# Patient Record
Sex: FEMALE | Race: BLACK OR AFRICAN AMERICAN | Employment: OTHER | URBAN - METROPOLITAN AREA
[De-identification: names, ages, dates, MRNs, and addresses within clinical notes are randomized per-mention and may not be internally consistent; named-entity substitution may affect disease eponyms.]

---

## 2017-02-20 PROBLEM — Z85.048 HISTORY OF ANAL CANCER: Status: ACTIVE | Noted: 2017-02-20

## 2017-05-22 PROBLEM — C52 VAGINAL CANCER (HCC): Status: ACTIVE | Noted: 2017-05-22

## 2017-05-22 PROBLEM — Z93.3 COLOSTOMY PRESENT (HCC): Status: ACTIVE | Noted: 2017-05-22

## 2018-04-17 ENCOUNTER — OFFICE VISIT (OUTPATIENT)
Dept: FAMILY MEDICINE CLINIC | Age: 68
End: 2018-04-17
Payer: COMMERCIAL

## 2018-04-17 VITALS
WEIGHT: 201 LBS | RESPIRATION RATE: 18 BRPM | BODY MASS INDEX: 31.55 KG/M2 | SYSTOLIC BLOOD PRESSURE: 138 MMHG | OXYGEN SATURATION: 98 % | HEIGHT: 67 IN | DIASTOLIC BLOOD PRESSURE: 88 MMHG | HEART RATE: 71 BPM

## 2018-04-17 DIAGNOSIS — I10 ESSENTIAL HYPERTENSION: ICD-10-CM

## 2018-04-17 DIAGNOSIS — E55.9 VITAMIN D DEFICIENCY: ICD-10-CM

## 2018-04-17 DIAGNOSIS — Z12.31 ENCOUNTER FOR SCREENING MAMMOGRAM FOR MALIGNANT NEOPLASM OF BREAST: ICD-10-CM

## 2018-04-17 DIAGNOSIS — E78.5 HYPERLIPIDEMIA, UNSPECIFIED HYPERLIPIDEMIA TYPE: ICD-10-CM

## 2018-04-17 DIAGNOSIS — E11.9 DIABETES MELLITUS WITHOUT COMPLICATION (HCC): Primary | ICD-10-CM

## 2018-04-17 DIAGNOSIS — Z23 NEED FOR SHINGLES VACCINE: ICD-10-CM

## 2018-04-17 DIAGNOSIS — Z23 NEED FOR TDAP VACCINATION: ICD-10-CM

## 2018-04-17 PROCEDURE — G8427 DOCREV CUR MEDS BY ELIG CLIN: HCPCS | Performed by: FAMILY MEDICINE

## 2018-04-17 PROCEDURE — 1090F PRES/ABSN URINE INCON ASSESS: CPT | Performed by: FAMILY MEDICINE

## 2018-04-17 PROCEDURE — G8417 CALC BMI ABV UP PARAM F/U: HCPCS | Performed by: FAMILY MEDICINE

## 2018-04-17 PROCEDURE — 4040F PNEUMOC VAC/ADMIN/RCVD: CPT | Performed by: FAMILY MEDICINE

## 2018-04-17 PROCEDURE — 1036F TOBACCO NON-USER: CPT | Performed by: FAMILY MEDICINE

## 2018-04-17 PROCEDURE — G8400 PT W/DXA NO RESULTS DOC: HCPCS | Performed by: FAMILY MEDICINE

## 2018-04-17 PROCEDURE — 3046F HEMOGLOBIN A1C LEVEL >9.0%: CPT | Performed by: FAMILY MEDICINE

## 2018-04-17 PROCEDURE — 1123F ACP DISCUSS/DSCN MKR DOCD: CPT | Performed by: FAMILY MEDICINE

## 2018-04-17 PROCEDURE — 3017F COLORECTAL CA SCREEN DOC REV: CPT | Performed by: FAMILY MEDICINE

## 2018-04-17 PROCEDURE — 99214 OFFICE O/P EST MOD 30 MIN: CPT | Performed by: FAMILY MEDICINE

## 2018-04-17 PROCEDURE — 2022F DILAT RTA XM EVC RTNOPTHY: CPT | Performed by: FAMILY MEDICINE

## 2018-04-17 RX ORDER — ATORVASTATIN CALCIUM 20 MG/1
20 TABLET, FILM COATED ORAL DAILY
Qty: 30 TABLET | Refills: 2 | Status: SHIPPED | OUTPATIENT
Start: 2018-04-17 | End: 2018-06-05 | Stop reason: SDUPTHER

## 2018-04-17 ASSESSMENT — PATIENT HEALTH QUESTIONNAIRE - PHQ9
SUM OF ALL RESPONSES TO PHQ QUESTIONS 1-9: 0
2. FEELING DOWN, DEPRESSED OR HOPELESS: 0
1. LITTLE INTEREST OR PLEASURE IN DOING THINGS: 0
SUM OF ALL RESPONSES TO PHQ9 QUESTIONS 1 & 2: 0

## 2018-06-05 DIAGNOSIS — I10 ESSENTIAL HYPERTENSION: ICD-10-CM

## 2018-06-05 DIAGNOSIS — E78.5 HYPERLIPIDEMIA, UNSPECIFIED HYPERLIPIDEMIA TYPE: ICD-10-CM

## 2018-06-05 DIAGNOSIS — E11.9 DIABETES MELLITUS WITHOUT COMPLICATION (HCC): ICD-10-CM

## 2018-06-05 RX ORDER — ATORVASTATIN CALCIUM 20 MG/1
20 TABLET, FILM COATED ORAL DAILY
Qty: 90 TABLET | Refills: 2 | Status: SHIPPED | OUTPATIENT
Start: 2018-06-05 | End: 2019-04-21 | Stop reason: SDUPTHER

## 2018-06-05 RX ORDER — ATENOLOL 50 MG/1
TABLET ORAL
Qty: 90 TABLET | Refills: 1 | Status: SHIPPED | OUTPATIENT
Start: 2018-06-05 | End: 2019-04-21 | Stop reason: SDUPTHER

## 2018-06-05 RX ORDER — HYDROCHLOROTHIAZIDE 12.5 MG/1
TABLET ORAL
Qty: 90 TABLET | Refills: 1 | Status: SHIPPED | OUTPATIENT
Start: 2018-06-05 | End: 2019-07-18 | Stop reason: SDUPTHER

## 2018-06-05 RX ORDER — GLIMEPIRIDE 2 MG/1
TABLET ORAL
Qty: 180 TABLET | Refills: 1 | Status: SHIPPED | OUTPATIENT
Start: 2018-06-05 | End: 2019-01-14 | Stop reason: SDUPTHER

## 2018-06-05 RX ORDER — AMLODIPINE BESYLATE 10 MG/1
TABLET ORAL
Qty: 90 TABLET | Refills: 1 | Status: SHIPPED | OUTPATIENT
Start: 2018-06-05 | End: 2019-01-14 | Stop reason: SDUPTHER

## 2018-08-20 ENCOUNTER — OFFICE VISIT (OUTPATIENT)
Dept: FAMILY MEDICINE CLINIC | Age: 68
End: 2018-08-20
Payer: COMMERCIAL

## 2018-08-20 ENCOUNTER — HOSPITAL ENCOUNTER (OUTPATIENT)
Age: 68
Discharge: HOME | End: 2018-08-22
Payer: COMMERCIAL

## 2018-08-20 VITALS
HEART RATE: 86 BPM | OXYGEN SATURATION: 98 % | WEIGHT: 200 LBS | BODY MASS INDEX: 31.39 KG/M2 | RESPIRATION RATE: 18 BRPM | SYSTOLIC BLOOD PRESSURE: 134 MMHG | DIASTOLIC BLOOD PRESSURE: 88 MMHG | HEIGHT: 67 IN

## 2018-08-20 DIAGNOSIS — Z12.39 SCREENING FOR BREAST CANCER: ICD-10-CM

## 2018-08-20 DIAGNOSIS — Z12.31 ENCOUNTER FOR SCREENING MAMMOGRAM FOR BREAST CANCER: ICD-10-CM

## 2018-08-20 DIAGNOSIS — E55.9 VITAMIN D DEFICIENCY: ICD-10-CM

## 2018-08-20 DIAGNOSIS — Z93.3 COLOSTOMY PRESENT (HCC): Primary | ICD-10-CM

## 2018-08-20 DIAGNOSIS — I10 HYPERTENSION, UNSPECIFIED TYPE: ICD-10-CM

## 2018-08-20 DIAGNOSIS — Z78.0 POSTMENOPAUSAL: ICD-10-CM

## 2018-08-20 DIAGNOSIS — E66.9 DIABETES MELLITUS TYPE 2 IN OBESE (HCC): ICD-10-CM

## 2018-08-20 DIAGNOSIS — E11.69 DIABETES MELLITUS TYPE 2 IN OBESE (HCC): ICD-10-CM

## 2018-08-20 LAB
CREATININE URINE: 95 MG/DL (ref 29–226)
HBA1C MFR BLD: 11.5 %
MICROALBUMIN UR-MCNC: 34.9 MG/L
MICROALBUMIN/CREAT UR-RTO: 36.7 (ref 0–30)

## 2018-08-20 PROCEDURE — G8427 DOCREV CUR MEDS BY ELIG CLIN: HCPCS | Performed by: FAMILY MEDICINE

## 2018-08-20 PROCEDURE — 1090F PRES/ABSN URINE INCON ASSESS: CPT | Performed by: FAMILY MEDICINE

## 2018-08-20 PROCEDURE — 80061 LIPID PANEL: CPT

## 2018-08-20 PROCEDURE — 1036F TOBACCO NON-USER: CPT | Performed by: FAMILY MEDICINE

## 2018-08-20 PROCEDURE — G8417 CALC BMI ABV UP PARAM F/U: HCPCS | Performed by: FAMILY MEDICINE

## 2018-08-20 PROCEDURE — 1123F ACP DISCUSS/DSCN MKR DOCD: CPT | Performed by: FAMILY MEDICINE

## 2018-08-20 PROCEDURE — 83036 HEMOGLOBIN GLYCOSYLATED A1C: CPT

## 2018-08-20 PROCEDURE — 83036 HEMOGLOBIN GLYCOSYLATED A1C: CPT | Performed by: FAMILY MEDICINE

## 2018-08-20 PROCEDURE — G8400 PT W/DXA NO RESULTS DOC: HCPCS | Performed by: FAMILY MEDICINE

## 2018-08-20 PROCEDURE — 2022F DILAT RTA XM EVC RTNOPTHY: CPT | Performed by: FAMILY MEDICINE

## 2018-08-20 PROCEDURE — 4040F PNEUMOC VAC/ADMIN/RCVD: CPT | Performed by: FAMILY MEDICINE

## 2018-08-20 PROCEDURE — 84443 ASSAY THYROID STIM HORMONE: CPT

## 2018-08-20 PROCEDURE — 99214 OFFICE O/P EST MOD 30 MIN: CPT | Performed by: FAMILY MEDICINE

## 2018-08-20 PROCEDURE — 3017F COLORECTAL CA SCREEN DOC REV: CPT | Performed by: FAMILY MEDICINE

## 2018-08-20 PROCEDURE — 3046F HEMOGLOBIN A1C LEVEL >9.0%: CPT | Performed by: FAMILY MEDICINE

## 2018-08-20 PROCEDURE — 1101F PT FALLS ASSESS-DOCD LE1/YR: CPT | Performed by: FAMILY MEDICINE

## 2018-08-20 PROCEDURE — 80053 COMPREHEN METABOLIC PANEL: CPT

## 2018-08-20 PROCEDURE — 82044 UR ALBUMIN SEMIQUANTITATIVE: CPT

## 2018-08-20 PROCEDURE — 82306 VITAMIN D 25 HYDROXY: CPT

## 2018-08-20 PROCEDURE — 82570 ASSAY OF URINE CREATININE: CPT

## 2018-08-20 PROCEDURE — 85027 COMPLETE CBC AUTOMATED: CPT

## 2018-08-20 NOTE — PROGRESS NOTES
Prescriptions on File Prior to Visit   Medication Sig Dispense Refill    amLODIPine (NORVASC) 10 MG tablet take 1 tablet by mouth once daily 90 tablet 1    atenolol (TENORMIN) 50 MG tablet take 1 tablet by mouth once daily 90 tablet 1    atorvastatin (LIPITOR) 20 MG tablet Take 1 tablet by mouth daily 90 tablet 2    glimepiride (AMARYL) 2 MG tablet take 1 tablet twice a day 180 tablet 1    hydrochlorothiazide (HYDRODIURIL) 12.5 MG tablet take 1 tablet by mouth once daily 90 tablet 1    metFORMIN (GLUCOPHAGE) 1000 MG tablet take 1 tablet by mouth twice a day with food 180 tablet 1    aspirin EC 81 MG EC tablet Take 1 tablet by mouth daily. 30 tablet 2    ibuprofen (ADVIL;MOTRIN) 200 MG tablet Take 400 mg by mouth every 6 hours as needed for Pain      traMADol (ULTRAM) 50 MG tablet take 1 tablet by mouth every 6 hours if needed for pain  0    ondansetron (ZOFRAN ODT) 4 MG disintegrating tablet Take 1 tablet by mouth every 8 hours as needed for Nausea or Vomiting 20 tablet 0     No current facility-administered medications on file prior to visit.       Patient Active Problem List   Diagnosis    Hypertension    Elevated lipids    Proteinuria    Vitamin D deficiency    [facility]    Closed fracture of radius    History of anal cancer    Colostomy present ([facility]    [facility]             Review Of Systems:    Skin: no abnormal pigmentation, rash, scaling, itching, masses, hair or nail changes  Eyes: no blurring, diplopia, or eye pain  Ears/Nose/Throat: no hearing loss, tinnitus, vertigo, nosebleed, nasal congestion, rhinorrhea, sore throat  Respiratory: no cough, pleuritic chest pain, dyspnea, or wheezing  Cardiovascular: no chest pain, angina, dyspnea on exertion, orthopnea, PND, palpitations, or claudication  Gastrointestinal: no nausea, vomiting, heartburn, diarrhea, constipation, bloating,  abdominal pain, or blood per rectum  Genitourinary: no urinary hemoglobin (Hb A1C)    CBC    Comprehensive Metabolic Panel    Hemoglobin A1C    Lipid Panel    TSH without Reflex   2. Colostomy present ([facility]     3. Hypertension, unspecified type  Microalbumin / Creatinine Urine Ratio   4. Screening for breast cancer  BELLE DIGITAL SCREEN W CAD BILATERAL   5. Encounter for screening mammogram for breast cancer  BELLE DIGITAL SCREEN W CAD BILATERAL   6. Postmenopausal  DEXA Bone Density Axial Skeleton   7. Vitamin D deficiency  Vitamin D 25 Hydrox, D2 & D3       PLAN:     Familia Morin was seen today for diabetes, constipation and other. Diagnoses and all orders for this visit:    Diabetes mellitus without complication ([facility]  -     POCT glycosylated hemoglobin (Hb A1C)  -     CBC; Future  -     Comprehensive Metabolic Panel; Future  -     Hemoglobin A1C; Future  -     Lipid Panel; Future  -     TSH without Reflex; Future  -    A1C  is still elevated even with compliance  -    Discussed other options including injectable antidiabetic regimens  -    Patient declines injections  -    Will refer to endocrinology      Colostomy present [facility])  -  Patient was advised to f/u with her surgeon regarding this    Hypertension, unspecified type  -     Microalbumin / Creatinine Urine Ratio; Future    Screening for breast cancer  -     BELLE DIGITAL SCREEN W CAD BILATERAL; Future    Encounter for screening mammogram for breast cancer  -     BELLE DIGITAL SCREEN W CAD BILATERAL; Future    Postmenopausal  -     DEXA Bone Density Axial Skeleton; Future    Vitamin D deficiency  -     Vitamin D 25 Hydrox, D2 & D3; Future      she was advised regarding the importance of compliance as to avoid possible risks and complications that may even  lead to permanent disability or even death . Patient verbalizes understanding. Counseled regarding above diagnosis, including possible risks and complications,  especially if left uncontrolled.   Counseled regarding the possible side effects, risks, benefits and

## 2018-08-21 LAB
ALBUMIN SERPL-MCNC: 4.5 G/DL (ref 3.5–5.2)
ALP BLD-CCNC: 96 U/L (ref 35–104)
ALT SERPL-CCNC: 23 U/L (ref 0–32)
ANION GAP SERPL CALCULATED.3IONS-SCNC: 25 MMOL/L (ref 7–16)
AST SERPL-CCNC: 26 U/L (ref 0–31)
BILIRUB SERPL-MCNC: 1.3 MG/DL (ref 0–1.2)
BUN BLDV-MCNC: 17 MG/DL (ref 8–23)
CALCIUM SERPL-MCNC: 10 MG/DL (ref 8.6–10.2)
CHLORIDE BLD-SCNC: 96 MMOL/L (ref 98–107)
CHOLESTEROL, TOTAL: 117 MG/DL (ref 0–199)
CO2: 18 MMOL/L (ref 22–29)
CREAT SERPL-MCNC: 0.9 MG/DL (ref 0.5–1)
GFR AFRICAN AMERICAN: >60
GFR NON-AFRICAN AMERICAN: >60 ML/MIN/1.73
GLUCOSE BLD-MCNC: 228 MG/DL (ref 74–109)
HBA1C MFR BLD: 11.2 % (ref 4–5.6)
HCT VFR BLD CALC: 53.1 % (ref 34–48)
HDLC SERPL-MCNC: 36 MG/DL
HEMOGLOBIN: 16.5 G/DL (ref 11.5–15.5)
LDL CHOLESTEROL CALCULATED: 65 MG/DL (ref 0–99)
MCH RBC QN AUTO: 27.9 PG (ref 26–35)
MCHC RBC AUTO-ENTMCNC: 31.1 % (ref 32–34.5)
MCV RBC AUTO: 89.8 FL (ref 80–99.9)
PDW BLD-RTO: 13.7 FL (ref 11.5–15)
PLATELET # BLD: 225 E9/L (ref 130–450)
PMV BLD AUTO: 13.6 FL (ref 7–12)
POTASSIUM SERPL-SCNC: 5.1 MMOL/L (ref 3.5–5)
RBC # BLD: 5.91 E12/L (ref 3.5–5.5)
SODIUM BLD-SCNC: 139 MMOL/L (ref 132–146)
TOTAL PROTEIN: 8.1 G/DL (ref 6.4–8.3)
TRIGL SERPL-MCNC: 81 MG/DL (ref 0–149)
TSH SERPL DL<=0.05 MIU/L-ACNC: 1.21 UIU/ML (ref 0.27–4.2)
VITAMIN D 25-HYDROXY: 11 NG/ML (ref 30–100)
VLDLC SERPL CALC-MCNC: 16 MG/DL
WBC # BLD: 10.9 E9/L (ref 4.5–11.5)

## 2018-09-06 RX ORDER — ERGOCALCIFEROL 1.25 MG/1
50000 CAPSULE ORAL WEEKLY
Qty: 12 CAPSULE | Refills: 0 | Status: SHIPPED | OUTPATIENT
Start: 2018-09-06 | End: 2019-12-23

## 2019-01-14 ENCOUNTER — OFFICE VISIT (OUTPATIENT)
Dept: FAMILY MEDICINE CLINIC | Age: 69
End: 2019-01-14
Payer: COMMERCIAL

## 2019-01-14 VITALS
BODY MASS INDEX: 31.23 KG/M2 | HEART RATE: 94 BPM | SYSTOLIC BLOOD PRESSURE: 126 MMHG | RESPIRATION RATE: 18 BRPM | OXYGEN SATURATION: 98 % | WEIGHT: 199 LBS | DIASTOLIC BLOOD PRESSURE: 98 MMHG | HEIGHT: 67 IN

## 2019-01-14 DIAGNOSIS — I10 ESSENTIAL HYPERTENSION: ICD-10-CM

## 2019-01-14 DIAGNOSIS — E11.9 DIABETES MELLITUS WITHOUT COMPLICATION (HCC): ICD-10-CM

## 2019-01-14 DIAGNOSIS — E11.9 DIABETES MELLITUS WITHOUT COMPLICATION (HCC): Primary | ICD-10-CM

## 2019-01-14 LAB — HBA1C MFR BLD: 11.8 %

## 2019-01-14 PROCEDURE — G8417 CALC BMI ABV UP PARAM F/U: HCPCS | Performed by: FAMILY MEDICINE

## 2019-01-14 PROCEDURE — 1123F ACP DISCUSS/DSCN MKR DOCD: CPT | Performed by: FAMILY MEDICINE

## 2019-01-14 PROCEDURE — 1036F TOBACCO NON-USER: CPT | Performed by: FAMILY MEDICINE

## 2019-01-14 PROCEDURE — 1101F PT FALLS ASSESS-DOCD LE1/YR: CPT | Performed by: FAMILY MEDICINE

## 2019-01-14 PROCEDURE — G8400 PT W/DXA NO RESULTS DOC: HCPCS | Performed by: FAMILY MEDICINE

## 2019-01-14 PROCEDURE — G8427 DOCREV CUR MEDS BY ELIG CLIN: HCPCS | Performed by: FAMILY MEDICINE

## 2019-01-14 PROCEDURE — 2022F DILAT RTA XM EVC RTNOPTHY: CPT | Performed by: FAMILY MEDICINE

## 2019-01-14 PROCEDURE — 1090F PRES/ABSN URINE INCON ASSESS: CPT | Performed by: FAMILY MEDICINE

## 2019-01-14 PROCEDURE — 3046F HEMOGLOBIN A1C LEVEL >9.0%: CPT | Performed by: FAMILY MEDICINE

## 2019-01-14 PROCEDURE — 83036 HEMOGLOBIN GLYCOSYLATED A1C: CPT | Performed by: FAMILY MEDICINE

## 2019-01-14 PROCEDURE — 99214 OFFICE O/P EST MOD 30 MIN: CPT | Performed by: FAMILY MEDICINE

## 2019-01-14 PROCEDURE — G8484 FLU IMMUNIZE NO ADMIN: HCPCS | Performed by: FAMILY MEDICINE

## 2019-01-14 PROCEDURE — 3017F COLORECTAL CA SCREEN DOC REV: CPT | Performed by: FAMILY MEDICINE

## 2019-01-14 PROCEDURE — 4040F PNEUMOC VAC/ADMIN/RCVD: CPT | Performed by: FAMILY MEDICINE

## 2019-01-14 RX ORDER — AMLODIPINE BESYLATE 10 MG/1
TABLET ORAL
Qty: 90 TABLET | Refills: 1 | Status: SHIPPED | OUTPATIENT
Start: 2019-01-14 | End: 2019-07-18 | Stop reason: SDUPTHER

## 2019-01-14 RX ORDER — GLIMEPIRIDE 2 MG/1
TABLET ORAL
Qty: 180 TABLET | Refills: 3 | Status: SHIPPED | OUTPATIENT
Start: 2019-01-14 | End: 2019-11-12 | Stop reason: SDUPTHER

## 2019-01-28 ENCOUNTER — TELEPHONE (OUTPATIENT)
Dept: FAMILY MEDICINE CLINIC | Age: 69
End: 2019-01-28

## 2019-04-21 DIAGNOSIS — E11.9 DIABETES MELLITUS WITHOUT COMPLICATION (HCC): ICD-10-CM

## 2019-04-21 DIAGNOSIS — E78.5 HYPERLIPIDEMIA, UNSPECIFIED HYPERLIPIDEMIA TYPE: ICD-10-CM

## 2019-04-22 RX ORDER — ATORVASTATIN CALCIUM 20 MG/1
TABLET, FILM COATED ORAL
Qty: 90 TABLET | Refills: 2 | Status: SHIPPED
Start: 2019-04-22 | End: 2020-02-17

## 2019-04-22 RX ORDER — ATENOLOL 50 MG/1
TABLET ORAL
Qty: 90 TABLET | Refills: 1 | Status: SHIPPED | OUTPATIENT
Start: 2019-04-22 | End: 2019-07-22 | Stop reason: SDUPTHER

## 2019-07-18 DIAGNOSIS — E11.9 DIABETES MELLITUS WITHOUT COMPLICATION (HCC): ICD-10-CM

## 2019-07-18 DIAGNOSIS — I10 ESSENTIAL HYPERTENSION: ICD-10-CM

## 2019-07-18 RX ORDER — HYDROCHLOROTHIAZIDE 12.5 MG/1
TABLET ORAL
Qty: 90 TABLET | Refills: 1 | Status: SHIPPED | OUTPATIENT
Start: 2019-07-18 | End: 2019-12-23 | Stop reason: SDUPTHER

## 2019-07-18 RX ORDER — AMLODIPINE BESYLATE 10 MG/1
TABLET ORAL
Qty: 90 TABLET | Refills: 1 | Status: SHIPPED | OUTPATIENT
Start: 2019-07-18 | End: 2019-11-12 | Stop reason: SDUPTHER

## 2019-07-22 RX ORDER — ATENOLOL 50 MG/1
TABLET ORAL
Qty: 90 TABLET | Refills: 1 | Status: SHIPPED
Start: 2019-07-22 | End: 2020-02-21

## 2019-11-12 DIAGNOSIS — I10 ESSENTIAL HYPERTENSION: ICD-10-CM

## 2019-11-12 DIAGNOSIS — E11.9 DIABETES MELLITUS WITHOUT COMPLICATION (HCC): ICD-10-CM

## 2019-11-12 RX ORDER — AMLODIPINE BESYLATE 10 MG/1
TABLET ORAL
Qty: 90 TABLET | Refills: 0 | Status: SHIPPED
Start: 2019-11-12 | End: 2020-02-10

## 2019-11-12 RX ORDER — GLIMEPIRIDE 2 MG/1
TABLET ORAL
Qty: 180 TABLET | Refills: 0 | Status: SHIPPED
Start: 2019-11-12 | End: 2020-02-10

## 2019-12-23 ENCOUNTER — HOSPITAL ENCOUNTER (OUTPATIENT)
Age: 69
Discharge: HOME | End: 2019-12-25
Payer: COMMERCIAL

## 2019-12-23 ENCOUNTER — OFFICE VISIT (OUTPATIENT)
Dept: FAMILY MEDICINE CLINIC | Age: 69
End: 2019-12-23
Payer: COMMERCIAL

## 2019-12-23 VITALS
HEIGHT: 67 IN | HEART RATE: 84 BPM | RESPIRATION RATE: 18 BRPM | WEIGHT: 195 LBS | BODY MASS INDEX: 30.61 KG/M2 | DIASTOLIC BLOOD PRESSURE: 90 MMHG | SYSTOLIC BLOOD PRESSURE: 150 MMHG

## 2019-12-23 DIAGNOSIS — E78.5 ELEVATED LIPIDS: ICD-10-CM

## 2019-12-23 DIAGNOSIS — I10 ESSENTIAL HYPERTENSION: ICD-10-CM

## 2019-12-23 DIAGNOSIS — E55.9 VITAMIN D DEFICIENCY: ICD-10-CM

## 2019-12-23 DIAGNOSIS — E11.9 DIABETES MELLITUS WITHOUT COMPLICATION (HCC): Primary | ICD-10-CM

## 2019-12-23 DIAGNOSIS — E11.9 DIABETES MELLITUS WITHOUT COMPLICATION (HCC): ICD-10-CM

## 2019-12-23 LAB
CREATININE URINE POCT: ABNORMAL
HBA1C MFR BLD: 10.6 %
MICROALBUMIN/CREAT 24H UR: ABNORMAL MG/G{CREAT}
MICROALBUMIN/CREAT UR-RTO: >300

## 2019-12-23 PROCEDURE — 99214 OFFICE O/P EST MOD 30 MIN: CPT | Performed by: FAMILY MEDICINE

## 2019-12-23 PROCEDURE — 1090F PRES/ABSN URINE INCON ASSESS: CPT | Performed by: FAMILY MEDICINE

## 2019-12-23 PROCEDURE — 2022F DILAT RTA XM EVC RTNOPTHY: CPT | Performed by: FAMILY MEDICINE

## 2019-12-23 PROCEDURE — 3017F COLORECTAL CA SCREEN DOC REV: CPT | Performed by: FAMILY MEDICINE

## 2019-12-23 PROCEDURE — 36415 COLL VENOUS BLD VENIPUNCTURE: CPT | Performed by: FAMILY MEDICINE

## 2019-12-23 PROCEDURE — 1036F TOBACCO NON-USER: CPT | Performed by: FAMILY MEDICINE

## 2019-12-23 PROCEDURE — 82044 UR ALBUMIN SEMIQUANTITATIVE: CPT | Performed by: FAMILY MEDICINE

## 2019-12-23 PROCEDURE — 85027 COMPLETE CBC AUTOMATED: CPT

## 2019-12-23 PROCEDURE — 80061 LIPID PANEL: CPT

## 2019-12-23 PROCEDURE — G8417 CALC BMI ABV UP PARAM F/U: HCPCS | Performed by: FAMILY MEDICINE

## 2019-12-23 PROCEDURE — 84443 ASSAY THYROID STIM HORMONE: CPT

## 2019-12-23 PROCEDURE — G8427 DOCREV CUR MEDS BY ELIG CLIN: HCPCS | Performed by: FAMILY MEDICINE

## 2019-12-23 PROCEDURE — 83036 HEMOGLOBIN GLYCOSYLATED A1C: CPT | Performed by: FAMILY MEDICINE

## 2019-12-23 PROCEDURE — G8484 FLU IMMUNIZE NO ADMIN: HCPCS | Performed by: FAMILY MEDICINE

## 2019-12-23 PROCEDURE — 3046F HEMOGLOBIN A1C LEVEL >9.0%: CPT | Performed by: FAMILY MEDICINE

## 2019-12-23 PROCEDURE — G8400 PT W/DXA NO RESULTS DOC: HCPCS | Performed by: FAMILY MEDICINE

## 2019-12-23 PROCEDURE — 4040F PNEUMOC VAC/ADMIN/RCVD: CPT | Performed by: FAMILY MEDICINE

## 2019-12-23 PROCEDURE — 80053 COMPREHEN METABOLIC PANEL: CPT

## 2019-12-23 PROCEDURE — 1123F ACP DISCUSS/DSCN MKR DOCD: CPT | Performed by: FAMILY MEDICINE

## 2019-12-23 PROCEDURE — 82306 VITAMIN D 25 HYDROXY: CPT

## 2019-12-23 RX ORDER — HYDROCHLOROTHIAZIDE 12.5 MG/1
12.5 TABLET ORAL DAILY
Qty: 90 TABLET | Refills: 5 | Status: SHIPPED
Start: 2019-12-23 | End: 2020-06-15 | Stop reason: SDUPTHER

## 2019-12-23 ASSESSMENT — PATIENT HEALTH QUESTIONNAIRE - PHQ9
1. LITTLE INTEREST OR PLEASURE IN DOING THINGS: 0
SUM OF ALL RESPONSES TO PHQ QUESTIONS 1-9: 0
SUM OF ALL RESPONSES TO PHQ9 QUESTIONS 1 & 2: 0
SUM OF ALL RESPONSES TO PHQ QUESTIONS 1-9: 0
2. FEELING DOWN, DEPRESSED OR HOPELESS: 0

## 2019-12-24 LAB
ALBUMIN SERPL-MCNC: 4.5 G/DL (ref 3.5–5.2)
ALP BLD-CCNC: 116 U/L (ref 35–104)
ALT SERPL-CCNC: 17 U/L (ref 0–32)
ANION GAP SERPL CALCULATED.3IONS-SCNC: 19 MMOL/L (ref 7–16)
AST SERPL-CCNC: 15 U/L (ref 0–31)
BILIRUB SERPL-MCNC: 1 MG/DL (ref 0–1.2)
BUN BLDV-MCNC: 14 MG/DL (ref 8–23)
CALCIUM SERPL-MCNC: 10.2 MG/DL (ref 8.6–10.2)
CHLORIDE BLD-SCNC: 97 MMOL/L (ref 98–107)
CHOLESTEROL, TOTAL: 126 MG/DL (ref 0–199)
CO2: 19 MMOL/L (ref 22–29)
CREAT SERPL-MCNC: 0.8 MG/DL (ref 0.5–1)
GFR AFRICAN AMERICAN: >60
GFR NON-AFRICAN AMERICAN: >60 ML/MIN/1.73
GLUCOSE BLD-MCNC: 259 MG/DL (ref 74–99)
HCT VFR BLD CALC: 50.3 % (ref 34–48)
HDLC SERPL-MCNC: 43 MG/DL
HEMOGLOBIN: 15.5 G/DL (ref 11.5–15.5)
LDL CHOLESTEROL CALCULATED: 66 MG/DL (ref 0–99)
MCH RBC QN AUTO: 26.9 PG (ref 26–35)
MCHC RBC AUTO-ENTMCNC: 30.8 % (ref 32–34.5)
MCV RBC AUTO: 87.2 FL (ref 80–99.9)
PDW BLD-RTO: 13.8 FL (ref 11.5–15)
PLATELET # BLD: 257 E9/L (ref 130–450)
PMV BLD AUTO: 13.7 FL (ref 7–12)
POTASSIUM SERPL-SCNC: 4.5 MMOL/L (ref 3.5–5)
RBC # BLD: 5.77 E12/L (ref 3.5–5.5)
SODIUM BLD-SCNC: 135 MMOL/L (ref 132–146)
TOTAL PROTEIN: 7.9 G/DL (ref 6.4–8.3)
TRIGL SERPL-MCNC: 83 MG/DL (ref 0–149)
TSH SERPL DL<=0.05 MIU/L-ACNC: 1.24 UIU/ML (ref 0.27–4.2)
VITAMIN D 25-HYDROXY: 9 NG/ML (ref 30–100)
VLDLC SERPL CALC-MCNC: 17 MG/DL
WBC # BLD: 9.4 E9/L (ref 4.5–11.5)

## 2019-12-27 RX ORDER — ERGOCALCIFEROL 1.25 MG/1
50000 CAPSULE ORAL WEEKLY
Qty: 12 CAPSULE | Refills: 0 | Status: SHIPPED
Start: 2019-12-27 | End: 2020-09-10 | Stop reason: SDUPTHER

## 2020-01-08 ENCOUNTER — TELEPHONE (OUTPATIENT)
Dept: FAMILY MEDICINE CLINIC | Age: 70
End: 2020-01-08

## 2020-01-08 NOTE — TELEPHONE ENCOUNTER
Prescriptions have been sent. Please reinforce patient to get her prescriptions filled and to take them regularly. Will contact  to see what is the issue with her not being able to fill her meds and to identify any social issues. Thanks!

## 2020-01-08 NOTE — TELEPHONE ENCOUNTER
Patient called stating that her HCTZ can't be filled until Feb 29 but that she has never taken HCTZ and also the Onglyza can't be filled until [address]. After investigating in her chart Onglyza was just added Dec 23. Called pharmacy and they said same thing too early to fill both medications which I told him that she hasn't never taken Onglyza and was just added Dec 23. States that he still has Rx that was sent 7/18/19 but she must of gotten filled elsewhere because it was never picked up. In chart, all Rx's for HCTZ has been sent to [facility] dating back to 2016. Please advise.

## 2020-01-08 NOTE — TELEPHONE ENCOUNTER
It doesn't matter which pharmacy you send it to, insurance is one stating they will not refill until then.

## 2020-01-09 ENCOUNTER — CARE COORDINATION (OUTPATIENT)
Dept: FAMILY MEDICINE CLINIC | Age: 70
End: 2020-01-09

## 2020-01-09 NOTE — CARE COORDINATION
LSW called [facility]'s pharmacy and spoke with pharmacist Jazmín Barraza. Processed and reviewed medications going back to 2016. Lyn Garcia was given a script in July for the HCTZ but never took it to the pharmacy, an e-script was sent in as well. Pharmacy did fill it but it was never picked up. Pharmacy has a note stating it was a hold and restocked due to no . The Onglyza medication is a brand new script that Lyn Garcia has never been on before, yet insurance is stating it is too soon to refill that as well. Pharmacist agrees that there is some glitch in the insurance system and will call them today to try and get this straightened out for [address]. He will call LSW back with update. LSW will keep PCP updated on progress and outcome.

## 2020-01-28 ENCOUNTER — OFFICE VISIT (OUTPATIENT)
Dept: FAMILY MEDICINE CLINIC | Age: 70
End: 2020-01-28
Payer: COMMERCIAL

## 2020-01-28 VITALS
HEART RATE: 78 BPM | DIASTOLIC BLOOD PRESSURE: 88 MMHG | BODY MASS INDEX: 30.61 KG/M2 | OXYGEN SATURATION: 98 % | SYSTOLIC BLOOD PRESSURE: 138 MMHG | HEIGHT: 67 IN | RESPIRATION RATE: 18 BRPM | WEIGHT: 195 LBS

## 2020-01-28 PROCEDURE — G8400 PT W/DXA NO RESULTS DOC: HCPCS | Performed by: FAMILY MEDICINE

## 2020-01-28 PROCEDURE — 1090F PRES/ABSN URINE INCON ASSESS: CPT | Performed by: FAMILY MEDICINE

## 2020-01-28 PROCEDURE — 1123F ACP DISCUSS/DSCN MKR DOCD: CPT | Performed by: FAMILY MEDICINE

## 2020-01-28 PROCEDURE — 3046F HEMOGLOBIN A1C LEVEL >9.0%: CPT | Performed by: FAMILY MEDICINE

## 2020-01-28 PROCEDURE — 2022F DILAT RTA XM EVC RTNOPTHY: CPT | Performed by: FAMILY MEDICINE

## 2020-01-28 PROCEDURE — G8427 DOCREV CUR MEDS BY ELIG CLIN: HCPCS | Performed by: FAMILY MEDICINE

## 2020-01-28 PROCEDURE — G8484 FLU IMMUNIZE NO ADMIN: HCPCS | Performed by: FAMILY MEDICINE

## 2020-01-28 PROCEDURE — 4040F PNEUMOC VAC/ADMIN/RCVD: CPT | Performed by: FAMILY MEDICINE

## 2020-01-28 PROCEDURE — 1036F TOBACCO NON-USER: CPT | Performed by: FAMILY MEDICINE

## 2020-01-28 PROCEDURE — 3017F COLORECTAL CA SCREEN DOC REV: CPT | Performed by: FAMILY MEDICINE

## 2020-01-28 PROCEDURE — 99214 OFFICE O/P EST MOD 30 MIN: CPT | Performed by: FAMILY MEDICINE

## 2020-01-28 PROCEDURE — G8417 CALC BMI ABV UP PARAM F/U: HCPCS | Performed by: FAMILY MEDICINE

## 2020-01-28 ASSESSMENT — PATIENT HEALTH QUESTIONNAIRE - PHQ9
SUM OF ALL RESPONSES TO PHQ QUESTIONS 1-9: 0
2. FEELING DOWN, DEPRESSED OR HOPELESS: 0
SUM OF ALL RESPONSES TO PHQ QUESTIONS 1-9: 0
1. LITTLE INTEREST OR PLEASURE IN DOING THINGS: 0
SUM OF ALL RESPONSES TO PHQ9 QUESTIONS 1 & 2: 0

## 2020-01-28 NOTE — PROGRESS NOTES
SUBJECTIVE:        Patient ID: Liang Sanchez is a 71 y.o. female who presents for   Chief Complaint   Patient presents with    Diabetes    Other     declined flu, shingles, tdap; due for mammo, DEXA, eye; FIT test at home       Diabetes Mellitus:improved but still uncontrolled, states she is taking medications regularly. Non compliant with diet. No lightheadedness or syncope. No exertional chest pain or dyspnea. No intermittent claudication. No foot pain or numbness. Recent labs reviewed and discussed with patient. Hemoglobin A1C (%)   Date Value   12/23/2019 10.6     BP has improved. Denies an headaches, chest pain, sob, palpitations    Hyperlipidemia:No weakness or myalgias. No chest pain or dyspnea.           Past Medical History:   Diagnosis Date    Cancer [facility])     colon and uterine    Closed fracture of radius 12/27/2016    Elevated lipids 1/15/2014    Headache     History of anal cancer 2/20/2017    Dr. Gokul Callahan Hyperlipidemia     Hypertension     Hypertension     Obesity     Proteinuria 6/25/2014    Type II or unspecified type diabetes mellitus without mention of complication, not stated as uncontrolled     Vaginal cancer ([address] 5/22/2017    Vitamin D deficiency 11/6/2014       Patient Active Problem List   Diagnosis    Hypertension    Elevated lipids    Proteinuria    Vitamin D deficiency    Closed fracture of radius    History of anal cancer    Colostomy present ([address]    Vaginal cancer ([address]    Diabetes mellitus type 2 in obese [facility])       Past Surgical History:   Procedure Laterality Date    BREAST BIOPSY      COLOSTOMY      RECTAL SURGERY      WRIST FRACTURE SURGERY Left 11/07/2016       Family History   Problem Relation Age of Onset    Kidney Disease Mother     High Blood Pressure Mother     Cancer Father     Diabetes Sister     Other Brother        Social History     Socioeconomic History    Marital status: Single     Spouse name: None    Number of children: None

## 2020-02-10 RX ORDER — AMLODIPINE BESYLATE 10 MG/1
TABLET ORAL
Qty: 90 TABLET | Refills: 0 | Status: SHIPPED
Start: 2020-02-10 | End: 2020-05-11

## 2020-02-10 RX ORDER — GLIMEPIRIDE 2 MG/1
TABLET ORAL
Qty: 180 TABLET | Refills: 0 | Status: SHIPPED
Start: 2020-02-10 | End: 2020-05-11

## 2020-02-17 RX ORDER — ATORVASTATIN CALCIUM 20 MG/1
TABLET, FILM COATED ORAL
Qty: 90 TABLET | Refills: 2 | Status: SHIPPED
Start: 2020-02-17 | End: 2020-11-06 | Stop reason: SDUPTHER

## 2020-02-21 RX ORDER — ATENOLOL 50 MG/1
TABLET ORAL
Qty: 90 TABLET | Refills: 1 | Status: SHIPPED
Start: 2020-02-21 | End: 2020-11-06 | Stop reason: SDUPTHER

## 2020-05-11 RX ORDER — AMLODIPINE BESYLATE 10 MG/1
TABLET ORAL
Qty: 90 TABLET | Refills: 1 | Status: SHIPPED
Start: 2020-05-11 | End: 2020-08-26

## 2020-05-11 RX ORDER — GLIMEPIRIDE 2 MG/1
TABLET ORAL
Qty: 180 TABLET | Refills: 1 | Status: SHIPPED
Start: 2020-05-11 | End: 2020-06-15 | Stop reason: SDUPTHER

## 2020-06-15 ENCOUNTER — TELEPHONE (OUTPATIENT)
Dept: FAMILY MEDICINE CLINIC | Age: 70
End: 2020-06-15

## 2020-06-15 ENCOUNTER — OFFICE VISIT (OUTPATIENT)
Dept: FAMILY MEDICINE CLINIC | Age: 70
End: 2020-06-15
Payer: COMMERCIAL

## 2020-06-15 VITALS
SYSTOLIC BLOOD PRESSURE: 148 MMHG | WEIGHT: 197 LBS | BODY MASS INDEX: 30.85 KG/M2 | OXYGEN SATURATION: 98 % | RESPIRATION RATE: 18 BRPM | HEART RATE: 84 BPM | DIASTOLIC BLOOD PRESSURE: 100 MMHG | TEMPERATURE: 97.5 F

## 2020-06-15 LAB — HBA1C MFR BLD: 10.1 %

## 2020-06-15 PROCEDURE — 2022F DILAT RTA XM EVC RTNOPTHY: CPT | Performed by: FAMILY MEDICINE

## 2020-06-15 PROCEDURE — 3046F HEMOGLOBIN A1C LEVEL >9.0%: CPT | Performed by: FAMILY MEDICINE

## 2020-06-15 PROCEDURE — 1123F ACP DISCUSS/DSCN MKR DOCD: CPT | Performed by: FAMILY MEDICINE

## 2020-06-15 PROCEDURE — G8400 PT W/DXA NO RESULTS DOC: HCPCS | Performed by: FAMILY MEDICINE

## 2020-06-15 PROCEDURE — 3017F COLORECTAL CA SCREEN DOC REV: CPT | Performed by: FAMILY MEDICINE

## 2020-06-15 PROCEDURE — G8417 CALC BMI ABV UP PARAM F/U: HCPCS | Performed by: FAMILY MEDICINE

## 2020-06-15 PROCEDURE — 1036F TOBACCO NON-USER: CPT | Performed by: FAMILY MEDICINE

## 2020-06-15 PROCEDURE — 83036 HEMOGLOBIN GLYCOSYLATED A1C: CPT | Performed by: FAMILY MEDICINE

## 2020-06-15 PROCEDURE — 4040F PNEUMOC VAC/ADMIN/RCVD: CPT | Performed by: FAMILY MEDICINE

## 2020-06-15 PROCEDURE — G8427 DOCREV CUR MEDS BY ELIG CLIN: HCPCS | Performed by: FAMILY MEDICINE

## 2020-06-15 PROCEDURE — 99214 OFFICE O/P EST MOD 30 MIN: CPT | Performed by: FAMILY MEDICINE

## 2020-06-15 PROCEDURE — 1090F PRES/ABSN URINE INCON ASSESS: CPT | Performed by: FAMILY MEDICINE

## 2020-06-15 RX ORDER — HYDROCHLOROTHIAZIDE 25 MG/1
25 TABLET ORAL DAILY
Qty: 30 TABLET | Refills: 3 | Status: SHIPPED
Start: 2020-06-15 | End: 2021-09-19

## 2020-06-15 RX ORDER — GLIMEPIRIDE 4 MG/1
TABLET ORAL
Qty: 60 TABLET | Refills: 3 | Status: SHIPPED
Start: 2020-06-15 | End: 2020-09-10

## 2020-06-15 NOTE — PROGRESS NOTES
Current Outpatient Medications on File Prior to Visit   Medication Sig Dispense Refill    amLODIPine (NORVASC) 10 MG tablet take 1 tablet by mouth once daily 90 tablet 1    glimepiride (AMARYL) 2 MG tablet take 1 tablet by mouth twice a day 180 tablet 1    metFORMIN (GLUCOPHAGE) 1000 MG tablet take 1 tablet by mouth twice a day with food 180 tablet 1    atenolol (TENORMIN) 50 MG tablet take 1 tablet by mouth once daily 90 tablet 1    atorvastatin (LIPITOR) 20 MG tablet take 1 tablet by mouth once daily 90 tablet 2    saxagliptin (ONGLYZA) 5 MG TABS tablet Take 1 tablet by mouth daily 30 tablet 5    aspirin EC 81 MG EC tablet Take 1 tablet by mouth daily. 30 tablet 2    vitamin D (ERGOCALCIFEROL) 1.25 MG (17035 UT) CAPS capsule Take 1 capsule by mouth once a week for 12 doses 12 capsule 0     No current facility-administered medications on file prior to visit. Past medical, surgical, family and social history were reviewed and updated if stated    OBJECTIVE:     VS: BP (!) 148/100   Pulse 84   Temp 97.5 °F (36.4 °C) (Temporal)   Resp 18   Wt 197 lb (89.4 kg)   SpO2 98%   BMI 30.85 kg/m²   Wt Readings from Last 3 Encounters:   06/15/20 197 lb (89.4 kg)   01/28/20 195 lb (88.5 kg)   12/23/19 195 lb (88.5 kg)     Temp Readings from Last 3 Encounters:   06/15/20 97.5 °F (36.4 °C) (Temporal)   01/16/18 99.1 °F (37.3 °C) (Oral)   11/07/16 97.6 °F (36.4 °C)     BP Readings from Last 3 Encounters:   06/15/20 (!) 148/100   01/28/20 138/88   12/23/19 (!) 150/90        General assesment: Alert, Awake, Oriented times 3, no distress  Skin: Warm and dry  Head: Normocephalic.  No masses, lesions or tenderness noted  Eyes: Conjunctivae appear normal.   Ears: External ears normal  Chest - clear to auscultation, no wheezes, rales or rhonchi, symmetric air entry  Heart - RRR, S1, S2, positive murmurs  Extremities - peripheral pulses normal, no pedal edema, no clubbing or cyanosis  Mental status: normal mood  Neuro: grossly normal    ASSESSMENT / PLAN :     Maximo Sharma was seen today for diabetes and other. Diagnoses and all orders for this visit:    Diabetes mellitus without complication ([facility]  -     POCT glycosylated hemoglobin (Hb A1C)  -     CBC; Future  -     Comprehensive Metabolic Panel; Future  -     Hemoglobin A1C; Future  -     Increase dose of glimepiride (AMARYL) 4 MG tablet; take 1 tablet by mouth twice a day  Monitor blood sugars  Discussed lifestyle modification    Essential hypertension  -     hydrochlorothiazide (HYDRODIURIL) 25 MG tablet; Take 1 tablet by mouth daily  -     Microalbumin / Creatinine Urine Ratio; Future  -     TSH without Reflex; Future  -    Monitor bp    Elevated lipids  -     Lipid Panel; Future  -      Continue lipitor        Counseled regarding above diagnosis, including possible risks and complications,  especially if left uncontrolled. Counseled regarding the possible side effects, risks, benefits and alternatives to treatment; patient and/or guardian verbalizes understanding, agrees, feels comfortable with and wishes to proceed with above treatment plan. Advised patient to call with any new medication issues, and read all Rx info from pharmacy to assure aware of all possible risks and side effects of medication before taking. Reviewed age and gender appropriate health screening exams and vaccinations. Advised patient regarding importance of keeping up with recommended health maintenance and to schedule as soon as possible if overdue, as this is important in assessing for undiagnosed pathology, especially cancer, as well as protecting against potentially harmful/life threatening disease. Refills as needed    Discussed any signs and symptoms that would warrant immediate ED evaluation    Maximo Sharma was instructed to call if any new symptoms develop prior to next visit.          F/U as instructed    Sade Francois M.D

## 2020-06-16 ENCOUNTER — TELEPHONE (OUTPATIENT)
Dept: FAMILY MEDICINE CLINIC | Age: 70
End: 2020-06-16

## 2020-08-26 RX ORDER — GLIMEPIRIDE 2 MG/1
TABLET ORAL
Qty: 180 TABLET | Refills: 1 | Status: SHIPPED
Start: 2020-08-26 | End: 2021-02-19

## 2020-08-26 RX ORDER — AMLODIPINE BESYLATE 10 MG/1
TABLET ORAL
Qty: 90 TABLET | Refills: 1 | Status: SHIPPED
Start: 2020-08-26 | End: 2021-02-19

## 2020-09-08 ENCOUNTER — OFFICE VISIT (OUTPATIENT)
Dept: FAMILY MEDICINE CLINIC | Age: 70
End: 2020-09-08
Payer: COMMERCIAL

## 2020-09-08 VITALS
TEMPERATURE: 97.3 F | WEIGHT: 199 LBS | HEIGHT: 67 IN | BODY MASS INDEX: 31.23 KG/M2 | RESPIRATION RATE: 18 BRPM | SYSTOLIC BLOOD PRESSURE: 148 MMHG | HEART RATE: 111 BPM | OXYGEN SATURATION: 98 % | DIASTOLIC BLOOD PRESSURE: 98 MMHG

## 2020-09-08 PROCEDURE — 1123F ACP DISCUSS/DSCN MKR DOCD: CPT | Performed by: FAMILY MEDICINE

## 2020-09-08 PROCEDURE — 36415 COLL VENOUS BLD VENIPUNCTURE: CPT | Performed by: FAMILY MEDICINE

## 2020-09-08 PROCEDURE — 4040F PNEUMOC VAC/ADMIN/RCVD: CPT | Performed by: FAMILY MEDICINE

## 2020-09-08 PROCEDURE — G8417 CALC BMI ABV UP PARAM F/U: HCPCS | Performed by: FAMILY MEDICINE

## 2020-09-08 PROCEDURE — 1036F TOBACCO NON-USER: CPT | Performed by: FAMILY MEDICINE

## 2020-09-08 PROCEDURE — 1090F PRES/ABSN URINE INCON ASSESS: CPT | Performed by: FAMILY MEDICINE

## 2020-09-08 PROCEDURE — G8427 DOCREV CUR MEDS BY ELIG CLIN: HCPCS | Performed by: FAMILY MEDICINE

## 2020-09-08 PROCEDURE — 2022F DILAT RTA XM EVC RTNOPTHY: CPT | Performed by: FAMILY MEDICINE

## 2020-09-08 PROCEDURE — 99214 OFFICE O/P EST MOD 30 MIN: CPT | Performed by: FAMILY MEDICINE

## 2020-09-08 PROCEDURE — 3017F COLORECTAL CA SCREEN DOC REV: CPT | Performed by: FAMILY MEDICINE

## 2020-09-08 PROCEDURE — 3046F HEMOGLOBIN A1C LEVEL >9.0%: CPT | Performed by: FAMILY MEDICINE

## 2020-09-08 PROCEDURE — G8400 PT W/DXA NO RESULTS DOC: HCPCS | Performed by: FAMILY MEDICINE

## 2020-09-08 RX ORDER — HYDRALAZINE HYDROCHLORIDE 25 MG/1
25 TABLET, FILM COATED ORAL 2 TIMES DAILY
Qty: 60 TABLET | Refills: 3 | Status: SHIPPED
Start: 2020-09-08 | End: 2021-09-19

## 2020-09-08 NOTE — PROGRESS NOTES
SUBJECTIVE:        Patient ID: Phoebe Quiroga is a 79 y.o. female who presents for   Chief Complaint   Patient presents with    Diabetes    Other     due for AWV, eye; declined flu, shingles, tdap       Diabetes Mellitus:improved but still uncontrolled, states she is taking medications regularly. Non compliant with diet. Hemoglobin A1C (%)   Date Value   06/15/2020 10.1     BP is elevated, compliant with medications. Denies an headaches, chest pain, sob, palpitations    Hyperlipidemia:No weakness or myalgias. No chest pain or dyspnea. Has history of vit d deficiency. On supplementation.       Review of Systems - General ROS: negative for - chills, fever, night sweats, weight gain or weight loss  ENT ROS: negative for - hearing change, nasal discharge,  sore throat or visual changes  Endocrine ROS: negative for - hair pattern changes, mood swings, palpitations, polydipsia/polyuria, temperature intolerance or unexpected weight changes  Respiratory ROS: no cough, shortness of breath, or wheezing  Cardiovascular ROS: no chest pain or dyspnea on exertion  Gastrointestinal ROS: no abdominal pain, change in bowel habits  Genito-Urinary ROS: no dysuria, trouble voiding, or hematuria  Neurological ROS: negative for - dizziness, headaches, numbness/tingling or weakness  Dermatological ROS: negative for - pruritus, rash or skin lesion changes      Past Medical History:   Diagnosis Date    [facility]     colon and uterine    Closed fracture of radius 12/27/2016    Elevated lipids 1/15/2014    Headache     History of anal cancer 2/20/2017    Dr. Gabi Lundy    Hyperlipidemia     Hypertension     Hypertension     Obesity     Proteinuria 6/25/2014    Type II or unspecified type diabetes mellitus without mention of complication, not stated as uncontrolled     [facility] 5/22/2017    Vitamin D deficiency 11/6/2014       Patient Active Problem List   Diagnosis    Hypertension    Elevated lipids    Proteinuria    Vitamin D deficiency    Closed fracture of radius    History of anal cancer    [facility] present [facility]    Vaginal cancer [facility]    Diabetes mellitus type 2 in obese [facility])       Past Surgical History:   Procedure Laterality Date    BREAST BIOPSY      COLOSTOMY      RECTAL SURGERY      WRIST FRACTURE SURGERY Left 11/07/2016       Family History   Problem Relation Age of Onset    Kidney Disease Mother     High Blood Pressure Mother     Cancer Father     Diabetes Sister     Other Brother        Social History     Socioeconomic History    Marital status: Single     Spouse name: None    Number of children: None    Years of education: None    Highest education level: None   Occupational History    None   Social Needs    Financial resource strain: None    Food insecurity     Worry: None     Inability: None    Transportation needs     Medical: None     Non-medical: None   Tobacco Use    Smoking status: Never Smoker    Smokeless tobacco: Never Used   Substance and Sexual Activity    Alcohol use: No    Drug use: No    Sexual activity: None   Lifestyle    Physical activity     Days per week: None     Minutes per session: None    Stress: None   Relationships    Social connections     Talks on phone: None     Gets together: None     Attends Hindu service: None     Active member of club or organization: None     Attends meetings of clubs or organizations: None     Relationship status: None    Intimate partner violence     Fear of current or ex partner: None     Emotionally abused: None     Physically abused: None     Forced sexual activity: None   Other Topics Concern    None   Social History Narrative    None       Allergies   Allergen Reactions    Betadine [Povidone Iodine]     Lisinopril Swelling    Penicillins     Tylenol [Acetaminophen] Rash       Current Outpatient Medications on File Prior to Visit   Medication Sig Dispense Refill    amLODIPine (NORVASC) 10 MG tablet take 1 tablet by mouth once daily 90 tablet 1    glimepiride (AMARYL) 2 MG tablet take 1 tablet by mouth twice a day 180 tablet 1    metFORMIN (GLUCOPHAGE) 1000 MG tablet take 1 tablet by mouth twice a day with food 180 tablet 1    hydrochlorothiazide (HYDRODIURIL) 25 MG tablet Take 1 tablet by mouth daily 30 tablet 3    glimepiride (AMARYL) 4 MG tablet take 1 tablet by mouth twice a day 60 tablet 3    atenolol (TENORMIN) 50 MG tablet take 1 tablet by mouth once daily 90 tablet 1    atorvastatin (LIPITOR) 20 MG tablet take 1 tablet by mouth once daily 90 tablet 2    saxagliptin (ONGLYZA) 5 MG TABS tablet Take 1 tablet by mouth daily 30 tablet 5    aspirin EC 81 MG EC tablet Take 1 tablet by mouth daily. 30 tablet 2    vitamin D (ERGOCALCIFEROL) 1.25 MG (12011 UT) CAPS capsule Take 1 capsule by mouth once a week for 12 doses 12 capsule 0     No current facility-administered medications on file prior to visit. Past medical, surgical, family and social history were reviewed and updated if stated    OBJECTIVE:     VS: BP (!) 148/98   Pulse 111   Temp 97.3 °F (36.3 °C) (Temporal)   Resp 18   Ht 5' 7\" (1.702 m)   Wt 199 lb (90.3 kg)   SpO2 98%   BMI 31.17 kg/m²   Wt Readings from Last 3 Encounters:   09/08/20 199 lb (90.3 kg)   06/15/20 197 lb (89.4 kg)   01/28/20 195 lb (88.5 kg)     Temp Readings from Last 3 Encounters:   09/08/20 97.3 °F (36.3 °C) (Temporal)   06/15/20 97.5 °F (36.4 °C) (Temporal)   01/16/18 99.1 °F (37.3 °C) (Oral)     BP Readings from Last 3 Encounters:   09/08/20 (!) 148/98   06/15/20 (!) 148/100   01/28/20 138/88        General assesment: Alert, Awake, Oriented times 3, no distress  Skin: Warm and dry  Head: Normocephalic.  No masses, lesions or tenderness noted  Eyes: Conjunctivae appear normal.   Ears: External ears normal  Chest - clear to auscultation, no wheezes, rales or rhonchi, symmetric air entry  Heart - RRR, S1, S2, positive murmurs  Extremities - peripheral pulses normal, no pedal edema, no clubbing or cyanosis  Mental status: normal mood  Neuro: grossly normal    ASSESSMENT / PLAN :     Tea San was seen today for diabetes and other. Diagnoses and all orders for this visit:    Essential hypertension  -     hydrALAZINE (APRESOLINE) 25 MG tablet; Take 1 tablet by mouth 2 times daily  -     CBC; Future  -     Comprehensive Metabolic Panel; Future  -     TSH without Reflex; Future  -      Monitor BP    Diabetes mellitus type 2 in obese (HCC)  -     Hemoglobin A1C; Future  -     Monitor blood sugars    Elevated lipids  -     Lipid Panel; Future  -     Lifestyle modification    Vitamin D deficiency  -     Vitamin D 25 Hydroxy; Future  -      Continue supplementation          Counseled regarding above diagnosis, including possible risks and complications,  especially if left uncontrolled. Counseled regarding the possible side effects, risks, benefits and alternatives to treatment; patient and/or guardian verbalizes understanding, agrees, feels comfortable with and wishes to proceed with above treatment plan. Advised patient to call with any new medication issues, and read all Rx info from pharmacy to assure aware of all possible risks and side effects of medication before taking. Reviewed age and gender appropriate health screening exams and vaccinations. Advised patient regarding importance of keeping up with recommended health maintenance and to schedule as soon as possible if overdue, as this is important in assessing for undiagnosed pathology, especially cancer, as well as protecting against potentially harmful/life threatening disease. Refills as needed    Discussed any signs and symptoms that would warrant immediate ED evaluation    Tea San was instructed to call if any new symptoms develop prior to next visit.          F/U as instructed    Suze Dalal M.D

## 2020-09-09 ENCOUNTER — HOSPITAL ENCOUNTER (OUTPATIENT)
Age: 70
Discharge: HOME | End: 2020-09-11
Payer: COMMERCIAL

## 2020-09-09 LAB
ALBUMIN SERPL-MCNC: 4.5 G/DL (ref 3.5–5.2)
ALP BLD-CCNC: 95 U/L (ref 35–104)
ALT SERPL-CCNC: 22 U/L (ref 0–32)
ANION GAP SERPL CALCULATED.3IONS-SCNC: 22 MMOL/L (ref 7–16)
AST SERPL-CCNC: 20 U/L (ref 0–31)
BILIRUB SERPL-MCNC: 0.8 MG/DL (ref 0–1.2)
BUN BLDV-MCNC: 12 MG/DL (ref 8–23)
CALCIUM SERPL-MCNC: 10.3 MG/DL (ref 8.6–10.2)
CHLORIDE BLD-SCNC: 94 MMOL/L (ref 98–107)
CHOLESTEROL, TOTAL: 128 MG/DL (ref 0–199)
CO2: 19 MMOL/L (ref 22–29)
CREAT SERPL-MCNC: 0.8 MG/DL (ref 0.5–1)
GFR AFRICAN AMERICAN: >60
GFR NON-AFRICAN AMERICAN: >60 ML/MIN/1.73
GLUCOSE BLD-MCNC: 213 MG/DL (ref 74–99)
HBA1C MFR BLD: 10 % (ref 4–5.6)
HCT VFR BLD CALC: 53.4 % (ref 34–48)
HDLC SERPL-MCNC: 43 MG/DL
HEMOGLOBIN: 16.3 G/DL (ref 11.5–15.5)
LDL CHOLESTEROL CALCULATED: 70 MG/DL (ref 0–99)
MCH RBC QN AUTO: 28.3 PG (ref 26–35)
MCHC RBC AUTO-ENTMCNC: 30.5 % (ref 32–34.5)
MCV RBC AUTO: 92.7 FL (ref 80–99.9)
PDW BLD-RTO: 14.7 FL (ref 11.5–15)
PLATELET # BLD: 233 E9/L (ref 130–450)
PMV BLD AUTO: 13.9 FL (ref 7–12)
POTASSIUM SERPL-SCNC: 4.8 MMOL/L (ref 3.5–5)
RBC # BLD: 5.76 E12/L (ref 3.5–5.5)
SODIUM BLD-SCNC: 135 MMOL/L (ref 132–146)
TOTAL PROTEIN: 7.7 G/DL (ref 6.4–8.3)
TRIGL SERPL-MCNC: 77 MG/DL (ref 0–149)
TSH SERPL DL<=0.05 MIU/L-ACNC: 1.27 UIU/ML (ref 0.27–4.2)
VITAMIN D 25-HYDROXY: 17 NG/ML (ref 30–100)
VLDLC SERPL CALC-MCNC: 15 MG/DL
WBC # BLD: 9.7 E9/L (ref 4.5–11.5)

## 2020-09-09 PROCEDURE — 82306 VITAMIN D 25 HYDROXY: CPT

## 2020-09-09 PROCEDURE — 83036 HEMOGLOBIN GLYCOSYLATED A1C: CPT

## 2020-09-09 PROCEDURE — 80061 LIPID PANEL: CPT

## 2020-09-09 PROCEDURE — 80053 COMPREHEN METABOLIC PANEL: CPT

## 2020-09-09 PROCEDURE — 85027 COMPLETE CBC AUTOMATED: CPT

## 2020-09-09 PROCEDURE — 84443 ASSAY THYROID STIM HORMONE: CPT

## 2020-09-10 RX ORDER — ERGOCALCIFEROL 1.25 MG/1
50000 CAPSULE ORAL WEEKLY
Qty: 12 CAPSULE | Refills: 0 | Status: SHIPPED
Start: 2020-09-10 | End: 2021-09-19

## 2020-10-19 ENCOUNTER — OFFICE VISIT (OUTPATIENT)
Dept: FAMILY MEDICINE CLINIC | Age: 70
End: 2020-10-19
Payer: COMMERCIAL

## 2020-10-19 VITALS
WEIGHT: 197 LBS | DIASTOLIC BLOOD PRESSURE: 89 MMHG | SYSTOLIC BLOOD PRESSURE: 139 MMHG | BODY MASS INDEX: 30.92 KG/M2 | OXYGEN SATURATION: 98 % | HEIGHT: 67 IN | RESPIRATION RATE: 18 BRPM | HEART RATE: 100 BPM | TEMPERATURE: 97 F

## 2020-10-19 PROCEDURE — G8484 FLU IMMUNIZE NO ADMIN: HCPCS | Performed by: FAMILY MEDICINE

## 2020-10-19 PROCEDURE — G0438 PPPS, INITIAL VISIT: HCPCS | Performed by: FAMILY MEDICINE

## 2020-10-19 PROCEDURE — 4040F PNEUMOC VAC/ADMIN/RCVD: CPT | Performed by: FAMILY MEDICINE

## 2020-10-19 PROCEDURE — 1123F ACP DISCUSS/DSCN MKR DOCD: CPT | Performed by: FAMILY MEDICINE

## 2020-10-19 PROCEDURE — 3017F COLORECTAL CA SCREEN DOC REV: CPT | Performed by: FAMILY MEDICINE

## 2020-10-19 ASSESSMENT — LIFESTYLE VARIABLES
HOW OFTEN DO YOU HAVE A DRINK CONTAINING ALCOHOL: 0
AUDIT-C TOTAL SCORE: INCOMPLETE
HOW OFTEN DO YOU HAVE A DRINK CONTAINING ALCOHOL: NEVER
AUDIT TOTAL SCORE: INCOMPLETE

## 2020-10-19 ASSESSMENT — PATIENT HEALTH QUESTIONNAIRE - PHQ9
SUM OF ALL RESPONSES TO PHQ QUESTIONS 1-9: 0
SUM OF ALL RESPONSES TO PHQ QUESTIONS 1-9: 0
2. FEELING DOWN, DEPRESSED OR HOPELESS: 0
1. LITTLE INTEREST OR PLEASURE IN DOING THINGS: 0
SUM OF ALL RESPONSES TO PHQ QUESTIONS 1-9: 0
SUM OF ALL RESPONSES TO PHQ9 QUESTIONS 1 & 2: 0

## 2020-10-19 NOTE — PROGRESS NOTES
Medicare Annual Wellness Visit  Name: Rosamaria Holstein Date: 10/19/2020   MRN: [id number] Sex: Female   Age: 79 y.o. Ethnicity: Non-/Non    : 1950 Race: Marie Renteria is here for Medicare AWV and Health Maintenance (declined flu, Tdap, Shingles)    Screenings for behavioral, psychosocial and functional/safety risks, and cognitive dysfunction are all negative except as indicated below. These results, as well as other patient data from the [address] form, are documented in Flowsheets linked to this Encounter. Allergies   Allergen Reactions    Betadine [Povidone Iodine]     Lisinopril Swelling    Penicillins     Tylenol [Acetaminophen] Rash       Prior to Visit Medications    Medication Sig Taking? Authorizing Provider   vitamin D (ERGOCALCIFEROL) 1.25 MG (47799 UT) CAPS capsule Take 1 capsule by mouth once a week for 12 doses Yes Bridgett Elder MD   hydrALAZINE (APRESOLINE) 25 MG tablet Take 1 tablet by mouth 2 times daily Yes Bridgett Elder MD   amLODIPine (NORVASC) 10 MG tablet take 1 tablet by mouth once daily Yes Bridgett Elder MD   glimepiride (AMARYL) 2 MG tablet take 1 tablet by mouth twice a day Yes Bridgett Elder MD   metFORMIN (GLUCOPHAGE) 1000 MG tablet take 1 tablet by mouth twice a day with food Yes Bridgett Elder MD   hydrochlorothiazide (HYDRODIURIL) 25 MG tablet Take 1 tablet by mouth daily Yes Bridgett Elder MD   atenolol (TENORMIN) 50 MG tablet take 1 tablet by mouth once daily Yes Bridgett Elder MD   atorvastatin (LIPITOR) 20 MG tablet take 1 tablet by mouth once daily Yes Bridgett Elder MD   saxagliptin (ONGLYZA) 5 MG TABS tablet Take 1 tablet by mouth daily Yes Bridgett Elder MD   aspirin EC 81 MG EC tablet Take 1 tablet by mouth daily.  Yes Bridgett Elder MD       Past Medical History:   Diagnosis Date    Cancer [facility])     colon and uterine    Closed fracture of radius 12/27/2016    Elevated lipids 1/15/2014    Headache     History of anal cancer 2/20/2017    Dr. Gokul Georges    Hyperlipidemia     Hypertension     Hypertension     Obesity     Proteinuria 6/25/2014    Type II or unspecified type diabetes mellitus without mention of complication, not stated as uncontrolled     Vaginal cancer ([facility] [address] 5/22/2017    Vitamin D deficiency 11/6/2014       Past Surgical History:   Procedure Laterality Date    BREAST BIOPSY      COLOSTOMY      RECTAL SURGERY      WRIST FRACTURE SURGERY Left 11/07/2016       Family History   Problem Relation Age of Onset    Kidney Disease Mother     High Blood Pressure Mother     Cancer Father     Diabetes Sister     Other Brother        CareTeam (Including outside providers/suppliers regularly involved in providing care):   Patient Care Team:  Marlon Kwon MD as PCP - General (Family Medicine)  Marlon Kwon MD as PCP - [facility] Provider    Wt Readings from Last 3 Encounters:   10/19/20 197 lb (89.4 kg)   09/08/20 199 lb (90.3 kg)   06/15/20 197 lb (89.4 kg)     Vitals:    10/19/20 1046   BP: 139/89   Pulse: 100   Resp: 18   Temp: 97 °F (36.1 °C)   TempSrc: Temporal   SpO2: 98%   Weight: 197 lb (89.4 kg)   Height: 5' 7\" (1.702 m)     Body mass index is 30.85 kg/m². Based upon direct observation of the patient, evaluation of cognition reveals recent and remote memory intact. General Appearance: alert and oriented to person, place and time  Skin: warm and dry, no rash or erythema  Head: normocephalic and atraumatic  Eyes: conjunctivae normal  Pulmonary/Chest: clear to auscultation bilaterally- no wheezes, rales or rhonchi, normal air movement, no respiratory distress  Cardiovascular: normal rate and normal S1 and S2  Extremities: no cyanosis and no clubbing    Patient's complete Health Risk Assessment and screening values have been reviewed and are found in Flowsheets.  The following problems were reviewed today and where indicated follow up appointments were made and/or referrals ordered. Positive Risk Factor Screenings with Interventions:   Fall Risk Interventions:    · Home safety tips provided    General Health and ACP:  General  In general, how would you say your health is?: Fair  In the past 7 days, have you experienced any of the following?  New or Increased Pain, New or Increased Fatigue, Loneliness, Social Isolation, Stress or Anger?: None of These  Do you get the social and emotional support that you need?: Yes  Do you have a Living Will?: (!) No  Advance Directives     [facility] ACP-Advance Directive ACP-Power of     Not on File Filed on 06/19/12 Filed [address] [facility] Risk Interventions:  · No Living Will: Patient declines ACP discussion/assistance    Health Habits/Nutrition:  Health Habits/Nutrition  Do you exercise for at least 20 minutes 2-3 times per week?: Yes  Have you lost any weight without trying in the past 3 months?: No  Do you eat fewer than 2 meals per day?: No  Have you seen a dentist within the past year?: (!) No  Body mass index: (!) 30.85  Health Habits/Nutrition Interventions:  · Dental exam overdue:  patient encouraged to make appointment with his/her dentist    Hearing/Vision:  No exam data present  Hearing/Vision  Do you or your family notice any trouble with your hearing?: (!) Yes  Do you have difficulty driving, watching TV, or doing any of your daily activities because of your eyesight?: No  Have you had an eye exam within the past year?: (!) No  Hearing/Vision Interventions:  · Hearing concerns:  audiology referral provided  · Vision concerns:  patient encouraged to make appointment with his/her eye specialist    Personalized Preventive Plan   Current Health Maintenance Status  Immunization History   Administered Date(s) Administered    Pneumococcal Conjugate 13-valent ([id number]) 04/13/2016    Pneumococcal Polysaccharide ([id number]) 05/22/2017        Health Maintenance   Topic Date Due    Hepatitis C screen  1950    DTaP/Tdap/Td vaccine (1 - Tdap) 06/08/1969    Shingles Vaccine (1 of 2) 06/08/2000    DEXA (modify frequency per FRAX score)  06/08/2005    Colon Cancer Screen FIT/FOBT  06/19/2013    Diabetic retinal exam  10/29/2015    Diabetic foot exam  04/13/2017    Breast cancer screen  05/16/2018    Annual Wellness Visit (AWV)  05/29/2019    Flu vaccine (1) 09/01/2020    A1C test (Diabetic or Prediabetic)  12/09/2020    Diabetic microalbuminuria test  12/23/2020    Lipid screen  09/09/2021    Potassium monitoring  09/09/2021    Creatinine monitoring  09/09/2021    Pneumococcal 65+ years Vaccine  Completed    Hepatitis A vaccine  Aged Out    Hib vaccine  Aged Out    Meningococcal (ACWY) vaccine  Aged Out     Recommendations for [employer] Due: see orders and patient instructions/AVS.  . Recommended screening schedule for the next 5-10 years is provided to the patient in written form: see Patient Instructions/AVS.    Marlin Cartagena was seen today for medicare awv and health maintenance.     Diagnoses and all orders for this visit:    Routine general medical examination at a health care facility

## 2020-10-19 NOTE — PATIENT INSTRUCTIONS
Personalized Preventive Plan for Michael Booth - 10/19/2020  Medicare offers a range of preventive health benefits. Some of the tests and screenings are paid in full while other may be subject to a deductible, co-insurance, and/or copay. Some of these benefits include a comprehensive review of your medical history including lifestyle, illnesses that may run in your family, and various assessments and screenings as appropriate. After reviewing your medical record and screening and assessments performed today your provider may have ordered immunizations, labs, imaging, and/or referrals for you. A list of these orders (if applicable) as well as your Preventive Care list are included within your After Visit Summary for your review. Other Preventive Recommendations:    · A preventive eye exam performed by an eye specialist is recommended every 1-2 years to screen for glaucoma; cataracts, macular degeneration, and other eye disorders. · A preventive dental visit is recommended every 6 months. · Try to get at least 150 minutes of exercise per week or 10,000 steps per day on a pedometer . · Order or download the FREE \"Exercise & Physical Activity: Your Everyday Guide\" from The "[employer]. Call 8-352.161.6715 or search The [employer] online. · You need 4187-0938 mg of calcium and 7012-9867 IU of vitamin D per day. It is possible to meet your calcium requirement with diet alone, but a vitamin D supplement is usually necessary to meet this goal.  · When exposed to the sun, use a sunscreen that protects against both UVA and UVB radiation with an SPF of 30 or greater. Reapply every 2 to 3 hours or after sweating, drying off with a towel, or swimming. · Always wear a seat belt when traveling in a car. Always wear a helmet when riding a bicycle or motorcycle.

## 2020-11-06 RX ORDER — ATENOLOL 50 MG/1
TABLET ORAL
Qty: 90 TABLET | Refills: 1 | Status: SHIPPED
Start: 2020-11-06 | End: 2021-08-19

## 2020-11-06 RX ORDER — ATORVASTATIN CALCIUM 20 MG/1
TABLET, FILM COATED ORAL
Qty: 90 TABLET | Refills: 1 | Status: SHIPPED
Start: 2020-11-06 | End: 2021-08-19 | Stop reason: SDUPTHER

## 2021-02-19 DIAGNOSIS — E11.9 DIABETES MELLITUS WITHOUT COMPLICATION (HCC): ICD-10-CM

## 2021-02-19 DIAGNOSIS — I10 ESSENTIAL HYPERTENSION: ICD-10-CM

## 2021-02-19 RX ORDER — AMLODIPINE BESYLATE 10 MG/1
TABLET ORAL
Qty: 90 TABLET | Refills: 1 | Status: SHIPPED
Start: 2021-02-19 | End: 2021-08-19 | Stop reason: SDUPTHER

## 2021-02-19 RX ORDER — GLIMEPIRIDE 2 MG/1
TABLET ORAL
Qty: 180 TABLET | Refills: 1 | Status: SHIPPED
Start: 2021-02-19 | End: 2021-08-19 | Stop reason: SDUPTHER

## 2021-07-12 ENCOUNTER — HOSPITAL ENCOUNTER (OUTPATIENT)
Dept: GENERAL RADIOLOGY | Age: 71
Discharge: HOME | End: 2021-07-14
Payer: MEDICARE

## 2021-07-12 ENCOUNTER — OFFICE VISIT (OUTPATIENT)
Dept: FAMILY MEDICINE CLINIC | Age: 71
End: 2021-07-12
Payer: MEDICARE

## 2021-07-12 ENCOUNTER — HOSPITAL ENCOUNTER (OUTPATIENT)
Age: 71
Discharge: HOME | End: 2021-07-14
Payer: MEDICARE

## 2021-07-12 VITALS
BODY MASS INDEX: 29.35 KG/M2 | SYSTOLIC BLOOD PRESSURE: 138 MMHG | OXYGEN SATURATION: 98 % | HEART RATE: 92 BPM | WEIGHT: 187 LBS | HEIGHT: 67 IN | TEMPERATURE: 97.2 F | DIASTOLIC BLOOD PRESSURE: 86 MMHG | RESPIRATION RATE: 18 BRPM

## 2021-07-12 DIAGNOSIS — G89.29 CHRONIC MIDLINE LOW BACK PAIN, UNSPECIFIED WHETHER SCIATICA PRESENT: ICD-10-CM

## 2021-07-12 DIAGNOSIS — M54.50 CHRONIC MIDLINE LOW BACK PAIN, UNSPECIFIED WHETHER SCIATICA PRESENT: ICD-10-CM

## 2021-07-12 DIAGNOSIS — E11.69 DIABETES MELLITUS TYPE 2 IN OBESE (HCC): Primary | ICD-10-CM

## 2021-07-12 DIAGNOSIS — E66.9 DIABETES MELLITUS TYPE 2 IN OBESE (HCC): Primary | ICD-10-CM

## 2021-07-12 DIAGNOSIS — Z12.31 BREAST CANCER SCREENING BY MAMMOGRAM: ICD-10-CM

## 2021-07-12 PROBLEM — F80.81 STUTTERING: Status: ACTIVE | Noted: 2021-07-12

## 2021-07-12 LAB — HBA1C MFR BLD: 7 %

## 2021-07-12 PROCEDURE — 72100 X-RAY EXAM L-S SPINE 2/3 VWS: CPT

## 2021-07-12 PROCEDURE — G8400 PT W/DXA NO RESULTS DOC: HCPCS | Performed by: FAMILY MEDICINE

## 2021-07-12 PROCEDURE — 2022F DILAT RTA XM EVC RTNOPTHY: CPT | Performed by: FAMILY MEDICINE

## 2021-07-12 PROCEDURE — 82044 UR ALBUMIN SEMIQUANTITATIVE: CPT | Performed by: FAMILY MEDICINE

## 2021-07-12 PROCEDURE — 4040F PNEUMOC VAC/ADMIN/RCVD: CPT | Performed by: FAMILY MEDICINE

## 2021-07-12 PROCEDURE — 3051F HG A1C>EQUAL 7.0%<8.0%: CPT | Performed by: FAMILY MEDICINE

## 2021-07-12 PROCEDURE — 1123F ACP DISCUSS/DSCN MKR DOCD: CPT | Performed by: FAMILY MEDICINE

## 2021-07-12 PROCEDURE — 1090F PRES/ABSN URINE INCON ASSESS: CPT | Performed by: FAMILY MEDICINE

## 2021-07-12 PROCEDURE — 99214 OFFICE O/P EST MOD 30 MIN: CPT | Performed by: FAMILY MEDICINE

## 2021-07-12 PROCEDURE — 83036 HEMOGLOBIN GLYCOSYLATED A1C: CPT | Performed by: FAMILY MEDICINE

## 2021-07-12 PROCEDURE — G8417 CALC BMI ABV UP PARAM F/U: HCPCS | Performed by: FAMILY MEDICINE

## 2021-07-12 PROCEDURE — 3017F COLORECTAL CA SCREEN DOC REV: CPT | Performed by: FAMILY MEDICINE

## 2021-07-12 PROCEDURE — 1036F TOBACCO NON-USER: CPT | Performed by: FAMILY MEDICINE

## 2021-07-12 PROCEDURE — G8427 DOCREV CUR MEDS BY ELIG CLIN: HCPCS | Performed by: FAMILY MEDICINE

## 2021-07-12 SDOH — ECONOMIC STABILITY: FOOD INSECURITY: WITHIN THE PAST 12 MONTHS, THE FOOD YOU BOUGHT JUST DIDN'T LAST AND YOU DIDN'T HAVE MONEY TO GET MORE.: NEVER TRUE

## 2021-07-12 SDOH — ECONOMIC STABILITY: FOOD INSECURITY: WITHIN THE PAST 12 MONTHS, YOU WORRIED THAT YOUR FOOD WOULD RUN OUT BEFORE YOU GOT MONEY TO BUY MORE.: NEVER TRUE

## 2021-07-12 ASSESSMENT — PATIENT HEALTH QUESTIONNAIRE - PHQ9
1. LITTLE INTEREST OR PLEASURE IN DOING THINGS: 0
SUM OF ALL RESPONSES TO PHQ QUESTIONS 1-9: 0
2. FEELING DOWN, DEPRESSED OR HOPELESS: 0
SUM OF ALL RESPONSES TO PHQ9 QUESTIONS 1 & 2: 0
SUM OF ALL RESPONSES TO PHQ QUESTIONS 1-9: 0
SUM OF ALL RESPONSES TO PHQ QUESTIONS 1-9: 0

## 2021-07-12 ASSESSMENT — SOCIAL DETERMINANTS OF HEALTH (SDOH): HOW HARD IS IT FOR YOU TO PAY FOR THE VERY BASICS LIKE FOOD, HOUSING, MEDICAL CARE, AND HEATING?: NOT HARD AT ALL

## 2021-07-12 NOTE — PROGRESS NOTES
SUBJECTIVE:        Patient ID: Hodan Yao is a 70 y.o. female who presents for   Chief Complaint   Patient presents with    Hypertension    Diabetes       Diabetes Mellitus: improved. States she is taking medications regularly. Hemoglobin A1C (%)   Date Value   07/12/2021 7.0     BP was elevated but improved on recheck. Denies any  headaches, chest pain, sob, palpitations    Hyperlipidemia:No weakness or myalgias. No chest pain or dyspnea. Chronic back pain: states she has been having acute on chronic back pain, denies any injury, trauma. Has chronic urinary incontinence but denies any numbness and tingling.       Review of Systems - General ROS: negative for - chills, fever, night sweats, weight gain or weight loss  ENT ROS: negative for - hearing change, nasal discharge,  sore throat or visual changes  Endocrine ROS: negative for - hair pattern changes, mood swings, palpitations, polydipsia/polyuria, temperature intolerance or unexpected weight changes  Respiratory ROS: no cough, shortness of breath, or wheezing  Cardiovascular ROS: no chest pain or dyspnea on exertion  Gastrointestinal ROS: no abdominal pain, change in bowel habits  Genito-Urinary ROS: no dysuria, trouble voiding, or hematuria  Neurological ROS: negative for - dizziness, headaches, numbness/tingling or weakness  Dermatological ROS: negative for - pruritus, rash or skin lesion changes      Past Medical History:   Diagnosis Date    Cancer ([facility] [address]     colon and uterine    Closed fracture of radius 12/27/2016    Elevated lipids 1/15/2014    Headache     History of anal cancer 2/20/2017    Dr. Shaye Maxwell    Hyperlipidemia     Hypertension     Hypertension     Obesity     Proteinuria 6/25/2014    Type II or unspecified type diabetes mellitus without mention of complication, not stated as uncontrolled     Vaginal cancer ([facility] [address] 5/22/2017    Vitamin D deficiency 11/6/2014       Patient Active Problem List   Diagnosis    Hypertension  Elevated lipids    Proteinuria    Vitamin D deficiency    Closed fracture of radius    History of anal cancer    Colostomy present ([facility] [address]    Vaginal cancer ([facility] [address]    Diabetes mellitus type 2 in obese [facility])       Past Surgical History:   Procedure Laterality Date    BREAST BIOPSY      COLOSTOMY      RECTAL SURGERY      WRIST FRACTURE SURGERY Left 11/07/2016       Family History   Problem Relation Age of Onset    Kidney Disease Mother     High Blood Pressure Mother     Cancer Father     Diabetes Sister     Other Brother        Social History     Socioeconomic History    Marital status: Single     Spouse name: None    Number of children: None    Years of education: None    Highest education level: None   Occupational History    None   Tobacco Use    Smoking status: Never Smoker    Smokeless tobacco: Never Used   Substance and Sexual Activity    Alcohol use: No    Drug use: No    Sexual activity: None   Other Topics Concern    None   Social History Narrative    None     Social Determinants of Health     Financial Resource Strain: Low Risk     Difficulty of Paying Living Expenses: Not hard at all   Food Insecurity: No Food Insecurity    Worried About Running Out of Food in the Last Year: Never true    Cristhian of Food in the Last Year: Never true   Transportation Needs:     Lack of Transportation (Medical):      Lack of Transportation (Non-Medical):    Physical Activity:     Days of Exercise per Week:     Minutes of Exercise per Session:    Stress:     Feeling of Stress :    Social Connections:     Frequency of Communication with Friends and Family:     Frequency of Social Gatherings with Friends and Family:     Attends Holiness Services:     Active Member of Clubs or Organizations:     Attends Club or Organization Meetings:     Marital Status:    Intimate Partner Violence:     Fear of Current or Ex-Partner:     Emotionally Abused:     Physically Abused:     Sexually Abused: Allergies   Allergen Reactions    Betadine [Povidone Iodine]     Lisinopril Swelling    Penicillins     Tylenol [Acetaminophen] Rash       Current Outpatient Medications on File Prior to Visit   Medication Sig Dispense Refill    amLODIPine (NORVASC) 10 MG tablet take 1 tablet by mouth once daily 90 tablet 1    glimepiride (AMARYL) 2 MG tablet take 1 tablet by mouth twice a day 180 tablet 1    metFORMIN (GLUCOPHAGE) 1000 MG tablet take 1 tablet by mouth twice a day with food 180 tablet 1    atenolol (TENORMIN) 50 MG tablet take 1 tablet by mouth once daily 90 tablet 1    atorvastatin (LIPITOR) 20 MG tablet take 1 tablet by mouth once daily 90 tablet 1    hydrALAZINE (APRESOLINE) 25 MG tablet Take 1 tablet by mouth 2 times daily 60 tablet 3    hydrochlorothiazide (HYDRODIURIL) 25 MG tablet Take 1 tablet by mouth daily 30 tablet 3    saxagliptin (ONGLYZA) 5 MG TABS tablet Take 1 tablet by mouth daily 30 tablet 5    aspirin EC 81 MG EC tablet Take 1 tablet by mouth daily. 30 tablet 2    vitamin D (ERGOCALCIFEROL) 1.25 MG (52912 UT) CAPS capsule Take 1 capsule by mouth once a week for 12 doses 12 capsule 0     No current facility-administered medications on file prior to visit.        Past medical, surgical, family and social history were reviewed and updated if stated    OBJECTIVE:     VS: BP (!) 163/100   Pulse 92   Temp 97.2 °F (36.2 °C) (Temporal)   Resp 18   Ht 5' 7\" (1.702 m)   Wt 187 lb (84.8 kg)   SpO2 98%   BMI 29.29 kg/m²   Wt Readings from Last 3 Encounters:   07/12/21 187 lb (84.8 kg)   10/19/20 197 lb (89.4 kg)   09/08/20 199 lb (90.3 kg)     Temp Readings from Last 3 Encounters:   07/12/21 97.2 °F (36.2 °C) (Temporal)   10/19/20 97 °F (36.1 °C) (Temporal)   09/08/20 97.3 °F (36.3 °C) (Temporal)     BP Readings from Last 3 Encounters:   07/12/21 138/86   10/19/20 139/89   09/08/20 (!) 148/98        General assesment: Alert, Awake, Oriented times 3, no distress  Skin: Warm and dry  Head: Normocephalic. No masses, lesions or tenderness noted  Eyes: Conjunctivae appear normal.   Ears: External ears normal  Chest - clear to auscultation, no wheezes, rales or rhonchi, symmetric air entry  Heart - RRR, S1, S2, positive murmurs  Abd: + colostomy bag  Back: + spinal tenderness at lumbar area  Extremities - peripheral pulses normal, no pedal edema, no clubbing or cyanosis  Mental status: normal mood  Neuro: grossly normal    ASSESSMENT / PLAN :     Ebony Garcia was seen today for hypertension and diabetes. Diagnoses and all orders for this visit:    Diabetes mellitus type 2 in obese ([facility] [address]  -     POCT glycosylated hemoglobin (Hb A1C)  -     POCT Microalbumin  -     Continue the same    Breast cancer screening by mammogram  -     [facility] DIGITAL SCREEN BILATERAL PER PROTOCOL; Future    Chronic midline low back pain, unspecified whether sciatica present  -     XR LUMBAR SPINE (2-3 VIEWS); Future  -     X-ray showed DDD and compression injury at L1  -     DEXA scan ordered. Will be started on Fosamax as has hx of radial fracture and now the compression injury.   -     Cannot wear a brace due to a colostomy bag  -     [address]    F/u as instructed    Ky Gordon MD

## 2021-07-13 RX ORDER — ALENDRONATE SODIUM 70 MG/1
70 TABLET ORAL
Qty: 4 TABLET | Refills: 5 | Status: ON HOLD
Start: 2021-07-13 | End: 2021-09-28 | Stop reason: HOSPADM

## 2021-07-16 ENCOUNTER — FOLLOWUP TELEPHONE ENCOUNTER (OUTPATIENT)
Dept: AUDIOLOGY | Age: 71
End: 2021-07-16

## 2021-07-19 ENCOUNTER — TELEPHONE (OUTPATIENT)
Dept: FAMILY MEDICINE CLINIC | Age: 71
End: 2021-07-19

## 2021-07-19 DIAGNOSIS — M54.50 CHRONIC MIDLINE LOW BACK PAIN WITHOUT SCIATICA: Primary | ICD-10-CM

## 2021-07-19 DIAGNOSIS — G89.29 CHRONIC MIDLINE LOW BACK PAIN WITHOUT SCIATICA: Primary | ICD-10-CM

## 2021-07-19 DIAGNOSIS — R26.9 GAIT DISTURBANCE: ICD-10-CM

## 2021-07-19 NOTE — TELEPHONE ENCOUNTER
Referral sent to [facility] PT was declined due to patient's condition. Patient is not homebound.   Patient was advised to pick an outpatient rehab facility and call us for an outpatient referral.

## 2021-07-20 ENCOUNTER — TELEPHONE (OUTPATIENT)
Dept: FAMILY MEDICINE CLINIC | Age: 71
End: 2021-07-20

## 2021-07-20 DIAGNOSIS — G89.29 CHRONIC MIDLINE LOW BACK PAIN WITHOUT SCIATICA: Primary | ICD-10-CM

## 2021-07-20 DIAGNOSIS — M54.50 CHRONIC MIDLINE LOW BACK PAIN WITHOUT SCIATICA: Primary | ICD-10-CM

## 2021-07-20 NOTE — TELEPHONE ENCOUNTER
I thought patient was home bound as she was dizzy and required assistance of a walker.  Anyway, referral placed for outpatient PT.

## 2021-07-20 NOTE — TELEPHONE ENCOUNTER
----- Message from Alka Villalpando MA sent at 7/20/2021  9:22 AM EDT -----  Subject: Referral Request    QUESTIONS   Reason for referral request? Needs order and referral for Physical therapy     Has the physician seen you for this condition before? Yes  Select a date? 2021-07-12  Select the physician (PCP or Specialist)? Sara Royal   Preferred Specialist (if applicable)? Do you already have an appointment scheduled? No  Additional Information for Provider? Please Fax order and referral to   [facility] [phone]  ---------------------------------------------------------------------------  --------------  [address]  What is the best way for the office to contact you? OK to leave message on   voicemail  Preferred Call Back Phone Number?  [phone]

## 2021-07-28 ENCOUNTER — TELEPHONE (OUTPATIENT)
Dept: FAMILY MEDICINE CLINIC | Age: 71
End: 2021-07-28

## 2021-07-28 NOTE — TELEPHONE ENCOUNTER
----- Message from Anika Lynch sent at 7/28/2021  9:02 AM EDT -----  Subject: Message to Provider    QUESTIONS  Information for Provider? Patient called on the 20th and needed this   referral for PT. I looked in the encounters and it was done on our end it   looks like, but the place told the patient that they did not receive it. Please Fax order and referral to [address] [facility] [phone]  ---------------------------------------------------------------------------  --------------  [address]  What is the best way for the office to contact you? OK to leave message on   voicemail  Preferred Call Back Phone Number? [phone]  ---------------------------------------------------------------------------  --------------  SCRIPT ANSWERS  Relationship to Patient?  Self

## 2021-08-11 ENCOUNTER — HOSPITAL ENCOUNTER (OUTPATIENT)
Dept: MAMMOGRAPHY | Age: 71
Discharge: HOME | End: 2021-08-13
Payer: MEDICARE

## 2021-08-11 DIAGNOSIS — Z12.31 BREAST CANCER SCREENING BY MAMMOGRAM: ICD-10-CM

## 2021-08-19 DIAGNOSIS — E78.5 HYPERLIPIDEMIA, UNSPECIFIED HYPERLIPIDEMIA TYPE: ICD-10-CM

## 2021-08-19 DIAGNOSIS — I10 ESSENTIAL HYPERTENSION: ICD-10-CM

## 2021-08-19 DIAGNOSIS — E11.9 DIABETES MELLITUS WITHOUT COMPLICATION (HCC): ICD-10-CM

## 2021-08-19 RX ORDER — GLIMEPIRIDE 2 MG/1
TABLET ORAL
Qty: 180 TABLET | Refills: 0 | Status: ON HOLD
Start: 2021-08-19 | End: 2021-09-28 | Stop reason: HOSPADM

## 2021-08-19 RX ORDER — ATENOLOL 50 MG/1
TABLET ORAL
Qty: 90 TABLET | Refills: 0 | Status: SHIPPED
Start: 2021-08-19 | End: 2021-10-28 | Stop reason: SDUPTHER

## 2021-08-19 RX ORDER — AMLODIPINE BESYLATE 10 MG/1
TABLET ORAL
Qty: 90 TABLET | Refills: 0 | Status: SHIPPED
Start: 2021-08-19 | End: 2021-10-28 | Stop reason: SDUPTHER

## 2021-08-19 RX ORDER — ATORVASTATIN CALCIUM 20 MG/1
TABLET, FILM COATED ORAL
Qty: 90 TABLET | Refills: 0 | Status: SHIPPED
Start: 2021-08-19 | End: 2021-10-28 | Stop reason: SDUPTHER

## 2021-08-19 NOTE — TELEPHONE ENCOUNTER
I called patient to confirm what medications she is taking, she is taking the medications that are pended for refills and said that she needs these before upcoming appointment.

## 2021-09-19 ENCOUNTER — APPOINTMENT (OUTPATIENT)
Dept: ULTRASOUND IMAGING | Age: 71
DRG: 654 | End: 2021-09-19
Payer: MEDICARE

## 2021-09-19 ENCOUNTER — HOSPITAL ENCOUNTER (INPATIENT)
Age: 71
LOS: 9 days | Discharge: HOME HEALTH | DRG: 654 | End: 2021-09-28
Attending: EMERGENCY MEDICINE | Admitting: HOSPITALIST
Payer: MEDICARE

## 2021-09-19 ENCOUNTER — APPOINTMENT (OUTPATIENT)
Dept: CT IMAGING | Age: 71
DRG: 654 | End: 2021-09-19
Payer: MEDICARE

## 2021-09-19 DIAGNOSIS — R42 DIZZINESS: ICD-10-CM

## 2021-09-19 DIAGNOSIS — N17.9 AKI (ACUTE KIDNEY INJURY) (HCC): Primary | ICD-10-CM

## 2021-09-19 DIAGNOSIS — E16.2 HYPOGLYCEMIA: ICD-10-CM

## 2021-09-19 LAB
ALBUMIN SERPL-MCNC: 4.3 G/DL (ref 3.5–5.2)
ALP BLD-CCNC: 114 U/L (ref 35–104)
ALT SERPL-CCNC: 5 U/L (ref 0–32)
ANION GAP SERPL CALCULATED.3IONS-SCNC: 10 MMOL/L (ref 7–16)
ANION GAP SERPL CALCULATED.3IONS-SCNC: 15 MMOL/L (ref 7–16)
AST SERPL-CCNC: 23 U/L (ref 0–31)
BASOPHILS ABSOLUTE: 0.03 E9/L (ref 0–0.2)
BASOPHILS RELATIVE PERCENT: 0.3 % (ref 0–2)
BILIRUB SERPL-MCNC: 0.5 MG/DL (ref 0–1.2)
BILIRUBIN URINE: NEGATIVE
BLOOD, URINE: NEGATIVE
BUN BLDV-MCNC: 30 MG/DL (ref 6–23)
BUN BLDV-MCNC: 31 MG/DL (ref 6–23)
CALCIUM SERPL-MCNC: 8.5 MG/DL (ref 8.6–10.2)
CALCIUM SERPL-MCNC: 8.6 MG/DL (ref 8.6–10.2)
CHLORIDE BLD-SCNC: 106 MMOL/L (ref 98–107)
CHLORIDE BLD-SCNC: 109 MMOL/L (ref 98–107)
CHLORIDE URINE RANDOM: 70 MMOL/L
CLARITY: CLEAR
CO2: 20 MMOL/L (ref 22–29)
CO2: 20 MMOL/L (ref 22–29)
COLOR: YELLOW
CREAT SERPL-MCNC: 2.3 MG/DL (ref 0.5–1)
CREAT SERPL-MCNC: 2.5 MG/DL (ref 0.5–1)
CREATININE URINE: 59 MG/DL (ref 29–226)
EKG ATRIAL RATE: 70 BPM
EKG P AXIS: 33 DEGREES
EKG P-R INTERVAL: 128 MS
EKG Q-T INTERVAL: 394 MS
EKG QRS DURATION: 66 MS
EKG QTC CALCULATION (BAZETT): 425 MS
EKG R AXIS: -10 DEGREES
EKG T AXIS: 31 DEGREES
EKG VENTRICULAR RATE: 70 BPM
EOSINOPHILS ABSOLUTE: 0.04 E9/L (ref 0.05–0.5)
EOSINOPHILS RELATIVE PERCENT: 0.3 % (ref 0–6)
GFR AFRICAN AMERICAN: 23
GFR AFRICAN AMERICAN: 25
GFR NON-AFRICAN AMERICAN: 23 ML/MIN/1.73
GFR NON-AFRICAN AMERICAN: 25 ML/MIN/1.73
GLUCOSE BLD-MCNC: 131 MG/DL (ref 74–99)
GLUCOSE BLD-MCNC: 214 MG/DL (ref 74–99)
GLUCOSE URINE: NEGATIVE MG/DL
HCT VFR BLD CALC: 36 % (ref 34–48)
HEMOGLOBIN: 12.1 G/DL (ref 11.5–15.5)
IMMATURE GRANULOCYTES #: 0.05 E9/L
IMMATURE GRANULOCYTES %: 0.4 % (ref 0–5)
KETONES, URINE: NEGATIVE MG/DL
LEUKOCYTE ESTERASE, URINE: NEGATIVE
LYMPHOCYTES ABSOLUTE: 0.67 E9/L (ref 1.5–4)
LYMPHOCYTES RELATIVE PERCENT: 5.8 % (ref 20–42)
MCH RBC QN AUTO: 29.5 PG (ref 26–35)
MCHC RBC AUTO-ENTMCNC: 33.6 % (ref 32–34.5)
MCV RBC AUTO: 87.8 FL (ref 80–99.9)
METER GLUCOSE: 137 MG/DL (ref 74–99)
METER GLUCOSE: 201 MG/DL (ref 74–99)
METER GLUCOSE: 45 MG/DL (ref 74–99)
MONOCYTES ABSOLUTE: 0.49 E9/L (ref 0.1–0.95)
MONOCYTES RELATIVE PERCENT: 4.3 % (ref 2–12)
NEUTROPHILS ABSOLUTE: 10.23 E9/L (ref 1.8–7.3)
NEUTROPHILS RELATIVE PERCENT: 88.9 % (ref 43–80)
NITRITE, URINE: NEGATIVE
PDW BLD-RTO: 16.6 FL (ref 11.5–15)
PH UA: 5 (ref 5–9)
PLATELET # BLD: 280 E9/L (ref 130–450)
PMV BLD AUTO: 11.2 FL (ref 7–12)
POTASSIUM REFLEX MAGNESIUM: 4.4 MMOL/L (ref 3.5–5)
POTASSIUM REFLEX MAGNESIUM: 4.7 MMOL/L (ref 3.5–5)
POTASSIUM, UR: 17.2 MMOL/L
PROTEIN UA: NEGATIVE MG/DL
RBC # BLD: 4.1 E12/L (ref 3.5–5.5)
SODIUM BLD-SCNC: 139 MMOL/L (ref 132–146)
SODIUM BLD-SCNC: 141 MMOL/L (ref 132–146)
SODIUM URINE: 76 MMOL/L
SPECIFIC GRAVITY UA: 1.01 (ref 1–1.03)
TOTAL PROTEIN: 7.1 G/DL (ref 6.4–8.3)
UREA NITROGEN, UR: 364 MG/DL (ref 800–1666)
UROBILINOGEN, URINE: 0.2 E.U./DL
WBC # BLD: 11.5 E9/L (ref 4.5–11.5)

## 2021-09-19 PROCEDURE — 82962 GLUCOSE BLOOD TEST: CPT

## 2021-09-19 PROCEDURE — 84300 ASSAY OF URINE SODIUM: CPT

## 2021-09-19 PROCEDURE — 81003 URINALYSIS AUTO W/O SCOPE: CPT

## 2021-09-19 PROCEDURE — 36415 COLL VENOUS BLD VENIPUNCTURE: CPT

## 2021-09-19 PROCEDURE — 2580000003 HC RX 258: Performed by: EMERGENCY MEDICINE

## 2021-09-19 PROCEDURE — 99285 EMERGENCY DEPT VISIT HI MDM: CPT

## 2021-09-19 PROCEDURE — 2060000000 HC ICU INTERMEDIATE R&B

## 2021-09-19 PROCEDURE — 84540 ASSAY OF URINE/UREA-N: CPT

## 2021-09-19 PROCEDURE — 82436 ASSAY OF URINE CHLORIDE: CPT

## 2021-09-19 PROCEDURE — 76770 US EXAM ABDO BACK WALL COMP: CPT

## 2021-09-19 PROCEDURE — 93010 ELECTROCARDIOGRAM REPORT: CPT | Performed by: INTERNAL MEDICINE

## 2021-09-19 PROCEDURE — 2580000003 HC RX 258: Performed by: NURSE PRACTITIONER

## 2021-09-19 PROCEDURE — 80048 BASIC METABOLIC PNL TOTAL CA: CPT

## 2021-09-19 PROCEDURE — 70450 CT HEAD/BRAIN W/O DYE: CPT

## 2021-09-19 PROCEDURE — 93005 ELECTROCARDIOGRAM TRACING: CPT | Performed by: EMERGENCY MEDICINE

## 2021-09-19 PROCEDURE — 84133 ASSAY OF URINE POTASSIUM: CPT

## 2021-09-19 PROCEDURE — 6370000000 HC RX 637 (ALT 250 FOR IP): Performed by: NURSE PRACTITIONER

## 2021-09-19 PROCEDURE — 2500000003 HC RX 250 WO HCPCS: Performed by: NURSE PRACTITIONER

## 2021-09-19 PROCEDURE — 80053 COMPREHEN METABOLIC PANEL: CPT

## 2021-09-19 PROCEDURE — 85025 COMPLETE CBC W/AUTO DIFF WBC: CPT

## 2021-09-19 PROCEDURE — 6370000000 HC RX 637 (ALT 250 FOR IP): Performed by: FAMILY MEDICINE

## 2021-09-19 PROCEDURE — 6360000002 HC RX W HCPCS: Performed by: NURSE PRACTITIONER

## 2021-09-19 PROCEDURE — 82570 ASSAY OF URINE CREATININE: CPT

## 2021-09-19 RX ORDER — 0.9 % SODIUM CHLORIDE 0.9 %
1000 INTRAVENOUS SOLUTION INTRAVENOUS ONCE
Status: COMPLETED | OUTPATIENT
Start: 2021-09-19 | End: 2021-09-19

## 2021-09-19 RX ORDER — TRAMADOL HYDROCHLORIDE 50 MG/1
50 TABLET ORAL ONCE
Status: COMPLETED | OUTPATIENT
Start: 2021-09-19 | End: 2021-09-19

## 2021-09-19 RX ORDER — ASPIRIN 81 MG/1
81 TABLET ORAL DAILY
Status: DISCONTINUED | OUTPATIENT
Start: 2021-09-19 | End: 2021-09-28 | Stop reason: HOSPADM

## 2021-09-19 RX ORDER — SODIUM CHLORIDE 0.9 % (FLUSH) 0.9 %
5-40 SYRINGE (ML) INJECTION PRN
Status: DISCONTINUED | OUTPATIENT
Start: 2021-09-19 | End: 2021-09-28 | Stop reason: HOSPADM

## 2021-09-19 RX ORDER — MORPHINE SULFATE 2 MG/ML
2 INJECTION, SOLUTION INTRAMUSCULAR; INTRAVENOUS EVERY 4 HOURS PRN
Status: COMPLETED | OUTPATIENT
Start: 2021-09-19 | End: 2021-09-24

## 2021-09-19 RX ORDER — SODIUM CHLORIDE 9 MG/ML
25 INJECTION, SOLUTION INTRAVENOUS PRN
Status: DISCONTINUED | OUTPATIENT
Start: 2021-09-19 | End: 2021-09-28 | Stop reason: HOSPADM

## 2021-09-19 RX ORDER — SODIUM CHLORIDE 9 MG/ML
INJECTION, SOLUTION INTRAVENOUS CONTINUOUS
Status: DISCONTINUED | OUTPATIENT
Start: 2021-09-19 | End: 2021-09-27

## 2021-09-19 RX ORDER — ATORVASTATIN CALCIUM 20 MG/1
20 TABLET, FILM COATED ORAL NIGHTLY
Status: DISCONTINUED | OUTPATIENT
Start: 2021-09-19 | End: 2021-09-28 | Stop reason: HOSPADM

## 2021-09-19 RX ORDER — SODIUM CHLORIDE 0.9 % (FLUSH) 0.9 %
5-40 SYRINGE (ML) INJECTION EVERY 12 HOURS SCHEDULED
Status: DISCONTINUED | OUTPATIENT
Start: 2021-09-19 | End: 2021-09-28 | Stop reason: HOSPADM

## 2021-09-19 RX ORDER — ATENOLOL 50 MG/1
50 TABLET ORAL DAILY
Status: DISCONTINUED | OUTPATIENT
Start: 2021-09-19 | End: 2021-09-28 | Stop reason: HOSPADM

## 2021-09-19 RX ORDER — ONDANSETRON 2 MG/ML
4 INJECTION INTRAMUSCULAR; INTRAVENOUS EVERY 6 HOURS PRN
Status: DISCONTINUED | OUTPATIENT
Start: 2021-09-19 | End: 2021-09-28 | Stop reason: HOSPADM

## 2021-09-19 RX ORDER — NICOTINE POLACRILEX 4 MG
15 LOZENGE BUCCAL PRN
Status: DISCONTINUED | OUTPATIENT
Start: 2021-09-19 | End: 2021-09-28 | Stop reason: HOSPADM

## 2021-09-19 RX ORDER — SODIUM CHLORIDE 9 MG/ML
1000 INJECTION, SOLUTION INTRAVENOUS CONTINUOUS
Status: DISCONTINUED | OUTPATIENT
Start: 2021-09-19 | End: 2021-09-21

## 2021-09-19 RX ORDER — POLYETHYLENE GLYCOL 3350 17 G/17G
17 POWDER, FOR SOLUTION ORAL DAILY PRN
Status: DISCONTINUED | OUTPATIENT
Start: 2021-09-19 | End: 2021-09-28 | Stop reason: HOSPADM

## 2021-09-19 RX ORDER — AMLODIPINE BESYLATE 10 MG/1
10 TABLET ORAL DAILY
Status: DISCONTINUED | OUTPATIENT
Start: 2021-09-19 | End: 2021-09-28 | Stop reason: HOSPADM

## 2021-09-19 RX ORDER — DEXTROSE MONOHYDRATE 50 MG/ML
100 INJECTION, SOLUTION INTRAVENOUS PRN
Status: DISCONTINUED | OUTPATIENT
Start: 2021-09-19 | End: 2021-09-28 | Stop reason: HOSPADM

## 2021-09-19 RX ORDER — HEPARIN SODIUM 10000 [USP'U]/ML
5000 INJECTION, SOLUTION INTRAVENOUS; SUBCUTANEOUS EVERY 8 HOURS SCHEDULED
Status: DISCONTINUED | OUTPATIENT
Start: 2021-09-19 | End: 2021-09-28 | Stop reason: HOSPADM

## 2021-09-19 RX ORDER — ONDANSETRON 4 MG/1
4 TABLET, ORALLY DISINTEGRATING ORAL EVERY 8 HOURS PRN
Status: DISCONTINUED | OUTPATIENT
Start: 2021-09-19 | End: 2021-09-28 | Stop reason: HOSPADM

## 2021-09-19 RX ORDER — DEXTROSE MONOHYDRATE 25 G/50ML
12.5 INJECTION, SOLUTION INTRAVENOUS PRN
Status: DISCONTINUED | OUTPATIENT
Start: 2021-09-19 | End: 2021-09-28 | Stop reason: HOSPADM

## 2021-09-19 RX ADMIN — Medication 10 ML: at 20:35

## 2021-09-19 RX ADMIN — ATORVASTATIN CALCIUM 20 MG: 20 TABLET, FILM COATED ORAL at 20:34

## 2021-09-19 RX ADMIN — SODIUM CHLORIDE 1000 ML: 9 INJECTION, SOLUTION INTRAVENOUS at 10:03

## 2021-09-19 RX ADMIN — TRAMADOL HYDROCHLORIDE 50 MG: 50 TABLET, FILM COATED ORAL at 21:29

## 2021-09-19 RX ADMIN — AMLODIPINE BESYLATE 10 MG: 10 TABLET ORAL at 16:47

## 2021-09-19 RX ADMIN — DEXTROSE MONOHYDRATE 12.5 G: 25 INJECTION, SOLUTION INTRAVENOUS at 17:03

## 2021-09-19 RX ADMIN — ATENOLOL 50 MG: 50 TABLET ORAL at 17:22

## 2021-09-19 RX ADMIN — SODIUM CHLORIDE 1000 ML: 9 INJECTION, SOLUTION INTRAVENOUS at 11:29

## 2021-09-19 RX ADMIN — SODIUM CHLORIDE: 9 INJECTION, SOLUTION INTRAVENOUS at 17:47

## 2021-09-19 RX ADMIN — HEPARIN SODIUM 5000 UNITS: 10000 INJECTION INTRAVENOUS; SUBCUTANEOUS at 21:00

## 2021-09-19 RX ADMIN — ASPIRIN 81 MG: 81 TABLET, COATED ORAL at 16:47

## 2021-09-19 ASSESSMENT — PAIN DESCRIPTION - PAIN TYPE: TYPE: ACUTE PAIN

## 2021-09-19 ASSESSMENT — PAIN SCALES - GENERAL
PAINLEVEL_OUTOF10: 6
PAINLEVEL_OUTOF10: 0
PAINLEVEL_OUTOF10: 8

## 2021-09-19 ASSESSMENT — PAIN DESCRIPTION - ORIENTATION: ORIENTATION: LOWER;MID

## 2021-09-19 ASSESSMENT — PAIN DESCRIPTION - LOCATION: LOCATION: BACK

## 2021-09-19 NOTE — PROGRESS NOTES
Message sent to Dr. Peter Sandoval regarding ultrasound retro results, asked for a knutson.  Also asked for admission orders

## 2021-09-19 NOTE — ED PROVIDER NOTES
HPI:  9/19/21,   Time: 8:45 AM EDT       Taylor Mclaughlin is a 70 y.o. female presenting to the ED for dizzy/light headed/low glucose, beginning 2 hrs ago. The complaint has been intermittent, moderate in severity, and worsened by standing up. Felt dizzy/light headed when standing up, didn't eat breakfast, called ems. Found glucose 50s, gave d10, feels better. Chronic stuttering. No focal weakness/tingling/n/v/d/abd pain/cp    Review of Systems:   Pertinent positives and negatives are stated within HPI, all other systems reviewed and are negative.          --------------------------------------------- PAST HISTORY ---------------------------------------------  Past Medical History:  has a past medical history of Cancer ([facility] Closed fracture of radius, Elevated lipids, Headache, History of anal cancer, Hyperlipidemia, Hypertension, Hypertension, Obesity, Proteinuria, Type II or unspecified type diabetes mellitus without mention of complication, not stated as uncontrolled, Vaginal cancer ([facility] and Vitamin D deficiency. Past Surgical History:  has a past surgical history that includes colostomy; Rectal surgery; Breast biopsy; and Wrist fracture surgery (Left, 11/07/2016). Social History:  reports that she has never smoked. She has never used smokeless tobacco. She reports that she does not drink alcohol and does not use drugs. Family History: family history includes Cancer in her father; Diabetes in her sister; High Blood Pressure in her mother; Kidney Disease in her mother; Other in her brother. The patients home medications have been reviewed.     Allergies: Betadine [povidone iodine], Lisinopril, Penicillins, and Tylenol [acetaminophen]        ---------------------------------------------------PHYSICAL EXAM--------------------------------------    Constitutional/General: Alert and oriented x3, well appearing, non toxic in NAD, chronic stuttering speech  Head: Normocephalic and atraumatic  Eyes: PERRL, EOMI, conjunctive normal, sclera non icteric  Mouth: Oropharynx clear, handling secretions,   Neck: Supple, full ROM,   Respiratory: Lungs clear to auscultation bilaterally, no wheezes, rales, or rhonchi. Not in respiratory distress  Cardiovascular:  Regular rate. Regular rhythm. No murmurs, gallops, or rubs. 2+ distal pulses  Chest: No chest wall tenderness  GI:  Abdomen Soft, Non tender, Non distended. Colostomy in llq   Musculoskeletal: Moves all extremities x 4. Warm and well perfused, no clubbing, cyanosis, or edema. Capillary refill <3 seconds  Integument: skin warm and dry. No rashes. Lymphatic: no lymphadenopathy noted  Neurologic: GCS 15, no focal deficits, symmetric strength 5/5 in the upper and lower extremities bilaterally, nml sensation, chronic speech stuttering  Psychiatric: Normal Affect    -------------------------------------------------- RESULTS -------------------------------------------------  I have personally reviewed all laboratory and imaging results for this patient. Results are listed below.      LABS:  Results for orders placed or performed during the hospital encounter of 09/19/21   CBC Auto Differential   Result Value Ref Range    WBC 11.5 4.5 - 11.5 E9/L    RBC 4.10 3.50 - 5.50 E12/L    Hemoglobin 12.1 11.5 - 15.5 g/dL    Hematocrit 36.0 34.0 - 48.0 %    MCV 87.8 80.0 - 99.9 fL    MCH 29.5 26.0 - 35.0 pg    MCHC 33.6 32.0 - 34.5 %    RDW 16.6 (H) 11.5 - 15.0 fL    Platelets 523 869 - 960 E9/L    MPV 11.2 7.0 - 12.0 fL    Neutrophils % 88.9 (H) 43.0 - 80.0 %    Immature Granulocytes % 0.4 0.0 - 5.0 %    Lymphocytes % 5.8 (L) 20.0 - 42.0 %    Monocytes % 4.3 2.0 - 12.0 %    Eosinophils % 0.3 0.0 - 6.0 %    Basophils % 0.3 0.0 - 2.0 %    Neutrophils Absolute 10.23 (H) 1.80 - 7.30 E9/L    Immature Granulocytes # 0.05 E9/L    Lymphocytes Absolute 0.67 (L) 1.50 - 4.00 E9/L    Monocytes Absolute 0.49 0.10 - 0.95 E9/L    Eosinophils Absolute 0.04 (L) 0.05 - 0.50 E9/L    Basophils Absolute 0.03 0.00 - 0.20 E9/L   Comprehensive Metabolic Panel w/ Reflex to MG   Result Value Ref Range    Sodium 141 132 - 146 mmol/L    Potassium reflex Magnesium 4.7 3.5 - 5.0 mmol/L    Chloride 106 98 - 107 mmol/L    CO2 20 (L) 22 - 29 mmol/L    Anion Gap 15 7 - 16 mmol/L    Glucose 131 (H) 74 - 99 mg/dL    BUN 31 (H) 6 - 23 mg/dL    CREATININE 2.5 (H) 0.5 - 1.0 mg/dL    GFR Non-African American 23 >=60 mL/min/1.73    GFR African American 23     Calcium 8.5 (L) 8.6 - 10.2 mg/dL    Total Protein 7.1 6.4 - 8.3 g/dL    Albumin 4.3 3.5 - 5.2 g/dL    Total Bilirubin 0.5 0.0 - 1.2 mg/dL    Alkaline Phosphatase 114 (H) 35 - 104 U/L    ALT 5 0 - 32 U/L    AST 23 0 - 31 U/L   EKG 12 Lead   Result Value Ref Range    Ventricular Rate 70 BPM    Atrial Rate 70 BPM    P-R Interval 128 ms    QRS Duration 66 ms    Q-T Interval 394 ms    QTc Calculation (Bazett) 425 ms    P Axis 33 degrees    R Axis -10 degrees    T Axis 31 degrees       RADIOLOGY:  Interpreted by Radiologist.  CT Head WO Contrast   Final Result   1. No acute intracranial abnormality. 2.  Area of hypodensity in the posterior left the parietal/occipital   subcortical/periventricular white matter more like old event. 3.  In absence of previous studies for comparison to determine the baseline,   can consider correlation with MRI study on more routine basis. US RETROPERITONEAL COMPLETE    (Results Pending)       EKG: This EKG is signed and interpreted by the EP. Time: 931  Rate: 75  Rhythm: Sinus  Interpretation: non-specific EKG  Comparison: None      ------------------------- NURSING NOTES AND VITALS REVIEWED ---------------------------   The nursing notes within the ED encounter and vital signs as below have been reviewed by myself.   BP (!) 196/90   Pulse 75   Temp 98 °F (36.7 °C)   Resp 18   Ht 5' 7\" (1.702 m)   Wt 187 lb (84.8 kg)   SpO2 99%   BMI 29.29 kg/m²   Oxygen Saturation Interpretation: Normal    The patients available past medical records and past encounters were reviewed. ------------------------------ ED COURSE/MEDICAL DECISION MAKING----------------------  Medications   0.9 % sodium chloride infusion (1,000 mLs IntraVENous New Bag 9/19/21 1129)   0.9 % sodium chloride bolus (0 mLs IntraVENous Stopped 9/19/21 1125)         ED COURSE:       Medical Decision Making:    Jose Walter, admit for further care    Pt at neuro baseline, stuttering is chronic, no sign of acute cva      This patient's ED course included: a personal history and physicial examination    This patient has remained hemodynamically stable during their ED course. Re-Evaluations:             Re-evaluation. Patients symptoms show no change    Re-examination  9/19/21   8:45 AM EDT          Vital Signs:   Vitals:    09/19/21 0828   BP: (!) 196/90   Pulse: 75   Resp: 18   Temp: 98 °F (36.7 °C)   SpO2: 99%   Weight: 187 lb (84.8 kg)   Height: 5' 7\" (1.702 m)       Consultations:             sound    Critical Care:         Counseling: The emergency provider has spoken with the patient and discussed todays results, in addition to providing specific details for the plan of care and counseling regarding the diagnosis and prognosis. Questions are answered at this time and they are agreeable with the plan.       --------------------------------- IMPRESSION AND DISPOSITION ---------------------------------    IMPRESSION  1. JEANNINE (acute kidney injury) ([facility]    2. Dizziness    3. Hypoglycemia        DISPOSITION Admit to telemetry  Patient condition is stable    NOTE: This report was transcribed using voice recognition software.  Every effort was made to ensure accuracy; however, inadvertent computerized transcription errors may be present        Elsie Wright MD  09/19/21 [place]  09/19/21 9403

## 2021-09-19 NOTE — H&P
Hospitalist History & Physical      PCP: Augie Galeano MD    Date of Admission: 9/19/2021    Date of Service: Pt seen/examined on 9/19/2021 and is admitted to Inpatient with expected LOS greater than two midnights due to medical therapy. Chief Complaint:  had concerns including Hypoglycemia (bgl 56 for EMS 200cc D10 now 161). History Of Present Illness:    Ms. Arabella Sierra is a 70y.o. year old female  who  has a past medical history of Cancer [facility]), Closed fracture of radius, Elevated lipids, Headache, History of anal cancer, Hyperlipidemia, Hypertension, Hypertension, Obesity, Proteinuria, Type II or unspecified type diabetes mellitus without mention of complication, not stated as uncontrolled, Vaginal cancer ([facility] and Vitamin D deficiency. She presented to the ER on 9/19/21 with hypoglycemia. She reportedly felt dizzy/lightheaded when standing up this morning, had not eaten breakfast, but called EMS. Her glucose was in the 50s for EMS and she was given D10 prior to arrival.    Glucose on arrival was 131 mg/dL. VS significant for /90. Labs revealed bicarbonate 20, BUN 31 and Cr 2.5. CT of the head showed no acute findings. She was given 1L of normal saline followed by NS at 125 mL/hr prior to being admitted for JEANNINE. She denies decreased PO intake, nausea, vomiting, use of NSAIDS. She and her daughter do endorse recent worsening urinary incontinence.     Past Medical History:        Diagnosis Date    Cancer [facility])     colon and uterine    Closed fracture of radius 12/27/2016    Elevated lipids 1/15/2014    Headache     History of anal cancer 2/20/2017    Dr. Esther Ledezma Hyperlipidemia     Hypertension     Hypertension     Obesity     Proteinuria 6/25/2014    Type II or unspecified type diabetes mellitus without mention of complication, not stated as uncontrolled     Vaginal cancer ([facility] 5/22/2017    Vitamin D deficiency 11/6/2014       Past Surgical History: Procedure Laterality Date    BREAST BIOPSY      COLOSTOMY      RECTAL SURGERY      WRIST FRACTURE SURGERY Left 11/07/2016       Medications Prior to Admission:      Prior to Admission medications    Medication Sig Start Date End Date Taking? Authorizing Provider   Cholecalciferol (D3 ADULT PO) Take 1 tablet by mouth daily   Yes Historical Provider, MD   atenolol (TENORMIN) 50 MG tablet take 1 tablet by mouth once daily 8/19/21  Yes Laurel Vela MD   amLODIPine (NORVASC) 10 MG tablet take 1 tablet by mouth once daily 8/19/21  Yes Laurel Vela MD   atorvastatin (LIPITOR) 20 MG tablet take 1 tablet by mouth once daily 8/19/21  Yes Laurel Vela MD   glimepiride (AMARYL) 2 MG tablet take 1 tablet by mouth twice a day 8/19/21  Yes Laurel Vela MD   metFORMIN (GLUCOPHAGE) 1000 MG tablet Take 1 tablet by mouth 2 times daily (with meals) 8/19/21  Yes Laurel Vela MD   alendronate (FOSAMAX) 70 MG tablet Take 1 tablet by mouth every 7 days 7/13/21  Yes Laurel Vela MD   aspirin EC 81 MG EC tablet Take 1 tablet by mouth daily. 1/15/14  Yes Laurel Vela MD       Allergies:  Betadine [povidone iodine], Lisinopril, Penicillins, and Tylenol [acetaminophen]    Social History:    RESIDENCE: Private residence  TOBACCO:   reports that she has never smoked. She has never used smokeless tobacco.  ETOH:   reports no history of alcohol use. Family History:    As follows:      Problem Relation Age of Onset    Kidney Disease Mother     High Blood Pressure Mother     Cancer Father     Diabetes Sister     Other Brother        REVIEW OF SYSTEMS:   Pertinent positives as noted in the HPI. All other systems reviewed and negative.     PHYSICAL EXAM:  BP (!) 177/95   Pulse 67   Temp 96.2 °F (35.7 °C) (Temporal)   Resp 16   Ht 5' 7\" (1.702 m)   Wt 187 lb (84.8 kg)   SpO2 100%   BMI 29.29 kg/m²   General appearance: No apparent distress, appears stated age and cooperative. HEENT: Normal cephalic, atraumatic without obvious deformity. Pupils equal, round, and reactive to light. Neck: Supple, with full range of motion. No jugular venous distention. Trachea midline. Respiratory:  Clear to auscultation bilaterally. No apparent distress on room air. Cardiovascular:  Regular rate and rhythm. S1, S2 without murmurs, rubs, or gallops. PV: Brisk capillary refill. +2 pedal and radial pulses bilaterally. No clubbing, cyanosis, edema of bilateral lower extremities. Abdomen: Soft, non-tender, non-distended. +BS. Colostomy LLQ. Bladder distended. Musculoskeletal: No obvious deformities or erythematous or edematous joints. Skin: Normal skin color. No rashes or lesions. Neurologic:  Neurovascularly intact without any focal sensory/motor deficits. Stutter. Psychiatric: Calm and cooperative      CBC:   Recent Labs     09/19/21 0918   WBC 11.5   RBC 4.10   HGB 12.1   HCT 36.0   MCV 87.8   RDW 16.6*        BMP:   Recent Labs     09/19/21 0918      K 4.7      CO2 20*   BUN 31*   CREATININE 2.5*     LFT:  Recent Labs     09/19/21 0918   PROT 7.1   ALKPHOS 114*   ALT 5   AST 23   BILITOT 0.5     CE:  No results for input(s): Lisa Fuchs in the last 72 hours. PT/INR: No results for input(s): INR, APTT in the last 72 hours. BNP: No results for input(s): BNP in the last 72 hours.   ESR: No results found for: SEDRATE  CRP: No results found for: CRP  D Dimer: No results found for: DDIMER   Folate and B12: No results found for: [id number], No results found for: FOLATE  Lactic Acid: No results found for: LACTA  Thyroid Studies:   Lab Results   Component Value Date    TSH 1.270 09/09/2020       Oupatient labs:  Lab Results   Component Value Date    CHOL 128 09/09/2020    TRIG 77 09/09/2020    HDL 43 09/09/2020    LDLCALC 70 09/09/2020    TSH 1.270 09/09/2020    LABA1C 7.0 07/12/2021       Urinalysis:    Lab Results   Component Value Date NITRU Negative 09/19/2021    BLOODU Negative 09/19/2021    SPECGRAV 1.010 09/19/2021    GLUCOSEU Negative 09/19/2021       Imaging:  CT Head WO Contrast    Result Date: 9/19/2021  1. No acute intracranial abnormality. 2.  Area of hypodensity in the posterior left the parietal/occipital subcortical/periventricular white matter more like old event. 3.  In absence of previous studies for comparison to determine the baseline, can consider correlation with MRI study on more routine basis. ASSESSMENT/PLAN:    JEANNINE stage III- 2/2 obstructive uropathy- renal ultrasound revealing >2.2 L of urine in the bladder with bilateral hydronephrosis. Baseline creatinine appears to be around 0.8 mg/dL. Insert knutson and consult urology. Repeat BMP this evening and consider nephrology consult if renal function not improved. Continue IVF- concern for post obstructive diuresis. Low bicarbonate level- likely HAGMA 2/2 uremia    Hypoglycemia- resolved    HTN- uncontrolled; to resume atenolol and amlodipine. Consider adding PRNs if BP remains elevated this evening. HLD- continue statin    DM- hold metformin and glimepiride for now, start SSI    Osteoporosis- continue alendronate weekly (Thursdays)    Hx colon/rectal CA s/p colostomy    Hx vaginal cancer s/p surgery      Diet: ADULT DIET; Regular;  No raw vegetables  Code Status: Full Code  Surrogate decision maker confirmed with patient:   Extended Emergency Contact Information  Primary Emergency Contact: Stephanie Camp  Address: [address] Phone: [phone]  Mobile Phone: [phone]  Relation: Brother/Sister    DVT Prophylaxis: []Lovenox [x]Heparin []PCD [] [address]  []Encouraged ambulation  Disposition: [x]Med/Surg [] Intermediate [] ICU/CCU  Admit status: [] Observation [x] Inpatient     +++++++++++++++++++++++++++++++++++++++++++++++++  [address] [facility]  [place]  +++++++++++++++++++++++++++++++++++++++++++++++++  NOTE: This report was transcribed using voice recognition software. Every effort was made to ensure accuracy; however, inadvertent computerized transcription errors may be present.

## 2021-09-19 NOTE — ED NOTES
Placed patient on Telemonitor; NSR 80 at this time. Patient's dgt brought pt's colostomy supplies from home to bedside.       Kathrin Rivera RN  09/19/21 1624

## 2021-09-20 LAB
ANION GAP SERPL CALCULATED.3IONS-SCNC: 13 MMOL/L (ref 7–16)
ANION GAP SERPL CALCULATED.3IONS-SCNC: 13 MMOL/L (ref 7–16)
BASOPHILS ABSOLUTE: 0.03 E9/L (ref 0–0.2)
BASOPHILS RELATIVE PERCENT: 0.3 % (ref 0–2)
BUN BLDV-MCNC: 27 MG/DL (ref 6–23)
BUN BLDV-MCNC: 29 MG/DL (ref 6–23)
CALCIUM SERPL-MCNC: 8 MG/DL (ref 8.6–10.2)
CALCIUM SERPL-MCNC: 8.2 MG/DL (ref 8.6–10.2)
CHLORIDE BLD-SCNC: 106 MMOL/L (ref 98–107)
CHLORIDE BLD-SCNC: 110 MMOL/L (ref 98–107)
CO2: 18 MMOL/L (ref 22–29)
CO2: 19 MMOL/L (ref 22–29)
CREAT SERPL-MCNC: 2.1 MG/DL (ref 0.5–1)
CREAT SERPL-MCNC: 2.2 MG/DL (ref 0.5–1)
EOSINOPHILS ABSOLUTE: 0.14 E9/L (ref 0.05–0.5)
EOSINOPHILS RELATIVE PERCENT: 1.5 % (ref 0–6)
GFR AFRICAN AMERICAN: 27
GFR AFRICAN AMERICAN: 28
GFR NON-AFRICAN AMERICAN: 27 ML/MIN/1.73
GFR NON-AFRICAN AMERICAN: 28 ML/MIN/1.73
GLUCOSE BLD-MCNC: 130 MG/DL (ref 74–99)
GLUCOSE BLD-MCNC: 176 MG/DL (ref 74–99)
HCT VFR BLD CALC: 32.6 % (ref 34–48)
HEMOGLOBIN: 10.7 G/DL (ref 11.5–15.5)
IMMATURE GRANULOCYTES #: 0.05 E9/L
IMMATURE GRANULOCYTES %: 0.6 % (ref 0–5)
LYMPHOCYTES ABSOLUTE: 1.4 E9/L (ref 1.5–4)
LYMPHOCYTES RELATIVE PERCENT: 15.4 % (ref 20–42)
MCH RBC QN AUTO: 29.5 PG (ref 26–35)
MCHC RBC AUTO-ENTMCNC: 32.8 % (ref 32–34.5)
MCV RBC AUTO: 89.8 FL (ref 80–99.9)
METER GLUCOSE: 217 MG/DL (ref 74–99)
METER GLUCOSE: 241 MG/DL (ref 74–99)
METER GLUCOSE: 276 MG/DL (ref 74–99)
MONOCYTES ABSOLUTE: 0.64 E9/L (ref 0.1–0.95)
MONOCYTES RELATIVE PERCENT: 7.1 % (ref 2–12)
NEUTROPHILS ABSOLUTE: 6.81 E9/L (ref 1.8–7.3)
NEUTROPHILS RELATIVE PERCENT: 75.1 % (ref 43–80)
PDW BLD-RTO: 16.7 FL (ref 11.5–15)
PLATELET # BLD: 279 E9/L (ref 130–450)
PMV BLD AUTO: 11.8 FL (ref 7–12)
POTASSIUM REFLEX MAGNESIUM: 4 MMOL/L (ref 3.5–5)
POTASSIUM REFLEX MAGNESIUM: 4.2 MMOL/L (ref 3.5–5)
RBC # BLD: 3.63 E12/L (ref 3.5–5.5)
SODIUM BLD-SCNC: 138 MMOL/L (ref 132–146)
SODIUM BLD-SCNC: 141 MMOL/L (ref 132–146)
WBC # BLD: 9.1 E9/L (ref 4.5–11.5)

## 2021-09-20 PROCEDURE — 82962 GLUCOSE BLOOD TEST: CPT

## 2021-09-20 PROCEDURE — 6360000002 HC RX W HCPCS: Performed by: NURSE PRACTITIONER

## 2021-09-20 PROCEDURE — 36415 COLL VENOUS BLD VENIPUNCTURE: CPT

## 2021-09-20 PROCEDURE — 6370000000 HC RX 637 (ALT 250 FOR IP): Performed by: NURSE PRACTITIONER

## 2021-09-20 PROCEDURE — 2580000003 HC RX 258: Performed by: NURSE PRACTITIONER

## 2021-09-20 PROCEDURE — 97166 OT EVAL MOD COMPLEX 45 MIN: CPT

## 2021-09-20 PROCEDURE — 80048 BASIC METABOLIC PNL TOTAL CA: CPT

## 2021-09-20 PROCEDURE — 97535 SELF CARE MNGMENT TRAINING: CPT

## 2021-09-20 PROCEDURE — 85025 COMPLETE CBC W/AUTO DIFF WBC: CPT

## 2021-09-20 PROCEDURE — 1200000000 HC SEMI PRIVATE

## 2021-09-20 RX ADMIN — HEPARIN SODIUM 5000 UNITS: 10000 INJECTION INTRAVENOUS; SUBCUTANEOUS at 21:21

## 2021-09-20 RX ADMIN — INSULIN LISPRO 1 UNITS: 100 INJECTION, SOLUTION INTRAVENOUS; SUBCUTANEOUS at 21:20

## 2021-09-20 RX ADMIN — AMLODIPINE BESYLATE 10 MG: 10 TABLET ORAL at 09:51

## 2021-09-20 RX ADMIN — ATORVASTATIN CALCIUM 20 MG: 20 TABLET, FILM COATED ORAL at 21:21

## 2021-09-20 RX ADMIN — ATENOLOL 50 MG: 50 TABLET ORAL at 09:51

## 2021-09-20 RX ADMIN — SODIUM CHLORIDE: 9 INJECTION, SOLUTION INTRAVENOUS at 19:59

## 2021-09-20 RX ADMIN — ASPIRIN 81 MG: 81 TABLET, COATED ORAL at 09:51

## 2021-09-20 RX ADMIN — Medication 10 ML: at 09:52

## 2021-09-20 RX ADMIN — INSULIN LISPRO 2 UNITS: 100 INJECTION, SOLUTION INTRAVENOUS; SUBCUTANEOUS at 12:15

## 2021-09-20 RX ADMIN — INSULIN LISPRO 3 UNITS: 100 INJECTION, SOLUTION INTRAVENOUS; SUBCUTANEOUS at 17:19

## 2021-09-20 RX ADMIN — HEPARIN SODIUM 5000 UNITS: 10000 INJECTION INTRAVENOUS; SUBCUTANEOUS at 05:35

## 2021-09-20 ASSESSMENT — PAIN SCALES - GENERAL
PAINLEVEL_OUTOF10: 0

## 2021-09-20 NOTE — PROGRESS NOTES
Occupational Therapy  OCCUPATIONAL THERAPY INITIAL EVALUATION    [facility] [address] [place] [address]    Date:2021                                                 Patient Name: Jamaal Gong  MRN: [id number]  : 1950  Room: [address] [place] [address]    Evaluating OT: Gricelda Marvin OTR/L #[id number]  Referring Provider: Wesly Turner MD  Specific Provider Orders: OT eval and treat; 21    Diagnosis: Dizziness [R42]  Hypoglycemia [E16.2]  JEANNINE (acute kidney injury) ([facility] [N17.9]    Surgery/Procedure: none   Pertinent Medical History: hx of colon and uterine cancer, HLD, HTN, obesity, type 2 DM, vitamin D deficiency       Precautions:  Fall Risk, cognitive deficit (word finding difficulty/stutter), bed alarm, colostomy    Assessment of current deficits   [x] Functional mobility  [x]ADLs  [x] Strength               [x]Cognition   [x] Functional transfers   [x] IADLs         [x] Safety Awareness   [x]Endurance   [] Fine Coordination              [x] Balance      [] Vision/perception   []Sensation    []Gross Motor Coordination  [] ROM  [] Delirium                   [] Motor Control     OT PLAN OF CARE   OT POC based on physician orders, patient diagnosis and results of clinical assessment    Frequency/Duration: 1-3 days/wk for 2 weeks PRN   Specific OT Treatment to include:   * Instruction/training on adapted ADL techniques and AE recommendations to increase functional independence within precautions       * Training on energy conservation strategies, correct breathing pattern and techniques to improve independence/tolerance for self-care routine  * Functional transfer/mobility training/DME recommendations for increased independence, safety, and fall prevention  * Patient/Family education to increase follow through with safety techniques and functional independence  * Recommendation of environmental modifications for increased safety with functional [place]    Balance Sitting:     Dynamic: SBA  Standing: CGA w/ no AD  Sitting:     Dynamic: Indep  Standing: Mod. I   Activity Tolerance Fair (limited d/t pain)  good   Visual/  Perceptual Glasses: readers    WFL                Hand Dominance R   AROM (PROM) Strength Additional Info:    RUE  WFL 4-/5 good  and wfl FMC/dexterity noted during ADL tasks       LUE WFL 4-/5 good  and wfl FMC/dexterity noted during ADL tasks       Hearing: WFL   Sensation:  No c/o numbness or tingling   Tone: WFL   Edema: none noted    Comments: Obtained nursing clearance prior to session. Upon arrival patient lying in bed (sister present). Pt demonstrating fair understanding of education/techniques, requiring additional training / education for increased independence for ADL completion. At end of session, patient lying in bed with call light and phone within reach, all lines and tubes intact. Pt instructed on use of call light for assistance and fall prevention. Line management and environmental modifications made prior to and end of session to ensure patient safety and to increase efficiency of session. Overall pt demonstrated decreased independence and safety during completion of ADL/functional transfers/mobility tasks. Pt would benefit from continued skilled OT to increase safety and independence with completion of ADL/IADL tasks for functional independence and quality of life. Treatment: Therapist facilitated bed mobility(supine<>Sit, educated pt on hand placement), functional transfers (EOB, commode, sit<>Stand with cueing on hand placement), standing/sitting tolerance tasks (addressing posture/balance impacting ADLs) and functional mobility task with no AD (cuing on posture and safety) - skilled cuing on hand placement, posture, body mechanics and safety.     Therapist facilitated self-care retraining: UB/LB self-care tasks(donned/doffed gown), simulated toileting task(in bathroom) and standing grooming task(oral care, deodorant) while educating pt on modified techniques, posture, safety and energy conservation techniques. Skilled monitoring of HR, O2 sats and pts response to treatment. Rehab Potential: Good for established goals     Patient / Family Goal: not stated      Patient and/or family were instructed on functional diagnosis, prognosis/goals and OT plan of care. Demonstrated fair+ understanding. Eval Complexity: Mod  mod  Profile and History- med (extensive chart review)  Assessment of Occupational Performance and Identification of Deficits- med  Clinical Decision Making- med    Time In: 2:00  Time Out: 2:30  Total Treatment Time: 15 minutes    Min Units   OT Eval Low 08167       OT Eval Medium 97166  x 1   OT Eval High 41835       OT Re-Eval [id number]       Therapeutic Ex 59877       Therapeutic Activities 64955       ADL/Self Care 26285  15  1   Orthotic Management 10774       Neuro Re-Ed 30778       Non-Billable Time          Evaluation Time includes thorough review of current medical information, gathering information on past medical history/social history and prior level of function, completion of standardized testing/informal observation of tasks, assessment of data and education on plan of care and goals.             Josey Brooks OTR/L #[id number]

## 2021-09-20 NOTE — CONSULTS
9/20/2021 11:45 AM  Service: Urology  Group: [facility] ([place])    Gabo Johns  [id number]     Chief Complaint:    Urinary Retention, Bilateral Hydronephrosis     History of Present Illness: The patient is a 70 y.o. female patient who presented to the hospital yesterday for hypoglycemia. She was found to have JEANNINE and admitted for further evaluation. She had a renal ultrasound that showed bilateral hydronephrosis and a distended bladder with estimated >2000ml of urine. A knutson was inserted for >2000ml of output and we were asked to evaluate. She has never seen a Urologist in the past to her knowledge. She lives at home alone. She reports incontinence going through multiple pads per day. She denies abdominal discomfort or relief when knutson was inserted.          Past Medical History:   Diagnosis Date    Cancer [facility])     colon and uterine    Closed fracture of radius 12/27/2016    Elevated lipids 1/15/2014    Headache     History of anal cancer 2/20/2017    Dr. Trae Castro Hyperlipidemia     Hypertension     Hypertension     Obesity     Proteinuria 6/25/2014    Type II or unspecified type diabetes mellitus without mention of complication, not stated as uncontrolled     Vaginal cancer ([facility] [address] 5/22/2017    Vitamin D deficiency 11/6/2014         Past Surgical History:   Procedure Laterality Date    BREAST BIOPSY      COLOSTOMY      RECTAL SURGERY      WRIST FRACTURE SURGERY Left 11/07/2016       Medications Prior to Admission:    Medications Prior to Admission: Cholecalciferol (D3 ADULT PO), Take 1 tablet by mouth daily  atenolol (TENORMIN) 50 MG tablet, take 1 tablet by mouth once daily  amLODIPine (NORVASC) 10 MG tablet, take 1 tablet by mouth once daily  atorvastatin (LIPITOR) 20 MG tablet, take 1 tablet by mouth once daily  glimepiride (AMARYL) 2 MG tablet, take 1 tablet by mouth twice a day  metFORMIN (GLUCOPHAGE) 1000 MG tablet, Take 1 tablet by mouth 2 times daily (with meals)  alendronate (FOSAMAX) 70 MG tablet, Take 1 tablet by mouth every 7 days  aspirin EC 81 MG EC tablet, Take 1 tablet by mouth daily. Allergies:    Betadine [povidone iodine], Lisinopril, Penicillins, and Tylenol [acetaminophen]    Social History:    reports that she has never smoked. She has never used smokeless tobacco. She reports that she does not drink alcohol and does not use drugs. Family History:   Non-contributory to this Urological problem  family history includes Cancer in her father; Diabetes in her sister; High Blood Pressure in her mother; Kidney Disease in her mother; Other in her brother. Review of Systems:  Constitutional: No fever or chills   Respiratory: negative for cough and hemoptysis  Cardiovascular: negative for chest pain and dyspnea  Gastrointestinal: negative for abdominal pain, diarrhea, nausea and vomiting   Derm: negative for rash and skin lesion(s)  Neurological: negative for seizures and tremors  Musculoskeletal: Negative    Psychiatric: Negative   : As above in the HPI, otherwise negative  All other reviews are negative    Physical Exam:     Vitals:  BP (!) 164/89   Pulse 66   Temp 98 °F (36.7 °C) (Oral)   Resp 16   Ht 5' 7\" (1.702 m)   Wt 187 lb (84.8 kg)   SpO2 100%   BMI 29.29 kg/m²     General:  Awake, alert, oriented X 3. No apparent distress. HEENT:  Normocephalic, atraumatic. Lungs:  Respirations symmetric and non-labored. Abdomen:  soft, nontender, no masses  Extremities:  No clubbing, cyanosis, or edema  Skin:  Warm and dry, no open lesions or rashes  Neuro: There are no motor or sensory deficits in the 4 quadrant extremities   Rectal: deferred  Genitourinary:   López draining clear yellow urine    Labs:     Recent Labs     09/19/21 0918 09/20/21  0518   WBC 11.5 9.1   RBC 4.10 3.63   HGB 12.1 10.7*   HCT 36.0 32.6*   MCV 87.8 89.8   MCH 29.5 29.5   MCHC 33.6 32.8   RDW 16.6* 16.7*    279   MPV 11.2 11.8         Recent Labs     09/19/21 0918 09/19/21 2040 09/20/21  0729   CREATININE 2.5* 2.3* 2.2*       No results found for: PSA    Imaging:   Narrative   EXAMINATION:   RETROPERITONEAL ULTRASOUND OF THE KIDNEYS AND URINARY BLADDER       9/19/2021       COMPARISON:   None       HISTORY:   ORDERING SYSTEM PROVIDED HISTORY: ana stage iii   TECHNOLOGIST PROVIDED HISTORY:       Reason for exam:->ana stage iii   What reading provider will be dictating this exam?->CRC       FINDINGS:       Kidneys:       The right kidney measures 10 cm in length and the left kidney measures 10.6   cm in length.       There is a simple cyst in the lower pole of the right kidney measuring 2.8 x   3 x 2.7 cm.  Moderate bilateral hydronephrosis is present.  No shadowing   renal calculus.           Bladder:       The urinary bladder is significantly distended, containing 2227 ml of urine. The patient did not feel the urge to void during the examination.           Impression   Moderate bilateral hydronephrosis without shadowing calculi.  The urinary   bladder is significantly dilated, containing 2227 ml of urine.    Hydronephrosis may be secondary to reflux from the dilated urinary bladder.                       Assessment/plan:  Urinary Retention (>2000ml)  Bilateral Hydronephrosis   Azotemia   Hypotonic bladder     Cont to watch the creatinine, trending down   Nephrology following   UA unremarkable   Antibiotics per primary   Renal ultrasound reviewed  Bilateral hydronephrosis should resolve with bladder decompression in place   Cont the knutson catheter   Will need urodynamics testing and a voiding trial as an outpatient  No further acute  interventions planned at this time   Will follow peripherally       Electronically signed by MARTIR Downs CNP on 9/20/2021 at 11:45 AM   Agree with assessment and plan

## 2021-09-20 NOTE — PROGRESS NOTES
HOSPITALIST PROGRESS NOTE  Date: 9/20/2021   Name: Fabricio Lopez   MRN: [id number]   YOB: 1950        Hospital Course:   Ms Kenneth Arellano is 70 T past medical history of Cancer [facility]), Closed fracture of radius, Elevated lipids, Headache, History of anal cancer, Hyperlipidemia, Hypertension, Hypertension, Obesity, Proteinuria, Type II or unspecified type diabetes mellitus without mention of complication, not stated as uncontrolled, Vaginal cancer ([address] and Vitamin D deficiency;  presented to the ER on 9/19/21 with hypoglycemia. She reportedly felt dizzy/lightheaded when standing up this morning, had not eaten breakfast, but called EMS.  Her glucose was in the 50s for EMS and she was given D10 prior to arrival.    Subjective/Interval Hx:   Patient states that she feels a lot better as once they placed a López she has some urinary retention urology has been consulted    Objective:   Physical Exam:   BP (!) 177/96   Pulse 75   Temp 98 °F (36.7 °C) (Oral)   Resp 18   Ht 5' 7\" (1.702 m)   Wt 187 lb (84.8 kg)   SpO2 99%   BMI 29.29 kg/m²   General: no acute distress, well nourished and well hydrated  HEENT: NCAT  Heart: S1S2 RRR  Lungs: Clear to ascultation bilaterally, respiratory effort normal  Abdomen: soft, NT/ND, positive bowel sounds  Extremities: no pitting edema, nontender   Neuro: patient is awake, alert and orientated times 3, no gross deficits  Skin: no rashes or ecchymosis        Meds:   Meds:    sodium chloride flush  5-40 mL IntraVENous 2 times per day    heparin (porcine)  5,000 Units SubCUTAneous 3 times per day    amLODIPine  10 mg Oral Daily    aspirin EC  81 mg Oral Daily    atenolol  50 mg Oral Daily    atorvastatin  20 mg Oral Nightly    insulin lispro  0-6 Units SubCUTAneous TID WC    insulin lispro  0-3 Units SubCUTAneous Nightly      Infusions:    sodium chloride Stopped (09/19/21 1747)    sodium chloride      dextrose      sodium chloride 75 mL/hr at 09/19/21 1747 PRN Meds: sodium chloride flush, 5-40 mL, PRN  sodium chloride, 25 mL, PRN  ondansetron, 4 mg, Q8H PRN   Or  ondansetron, 4 mg, Q6H PRN  polyethylene glycol, 17 g, Daily PRN  glucose, 15 g, PRN  dextrose, 12.5 g, PRN  glucagon (rDNA), 1 mg, PRN  dextrose, 100 mL/hr, PRN  morphine, 2 mg, Q4H PRN        Data/Labs:     Recent Labs     09/19/21 0918 09/20/21  0518   WBC 11.5 9.1   HGB 12.1 10.7*   HCT 36.0 32.6*    279      Recent Labs     09/19/21 0918 09/19/21  2040 09/20/21  0729    139 141   K 4.7 4.4 4.2    109* 110*   CO2 20* 20* 18*   BUN 31* 30* 27*   CREATININE 2.5* 2.3* 2.2*     Recent Labs     09/19/21 0918   AST 23   ALT 5   BILITOT 0.5   ALKPHOS 114*     No results for input(s): INR in the last 72 hours. No results for input(s): CKTOTAL, CKMB, CKMBINDEX, TROPONINT in the last 72 hours. I/O last 3 completed shifts: In: 61 [P.O.:60]  Out: 5000 [Urine:5000]    Intake/Output Summary (Last 24 hours) at 9/20/2021 1315  Last data filed at 9/20/2021 1126  Gross per 24 hour   Intake 60 ml   Output 6500 ml   Net -6440 ml        Assessment/Plan:   1. Urinary obstructive uropathy with bilateral hydronephrosis-urology was consulted and López was placed and nephrology is also following for acute kidney injury monitor lab studies  2. Acute kidney injury/chronic kidney disease stage III-nephrology is following, maintain hydration, avoid nephrotoxic agents  3. Hypertension-amlodipine, atenolol, monitor blood pressure  4. Non-insulin-dependent diabetes mellitus-sliding scale, diabetic diet, monitor blood glucose      DVT Prophylaxis: Heparin  Diet: ADULT DIET; Regular;  No raw vegetables  Code Status: Full Code    Dispo: when stable    Electronically signed by Brandi Meek MD on 9/20/2021 at 1:15 PM  [facility]

## 2021-09-20 NOTE — CONSULTS
Nephrology Consult  [facility]  Wilbur Aguirre. Jenny COLLINS    CC:   JEANNINE    HPI:   Chris Luevano is a 70year old female we were asked to see regarding JEANNINE. She presented to the ED yesterday for dizziness and was found to be hypoglycemic with a blood sugar in the 50's and hypertensive with /90. Her creatinine was elevated at 2.5, with no known Hx CKD and baseline creatinine of 0.8-0.9. A katharina US was done and she was found to have bilateral hydronephrosis and a bladder with 2227 ml urine. She had a knutson placed and has had 6.1 liters of UOP since then and creatinine has trended down from 2.5-->2.3-->2.2. She is awake and alert. She is feeling much better. Family in the room and updated.        PMH:    Past Medical History:   Diagnosis Date    Cancer [facility])     colon and uterine    Closed fracture of radius 12/27/2016    Elevated lipids 1/15/2014    Headache     History of anal cancer 2/20/2017    Dr. Og Lea    Hyperlipidemia     Hypertension     Hypertension     Obesity     Proteinuria 6/25/2014    Type II or unspecified type diabetes mellitus without mention of complication, not stated as uncontrolled     Vaginal cancer ([facility] [address] 5/22/2017    Vitamin D deficiency 11/6/2014       Patient Active Problem List   Diagnosis    Hypertension    Elevated lipids    Proteinuria    Vitamin D deficiency    Closed fracture of radius    History of anal cancer    Colostomy present ([address]    Vaginal cancer ([facility] [address]    Diabetes mellitus type 2 in obese ([address]    Stuttering    JEANNINE (acute kidney injury) ([address]       Meds:     sodium chloride flush  5-40 mL IntraVENous 2 times per day    heparin (porcine)  5,000 Units SubCUTAneous 3 times per day    amLODIPine  10 mg Oral Daily    aspirin EC  81 mg Oral Daily    atenolol  50 mg Oral Daily    atorvastatin  20 mg Oral Nightly    insulin lispro  0-6 Units SubCUTAneous TID     insulin lispro  0-3 Units SubCUTAneous Nightly        sodium chloride Stopped (09/19/21 1747)    sodium chloride      dextrose      sodium chloride 75 mL/hr at 09/19/21 1747       Meds prn:     sodium chloride flush, sodium chloride, ondansetron **OR** ondansetron, polyethylene glycol, glucose, dextrose, glucagon (rDNA), dextrose, morphine    Meds prior to admission:     No current facility-administered medications on file prior to encounter. Current Outpatient Medications on File Prior to Encounter   Medication Sig Dispense Refill    Cholecalciferol (D3 ADULT PO) Take 1 tablet by mouth daily      atenolol (TENORMIN) 50 MG tablet take 1 tablet by mouth once daily 90 tablet 0    amLODIPine (NORVASC) 10 MG tablet take 1 tablet by mouth once daily 90 tablet 0    atorvastatin (LIPITOR) 20 MG tablet take 1 tablet by mouth once daily 90 tablet 0    glimepiride (AMARYL) 2 MG tablet take 1 tablet by mouth twice a day 180 tablet 0    metFORMIN (GLUCOPHAGE) 1000 MG tablet Take 1 tablet by mouth 2 times daily (with meals) 180 tablet 0    alendronate (FOSAMAX) 70 MG tablet Take 1 tablet by mouth every 7 days 4 tablet 5    aspirin EC 81 MG EC tablet Take 1 tablet by mouth daily. 30 tablet 2       Allergies:    Betadine [povidone iodine], Lisinopril, Penicillins, and Tylenol [acetaminophen]    Social History:     reports that she has never smoked. She has never used smokeless tobacco. She reports that she does not drink alcohol and does not use drugs.     Family History:         Problem Relation Age of Onset    Kidney Disease Mother     High Blood Pressure Mother     Cancer Father     Diabetes Sister     Other Brother        ROS:     All pertinent + discussed in HPI  All other sx negative     Physical Exam:      Patient Vitals for the past 24 hrs:   BP Temp Temp src Pulse Resp SpO2   09/20/21 1200 (!) 177/96 98 °F (36.7 °C) Oral 75 18 99 %   09/20/21 0800 (!) 164/89 98 °F (36.7 °C) Oral 66 16 100 %   09/20/21 0530 (!) 140/66 98.2 °F (36.8 °C) Oral 80 18 96 %   09/19/21 2030 (!) 149/86 98.3 °F OSMOU    No components found for: URIC    No results found for: LIPIDPAN      Assessment and Plan:    1. JEANNINE-  In the setting of acute urinary retention with bilateral hydronephrosis. Knutson placed after Renal US showed >2 liters urine in bladder  She has had >6.1 liters out so far  Baseline creatinine 0.8-0.9  Creatinine improving 2.5-->2.3-->2. 2  Will follow     2. Metabolic acidosis-  In the setting of JEANNINE  As well as on metformin PTA  Continue to hold metformin and follow     3. Hypertension-  Above goal <130/80  Will follow on current meds, but may need additional medications.      4. Anemia-  Hgb 10.7  Will check Iron studies, B-12 and folate      MATRIR Kelley - CNP   Pt seen and examined agree with above  Hydronephrosis improving with knutson  Follow cr  Hilario Perry MD

## 2021-09-21 LAB
ANION GAP SERPL CALCULATED.3IONS-SCNC: 15 MMOL/L (ref 7–16)
BUN BLDV-MCNC: 30 MG/DL (ref 6–23)
CALCIUM SERPL-MCNC: 8.4 MG/DL (ref 8.6–10.2)
CHLORIDE BLD-SCNC: 103 MMOL/L (ref 98–107)
CO2: 20 MMOL/L (ref 22–29)
CREAT SERPL-MCNC: 2.1 MG/DL (ref 0.5–1)
CREATININE URINE: 39 MG/DL (ref 29–226)
FERRITIN: 248 NG/ML
FOLATE: 3.8 NG/ML (ref 4.8–24.2)
GFR AFRICAN AMERICAN: 28
GFR NON-AFRICAN AMERICAN: 28 ML/MIN/1.73
GLUCOSE BLD-MCNC: 126 MG/DL (ref 74–99)
HCT VFR BLD CALC: 34.1 % (ref 34–48)
HEMOGLOBIN: 11.2 G/DL (ref 11.5–15.5)
IRON SATURATION: 25 % (ref 15–50)
IRON: 75 MCG/DL (ref 37–145)
MAGNESIUM: 1.1 MG/DL (ref 1.6–2.6)
MCH RBC QN AUTO: 29.3 PG (ref 26–35)
MCHC RBC AUTO-ENTMCNC: 32.8 % (ref 32–34.5)
MCV RBC AUTO: 89.3 FL (ref 80–99.9)
METER GLUCOSE: 146 MG/DL (ref 74–99)
METER GLUCOSE: 151 MG/DL (ref 74–99)
METER GLUCOSE: 205 MG/DL (ref 74–99)
METER GLUCOSE: 252 MG/DL (ref 74–99)
PDW BLD-RTO: 16.6 FL (ref 11.5–15)
PHOSPHORUS: 2.4 MG/DL (ref 2.5–4.5)
PLATELET # BLD: 256 E9/L (ref 130–450)
PMV BLD AUTO: 12 FL (ref 7–12)
POTASSIUM REFLEX MAGNESIUM: 4 MMOL/L (ref 3.5–5)
PROTEIN PROTEIN: 17 MG/DL (ref 0–12)
PROTEIN/CREAT RATIO: 0.4
PROTEIN/CREAT RATIO: 0.4 (ref 0–0.2)
RBC # BLD: 3.82 E12/L (ref 3.5–5.5)
SODIUM BLD-SCNC: 138 MMOL/L (ref 132–146)
TOTAL IRON BINDING CAPACITY: 305 MCG/DL (ref 250–450)
VITAMIN B-12: 247 PG/ML (ref 211–946)
WBC # BLD: 10.2 E9/L (ref 4.5–11.5)

## 2021-09-21 PROCEDURE — 82962 GLUCOSE BLOOD TEST: CPT

## 2021-09-21 PROCEDURE — 36415 COLL VENOUS BLD VENIPUNCTURE: CPT

## 2021-09-21 PROCEDURE — 2580000003 HC RX 258: Performed by: NURSE PRACTITIONER

## 2021-09-21 PROCEDURE — 83735 ASSAY OF MAGNESIUM: CPT

## 2021-09-21 PROCEDURE — 97161 PT EVAL LOW COMPLEX 20 MIN: CPT

## 2021-09-21 PROCEDURE — 6370000000 HC RX 637 (ALT 250 FOR IP): Performed by: NURSE PRACTITIONER

## 2021-09-21 PROCEDURE — 1200000000 HC SEMI PRIVATE

## 2021-09-21 PROCEDURE — 6360000002 HC RX W HCPCS: Performed by: NURSE PRACTITIONER

## 2021-09-21 PROCEDURE — 80048 BASIC METABOLIC PNL TOTAL CA: CPT

## 2021-09-21 PROCEDURE — 82728 ASSAY OF FERRITIN: CPT

## 2021-09-21 PROCEDURE — 85027 COMPLETE CBC AUTOMATED: CPT

## 2021-09-21 PROCEDURE — 83550 IRON BINDING TEST: CPT

## 2021-09-21 PROCEDURE — 82607 VITAMIN B-12: CPT

## 2021-09-21 PROCEDURE — 6370000000 HC RX 637 (ALT 250 FOR IP): Performed by: INTERNAL MEDICINE

## 2021-09-21 PROCEDURE — 97530 THERAPEUTIC ACTIVITIES: CPT

## 2021-09-21 PROCEDURE — 82570 ASSAY OF URINE CREATININE: CPT

## 2021-09-21 PROCEDURE — 82746 ASSAY OF FOLIC ACID SERUM: CPT

## 2021-09-21 PROCEDURE — 84100 ASSAY OF PHOSPHORUS: CPT

## 2021-09-21 PROCEDURE — 84156 ASSAY OF PROTEIN URINE: CPT

## 2021-09-21 PROCEDURE — 83540 ASSAY OF IRON: CPT

## 2021-09-21 RX ORDER — HYDRALAZINE HYDROCHLORIDE 20 MG/ML
10 INJECTION INTRAMUSCULAR; INTRAVENOUS EVERY 6 HOURS PRN
Status: DISCONTINUED | OUTPATIENT
Start: 2021-09-21 | End: 2021-09-28 | Stop reason: HOSPADM

## 2021-09-21 RX ADMIN — ATORVASTATIN CALCIUM 20 MG: 20 TABLET, FILM COATED ORAL at 20:43

## 2021-09-21 RX ADMIN — Medication 400 MG: at 20:43

## 2021-09-21 RX ADMIN — SODIUM CHLORIDE: 9 INJECTION, SOLUTION INTRAVENOUS at 12:20

## 2021-09-21 RX ADMIN — INSULIN LISPRO 1 UNITS: 100 INJECTION, SOLUTION INTRAVENOUS; SUBCUTANEOUS at 10:08

## 2021-09-21 RX ADMIN — HEPARIN SODIUM 5000 UNITS: 10000 INJECTION INTRAVENOUS; SUBCUTANEOUS at 20:43

## 2021-09-21 RX ADMIN — ATENOLOL 50 MG: 50 TABLET ORAL at 09:43

## 2021-09-21 RX ADMIN — INSULIN LISPRO 2 UNITS: 100 INJECTION, SOLUTION INTRAVENOUS; SUBCUTANEOUS at 12:20

## 2021-09-21 RX ADMIN — HEPARIN SODIUM 5000 UNITS: 10000 INJECTION INTRAVENOUS; SUBCUTANEOUS at 14:34

## 2021-09-21 RX ADMIN — ASPIRIN 81 MG: 81 TABLET, COATED ORAL at 09:44

## 2021-09-21 RX ADMIN — INSULIN LISPRO 1 UNITS: 100 INJECTION, SOLUTION INTRAVENOUS; SUBCUTANEOUS at 17:48

## 2021-09-21 RX ADMIN — AMLODIPINE BESYLATE 10 MG: 10 TABLET ORAL at 09:44

## 2021-09-21 RX ADMIN — INSULIN LISPRO 2 UNITS: 100 INJECTION, SOLUTION INTRAVENOUS; SUBCUTANEOUS at 20:44

## 2021-09-21 ASSESSMENT — PAIN SCALES - GENERAL
PAINLEVEL_OUTOF10: 0
PAINLEVEL_OUTOF10: 0

## 2021-09-21 NOTE — CARE COORDINATION
Per internal med today, Continue IV hydration and monitor Cr- currently at 2.1. nephrology on board. Per urology note today, Pt felt to have a neurogenic bladder. Will plan onleaving knutson in post discharge. Pt will be taught intermittent self cath in office. I met with patient in room to discuss this and therapy evals. She is agreeable to home health care and does not have a preference for an agency. Referral made to [facility]. Home health care orders are in. Patient's sister Vijay Sam will transport her home at time of discharge and her number is [phone].    Rosena Leyden RN CM

## 2021-09-21 NOTE — PROGRESS NOTES
[facility]                                                            PROGRESS NOTE                                                                       9/21/2021          SUBJECTIVE:    Pt comfortable with knutson  Urine clear  Creatinine 2.1    OBJECTIVE:    BP (!) 170/93   Pulse 62   Temp 97.5 °F (36.4 °C) (Oral)   Resp 16   Ht 5' 7\" (1.702 m)   Wt 187 lb (84.8 kg)   SpO2 98%   BMI 29.29 kg/m²     PHYSICAL EXAMINATION:  Skin: dry, without rashes  Respirations: non-labored, intact  Abdomen: soft, non-tender, non-distended      Lab Results   Component Value Date    WBC 10.2 09/21/2021    HGB 11.2 (L) 09/21/2021    HCT 34.1 09/21/2021    MCV 89.3 09/21/2021     09/21/2021       Lab Results   Component Value Date    CREATININE 2.1 (H) 09/21/2021       No results found for: PSA    REVIEW OF SYSTEMS:    CONSTITUTIONAL: negative  HEENT: negative  HEMATOLOGIC: negative  ENDOCRINE: negative  RESPIRATORY: negative  CV: negative  GI: negative  NEURO: negative  ORTHOPEDICS: negative  PSYCHIATRIC: negative  : as above    PAST FAMILY HISTORY:    Family History   Problem Relation Age of Onset    Kidney Disease Mother     High Blood Pressure Mother     Cancer Father     Diabetes Sister     Other Brother      PAST SOCIAL HISTORY:    Social History     Socioeconomic History    Marital status: Single     Spouse name: None    Number of children: None    Years of education: None    Highest education level: None   Occupational History    None   Tobacco Use    Smoking status: Never Smoker    Smokeless tobacco: Never Used   Substance and Sexual Activity    Alcohol use: No    Drug use: No    Sexual activity: None   Other Topics Concern    None   Social History Narrative    None     Social Determinants of Health     Financial Resource Strain: Low Risk     Difficulty of Paying Living Expenses: Not hard at all   Food Insecurity: No Food Insecurity    Worried About [facility] in the Last Year: Never true    Cristhian of Food in the Last Year: Never true   Transportation Needs:     Lack of Transportation (Medical):      Lack of Transportation (Non-Medical):    Physical Activity:     Days of Exercise per Week:     Minutes of Exercise per Session:    Stress:     Feeling of Stress :    Social Connections:     Frequency of Communication with Friends and Family:     Frequency of Social Gatherings with Friends and Family:     Attends [employer] Services:     Active Member of Clubs or Organizations:     Attends Club or Organization Meetings:     Marital Status:    Intimate Partner Violence:     Fear of Current or Ex-Partner:     Emotionally Abused:     Physically Abused:     Sexually Abused:        Scheduled Meds:   sodium chloride flush  5-40 mL IntraVENous 2 times per day    heparin (porcine)  5,000 Units SubCUTAneous 3 times per day    amLODIPine  10 mg Oral Daily    aspirin EC  81 mg Oral Daily    atenolol  50 mg Oral Daily    atorvastatin  20 mg Oral Nightly    insulin lispro  0-6 Units SubCUTAneous TID WC    insulin lispro  0-3 Units SubCUTAneous Nightly     Continuous Infusions:   sodium chloride Stopped (09/19/21 1747)    sodium chloride      dextrose      sodium chloride 75 mL/hr at 09/20/21 1959     PRN Meds:.sodium chloride flush, sodium chloride, ondansetron **OR** ondansetron, polyethylene glycol, glucose, dextrose, glucagon (rDNA), dextrose, morphine    ASSESSMENT:    Patient Active Problem List   Diagnosis    Hypertension    Elevated lipids    Proteinuria    Vitamin D deficiency    Closed fracture of radius    History of anal cancer    [facility] present ([facility] [address]    Vaginal cancer ([facility] [address]    Diabetes mellitus type 2 in obese ([facility] [address]    Stuttering    JEANNINE (acute kidney injury) ([facility] [address]       PLAN:  Pt felt to have a neurogenic bladder  Will plan onleaving knutson in post discharge  Pt will be taught intermittent self cath in office      Estella Watson MD, M.D.  9/21/2021  11:51 AM

## 2021-09-21 NOTE — PLAN OF CARE
Problem: Falls - Risk of:  Goal: Will remain free from falls  Description: Will remain free from falls  9/21/2021 1000 by Suyapa Zimmer RN  Outcome: Met This Shift     Problem: Falls - Risk of:  Goal: Absence of physical injury  Description: Absence of physical injury  9/21/2021 1000 by Suyapa Zimmer RN  Outcome: Met This Shift     Problem: Pain:  Goal: Control of acute pain  Description: Control of acute pain  Outcome: Met This Shift

## 2021-09-21 NOTE — PROGRESS NOTES
Department of Internal Medicine  Nephrology Attending Progress Note        SUBJECTIVE:  Mrs Makayla Medeiros is feeling better today no further back pain, admits to using a lot of Aleve for her back pain which was being treated as osteoporosis. Patient not known to have any recurrence of her malignancy. . Has not been checking blood sugars at home. Does admit to urinary incontinence at home    Physical Exam:    Vitals:    09/21/21 0730   BP: (!) 170/93   Pulse: 62   Resp: 16   Temp: 97.5 °F (36.4 °C)   SpO2: 98%       I/O last 24 hours:  Intake/Output intake not documented/3400    Weight: Not weigh    General Appearance: Awake alert no distress  Skin: No rash  Neck:  neck- supple, no mass, non-tender and no bruits  Lungs: Distant breath sounds no wheezing  Heart:   regular rate and rhythm     Abdominal: Abdomen soft, non-tender. BS normal. No masses,  No organomegaly  Extremities: Extremities warm to touch, pink, with no edema.   Peripheral Pulses:  +2    DATA:    CBC with Differential:    Lab Results   Component Value Date    WBC 10.2 09/21/2021    RBC 3.82 09/21/2021    HGB 11.2 09/21/2021    HCT 34.1 09/21/2021     09/21/2021    MCV 89.3 09/21/2021    MCH 29.3 09/21/2021    MCHC 32.8 09/21/2021    RDW 16.6 09/21/2021    SEGSPCT 91 06/19/2012    LYMPHOPCT 15.4 09/20/2021    MONOPCT 7.1 09/20/2021    BASOPCT 0.3 09/20/2021    MONOSABS 0.64 09/20/2021    LYMPHSABS 1.40 09/20/2021    EOSABS 0.14 09/20/2021    BASOSABS 0.03 09/20/2021     BMP:    Lab Results   Component Value Date     09/21/2021    K 4.0 09/21/2021     09/21/2021    CO2 20 09/21/2021    BUN 30 09/21/2021    LABALBU 4.3 09/19/2021    LABALBU 4.7 03/05/2012    CREATININE 2.1 09/21/2021    CALCIUM 8.4 09/21/2021    GFRAA 28 09/21/2021    LABGLOM 28 09/21/2021    GLUCOSE 126 09/21/2021    GLUCOSE 123 03/05/2012     Magnesium:    Lab Results   Component Value Date    MG 1.1 09/21/2021     Phosphorus:    Lab Results   Component Value Date    PHOS

## 2021-09-21 NOTE — PROGRESS NOTES
HOSPITALIST PROGRESS NOTE  Date: 9/21/2021   Name: Dana Mueller   MRN: [id number]   YOB: 1950        Hospital Course:   Ms Alejandro Aguilar is 70 T past medical history of Cancer [facility]), Closed fracture of radius, Elevated lipids, Headache, History of anal cancer, Hyperlipidemia, Hypertension, Hypertension, Obesity, Proteinuria, Type II or unspecified type diabetes mellitus without mention of complication, not stated as uncontrolled, Vaginal cancer ([facility] [address] and Vitamin D deficiency;  presented to the ER on 9/19/21 with hypoglycemia. She reportedly felt dizzy/lightheaded when standing up this morning, had not eaten breakfast, but called EMS. Her glucose was in the 50s for EMS and she was given D10 prior to arrival.    Placed on López due to urinary retention after urology consulted    Subjective/Interval Hx:   Patient states that she feels a lot better as once they placed a López. No Abdominal pain.   Explained about the plan regarding hydration and monitor kidney function    Objective:   Physical Exam:   BP (!) 170/93   Pulse 62   Temp 97.5 °F (36.4 °C) (Oral)   Resp 16   Ht 5' 7\" (1.702 m)   Wt 187 lb (84.8 kg)   SpO2 98%   BMI 29.29 kg/m²   General: no acute distress, well nourished and well hydrated  HEENT: NCAT  Heart: S1S2 RRR  Lungs: Clear to ascultation bilaterally, respiratory effort normal  Abdomen: soft, NT/ND, positive bowel sounds  Extremities: no pitting edema, nontender   Neuro: patient is awake, alert and orientated times 3, no gross deficits  Skin: no rashes or ecchymosis        Meds:   Meds:    sodium chloride flush  5-40 mL IntraVENous 2 times per day    heparin (porcine)  5,000 Units SubCUTAneous 3 times per day    amLODIPine  10 mg Oral Daily    aspirin EC  81 mg Oral Daily    atenolol  50 mg Oral Daily    atorvastatin  20 mg Oral Nightly    insulin lispro  0-6 Units SubCUTAneous TID WC    insulin lispro  0-3 Units SubCUTAneous Nightly      Infusions:    sodium chloride Stopped (09/19/21 1747)    sodium chloride      dextrose      sodium chloride 75 mL/hr at 09/21/21 1220     PRN Meds: sodium chloride flush, 5-40 mL, PRN  sodium chloride, 25 mL, PRN  ondansetron, 4 mg, Q8H PRN   Or  ondansetron, 4 mg, Q6H PRN  polyethylene glycol, 17 g, Daily PRN  glucose, 15 g, PRN  dextrose, 12.5 g, PRN  glucagon (rDNA), 1 mg, PRN  dextrose, 100 mL/hr, PRN  morphine, 2 mg, Q4H PRN        Data/Labs:     Recent Labs     09/19/21  0918 09/20/21  0518 09/21/21  0435   WBC 11.5 9.1 10.2   HGB 12.1 10.7* 11.2*   HCT 36.0 32.6* 34.1    279 256      Recent Labs     09/20/21  0729 09/20/21  1940 09/21/21  0435    138 138   K 4.2 4.0 4.0   * 106 103   CO2 18* 19* 20*   PHOS  --   --  2.4*   BUN 27* 29* 30*   CREATININE 2.2* 2.1* 2.1*     Recent Labs     09/19/21  0918   AST 23   ALT 5   BILITOT 0.5   ALKPHOS 114*     No results for input(s): INR in the last 72 hours. No results for input(s): CKTOTAL, CKMB, CKMBINDEX, TROPONINT in the last 72 hours. I/O last 3 completed shifts: In: 240 [P.O.:240]  Out: 3400 [Urine:3400]    Intake/Output Summary (Last 24 hours) at 9/21/2021 1331  Last data filed at 9/21/2021 0957  Gross per 24 hour   Intake 240 ml   Output 2700 ml   Net -2460 ml      Results for Myra Alejandro (MRN [id number]) as of 9/21/2021 13:29   Ref. Range 9/21/2021 04:35   Ferritin Latest Units: ng/mL 248   Iron Latest Ref Range: 37 - 145 mcg/dL 75   Iron Saturation Latest Ref Range: 15 - 50 % 25   TIBC Latest Ref Range: 250 - 450 mcg/dL 305   Folate Latest Ref Range: 4.8 - 24.2 ng/mL 3.8 (L)   Vitamin B-12 Latest Ref Range: 211 - 946 pg/mL 247     Assessment/Plan:   1. Urinary obstructive uropathy with Neurogenic bladder-urology was consulted and López was placed and nephrology is also following for acute kidney injury monitor lab studies. López continued upon discharge  2.  Acute kidney injury/chronic kidney disease stage III-nephrology is following, maintain hydration, avoid nephrotoxic agents. IVF - NS at 75cc/hr. Cr currently at 2.1  3. Hypertension-amlodipine, atenolol, monitor blood pressure. Currently high in the AM before meds. Will recheck and adjust as deemed necessary  4. Non-insulin-dependent diabetes mellitus-sliding scale, diabetic diet, monitor blood glucose  5. Mild Anemia- iron studies ordered- shows mildly decreased folate. Will start on folate at discharge. DVT Prophylaxis: Heparin  Diet: ADULT DIET; Regular; No raw vegetables  Code Status: Full Code    Dispo: Continue IV hydration and monitor Cr- currently at 2.1. nephrology on board.  As per urology- D/c on eamon    Electronically signed by Dale Valladares MD on 9/21/2021 at [address] [place] [address]

## 2021-09-21 NOTE — PROGRESS NOTES
Physical Therapy    Physical Therapy Initial Assessment     Name: Chantal Gilliland  : 1950  MRN: [id number]      Date of Service: 2021    Evaluating PT: Anthonyfranny CarbajalVICKIE, RICCO [id number]      Room #:  9698/4899-A  Diagnosis:  Dizziness [R42]  Hypoglycemia [E16.2]  JEANNINE (acute kidney injury) ([facility] [address] [N17.9]  PMHx/PSHx:  DM, HTN, CA, obesity, HLD  Precautions:  Fall risk, stutter, colostomy, knutson    SUBJECTIVE:    Pt lives alone in a single story basement apartment unit. Level entry into building. Elevator access to apartment level. Pt ambulated with SPC PRN prior to admission. OBJECTIVE:   Initial Evaluation  Date: 21 Treatment Date: Short Term/ Long Term   Goals   AM-PAC 6 Clicks 62/48     Was pt agreeable to Eval/treatment? Yes     Does pt have pain? No current complaints of pain     Bed Mobility  Rolling: NT  Supine to sit: SBA  Sit to supine: SBA  Scooting: SBA to EOB  Rolling: Independent   Supine to sit: Independent   Sit to supine: Independent   Scooting: Independent    Transfers Sit to stand: SBA  Stand to sit: SBA  Stand pivot: SBA with [employer]  Sit to stand: Independent   Stand to sit: Independent   Stand pivot: Mod Independent with [employer]   Ambulation   50 feet x2 with [employer] with SBA  400 feet with [employer] Mod Independent    Stair negotiation: ascended and descended NT  NA   ROM BUE: Refer to OT note  BLE: WFL     Strength BUE: Refer to OT note  BLE: NT     Balance Sitting EOB: Independent   Dynamic Standing: SBA with [employer]  Dynamic Standing: Mod Independent with [employer]     Pt is A & O x: 4 to person, place, month/year, and situation. Sensation: Pt denies numbness and tingling of extremities. Edema: Unremarkable. Therapeutic Exercises:  5x STS from EOB    Patient education  Pt educated on hand placement during sit <> stand transfers.     Patient response to education:   Pt verbalized understanding Pt demonstrated skill Pt requires further education in this area   Yes NA No     ASSESSMENT:    Conditions Requiring Skilled Therapeutic Intervention:    [x]Decreased strength     []Decreased ROM  [x]Decreased functional mobility  [x]Decreased balance   [x]Decreased endurance   []Decreased posture  []Decreased sensation  []Decreased coordination   []Decreased vision  []Decreased safety awareness   []Increased pain       Comments:    Pt was in bed upon room entry, agreeable to PT evaluation. Pt has a stutter and speaks slowly but is oriented and able to follow commands. Pt has slow but steady gait with [employer]. Pt ambulated x2 reps in hallway with [employer]. Pt completed 5x STS exercise from EOB. Pt was assisted back to bed. Pt was left in bed with all needs met at conclusion of session. Treatment:  Patient practiced and was instructed in the following treatment:     Therapeutic activities:  o Bed mobility: Pt was cued for technique during bed mobility transfers. o Transfers: Pt was cued for hand placement during sit <> stand transfers. Pt completed x7 transfers from EOB. o Ambulation: Pt ambulated x2 reps with [employer] as she was cued for technique and posture.  Therapeutic exercise: Pt completed 5x STS exercise as noted above. Pt's/family goals:  1. To return home. Prognosis is Good for reaching above PT goals. Patient and or family understand(s) diagnosis, prognosis, and plan of care. Yes. PHYSICAL THERAPY PLAN OF CARE:    PT POC is established based on physician order and patient diagnosis     Referring provider/PT Order:    Start   Ordering Provider    09/20/21 0830  PT eval and treat Start: 09/20/21 0830, End: 09/20/21 0830, ONE TIME, Standing Count: 1 Occurrences, R      Gayathri Staples MD        Diagnosis:  Dizziness [R42]  Hypoglycemia [E16.2]  JEANNINE (acute kidney injury) ([facility] [address] [N17.9]  Specific instructions for next treatment:  Increase ambulation distance.     Current Treatment Recommendations:     [x] Strengthening to improve independence with functional mobility   [] ROM to improve independence with functional mobility   [x] Balance Training to improve static/dynamic balance and to reduce fall risk  [x] Endurance Training to improve activity tolerance during functional mobility   [x] Transfer Training to improve safety and independence with all functional transfers   [x] Gait Training to improve gait mechanics, endurance and assess need for appropriate assistive device  [] Stair Training in preparation for safe discharge home and/or into the community   [] Positioning to prevent skin breakdown and contractures  [x] Safety and Education Training   [] Patient/Caregiver Education   [] HEP  [] Other     PT long term treatment goals are located in above grid    Frequency of treatments: 2-5x/week x 2-3 days. Time in  1030  Time out  1055    Total Treatment Time  10 minutes     Evaluation Time includes thorough review of current medical information, gathering information on past medical history/social history and prior level of function, completion of standardized testing/informal observation of tasks, assessment of data and education on plan of care and goals.     CPT codes:  [x] Low Complexity PT evaluation 27792  [] Moderate Complexity PT evaluation 17753  [] High Complexity PT evaluation 80430  [] PT Re-evaluation 67406  [] Gait training 90261 0 minutes  [] Manual therapy 83662 0 minutes  [x] Therapeutic activities 44072 10 minutes  [] Therapeutic exercises 39373 0 minutes  [] Neuromuscular reeducation 46419 0 minutes     Rafy Andrew, PT, DPT  [id number]

## 2021-09-22 LAB
ANION GAP SERPL CALCULATED.3IONS-SCNC: 15 MMOL/L (ref 7–16)
BUN BLDV-MCNC: 33 MG/DL (ref 6–23)
CALCIUM SERPL-MCNC: 8 MG/DL (ref 8.6–10.2)
CHLORIDE BLD-SCNC: 105 MMOL/L (ref 98–107)
CO2: 19 MMOL/L (ref 22–29)
CREAT SERPL-MCNC: 2.2 MG/DL (ref 0.5–1)
GFR AFRICAN AMERICAN: 27
GFR NON-AFRICAN AMERICAN: 27 ML/MIN/1.73
GLUCOSE BLD-MCNC: 119 MG/DL (ref 74–99)
METER GLUCOSE: 137 MG/DL (ref 74–99)
METER GLUCOSE: 152 MG/DL (ref 74–99)
METER GLUCOSE: 289 MG/DL (ref 74–99)
METER GLUCOSE: 339 MG/DL (ref 74–99)
POTASSIUM REFLEX MAGNESIUM: 3.7 MMOL/L (ref 3.5–5)
SODIUM BLD-SCNC: 139 MMOL/L (ref 132–146)

## 2021-09-22 PROCEDURE — 36415 COLL VENOUS BLD VENIPUNCTURE: CPT

## 2021-09-22 PROCEDURE — 80048 BASIC METABOLIC PNL TOTAL CA: CPT

## 2021-09-22 PROCEDURE — 6370000000 HC RX 637 (ALT 250 FOR IP): Performed by: NURSE PRACTITIONER

## 2021-09-22 PROCEDURE — 97535 SELF CARE MNGMENT TRAINING: CPT

## 2021-09-22 PROCEDURE — 2580000003 HC RX 258: Performed by: NURSE PRACTITIONER

## 2021-09-22 PROCEDURE — 6360000002 HC RX W HCPCS: Performed by: NURSE PRACTITIONER

## 2021-09-22 PROCEDURE — 82962 GLUCOSE BLOOD TEST: CPT

## 2021-09-22 PROCEDURE — 6360000002 HC RX W HCPCS: Performed by: INTERNAL MEDICINE

## 2021-09-22 PROCEDURE — 1200000000 HC SEMI PRIVATE

## 2021-09-22 PROCEDURE — 6370000000 HC RX 637 (ALT 250 FOR IP): Performed by: INTERNAL MEDICINE

## 2021-09-22 RX ADMIN — Medication 400 MG: at 21:04

## 2021-09-22 RX ADMIN — INSULIN LISPRO 1 UNITS: 100 INJECTION, SOLUTION INTRAVENOUS; SUBCUTANEOUS at 09:08

## 2021-09-22 RX ADMIN — ATORVASTATIN CALCIUM 20 MG: 20 TABLET, FILM COATED ORAL at 21:04

## 2021-09-22 RX ADMIN — Medication 10 ML: at 21:04

## 2021-09-22 RX ADMIN — ASPIRIN 81 MG: 81 TABLET, COATED ORAL at 09:07

## 2021-09-22 RX ADMIN — HEPARIN SODIUM 5000 UNITS: 10000 INJECTION INTRAVENOUS; SUBCUTANEOUS at 06:04

## 2021-09-22 RX ADMIN — ATENOLOL 50 MG: 50 TABLET ORAL at 09:07

## 2021-09-22 RX ADMIN — INSULIN LISPRO 2 UNITS: 100 INJECTION, SOLUTION INTRAVENOUS; SUBCUTANEOUS at 21:49

## 2021-09-22 RX ADMIN — INSULIN LISPRO 3 UNITS: 100 INJECTION, SOLUTION INTRAVENOUS; SUBCUTANEOUS at 12:39

## 2021-09-22 RX ADMIN — HEPARIN SODIUM 5000 UNITS: 10000 INJECTION INTRAVENOUS; SUBCUTANEOUS at 21:04

## 2021-09-22 RX ADMIN — HEPARIN SODIUM 5000 UNITS: 10000 INJECTION INTRAVENOUS; SUBCUTANEOUS at 14:43

## 2021-09-22 RX ADMIN — AMLODIPINE BESYLATE 10 MG: 10 TABLET ORAL at 09:07

## 2021-09-22 RX ADMIN — Medication 400 MG: at 09:07

## 2021-09-22 RX ADMIN — HYDRALAZINE HYDROCHLORIDE 10 MG: 20 INJECTION INTRAMUSCULAR; INTRAVENOUS at 21:04

## 2021-09-22 ASSESSMENT — PAIN SCALES - GENERAL
PAINLEVEL_OUTOF10: 0
PAINLEVEL_OUTOF10: 0

## 2021-09-22 NOTE — CARE COORDINATION
Plan is home with [address] care when medically ready. Home health care orders are in. Per internal med note today, Continue IV hydration and monitor Cr- currently at 2.2. nephrology on board. As per urology- Discharge home on knutson when stable. Patient's sister Sabi Dawn will transport her home at time of discharge and her number is [phone].    Nguyen Blanco RN CM

## 2021-09-22 NOTE — PLAN OF CARE
Problem: Falls - Risk of:  Goal: Will remain free from falls  Description: Will remain free from falls  9/22/2021 1549 by Leland Rogers RN  Outcome: Met This Shift  9/22/2021 0232 by Rupinder Guerrero RN  Outcome: Met This Shift  Goal: Absence of physical injury  Description: Absence of physical injury  Outcome: Met This Shift     Problem: Pain:  Goal: Pain level will decrease  Description: Pain level will decrease  9/22/2021 1549 by Leland Rogers RN  Outcome: Met This Shift  9/22/2021 0232 by Rupinder Guerrero RN  Outcome: Met This Shift  Goal: Control of acute pain  Description: Control of acute pain  Outcome: Met This Shift  Goal: Control of chronic pain  Description: Control of chronic pain  Outcome: Met This Shift     Problem: Health Behavior:  Goal: Compliance with treatment plan for underlying cause of condition will improve  Description: Compliance with treatment plan for underlying cause of condition will improve  Outcome: Met This Shift  Goal: Identification of resources available to assist in meeting health care needs will improve  Description: Identification of resources available to assist in meeting health care needs will improve  Outcome: Met This Shift     Problem: Fluid Volume:  Goal: Maintenance of adequate hydration will improve  Description: Maintenance of adequate hydration will improve  9/22/2021 0232 by Rupinder Guerrero RN  Outcome: Met This Shift

## 2021-09-22 NOTE — PROGRESS NOTES
Units SubCUTAneous Nightly      Infusions:    sodium chloride      dextrose      sodium chloride 75 mL/hr at 09/21/21 1220     PRN Meds: hydrALAZINE, 10 mg, Q6H PRN  sodium chloride flush, 5-40 mL, PRN  sodium chloride, 25 mL, PRN  ondansetron, 4 mg, Q8H PRN   Or  ondansetron, 4 mg, Q6H PRN  polyethylene glycol, 17 g, Daily PRN  glucose, 15 g, PRN  dextrose, 12.5 g, PRN  glucagon (rDNA), 1 mg, PRN  dextrose, 100 mL/hr, PRN  morphine, 2 mg, Q4H PRN        Data/Labs:     Recent Labs     09/20/21  0518 09/21/21  0435   WBC 9.1 10.2   HGB 10.7* 11.2*   HCT 32.6* 34.1    256      Recent Labs     09/20/21  1940 09/21/21  0435 09/22/21  0603    138 139   K 4.0 4.0 3.7    103 105   CO2 19* 20* 19*   PHOS  --  2.4*  --    BUN 29* 30* 33*   CREATININE 2.1* 2.1* 2.2*     No results for input(s): AST, ALT, ALB, BILIDIR, BILITOT, ALKPHOS in the last 72 hours. No results for input(s): INR in the last 72 hours. No results for input(s): CKTOTAL, CKMB, CKMBINDEX, TROPONINT in the last 72 hours. I/O last 3 completed shifts: In: 2587 [P.O.:240; I.V.:2347]  Out: 3850 [Urine:3850]    Intake/Output Summary (Last 24 hours) at 9/22/2021 1258  Last data filed at 9/22/2021 1020  Gross per 24 hour   Intake 2827 ml   Output 3050 ml   Net -223 ml      Results for Vera Obrien (MRN [id number]) as of 9/21/2021 13:29   Ref. Range 9/21/2021 04:35   Ferritin Latest Units: ng/mL 248   Iron Latest Ref Range: 37 - 145 mcg/dL 75   Iron Saturation Latest Ref Range: 15 - 50 % 25   TIBC Latest Ref Range: 250 - 450 mcg/dL 305   Folate Latest Ref Range: 4.8 - 24.2 ng/mL 3.8 (L)   Vitamin B-12 Latest Ref Range: 211 - 946 pg/mL 247     Assessment/Plan:   1. Urinary obstructive uropathy with Mod b/l hydronephrosis-urology consulted and López was placed and nephrology is also following for acute kidney injury monitor lab studies.  López continued upon discharge  Retroperitoneal U/S showed   Moderate bilateral hydronephrosis without shadowing calculi.  The urinary   bladder is significantly dilated, containing 2227 ml of urine. Hydronephrosis may be secondary to reflux from the dilated urinary bladder       2. Acute kidney injury/chronic kidney disease stage III-nephrology is following, maintain hydration, avoid nephrotoxic agents. IVF - NS at 75cc/hr. Cr currently at 2.2  3. Hypertension-amlodipine, atenolol, monitor blood pressure. Currently high in the AM before meds. Will recheck and adjust as deemed necessary  4. Non-insulin-dependent diabetes mellitus-sliding scale, diabetic diet, monitor blood glucose  5. Mild Anemia- iron studies ordered- shows mildly decreased folate. Will start on folate at discharge. Intake/Output Summary (Last 24 hours) at 9/22/2021 1300  Last data filed at 9/22/2021 1020  Gross per 24 hour   Intake 2827 ml   Output 3050 ml   Net -223 ml       DVT Prophylaxis: Heparin  Diet: ADULT DIET; Regular; No raw vegetables  Code Status: Full Code    Dispo: Continue IV hydration and monitor Cr- currently at 2.2. nephrology on board.  As per urology- Discharge home on knutson when stable    Electronically signed by Jazmin uDtton MD on 9/22/2021 at 12:58 PM  [facility] Hospitalist

## 2021-09-22 NOTE — PROGRESS NOTES
Nephrology Progress Note  [facility]      CC:   JEANNINE    Per 9/20 HPI:   Carlos Alberto Waite is a 70year old female we were asked to see regarding JEANNINE. She presented to the ED yesterday for dizziness and was found to be hypoglycemic with a blood sugar in the 50's and hypertensive with /90. Her creatinine was elevated at 2.5, with no known Hx CKD and baseline creatinine of 0.8-0.9. A katharina US was done and she was found to have bilateral hydronephrosis and a bladder with 2227 ml urine. She had a knutson placed and has had 6.1 liters of UOP since then and creatinine has trended down from 2.5-->2.3-->2. 2. \"    9/22/21: Patient is very pleasant and talkative. States she is feeling good. No complaints.     PMH:    Past Medical History:   Diagnosis Date    Cancer [facility])     colon and uterine    Closed fracture of radius 12/27/2016    Elevated lipids 1/15/2014    Headache     History of anal cancer 2/20/2017    Dr. Neha Galeano    Hyperlipidemia     Hypertension     Hypertension     Obesity     Proteinuria 6/25/2014    Type II or unspecified type diabetes mellitus without mention of complication, not stated as uncontrolled     Vaginal cancer ([address] 5/22/2017    Vitamin D deficiency 11/6/2014       Patient Active Problem List   Diagnosis    Hypertension    Elevated lipids    Proteinuria    Vitamin D deficiency    Closed fracture of radius    History of anal cancer    Colostomy present ([address]    Vaginal cancer ([facility] [address]    Diabetes mellitus type 2 in obese ([address]    Stuttering    JEANNINE (acute kidney injury) ([address]       Meds:     magnesium oxide  400 mg Oral BID    sodium chloride flush  5-40 mL IntraVENous 2 times per day    heparin (porcine)  5,000 Units SubCUTAneous 3 times per day    amLODIPine  10 mg Oral Daily    aspirin EC  81 mg Oral Daily    atenolol  50 mg Oral Daily    atorvastatin  20 mg Oral Nightly    insulin lispro  0-6 Units SubCUTAneous TID WC    insulin lispro  0-3 Units SubCUTAneous Nightly  sodium chloride      dextrose      sodium chloride 75 mL/hr at 09/21/21 1220       Meds prn:     hydrALAZINE, sodium chloride flush, sodium chloride, ondansetron **OR** ondansetron, polyethylene glycol, glucose, dextrose, glucagon (rDNA), dextrose, morphine    Meds prior to admission:     No current facility-administered medications on file prior to encounter. Current Outpatient Medications on File Prior to Encounter   Medication Sig Dispense Refill    Cholecalciferol (D3 ADULT PO) Take 1 tablet by mouth daily      atenolol (TENORMIN) 50 MG tablet take 1 tablet by mouth once daily 90 tablet 0    amLODIPine (NORVASC) 10 MG tablet take 1 tablet by mouth once daily 90 tablet 0    atorvastatin (LIPITOR) 20 MG tablet take 1 tablet by mouth once daily 90 tablet 0    glimepiride (AMARYL) 2 MG tablet take 1 tablet by mouth twice a day 180 tablet 0    metFORMIN (GLUCOPHAGE) 1000 MG tablet Take 1 tablet by mouth 2 times daily (with meals) 180 tablet 0    alendronate (FOSAMAX) 70 MG tablet Take 1 tablet by mouth every 7 days 4 tablet 5    aspirin EC 81 MG EC tablet Take 1 tablet by mouth daily. 30 tablet 2       Allergies:    Betadine [povidone iodine], Lisinopril, Penicillins, and Tylenol [acetaminophen]    Social History:     reports that she has never smoked. She has never used smokeless tobacco. She reports that she does not drink alcohol and does not use drugs.     Family History:         Problem Relation Age of Onset    Kidney Disease Mother     High Blood Pressure Mother     Cancer Father     Diabetes Sister     Other Brother        ROS:     All pertinent + discussed in HPI  All other sx negative     Physical Exam:      Patient Vitals for the past 24 hrs:   BP Temp Temp src Pulse Resp SpO2 Weight   09/22/21 0600 -- -- -- -- -- -- 184 lb 15.5 oz (83.9 kg)   09/21/21 2330 (!) 146/86 98.9 °F (37.2 °C) Oral 68 16 99 % --   09/21/21 0730 (!) 170/93 97.5 °F (36.4 °C) Oral 62 16 98 % -- Intake/Output Summary (Last 24 hours) at 9/22/2021 0636  Last data filed at 9/22/2021 0604  Gross per 24 hour   Intake 2587 ml   Output 3850 ml   Net -1263 ml       General appearance: alert, in no acute distress  Skin: No rashes or lesions on exposed skin  Neck: No JVD  Lungs: Clear, no adventitious sounds  Heart: RRR, no rub  Abdomen: Soft, non-tender, + bowel sounds, ostomy present  : López catheter draining thi uring  Extremities: No edema  Neurologic: Mental status: Alert, oriented, thought content appropriate    Data:    Recent Labs     09/19/21 0918 09/20/21  0518 09/21/21  0435   WBC 11.5 9.1 10.2   HGB 12.1 10.7* 11.2*   HCT 36.0 32.6* 34.1   MCV 87.8 89.8 89.3    279 256       Recent Labs     09/20/21  0729 09/20/21  1940 09/21/21  0435    138 138   K 4.2 4.0 4.0   * 106 103   CO2 18* 19* 20*   CREATININE 2.2* 2.1* 2.1*   BUN 27* 29* 30*   LABGLOM 27 28 28   GLUCOSE 130* 176* 126*   CALCIUM 8.2* 8.0* 8.4*   PHOS  --   --  2.4*   MG  --   --  1.1*       Vit D, 25-Hydroxy   Date Value Ref Range Status   09/09/2020 17 (L) 30 - 100 ng/mL Final     Comment:     <20 ng/mL. ........... Lexy Raddle Deficient  20-30 ng/mL. ......... Lexy Raddle Insufficient   ng/mL. ........ Lexy Raddle Sufficient  >100 ng/mL. .......... Lexy Raddle Toxic         No results found for: PTH    Recent Labs     09/19/21 0918   ALT 5   AST 23   ALKPHOS 114*   BILITOT 0.5       Recent Labs     09/19/21 0918   LABALBU 4.3       Ferritin   Date Value Ref Range Status   09/21/2021 248 ng/mL Final     Comment:     FERRITIN Reference Ranges:  Adult Males   20 - 60 years:    30 - 400 ng/mL  Adult females 16 - 61 years:    15 - 150 ng/mL  Adults greater than 60 years:   no established reference range  Pediatrics:                     no established reference range       Iron   Date Value Ref Range Status   09/21/2021 75 37 - 145 mcg/dL Final     TIBC   Date Value Ref Range Status   09/21/2021 305 250 - 450 mcg/dL Final       Vitamin B-12   Date Value Ref Range Status   09/21/2021 247 211 - 946 pg/mL Final       Folate   Date Value Ref Range Status   09/21/2021 3.8 (L) 4.8 - 24.2 ng/mL Final         Lab Results   Component Value Date    COLORU Yellow 09/19/2021    NITRU Negative 09/19/2021    GLUCOSEU Negative 09/19/2021    KETUA Negative 09/19/2021    UROBILINOGEN 0.2 09/19/2021    BILIRUBINUR Negative 09/19/2021    BILIRUBINUR neg 10/07/2013       No results found for: JO, CREURRAN, MACREATRATIO, OSMOU    No components found for: URIC    No results found for: LIPIDPAN      Assessment and Plan:    1. JEANNINE- secondary to obstructive uropathy   In the setting of acute urinary retention with bilateral hydronephrosis. López placed after Renal US showed >2 liters urine in bladder  Baseline creatinine 0.8-0.9  Creatinine improving 2.5 on  admission, 2.2 today  Output last 24 hrs 3850  Will follow     2. Metabolic acidosis-  In the setting of JEANNINE  As well as on metformin PTA  Continue to hold metformin and follow     3. Hypertension-  Above goal <130/80  Will follow on current meds, but may need additional medications. 4. Anemia-  Hgb 11.2  Will check Iron studies, B-12 and folate    5. Hypomagnesemia   On mag oxide BID  Check mg in am      MARTIR Puri - CNS     Patient seen and examined all key components of the physical performed independently , case discussed with NP, all pertinent labs and radiologic tests personally reviewed agree with above.       Yifan Martinez MD

## 2021-09-22 NOTE — PROGRESS NOTES
ET nurse: Patients pouch intact, has own home supplies and changes pouch per self. No need for ET nurse at this time, will follow up as needed.  Hilton Baxter RN

## 2021-09-23 ENCOUNTER — APPOINTMENT (OUTPATIENT)
Dept: CT IMAGING | Age: 71
DRG: 654 | End: 2021-09-23
Payer: MEDICARE

## 2021-09-23 ENCOUNTER — APPOINTMENT (OUTPATIENT)
Dept: GENERAL RADIOLOGY | Age: 71
DRG: 654 | End: 2021-09-23
Payer: MEDICARE

## 2021-09-23 LAB
ANION GAP SERPL CALCULATED.3IONS-SCNC: 14 MMOL/L (ref 7–16)
BUN BLDV-MCNC: 34 MG/DL (ref 6–23)
CALCIUM SERPL-MCNC: 8.4 MG/DL (ref 8.6–10.2)
CHLORIDE BLD-SCNC: 103 MMOL/L (ref 98–107)
CO2: 20 MMOL/L (ref 22–29)
CREAT SERPL-MCNC: 2.1 MG/DL (ref 0.5–1)
GFR AFRICAN AMERICAN: 28
GFR NON-AFRICAN AMERICAN: 28 ML/MIN/1.73
GLUCOSE BLD-MCNC: 128 MG/DL (ref 74–99)
MAGNESIUM: 1.2 MG/DL (ref 1.6–2.6)
METER GLUCOSE: 130 MG/DL (ref 74–99)
METER GLUCOSE: 144 MG/DL (ref 74–99)
METER GLUCOSE: 277 MG/DL (ref 74–99)
METER GLUCOSE: 334 MG/DL (ref 74–99)
POTASSIUM REFLEX MAGNESIUM: 3.6 MMOL/L (ref 3.5–5)
SODIUM BLD-SCNC: 137 MMOL/L (ref 132–146)

## 2021-09-23 PROCEDURE — 71045 X-RAY EXAM CHEST 1 VIEW: CPT

## 2021-09-23 PROCEDURE — 83735 ASSAY OF MAGNESIUM: CPT

## 2021-09-23 PROCEDURE — 82962 GLUCOSE BLOOD TEST: CPT

## 2021-09-23 PROCEDURE — 6370000000 HC RX 637 (ALT 250 FOR IP): Performed by: INTERNAL MEDICINE

## 2021-09-23 PROCEDURE — 74176 CT ABD & PELVIS W/O CONTRAST: CPT

## 2021-09-23 PROCEDURE — 6360000002 HC RX W HCPCS: Performed by: NURSE PRACTITIONER

## 2021-09-23 PROCEDURE — 6360000002 HC RX W HCPCS: Performed by: STUDENT IN AN ORGANIZED HEALTH CARE EDUCATION/TRAINING PROGRAM

## 2021-09-23 PROCEDURE — 36415 COLL VENOUS BLD VENIPUNCTURE: CPT

## 2021-09-23 PROCEDURE — 80048 BASIC METABOLIC PNL TOTAL CA: CPT

## 2021-09-23 PROCEDURE — 1200000000 HC SEMI PRIVATE

## 2021-09-23 PROCEDURE — 2580000003 HC RX 258: Performed by: NURSE PRACTITIONER

## 2021-09-23 PROCEDURE — 6370000000 HC RX 637 (ALT 250 FOR IP): Performed by: NURSE PRACTITIONER

## 2021-09-23 RX ORDER — SODIUM BICARBONATE 650 MG/1
650 TABLET ORAL 2 TIMES DAILY
Status: DISCONTINUED | OUTPATIENT
Start: 2021-09-23 | End: 2021-09-24

## 2021-09-23 RX ORDER — MAGNESIUM SULFATE IN WATER 40 MG/ML
2000 INJECTION, SOLUTION INTRAVENOUS ONCE
Status: COMPLETED | OUTPATIENT
Start: 2021-09-23 | End: 2021-09-23

## 2021-09-23 RX ADMIN — INSULIN LISPRO 4 UNITS: 100 INJECTION, SOLUTION INTRAVENOUS; SUBCUTANEOUS at 13:07

## 2021-09-23 RX ADMIN — HEPARIN SODIUM 5000 UNITS: 10000 INJECTION INTRAVENOUS; SUBCUTANEOUS at 22:17

## 2021-09-23 RX ADMIN — ASPIRIN 81 MG: 81 TABLET, COATED ORAL at 08:32

## 2021-09-23 RX ADMIN — SODIUM BICARBONATE 650 MG: 650 TABLET ORAL at 13:06

## 2021-09-23 RX ADMIN — HEPARIN SODIUM 5000 UNITS: 10000 INJECTION INTRAVENOUS; SUBCUTANEOUS at 05:35

## 2021-09-23 RX ADMIN — ATENOLOL 50 MG: 50 TABLET ORAL at 08:32

## 2021-09-23 RX ADMIN — SODIUM BICARBONATE 650 MG: 650 TABLET ORAL at 22:15

## 2021-09-23 RX ADMIN — HEPARIN SODIUM 5000 UNITS: 10000 INJECTION INTRAVENOUS; SUBCUTANEOUS at 13:07

## 2021-09-23 RX ADMIN — INSULIN LISPRO 1 UNITS: 100 INJECTION, SOLUTION INTRAVENOUS; SUBCUTANEOUS at 17:41

## 2021-09-23 RX ADMIN — Medication 400 MG: at 22:15

## 2021-09-23 RX ADMIN — AMLODIPINE BESYLATE 10 MG: 10 TABLET ORAL at 08:32

## 2021-09-23 RX ADMIN — SODIUM CHLORIDE, PRESERVATIVE FREE 10 ML: 5 INJECTION INTRAVENOUS at 06:14

## 2021-09-23 RX ADMIN — MAGNESIUM SULFATE HEPTAHYDRATE 2000 MG: 40 INJECTION, SOLUTION INTRAVENOUS at 15:24

## 2021-09-23 RX ADMIN — Medication 400 MG: at 08:32

## 2021-09-23 RX ADMIN — ONDANSETRON 4 MG: 2 INJECTION INTRAMUSCULAR; INTRAVENOUS at 06:13

## 2021-09-23 RX ADMIN — ATORVASTATIN CALCIUM 20 MG: 20 TABLET, FILM COATED ORAL at 22:15

## 2021-09-23 RX ADMIN — INSULIN LISPRO 2 UNITS: 100 INJECTION, SOLUTION INTRAVENOUS; SUBCUTANEOUS at 22:16

## 2021-09-23 ASSESSMENT — PAIN SCALES - GENERAL
PAINLEVEL_OUTOF10: 0
PAINLEVEL_OUTOF10: 0

## 2021-09-23 NOTE — PROGRESS NOTES
HOSPITALIST PROGRESS NOTE  Date: 9/23/2021   Name: Wiley Presley   MRN: [id number]   YOB: 1950        Hospital Course:   Ms Gokul Garsia is 70 T past medical history of Cancer [facility]), Closed fracture of radius, Elevated lipids, Headache, History of anal cancer, Hyperlipidemia, Hypertension, Hypertension, Obesity, Proteinuria, Type II or unspecified type diabetes mellitus without mention of complication, not stated as uncontrolled, Vaginal cancer ([address] and Vitamin D deficiency;  presented to the ER on 9/19/21 with hypoglycemia. She reportedly felt dizzy/lightheaded when standing up this morning, had not eaten breakfast, but called EMS. Her glucose was in the 50s for EMS and she was given D10 prior to arrival.    Placed on López due to urinary retention after urology consulted    Subjective/Interval Hx:   Patient states Had some nausea this AM which resolved with zofran. Had tolerated diet after without any issue. Althea Stands No Abdominal pain.    Explained about the plan regarding hydration and monitor kidney function    Objective:   Physical Exam:   BP (!) 162/86   Pulse 74   Temp 98.4 °F (36.9 °C) (Oral)   Resp 18   Ht 5' 7\" (1.702 m)   Wt 183 lb 4.2 oz (83.1 kg)   SpO2 99%   BMI 28.70 kg/m²   General: no acute distress, well nourished and well hydrated  HEENT: NCAT  Heart: S1S2 RRR  Lungs: Clear to ascultation bilaterally, respiratory effort normal  Abdomen: soft, NT/ND, positive bowel sounds  Extremities: no pitting edema, nontender   Neuro: patient is awake, alert and orientated times 3, no gross deficits  Skin: no rashes or ecchymosis        Meds:   Meds:    sodium bicarbonate  650 mg Oral BID    magnesium oxide  400 mg Oral BID    sodium chloride flush  5-40 mL IntraVENous 2 times per day    heparin (porcine)  5,000 Units SubCUTAneous 3 times per day    amLODIPine  10 mg Oral Daily    aspirin EC  81 mg Oral Daily    atenolol  50 mg Oral Daily    atorvastatin  20 mg Oral Nightly    insulin lispro  0-6 Units SubCUTAneous TID     insulin lispro  0-3 Units SubCUTAneous Nightly      Infusions:    sodium chloride      dextrose      sodium chloride 100 mL/hr at 09/23/21 0853     PRN Meds: hydrALAZINE, 10 mg, Q6H PRN  sodium chloride flush, 5-40 mL, PRN  sodium chloride, 25 mL, PRN  ondansetron, 4 mg, Q8H PRN   Or  ondansetron, 4 mg, Q6H PRN  polyethylene glycol, 17 g, Daily PRN  glucose, 15 g, PRN  dextrose, 12.5 g, PRN  glucagon (rDNA), 1 mg, PRN  dextrose, 100 mL/hr, PRN  morphine, 2 mg, Q4H PRN        Data/Labs:     Recent Labs     09/21/21 0435   WBC 10.2   HGB 11.2*   HCT 34.1         Recent Labs     09/21/21  0435 09/22/21  0603 09/23/21  0421    139 137   K 4.0 3.7 3.6    105 103   CO2 20* 19* 20*   PHOS 2.4*  --   --    BUN 30* 33* 34*   CREATININE 2.1* 2.2* 2.1*     No results for input(s): AST, ALT, ALB, BILIDIR, BILITOT, ALKPHOS in the last 72 hours. No results for input(s): INR in the last 72 hours. No results for input(s): CKTOTAL, CKMB, CKMBINDEX, TROPONINT in the last 72 hours. I/O last 3 completed shifts: In: 2192 [P.O.:480; I.V.:1712]  Out: 0979 [Urine:3475]    Intake/Output Summary (Last 24 hours) at 9/23/2021 1430  Last data filed at 9/23/2021 1423  Gross per 24 hour   Intake 2672 ml   Output 4125 ml   Net -1453 ml      Results for Dona Hair (MRN [id number]) as of 9/21/2021 13:29   Ref. Range 9/21/2021 04:35   Ferritin Latest Units: ng/mL 248   Iron Latest Ref Range: 37 - 145 mcg/dL 75   Iron Saturation Latest Ref Range: 15 - 50 % 25   TIBC Latest Ref Range: 250 - 450 mcg/dL 305   Folate Latest Ref Range: 4.8 - 24.2 ng/mL 3.8 (L)   Vitamin B-12 Latest Ref Range: 211 - 946 pg/mL 247     Assessment/Plan:   1. Urinary obstructive uropathy with Mod b/l hydronephrosis-urology consulted and López was placed and nephrology is also following for acute kidney injury monitor lab studies.  López continued upon discharge  Retroperitoneal U/S showed   Moderate bilateral hydronephrosis without shadowing calculi.  The urinary   bladder is significantly dilated, containing 2227 ml of urine. Hydronephrosis may be secondary to reflux from the dilated urinary bladder     Nephrology note----->9/23-Acute kidney injury secondary to obstructive uropathy presumed to be neurogenic bladder.  Creatinine seems to have leveled , discussed with urology about proceeding for further evaluation of upper tract disease     2. Acute kidney injury/chronic kidney disease stage III-nephrology is following, maintain hydration, avoid nephrotoxic agents. IVF - NS at 100cc/hr. Cr currently at 2.1  3. Hypertension-amlodipine, atenolol, monitor blood pressure. Currently high in the AM before meds. Will recheck and adjust as deemed necessary  4. Non-insulin-dependent diabetes mellitus-sliding scale, diabetic diet, monitor blood glucose  5. Mild Anemia- iron studies ordered- shows mildly decreased folate. Will start on folate at discharge. 6.  Hypomagnesemia--->at 1.1---1.2. Nephrology on board. Currently on Magnesium oxide bid. Will provide one dose of magnesium IVPB      Intake/Output Summary (Last 24 hours) at 9/23/2021 1430  Last data filed at 9/23/2021 1423  Gross per 24 hour   Intake 2672 ml   Output 4125 ml   Net -1453 ml       DVT Prophylaxis: Heparin  Diet: ADULT DIET; Regular;  No raw vegetables  Diet NPO  Code Status: Full Code    Dispo: Continue IV hydration and monitor Cr- currently at 2.1. nephrology on board - discussed with urology about proceeding for further evaluation of upper tract disease     Electronically signed by Jean Paul Tracy MD on 9/23/2021 at 2:30 PM  [facility]

## 2021-09-23 NOTE — PROGRESS NOTES
Department of Internal Medicine  Nephrology Attending Progress Note        SUBJECTIVE:  Mrs Susana Renteria had some nausea today requiring some Zofran. Creatinine does not seem to be improving any further, had been 0.8 in December 2020. Magnesium remains low    Physical Exam:    Vitals:    09/23/21 0800   BP: (!) 162/86   Pulse: 74   Resp: 18   Temp: 98.4 °F (36.9 °C)   SpO2: 99%       I/O last 24 hours:  Intake/Output 2192/3475    Weight: 183    General Appearance: Awake alert no distress  Skin: No rash  Neck:  neck- supple, no mass, non-tender and no bruits  Lungs: Distant breath sounds no wheezing  Heart:   regular rate and rhythm     Abdominal: Abdomen soft, non-tender. BS normal. No masses,  No organomegaly  Extremities: Extremities warm to touch, pink, with no edema.   Peripheral Pulses:  +2       DATA:    CBC with Differential:    Lab Results   Component Value Date    WBC 10.2 09/21/2021    RBC 3.82 09/21/2021    HGB 11.2 09/21/2021    HCT 34.1 09/21/2021     09/21/2021    MCV 89.3 09/21/2021    MCH 29.3 09/21/2021    MCHC 32.8 09/21/2021    RDW 16.6 09/21/2021    SEGSPCT 91 06/19/2012    LYMPHOPCT 15.4 09/20/2021    MONOPCT 7.1 09/20/2021    BASOPCT 0.3 09/20/2021    MONOSABS 0.64 09/20/2021    LYMPHSABS 1.40 09/20/2021    EOSABS 0.14 09/20/2021    BASOSABS 0.03 09/20/2021     BMP:    Lab Results   Component Value Date     09/23/2021    K 3.6 09/23/2021     09/23/2021    CO2 20 09/23/2021    BUN 34 09/23/2021    LABALBU 4.3 09/19/2021    LABALBU 4.7 03/05/2012    CREATININE 2.1 09/23/2021    CALCIUM 8.4 09/23/2021    GFRAA 28 09/23/2021    LABGLOM 28 09/23/2021    GLUCOSE 128 09/23/2021    GLUCOSE 123 03/05/2012     Magnesium:    Lab Results   Component Value Date    MG 1.2 09/23/2021     Phosphorus:    Lab Results   Component Value Date    PHOS 2.4 09/21/2021          magnesium oxide  400 mg Oral BID    sodium chloride flush  5-40 mL IntraVENous 2 times per day    heparin (porcine)  5,000 Units SubCUTAneous 3 times per day    amLODIPine  10 mg Oral Daily    aspirin EC  81 mg Oral Daily    atenolol  50 mg Oral Daily    atorvastatin  20 mg Oral Nightly    insulin lispro  0-6 Units SubCUTAneous TID     insulin lispro  0-3 Units SubCUTAneous Nightly      sodium chloride      dextrose      sodium chloride 100 mL/hr at 09/23/21 0853     hydrALAZINE, sodium chloride flush, sodium chloride, ondansetron **OR** ondansetron, polyethylene glycol, glucose, dextrose, glucagon (rDNA), dextrose, morphine    IMPRESSION/RECOMMENDATIONS:      Acute kidney injury secondary to obstructive uropathy presumed to be neurogenic bladder.   Creatinine seems to have leveled , spoke with urology about proceeding for further evaluation of upper tract disease   Metabolic acidosis not improving will begin sodium bicarbonate  Hypomagnesemia continue supplementation  Type 2 diabetes urine protein creatinine ratio 0.4  History of vaginal squamous cancer, with no recent ct of pelvis to exclude  recurrence of disease, did have colonoscopy which was normal in march 2021  Hypertension fair control    Emily Lerma MD  9/23/2021 10:21 AM

## 2021-09-23 NOTE — PROGRESS NOTES
Physician Progress Note      Fang Broderick  CSN #:                  [id number]  :                       1950  ADMIT DATE:       2021 8:28 AM  DISCH DATE:  RESPONDING  PROVIDER #:        Naima Mcintyre          QUERY TEXT:    Pt admitted with JEANNINE  and has anemia documented. If possible, please document   in progress notes and discharge summary further specificity regarding the   acuity and type of anemia:    The medical record reflects the following:  Risk Factors: JEANNINE, CKD III, NIDDM, Hx Vag and anal Cancer, HLD,HTN  Clinical Indicators: Hemoglobin 12.2 admit>>10.7 on >>11.2 on ,    Iron panel -Ferritin 248, Iron 75,Folate low at 3.8, Iron saturation 25, TIBC   305, B12 247.; Your note -\"Mild Anemia- iron studies ordered- shows mildly   decreased folate. Will start on folate at discharge. \";    Nephrology consult assessment- \"Anemia-Hgb 10.7, check iron studies,   B-12 and folate\";  Nephrology note-\"Patient not known to have any   recurrence of her malignancy. \", and \"History of vaginal squamous cancer, with   no recent ct of pelvis to exclude  recurrence of disease\". Treatment: Folate at discharge, Iron studies, Hgb serial labs, monitoring,   consults    Thank Cain FOSS, RN, CDS  CDI  [email or web address]  [phone]  Options provided:  -- Anemia due to acute blood loss  -- Anemia due to chronic blood loss  -- Anemia due to acute on chronic blood loss  -- Anemia due to iron deficiency  -- Anemia due to Hx Vaginal cancer  -- Anemia due to CKD  -- Other - I will add my own diagnosis  -- Disagree - Not applicable / Not valid  -- Disagree - Clinically unable to determine / Unknown  -- Refer to Clinical Documentation Reviewer    PROVIDER RESPONSE TEXT:    Folate deficiency-low at 3.8    Query created by: Demetrius Klinefelter on 2021 8:54 AM      Electronically signed by:  Naima Mcintyre 2021 8:50 AM

## 2021-09-23 NOTE — PROGRESS NOTES
[facility]  PROGRESS NOTE    Chief Complaint:   Urinary retention/Neurogenic bladder/Bilateral hydronephrosis/Gross hematuria    HPI:   She is feeling much better today. She denies flank or abdominal pain. She denies any catheter discomfort. Her sister and nephew are at her bedside.     Vitals:    09/23/21 0800   BP: (!) 162/86   Pulse: 74   Resp: 18   Temp: 98.4 °F (36.9 °C)   SpO2: 99%       Allergies: Betadine [povidone iodine], Lisinopril, Penicillins, and Tylenol [acetaminophen]    PAST MEDICAL HISTORY:   Past Medical History:   Diagnosis Date    Cancer [facility])     colon and uterine    Closed fracture of radius 12/27/2016    Elevated lipids 1/15/2014    Headache     History of anal cancer 2/20/2017    Dr. Juanito Harrison    Hyperlipidemia     Hypertension     Hypertension     Obesity     Proteinuria 6/25/2014    Type II or unspecified type diabetes mellitus without mention of complication, not stated as uncontrolled     Vaginal cancer ([facility] [address] 5/22/2017    Vitamin D deficiency 11/6/2014       PAST SURGICAL HISTORY:   Past Surgical History:   Procedure Laterality Date    BREAST BIOPSY      COLOSTOMY      RECTAL SURGERY      WRIST FRACTURE SURGERY Left 11/07/2016        PAST FAMILY HISTORY:    Family History   Problem Relation Age of Onset    Kidney Disease Mother     High Blood Pressure Mother     Cancer Father     Diabetes Sister     Other Brother        PAST SOCIAL HISTORY:    Social History     Socioeconomic History    Marital status: Single     Spouse name: None    Number of children: None    Years of education: None    Highest education level: None   Occupational History    None   Tobacco Use    Smoking status: Never Smoker    Smokeless tobacco: Never Used   Substance and Sexual Activity    Alcohol use: No    Drug use: No    Sexual activity: None   Other Topics Concern    None   Social History Narrative    None     Social Determinants of Health     Financial Resource Strain: Low Risk  Difficulty of Paying Living Expenses: Not hard at all   Food Insecurity: No Food Insecurity    Worried About Running Out of Food in the Last Year: Never true    Ran Out of Food in the Last Year: Never true   Transportation Needs:     Lack of Transportation (Medical):      Lack of Transportation (Non-Medical):    Physical Activity:     Days of Exercise per Week:     Minutes of Exercise per Session:    Stress:     Feeling of Stress :    Social Connections:     Frequency of Communication with Friends and Family:     Frequency of Social Gatherings with Friends and Family:     Attends Gnosticism Services:     Active Member of Clubs or Organizations:     Attends Club or Organization Meetings:     Marital Status:    Intimate Partner Violence:     Fear of Current or Ex-Partner:     Emotionally Abused:     Physically Abused:     Sexually Abused:        IV:    sodium chloride      dextrose      sodium chloride 100 mL/hr at 09/23/21 0853       PRN: hydrALAZINE, sodium chloride flush, sodium chloride, ondansetron **OR** ondansetron, polyethylene glycol, glucose, dextrose, glucagon (rDNA), dextrose, morphine    Scheduled:    sodium bicarbonate  650 mg Oral BID    magnesium oxide  400 mg Oral BID    sodium chloride flush  5-40 mL IntraVENous 2 times per day    heparin (porcine)  5,000 Units SubCUTAneous 3 times per day    amLODIPine  10 mg Oral Daily    aspirin EC  81 mg Oral Daily    atenolol  50 mg Oral Daily    atorvastatin  20 mg Oral Nightly    insulin lispro  0-6 Units SubCUTAneous TID WC    insulin lispro  0-3 Units SubCUTAneous Nightly       Lab Results   Component Value Date     09/23/2021    K 3.6 09/23/2021    BUN 34 09/23/2021    CREATININE 2.1 09/23/2021        Lab Results   Component Value Date    HGB 11.2 09/21/2021    HCT 34.1 09/21/2021       Review Of Systems:  Constitutional: Tired  Eyes: negative  Ears, nose, mouth, throat, and face: negative  Respiratory: negative  Cardiovascular: Hypertension  Gastrointestinal: Colon cancer  Genitourinary: Swollen kidneys  Musculoskeletal: negative  Neurological: Headaches  Behavioral/Psych: negative  Endocrine: Diabetes    Physical Exam:  Skin is dry, and without rashes  Respirations are non-labored, intact  Abdomen is soft, non-tender, non-distended. Active bowel sounds are present. No rebound or guarding  Alert and oriented x 3. No focal motor/sensory deficits  López catheter is draining clear, pink-colored urine    Assessment and Plan:  Urinary retention/Neurogenic bladder/Bilateral hydronephrosis/Gross hematuria  -Her creatinine is 2.1 today. She is being followed by nephrology (Dr. Pablo Fairbanks)  -CT scan today shows bilateral renal cysts and left hydronephrosis. No stone is identified. Renal ultrasound on 9/19/2021 showed moderate bilateral hydronephrosis without stones and a dilated bladder with a 2227 mL residual urine volume  -Continue the López upon discharge. This will be irrigated manually as needed to maintain patency  -Cystoscopy and ureteral stent insertion planned for tomorrow especially for renal function does not improve. Continue to monitor the creatinine closely  -Urodynamic testing as an outpatient. 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   She may need to be taught intermittent self-catheterization if she is capable of doing this  -We will follow her during her hospital stay    Santos Duverney, MD  9/23/2021  12:10 PM

## 2021-09-23 NOTE — CARE COORDINATION
Plan is home with [address] care when medically ready. Home health care orders are in. Per internal med note today, Metabolic acidosis not improving will begin sodium bicarbonate. Hypomagnesemia continue supplementation. Nephrology on board. As per urology- Discharge home on [place] when stable. Patient's sister Amita Delgado transport her home at time of discharge and her number is [phone].   Domenica Hernandez RN CM

## 2021-09-24 ENCOUNTER — APPOINTMENT (OUTPATIENT)
Dept: GENERAL RADIOLOGY | Age: 71
DRG: 654 | End: 2021-09-24
Payer: MEDICARE

## 2021-09-24 ENCOUNTER — ANESTHESIA EVENT (OUTPATIENT)
Dept: OPERATING ROOM | Age: 71
DRG: 654 | End: 2021-09-24
Payer: MEDICARE

## 2021-09-24 ENCOUNTER — ANESTHESIA (OUTPATIENT)
Dept: OPERATING ROOM | Age: 71
DRG: 654 | End: 2021-09-24
Payer: MEDICARE

## 2021-09-24 VITALS — SYSTOLIC BLOOD PRESSURE: 106 MMHG | OXYGEN SATURATION: 100 % | DIASTOLIC BLOOD PRESSURE: 66 MMHG

## 2021-09-24 LAB
ANION GAP SERPL CALCULATED.3IONS-SCNC: 14 MMOL/L (ref 7–16)
BUN BLDV-MCNC: 32 MG/DL (ref 6–23)
CALCIUM SERPL-MCNC: 8.7 MG/DL (ref 8.6–10.2)
CHLORIDE BLD-SCNC: 107 MMOL/L (ref 98–107)
CO2: 19 MMOL/L (ref 22–29)
CREAT SERPL-MCNC: 2 MG/DL (ref 0.5–1)
GFR AFRICAN AMERICAN: 30
GFR NON-AFRICAN AMERICAN: 30 ML/MIN/1.73
GLUCOSE BLD-MCNC: 148 MG/DL (ref 74–99)
MAGNESIUM: 1.7 MG/DL (ref 1.6–2.6)
METER GLUCOSE: 132 MG/DL (ref 74–99)
METER GLUCOSE: 165 MG/DL (ref 74–99)
METER GLUCOSE: 218 MG/DL (ref 74–99)
METER GLUCOSE: 231 MG/DL (ref 74–99)
PHOSPHORUS: 3.5 MG/DL (ref 2.5–4.5)
POTASSIUM SERPL-SCNC: 4 MMOL/L (ref 3.5–5)
SODIUM BLD-SCNC: 140 MMOL/L (ref 132–146)

## 2021-09-24 PROCEDURE — 83735 ASSAY OF MAGNESIUM: CPT

## 2021-09-24 PROCEDURE — 6360000002 HC RX W HCPCS: Performed by: UROLOGY

## 2021-09-24 PROCEDURE — 6360000002 HC RX W HCPCS: Performed by: NURSE PRACTITIONER

## 2021-09-24 PROCEDURE — 6370000000 HC RX 637 (ALT 250 FOR IP): Performed by: NURSE PRACTITIONER

## 2021-09-24 PROCEDURE — 82962 GLUCOSE BLOOD TEST: CPT

## 2021-09-24 PROCEDURE — 6370000000 HC RX 637 (ALT 250 FOR IP): Performed by: UROLOGY

## 2021-09-24 PROCEDURE — 84100 ASSAY OF PHOSPHORUS: CPT

## 2021-09-24 PROCEDURE — 2580000003 HC RX 258

## 2021-09-24 PROCEDURE — 0TCB8ZZ EXTIRPATION OF MATTER FROM BLADDER, VIA NATURAL OR ARTIFICIAL OPENING ENDOSCOPIC: ICD-10-PCS | Performed by: UROLOGY

## 2021-09-24 PROCEDURE — C1769 GUIDE WIRE: HCPCS | Performed by: UROLOGY

## 2021-09-24 PROCEDURE — C1758 CATHETER, URETERAL: HCPCS | Performed by: UROLOGY

## 2021-09-24 PROCEDURE — 6360000002 HC RX W HCPCS

## 2021-09-24 PROCEDURE — 80048 BASIC METABOLIC PNL TOTAL CA: CPT

## 2021-09-24 PROCEDURE — 87088 URINE BACTERIA CULTURE: CPT

## 2021-09-24 PROCEDURE — 0T7B8DZ DILATION OF BLADDER WITH INTRALUMINAL DEVICE, VIA NATURAL OR ARTIFICIAL OPENING ENDOSCOPIC: ICD-10-PCS | Performed by: UROLOGY

## 2021-09-24 PROCEDURE — BT141ZZ FLUOROSCOPY OF KIDNEYS, URETERS AND BLADDER USING LOW OSMOLAR CONTRAST: ICD-10-PCS | Performed by: UROLOGY

## 2021-09-24 PROCEDURE — 3600000013 HC SURGERY LEVEL 3 ADDTL 15MIN: Performed by: UROLOGY

## 2021-09-24 PROCEDURE — 6370000000 HC RX 637 (ALT 250 FOR IP): Performed by: INTERNAL MEDICINE

## 2021-09-24 PROCEDURE — 7100000001 HC PACU RECOVERY - ADDTL 15 MIN: Performed by: UROLOGY

## 2021-09-24 PROCEDURE — 7100000000 HC PACU RECOVERY - FIRST 15 MIN: Performed by: UROLOGY

## 2021-09-24 PROCEDURE — 3600000003 HC SURGERY LEVEL 3 BASE: Performed by: UROLOGY

## 2021-09-24 PROCEDURE — 36415 COLL VENOUS BLD VENIPUNCTURE: CPT

## 2021-09-24 PROCEDURE — 2580000003 HC RX 258: Performed by: UROLOGY

## 2021-09-24 PROCEDURE — 2709999900 HC NON-CHARGEABLE SUPPLY: Performed by: UROLOGY

## 2021-09-24 PROCEDURE — C2617 STENT, NON-COR, TEM W/O DEL: HCPCS | Performed by: UROLOGY

## 2021-09-24 PROCEDURE — 3700000000 HC ANESTHESIA ATTENDED CARE: Performed by: UROLOGY

## 2021-09-24 PROCEDURE — 1200000000 HC SEMI PRIVATE

## 2021-09-24 PROCEDURE — 74420 UROGRAPHY RTRGR +-KUB: CPT

## 2021-09-24 PROCEDURE — 3700000001 HC ADD 15 MINUTES (ANESTHESIA): Performed by: UROLOGY

## 2021-09-24 DEVICE — UNIVERSA FIRM URETERAL STENT AND POSITIONER WITH HYDROPHILIC COATING
Type: IMPLANTABLE DEVICE | Site: URETER | Status: FUNCTIONAL
Brand: UNIVERSA

## 2021-09-24 RX ORDER — PROMETHAZINE HYDROCHLORIDE 25 MG/ML
6.25 INJECTION, SOLUTION INTRAMUSCULAR; INTRAVENOUS EVERY 10 MIN PRN
Status: DISCONTINUED | OUTPATIENT
Start: 2021-09-24 | End: 2021-09-24 | Stop reason: HOSPADM

## 2021-09-24 RX ORDER — MORPHINE SULFATE 2 MG/ML
1 INJECTION, SOLUTION INTRAMUSCULAR; INTRAVENOUS EVERY 5 MIN PRN
Status: DISCONTINUED | OUTPATIENT
Start: 2021-09-24 | End: 2021-09-24 | Stop reason: HOSPADM

## 2021-09-24 RX ORDER — HYDROCODONE BITARTRATE AND ACETAMINOPHEN 5; 325 MG/1; MG/1
1 TABLET ORAL PRN
Status: DISCONTINUED | OUTPATIENT
Start: 2021-09-24 | End: 2021-09-24 | Stop reason: HOSPADM

## 2021-09-24 RX ORDER — MORPHINE SULFATE 2 MG/ML
2 INJECTION, SOLUTION INTRAMUSCULAR; INTRAVENOUS EVERY 5 MIN PRN
Status: DISCONTINUED | OUTPATIENT
Start: 2021-09-24 | End: 2021-09-24 | Stop reason: HOSPADM

## 2021-09-24 RX ORDER — SODIUM BICARBONATE 650 MG/1
1300 TABLET ORAL 2 TIMES DAILY
Status: DISCONTINUED | OUTPATIENT
Start: 2021-09-24 | End: 2021-09-28 | Stop reason: HOSPADM

## 2021-09-24 RX ORDER — HYDROCODONE BITARTRATE AND ACETAMINOPHEN 5; 325 MG/1; MG/1
2 TABLET ORAL PRN
Status: DISCONTINUED | OUTPATIENT
Start: 2021-09-24 | End: 2021-09-24 | Stop reason: HOSPADM

## 2021-09-24 RX ORDER — MEPERIDINE HYDROCHLORIDE 25 MG/ML
12.5 INJECTION INTRAMUSCULAR; INTRAVENOUS; SUBCUTANEOUS EVERY 5 MIN PRN
Status: DISCONTINUED | OUTPATIENT
Start: 2021-09-24 | End: 2021-09-24 | Stop reason: HOSPADM

## 2021-09-24 RX ORDER — LIDOCAINE HYDROCHLORIDE 20 MG/ML
INJECTION, SOLUTION INTRAVENOUS PRN
Status: DISCONTINUED | OUTPATIENT
Start: 2021-09-24 | End: 2021-09-24 | Stop reason: SDUPTHER

## 2021-09-24 RX ORDER — FENTANYL CITRATE 50 UG/ML
INJECTION, SOLUTION INTRAMUSCULAR; INTRAVENOUS PRN
Status: DISCONTINUED | OUTPATIENT
Start: 2021-09-24 | End: 2021-09-24 | Stop reason: SDUPTHER

## 2021-09-24 RX ORDER — PROPOFOL 10 MG/ML
INJECTION, EMULSION INTRAVENOUS CONTINUOUS PRN
Status: DISCONTINUED | OUTPATIENT
Start: 2021-09-24 | End: 2021-09-24 | Stop reason: SDUPTHER

## 2021-09-24 RX ORDER — MIDAZOLAM HYDROCHLORIDE 1 MG/ML
INJECTION INTRAMUSCULAR; INTRAVENOUS PRN
Status: DISCONTINUED | OUTPATIENT
Start: 2021-09-24 | End: 2021-09-24 | Stop reason: SDUPTHER

## 2021-09-24 RX ORDER — CEFAZOLIN SODIUM 1 G/3ML
INJECTION, POWDER, FOR SOLUTION INTRAMUSCULAR; INTRAVENOUS PRN
Status: DISCONTINUED | OUTPATIENT
Start: 2021-09-24 | End: 2021-09-24 | Stop reason: SDUPTHER

## 2021-09-24 RX ORDER — SODIUM CHLORIDE 9 MG/ML
INJECTION, SOLUTION INTRAVENOUS CONTINUOUS PRN
Status: DISCONTINUED | OUTPATIENT
Start: 2021-09-24 | End: 2021-09-24 | Stop reason: SDUPTHER

## 2021-09-24 RX ADMIN — LIDOCAINE HYDROCHLORIDE 30 MG: 20 INJECTION, SOLUTION INTRAVENOUS at 08:46

## 2021-09-24 RX ADMIN — Medication 10 ML: at 20:44

## 2021-09-24 RX ADMIN — HEPARIN SODIUM 5000 UNITS: 10000 INJECTION INTRAVENOUS; SUBCUTANEOUS at 20:48

## 2021-09-24 RX ADMIN — INSULIN LISPRO 1 UNITS: 100 INJECTION, SOLUTION INTRAVENOUS; SUBCUTANEOUS at 17:56

## 2021-09-24 RX ADMIN — MORPHINE SULFATE 2 MG: 2 INJECTION, SOLUTION INTRAMUSCULAR; INTRAVENOUS at 12:11

## 2021-09-24 RX ADMIN — INSULIN LISPRO 2 UNITS: 100 INJECTION, SOLUTION INTRAVENOUS; SUBCUTANEOUS at 12:13

## 2021-09-24 RX ADMIN — HEPARIN SODIUM 5000 UNITS: 10000 INJECTION INTRAVENOUS; SUBCUTANEOUS at 06:11

## 2021-09-24 RX ADMIN — INSULIN LISPRO 1 UNITS: 100 INJECTION, SOLUTION INTRAVENOUS; SUBCUTANEOUS at 20:46

## 2021-09-24 RX ADMIN — Medication 400 MG: at 10:33

## 2021-09-24 RX ADMIN — MORPHINE SULFATE 2 MG: 2 INJECTION, SOLUTION INTRAMUSCULAR; INTRAVENOUS at 17:02

## 2021-09-24 RX ADMIN — SODIUM BICARBONATE 650 MG: 650 TABLET ORAL at 10:33

## 2021-09-24 RX ADMIN — ASPIRIN 81 MG: 81 TABLET, COATED ORAL at 10:32

## 2021-09-24 RX ADMIN — SODIUM CHLORIDE: 9 INJECTION, SOLUTION INTRAVENOUS at 08:40

## 2021-09-24 RX ADMIN — MIDAZOLAM 1 MG: 1 INJECTION INTRAMUSCULAR; INTRAVENOUS at 08:35

## 2021-09-24 RX ADMIN — Medication 400 MG: at 20:44

## 2021-09-24 RX ADMIN — SODIUM CHLORIDE: 9 INJECTION, SOLUTION INTRAVENOUS at 21:44

## 2021-09-24 RX ADMIN — CEFAZOLIN 2000 MG: 1 INJECTION, POWDER, FOR SOLUTION INTRAMUSCULAR; INTRAVENOUS at 08:55

## 2021-09-24 RX ADMIN — ATORVASTATIN CALCIUM 20 MG: 20 TABLET, FILM COATED ORAL at 20:44

## 2021-09-24 RX ADMIN — SODIUM BICARBONATE 1300 MG: 650 TABLET ORAL at 20:45

## 2021-09-24 RX ADMIN — ATENOLOL 50 MG: 50 TABLET ORAL at 10:33

## 2021-09-24 RX ADMIN — AMLODIPINE BESYLATE 10 MG: 10 TABLET ORAL at 10:34

## 2021-09-24 RX ADMIN — FENTANYL CITRATE 50 MCG: 50 INJECTION, SOLUTION INTRAMUSCULAR; INTRAVENOUS at 08:46

## 2021-09-24 RX ADMIN — PROPOFOL 100 MCG/KG/MIN: 10 INJECTION, EMULSION INTRAVENOUS at 08:46

## 2021-09-24 RX ADMIN — HEPARIN SODIUM 5000 UNITS: 10000 INJECTION INTRAVENOUS; SUBCUTANEOUS at 14:27

## 2021-09-24 ASSESSMENT — LIFESTYLE VARIABLES: SMOKING_STATUS: 0

## 2021-09-24 ASSESSMENT — PULMONARY FUNCTION TESTS
PIF_VALUE: 0
PIF_VALUE: 1
PIF_VALUE: 1
PIF_VALUE: 0

## 2021-09-24 ASSESSMENT — PAIN SCALES - GENERAL
PAINLEVEL_OUTOF10: 7
PAINLEVEL_OUTOF10: 0
PAINLEVEL_OUTOF10: 7
PAINLEVEL_OUTOF10: 0

## 2021-09-24 NOTE — ANESTHESIA POSTPROCEDURE EVALUATION
Department of Anesthesiology  Postprocedure Note    Patient: Josh Mansfield  MRN: [id number]  YOB: 1950  Date of evaluation: 9/24/2021  Time:  10:23 AM     Procedure Summary     Date: 09/24/21 Room / Location: [facility] OR 11 / [facility]    Anesthesia Start: 7217 Anesthesia Stop:     Procedure: CYSTOSCOPY RETROGRADE PYELOGRAM POSSIBLE STENT INSERTION (N/A ) Diagnosis: (/)    Surgeons: Becca Simpson MD Responsible Provider: Edna Godinez MD    Anesthesia Type: MAC ASA Status: 3          Anesthesia Type: MAC    Lino Phase I: Lino Score: 10    Lino Phase II: Lino Score: 10    Last vitals: Reviewed and per EMR flowsheets.        Anesthesia Post Evaluation    Patient location during evaluation: PACU  Patient participation: complete - patient participated  Level of consciousness: awake  Pain score: 3  Airway patency: patent  Nausea & Vomiting: no nausea and no vomiting  Complications: no  Cardiovascular status: blood pressure returned to baseline  Respiratory status: acceptable  Hydration status: euvolemic

## 2021-09-24 NOTE — ANESTHESIA PRE PROCEDURE
09/22/21 2104    sodium chloride flush 0.9 % injection 5-40 mL  5-40 mL IntraVENous PRN Alpheus Come, APRN - CNP   10 mL at 09/23/21 0614    0.9 % sodium chloride infusion  25 mL IntraVENous PRN Alpheus Come, APRN - CNP        ondansetron (ZOFRAN-ODT) disintegrating tablet 4 mg  4 mg Oral Q8H PRN Alpheus Come, APRN - CNP        Or    ondansetron (ZOFRAN) injection 4 mg  4 mg IntraVENous Q6H PRN Alpheus Come, APRN - CNP   4 mg at 09/23/21 9217    polyethylene glycol (GLYCOLAX) packet 17 g  17 g Oral Daily PRN Alpheus Come, APRN - CNP        heparin (porcine) injection 5,000 Units  5,000 Units SubCUTAneous 3 times per day Alpheus Come, APRN - CNP   5,000 Units at 09/24/21 5637    amLODIPine (NORVASC) tablet 10 mg  10 mg Oral Daily Alpheus Come, APRN - CNP   10 mg at 09/23/21 0612    aspirin EC tablet 81 mg  81 mg Oral Daily Alpheus Come, APRN - CNP   81 mg at 09/23/21 4380    atenolol (TENORMIN) tablet 50 mg  50 mg Oral Daily Alpheus Come, APRN - CNP   50 mg at 09/23/21 5511    atorvastatin (LIPITOR) tablet 20 mg  20 mg Oral Nightly Alpheus Come, APRN - CNP   20 mg at 09/23/21 2215    insulin lispro (HUMALOG) injection vial 0-6 Units  0-6 Units SubCUTAneous TID  Niko Mckenna, APRN - CNP   1 Units at 09/23/21 1741    insulin lispro (HUMALOG) injection vial 0-3 Units  0-3 Units SubCUTAneous Nightly Alpheus Come, APRN - CNP   2 Units at 09/23/21 2216    glucose (GLUTOSE) 40 % oral gel 15 g  15 g Oral PRN Alpheus Come, APRN - CNP        dextrose 50 % IV solution  12.5 g IntraVENous PRN Alpheus Come, APRN - CNP   12.5 g at 09/19/21 1703    glucagon (rDNA) injection 1 mg  1 mg IntraMUSCular PRN Alpheus Come, APRN - CNP        dextrose 5 % solution  100 mL/hr IntraVENous PRN Alpheus Come, APRN - CNP        0.9 % sodium chloride infusion   IntraVENous Continuous Ruben Montano  mL/hr at 09/23/21 0853 Rate Change at 09/23/21 0880    morphine (PF) injection 2 mg  2 mg IntraVENous Q4H PRN MARTIR Moyer CNP         Facility-Administered Medications Ordered in Other Encounters   Medication Dose Route Frequency Provider Last Rate Last Admin    0.9 % sodium chloride infusion   IntraVENous Continuous PRN Sara Goyal RN   New Bag at 09/24/21 0840    midazolam (VERSED) injection   IntraVENous PRN Sara Goyal RN   1 mg at 09/24/21 0835    fentaNYL (SUBLIMAZE) injection   IntraVENous PRN Sara Goyal RN   50 mcg at 09/24/21 0846    lidocaine (cardiac) (XYLOCAINE) injection   IntraVENous PRN Sara Goyal RN   30 mg at 09/24/21 0846    propofol injection   IntraVENous Continuous PRN Sara Goyal RN 37.44 mL/hr at 09/24/21 0852 75 mcg/kg/min at 09/24/21 1532       Allergies:     Allergies   Allergen Reactions    Betadine [Povidone Iodine]     Lisinopril Swelling    Penicillins     Tylenol [Acetaminophen] Rash       Problem List:    Patient Active Problem List   Diagnosis Code    Hypertension I10    Elevated lipids E78.5    Proteinuria R80.9    Vitamin D deficiency E55.9    Closed fracture of radius S52.90XA    History of anal cancer Z85.048    Colostomy present (HCC) Z93.3    Vaginal cancer ([facility] [address] C52    Diabetes mellitus type 2 in obese (HCC) E11.69, E66.9    Stuttering F80.81    JEANNINE (acute kidney injury) ([facility] [address] N17.9       Past Medical History:        Diagnosis Date    Cancer ([facility] [address]     colon and uterine    Closed fracture of radius 12/27/2016    Elevated lipids 1/15/2014    Headache     History of anal cancer 2/20/2017    Dr. Layne Moore    Hyperlipidemia     Hypertension     Hypertension     Obesity     Proteinuria 6/25/2014    Type II or unspecified type diabetes mellitus without mention of complication, not stated as uncontrolled     Vaginal cancer ([facility] [address] 5/22/2017    Vitamin D deficiency 11/6/2014       Past Surgical History:        Procedure Laterality Date    BREAST BIOPSY      COLOSTOMY      RECTAL SURGERY found for: PROTIME, INR, APTT    HCG (If Applicable): No results found for: PREGTESTUR, PREGSERUM, HCG, HCGQUANT     ABGs: No results found for: PHART, PO2ART, DVY7LEP, UNG1FIW, BEART, Y5OTEUHI     Type & Screen (If Applicable):  No results found for: LABABO, LABRH    Drug/Infectious Status (If Applicable):  No results found for: HIV, HEPCAB    COVID-19 Screening (If Applicable): No results found for: COVID19     EKG 9/19/20  Ventricular Rate BPM 70    Atrial Rate BPM 70    P-R Interval ms 128    QRS Duration ms 66    Q-T Interval ms 394    QTc Calculation (Bazett) ms 425    P Axis degrees 33    R Axis degrees -10    T Axis degrees 31    Resulting Agency  [facility]      Narrative & Impression    Normal sinus rhythm  Cannot rule out Anterior infarct , age undetermined  Abnormal ECG  When compared with ECG of 07-NOV-2016 09:08,     Confirmed by Leigh Siddiqui ([id number]) on 9/19/2021 7:01:26 PM         Anesthesia Evaluation    Airway: Mallampati: II  TM distance: >3 FB   Neck ROM: full  Mouth opening: > = 3 FB Dental:    (+) upper dentures      Pulmonary:Negative Pulmonary ROS breath sounds clear to auscultation      (-) not a current smoker                           Cardiovascular:  Exercise tolerance: poor (<4 METS),   (+) hypertension:, hyperlipidemia        Rhythm: regular  Rate: normal           Beta Blocker:  Dose within 24 Hrs         Neuro/Psych:   (+) headaches:,              ROS comment: Hx stuttering  GI/Hepatic/Renal:   (+) renal disease (JEANNINE):,          ROS comment: Colostomy present. Endo/Other:    (+) Diabetes (last A1C - 10.0  on 9/9/20)Type II DM, poorly controlled, , malignancy/cancer. ROS comment: History of anal cancer  Hx Cancer colon and uterine  Abdominal:             Vascular: Other Findings:             Anesthesia Plan      MAC     ASA 3       Induction: intravenous. Anesthetic plan and risks discussed with patient.     Use of blood products discussed with patient whom consented to blood products. Plan discussed with CRNA and attending. Judith Turner MD   9/24/2021      Pt seen, examined, chart reviewed, plan discussed.   Judith Turner MD  9/24/2021  8:54 AM

## 2021-09-24 NOTE — BRIEF OP NOTE
Brief Postoperative Note      Patient: Tiki Hartley  YOB: 1950  MRN: [id number]    Date of Procedure: 9/24/2021    Pre-Op Diagnosis: Bilateral hydronephrosis and azotemia hypotonic bladder with obstructive uropathy/gross hematuria  Post-Op Diagnosis: Massive bilateral hydronephrosis associated with neurogenic bladder and tortuous ureters.   The gross hematuria is from decompressive changes related to chronic retention       Procedure: Cystopanendoscopy bilateral retrograde pyelogram bilateral stent insertion    Surgeon(s):  Ludivina Dos Santos MD    Assistant:  None    Anesthesia: Monitor Anesthesia Care    Estimated Blood Loss (mL): Minimal less than 5 cc    Complications: None    Specimens:   Urine for culture from each renal pelvis    Implants:  None      Drains:   Colostomy LUQ (Active)   Treatment Bag change 09/19/21 1557   Stool Appearance Formed 09/19/21 1557   Stool Color Brown 09/19/21 1557   Output (mL) 0 ml 09/23/21 1919       Urethral Catheter (Active)   Site Assessment No urethral drainage 09/24/21 0401   Urine Color Pink 09/24/21 0401   Urine Appearance Clear 09/24/21 0401   Urine Odor Other (Comment) 09/23/21 1219   Output (mL) 1150 mL 09/24/21 0538       Findings: As above    Electronically signed by Ludivina Dos Santos MD on 9/24/2021 at 8:33 AM

## 2021-09-24 NOTE — PROGRESS NOTES
Department of Internal Medicine  Nephrology Attending Progress Note        SUBJECTIVE:  Mrs Bernal Min back from cystoscopy and placement of bilateral stents, urine blood tinged  Physical Exam:    Vitals:    09/24/21 1015   BP: (!) 158/84   Pulse: 62   Resp: 18   Temp: 97.4 °F (36.3 °C)   SpO2: 100%       I/O last 24 hours:  Intake/Output 3095/3400:    Weight: 183    General Appearance: Awake alert no distress  Skin: No rash  Neck:  neck- supple, no mass, non-tender and no bruits  Lungs: Distant breath sounds no wheezing  Heart:   regular rate and rhythm     Abdominal: Abdomen soft, non-tender. BS normal. No masses,  No organomegaly  Extremities: Extremities warm to touch, pink, with no edema.   Peripheral Pulses:  +2    DATA:    pending       sodium bicarbonate  650 mg Oral BID    magnesium oxide  400 mg Oral BID    sodium chloride flush  5-40 mL IntraVENous 2 times per day    heparin (porcine)  5,000 Units SubCUTAneous 3 times per day    amLODIPine  10 mg Oral Daily    aspirin EC  81 mg Oral Daily    atenolol  50 mg Oral Daily    atorvastatin  20 mg Oral Nightly    insulin lispro  0-6 Units SubCUTAneous TID WC    insulin lispro  0-3 Units SubCUTAneous Nightly      sodium chloride      dextrose      sodium chloride 100 mL/hr at 09/23/21 0853     hydrALAZINE, sodium chloride flush, sodium chloride, ondansetron **OR** ondansetron, polyethylene glycol, glucose, dextrose, glucagon (rDNA), dextrose, morphine    IMPRESSION/RECOMMENDATIONS:      Acute kidney injury secondary to obstructive uropathy presumed to be neurogenic bladder.  s/p placement of bilateral renal stents to see if any further renal  recovery   Metabolic acidosis  continue  sodium bicarbonate  Hypomagnesemia continue supplementation  Type 2 diabetes urine protein creatinine ratio 0.4  History of vaginal squamous cancer, with no recent ct of pelvis to exclude  recurrence of disease, did have colonoscopy which was normal in march 2021  Hypertension fair control      Benton Diaz MD  9/24/2021 10:42 AM

## 2021-09-24 NOTE — PROGRESS NOTES
HOSPITALIST PROGRESS NOTE  Date: 9/24/2021   Name: Main Irwin   MRN: [id number]   YOB: 1950        Hospital Course:   Ms Ray Peck is 70 T past medical history of Cancer [facility]), Closed fracture of radius, Elevated lipids, Headache, History of anal cancer, Hyperlipidemia, Hypertension, Hypertension, Obesity, Proteinuria, Type II or unspecified type diabetes mellitus without mention of complication, not stated as uncontrolled, Vaginal cancer ([facility] [address] and Vitamin D deficiency;  presented to the ER on 9/19/21 with hypoglycemia. She reportedly felt dizzy/lightheaded when standing up this morning, had not eaten breakfast, but called EMS. Her glucose was in the 50s for EMS and she was given D10 prior to arrival.    Placed on López due to urinary retention after urology consulted    Subjective/Interval Hx:   Patient had a cystoscopy this Am and stent placement.  Doing well postop  Explained about the plan regarding hydration and monitor kidney function      Objective:   Physical Exam:   BP (!) 158/84   Pulse 62   Temp 97.4 °F (36.3 °C) (Oral)   Resp 18   Ht 5' 7\" (1.702 m)   Wt 183 lb 6.8 oz (83.2 kg)   SpO2 100%   BMI 28.73 kg/m²   General: no acute distress, well nourished and well hydrated  HEENT: NCAT  Heart: S1S2 RRR  Lungs: Clear to ascultation bilaterally, respiratory effort normal  Abdomen: soft, NT/ND, positive bowel sounds  Extremities: no pitting edema, nontender   Neuro: patient is awake, alert and orientated times 3, no gross deficits  Skin: no rashes or ecchymosis        Meds:   Meds:    sodium bicarbonate  1,300 mg Oral BID    magnesium oxide  400 mg Oral BID    sodium chloride flush  5-40 mL IntraVENous 2 times per day    heparin (porcine)  5,000 Units SubCUTAneous 3 times per day    amLODIPine  10 mg Oral Daily    aspirin EC  81 mg Oral Daily    atenolol  50 mg Oral Daily    atorvastatin  20 mg Oral Nightly    insulin lispro  0-6 Units SubCUTAneous TID WC    insulin lispro 0-3 Units SubCUTAneous Nightly      Infusions:    sodium chloride      dextrose      sodium chloride 100 mL/hr at 09/23/21 0853     PRN Meds: hydrALAZINE, 10 mg, Q6H PRN  sodium chloride flush, 5-40 mL, PRN  sodium chloride, 25 mL, PRN  ondansetron, 4 mg, Q8H PRN   Or  ondansetron, 4 mg, Q6H PRN  polyethylene glycol, 17 g, Daily PRN  glucose, 15 g, PRN  dextrose, 12.5 g, PRN  glucagon (rDNA), 1 mg, PRN  dextrose, 100 mL/hr, PRN  morphine, 2 mg, Q4H PRN        Data/Labs:     No results for input(s): WBC, HGB, HCT, PLT in the last 72 hours. Recent Labs     09/22/21  0603 09/23/21  0421 09/24/21  0647    137 140   K 3.7 3.6 4.0    103 107   CO2 19* 20* 19*   PHOS  --   --  3.5   BUN 33* 34* 32*   CREATININE 2.2* 2.1* 2.0*     No results for input(s): AST, ALT, ALB, BILIDIR, BILITOT, ALKPHOS in the last 72 hours. No results for input(s): INR in the last 72 hours. No results for input(s): CKTOTAL, CKMB, CKMBINDEX, TROPONINT in the last 72 hours. I/O last 3 completed shifts: In: 2853 [P.O.:1140; I.V.:1955]  Out: 3400 [Urine:3400]    Intake/Output Summary (Last 24 hours) at 9/24/2021 1329  Last data filed at 9/24/2021 1159  Gross per 24 hour   Intake 3335 ml   Output 3500 ml   Net -165 ml      Results for Collin Kinney (MRN [id number]) as of 9/21/2021 13:29   Ref. Range 9/21/2021 04:35   Ferritin Latest Units: ng/mL 248   Iron Latest Ref Range: 37 - 145 mcg/dL 75   Iron Saturation Latest Ref Range: 15 - 50 % 25   TIBC Latest Ref Range: 250 - 450 mcg/dL 305   Folate Latest Ref Range: 4.8 - 24.2 ng/mL 3.8 (L)   Vitamin B-12 Latest Ref Range: 211 - 946 pg/mL 247     Assessment/Plan:   Urinary obstructive uropathy with Mod b/l hydronephrosis-  S/p Cystopanendoscopy, bilateral retrograde pyelograms, bilateral stent insertion 9/24/21    1. Acute kidney injury/chronic kidney disease stage III-nephrology is following, maintain hydration, avoid nephrotoxic agents. IVF - NS at 100cc/hr.  Cr currently at

## 2021-09-24 NOTE — PLAN OF CARE
Problem: Falls - Risk of:  Goal: Will remain free from falls  Description: Will remain free from falls  9/24/2021 1456 by Sadia Sanchez RN  Outcome: Met This Shift  9/24/2021 0450 by Nigel Martinez RN  Outcome: Met This Shift  Goal: Absence of physical injury  Description: Absence of physical injury  9/24/2021 1456 by Sadia Sanchez RN  Outcome: Met This Shift  9/24/2021 0450 by Nigel Martinez RN  Outcome: Met This Shift     Problem: Pain:  Goal: Pain level will decrease  Description: Pain level will decrease  Outcome: Met This Shift  Goal: Control of acute pain  Description: Control of acute pain  Outcome: Met This Shift  Goal: Control of chronic pain  Description: Control of chronic pain  Outcome: Met This Shift     Problem: Health Behavior:  Goal: Compliance with treatment plan for underlying cause of condition will improve  Description: Compliance with treatment plan for underlying cause of condition will improve  Outcome: Met This Shift  Goal: Identification of resources available to assist in meeting health care needs will improve  Description: Identification of resources available to assist in meeting health care needs will improve  Outcome: Met This Shift     Problem: Fluid Volume:  Goal: Maintenance of adequate hydration will improve  Description: Maintenance of adequate hydration will improve  Outcome: Met This Shift     Problem: Urinary Elimination:  Goal: Skin integrity will improve  Description: Skin integrity will improve  Outcome: Met This Shift  Goal: Ability to recognize the need to void and respond appropriately will improve  Description: Ability to recognize the need to void and respond appropriately will improve  Outcome: Met This Shift  Goal: Will remain free from infection  Description: Will remain free from infection  Outcome: Met This Shift  Goal: Incidence of incontinence will decrease  Description: Incidence of incontinence will decrease  Outcome: Met This Shift

## 2021-09-24 NOTE — PLAN OF CARE
Problem: Falls - Risk of:  Goal: Will remain free from falls  Description: Will remain free from falls  9/24/2021 1611 by Justyna Mendoza RN  Outcome: Met This Shift  9/24/2021 1456 by Justyna Mendoza RN  Outcome: Met This Shift  9/24/2021 0450 by Rosi Saba RN  Outcome: Met This Shift  Goal: Absence of physical injury  Description: Absence of physical injury  9/24/2021 1611 by Justyna Mendoza RN  Outcome: Met This Shift  9/24/2021 1456 by Justyna Mendoza RN  Outcome: Met This Shift  9/24/2021 0450 by Rosi Saba RN  Outcome: Met This Shift     Problem: Pain:  Goal: Pain level will decrease  Description: Pain level will decrease  9/24/2021 1611 by Justyna Mendoza RN  Outcome: Met This Shift  9/24/2021 1456 by Justyna Mendoza RN  Outcome: Met This Shift  Goal: Control of acute pain  Description: Control of acute pain  9/24/2021 1611 by Justyna Mendoza RN  Outcome: Met This Shift  9/24/2021 1456 by Justyna Mendoza RN  Outcome: Met This Shift  Goal: Control of chronic pain  Description: Control of chronic pain  9/24/2021 1611 by Justyna Mendoza RN  Outcome: Met This Shift  9/24/2021 1456 by Justyna Mendoza RN  Outcome: Met This Shift     Problem: Health Behavior:  Goal: Compliance with treatment plan for underlying cause of condition will improve  Description: Compliance with treatment plan for underlying cause of condition will improve  9/24/2021 1611 by Justyna Mendoza RN  Outcome: Met This Shift  9/24/2021 1456 by Justyna Mendoza RN  Outcome: Met This Shift  Goal: Identification of resources available to assist in meeting health care needs will improve  Description: Identification of resources available to assist in meeting health care needs will improve  9/24/2021 1611 by Justyna Mendoza RN  Outcome: Met This Shift  9/24/2021 1456 by Justyna Mendoza RN  Outcome: Met This Shift     Problem: Fluid Volume:  Goal: Maintenance of adequate hydration will improve  Description: Maintenance of adequate hydration will improve  9/24/2021 1611 by Cheryl Morrow RN  Outcome: Met This Shift  9/24/2021 1456 by Cheryl Morrow RN  Outcome: Met This Shift     Problem: Urinary Elimination:  Goal: Skin integrity will improve  Description: Skin integrity will improve  9/24/2021 1611 by Cheryl Morrow RN  Outcome: Met This Shift  9/24/2021 1456 by Cheryl Morrow RN  Outcome: Met This Shift  Goal: Ability to recognize the need to void and respond appropriately will improve  Description: Ability to recognize the need to void and respond appropriately will improve  9/24/2021 1611 by Cheryl Morrow RN  Outcome: Met This Shift  9/24/2021 1456 by Cheryl Morrow RN  Outcome: Met This Shift  Goal: Will remain free from infection  Description: Will remain free from infection  9/24/2021 1611 by Cheryl Morrow RN  Outcome: Met This Shift  9/24/2021 1456 by Cheryl Morrow RN  Outcome: Met This Shift  Goal: Incidence of incontinence will decrease  Description: Incidence of incontinence will decrease  9/24/2021 1611 by Cheryl Morrow RN  Outcome: Met This Shift  9/24/2021 1456 by Cheryl Morrow RN  Outcome: Met This Shift

## 2021-09-24 NOTE — CARE COORDINATION
Patient had a Cystopanendoscopy bilateral retrograde pyelogram bilateral stent insertion for Bilateral hydronephrosis and azotemia hypotonic bladder with obstructive uropathy/gross hematuria. Post-Op Diagnosis: Massive bilateral hydronephrosis associated with neurogenic bladder and tortuous ureters. The gross hematuria is from decompressive changes related to chronic retention. Await further input and plan from urology. Plan is home with [address] care when medically ready. Home health care orders are in. Patient's sister Hussein Zhu transport her home at time of discharge and her number is [phone].    Rosena Leyden RN CM

## 2021-09-24 NOTE — OP NOTE
[address]                  [address] [facility]                                OPERATIVE REPORT    PATIENT NAME: Debi Hancock                    :        1950  MED REC NO:   [id number]                            ROOM:       8424  ACCOUNT NO:   [de-identified]                           ADMIT DATE: 2021  PROVIDER:     Heather Bethea MD    DATE OF PROCEDURE:  2021    PREOPERATIVE DIAGNOSES:  Azotemia, bilateral hydronephrosis, a flaccid  neurogenic bladder with massive retention, and gross hematuria. POSTOPERATIVE DIAGNOSES:  Massive hydronephrosis with tortuous ureters,  flaccid neurogenic bladder, gross hematuria from the decompressive  changes from chronic retention. OPERATION:  Cystopanendoscopy, bilateral retrograde pyelograms,  bilateral stent insertion. ANESTHESIA:  Monitored sedation. CONDITION:  Stable. COMPLICATIONS:  None. DISPOSITION:  Back to her room. BLOOD LOSS:  Less than 5 mL. SURGEON:  Clary Moreau MD    DESCRIPTION OF PROCEDURE:  The timeout was read by me, the Anesthesia  and the operating staff; reviewed history, physical, allergy and  medication. 2 gm of Ancef given upon induction. The patient was placed  in lithotomy position, prepped and draped in usual fashion. The #21  panendoscope and obturator inserted into the bladder. There was a small  amount of clot in the bladder, less than 5 mL. It was aspirated. There  were decompressive changes in the bladder compatible with hemorrhagic or  hyperemia of the mucosa from decompression of the bladder from chronic  retention. There were no stones or foreign bodies in the bladder. Bladder was trabeculated without cellule or diverticular formation. Large bladder capacity. No cystitis cystica, enterovesical fistula or  extrinsic imprints.  film of the abdomen demonstrates no stones,  estimated area of the kidneys or ureters. The 5 open-ended catheter  would not negotiate beyond the ureterovesical junction on the right. I  did use a Glidewire to get into the renal pelvis. The urine was grossly  clear, but I sent a culture from the right renal pelvis. Retrograde  pyelogram showed a massive large renal pelvis with a tortuous ureter. There were no obvious stones. A 6 x 28 J-stent was inserted over a  Glidewire to the renal and positioned with fluoroscopy at the bladder  under direct visualization. Similarly on the left side, I could not  navigate through the ureteral orifice without Glidewire, and the  tortuous ureter was more profound on the left than the right, but I was  able to fish a wire into the renal pelvis. Urine was grossly clear,  again demonstrated massive hydronephrosis without extrinsic or intrinsic  changes. No obvious stones. The ureter was dilated most likely from  chronic retention from the bladder distention. A 6 x 28 J-stent was  inserted to the renal and positioned with fluoroscopy at the bladder  under direct visualization. I put a #22-Spanish catheter in her bladder  ______ irrigation. I did not do a complete vaginal exam.  She had a  vaginal stenosis. Rectal, no masses. The patient tolerated the  procedure well, was sent back to room in satisfactory condition. Hopefully, her azotemia will resolve.         Spence Barthel, MD    D: 09/24/2021 9:41:07       T: 09/24/2021 9:43:32     [email or web address]  Job#: [id number]     Doc#: [id number]    CC:  Tahira Mora MD

## 2021-09-25 LAB
ANION GAP SERPL CALCULATED.3IONS-SCNC: 14 MMOL/L (ref 7–16)
BUN BLDV-MCNC: 27 MG/DL (ref 6–23)
CALCIUM SERPL-MCNC: 9.3 MG/DL (ref 8.6–10.2)
CHLORIDE BLD-SCNC: 106 MMOL/L (ref 98–107)
CO2: 21 MMOL/L (ref 22–29)
CREAT SERPL-MCNC: 1.9 MG/DL (ref 0.5–1)
GFR AFRICAN AMERICAN: 32
GFR NON-AFRICAN AMERICAN: 32 ML/MIN/1.73
GLUCOSE BLD-MCNC: 139 MG/DL (ref 74–99)
METER GLUCOSE: 123 MG/DL (ref 74–99)
METER GLUCOSE: 142 MG/DL (ref 74–99)
METER GLUCOSE: 146 MG/DL (ref 74–99)
METER GLUCOSE: 201 MG/DL (ref 74–99)
POTASSIUM SERPL-SCNC: 4.2 MMOL/L (ref 3.5–5)
SODIUM BLD-SCNC: 141 MMOL/L (ref 132–146)

## 2021-09-25 PROCEDURE — 6370000000 HC RX 637 (ALT 250 FOR IP): Performed by: UROLOGY

## 2021-09-25 PROCEDURE — 6360000002 HC RX W HCPCS: Performed by: UROLOGY

## 2021-09-25 PROCEDURE — 6370000000 HC RX 637 (ALT 250 FOR IP): Performed by: INTERNAL MEDICINE

## 2021-09-25 PROCEDURE — 1200000000 HC SEMI PRIVATE

## 2021-09-25 PROCEDURE — 36415 COLL VENOUS BLD VENIPUNCTURE: CPT

## 2021-09-25 PROCEDURE — 2580000003 HC RX 258: Performed by: UROLOGY

## 2021-09-25 PROCEDURE — 80048 BASIC METABOLIC PNL TOTAL CA: CPT

## 2021-09-25 PROCEDURE — 82962 GLUCOSE BLOOD TEST: CPT

## 2021-09-25 RX ADMIN — ATORVASTATIN CALCIUM 20 MG: 20 TABLET, FILM COATED ORAL at 21:26

## 2021-09-25 RX ADMIN — ATENOLOL 50 MG: 50 TABLET ORAL at 10:18

## 2021-09-25 RX ADMIN — SODIUM BICARBONATE 1300 MG: 650 TABLET ORAL at 21:26

## 2021-09-25 RX ADMIN — INSULIN LISPRO 2 UNITS: 100 INJECTION, SOLUTION INTRAVENOUS; SUBCUTANEOUS at 11:53

## 2021-09-25 RX ADMIN — ASPIRIN 81 MG: 81 TABLET, COATED ORAL at 08:52

## 2021-09-25 RX ADMIN — Medication 10 ML: at 08:52

## 2021-09-25 RX ADMIN — SODIUM BICARBONATE 1300 MG: 650 TABLET ORAL at 08:52

## 2021-09-25 RX ADMIN — HEPARIN SODIUM 5000 UNITS: 10000 INJECTION INTRAVENOUS; SUBCUTANEOUS at 06:47

## 2021-09-25 RX ADMIN — INSULIN LISPRO 1 UNITS: 100 INJECTION, SOLUTION INTRAVENOUS; SUBCUTANEOUS at 22:12

## 2021-09-25 RX ADMIN — HEPARIN SODIUM 5000 UNITS: 10000 INJECTION INTRAVENOUS; SUBCUTANEOUS at 14:30

## 2021-09-25 RX ADMIN — Medication 400 MG: at 08:52

## 2021-09-25 RX ADMIN — HEPARIN SODIUM 5000 UNITS: 10000 INJECTION INTRAVENOUS; SUBCUTANEOUS at 21:26

## 2021-09-25 RX ADMIN — AMLODIPINE BESYLATE 10 MG: 10 TABLET ORAL at 08:51

## 2021-09-25 RX ADMIN — Medication 400 MG: at 21:25

## 2021-09-25 RX ADMIN — Medication 10 ML: at 21:26

## 2021-09-25 RX ADMIN — INSULIN LISPRO 1 UNITS: 100 INJECTION, SOLUTION INTRAVENOUS; SUBCUTANEOUS at 08:52

## 2021-09-25 ASSESSMENT — PAIN SCALES - GENERAL
PAINLEVEL_OUTOF10: 0

## 2021-09-25 NOTE — PROGRESS NOTES
rhythm, no murmurs, gallops, or rubs  Respiratory:  Clear and equal   Gastrointestinal:  Soft, nontender, nondistended  Ext: trace edema   Skin: dry, no rash  Neuro: awake and alert    MEDS (scheduled):    sodium bicarbonate  1,300 mg Oral BID    magnesium oxide  400 mg Oral BID    sodium chloride flush  5-40 mL IntraVENous 2 times per day    heparin (porcine)  5,000 Units SubCUTAneous 3 times per day    amLODIPine  10 mg Oral Daily    aspirin EC  81 mg Oral Daily    atenolol  50 mg Oral Daily    atorvastatin  20 mg Oral Nightly    insulin lispro  0-6 Units SubCUTAneous TID WC    insulin lispro  0-3 Units SubCUTAneous Nightly       MEDS (infusions):   sodium chloride      dextrose      sodium chloride 100 mL/hr at 09/24/21 2144       MEDS (prn):  hydrALAZINE, sodium chloride flush, sodium chloride, ondansetron **OR** ondansetron, polyethylene glycol, glucose, dextrose, glucagon (rDNA), dextrose    DATA:    No results for input(s): WBC, HGB, HCT, MCV, PLT in the last 72 hours.   Recent Labs     09/23/21  0421 09/24/21  0647 09/24/21  1429 09/25/21  0545    140  --  141   K 3.6 4.0  --  4.2    107  --  106   CO2 20* 19*  --  21*   BUN 34* 32*  --  27*   CREATININE 2.1* 2.0*  --  1.9*   LABGLOM 28 30  --  32   GLUCOSE 128* 148*  --  139*   CALCIUM 8.4* 8.7  --  9.3   MG 1.2*  --  1.7  --    PHOS  --  3.5  --   --        Lab Results   Component Value Date    LABALBU 4.3 09/19/2021    LABALBU 4.5 09/09/2020    LABALBU 4.5 12/23/2019     Lab Results   Component Value Date    TSH 1.270 09/09/2020       Iron Studies  Lab Results   Component Value Date    IRON 75 09/21/2021    TIBC 305 09/21/2021    FERRITIN 248 09/21/2021     Vitamin B-12   Date Value Ref Range Status   09/21/2021 247 211 - 946 pg/mL Final     Folate   Date Value Ref Range Status   09/21/2021 3.8 (L) 4.8 - 24.2 ng/mL Final       Vit D, 25-Hydroxy   Date Value Ref Range Status   09/09/2020 17 (L) 30 - 100 ng/mL Final     Comment: <20 ng/mL. ........... Paullette Rakes Deficient  20-30 ng/mL. ......... Paullette Rakes Insufficient   ng/mL. ........ Paullette Rakes Sufficient  >100 ng/mL. .......... Paullette Rakes Toxic       No results found for: PTH    No components found for: URIC    Lab Results   Component Value Date    COLORU Yellow 09/19/2021    NITRU Negative 09/19/2021    GLUCOSEU Negative 09/19/2021    KETUA Negative 09/19/2021    UROBILINOGEN 0.2 09/19/2021    BILIRUBINUR Negative 09/19/2021    BILIRUBINUR neg 10/07/2013       No results found for: Nga Law      IMPRESSION/RECOMMENDATIONS:      1. Acute kidney injury secondary to obstructive uropathy presumed to be neurogenic bladder.  s/p placement of bilateral renal stents  Creatinine 2.2-->2.1-->2.0-->1.9 mg/dl    2. Metabolic acidosis  continue  sodium bicarbonate    3. Hypomagnesemia continue supplementation    4. Type 2 diabetes urine protein creatinine ratio 0.4    5. Hypertension-  BP near  Goal<130/80    Eyad Pennington APRN - CNP    Patient seen and examined all key components of the physical performed independently , case discussed with NP, all pertinent labs and radiologic tests personally reviewed agree with above.       Gaston Snellen, MD

## 2021-09-25 NOTE — PROGRESS NOTES
[facility] PROGRESS NOTE                                                                       9/25/2021        CHIEF UROLOGIC COMPLAINT: Bilateral hydronephrosis    HISTORY OF PRESENT ILLNESS:  Patient without new complaints. Feeling well today. Urine remains bloody. No new complaints. REVIEW OF SYSTEMS:   CONSTITUTIONAL: negative  HEENT: negative  HEMATOLOGIC: negative  ENDOCRINE: negative  RESPIRATORY: negative  CV: negative  GI: negative  NEURO: negative  ORTHOPEDICS: negative  PSYCHIATRIC: negative  : as above    PAST FAMILY HISTORY:    Family History   Problem Relation Age of Onset    Kidney Disease Mother     High Blood Pressure Mother     Cancer Father     Diabetes Sister     Other Brother      PAST SOCIAL HISTORY:    Social History     Socioeconomic History    Marital status: Single     Spouse name: None    Number of children: None    Years of education: None    Highest education level: None   Occupational History    None   Tobacco Use    Smoking status: Never Smoker    Smokeless tobacco: Never Used   Substance and Sexual Activity    Alcohol use: No    Drug use: No    Sexual activity: None   Other Topics Concern    None   Social History Narrative    None     Social Determinants of Health     Financial Resource Strain: Low Risk     Difficulty of Paying Living Expenses: Not hard at all   Food Insecurity: No Food Insecurity    Worried About Running Out of Food in the Last Year: Never true    Cristhian of Food in the Last Year: Never true   Transportation Needs:     Lack of Transportation (Medical):      Lack of Transportation (Non-Medical):    Physical Activity:     Days of Exercise per Week:     Minutes of Exercise per Session:    Stress:     Feeling of Stress :    Social Connections:     Frequency of Communication with Friends and Family:     Frequency of Social Gatherings with Friends and Family:     Attends Taoist Services:     Active Member of Clubs or Organizations:     Attends Club or Organization Meetings:     Marital Status:    Intimate Partner Violence:     Fear of Current or Ex-Partner:     Emotionally Abused:     Physically Abused:     Sexually Abused:        Scheduled Meds:   sodium bicarbonate  1,300 mg Oral BID    magnesium oxide  400 mg Oral BID    sodium chloride flush  5-40 mL IntraVENous 2 times per day    heparin (porcine)  5,000 Units SubCUTAneous 3 times per day    amLODIPine  10 mg Oral Daily    aspirin EC  81 mg Oral Daily    atenolol  50 mg Oral Daily    atorvastatin  20 mg Oral Nightly    insulin lispro  0-6 Units SubCUTAneous TID WC    insulin lispro  0-3 Units SubCUTAneous Nightly     Continuous Infusions:   sodium chloride      dextrose      sodium chloride 100 mL/hr at 09/24/21 2144     PRN Meds:.hydrALAZINE, sodium chloride flush, sodium chloride, ondansetron **OR** ondansetron, polyethylene glycol, glucose, dextrose, glucagon (rDNA), dextrose    BP (!) 145/92   Pulse 69   Temp 97 °F (36.1 °C) (Temporal)   Resp 18   Ht 5' 7\" (1.702 m)   Wt 183 lb 6.8 oz (83.2 kg)   SpO2 100%   BMI 28.73 kg/m²     Lab Results   Component Value Date    WBC 10.2 09/21/2021    HGB 11.2 (L) 09/21/2021    HCT 34.1 09/21/2021    MCV 89.3 09/21/2021     09/21/2021       Lab Results   Component Value Date    CREATININE 1.9 (H) 09/25/2021       No results found for: PSA    Lab Results   Component Value Date    LABURIN 100 mg/dL (H) 12/23/2019       No results found for: BC    No results found for: BLOODCULT2    PHYSICAL EXAMINATION:  Skin dry, without rashes  Respirations non-labored, intact  Abdomen soft, non-tender, non-distended  Alert and oriented x3  López draining bloody urine      ASSESSMENT AND PLAN:  1.  S/P bilateral stent insertion for massive hydronephrosis with tortuous ureters,  flaccid neurogenic bladder, gross hematuria from the decompressive  changes from chronic retention POD#1. Continue López catheter and irrigate as needed. Is normal to have gross hematuria with the presence of indwelling stents and not alarming. Will follow creatinine.     Kim Bravo MD, M.D.  9/25/2021  11:03 AM

## 2021-09-25 NOTE — PROGRESS NOTES
HOSPITALIST PROGRESS NOTE  Date: 9/25/2021   Name: Tramaine Bo   MRN: [id number]   YOB: 1950        Hospital Course:   Ms Venecia Rosado is 70 T past medical history of Cancer [facility]), Closed fracture of radius, Elevated lipids, Headache, History of anal cancer, Hyperlipidemia, Hypertension, Hypertension, Obesity, Proteinuria, Type II or unspecified type diabetes mellitus without mention of complication, not stated as uncontrolled, Vaginal cancer ([facility] [address] and Vitamin D deficiency;  presented to the ER on 9/19/21 with hypoglycemia. She reportedly felt dizzy/lightheaded when standing up this morning, had not eaten breakfast, but called EMS. Her glucose was in the 50s for EMS and she was given D10 prior to arrival.    Placed on López due to urinary retention after urology consulted. S/p Cystopanendoscopy, bilateral retrograde pyelograms, bilateral stent insertion 9/24/21. Awaiting renal improvement. Subjective/Interval Hx:   Patient had a cystoscopy yesterday. Has been having decreased appt since post surgery. No nausea/vomiting. Pain under control with morphine.   Explained about the plan regarding hydration and monitor kidney function      Objective:   Physical Exam:   BP (!) 145/92   Pulse 69   Temp 97 °F (36.1 °C) (Temporal)   Resp 18   Ht 5' 7\" (1.702 m)   Wt 183 lb 6.8 oz (83.2 kg)   SpO2 100%   BMI 28.73 kg/m²   General: no acute distress, well nourished and well hydrated  HEENT: NCAT  Heart: S1S2 RRR  Lungs: Clear to ascultation bilaterally, respiratory effort normal  Abdomen: soft, NT/ND, positive bowel sounds  Extremities: no pitting edema, nontender   Neuro: patient is awake, alert and orientated times 3, no gross deficits  Skin: no rashes or ecchymosis        Meds:   Meds:    sodium bicarbonate  1,300 mg Oral BID    magnesium oxide  400 mg Oral BID    sodium chloride flush  5-40 mL IntraVENous 2 times per day    heparin (porcine)  5,000 Units SubCUTAneous 3 times per day    amLODIPine 10 mg Oral Daily    aspirin EC  81 mg Oral Daily    atenolol  50 mg Oral Daily    atorvastatin  20 mg Oral Nightly    insulin lispro  0-6 Units SubCUTAneous TID WC    insulin lispro  0-3 Units SubCUTAneous Nightly      Infusions:    sodium chloride      dextrose      sodium chloride 100 mL/hr at 09/24/21 2144     PRN Meds: hydrALAZINE, 10 mg, Q6H PRN  sodium chloride flush, 5-40 mL, PRN  sodium chloride, 25 mL, PRN  ondansetron, 4 mg, Q8H PRN   Or  ondansetron, 4 mg, Q6H PRN  polyethylene glycol, 17 g, Daily PRN  glucose, 15 g, PRN  dextrose, 12.5 g, PRN  glucagon (rDNA), 1 mg, PRN  dextrose, 100 mL/hr, PRN        Data/Labs:     No results for input(s): WBC, HGB, HCT, PLT in the last 72 hours. Recent Labs     09/23/21  0421 09/24/21  0647 09/25/21  0545    140 141   K 3.6 4.0 4.2    107 106   CO2 20* 19* 21*   PHOS  --  3.5  --    BUN 34* 32* 27*   CREATININE 2.1* 2.0* 1.9*     No results for input(s): AST, ALT, ALB, BILIDIR, BILITOT, ALKPHOS in the last 72 hours. No results for input(s): INR in the last 72 hours. No results for input(s): CKTOTAL, CKMB, CKMBINDEX, TROPONINT in the last 72 hours. I/O last 3 completed shifts: In: Nikita hZang [P.O.:420; I.V.:1475]  Out: 2750 [Urine:2750]    Intake/Output Summary (Last 24 hours) at 9/25/2021 1234  Last data filed at 9/25/2021 1007  Gross per 24 hour   Intake 1535 ml   Output 3200 ml   Net -1665 ml      Results for Denise Hendricks (MRN [id number]) as of 9/21/2021 13:29   Ref. Range 9/21/2021 04:35   Ferritin Latest Units: ng/mL 248   Iron Latest Ref Range: 37 - 145 mcg/dL 75   Iron Saturation Latest Ref Range: 15 - 50 % 25   TIBC Latest Ref Range: 250 - 450 mcg/dL 305   Folate Latest Ref Range: 4.8 - 24.2 ng/mL 3.8 (L)   Vitamin B-12 Latest Ref Range: 211 - 946 pg/mL 247     Assessment/Plan:   Urinary obstructive uropathy with Mod b/l hydronephrosis-  S/p Cystopanendoscopy, bilateral retrograde pyelograms, bilateral stent insertion 9/24/21    1.  Acute kidney injury/chronic kidney disease stage III-nephrology is following, maintain hydration, avoid nephrotoxic agents. IVF - NS at 100cc/hr. Cr currently at 1.9  2. Hypertension-amlodipine, atenolol, monitor blood pressure. Reviewed and appears to be high. Will adjust meds. Avoid ACEi/arb due to JEANNINE. Hydralazine will be considered  3. Non-insulin-dependent diabetes mellitus-sliding scale, diabetic diet, monitor blood glucose     Mild Anemia- iron studies ordered- shows mildly decreased folate. Will start on folate at discharge. Hypomagnesemia--->at 1.1---1.2->1.7. Nephrology on board. Currently on Magnesium oxide bid. Intake/Output Summary (Last 24 hours) at 9/25/2021 1234  Last data filed at 9/25/2021 1007  Gross per 24 hour   Intake 1535 ml   Output 3200 ml   Net -1665 ml       DVT Prophylaxis: Heparin  Diet: ADULT DIET; Regular; 4 carb choices (60 gm/meal)  Code Status: Full Code    Dispo: Continue IV hydration and monitor Cr- currently at 1.9. nephrology/urology on board. S/p Cystopanendoscopy, bilateral retrograde pyelograms, bilateral stent insertion 9/24/21. Awaiting clinical improvement.     Electronically signed by Isela Jeff MD on 9/25/2021 at 12:34 PM  [facility]

## 2021-09-26 LAB
ANION GAP SERPL CALCULATED.3IONS-SCNC: 13 MMOL/L (ref 7–16)
BUN BLDV-MCNC: 27 MG/DL (ref 6–23)
CALCIUM SERPL-MCNC: 9.3 MG/DL (ref 8.6–10.2)
CHLORIDE BLD-SCNC: 103 MMOL/L (ref 98–107)
CO2: 22 MMOL/L (ref 22–29)
CREAT SERPL-MCNC: 1.8 MG/DL (ref 0.5–1)
GFR AFRICAN AMERICAN: 34
GFR NON-AFRICAN AMERICAN: 34 ML/MIN/1.73
GLUCOSE BLD-MCNC: 113 MG/DL (ref 74–99)
LIPASE: 113 U/L (ref 13–60)
METER GLUCOSE: 132 MG/DL (ref 74–99)
METER GLUCOSE: 134 MG/DL (ref 74–99)
METER GLUCOSE: 145 MG/DL (ref 74–99)
METER GLUCOSE: 177 MG/DL (ref 74–99)
POTASSIUM SERPL-SCNC: 3.9 MMOL/L (ref 3.5–5)
SODIUM BLD-SCNC: 138 MMOL/L (ref 132–146)
URINE CULTURE, ROUTINE: NORMAL
URINE CULTURE, ROUTINE: NORMAL

## 2021-09-26 PROCEDURE — 82962 GLUCOSE BLOOD TEST: CPT

## 2021-09-26 PROCEDURE — 6360000002 HC RX W HCPCS: Performed by: UROLOGY

## 2021-09-26 PROCEDURE — 1200000000 HC SEMI PRIVATE

## 2021-09-26 PROCEDURE — 80048 BASIC METABOLIC PNL TOTAL CA: CPT

## 2021-09-26 PROCEDURE — 2580000003 HC RX 258: Performed by: UROLOGY

## 2021-09-26 PROCEDURE — 83690 ASSAY OF LIPASE: CPT

## 2021-09-26 PROCEDURE — 6370000000 HC RX 637 (ALT 250 FOR IP): Performed by: UROLOGY

## 2021-09-26 PROCEDURE — 6370000000 HC RX 637 (ALT 250 FOR IP): Performed by: STUDENT IN AN ORGANIZED HEALTH CARE EDUCATION/TRAINING PROGRAM

## 2021-09-26 PROCEDURE — 36415 COLL VENOUS BLD VENIPUNCTURE: CPT

## 2021-09-26 PROCEDURE — 6370000000 HC RX 637 (ALT 250 FOR IP): Performed by: INTERNAL MEDICINE

## 2021-09-26 RX ORDER — HYDRALAZINE HYDROCHLORIDE 25 MG/1
25 TABLET, FILM COATED ORAL EVERY 12 HOURS SCHEDULED
Status: DISCONTINUED | OUTPATIENT
Start: 2021-09-26 | End: 2021-09-28 | Stop reason: HOSPADM

## 2021-09-26 RX ORDER — PANTOPRAZOLE SODIUM 40 MG/1
40 TABLET, DELAYED RELEASE ORAL
Status: DISCONTINUED | OUTPATIENT
Start: 2021-09-26 | End: 2021-09-28 | Stop reason: HOSPADM

## 2021-09-26 RX ADMIN — INSULIN LISPRO 1 UNITS: 100 INJECTION, SOLUTION INTRAVENOUS; SUBCUTANEOUS at 21:33

## 2021-09-26 RX ADMIN — ASPIRIN 81 MG: 81 TABLET, COATED ORAL at 09:04

## 2021-09-26 RX ADMIN — ATENOLOL 50 MG: 50 TABLET ORAL at 09:05

## 2021-09-26 RX ADMIN — Medication 400 MG: at 09:05

## 2021-09-26 RX ADMIN — HYDRALAZINE HYDROCHLORIDE 25 MG: 25 TABLET, FILM COATED ORAL at 12:19

## 2021-09-26 RX ADMIN — SODIUM BICARBONATE 1300 MG: 650 TABLET ORAL at 09:04

## 2021-09-26 RX ADMIN — Medication 10 ML: at 21:29

## 2021-09-26 RX ADMIN — SODIUM BICARBONATE 1300 MG: 650 TABLET ORAL at 21:29

## 2021-09-26 RX ADMIN — PANTOPRAZOLE SODIUM 40 MG: 40 TABLET, DELAYED RELEASE ORAL at 12:20

## 2021-09-26 RX ADMIN — HEPARIN SODIUM 5000 UNITS: 10000 INJECTION INTRAVENOUS; SUBCUTANEOUS at 05:38

## 2021-09-26 RX ADMIN — Medication 400 MG: at 21:29

## 2021-09-26 RX ADMIN — HEPARIN SODIUM 5000 UNITS: 10000 INJECTION INTRAVENOUS; SUBCUTANEOUS at 13:40

## 2021-09-26 RX ADMIN — INSULIN LISPRO 1 UNITS: 100 INJECTION, SOLUTION INTRAVENOUS; SUBCUTANEOUS at 12:20

## 2021-09-26 RX ADMIN — AMLODIPINE BESYLATE 10 MG: 10 TABLET ORAL at 09:04

## 2021-09-26 RX ADMIN — ATORVASTATIN CALCIUM 20 MG: 20 TABLET, FILM COATED ORAL at 21:29

## 2021-09-26 RX ADMIN — HYDRALAZINE HYDROCHLORIDE 25 MG: 25 TABLET, FILM COATED ORAL at 21:33

## 2021-09-26 ASSESSMENT — PAIN SCALES - GENERAL: PAINLEVEL_OUTOF10: 0

## 2021-09-26 NOTE — PROGRESS NOTES
Nutrition Assessment     Type and Reason for Visit: Initial    Nutrition Recommendations/Plan: Continue current diet     Nutrition Assessment:  Pt adm w/ JEANNINE on CKD/bilat hydronephrosis s/p cysto. Hx anal CA, vaginal CA. Will monitor    Malnutrition Assessment:  Malnutrition Status: No malnutrition    Nutrition Related Findings: expressive aphasia, soft abd, active BS, colostomy LUQ, no noted edema, -I/Os      Current Nutrition Therapies:    ADULT DIET; Regular; 4 carb choices (60 gm/meal)    Anthropometric Measures:  · Height: 5' 7\" (170.2 cm)  · Current Body Wt: 182 lb (82.6 kg) (bed scale 9/26)   · BMI: 28.5    Nutrition Diagnosis:   No nutrition diagnosis at this time     Nutrition Interventions:   Food and/or Nutrient Delivery:  Continue Current Diet  Nutrition Education/Counseling:  Education not indicated   Coordination of Nutrition Care:  Continue to monitor while inpatient    Goals:  Consume >75% meals       Nutrition Monitoring and Evaluation:   Food/Nutrient Intake Outcomes:  Diet Advancement/Tolerance, Food and Nutrient Intake  Physical Signs/Symptoms Outcomes:  Biochemical Data, GI Status, Fluid Status or Edema, Nutrition Focused Physical Findings, Skin, Weight     Discharge Planning:     Too soon to determine     Electronically signed by Amy Arevalo MS, RD, LD on 9/26/21 at 2:01 PM EDT    Contact: [phone]

## 2021-09-26 NOTE — PLAN OF CARE
Problem: Falls - Risk of:  Goal: Will remain free from falls  Description: Will remain free from falls  Outcome: Met This Shift  Goal: Absence of physical injury  Description: Absence of physical injury  Outcome: Met This Shift     Problem: Pain:  Goal: Pain level will decrease  Description: Pain level will decrease  Outcome: Met This Shift  Goal: Control of acute pain  Description: Control of acute pain  Outcome: Met This Shift  Goal: Control of chronic pain  Description: Control of chronic pain  Outcome: Met This Shift     Problem: Health Behavior:  Goal: Compliance with treatment plan for underlying cause of condition will improve  Description: Compliance with treatment plan for underlying cause of condition will improve  Outcome: Met This Shift  Goal: Identification of resources available to assist in meeting health care needs will improve  Description: Identification of resources available to assist in meeting health care needs will improve  Outcome: Met This Shift     Problem: Fluid Volume:  Goal: Maintenance of adequate hydration will improve  Description: Maintenance of adequate hydration will improve  Outcome: Met This Shift     Problem: Urinary Elimination:  Goal: Skin integrity will improve  Description: Skin integrity will improve  Outcome: Met This Shift  Goal: Ability to recognize the need to void and respond appropriately will improve  Description: Ability to recognize the need to void and respond appropriately will improve  Outcome: Met This Shift  Goal: Will remain free from infection  Description: Will remain free from infection  Outcome: Met This Shift  Goal: Incidence of incontinence will decrease  Description: Incidence of incontinence will decrease  Outcome: Met This Shift

## 2021-09-26 NOTE — PROGRESS NOTES
[facility]  Nephrology Attending Progress Note      SUBJECTIVE:     9/26/21- awake and alert. She remains concerned her appetite is not back     PROBLEM LIST:    Patient Active Problem List   Diagnosis    Hypertension    Elevated lipids    Proteinuria    Vitamin D deficiency    Closed fracture of radius    History of anal cancer    [facility]    [facility]    Diabetes mellitus type 2 in obese ([facility] [address]    Stuttering    JEANNINE (acute kidney injury) ([facility] [address]        PAST MEDICAL HISTORY:    Past Medical History:   Diagnosis Date    Cancer [facility])     colon and uterine    Closed fracture of radius 12/27/2016    Elevated lipids 1/15/2014    Headache     History of anal cancer 2/20/2017    Dr. Sofia Patterson    Hyperlipidemia     Hypertension     Hypertension     Obesity     Proteinuria 6/25/2014    Type II or unspecified type diabetes mellitus without mention of complication, not stated as uncontrolled     Vaginal cancer ([facility] 5/22/2017    Vitamin D deficiency 11/6/2014       DIET:    ADULT DIET;  Regular; 4 carb choices (60 gm/meal)     PHYSICAL EXAM:     Patient Vitals for the past 24 hrs:   BP Temp Temp src Pulse Resp SpO2 Weight   09/26/21 0745 (!) 154/84 97 °F (36.1 °C) Oral 66 19 99 % --   09/26/21 0515 -- -- -- -- -- -- 182 lb 1.6 oz (82.6 kg)   09/25/21 2115 (!) 172/91 98.2 °F (36.8 °C) Oral 59 18 98 % --   09/25/21 1530 (!) 151/88 97 °F (36.1 °C) Temporal 67 18 100 % --   @      Intake/Output Summary (Last 24 hours) at 9/26/2021 8987  Last data filed at 9/26/2021 0031  Gross per 24 hour   Intake 3476 ml   Output 3200 ml   Net 276 ml         Wt Readings from Last 3 Encounters:   09/26/21 182 lb 1.6 oz (82.6 kg)   07/12/21 187 lb (84.8 kg)   10/19/20 197 lb (89.4 kg)       Constitutional:  Pt is in no acute distress  Head: normocephalic, atraumatic  Neck: no JVD  Cardiovascular: regular rate and rhythm, no murmurs, gallops, or rubs  Respiratory:  Clear and equal   Gastrointestinal: Soft, nontender, nondistended  Ext: trace edema   Skin: dry, no rash  Neuro: awake and alert    MEDS (scheduled):    sodium bicarbonate  1,300 mg Oral BID    magnesium oxide  400 mg Oral BID    sodium chloride flush  5-40 mL IntraVENous 2 times per day    heparin (porcine)  5,000 Units SubCUTAneous 3 times per day    amLODIPine  10 mg Oral Daily    aspirin EC  81 mg Oral Daily    atenolol  50 mg Oral Daily    atorvastatin  20 mg Oral Nightly    insulin lispro  0-6 Units SubCUTAneous TID WC    insulin lispro  0-3 Units SubCUTAneous Nightly       MEDS (infusions):   sodium chloride      dextrose      sodium chloride 100 mL/hr at 09/24/21 2144       MEDS (prn):  hydrALAZINE, sodium chloride flush, sodium chloride, ondansetron **OR** ondansetron, polyethylene glycol, glucose, dextrose, glucagon (rDNA), dextrose    DATA:    No results for input(s): WBC, HGB, HCT, MCV, PLT in the last 72 hours. Recent Labs     09/24/21  0647 09/24/21  1429 09/25/21  0545 09/26/21  0403     --  141 138   K 4.0  --  4.2 3.9     --  106 103   CO2 19*  --  21* 22   BUN 32*  --  27* 27*   CREATININE 2.0*  --  1.9* 1.8*   LABGLOM 30  --  32 34   GLUCOSE 148*  --  139* 113*   CALCIUM 8.7  --  9.3 9.3   MG  --  1.7  --   --    PHOS 3.5  --   --   --        Lab Results   Component Value Date    LABALBU 4.3 09/19/2021    LABALBU 4.5 09/09/2020    LABALBU 4.5 12/23/2019     Lab Results   Component Value Date    TSH 1.270 09/09/2020       Iron Studies  Lab Results   Component Value Date    IRON 75 09/21/2021    TIBC 305 09/21/2021    FERRITIN 248 09/21/2021     Vitamin B-12   Date Value Ref Range Status   09/21/2021 247 211 - 946 pg/mL Final     Folate   Date Value Ref Range Status   09/21/2021 3.8 (L) 4.8 - 24.2 ng/mL Final       Vit D, 25-Hydroxy   Date Value Ref Range Status   09/09/2020 17 (L) 30 - 100 ng/mL Final     Comment:     <20 ng/mL. ........... Isabel Red Deficient  20-30 ng/mL. ......... Isabel Red Insufficient   ng/mL......... [facility] [employer] Sufficient  >100 ng/mL. .......... [facility] [employer] Toxic       No results found for: PTH    No components found for: URIC    Lab Results   Component Value Date    COLORU Yellow 09/19/2021    NITRU Negative 09/19/2021    GLUCOSEU Negative 09/19/2021    KETUA Negative 09/19/2021    UROBILINOGEN 0.2 09/19/2021    BILIRUBINUR Negative 09/19/2021    BILIRUBINUR neg 10/07/2013       No results found for: Junior Cannon      IMPRESSION/RECOMMENDATIONS:      1. Acute kidney injury secondary to obstructive uropathy presumed to be neurogenic bladder.  s/p placement of bilateral renal stents  Creatinine 2.2-->1.8 mg/dl  UOP 4400 ml past 24 hours    2. Metabolic acidosis  continue  sodium bicarbonate  CO2 goal>/= 22    3. Hypomagnesemia continue supplementation orally    4. Type 2 diabetes urine protein creatinine ratio 0.4    5. Hypertension-  BP near  Goal<130/80    Joe Walker APRN - CNP     Patient seen and examined all key components of the physical performed independently , case discussed with NP, all pertinent labs and radiologic tests personally reviewed agree with above.       Patric Tomas MD

## 2021-09-26 NOTE — PROGRESS NOTES
HOSPITALIST PROGRESS NOTE  Date: 9/26/2021   Name: Prem Cortez   MRN: [id number]   YOB: 1950        Hospital Course:   Ms Abel Gilliland is 70 T past medical history of Cancer [facility]), Closed fracture of radius, Elevated lipids, Headache, History of anal cancer, Hyperlipidemia, Hypertension, Hypertension, Obesity, Proteinuria, Type II or unspecified type diabetes mellitus without mention of complication, not stated as uncontrolled, Vaginal cancer ([address] and Vitamin D deficiency;  presented to the ER on 9/19/21 with hypoglycemia. She reportedly felt dizzy/lightheaded when standing up this morning, had not eaten breakfast, but called EMS. Her glucose was in the 50s for EMS and she was given D10 prior to arrival.    Placed on López due to urinary retention after urology consulted. S/p Cystopanendoscopy, bilateral retrograde pyelograms, bilateral stent insertion 9/24/21. Awaiting renal improvement. Subjective/Interval Hx:   Patient had a cystoscopy yesterday. Has been having decreased appt since post surgery. No nausea/vomiting. Pain under control with morphine.   Explained about the plan regarding hydration and monitor kidney function      Objective:   Physical Exam:   BP (!) 154/84 Comment: Manual   Pulse 66   Temp 97 °F (36.1 °C) (Oral)   Resp 19   Ht 5' 7\" (1.702 m)   Wt 182 lb 1.6 oz (82.6 kg)   SpO2 99%   BMI 28.52 kg/m²   General: no acute distress, well nourished and well hydrated  HEENT: NCAT  Heart: S1S2 RRR  Lungs: Clear to ascultation bilaterally, respiratory effort normal  Abdomen: soft, NT/ND, positive bowel sounds  Extremities: no pitting edema, nontender   Neuro: patient is awake, alert and orientated times 3, no gross deficits  Skin: no rashes or ecchymosis        Meds:   Meds:    sodium bicarbonate  1,300 mg Oral BID    magnesium oxide  400 mg Oral BID    sodium chloride flush  5-40 mL IntraVENous 2 times per day    heparin (porcine)  5,000 Units SubCUTAneous 3 times per day    amLODIPine  10 mg Oral Daily    aspirin EC  81 mg Oral Daily    atenolol  50 mg Oral Daily    atorvastatin  20 mg Oral Nightly    insulin lispro  0-6 Units SubCUTAneous TID WC    insulin lispro  0-3 Units SubCUTAneous Nightly      Infusions:    sodium chloride      dextrose      sodium chloride 100 mL/hr at 09/24/21 2144     PRN Meds: hydrALAZINE, 10 mg, Q6H PRN  sodium chloride flush, 5-40 mL, PRN  sodium chloride, 25 mL, PRN  ondansetron, 4 mg, Q8H PRN   Or  ondansetron, 4 mg, Q6H PRN  polyethylene glycol, 17 g, Daily PRN  glucose, 15 g, PRN  dextrose, 12.5 g, PRN  glucagon (rDNA), 1 mg, PRN  dextrose, 100 mL/hr, PRN        Data/Labs:     No results for input(s): WBC, HGB, HCT, PLT in the last 72 hours. Recent Labs     09/24/21  0647 09/25/21  0545 09/26/21  0403    141 138   K 4.0 4.2 3.9    106 103   CO2 19* 21* 22   PHOS 3.5  --   --    BUN 32* 27* 27*   CREATININE 2.0* 1.9* 1.8*     No results for input(s): AST, ALT, ALB, BILIDIR, BILITOT, ALKPHOS in the last 72 hours. No results for input(s): INR in the last 72 hours. No results for input(s): CKTOTAL, CKMB, CKMBINDEX, TROPONINT in the last 72 hours. I/O last 3 completed shifts: In: 2524 [P.O.:240; I.V.:3236]  Out: 4400 [Urine:4400]    Intake/Output Summary (Last 24 hours) at 9/26/2021 1102  Last data filed at 9/26/2021 1003  Gross per 24 hour   Intake 3556 ml   Output 3200 ml   Net 356 ml      Results for Jayant hO (MRN [id number]) as of 9/21/2021 13:29   Ref.  Range 9/21/2021 04:35   Ferritin Latest Units: ng/mL 248   Iron Latest Ref Range: 37 - 145 mcg/dL 75   Iron Saturation Latest Ref Range: 15 - 50 % 25   TIBC Latest Ref Range: 250 - 450 mcg/dL 305   Folate Latest Ref Range: 4.8 - 24.2 ng/mL 3.8 (L)   Vitamin B-12 Latest Ref Range: 211 - 946 pg/mL 247     Assessment/Plan:   Urinary obstructive uropathy with Mod b/l hydronephrosis-  S/p Cystopanendoscopy, bilateral retrograde pyelograms, bilateral stent insertion 9/24/21    1. Acute kidney injury/chronic kidney disease stage III-nephrology is following, maintain hydration, avoid nephrotoxic agents. IVF - NS at 100cc/hr. Cr currently at 1.9---1.8  2. Hypertension-amlodipine, atenolol, monitor blood pressure. Reviewed and appears to be high. Hydralazine was added at low dose -25mg bid  3. Non-insulin-dependent diabetes mellitus-sliding scale, diabetic diet, monitor blood glucose     Mild Anemia- iron studies ordered- shows mildly decreased folate. Will start on folate at discharge. 4.    Decreased appetite - will start on Protonix- has epigastric tenderness. Will order- lipase. If worsens will consider CT abdomen    Hypomagnesemia--->at 1.1---1.2->1.7. Nephrology on board. Currently on Magnesium oxide bid. Intake/Output Summary (Last 24 hours) at 9/26/2021 1102  Last data filed at 9/26/2021 1003  Gross per 24 hour   Intake 3556 ml   Output 3200 ml   Net 356 ml       DVT Prophylaxis: Heparin  Diet: ADULT DIET; Regular; 4 carb choices (60 gm/meal)  Code Status: Full Code    Dispo: Continue IV hydration and monitor Cr- currently at 1.8 nephrology/urology on board. S/p Cystopanendoscopy, bilateral retrograde pyelograms, bilateral stent insertion 9/24/21. Awaiting clinical improvement.     Electronically signed by Benjamin Caro MD on 9/26/2021 at 11:02 AM  [facility]

## 2021-09-26 NOTE — PROGRESS NOTES
[facility] PROGRESS NOTE                                                                       9/26/2021        CHIEF UROLOGIC COMPLAINT: Flaccid neurogenic bladder, bilateral hydronephrosis, acute kidney injury    HISTORY OF PRESENT ILLNESS:  Patient without new complaints. Feeling well. Gaining some appetite. Still does not feel like \"herself \". REVIEW OF SYSTEMS:   CONSTITUTIONAL: As above  HEENT: negative  HEMATOLOGIC: negative  ENDOCRINE: negative  RESPIRATORY: negative  CV: negative  GI: negative  NEURO: negative  ORTHOPEDICS: negative  PSYCHIATRIC: negative  : as above    PAST FAMILY HISTORY:    Family History   Problem Relation Age of Onset    Kidney Disease Mother     High Blood Pressure Mother     Cancer Father     Diabetes Sister     Other Brother      PAST SOCIAL HISTORY:    Social History     Socioeconomic History    Marital status: Single     Spouse name: None    Number of children: None    Years of education: None    Highest education level: None   Occupational History    None   Tobacco Use    Smoking status: Never Smoker    Smokeless tobacco: Never Used   Substance and Sexual Activity    Alcohol use: No    Drug use: No    Sexual activity: None   Other Topics Concern    None   Social History Narrative    None     Social Determinants of Health     Financial Resource Strain: Low Risk     Difficulty of Paying Living Expenses: Not hard at all   Food Insecurity: No Food Insecurity    Worried About Running Out of Food in the Last Year: Never true    Cristhian of Food in the Last Year: Never true   Transportation Needs:     Lack of Transportation (Medical):      Lack of Transportation (Non-Medical):    Physical Activity:     Days of Exercise per Week:     Minutes of Exercise per Session:    Stress:     Feeling of Stress :    Social Connections:     Frequency of Communication with Friends and Family:     Frequency of Social Gatherings with Friends and Family:     Attends Yarsani Services:     Active Member of Clubs or Organizations:     Attends Club or Organization Meetings:     Marital Status:    Intimate Partner Violence:     Fear of Current or Ex-Partner:     Emotionally Abused:     Physically Abused:     Sexually Abused:        Scheduled Meds:   sodium bicarbonate  1,300 mg Oral BID    magnesium oxide  400 mg Oral BID    sodium chloride flush  5-40 mL IntraVENous 2 times per day    heparin (porcine)  5,000 Units SubCUTAneous 3 times per day    amLODIPine  10 mg Oral Daily    aspirin EC  81 mg Oral Daily    atenolol  50 mg Oral Daily    atorvastatin  20 mg Oral Nightly    insulin lispro  0-6 Units SubCUTAneous TID WC    insulin lispro  0-3 Units SubCUTAneous Nightly     Continuous Infusions:   sodium chloride      dextrose      sodium chloride 100 mL/hr at 09/24/21 2144     PRN Meds:.hydrALAZINE, sodium chloride flush, sodium chloride, ondansetron **OR** ondansetron, polyethylene glycol, glucose, dextrose, glucagon (rDNA), dextrose    BP (!) 154/84 Comment: Manual   Pulse 66   Temp 97 °F (36.1 °C) (Oral)   Resp 19   Ht 5' 7\" (1.702 m)   Wt 182 lb 1.6 oz (82.6 kg)   SpO2 99%   BMI 28.52 kg/m²     Lab Results   Component Value Date    WBC 10.2 09/21/2021    HGB 11.2 (L) 09/21/2021    HCT 34.1 09/21/2021    MCV 89.3 09/21/2021     09/21/2021       Lab Results   Component Value Date    CREATININE 1.8 (H) 09/26/2021         Lab Results   Component Value Date    LABURIN Growth not present 09/24/2021    LABURIN Growth not present 09/24/2021    LABURIN 100 mg/dL (H) 12/23/2019       No results found for: BC    No results found for: BLOODCULT2    PHYSICAL EXAMINATION:  Skin dry, without rashes  Respirations non-labored, intact  Abdomen soft, non-tender, non-distended  Alert and oriented x3  López draining blood-tinged urine      ASSESSMENT AND PLAN:  1. S/P bilateral stent insertion for massive hydronephrosis with tortuous ureters,  flaccid neurogenic bladder, gross hematuria from the decompressive  changes from chronic retention POD#2. Creatinine slightly improved today. Continue López catheter at discharge and follow-up with Edilberto Lamb in the office. Will need to repeat basic metabolic panel in approximately 1 week. We will also need a definitive plan regarding suprapubic catheter, clean remittent catheterization, trial of voiding. This definitive plan will also need to address how to best manage her ureteral stents and whether or not these need to be continued long-term or removed based on her kidney function.     Anne-Marie Page MD, M.D.  9/26/2021  9:48 AM

## 2021-09-27 LAB
ANION GAP SERPL CALCULATED.3IONS-SCNC: 15 MMOL/L (ref 7–16)
BUN BLDV-MCNC: 24 MG/DL (ref 6–23)
CALCIUM SERPL-MCNC: 9.3 MG/DL (ref 8.6–10.2)
CHLORIDE BLD-SCNC: 102 MMOL/L (ref 98–107)
CO2: 18 MMOL/L (ref 22–29)
CREAT SERPL-MCNC: 1.8 MG/DL (ref 0.5–1)
GFR AFRICAN AMERICAN: 34
GFR NON-AFRICAN AMERICAN: 34 ML/MIN/1.73
GLUCOSE BLD-MCNC: 127 MG/DL (ref 74–99)
HCT VFR BLD CALC: 35.9 % (ref 34–48)
HEMOGLOBIN: 11.6 G/DL (ref 11.5–15.5)
MAGNESIUM: 1.6 MG/DL (ref 1.6–2.6)
MCH RBC QN AUTO: 29.4 PG (ref 26–35)
MCHC RBC AUTO-ENTMCNC: 32.3 % (ref 32–34.5)
MCV RBC AUTO: 90.9 FL (ref 80–99.9)
METER GLUCOSE: 137 MG/DL (ref 74–99)
METER GLUCOSE: 139 MG/DL (ref 74–99)
METER GLUCOSE: 179 MG/DL (ref 74–99)
METER GLUCOSE: 187 MG/DL (ref 74–99)
PDW BLD-RTO: 16.1 FL (ref 11.5–15)
PHOSPHORUS: 3.8 MG/DL (ref 2.5–4.5)
PLATELET # BLD: 245 E9/L (ref 130–450)
PMV BLD AUTO: 12.4 FL (ref 7–12)
POTASSIUM SERPL-SCNC: 4.3 MMOL/L (ref 3.5–5)
RBC # BLD: 3.95 E12/L (ref 3.5–5.5)
SODIUM BLD-SCNC: 135 MMOL/L (ref 132–146)
WBC # BLD: 8.8 E9/L (ref 4.5–11.5)

## 2021-09-27 PROCEDURE — 80048 BASIC METABOLIC PNL TOTAL CA: CPT

## 2021-09-27 PROCEDURE — 6360000002 HC RX W HCPCS: Performed by: UROLOGY

## 2021-09-27 PROCEDURE — 36415 COLL VENOUS BLD VENIPUNCTURE: CPT

## 2021-09-27 PROCEDURE — 6370000000 HC RX 637 (ALT 250 FOR IP): Performed by: STUDENT IN AN ORGANIZED HEALTH CARE EDUCATION/TRAINING PROGRAM

## 2021-09-27 PROCEDURE — 1200000000 HC SEMI PRIVATE

## 2021-09-27 PROCEDURE — 85027 COMPLETE CBC AUTOMATED: CPT

## 2021-09-27 PROCEDURE — 84100 ASSAY OF PHOSPHORUS: CPT

## 2021-09-27 PROCEDURE — 2580000003 HC RX 258: Performed by: UROLOGY

## 2021-09-27 PROCEDURE — 6370000000 HC RX 637 (ALT 250 FOR IP): Performed by: UROLOGY

## 2021-09-27 PROCEDURE — 83735 ASSAY OF MAGNESIUM: CPT

## 2021-09-27 PROCEDURE — 6370000000 HC RX 637 (ALT 250 FOR IP): Performed by: INTERNAL MEDICINE

## 2021-09-27 PROCEDURE — 82962 GLUCOSE BLOOD TEST: CPT

## 2021-09-27 RX ADMIN — HYDRALAZINE HYDROCHLORIDE 25 MG: 25 TABLET, FILM COATED ORAL at 08:00

## 2021-09-27 RX ADMIN — Medication 400 MG: at 08:00

## 2021-09-27 RX ADMIN — ASPIRIN 81 MG: 81 TABLET, COATED ORAL at 08:00

## 2021-09-27 RX ADMIN — PANTOPRAZOLE SODIUM 40 MG: 40 TABLET, DELAYED RELEASE ORAL at 06:07

## 2021-09-27 RX ADMIN — Medication 400 MG: at 21:38

## 2021-09-27 RX ADMIN — SODIUM BICARBONATE 1300 MG: 650 TABLET ORAL at 21:38

## 2021-09-27 RX ADMIN — SODIUM BICARBONATE 1300 MG: 650 TABLET ORAL at 08:00

## 2021-09-27 RX ADMIN — HEPARIN SODIUM 5000 UNITS: 10000 INJECTION INTRAVENOUS; SUBCUTANEOUS at 21:39

## 2021-09-27 RX ADMIN — INSULIN LISPRO 1 UNITS: 100 INJECTION, SOLUTION INTRAVENOUS; SUBCUTANEOUS at 21:38

## 2021-09-27 RX ADMIN — Medication 10 ML: at 08:01

## 2021-09-27 RX ADMIN — ATENOLOL 50 MG: 50 TABLET ORAL at 08:00

## 2021-09-27 RX ADMIN — INSULIN LISPRO 1 UNITS: 100 INJECTION, SOLUTION INTRAVENOUS; SUBCUTANEOUS at 11:37

## 2021-09-27 RX ADMIN — ATORVASTATIN CALCIUM 20 MG: 20 TABLET, FILM COATED ORAL at 21:38

## 2021-09-27 RX ADMIN — Medication 10 ML: at 22:00

## 2021-09-27 RX ADMIN — AMLODIPINE BESYLATE 10 MG: 10 TABLET ORAL at 08:00

## 2021-09-27 RX ADMIN — HYDRALAZINE HYDROCHLORIDE 25 MG: 25 TABLET, FILM COATED ORAL at 21:39

## 2021-09-27 RX ADMIN — HEPARIN SODIUM 5000 UNITS: 10000 INJECTION INTRAVENOUS; SUBCUTANEOUS at 14:31

## 2021-09-27 RX ADMIN — HEPARIN SODIUM 5000 UNITS: 10000 INJECTION INTRAVENOUS; SUBCUTANEOUS at 06:07

## 2021-09-27 RX ADMIN — HYDRALAZINE HYDROCHLORIDE 10 MG: 20 INJECTION INTRAMUSCULAR; INTRAVENOUS at 00:06

## 2021-09-27 ASSESSMENT — PAIN SCALES - GENERAL
PAINLEVEL_OUTOF10: 0

## 2021-09-27 NOTE — PROGRESS NOTES
Department of Internal Medicine  Nephrology Attending Progress Note        SUBJECTIVE: Mrs Dalila Atkinson continues to complain of poor appetite, was able to sit in a chair for 20 minutes before getting tired, did walk around the nurses station yesterday. . Creatinine is slowly improving. Urine remains blood-tinged    Physical Exam:    Vitals:    09/27/21 0715   BP: (!) 153/87   Pulse: 71   Resp: 18   Temp: 97.9 °F (36.6 °C)   SpO2: 99%       I/O last 24 hours:  Intake/Output inaccurate intake/4300    Weight: Not weighed    General Appearance: Awake alert no distress  Skin: No rash  Neck:  neck- supple, no mass, non-tender and no bruits  Lungs: Distant breath sounds no wheezing  Heart:   regular rate and rhythm     Abdominal: Abdomen soft, non-tender. BS normal. No masses,  No organomegaly  Extremities: Extremities warm to touch, pink, with no edema.   Peripheral Pulses:  +2    DATA:    CBC with Differential:    Lab Results   Component Value Date    WBC 8.8 09/27/2021    RBC 3.95 09/27/2021    HGB 11.6 09/27/2021    HCT 35.9 09/27/2021     09/27/2021    MCV 90.9 09/27/2021    MCH 29.4 09/27/2021    MCHC 32.3 09/27/2021    RDW 16.1 09/27/2021    SEGSPCT 91 06/19/2012    LYMPHOPCT 15.4 09/20/2021    MONOPCT 7.1 09/20/2021    BASOPCT 0.3 09/20/2021    MONOSABS 0.64 09/20/2021    LYMPHSABS 1.40 09/20/2021    EOSABS 0.14 09/20/2021    BASOSABS 0.03 09/20/2021     BMP:    Lab Results   Component Value Date     09/27/2021    K 4.3 09/27/2021    K 3.6 09/23/2021     09/27/2021    CO2 18 09/27/2021    BUN 24 09/27/2021    LABALBU 4.3 09/19/2021    LABALBU 4.7 03/05/2012    CREATININE 1.8 09/27/2021    CALCIUM 9.3 09/27/2021    GFRAA 34 09/27/2021    LABGLOM 34 09/27/2021    GLUCOSE 127 09/27/2021    GLUCOSE 123 03/05/2012     Magnesium:    Lab Results   Component Value Date    MG 1.6 09/27/2021     Phosphorus:    Lab Results   Component Value Date    PHOS 3.8 09/27/2021            hydrALAZINE  25 mg Oral 2 times

## 2021-09-27 NOTE — PLAN OF CARE
Problem: Falls - Risk of:  Goal: Will remain free from falls  Description: Will remain free from falls  9/27/2021 1450 by Lizbeth Jensen RN  Outcome: Met This Shift  9/27/2021 0052 by Aniyah Anderson RN  Outcome: Met This Shift  Goal: Absence of physical injury  Description: Absence of physical injury  9/27/2021 1450 by Lizbeth Jenesn RN  Outcome: Met This Shift  9/27/2021 0052 by Aniyah Anderson RN  Outcome: Met This Shift     Problem: Pain:  Goal: Pain level will decrease  Description: Pain level will decrease  9/27/2021 1450 by Lizbeth Jensen RN  Outcome: Met This Shift  9/27/2021 0052 by Aniyah Anderson RN  Outcome: Met This Shift  Goal: Control of acute pain  Description: Control of acute pain  9/27/2021 1450 by Lizbeth Jensen RN  Outcome: Met This Shift  9/27/2021 0052 by Aniyah Anderson RN  Outcome: Met This Shift  Goal: Control of chronic pain  Description: Control of chronic pain  9/27/2021 1450 by Lizbeth Jensen RN  Outcome: Met This Shift  9/27/2021 0052 by Aniyah Anderson RN  Outcome: Met This Shift     Problem: Health Behavior:  Goal: Compliance with treatment plan for underlying cause of condition will improve  Description: Compliance with treatment plan for underlying cause of condition will improve  Outcome: Met This Shift  Goal: Identification of resources available to assist in meeting health care needs will improve  Description: Identification of resources available to assist in meeting health care needs will improve  Outcome: Met This Shift     Problem: Fluid Volume:  Goal: Maintenance of adequate hydration will improve  Description: Maintenance of adequate hydration will improve  Outcome: Met This Shift     Problem: Urinary Elimination:  Goal: Skin integrity will improve  Description: Skin integrity will improve  Outcome: Met This Shift  Goal: Ability to recognize the need to void and respond appropriately will improve  Description: Ability to recognize the need to void and respond appropriately will improve  Outcome: Met This Shift  Goal: Will remain free from infection  Description: Will remain free from infection  Outcome: Met This Shift  Goal: Incidence of incontinence will decrease  Description: Incidence of incontinence will decrease  Outcome: Met This Shift

## 2021-09-27 NOTE — CARE COORDINATION
9/27:  POD# 3 Pt for cystopanendoscopy bilateral retrograde pyelogram bilateral stent insertion for Bilateral hydronephrosis and azotemia hypotonic bladder with obstructive uropathy/gross hematuria. Post-Op Diagnosis: Massive bilateral hydronephrosis associated with neurogenic bladder and tortuous ureters.  The gross hematuria is from decompressive changes related to chronic retention. Urology -pt to be d/c home with knutson. Awaiting for further input and plan from Nephrology, pt not at baseline Bun 24 Creatine 1.8. Plan is home with [address] care when medically ready. Home health care orders are in. Patient's sister Martha Abernathy transport her home at time of discharge and her number is [phone]. JAXON/SRIRAM will continue to follow.  Electronically signed by Jonathan Singh RN on 9/27/2021 at 5:17 PM

## 2021-09-27 NOTE — PROGRESS NOTES
HOSPITALIST PROGRESS NOTE  Date: 9/27/2021   Name: Margaret Humphreys   MRN: [id number]   YOB: 1950        Hospital Course:   Ms Alondra Ghosh is 70 T past medical history of Cancer [facility]), Closed fracture of radius, Elevated lipids, Headache, History of anal cancer, Hyperlipidemia, Hypertension, Hypertension, Obesity, Proteinuria, Type II or unspecified type diabetes mellitus without mention of complication, not stated as uncontrolled, Vaginal cancer ([address] and Vitamin D deficiency;  presented to the ER on 9/19/21 with hypoglycemia. She reportedly felt dizzy/lightheaded when standing up this morning, had not eaten breakfast, but called EMS. Her glucose was in the 50s for EMS and she was given D10 prior to arrival.    Placed on López due to urinary retention after urology consulted. S/p Cystopanendoscopy, bilateral retrograde pyelograms, bilateral stent insertion 9/24/21. Awaiting renal improvement. Subjective/Interval Hx:   Patient endorses that her appetite is slowly coming back. She had a good lunch. She does not complain of any abdominal pain or constipation.       Objective:   Physical Exam:   BP (!) 153/87   Pulse 71   Temp 97.9 °F (36.6 °C) (Oral)   Resp 18   Ht 5' 7\" (1.702 m)   Wt 182 lb 1.6 oz (82.6 kg)   SpO2 99%   BMI 28.52 kg/m²   General: no acute distress, well nourished and well hydrated  HEENT: NCAT  Heart: S1S2 RRR  Lungs: Clear to ascultation bilaterally, respiratory effort normal  Abdomen: soft, NT/ND, positive bowel sounds  Extremities: no pitting edema, nontender   Neuro: patient is awake, alert and orientated times 3, no gross deficits  Skin: no rashes or ecchymosis        Meds:   Meds:    hydrALAZINE  25 mg Oral 2 times per day    pantoprazole  40 mg Oral QAM AC    sodium bicarbonate  1,300 mg Oral BID    magnesium oxide  400 mg Oral BID    sodium chloride flush  5-40 mL IntraVENous 2 times per day    heparin (porcine)  5,000 Units SubCUTAneous 3 times per day    amLODIPine 10 mg Oral Daily    aspirin EC  81 mg Oral Daily    atenolol  50 mg Oral Daily    atorvastatin  20 mg Oral Nightly    insulin lispro  0-6 Units SubCUTAneous TID WC    insulin lispro  0-3 Units SubCUTAneous Nightly      Infusions:    sodium chloride      dextrose       PRN Meds: hydrALAZINE, 10 mg, Q6H PRN  sodium chloride flush, 5-40 mL, PRN  sodium chloride, 25 mL, PRN  ondansetron, 4 mg, Q8H PRN   Or  ondansetron, 4 mg, Q6H PRN  polyethylene glycol, 17 g, Daily PRN  glucose, 15 g, PRN  dextrose, 12.5 g, PRN  glucagon (rDNA), 1 mg, PRN  dextrose, 100 mL/hr, PRN        Data/Labs:     Recent Labs     09/27/21  0644   WBC 8.8   HGB 11.6   HCT 35.9         Recent Labs     09/25/21  0545 09/26/21  0403 09/27/21  0644    138 135   K 4.2 3.9 4.3    103 102   CO2 21* 22 18*   PHOS  --   --  3.8   BUN 27* 27* 24*   CREATININE 1.9* 1.8* 1.8*     No results for input(s): AST, ALT, ALB, BILIDIR, BILITOT, ALKPHOS in the last 72 hours. No results for input(s): INR in the last 72 hours. No results for input(s): CKTOTAL, CKMB, CKMBINDEX, TROPONINT in the last 72 hours. I/O last 3 completed shifts: In: 540 [P.O.:360; I.V.:180]  Out: 3600 [Urine:3600]    Intake/Output Summary (Last 24 hours) at 9/27/2021 1507  Last data filed at 9/27/2021 1436  Gross per 24 hour   Intake 540 ml   Output 3600 ml   Net -3060 ml      Results for Chilo Amos (MRN [id number]) as of 9/21/2021 13:29   Ref. Range 9/21/2021 04:35   Ferritin Latest Units: ng/mL 248   Iron Latest Ref Range: 37 - 145 mcg/dL 75   Iron Saturation Latest Ref Range: 15 - 50 % 25   TIBC Latest Ref Range: 250 - 450 mcg/dL 305   Folate Latest Ref Range: 4.8 - 24.2 ng/mL 3.8 (L)   Vitamin B-12 Latest Ref Range: 211 - 946 pg/mL 247     Assessment/Plan:   Urinary obstructive uropathy with Mod b/l hydronephrosis-  S/p Cystopanendoscopy, bilateral retrograde pyelograms, bilateral stent insertion 9/24/21    1.  Acute kidney injury/chronic kidney disease stage III-nephrology is following, maintain hydration, avoid nephrotoxic agents. IVF -discontinued by nephrology. Cr currently at 1.9--->1.8  2. Hypertension-amlodipine, atenolol, monitor blood pressure. Reviewed and appears to be high. Hydralazine was added at low dose -25mg bid  3. Non-insulin-dependent diabetes mellitus-sliding scale, diabetic diet, monitor blood glucose     Mild Anemia- iron studies ordered- shows mildly decreased folate. Will start on folate at discharge. 4.    Decreased appetite - continue on Protonix- Will consider lipase,CT abdomen if does not improve. Hypomagnesemia--->at 1.1---1.2->1.7. Nephrology on board. Currently on Magnesium oxide bid. Intake/Output Summary (Last 24 hours) at 9/27/2021 1507  Last data filed at 9/27/2021 1436  Gross per 24 hour   Intake 540 ml   Output 3600 ml   Net -3060 ml       DVT Prophylaxis: Heparin  Diet: ADULT DIET; Regular; 4 carb choices (60 gm/meal)  Code Status: Full Code    Dispo: Continue IV hydration and monitor Cr- currently at 1.8 nephrology/urology on board. S/p Cystopanendoscopy, bilateral retrograde pyelograms, bilateral stent insertion 9/24/21. Awaiting clinical improvement.     Electronically signed by Pedro Castro MD on 9/27/2021 at 3:07 PM  [facility]

## 2021-09-28 VITALS
RESPIRATION RATE: 18 BRPM | HEART RATE: 75 BPM | OXYGEN SATURATION: 99 % | DIASTOLIC BLOOD PRESSURE: 83 MMHG | SYSTOLIC BLOOD PRESSURE: 125 MMHG | HEIGHT: 67 IN | TEMPERATURE: 98.2 F | WEIGHT: 182.1 LBS | BODY MASS INDEX: 28.58 KG/M2

## 2021-09-28 LAB
ANION GAP SERPL CALCULATED.3IONS-SCNC: 14 MMOL/L (ref 7–16)
BUN BLDV-MCNC: 31 MG/DL (ref 6–23)
CALCIUM SERPL-MCNC: 9.8 MG/DL (ref 8.6–10.2)
CHLORIDE BLD-SCNC: 101 MMOL/L (ref 98–107)
CO2: 24 MMOL/L (ref 22–29)
CREAT SERPL-MCNC: 2.1 MG/DL (ref 0.5–1)
GFR AFRICAN AMERICAN: 28
GFR NON-AFRICAN AMERICAN: 28 ML/MIN/1.73
GLUCOSE BLD-MCNC: 155 MG/DL (ref 74–99)
METER GLUCOSE: 164 MG/DL (ref 74–99)
METER GLUCOSE: 193 MG/DL (ref 74–99)
POTASSIUM SERPL-SCNC: 4 MMOL/L (ref 3.5–5)
SODIUM BLD-SCNC: 139 MMOL/L (ref 132–146)

## 2021-09-28 PROCEDURE — 6370000000 HC RX 637 (ALT 250 FOR IP): Performed by: UROLOGY

## 2021-09-28 PROCEDURE — 6360000002 HC RX W HCPCS: Performed by: UROLOGY

## 2021-09-28 PROCEDURE — 80048 BASIC METABOLIC PNL TOTAL CA: CPT

## 2021-09-28 PROCEDURE — 82962 GLUCOSE BLOOD TEST: CPT

## 2021-09-28 PROCEDURE — 6370000000 HC RX 637 (ALT 250 FOR IP): Performed by: STUDENT IN AN ORGANIZED HEALTH CARE EDUCATION/TRAINING PROGRAM

## 2021-09-28 PROCEDURE — 6370000000 HC RX 637 (ALT 250 FOR IP): Performed by: INTERNAL MEDICINE

## 2021-09-28 PROCEDURE — 2580000003 HC RX 258: Performed by: UROLOGY

## 2021-09-28 PROCEDURE — 36415 COLL VENOUS BLD VENIPUNCTURE: CPT

## 2021-09-28 RX ORDER — HYDRALAZINE HYDROCHLORIDE 25 MG/1
25 TABLET, FILM COATED ORAL EVERY 12 HOURS SCHEDULED
Qty: 90 TABLET | Refills: 3 | Status: SHIPPED | OUTPATIENT
Start: 2021-09-28

## 2021-09-28 RX ORDER — POLYETHYLENE GLYCOL 3350 17 G/17G
17 POWDER, FOR SOLUTION ORAL DAILY PRN
Qty: 527 G | Refills: 1 | Status: SHIPPED | OUTPATIENT
Start: 2021-09-28 | End: 2021-10-28

## 2021-09-28 RX ORDER — SODIUM BICARBONATE 650 MG/1
1300 TABLET ORAL 2 TIMES DAILY
Qty: 120 TABLET | Refills: 0 | Status: SHIPPED
Start: 2021-09-28 | End: 2021-10-28 | Stop reason: SDUPTHER

## 2021-09-28 RX ORDER — PANTOPRAZOLE SODIUM 40 MG/1
40 TABLET, DELAYED RELEASE ORAL
Qty: 30 TABLET | Refills: 3 | Status: SHIPPED | OUTPATIENT
Start: 2021-09-29 | End: 2021-10-29 | Stop reason: SDUPTHER

## 2021-09-28 RX ADMIN — ATENOLOL 50 MG: 50 TABLET ORAL at 08:43

## 2021-09-28 RX ADMIN — PANTOPRAZOLE SODIUM 40 MG: 40 TABLET, DELAYED RELEASE ORAL at 05:42

## 2021-09-28 RX ADMIN — Medication 10 ML: at 08:42

## 2021-09-28 RX ADMIN — HEPARIN SODIUM 5000 UNITS: 10000 INJECTION INTRAVENOUS; SUBCUTANEOUS at 05:42

## 2021-09-28 RX ADMIN — SODIUM BICARBONATE 1300 MG: 650 TABLET ORAL at 08:43

## 2021-09-28 RX ADMIN — Medication 400 MG: at 08:43

## 2021-09-28 RX ADMIN — AMLODIPINE BESYLATE 10 MG: 10 TABLET ORAL at 08:43

## 2021-09-28 RX ADMIN — INSULIN LISPRO 1 UNITS: 100 INJECTION, SOLUTION INTRAVENOUS; SUBCUTANEOUS at 12:49

## 2021-09-28 RX ADMIN — HEPARIN SODIUM 5000 UNITS: 10000 INJECTION INTRAVENOUS; SUBCUTANEOUS at 13:41

## 2021-09-28 RX ADMIN — INSULIN LISPRO 1 UNITS: 100 INJECTION, SOLUTION INTRAVENOUS; SUBCUTANEOUS at 08:44

## 2021-09-28 RX ADMIN — HYDRALAZINE HYDROCHLORIDE 25 MG: 25 TABLET, FILM COATED ORAL at 08:43

## 2021-09-28 RX ADMIN — ASPIRIN 81 MG: 81 TABLET, COATED ORAL at 08:43

## 2021-09-28 ASSESSMENT — PAIN SCALES - GENERAL: PAINLEVEL_OUTOF10: 0

## 2021-09-28 NOTE — PLAN OF CARE
Problem: Falls - Risk of:  Goal: Will remain free from falls  Description: Will remain free from falls  9/28/2021 1059 by Walker Mendez RN  Outcome: Met This Shift  9/28/2021 0145 by Vicky Monet RN  Outcome: Met This Shift  Goal: Absence of physical injury  Description: Absence of physical injury  9/28/2021 1059 by Walker Mendez RN  Outcome: Met This Shift  9/28/2021 0145 by Vicky Monet RN  Outcome: Met This Shift     Problem: Pain:  Goal: Pain level will decrease  Description: Pain level will decrease  9/28/2021 1059 by Walker Mendez RN  Outcome: Met This Shift  9/28/2021 0145 by Vicky Monet RN  Outcome: Met This Shift  Goal: Control of acute pain  Description: Control of acute pain  9/28/2021 1059 by Walker Mendez RN  Outcome: Met This Shift  9/28/2021 0145 by Vicky Monet RN  Outcome: Met This Shift  Goal: Control of chronic pain  Description: Control of chronic pain  9/28/2021 1059 by Walker Mendez RN  Outcome: Met This Shift  9/28/2021 0145 by Vicky Monet RN  Outcome: Met This Shift     Problem: Health Behavior:  Goal: Compliance with treatment plan for underlying cause of condition will improve  Description: Compliance with treatment plan for underlying cause of condition will improve  9/28/2021 1059 by Walker Mendez RN  Outcome: Met This Shift  9/28/2021 0145 by Vicky Monet RN  Outcome: Met This Shift  Goal: Identification of resources available to assist in meeting health care needs will improve  Description: Identification of resources available to assist in meeting health care needs will improve  9/28/2021 1059 by Walker Mendez RN  Outcome: Met This Shift  9/28/2021 0145 by Vicky Monet RN  Outcome: Met This Shift     Problem: Fluid Volume:  Goal: Maintenance of adequate hydration will improve  Description: Maintenance of adequate hydration will improve  9/28/2021 1059 by Walker Mendez RN  Outcome: Met This Shift  9/28/2021 0145 by Vicky Monet RN  Outcome: Met This Shift     Problem: Urinary Elimination:  Goal: Skin integrity will improve  Description: Skin integrity will improve  9/28/2021 1059 by Francisco Dickson RN  Outcome: Met This Shift  9/28/2021 0145 by Julia Fraga RN  Outcome: Met This Shift  Goal: Ability to recognize the need to void and respond appropriately will improve  Description: Ability to recognize the need to void and respond appropriately will improve  9/28/2021 1059 by Francisco Dickson RN  Outcome: Met This Shift  9/28/2021 0145 by Julia Fraga RN  Outcome: Met This Shift  Goal: Will remain free from infection  Description: Will remain free from infection  9/28/2021 1059 by Francisco Dickson RN  Outcome: Met This Shift  9/28/2021 0145 by Julia Fraga RN  Outcome: Met This Shift  Goal: Incidence of incontinence will decrease  Description: Incidence of incontinence will decrease  9/28/2021 1059 by Francisco Dickson RN  Outcome: Met This Shift  9/28/2021 0145 by Julia Fraga RN  Outcome: Met This Shift

## 2021-09-28 NOTE — DISCHARGE SUMMARY
Hospital Medicine Discharge Summary    Patient ID: Chantal Gilliland      Patient's PCP: Miguel Quarles MD    Admit Date: 9/19/2021     Discharge Date:   9/28/21    Admitting Physician: Toni Bueno MD     Discharge Physician: Wilfredo Chauhan MD     Discharge Diagnoses: Active Hospital Problems    Diagnosis Date Noted    JEANNINE (acute kidney injury) ([facility] [address] [N17.9] 09/19/2021       The patient was seen and examined on day of discharge and this discharge summary is in conjunction with any daily progress note from day of discharge. Hospital Course:     Pt is a 71 yo with a hx of cancer, hld, htn, obesity, proteinuria, who presented to the ED with SOB and hypoglycemia. Was found to have urinary retention and urology consulted s/p cystopenendoscopy with b/l retrograde pyelograms with b/l stent insertion 9/24/21. Renal function did not specifically improve with IVF. Nephrology consulted and renal function improved; will follow up with nephrology and urology outpatient. Discharged to home on 9/28/21 in stable condition with pcp follow up. Discussed discharge with patient and her sister; all questions answered. Exam:     /83   Pulse 75   Temp 98.2 °F (36.8 °C) (Oral)   Resp 18   Ht 5' 7\" (1.702 m)   Wt 182 lb 1.6 oz (82.6 kg)   SpO2 99%   BMI 28.52 kg/m²     General appearance: No apparent distress, appears stated age and cooperative. HEENT: Pupils equal, round, and reactive to light. Conjunctivae/corneas clear. Neck: Supple, with full range of motion. No jugular venous distention. Trachea midline. Respiratory:  Normal respiratory effort. Clear to auscultation, bilaterally without Rales/Wheezes/Rhonchi. Cardiovascular: Regular rate and rhythm with normal S1/S2 without murmurs, rubs or gallops. Abdomen: Soft, non-tender, non-distended with normal bowel sounds. Musculoskeletal: No clubbing, cyanosis or edema bilaterally. Full range of motion without deformity.   Skin: Skin color, texture, turgor normal.  No rashes or lesions. Neurologic:  Neurovascularly intact without any focal sensory/motor deficits. Cranial nerves: II-XII intact, grossly non-focal.  Psychiatric: Alert and oriented, thought content appropriate, normal insight      Consults:     IP CONSULT TO INTERNAL MEDICINE  IP CONSULT TO UROLOGY  IP CONSULT TO NEPHROLOGY  IP CONSULT TO HOME CARE NEEDS    Significant Diagnostic Studies:     FL RETROGRADE PYELOGRAM W WO KUB   Final Result   Fluoroscopy provided for retrograde pyelography and stent placement. XR CHEST PORTABLE   Final Result   Negative single view chest for acute process. CT ABDOMEN PELVIS WO CONTRAST Additional Contrast? None   Final Result   The distal ureters are obscured by adjacent structures, and there is no IV or   oral contrast, limiting the evaluation. Slight proximal left hydronephrosis,   nonspecific. Cannot exclude a calculus in the distal left ureter. Consider   follow-up pelvis CT with IV contrast, in the appropriate clinical setting      Prominent stool and caliber in ascending and transverse colon may reflect   constipation      Moderate to severe plaque in the iliac and femoral arteries may be associated   with stenosis      Left colostomy associated with moderate peristomal fat containing hernia      Chronic appearing slight anterior wedge deformity of L1      Multilevel degenerative spondylolisthesis without spondylolysis         US RETROPERITONEAL COMPLETE   Final Result   Moderate bilateral hydronephrosis without shadowing calculi. The urinary   bladder is significantly dilated, containing 2227 ml of urine. Hydronephrosis may be secondary to reflux from the dilated urinary bladder. CT Head WO Contrast   Final Result   1. No acute intracranial abnormality. 2.  Area of hypodensity in the posterior left the parietal/occipital   subcortical/periventricular white matter more like old event.       3.  In absence of previous studies for comparison to determine the baseline,   can consider correlation with MRI study on more routine basis. Disposition:  Home w [facility]      Discharge Instructions/Follow-up:  Keep scheduled follow up appointments. Take medications as prescribed. Code Status:  Full Code     Activity: activity as tolerated    Diet: regular diet    Labs:  For convenience and continuity at follow-up the following most recent labs are provided:      CBC:    Lab Results   Component Value Date    WBC 8.8 09/27/2021    HGB 11.6 09/27/2021    HCT 35.9 09/27/2021     09/27/2021       Renal:    Lab Results   Component Value Date     09/28/2021    K 4.0 09/28/2021    K 3.6 09/23/2021     09/28/2021    CO2 24 09/28/2021    BUN 31 09/28/2021    CREATININE 2.1 09/28/2021    CALCIUM 9.8 09/28/2021    PHOS 3.8 09/27/2021       Discharge Medications:     Current Discharge Medication List           Details   sodium bicarbonate 650 MG tablet Take 2 tablets by mouth 2 times daily  Qty: 120 tablet, Refills: 0      polyethylene glycol (GLYCOLAX) 17 g packet Take 17 g by mouth daily as needed for Constipation  Qty: 527 g, Refills: 1      magnesium oxide (MAG-OX) 400 (241.3 Mg) MG TABS tablet Take 1 tablet by mouth 2 times daily  Qty: 30 tablet, Refills: 0      pantoprazole (PROTONIX) 40 MG tablet Take 1 tablet by mouth every morning (before breakfast)  Qty: 30 tablet, Refills: 3              Details   hydrALAZINE (APRESOLINE) 25 MG tablet Take 1 tablet by mouth every 12 hours  Qty: 90 tablet, Refills: 3              Details   atenolol (TENORMIN) 50 MG tablet take 1 tablet by mouth once daily  Qty: 90 tablet, Refills: 0    Associated Diagnoses: Essential hypertension      amLODIPine (NORVASC) 10 MG tablet take 1 tablet by mouth once daily  Qty: 90 tablet, Refills: 0    Associated Diagnoses: Essential hypertension      atorvastatin (LIPITOR) 20 MG tablet take 1 tablet by mouth once daily  Qty: 90 tablet, Refills: 0    Associated Diagnoses: Diabetes mellitus without complication ([facility] [address] Hyperlipidemia, unspecified hyperlipidemia type      aspirin EC 81 MG EC tablet Take 1 tablet by mouth daily. Qty: 30 tablet, Refills: 2    Associated Diagnoses: Type II or unspecified type diabetes mellitus without mention of complication, not stated as uncontrolled             Time Spent on discharge is more than 45 minutes in the examination, evaluation, counseling and review of medications and discharge plan.       Signed:    Cassie Weber MD   9/28/2021

## 2021-09-28 NOTE — PROGRESS NOTES
CLINICAL PHARMACY NOTE: MEDS TO BEDS    Total # of Prescriptions Filled: 5   The following medications were delivered to the patient:  · Hydralazine 25 mg  · Magnesium oxide 400 mg  · pantopprazole 40 mg  · Sodium bicarbonate 650 mg  · PEG 3350    Additional Documentation:  Delivered @5

## 2021-09-28 NOTE — CARE COORDINATION
Social Work Discharge/Planning:    SW met with patient who reports she is \"forgetful at [employer]". Patient reports plan is to return home with [facility]). Patient reports Sister Sam Hrenandez [phone] is her moral support system and she will be the one to transport her home day of discharge. Patient directed this SW to reach out to Checo Arellano if this SW needed to ask any questions. Tj spoke with Checo Arellano who reports she has been caring for patient ever since her parents passed away. Per Checo Arellano, she will be transporting patient home day of discharge and plan remains for patient to return home with [facility]. [facility] orders are in.     SW/CM to follow.     CORNELIUS Quiles  [phone]

## 2021-09-28 NOTE — PLAN OF CARE
Problem: Falls - Risk of:  Goal: Will remain free from falls  Description: Will remain free from falls  9/28/2021 0145 by Sherrell Alexandre RN  Outcome: Met This Shift  9/27/2021 1450 by Heladio Pemberton RN  Outcome: Met This Shift     Problem: Falls - Risk of:  Goal: Absence of physical injury  Description: Absence of physical injury  9/28/2021 0145 by Sherrell Alexandre RN  Outcome: Met This Shift  9/27/2021 1450 by Heladio Pemberton RN  Outcome: Met This Shift     Problem: Fluid Volume:  Goal: Maintenance of adequate hydration will improve  Description: Maintenance of adequate hydration will improve  9/28/2021 0145 by Sherrell Alexandre RN  Outcome: Met This Shift  9/27/2021 1450 by Heladio Pemberton RN  Outcome: Met This Shift     Problem: Urinary Elimination:  Goal: Will remain free from infection  Description: Will remain free from infection  9/28/2021 0145 by Sherrell Alexandre RN  Outcome: Met This Shift  9/27/2021 1450 by Heladio Pemberton RN  Outcome: Met This Shift

## 2021-09-29 ENCOUNTER — TELEPHONE (OUTPATIENT)
Dept: FAMILY MEDICINE CLINIC | Age: 71
End: 2021-09-29

## 2021-09-29 ENCOUNTER — CARE COORDINATION (OUTPATIENT)
Dept: CASE MANAGEMENT | Age: 71
End: 2021-09-29

## 2021-09-29 NOTE — CARE COORDINATION
9/29/2021 Final  Patient no longer eligible for BPCI bundle due to excluded DRG. 9/19/2021 - 9/28/2021 admission.      Joe Meckel, LPN    [phone]  [facility] / [address] Coordinator

## 2021-09-30 ENCOUNTER — TELEPHONE (OUTPATIENT)
Dept: FAMILY MEDICINE CLINIC | Age: 71
End: 2021-09-30

## 2021-10-07 ENCOUNTER — OFFICE VISIT (OUTPATIENT)
Dept: FAMILY MEDICINE CLINIC | Age: 71
End: 2021-10-07
Payer: MEDICARE

## 2021-10-07 VITALS
HEIGHT: 67 IN | TEMPERATURE: 97.7 F | BODY MASS INDEX: 26.21 KG/M2 | WEIGHT: 167 LBS | RESPIRATION RATE: 18 BRPM | DIASTOLIC BLOOD PRESSURE: 75 MMHG | HEART RATE: 74 BPM | SYSTOLIC BLOOD PRESSURE: 135 MMHG | OXYGEN SATURATION: 99 %

## 2021-10-07 DIAGNOSIS — Z09 HOSPITAL DISCHARGE FOLLOW-UP: Primary | ICD-10-CM

## 2021-10-07 DIAGNOSIS — Z93.3 COLOSTOMY PRESENT (HCC): ICD-10-CM

## 2021-10-07 DIAGNOSIS — Z12.31 SCREENING MAMMOGRAM FOR BREAST CANCER: ICD-10-CM

## 2021-10-07 DIAGNOSIS — N18.4 CKD (CHRONIC KIDNEY DISEASE) STAGE 4, GFR 15-29 ML/MIN (HCC): ICD-10-CM

## 2021-10-07 DIAGNOSIS — C52 VAGINAL CANCER (HCC): ICD-10-CM

## 2021-10-07 DIAGNOSIS — N17.9 AKI (ACUTE KIDNEY INJURY) (HCC): ICD-10-CM

## 2021-10-07 DIAGNOSIS — Z78.0 POSTMENOPAUSAL: ICD-10-CM

## 2021-10-07 PROCEDURE — 99495 TRANSJ CARE MGMT MOD F2F 14D: CPT | Performed by: FAMILY MEDICINE

## 2021-10-07 PROCEDURE — 1111F DSCHRG MED/CURRENT MED MERGE: CPT | Performed by: FAMILY MEDICINE

## 2021-10-07 RX ORDER — GLIPIZIDE 2.5 MG/1
2.5 TABLET, EXTENDED RELEASE ORAL DAILY
Qty: 30 TABLET | Refills: 3 | Status: SHIPPED
Start: 2021-10-07 | End: 2021-10-28 | Stop reason: SDUPTHER

## 2021-10-07 NOTE — PATIENT INSTRUCTIONS

## 2021-10-07 NOTE — PROGRESS NOTES
Post-Discharge Transitional Care Management Services or Hospital Follow Up      Dana Mueller   YOB: 1950    Date of Office Visit:  10/7/2021  Date of Hospital Admission: 9/19/21  Date of Hospital Discharge: 9/28/21  Risk of hospital readmission (high >=14%. Medium >=10%) :Readmission Risk Score: 18      Care management risk score Rising risk (score 2-5) and Complex Care (Scores >=6): 1     Non face to face  following discharge, date last encounter closed (first attempt may have been earlier): 9/30/2021  1:48 PM    Call initiated 2 business days of discharge: Yes       Patient Active Problem List   Diagnosis    Hypertension    Elevated lipids    Proteinuria    Vitamin D deficiency    Closed fracture of radius    History of anal cancer    [facility] present ([facility] [address]    Vaginal cancer ([facility] [address]    Diabetes mellitus type 2 in obese ([facility] [address]    Stuttering    JEANNINE (acute kidney injury) ([facility] [address]       Allergies   Allergen Reactions    Betadine [Povidone Iodine]     Lisinopril Swelling    Penicillins     Tylenol [Acetaminophen] Rash       Medications listed as ordered at the time of discharge from hospital   [facility] Medication Instructions DIVYA:    Printed on:10/07/21 4626   Medication Information                      amLODIPine (NORVASC) 10 MG tablet  take 1 tablet by mouth once daily             aspirin EC 81 MG EC tablet  Take 1 tablet by mouth daily. atenolol (TENORMIN) 50 MG tablet  take 1 tablet by mouth once daily             atorvastatin (LIPITOR) 20 MG tablet  take 1 tablet by mouth once daily             blood glucose monitor kit and supplies  As per insurance approval please Dispense sufficient amount for indicated testing once daily plus additional to accommodate PRN testing needs. Dispense all needed supplies to include: monitor, strips, lancing device, lancets, control solutions, alcohol swabs.              hydrALAZINE (APRESOLINE) 25 MG tablet  Take 1 tablet by mouth every 12 hours             magnesium oxide (MAG-OX) 400 (241.3 Mg) MG TABS tablet  Take 1 tablet by mouth 2 times daily             pantoprazole (PROTONIX) 40 MG tablet  Take 1 tablet by mouth every morning (before breakfast)             polyethylene glycol (GLYCOLAX) 17 g packet  Take 17 g by mouth daily as needed for Constipation             sodium bicarbonate 650 MG tablet  Take 2 tablets by mouth 2 times daily                   Medications marked \"taking\" at this time  Outpatient Medications Marked as Taking for the 10/7/21 encounter (Office Visit) with Rober Benitez MD   Medication Sig Dispense Refill    sodium bicarbonate 650 MG tablet Take 2 tablets by mouth 2 times daily 120 tablet 0    hydrALAZINE (APRESOLINE) 25 MG tablet Take 1 tablet by mouth every 12 hours 90 tablet 3    polyethylene glycol (GLYCOLAX) 17 g packet Take 17 g by mouth daily as needed for Constipation 527 g 1    magnesium oxide (MAG-OX) 400 (241.3 Mg) MG TABS tablet Take 1 tablet by mouth 2 times daily 30 tablet 0    pantoprazole (PROTONIX) 40 MG tablet Take 1 tablet by mouth every morning (before breakfast) 30 tablet 3    atenolol (TENORMIN) 50 MG tablet take 1 tablet by mouth once daily 90 tablet 0    amLODIPine (NORVASC) 10 MG tablet take 1 tablet by mouth once daily 90 tablet 0    aspirin EC 81 MG EC tablet Take 1 tablet by mouth daily. 30 tablet 2        Medications patient taking as of now reconciled against medications ordered at time of hospital discharge: Yes    Chief Complaint   Patient presents with    Care Management     TCM d/c 9/28;    [facility]     declined flu, tdap, shingles       History of Present illness - Follow up of Hospital diagnosis(es): JEANNINE, urinary retention, hypoglycemia    Inpatient course: Discharge summary reviewed- see chart. Interval history/Current status: Patient was recently hospitalized for hypoglycemia and shortness of breath.   She was found to have urinary retention and JEANNINE. Subsequently, her antidiabetic medications were discontinued. States that her blood sugars have been elevated at home but have been around 200. Recent creatinine level was 2.5  Following with urology and has intact López catheter  No fever chills      Vitals:    10/07/21 1457   BP: 135/75   Pulse: 74   Resp: 18   Temp: 97.7 °F (36.5 °C)   TempSrc: Temporal   SpO2: 99%   Weight: 167 lb (75.8 kg)   Height: 5' 7\" (1.702 m)     Body mass index is 26.16 kg/m². Wt Readings from Last 3 Encounters:   10/07/21 167 lb (75.8 kg)   09/26/21 182 lb 1.6 oz (82.6 kg)   07/12/21 187 lb (84.8 kg)     BP Readings from Last 3 Encounters:   10/07/21 135/75   09/28/21 125/83   09/24/21 106/66        Physical Exam:  Physical Examination:  General appearance - alert, well appearing, and in no distress  Chest - clear to auscultation, no wheezes, rales or rhonchi, symmetric air entry  Heart - normal rate, regular rhythm, normal S1, S2, no murmurs, rubs, clicks or gallops  Abdomen -intact colostomy bag  Extremities - no pedal edema  Skin- warm, dry  Mental status - Normal mood, judgement and thought process          Assessment/Plan:  1. Hospital discharge follow-up    Patient is stable. Her renal function has worsened from the baseline but is stable from discharge. She will be referred to nephrology. Will hold nephrotoxic medications  - OK DISCHARGE MEDS RECONCILED W/ CURRENT OUTPATIENT MED LIST    2. Colostomy present [facility])  She is following with general surgery    3. Vaginal cancer ([facility] [address]  History of vaginal cancer. No recent issues. 4. JEANNINE (acute kidney injury) ([facility] [address]  Patient was hospitalized with JEANNINE but now her renal function is stable. Will hold nephrotoxic medications. Glipizide was started for her diabetes. Advised to take it with meals to avoid hypoglycemia  Monitor blood sugar    5.  CKD (chronic kidney disease) stage 4, GFR 15-29 ml/min ([facility])  - [facility] - Kati Pathak MD, Nephrology, [facility]  - BASIC METABOLIC PANEL; Future      6. Screening mammogram for breast cancer  - BELLE DIGITAL SCREEN BILATERAL PER PROTOCOL; Future    7.  Postmenopausal  - DEXA BONE DENSITY AXIAL SKELETON; Future        Medical Decision Making: high complexity    Follow-up as instructed

## 2021-10-08 LAB
BUN BLDV-MCNC: 49 MG/DL
CALCIUM SERPL-MCNC: 9.7 MG/DL
CHLORIDE BLD-SCNC: 103 MMOL/L
CO2: 21 MMOL/L
CREAT SERPL-MCNC: 2.75 MG/DL
GFR CALCULATED: 19
GLUCOSE BLD-MCNC: 253 MG/DL
POTASSIUM SERPL-SCNC: 4.7 MMOL/L
SODIUM BLD-SCNC: 133 MMOL/L

## 2021-10-11 PROBLEM — N17.9 AKI (ACUTE KIDNEY INJURY) (HCC): Status: RESOLVED | Noted: 2021-09-19 | Resolved: 2021-10-11

## 2021-10-25 ENCOUNTER — TELEPHONE (OUTPATIENT)
Dept: FAMILY MEDICINE CLINIC | Age: 71
End: 2021-10-25

## 2021-10-25 ENCOUNTER — HOSPITAL ENCOUNTER (OUTPATIENT)
Dept: AUDIOLOGY | Age: 71
Discharge: HOME | End: 2021-10-25
Payer: MEDICARE

## 2021-10-25 DIAGNOSIS — H91.90 HEARING LOSS, UNSPECIFIED HEARING LOSS TYPE, UNSPECIFIED LATERALITY: Primary | ICD-10-CM

## 2021-10-25 PROCEDURE — 92567 TYMPANOMETRY: CPT | Performed by: AUDIOLOGIST

## 2021-10-25 PROCEDURE — 92557 COMPREHENSIVE HEARING TEST: CPT | Performed by: AUDIOLOGIST

## 2021-10-25 NOTE — PROGRESS NOTES
AUDIOMETRIC EVALUATION    REASON FOR REFERRAL:  This patient was referred for audiometric testing by Roel Salazar MD due to hearing loss. She has noticed it within the last year. She denies pain, drainage. She reports occasional tinnitus. RESULTS:  Otoscopic inspection of the ears revealed impacted cerumen bilaterally. Impedance testing revealed flat tympanograms with small ear canal volume, suggesting 100% occlusion bilaterally. Both ears were cleaned without incident. Repeat tympanograms revealed normal peak pressure and compliance bilaterally. Pure tone audiometric testing using earphones was carried out. Results revealed air conduction thresholds averaging 12 dBHL through 2000 Hz, sloping to about 65 dBHL at 8000 Hz. Speech reception thresholds were obtained at 10 dBHL in the right ear and 5 dBHL in the left ear. Speech discrimination testing was performed at 40 dBHL. Obtained were scores of 88% in the right ear and 92% in the left ear. (Un)Masked Bone Conduction Testing revealed no air bone gaps. IMPRESSION:  Todays results revealed initial cerumen impaction bilaterally. After cleaning, hearing results indicate normal hearing for speech, with a mild to moderately severe high frequency loss bilaterally. Speech testing was in agreement with the pure tones, bilaterally. Speech discrimination was excellent bilaterally. Impedance testing revealed essentially normal middle ear function bilaterally. A re-evaluation is recommended annually or if a change in hearing is noted. The above results were reviewed with the patient and her sister. If I can be of further assistance or provide additional information, please do not hesitate to contact this office.       Thank you for the referral.    Jimena Marshall M.A., [id number]  Electronically signed by Shira Whitaker on 10/25/2021 at 1:41 PM

## 2021-10-25 NOTE — TELEPHONE ENCOUNTER
Patient saw Audiology today and had a hearing test. However, the order/referral in chart . It was originally from 10/19/2020. They did complete the hearing test today so that the patient did not have to reschedule. They need a new order for audiology placed today. Thank you.

## 2021-10-28 ENCOUNTER — OFFICE VISIT (OUTPATIENT)
Dept: FAMILY MEDICINE CLINIC | Age: 71
End: 2021-10-28
Payer: MEDICARE

## 2021-10-28 VITALS
DIASTOLIC BLOOD PRESSURE: 73 MMHG | TEMPERATURE: 98 F | HEART RATE: 80 BPM | SYSTOLIC BLOOD PRESSURE: 127 MMHG | WEIGHT: 167 LBS | HEIGHT: 67 IN | OXYGEN SATURATION: 97 % | RESPIRATION RATE: 18 BRPM | BODY MASS INDEX: 26.21 KG/M2

## 2021-10-28 DIAGNOSIS — I10 ESSENTIAL HYPERTENSION: ICD-10-CM

## 2021-10-28 DIAGNOSIS — Z00.00 ROUTINE GENERAL MEDICAL EXAMINATION AT A HEALTH CARE FACILITY: Primary | ICD-10-CM

## 2021-10-28 DIAGNOSIS — E11.9 DIABETES MELLITUS WITHOUT COMPLICATION (HCC): ICD-10-CM

## 2021-10-28 DIAGNOSIS — E78.5 HYPERLIPIDEMIA, UNSPECIFIED HYPERLIPIDEMIA TYPE: ICD-10-CM

## 2021-10-28 LAB
ANION GAP SERPL CALCULATED.3IONS-SCNC: 15 MMOL/L (ref 7–16)
BUN BLDV-MCNC: 43 MG/DL (ref 6–23)
CALCIUM SERPL-MCNC: 9.6 MG/DL (ref 8.6–10.2)
CHLORIDE BLD-SCNC: 100 MMOL/L (ref 98–107)
CHOLESTEROL, TOTAL: 181 MG/DL (ref 0–199)
CO2: 17 MMOL/L (ref 22–29)
CREAT SERPL-MCNC: 2.2 MG/DL (ref 0.5–1)
GFR AFRICAN AMERICAN: 27
GFR NON-AFRICAN AMERICAN: 27 ML/MIN/1.73
GLUCOSE BLD-MCNC: 160 MG/DL (ref 74–99)
HBA1C MFR BLD: 6.8 % (ref 4–5.6)
HDLC SERPL-MCNC: 36 MG/DL
LDL CHOLESTEROL CALCULATED: 125 MG/DL (ref 0–99)
POTASSIUM SERPL-SCNC: 4.4 MMOL/L (ref 3.5–5)
SODIUM BLD-SCNC: 132 MMOL/L (ref 132–146)
TRIGL SERPL-MCNC: 100 MG/DL (ref 0–149)
VLDLC SERPL CALC-MCNC: 20 MG/DL

## 2021-10-28 PROCEDURE — G0439 PPPS, SUBSEQ VISIT: HCPCS | Performed by: FAMILY MEDICINE

## 2021-10-28 PROCEDURE — G8484 FLU IMMUNIZE NO ADMIN: HCPCS | Performed by: FAMILY MEDICINE

## 2021-10-28 PROCEDURE — 4040F PNEUMOC VAC/ADMIN/RCVD: CPT | Performed by: FAMILY MEDICINE

## 2021-10-28 PROCEDURE — 3044F HG A1C LEVEL LT 7.0%: CPT | Performed by: FAMILY MEDICINE

## 2021-10-28 PROCEDURE — 36415 COLL VENOUS BLD VENIPUNCTURE: CPT | Performed by: FAMILY MEDICINE

## 2021-10-28 PROCEDURE — 3017F COLORECTAL CA SCREEN DOC REV: CPT | Performed by: FAMILY MEDICINE

## 2021-10-28 PROCEDURE — 1123F ACP DISCUSS/DSCN MKR DOCD: CPT | Performed by: FAMILY MEDICINE

## 2021-10-28 RX ORDER — ATENOLOL 50 MG/1
TABLET ORAL
Qty: 90 TABLET | Refills: 2 | Status: SHIPPED
Start: 2021-10-28 | End: 2022-07-25

## 2021-10-28 RX ORDER — GLIPIZIDE 5 MG/1
5 TABLET, FILM COATED, EXTENDED RELEASE ORAL DAILY
Qty: 90 TABLET | Refills: 2 | Status: SHIPPED
Start: 2021-10-28 | End: 2022-07-25

## 2021-10-28 RX ORDER — ATORVASTATIN CALCIUM 20 MG/1
TABLET, FILM COATED ORAL
Qty: 90 TABLET | Refills: 2 | Status: SHIPPED
Start: 2021-10-28 | End: 2022-07-25

## 2021-10-28 RX ORDER — SODIUM BICARBONATE 650 MG/1
650 TABLET ORAL 2 TIMES DAILY
Qty: 60 TABLET | Refills: 2 | Status: SHIPPED | OUTPATIENT
Start: 2021-10-28 | End: 2022-01-26

## 2021-10-28 RX ORDER — AMLODIPINE BESYLATE 10 MG/1
TABLET ORAL
Qty: 90 TABLET | Refills: 2 | Status: SHIPPED | OUTPATIENT
Start: 2021-10-28

## 2021-10-28 ASSESSMENT — PATIENT HEALTH QUESTIONNAIRE - PHQ9
SUM OF ALL RESPONSES TO PHQ QUESTIONS 1-9: 0
SUM OF ALL RESPONSES TO PHQ9 QUESTIONS 1 & 2: 0
SUM OF ALL RESPONSES TO PHQ QUESTIONS 1-9: 0
2. FEELING DOWN, DEPRESSED OR HOPELESS: 0
1. LITTLE INTEREST OR PLEASURE IN DOING THINGS: 0
SUM OF ALL RESPONSES TO PHQ QUESTIONS 1-9: 0

## 2021-10-28 ASSESSMENT — LIFESTYLE VARIABLES
AUDIT-C TOTAL SCORE: INCOMPLETE
HOW OFTEN DO YOU HAVE A DRINK CONTAINING ALCOHOL: NEVER
HOW OFTEN DO YOU HAVE A DRINK CONTAINING ALCOHOL: 0
AUDIT TOTAL SCORE: INCOMPLETE

## 2021-10-28 NOTE — PATIENT INSTRUCTIONS
Personalized Preventive Plan for Yared Neal - 10/28/2021  Medicare offers a range of preventive health benefits. Some of the tests and screenings are paid in full while other may be subject to a deductible, co-insurance, and/or copay. Some of these benefits include a comprehensive review of your medical history including lifestyle, illnesses that may run in your family, and various assessments and screenings as appropriate. After reviewing your medical record and screening and assessments performed today your provider may have ordered immunizations, labs, imaging, and/or referrals for you. A list of these orders (if applicable) as well as your Preventive Care list are included within your After Visit Summary for your review. Other Preventive Recommendations:    · A preventive eye exam performed by an eye specialist is recommended every 1-2 years to screen for glaucoma; cataracts, macular degeneration, and other eye disorders. · A preventive dental visit is recommended every 6 months. · Try to get at least 150 minutes of exercise per week or 10,000 steps per day on a pedometer . · Order or download the FREE \"Exercise & Physical Activity: Your Everyday Guide\" from [employer]. Call 2-872.696.9750 or search [employer] online. · You need 2398-8803 mg of calcium and 1763-5847 IU of vitamin D per day. It is possible to meet your calcium requirement with diet alone, but a vitamin D supplement is usually necessary to meet this goal.  · When exposed to the sun, use a sunscreen that protects against both UVA and UVB radiation with an SPF of 30 or greater. Reapply every 2 to 3 hours or after sweating, drying off with a towel, or swimming. · Always wear a seat belt when traveling in a car. Always wear a helmet when riding a bicycle or motorcycle.

## 2021-10-28 NOTE — PROGRESS NOTES
Medicare Annual Wellness Visit  Name: Otto Ibarra Date: 10/28/2021   MRN: [id number] Sex: Female   Age: 70 y.o. Ethnicity: Non- / Non    : 1950 Race: Serina L[place] / Sera Jose American      Sara Ramos is here for Medicare AWV and Health Maintenance (declines flu shot)    Screenings for behavioral, psychosocial and functional/safety risks, and cognitive dysfunction are all negative except as indicated below. These results, as well as other patient data from the [address] form, are documented in Flowsheets linked to this Encounter. Hypertension. Blood pressure is controlled. Compliant with medications    Diabetes mellitus. Blood sugar readings are still elevated. Patient is compliant with medications. Hyperlipidemia. Compliant with statins. Allergies   Allergen Reactions    Betadine [Povidone Iodine]     Lisinopril Swelling    Penicillins     Tylenol [Acetaminophen] Rash       Prior to Visit Medications    Medication Sig Taking? Authorizing Provider   glipiZIDE (GLUCOTROL XL) 2.5 MG extended release tablet Take 1 tablet by mouth daily  Raz Mendiola MD   blood glucose monitor kit and supplies As per insurance approval please Dispense sufficient amount for indicated testing once daily plus additional to accommodate PRN testing needs. Dispense all needed supplies to include: monitor, strips, lancing device, lancets, control solutions, alcohol swabs.   Patient not taking: Reported on 10/7/2021  Raz Mendiola MD   sodium bicarbonate 650 MG tablet Take 2 tablets by mouth 2 times daily  Aydee Frances MD   hydrALAZINE (APRESOLINE) 25 MG tablet Take 1 tablet by mouth every 12 hours  Aydee Frances MD   polyethylene glycol (GLYCOLAX) 17 g packet Take 17 g by mouth daily as needed for Constipation  Aydee Frances MD   magnesium oxide (MAG-OX) 400 (241.3 Mg) MG TABS tablet Take 1 tablet by mouth 2 times daily  Aydee Frances MD   pantoprazole (PROTONIX) 40 MG tablet Take 1 tablet by mouth every morning (before breakfast)  Aydee Frances MD   atenolol (TENORMIN) 50 MG tablet take 1 tablet by mouth once daily  Maurice Shen MD   amLODIPine (NORVASC) 10 MG tablet take 1 tablet by mouth once daily  Maurice Shen MD   atorvastatin (LIPITOR) 20 MG tablet take 1 tablet by mouth once daily  Maurice Shen MD   aspirin EC 81 MG EC tablet Take 1 tablet by mouth daily.   Maurice Shen MD       Past Medical History:   Diagnosis Date    Cancer [facility])     colon and uterine    Closed fracture of radius 12/27/2016    Elevated lipids 1/15/2014    Headache     History of anal cancer 2/20/2017    Dr. Majano Organ    Hyperlipidemia     Hypertension     Hypertension     Obesity     Proteinuria 6/25/2014    Type II or unspecified type diabetes mellitus without mention of complication, not stated as uncontrolled     Vaginal cancer ([facility] [address] 5/22/2017    Vitamin D deficiency 11/6/2014       Past Surgical History:   Procedure Laterality Date    BLADDER SURGERY N/A 9/24/2021    CYSTOSCOPY BILATERAL RETROGRADE PYELOGRAM, BILATERAL STENT INSERTION performed by Mile Henry MD at [address] [place] [address]      RECTAL SURGERY      WRIST FRACTURE SURGERY Left 11/07/2016       Family History   Problem Relation Age of Onset    Kidney Disease Mother     High Blood Pressure Mother     Cancer Father     Diabetes Sister     Other Brother        CareTeam (Including outside providers/suppliers regularly involved in providing care):   Patient Care Team:  Maurice Shen MD as PCP - General (Family Medicine)  Maurice Shen MD as PCP - [facility] Empaneled Provider    Wt Readings from Last 3 Encounters:   10/28/21 167 lb (75.8 kg)   10/07/21 167 lb (75.8 kg)   09/26/21 182 lb 1.6 oz (82.6 kg)     Vitals:    10/28/21 1411   BP: 127/73   Pulse: 80   Resp: 18   Temp: 98 °F (36.7 °C)   TempSrc: Temporal   SpO2: 97%   Weight: 167 lb (75.8 kg)   Height: 5' 7\" (1.702 m)     Body mass index is 26.16 kg/m². Based upon direct observation of the patient, evaluation of cognition reveals recent and remote memory intact. Physical Examination:  General appearance - alert, well appearing, and in no distress  Chest - clear to auscultation, no wheezes, rales or rhonchi, symmetric air entry  Heart - normal rate, regular rhythm, normal S1, S2, no murmurs, rubs, clicks or gallops  Neurological - alert, oriented, normal speech, no focal findings or movement disorder noted  Extremities - no pedal edema  Skin- warm, dry  Mental status - Normal mood, judgement and thought process          Patient's complete Health Risk Assessment and screening values have been reviewed and are found in Flowsheets. The following problems were reviewed today and where indicated follow up appointments were made and/or referrals ordered. Positive Risk Factor Screenings with Interventions:          General Health and ACP:  General  In general, how would you say your health is?: Good  In the past 7 days, have you experienced any of the following?  New or Increased Pain, New or Increased Fatigue, Loneliness, Social Isolation, Stress or Anger?: None of These  Do you get the social and emotional support that you need?: Yes  Do you have a Living Will?: (!) No  Advance Directives     Power of [address] [place] [address] Will ACP-Advance Directive ACP-Power of     Not on File Filed on 06/19/12 Filed Not on File      General Health Risk Interventions:  · No Living Will: Advance Care Planning addressed with patient today    Health Habits/Nutrition:  Health Habits/Nutrition  Do you exercise for at least 20 minutes 2-3 times per week?: Yes  Have you lost any weight without trying in the past 3 months?: (!) Yes  Do you eat only one meal per day?: No  Have you seen the dentist within the past year?: (!) No  Body mass index: (!) 26.15  Health Habits/Nutrition Interventions:  · Dental exam overdue:  patient encouraged to make appointment with his/her dentist       Personalized Preventive Plan   Current Health Maintenance Status  Immunization History   Administered Date(s) Administered    Pneumococcal Conjugate 13-valent ([id number]) 04/13/2016    Pneumococcal Polysaccharide ([id number]) 05/22/2017        Health Maintenance   Topic Date Due    Hepatitis C screen  Never done    COVID-19 Vaccine (1) Never done    DTaP/Tdap/Td vaccine (1 - Tdap) Never done    Shingles Vaccine (1 of 2) Never done    DEXA (modify frequency per FRAX score)  Never done    Diabetic foot exam  04/13/2017    Breast cancer screen  05/16/2018    Diabetic microalbuminuria test  12/23/2020    Flu vaccine (1) Never done    Lipid screen  09/09/2021    Annual Wellness Visit (AWV)  10/20/2021    A1C test (Diabetic or Prediabetic)  07/12/2022    Diabetic retinal exam  08/31/2022    Colon cancer screen colonoscopy  03/01/2031    Pneumococcal 65+ years Vaccine  Completed    Hepatitis A vaccine  Aged Out    Hib vaccine  Aged Out    Meningococcal (ACWY) vaccine  Aged Out     Recommendations for [place] Due: see orders and patient instructions/AVS.  . Recommended screening schedule for the next 5-10 years is provided to the patient in written form: see Patient Instructions/AVS.    Pratik Garrett was seen today for medicare awv and health maintenance. Diagnoses and all orders for this visit:    Routine general medical examination at a health care facility    Essential hypertension  -     atenolol (TENORMIN) 50 MG tablet; take 1 tablet by mouth once daily  -     amLODIPine (NORVASC) 10 MG tablet; take 1 tablet by mouth once daily  -     BASIC METABOLIC PANEL; Future    Diabetes mellitus without complication (HCC)  -     atorvastatin (LIPITOR) 20 MG tablet; take 1 tablet by mouth once daily  -     glipiZIDE (GLUCOTROL XL) 5 MG extended release tablet;  Take 1 tablet by mouth daily  -     Hemoglobin A1C; Future    Hyperlipidemia, unspecified hyperlipidemia type  -     atorvastatin (LIPITOR) 20 MG tablet; take 1 tablet by mouth once daily  -     LIPID PANEL; Future    Other orders  -     magnesium oxide (MAG-OX) 400 (241.3 Mg) MG TABS tablet; Take 1 tablet by mouth 2 times daily  -     sodium bicarbonate 650 MG tablet;  Take 1 tablet by mouth 2 times daily      F/u  As instructed

## 2021-10-29 ENCOUNTER — TELEPHONE (OUTPATIENT)
Dept: FAMILY MEDICINE CLINIC | Age: 71
End: 2021-10-29

## 2021-10-29 RX ORDER — PANTOPRAZOLE SODIUM 40 MG/1
40 TABLET, DELAYED RELEASE ORAL
Qty: 30 TABLET | Refills: 3 | Status: SHIPPED
Start: 2021-10-29 | End: 2022-02-23

## 2021-10-29 NOTE — TELEPHONE ENCOUNTER
Patient has a prescription for Pantoprazole 40 mg from the hospital and wonders if she should refill it and continue taking it.

## 2021-11-01 ENCOUNTER — HOSPITAL ENCOUNTER (OUTPATIENT)
Dept: GENERAL RADIOLOGY | Age: 71
Discharge: HOME | End: 2021-11-03
Payer: MEDICARE

## 2021-11-01 DIAGNOSIS — Z12.31 SCREENING MAMMOGRAM FOR BREAST CANCER: ICD-10-CM

## 2021-11-01 DIAGNOSIS — Z78.0 POSTMENOPAUSAL: ICD-10-CM

## 2021-11-01 PROCEDURE — 77063 BREAST TOMOSYNTHESIS BI: CPT

## 2021-11-01 PROCEDURE — 77080 DXA BONE DENSITY AXIAL: CPT

## 2021-11-09 ENCOUNTER — TELEPHONE (OUTPATIENT)
Dept: FAMILY MEDICINE CLINIC | Age: 71
End: 2021-11-09

## 2021-11-09 DIAGNOSIS — I10 HYPERTENSION, UNSPECIFIED TYPE: Primary | ICD-10-CM

## 2021-11-09 NOTE — TELEPHONE ENCOUNTER
Looks like [facility] group  aware of the referral as she is on cancellation list.  For now, I have ordered a BMP for her. Please advise her to get it checked sometime next week. We will monitor her renal function and nephrologist will see her when able.

## 2021-11-09 NOTE — TELEPHONE ENCOUNTER
Patient called stating that she was advised to call Dr Jj Nelson if she had not heard from the kidney group. She indicated that  Was going to call them for her. Please advise if you need me to do anything.  Apparently, they are booked out pretty far and she is on a \"cancellation list\"     Thanks

## 2021-11-17 ENCOUNTER — NURSE ONLY (OUTPATIENT)
Dept: FAMILY MEDICINE CLINIC | Age: 71
End: 2021-11-17
Payer: MEDICARE

## 2021-11-17 DIAGNOSIS — I10 HYPERTENSION, UNSPECIFIED TYPE: ICD-10-CM

## 2021-11-17 DIAGNOSIS — I10 PRIMARY HYPERTENSION: ICD-10-CM

## 2021-11-17 DIAGNOSIS — E11.69 DIABETES MELLITUS TYPE 2 IN OBESE (HCC): ICD-10-CM

## 2021-11-17 DIAGNOSIS — E66.9 DIABETES MELLITUS TYPE 2 IN OBESE (HCC): ICD-10-CM

## 2021-11-17 LAB
ANION GAP SERPL CALCULATED.3IONS-SCNC: 18 MMOL/L (ref 7–16)
BUN BLDV-MCNC: 41 MG/DL (ref 6–23)
CALCIUM SERPL-MCNC: 10.4 MG/DL (ref 8.6–10.2)
CHLORIDE BLD-SCNC: 99 MMOL/L (ref 98–107)
CO2: 19 MMOL/L (ref 22–29)
CREAT SERPL-MCNC: 2.1 MG/DL (ref 0.5–1)
GFR AFRICAN AMERICAN: 28
GFR NON-AFRICAN AMERICAN: 28 ML/MIN/1.73
GLUCOSE BLD-MCNC: 207 MG/DL (ref 74–99)
POTASSIUM SERPL-SCNC: 4.8 MMOL/L (ref 3.5–5)
SODIUM BLD-SCNC: 136 MMOL/L (ref 132–146)

## 2021-11-17 PROCEDURE — 36415 COLL VENOUS BLD VENIPUNCTURE: CPT | Performed by: FAMILY MEDICINE

## 2021-12-02 RX ORDER — BLOOD SUGAR DIAGNOSTIC
1 STRIP MISCELLANEOUS DAILY
COMMUNITY
Start: 2021-10-08 | End: 2021-12-03 | Stop reason: SDUPTHER

## 2021-12-03 RX ORDER — BLOOD SUGAR DIAGNOSTIC
1 STRIP MISCELLANEOUS DAILY
Qty: 100 EACH | Refills: 1 | Status: SHIPPED | OUTPATIENT
Start: 2021-12-03

## 2022-01-31 ENCOUNTER — OFFICE VISIT (OUTPATIENT)
Dept: FAMILY MEDICINE CLINIC | Age: 72
End: 2022-01-31
Payer: MEDICARE

## 2022-01-31 VITALS
TEMPERATURE: 97.8 F | WEIGHT: 169 LBS | RESPIRATION RATE: 18 BRPM | BODY MASS INDEX: 26.53 KG/M2 | DIASTOLIC BLOOD PRESSURE: 72 MMHG | HEIGHT: 67 IN | OXYGEN SATURATION: 100 % | HEART RATE: 69 BPM | SYSTOLIC BLOOD PRESSURE: 139 MMHG

## 2022-01-31 DIAGNOSIS — C52 VAGINAL CANCER (HCC): ICD-10-CM

## 2022-01-31 DIAGNOSIS — M80.00XD AGE-RELATED OSTEOPOROSIS WITH CURRENT PATHOLOGICAL FRACTURE WITH ROUTINE HEALING, SUBSEQUENT ENCOUNTER: ICD-10-CM

## 2022-01-31 DIAGNOSIS — E78.5 ELEVATED LIPIDS: ICD-10-CM

## 2022-01-31 DIAGNOSIS — E55.9 VITAMIN D INSUFFICIENCY: ICD-10-CM

## 2022-01-31 DIAGNOSIS — I10 PRIMARY HYPERTENSION: ICD-10-CM

## 2022-01-31 DIAGNOSIS — E66.9 DIABETES MELLITUS TYPE 2 IN OBESE (HCC): Primary | ICD-10-CM

## 2022-01-31 DIAGNOSIS — N18.4 CKD (CHRONIC KIDNEY DISEASE) STAGE 4, GFR 15-29 ML/MIN (HCC): ICD-10-CM

## 2022-01-31 DIAGNOSIS — E11.69 DIABETES MELLITUS TYPE 2 IN OBESE (HCC): Primary | ICD-10-CM

## 2022-01-31 DIAGNOSIS — Z93.3 COLOSTOMY PRESENT (HCC): ICD-10-CM

## 2022-01-31 DIAGNOSIS — E66.9 DIABETES MELLITUS TYPE 2 IN OBESE (HCC): ICD-10-CM

## 2022-01-31 DIAGNOSIS — E11.69 DIABETES MELLITUS TYPE 2 IN OBESE (HCC): ICD-10-CM

## 2022-01-31 LAB
ALBUMIN SERPL-MCNC: 4.1 G/DL (ref 3.5–5.2)
ALP BLD-CCNC: 111 U/L (ref 35–104)
ALT SERPL-CCNC: 9 U/L (ref 0–32)
ANION GAP SERPL CALCULATED.3IONS-SCNC: 14 MMOL/L (ref 7–16)
AST SERPL-CCNC: 12 U/L (ref 0–31)
BILIRUB SERPL-MCNC: 0.7 MG/DL (ref 0–1.2)
BUN BLDV-MCNC: 34 MG/DL (ref 6–23)
CALCIUM SERPL-MCNC: 10.1 MG/DL (ref 8.6–10.2)
CHLORIDE BLD-SCNC: 101 MMOL/L (ref 98–107)
CHOLESTEROL, TOTAL: 149 MG/DL (ref 0–199)
CO2: 23 MMOL/L (ref 22–29)
CREAT SERPL-MCNC: 1.6 MG/DL (ref 0.5–1)
GFR AFRICAN AMERICAN: 38
GFR NON-AFRICAN AMERICAN: 38 ML/MIN/1.73
GLUCOSE BLD-MCNC: 147 MG/DL (ref 74–99)
HBA1C MFR BLD: 7.5 % (ref 4–5.6)
HCT VFR BLD CALC: 43.3 % (ref 34–48)
HDLC SERPL-MCNC: 47 MG/DL
HEMOGLOBIN: 13.9 G/DL (ref 11.5–15.5)
LDL CHOLESTEROL CALCULATED: 83 MG/DL (ref 0–99)
MCH RBC QN AUTO: 27.9 PG (ref 26–35)
MCHC RBC AUTO-ENTMCNC: 32.1 % (ref 32–34.5)
MCV RBC AUTO: 86.9 FL (ref 80–99.9)
PDW BLD-RTO: 14.1 FL (ref 11.5–15)
PLATELET # BLD: 155 E9/L (ref 130–450)
PMV BLD AUTO: 13.3 FL (ref 7–12)
POTASSIUM SERPL-SCNC: 4.9 MMOL/L (ref 3.5–5)
RBC # BLD: 4.98 E12/L (ref 3.5–5.5)
SODIUM BLD-SCNC: 138 MMOL/L (ref 132–146)
TOTAL PROTEIN: 7.8 G/DL (ref 6.4–8.3)
TRIGL SERPL-MCNC: 96 MG/DL (ref 0–149)
TSH SERPL DL<=0.05 MIU/L-ACNC: 2 UIU/ML (ref 0.27–4.2)
VITAMIN D 25-HYDROXY: 32 NG/ML (ref 30–100)
VLDLC SERPL CALC-MCNC: 19 MG/DL
WBC # BLD: 9.9 E9/L (ref 4.5–11.5)

## 2022-01-31 PROCEDURE — 4040F PNEUMOC VAC/ADMIN/RCVD: CPT | Performed by: FAMILY MEDICINE

## 2022-01-31 PROCEDURE — 1036F TOBACCO NON-USER: CPT | Performed by: FAMILY MEDICINE

## 2022-01-31 PROCEDURE — 99214 OFFICE O/P EST MOD 30 MIN: CPT | Performed by: FAMILY MEDICINE

## 2022-01-31 PROCEDURE — 3017F COLORECTAL CA SCREEN DOC REV: CPT | Performed by: FAMILY MEDICINE

## 2022-01-31 PROCEDURE — G8427 DOCREV CUR MEDS BY ELIG CLIN: HCPCS | Performed by: FAMILY MEDICINE

## 2022-01-31 PROCEDURE — G8484 FLU IMMUNIZE NO ADMIN: HCPCS | Performed by: FAMILY MEDICINE

## 2022-01-31 PROCEDURE — G8399 PT W/DXA RESULTS DOCUMENT: HCPCS | Performed by: FAMILY MEDICINE

## 2022-01-31 PROCEDURE — 2022F DILAT RTA XM EVC RTNOPTHY: CPT | Performed by: FAMILY MEDICINE

## 2022-01-31 PROCEDURE — 36415 COLL VENOUS BLD VENIPUNCTURE: CPT | Performed by: FAMILY MEDICINE

## 2022-01-31 PROCEDURE — 1123F ACP DISCUSS/DSCN MKR DOCD: CPT | Performed by: FAMILY MEDICINE

## 2022-01-31 PROCEDURE — G8417 CALC BMI ABV UP PARAM F/U: HCPCS | Performed by: FAMILY MEDICINE

## 2022-01-31 PROCEDURE — 1090F PRES/ABSN URINE INCON ASSESS: CPT | Performed by: FAMILY MEDICINE

## 2022-01-31 PROCEDURE — 3046F HEMOGLOBIN A1C LEVEL >9.0%: CPT | Performed by: FAMILY MEDICINE

## 2022-01-31 RX ORDER — LANCETS 33 GAUGE
EACH MISCELLANEOUS
Qty: 100 EACH | Refills: 3 | Status: SHIPPED | OUTPATIENT
Start: 2022-01-31

## 2022-01-31 RX ORDER — LANCETS 33 GAUGE
EACH MISCELLANEOUS
Qty: 100 EACH | Refills: 3 | Status: SHIPPED
Start: 2022-01-31 | End: 2022-01-31 | Stop reason: SDUPTHER

## 2022-01-31 NOTE — PROGRESS NOTES
SUBJECTIVE:        Patient ID: Deonna Ang is a 70 y.o. female who presents for   Chief Complaint   Patient presents with    Diabetes        [facility]     declined flu, shingles, tdap       Diabetes Mellitus: Patient states that her blood sugars are running good at home. She is compliant with her medications. Denies any chest pain, shortness of breath, fevers or chills  Hemoglobin A1C (%)   Date Value   10/28/2021 6.8 (H)     BP is controlled. Is compliant with medications    Hyperlipidemia:No weakness or myalgias. No chest pain or dyspnea. Osteoporosis: Patient not on any treatment yet. Prolia was offered and patient is agreeable to starting it. She has colostomy is present chronically. She follows with general surgery. Denies any concerns with that. Next    She has a history of vaginal cancer. Status post treatment. No issues currently. She had cystoscopy and bilateral stent insertion. She recently had stent removal and she is following with urology and has López catheter. No urinary issues at this time.   She has  upcoming appointment to see urology    Past Medical History:   Diagnosis Date    Cancer [facility])     colon and uterine    Closed fracture of radius 12/27/2016    Elevated lipids 1/15/2014    Headache     History of anal cancer 2/20/2017    Dr. Herschel Kanner Hyperlipidemia     Hypertension     Hypertension     Obesity     Proteinuria 6/25/2014    Type II or unspecified type diabetes mellitus without mention of complication, not stated as uncontrolled     Vaginal cancer ([address] 5/22/2017    Vitamin D deficiency 11/6/2014       Patient Active Problem List   Diagnosis    Hypertension    Elevated lipids    Proteinuria    Vitamin D deficiency    Closed fracture of radius    History of anal cancer    Colostomy present ([address]    Vaginal cancer ([address]    Diabetes mellitus type 2 in obese ([address]    Stuttering    CKD (chronic kidney disease) stage 4, GFR 15-29 ml/min [facility])       Past Surgical History:   Procedure Laterality Date    BLADDER SURGERY N/A 9/24/2021    CYSTOSCOPY BILATERAL RETROGRADE PYELOGRAM, BILATERAL STENT INSERTION performed by Ailyn Ocasio MD at [address] [place] [address]      RECTAL SURGERY      WRIST FRACTURE SURGERY Left 11/07/2016       Family History   Problem Relation Age of Onset    Kidney Disease Mother     High Blood Pressure Mother     Cancer Father     Diabetes Sister     Other Brother        Social History     Socioeconomic History    Marital status: Single     Spouse name: None    Number of children: None    Years of education: None    Highest education level: None   Occupational History    None   Tobacco Use    Smoking status: Never Smoker    Smokeless tobacco: Never Used   Substance and Sexual Activity    Alcohol use: No    Drug use: No    Sexual activity: None   Other Topics Concern    None   Social History Narrative    None     Social Determinants of Health     Financial Resource Strain: Low Risk     Difficulty of Paying Living Expenses: Not hard at all   Food Insecurity: No Food Insecurity    Worried About Running Out of Food in the Last Year: Never true    Cristhian of Food in the Last Year: Never true   Transportation Needs:     Lack of Transportation (Medical): Not on file    Lack of Transportation (Non-Medical):  Not on file   Physical Activity:     Days of Exercise per Week: Not on file    Minutes of Exercise per Session: Not on file   Stress:     Feeling of Stress : Not on file   Social Connections:     Frequency of Communication with Friends and Family: Not on file    Frequency of Social Gatherings with Friends and Family: Not on file    Attends [employer] Services: Not on file    Active Member of Clubs or Organizations: Not on file    Attends Club or Organization Meetings: Not on file    Marital Status: Not on file   Intimate Partner Violence:     Fear of Current or (36.6 °C) (Temporal)   10/28/21 98 °F (36.7 °C) (Temporal)   10/07/21 97.7 °F (36.5 °C) (Temporal)     BP Readings from Last 3 Encounters:   01/31/22 139/72   10/28/21 127/73   10/07/21 135/75        General assesment: Alert, Awake, Oriented times 3, no distress  Skin: Warm and dry  Head: Normocephalic. No masses, lesions or tenderness noted  Eyes: Conjunctivae appear normal.   Ears: External ears normal  Chest - clear to auscultation, no wheezes, rales or rhonchi, symmetric air entry  Heart - RRR, S1, S2, positive murmurs  Abd: + colostomy bag, also has a Lóepz catheter. Extremities - peripheral pulses normal, no pedal edema, no clubbing or cyanosis  Mental status: normal mood  Neuro: grossly normal    ASSESSMENT / PLAN :     Bell Arellano was seen today for diabetes and health maintenance. Diagnoses and all orders for this visit:    Diabetes mellitus type 2 in obese ([facility]  -     Hemoglobin A1C; Future  Continue current treatment. Monitor blood sugars      CKD (chronic kidney disease) stage 4, GFR 15-29 ml/min ([facility])  Monitor renal function. Nephrotoxic drugs    Colostomy present ([facility]  Continue current treatment. Follow with surgery    Vaginal cancer ([facility]  Follow    Age-related osteoporosis with current pathological fracture with routine healing, subsequent encounter  -     denosumab (PROLIA) 60 MG/ML SOSY SC injection; Inject 1 mL into the skin once for 1 dose  -     Infusion therapy; Future    Elevated lipids  -     Lipid Panel; Future  Continue lifestyle modification    Primary hypertension  -     CBC; Future  -     Comprehensive Metabolic Panel; Future  -     TSH without Reflex; Future plan monitor blood pressures. Vitamin D insufficiency  -     Vitamin D 25 Hydroxy;  Future    Follow-up as instructed        Debra Sullivan MD

## 2022-02-09 DIAGNOSIS — M80.00XD AGE-RELATED OSTEOPOROSIS WITH CURRENT PATHOLOGICAL FRACTURE WITH ROUTINE HEALING: ICD-10-CM

## 2022-02-16 ENCOUNTER — HOSPITAL ENCOUNTER (OUTPATIENT)
Dept: INFUSION THERAPY | Age: 72
Setting detail: INFUSION SERIES
Discharge: HOME | End: 2022-02-16
Payer: MEDICARE

## 2022-02-16 VITALS
WEIGHT: 169 LBS | HEART RATE: 64 BPM | DIASTOLIC BLOOD PRESSURE: 66 MMHG | HEIGHT: 67 IN | BODY MASS INDEX: 26.53 KG/M2 | OXYGEN SATURATION: 100 % | RESPIRATION RATE: 16 BRPM | SYSTOLIC BLOOD PRESSURE: 114 MMHG | TEMPERATURE: 96.9 F

## 2022-02-16 DIAGNOSIS — M80.00XD AGE-RELATED OSTEOPOROSIS WITH CURRENT PATHOLOGICAL FRACTURE WITH ROUTINE HEALING: Primary | ICD-10-CM

## 2022-02-16 PROCEDURE — 96372 THER/PROPH/DIAG INJ SC/IM: CPT

## 2022-02-16 PROCEDURE — 6360000002 HC RX W HCPCS: Performed by: FAMILY MEDICINE

## 2022-02-16 RX ADMIN — DENOSUMAB 60 MG: 60 INJECTION SUBCUTANEOUS at 11:10

## 2022-02-23 RX ORDER — PANTOPRAZOLE SODIUM 40 MG/1
TABLET, DELAYED RELEASE ORAL
Qty: 30 TABLET | Refills: 3 | Status: SHIPPED
Start: 2022-02-23 | End: 2022-07-11

## 2022-02-25 RX ORDER — LANOLIN ALCOHOL/MO/W.PET/CERES
CREAM (GRAM) TOPICAL
Qty: 60 TABLET | Refills: 3 | Status: SHIPPED
Start: 2022-02-25 | End: 2022-07-13

## 2022-06-06 ENCOUNTER — OFFICE VISIT (OUTPATIENT)
Dept: FAMILY MEDICINE CLINIC | Age: 72
End: 2022-06-06
Payer: MEDICARE

## 2022-06-06 VITALS
SYSTOLIC BLOOD PRESSURE: 139 MMHG | HEART RATE: 78 BPM | RESPIRATION RATE: 18 BRPM | OXYGEN SATURATION: 99 % | BODY MASS INDEX: 28.28 KG/M2 | DIASTOLIC BLOOD PRESSURE: 78 MMHG | HEIGHT: 66 IN | WEIGHT: 176 LBS | TEMPERATURE: 98.3 F

## 2022-06-06 DIAGNOSIS — N18.32 STAGE 3B CHRONIC KIDNEY DISEASE (HCC): ICD-10-CM

## 2022-06-06 DIAGNOSIS — I10 PRIMARY HYPERTENSION: ICD-10-CM

## 2022-06-06 DIAGNOSIS — Z28.21 HERPES ZOSTER VACCINATION DECLINED: ICD-10-CM

## 2022-06-06 DIAGNOSIS — E66.9 DIABETES MELLITUS TYPE 2 IN OBESE (HCC): Primary | ICD-10-CM

## 2022-06-06 DIAGNOSIS — E11.69 DIABETES MELLITUS TYPE 2 IN OBESE (HCC): Primary | ICD-10-CM

## 2022-06-06 PROBLEM — N18.4 CKD (CHRONIC KIDNEY DISEASE) STAGE 4, GFR 15-29 ML/MIN (HCC): Status: RESOLVED | Noted: 2022-01-31 | Resolved: 2022-06-06

## 2022-06-06 PROBLEM — N18.30 CHRONIC RENAL DISEASE, STAGE III (HCC): Status: ACTIVE | Noted: 2022-06-06

## 2022-06-06 LAB — HBA1C MFR BLD: 7.4 %

## 2022-06-06 PROCEDURE — 1036F TOBACCO NON-USER: CPT | Performed by: FAMILY MEDICINE

## 2022-06-06 PROCEDURE — 1123F ACP DISCUSS/DSCN MKR DOCD: CPT | Performed by: FAMILY MEDICINE

## 2022-06-06 PROCEDURE — 99214 OFFICE O/P EST MOD 30 MIN: CPT | Performed by: FAMILY MEDICINE

## 2022-06-06 PROCEDURE — G8427 DOCREV CUR MEDS BY ELIG CLIN: HCPCS | Performed by: FAMILY MEDICINE

## 2022-06-06 PROCEDURE — 1090F PRES/ABSN URINE INCON ASSESS: CPT | Performed by: FAMILY MEDICINE

## 2022-06-06 PROCEDURE — 3051F HG A1C>EQUAL 7.0%<8.0%: CPT | Performed by: FAMILY MEDICINE

## 2022-06-06 PROCEDURE — G8417 CALC BMI ABV UP PARAM F/U: HCPCS | Performed by: FAMILY MEDICINE

## 2022-06-06 PROCEDURE — 83036 HEMOGLOBIN GLYCOSYLATED A1C: CPT | Performed by: FAMILY MEDICINE

## 2022-06-06 PROCEDURE — G8399 PT W/DXA RESULTS DOCUMENT: HCPCS | Performed by: FAMILY MEDICINE

## 2022-06-06 PROCEDURE — 2022F DILAT RTA XM EVC RTNOPTHY: CPT | Performed by: FAMILY MEDICINE

## 2022-06-06 PROCEDURE — 3017F COLORECTAL CA SCREEN DOC REV: CPT | Performed by: FAMILY MEDICINE

## 2022-06-06 ASSESSMENT — PATIENT HEALTH QUESTIONNAIRE - PHQ9
SUM OF ALL RESPONSES TO PHQ QUESTIONS 1-9: 0
2. FEELING DOWN, DEPRESSED OR HOPELESS: 0
SUM OF ALL RESPONSES TO PHQ QUESTIONS 1-9: 0

## 2022-06-07 NOTE — PROGRESS NOTES
SUBJECTIVE:        Patient ID: Oc Juares is a 70 y.o. female who presents for   Chief Complaint   Patient presents with    Diabetes       Diabetes Mellitus: Doing well. She is compliant with her medications. Denies any chest pain, shortness of breath, fevers or chills  Hemoglobin A1C (%)   Date Value   06/06/2022 7.4     Hypertension BP is controlled. Is compliant with medications    Hyperlipidemia:No weakness or myalgias. No chest pain or dyspnea. Osteoporosis: Is on Prolia    Is following with urology after she had cystoscopy and ureteral stent placement. Still has López catheter.   No fevers or chills    Past Medical History:   Diagnosis Date    Arthritis     osteoporsis    Cancer ([address]     colon and uterine    Closed fracture of radius 12/27/2016    Elevated lipids 1/15/2014    Headache     History of anal cancer 2/20/2017    Dr. Michael Maldonado Hyperlipidemia     Hypertension     Hypertension     Obesity     Proteinuria 6/25/2014    Type II or unspecified type diabetes mellitus without mention of complication, not stated as uncontrolled     Vaginal cancer ([address] 5/22/2017    Vitamin D deficiency 11/6/2014       Patient Active Problem List   Diagnosis    Hypertension    Elevated lipids    Proteinuria    Vitamin D deficiency    Closed fracture of radius    History of anal cancer    Colostomy present ([address]    Vaginal cancer ([address]    Diabetes mellitus type 2 in obese ([address]    Stuttering    Age-related osteoporosis with current pathological fracture with routine healing    Herpes zoster vaccination declined    Chronic renal disease, stage III ([address] [230103]       Past Surgical History:   Procedure Laterality Date    BLADDER SURGERY N/A 9/24/2021    CYSTOSCOPY BILATERAL RETROGRADE PYELOGRAM, BILATERAL STENT INSERTION performed by Lanie Salinas MD at [address] [place] [address]      RECTAL SURGERY      WRIST FRACTURE SURGERY Left 11/07/2016       Family History  Betadine [Povidone Iodine]     Lisinopril Swelling    Penicillins     Tylenol [Acetaminophen] Rash       Current Outpatient Medications on File Prior to Visit   Medication Sig Dispense Refill    magnesium oxide (MAG-OX) 400 (240 Mg) MG tablet take 1 tablet by mouth twice a day 60 tablet 3    pantoprazole (PROTONIX) 40 MG tablet take 1 tablet by mouth EVERY MORNING BEFORE BREAKFAST 30 tablet 3    Lancets (ONETOUCH DELICA PLUS PYLIVD85A) MISC use 1 LANCET to TEST BLOOD SUGAR once daily and if needed 100 each 3    ONETOUCH ULTRA strip Inject 1 each into the skin daily 100 each 1    ONETOUCH ULTRA strip Inject 1 each into the skin daily 100 each 1    atenolol (TENORMIN) 50 MG tablet take 1 tablet by mouth once daily 90 tablet 2    amLODIPine (NORVASC) 10 MG tablet take 1 tablet by mouth once daily 90 tablet 2    atorvastatin (LIPITOR) 20 MG tablet take 1 tablet by mouth once daily 90 tablet 2    glipiZIDE (GLUCOTROL XL) 5 MG extended release tablet Take 1 tablet by mouth daily 90 tablet 2    hydrALAZINE (APRESOLINE) 25 MG tablet Take 1 tablet by mouth every 12 hours 90 tablet 3    aspirin EC 81 MG EC tablet Take 1 tablet by mouth daily. 30 tablet 2    denosumab (PROLIA) 60 MG/ML SOSY SC injection Inject 1 mL into the skin once for 1 dose 180 mL 0     No current facility-administered medications on file prior to visit.        Past medical, surgical, family and social history were reviewed and updated if stated    OBJECTIVE:     VS: /78   Pulse 78   Temp 98.3 °F (36.8 °C) (Temporal)   Resp 18   Ht 5' 6\" (1.676 m)   Wt 176 lb (79.8 kg)   SpO2 99%   BMI 28.41 kg/m²   Wt Readings from Last 3 Encounters:   06/06/22 176 lb (79.8 kg)   02/16/22 169 lb (76.7 kg)   01/31/22 169 lb (76.7 kg)     Temp Readings from Last 3 Encounters:   06/06/22 98.3 °F (36.8 °C) (Temporal)   02/16/22 96.9 °F (36.1 °C) (Temporal)   01/31/22 97.8 °F (36.6 °C) (Temporal)     BP Readings from Last 3 Encounters:   06/06/22 139/78   02/16/22 114/66   01/31/22 139/72        General assesment: Alert, Awake, Oriented times 3, no distress  Head: Normocephalic. No masses, lesions or tenderness noted  Eyes: Conjunctivae appear normal.   Ears: External ears normal  Chest - clear to auscultation, no wheezes, rales or rhonchi, symmetric air entry  Heart - RRR, S1, S2, positive murmurs  Abd: + colostomy bag, also has a López catheter. Extremities - peripheral pulses normal, no pedal edema, no clubbing or cyanosis  Mental status: normal mood  Neuro: grossly normal  Foot exam Conscious proproception, light touch, and vibration all intact, no ulcers or callous formation, no signs of fungal infection   ASSESSMENT / PLAN :     Marlin Cartagena was seen today for diabetes. Diagnoses and all orders for this visit:    Diabetes mellitus type 2 in obese ([facility]  -     POCT glycosylated hemoglobin (Hb A1C)  -      DIABETES FOOT EXAM  Continue current treatment    Herpes zoster vaccination declined    Stage 3b chronic kidney disease ([facility]  -     AFL - Kizzy Hansen MD, Nephrology, [facility] to avoid nephrotoxic drugs    Primary hypertension  Pressure controlled.   Continue current treatment      Follow-up as instructed        Chris Delacruz MD

## 2022-07-11 RX ORDER — PANTOPRAZOLE SODIUM 40 MG/1
TABLET, DELAYED RELEASE ORAL
Qty: 30 TABLET | Refills: 3 | Status: SHIPPED | OUTPATIENT
Start: 2022-07-11

## 2022-07-12 DIAGNOSIS — M80.00XD AGE-RELATED OSTEOPOROSIS WITH CURRENT PATHOLOGICAL FRACTURE WITH ROUTINE HEALING: Primary | ICD-10-CM

## 2022-07-13 RX ORDER — LANOLIN ALCOHOL/MO/W.PET/CERES
CREAM (GRAM) TOPICAL
Qty: 60 TABLET | Refills: 3 | Status: SHIPPED | OUTPATIENT
Start: 2022-07-13

## 2022-07-15 NOTE — PROGRESS NOTES
[address] [facility] PRE-ADMISSION TESTING GENERAL INSTRUCTIONS- [facility]-phone number:[phone]    GENERAL INSTRUCTIONS  [x] Antibacterial Soap shower Night before and/or AM of Surgery  [] Michael wipe instruction sheet and wipes given. [x] Nothing by mouth after midnight, including gum, candy, mints, or water. [x] You may brush your teeth, gargle, but do NOT swallow water. []Hibiclens shower  the night before and the morning of surgery. Do not use             Hibiclens on your face or head. [x]No smoking, chewing tobacco, illegal drugs, or alcohol within 24 hours of your surgery. [x] Jewelry, valuables or body piercing's should not be brought to the hospital. All body and/or tongue piercing's must be removed prior to arriving to hospital.  ALL hair pins must be removed. [x] Do not wear makeup, lotions, powders, deodorant. Nail polish as directed by the nurse. [x] Arrange transportation with a responsible adult  to and from the hospital. If you do not have a responsible adult  to transport you, you will need to make arrangements with a medical transportation company (i.e. [employer]. A Uber/taxi/bus is not appropriate unless you are accompanied by a responsible adult ). Arrange for someone to be with you for the remainder of the day and for 24 hours after your procedure due to having had anesthesia. Who will be your  for transportation?____sister______________   Who will be staying with you for 24 hrs after your procedure?______sister__________  [x] Bring insurance card and photo ID.  [] Transfusion Bracelet: Please bring with you to hospital, day of surgery  [] Bring urine specimen day of surgery. Any small container is acceptable. [] Use inhalers the morning of surgery and bring with you to hospital.  [] Bring copy of living will or healthcare power of  papers to be placed in your electronic record.   [] CPAP/BI-PAP: Please bring your machine if you are to spend the night in the hospital.     PARKING INSTRUCTIONS:   [x] Arrival Time:_________0800____  [x] Parking lot '\"I\"  is located on [facility] (the corner of [facility] and [facility]). To enter, press the button and the gate will lift. A free token will be provided to exit the lot. One car per patient is allowed to park in this lot. All other cars are to park on [address] [place] [address] either in the parking garage or the handicap lot. [] To reach the [facility] lobby from [address] [place] [address], upon entering the hospital, take elevator B to the 3rd floor. EDUCATION INSTRUCTIONS:      [] Knee or hip replacement booklet & exercise pamphlets given. [] [address] placed in chart. [] Pre-admission Testing educational folder given  [] Incentive Spirometry,coughing & deep breathing exercises reviewed. []Medication information sheet(s)   []Fluoroscopy-Xray used in surgery reviewed with patient. Educational pamphlet placed in chart. []Pain: Post-op pain is normal and to be expected. You will be asked to rate your pain from 0-10(a zero is not acceptable-education is needed). Your post-op pain goal is:  [] Ask your nurse for your pain medication. [] Joint camp offered. [] Joint replacement booklets given. [] Other:___________________________    MEDICATION INSTRUCTIONS:   [x]Bring a complete list of your medications, please write the last time you took the medicine, give this list to the nurse. [x] Take the following medications the morning of surgery with 1-2 ounces of water: see med sheet  [x] Stop herbal supplements and vitamins 5 days before your surgery. [x] DO NOT take any diabetic medicine the morning of surgery. Follow instructions for insulin the day before surgery. [x] If you are diabetic and your blood sugar is low or you feel symptomatic, you may drink 1-2 ounces of apple juice or take a glucose tablet.   The morning of your procedure, you may call the pre-op area if you have concerns about your blood sugar [phone]. [] Use your inhalers the morning of surgery. Bring your emergency inhaler with you day of surgery. [x] Follow physician instructions regarding any blood thinners you may be taking. WHAT TO EXPECT:  [x] The day of surgery you will be greeted and checked in by the [employer].  In addition, you will be registered in the [facility] by a Patient Access Representative. Please bring your photo ID and insurance card. A nurse will greet you in accordance to the time you are needed in the pre-op area to prepare you for surgery. Please do not be discouraged if you are not greeted in the order you arrive as there are many variables that are involved in patient preparation. Your patience is greatly appreciated as you wait for your nurse. Please bring in items such as: books, magazines, newspapers, electronics, or any other items  to occupy your time in the waiting area. [x]  Delays may occur with surgery and staff will make a sincere effort to keep you informed of delays. If any delays occur with your procedure, we apologize ahead of time for your inconvenience as we recognize the value of your time.

## 2022-07-19 ENCOUNTER — PREP FOR PROCEDURE (OUTPATIENT)
Dept: UROLOGY | Age: 72
End: 2022-07-19

## 2022-07-19 ENCOUNTER — HOSPITAL ENCOUNTER (OUTPATIENT)
Age: 72
Discharge: HOME | End: 2022-07-19
Payer: MEDICARE

## 2022-07-19 DIAGNOSIS — M80.00XD AGE-RELATED OSTEOPOROSIS WITH CURRENT PATHOLOGICAL FRACTURE WITH ROUTINE HEALING: ICD-10-CM

## 2022-07-19 LAB
ANION GAP SERPL CALCULATED.3IONS-SCNC: 17 MMOL/L (ref 7–16)
BUN BLDV-MCNC: 45 MG/DL (ref 6–23)
CALCIUM SERPL-MCNC: 10 MG/DL (ref 8.6–10.2)
CHLORIDE BLD-SCNC: 99 MMOL/L (ref 98–107)
CO2: 18 MMOL/L (ref 22–29)
CREAT SERPL-MCNC: 1.9 MG/DL (ref 0.5–1)
GFR AFRICAN AMERICAN: 31
GFR NON-AFRICAN AMERICAN: 31 ML/MIN/1.73
GLUCOSE BLD-MCNC: 272 MG/DL (ref 74–99)
POTASSIUM SERPL-SCNC: 4.6 MMOL/L (ref 3.5–5)
SODIUM BLD-SCNC: 134 MMOL/L (ref 132–146)

## 2022-07-19 PROCEDURE — 80048 BASIC METABOLIC PNL TOTAL CA: CPT

## 2022-07-19 PROCEDURE — 36415 COLL VENOUS BLD VENIPUNCTURE: CPT

## 2022-07-19 RX ORDER — CIPROFLOXACIN 2 MG/ML
400 INJECTION, SOLUTION INTRAVENOUS
Status: CANCELLED | OUTPATIENT
Start: 2022-07-19 | End: 2022-07-19

## 2022-07-19 RX ORDER — SODIUM CHLORIDE 0.9 % (FLUSH) 0.9 %
5-40 SYRINGE (ML) INJECTION EVERY 12 HOURS SCHEDULED
Status: CANCELLED | OUTPATIENT
Start: 2022-07-19

## 2022-07-19 RX ORDER — SODIUM CHLORIDE 9 MG/ML
INJECTION, SOLUTION INTRAVENOUS CONTINUOUS
Status: CANCELLED | OUTPATIENT
Start: 2022-07-19

## 2022-07-19 RX ORDER — SODIUM CHLORIDE 9 MG/ML
INJECTION, SOLUTION INTRAVENOUS PRN
Status: CANCELLED | OUTPATIENT
Start: 2022-07-19

## 2022-07-19 RX ORDER — SODIUM CHLORIDE 0.9 % (FLUSH) 0.9 %
5-40 SYRINGE (ML) INJECTION PRN
Status: CANCELLED | OUTPATIENT
Start: 2022-07-19

## 2022-07-20 ENCOUNTER — ANESTHESIA EVENT (OUTPATIENT)
Dept: OPERATING ROOM | Age: 72
End: 2022-07-20
Payer: MEDICARE

## 2022-07-20 ENCOUNTER — TELEPHONE (OUTPATIENT)
Dept: FAMILY MEDICINE CLINIC | Age: 72
End: 2022-07-20

## 2022-07-20 DIAGNOSIS — N18.32 STAGE 3B CHRONIC KIDNEY DISEASE (HCC): Primary | ICD-10-CM

## 2022-07-20 NOTE — TELEPHONE ENCOUNTER
Adrienne Vang from Dr Rader's office returned call; they had reviewed labs and reviewed notes and called the patient. Patient has knutson catheter, still follows with urology. Patient has stents and are getting exchanged tomorrow at [facility] with Dr Yobani Mccarty. If the stents are malfunctioning, this could cause the labs to be out of range. Once they get the labs done, they will review again and if the issue continues then the kidney doctors will make changes to her kidney regimen as necessary. She had talked to the patient and patient verbalized understanding. I had called the sister and explained this to her as well as she is the one who keeps calling upset because patient isn't being seen by the kidney doctor. Advised to wait until her labs are done next week and they will go from there. Verbalized understanding.

## 2022-07-20 NOTE — TELEPHONE ENCOUNTER
Called and spoke to a Anjana Mosley at [facility] office, also resent the referral to their office with latest labs. Anjana Mosley said pt is on a waiting list with their office to be seen and she has no sooner appointments to offer the patient. Pts family is very concerned about this and would like to know what needs to be done and if they need to find a new kidney doctor. Anjana Mosley said when she received the fax she would view the labs about inform the doctor.

## 2022-07-20 NOTE — TELEPHONE ENCOUNTER
Dr Michelle Mcdonough office has contacted patient, however they have her on a waiting list, is there anyone else you can refer her to or call to get her in sooner? WE are refaxing updated labs and referral to see if anyone at her office can see her sooner.

## 2022-07-21 ENCOUNTER — ANESTHESIA (OUTPATIENT)
Dept: OPERATING ROOM | Age: 72
End: 2022-07-21
Payer: MEDICARE

## 2022-07-21 ENCOUNTER — HOSPITAL ENCOUNTER (OUTPATIENT)
Dept: GENERAL RADIOLOGY | Age: 72
Discharge: HOME | End: 2022-07-23

## 2022-07-21 ENCOUNTER — HOSPITAL ENCOUNTER (OUTPATIENT)
Age: 72
Setting detail: OUTPATIENT SURGERY
Discharge: HOME | End: 2022-07-21
Attending: UROLOGY | Admitting: UROLOGY
Payer: MEDICARE

## 2022-07-21 VITALS
DIASTOLIC BLOOD PRESSURE: 78 MMHG | HEIGHT: 67 IN | WEIGHT: 150 LBS | HEART RATE: 82 BPM | OXYGEN SATURATION: 100 % | BODY MASS INDEX: 23.54 KG/M2 | RESPIRATION RATE: 18 BRPM | TEMPERATURE: 97.9 F | SYSTOLIC BLOOD PRESSURE: 119 MMHG

## 2022-07-21 DIAGNOSIS — R52 PAIN: ICD-10-CM

## 2022-07-21 DIAGNOSIS — R33.9 RETENTION OF URINE, UNSPECIFIED: ICD-10-CM

## 2022-07-21 DIAGNOSIS — Z01.818 PRE-OP TESTING: Primary | ICD-10-CM

## 2022-07-21 PROBLEM — N32.0 HYDRONEPHROSIS DUE TO OBSTRUCTION OF BLADDER: Status: ACTIVE | Noted: 2022-07-21

## 2022-07-21 PROBLEM — N13.30 HYDRONEPHROSIS DUE TO OBSTRUCTION OF BLADDER: Status: ACTIVE | Noted: 2022-07-21

## 2022-07-21 LAB — METER GLUCOSE: 204 MG/DL (ref 74–99)

## 2022-07-21 PROCEDURE — 7100000011 HC PHASE II RECOVERY - ADDTL 15 MIN: Performed by: UROLOGY

## 2022-07-21 PROCEDURE — 3700000000 HC ANESTHESIA ATTENDED CARE: Performed by: UROLOGY

## 2022-07-21 PROCEDURE — 6360000002 HC RX W HCPCS

## 2022-07-21 PROCEDURE — C2617 STENT, NON-COR, TEM W/O DEL: HCPCS | Performed by: UROLOGY

## 2022-07-21 PROCEDURE — 3209999900 FLUORO FOR SURGICAL PROCEDURES

## 2022-07-21 PROCEDURE — 87186 SC STD MICRODIL/AGAR DIL: CPT

## 2022-07-21 PROCEDURE — 7100000010 HC PHASE II RECOVERY - FIRST 15 MIN: Performed by: UROLOGY

## 2022-07-21 PROCEDURE — 87088 URINE BACTERIA CULTURE: CPT

## 2022-07-21 PROCEDURE — 2709999900 HC NON-CHARGEABLE SUPPLY: Performed by: UROLOGY

## 2022-07-21 PROCEDURE — 87077 CULTURE AEROBIC IDENTIFY: CPT

## 2022-07-21 PROCEDURE — 2580000003 HC RX 258: Performed by: NURSE ANESTHETIST, CERTIFIED REGISTERED

## 2022-07-21 PROCEDURE — 2500000003 HC RX 250 WO HCPCS: Performed by: NURSE ANESTHETIST, CERTIFIED REGISTERED

## 2022-07-21 PROCEDURE — 82962 GLUCOSE BLOOD TEST: CPT

## 2022-07-21 PROCEDURE — 3700000001 HC ADD 15 MINUTES (ANESTHESIA): Performed by: UROLOGY

## 2022-07-21 PROCEDURE — 3600000013 HC SURGERY LEVEL 3 ADDTL 15MIN: Performed by: UROLOGY

## 2022-07-21 PROCEDURE — C1769 GUIDE WIRE: HCPCS | Performed by: UROLOGY

## 2022-07-21 PROCEDURE — C1758 CATHETER, URETERAL: HCPCS | Performed by: UROLOGY

## 2022-07-21 PROCEDURE — 6360000002 HC RX W HCPCS: Performed by: NURSE PRACTITIONER

## 2022-07-21 PROCEDURE — 3600000003 HC SURGERY LEVEL 3 BASE: Performed by: UROLOGY

## 2022-07-21 DEVICE — UNIVERSA FIRM URETERAL STENT AND POSITIONER WITH HYDROPHILIC COATING
Type: IMPLANTABLE DEVICE | Site: URETER | Status: FUNCTIONAL
Brand: UNIVERSA

## 2022-07-21 RX ORDER — SODIUM CHLORIDE 9 MG/ML
INJECTION, SOLUTION INTRAVENOUS CONTINUOUS
Status: DISCONTINUED | OUTPATIENT
Start: 2022-07-21 | End: 2022-07-21 | Stop reason: HOSPADM

## 2022-07-21 RX ORDER — LIDOCAINE HYDROCHLORIDE 20 MG/ML
INJECTION, SOLUTION INTRAVENOUS PRN
Status: DISCONTINUED | OUTPATIENT
Start: 2022-07-21 | End: 2022-07-21 | Stop reason: SDUPTHER

## 2022-07-21 RX ORDER — LABETALOL HYDROCHLORIDE 5 MG/ML
5 INJECTION, SOLUTION INTRAVENOUS
Status: DISCONTINUED | OUTPATIENT
Start: 2022-07-21 | End: 2022-07-21 | Stop reason: HOSPADM

## 2022-07-21 RX ORDER — DROPERIDOL 2.5 MG/ML
0.62 INJECTION, SOLUTION INTRAMUSCULAR; INTRAVENOUS
Status: DISCONTINUED | OUTPATIENT
Start: 2022-07-21 | End: 2022-07-21 | Stop reason: HOSPADM

## 2022-07-21 RX ORDER — ONDANSETRON 2 MG/ML
4 INJECTION INTRAMUSCULAR; INTRAVENOUS EVERY 6 HOURS PRN
Status: CANCELLED | OUTPATIENT
Start: 2022-07-21

## 2022-07-21 RX ORDER — PHENYLEPHRINE HCL IN 0.9% NACL 1 MG/10 ML
SYRINGE (ML) INTRAVENOUS PRN
Status: DISCONTINUED | OUTPATIENT
Start: 2022-07-21 | End: 2022-07-21 | Stop reason: SDUPTHER

## 2022-07-21 RX ORDER — SODIUM CHLORIDE 9 MG/ML
INJECTION, SOLUTION INTRAVENOUS PRN
Status: DISCONTINUED | OUTPATIENT
Start: 2022-07-21 | End: 2022-07-21 | Stop reason: HOSPADM

## 2022-07-21 RX ORDER — PROPOFOL 10 MG/ML
INJECTION, EMULSION INTRAVENOUS PRN
Status: DISCONTINUED | OUTPATIENT
Start: 2022-07-21 | End: 2022-07-21 | Stop reason: SDUPTHER

## 2022-07-21 RX ORDER — SODIUM CHLORIDE 0.9 % (FLUSH) 0.9 %
5-40 SYRINGE (ML) INJECTION PRN
Status: DISCONTINUED | OUTPATIENT
Start: 2022-07-21 | End: 2022-07-21 | Stop reason: HOSPADM

## 2022-07-21 RX ORDER — SODIUM CHLORIDE 9 MG/ML
25 INJECTION, SOLUTION INTRAVENOUS PRN
Status: DISCONTINUED | OUTPATIENT
Start: 2022-07-21 | End: 2022-07-21 | Stop reason: HOSPADM

## 2022-07-21 RX ORDER — SODIUM CHLORIDE 0.9 % (FLUSH) 0.9 %
5-40 SYRINGE (ML) INJECTION EVERY 12 HOURS SCHEDULED
Status: DISCONTINUED | OUTPATIENT
Start: 2022-07-21 | End: 2022-07-21 | Stop reason: HOSPADM

## 2022-07-21 RX ORDER — IPRATROPIUM BROMIDE AND ALBUTEROL SULFATE 2.5; .5 MG/3ML; MG/3ML
1 SOLUTION RESPIRATORY (INHALATION)
Status: DISCONTINUED | OUTPATIENT
Start: 2022-07-21 | End: 2022-07-21 | Stop reason: HOSPADM

## 2022-07-21 RX ORDER — MIDAZOLAM HYDROCHLORIDE 2 MG/2ML
2 INJECTION, SOLUTION INTRAMUSCULAR; INTRAVENOUS
Status: DISCONTINUED | OUTPATIENT
Start: 2022-07-21 | End: 2022-07-21 | Stop reason: HOSPADM

## 2022-07-21 RX ORDER — ONDANSETRON 2 MG/ML
4 INJECTION INTRAMUSCULAR; INTRAVENOUS EVERY 6 HOURS PRN
Status: DISCONTINUED | OUTPATIENT
Start: 2022-07-21 | End: 2022-07-21 | Stop reason: HOSPADM

## 2022-07-21 RX ORDER — CIPROFLOXACIN 2 MG/ML
INJECTION, SOLUTION INTRAVENOUS PRN
Status: DISCONTINUED | OUTPATIENT
Start: 2022-07-21 | End: 2022-07-21 | Stop reason: SDUPTHER

## 2022-07-21 RX ORDER — CIPROFLOXACIN 2 MG/ML
400 INJECTION, SOLUTION INTRAVENOUS
Status: DISCONTINUED | OUTPATIENT
Start: 2022-07-21 | End: 2022-07-21 | Stop reason: HOSPADM

## 2022-07-21 RX ORDER — PROPOFOL 10 MG/ML
INJECTION, EMULSION INTRAVENOUS CONTINUOUS PRN
Status: DISCONTINUED | OUTPATIENT
Start: 2022-07-21 | End: 2022-07-21 | Stop reason: SDUPTHER

## 2022-07-21 RX ORDER — ONDANSETRON 2 MG/ML
4 INJECTION INTRAMUSCULAR; INTRAVENOUS
Status: DISCONTINUED | OUTPATIENT
Start: 2022-07-21 | End: 2022-07-21 | Stop reason: HOSPADM

## 2022-07-21 RX ORDER — HYDRALAZINE HYDROCHLORIDE 20 MG/ML
5 INJECTION INTRAMUSCULAR; INTRAVENOUS
Status: DISCONTINUED | OUTPATIENT
Start: 2022-07-21 | End: 2022-07-21 | Stop reason: HOSPADM

## 2022-07-21 RX ORDER — DIPHENHYDRAMINE HYDROCHLORIDE 50 MG/ML
12.5 INJECTION INTRAMUSCULAR; INTRAVENOUS
Status: DISCONTINUED | OUTPATIENT
Start: 2022-07-21 | End: 2022-07-21 | Stop reason: HOSPADM

## 2022-07-21 RX ORDER — FENTANYL CITRATE 50 UG/ML
INJECTION, SOLUTION INTRAMUSCULAR; INTRAVENOUS PRN
Status: DISCONTINUED | OUTPATIENT
Start: 2022-07-21 | End: 2022-07-21 | Stop reason: SDUPTHER

## 2022-07-21 RX ORDER — SODIUM CHLORIDE 9 MG/ML
INJECTION, SOLUTION INTRAVENOUS CONTINUOUS PRN
Status: DISCONTINUED | OUTPATIENT
Start: 2022-07-21 | End: 2022-07-21 | Stop reason: SDUPTHER

## 2022-07-21 RX ORDER — CIPROFLOXACIN 500 MG/1
500 TABLET, FILM COATED ORAL 2 TIMES DAILY
Qty: 14 TABLET | Refills: 1 | Status: SHIPPED | OUTPATIENT
Start: 2022-07-21 | End: 2022-07-28

## 2022-07-21 RX ORDER — TRAMADOL HYDROCHLORIDE 50 MG/1
50 TABLET ORAL
Status: DISCONTINUED | OUTPATIENT
Start: 2022-07-21 | End: 2022-07-21 | Stop reason: HOSPADM

## 2022-07-21 RX ADMIN — SODIUM CHLORIDE: 9 INJECTION, SOLUTION INTRAVENOUS at 10:24

## 2022-07-21 RX ADMIN — FENTANYL CITRATE 50 MCG: 50 INJECTION, SOLUTION INTRAMUSCULAR; INTRAVENOUS at 10:30

## 2022-07-21 RX ADMIN — FENTANYL CITRATE 50 MCG: 50 INJECTION, SOLUTION INTRAMUSCULAR; INTRAVENOUS at 10:35

## 2022-07-21 RX ADMIN — LIDOCAINE HYDROCHLORIDE 100 MG: 20 INJECTION, SOLUTION INTRAVENOUS at 10:37

## 2022-07-21 RX ADMIN — PROPOFOL 120 MCG/KG/MIN: 10 INJECTION, EMULSION INTRAVENOUS at 10:35

## 2022-07-21 RX ADMIN — CIPROFLOXACIN 400 MG: 2 INJECTION, SOLUTION INTRAVENOUS at 10:30

## 2022-07-21 RX ADMIN — PROPOFOL 40 MG: 10 INJECTION, EMULSION INTRAVENOUS at 10:37

## 2022-07-21 RX ADMIN — Medication 100 MCG: at 10:50

## 2022-07-21 RX ADMIN — PROPOFOL 110 MCG/KG/MIN: 10 INJECTION, EMULSION INTRAVENOUS at 10:51

## 2022-07-21 RX ADMIN — Medication 100 MCG: at 10:58

## 2022-07-21 RX ADMIN — Medication 100 MCG: at 10:44

## 2022-07-21 ASSESSMENT — LIFESTYLE VARIABLES: SMOKING_STATUS: 0

## 2022-07-21 ASSESSMENT — PAIN SCALES - GENERAL: PAINLEVEL_OUTOF10: 0

## 2022-07-21 NOTE — OP NOTE
[address]                  [address] [facility]  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 POSTOPERATIVE DIAGNOSES:  Bilateral obstructive uropathy, suspected  radiation changes of the distal ureters, and a flaccid bladder. OPERATIONS:  Cystopanendoscopy, bilateral retrograde pyelogram,  bilateral stent exchange. SURGEON:  Moe Ivy MD    ANESTHESIA:  Monitored sedation. CONDITION:  Stable. BLOOD LOSS:  Minimal, less than 5 mL. DISPOSITION:  PACU, then home with an indwelling López. OPERATIVE PROCEDURE:  The timeout was read by me, the Anesthesia, and  the operating staff; reviewed history, physical, allergy, and  medications. The patient had Cipro 400 mg given IV in the preop area,  placed in the lithotomy position, prepped and draped in the usual  fashion. No undue tension on the hips, knees, or buttocks. #21  panendoscope and obturator inserted in her bladder. The urethra is  stiff. The vaginal opening is stenotic, small, but I can introduce a  finger into the vagina for a distance of about 3 cm. The bladder showed  no stone, clots, or foreign bodies. The entire mucosa was well  visualized. There were stents seen and they were noncalcified. The  bladder capacity was small, under 300 mL. There was some trabeculation,  but no cellule or diverticular formation. No cystitis cystica,  enterovesical fissure, or extrinsic imprints.   There were no mucosal  lesions compatible with serious inflammatory, premalignant, or obviously  malignant change. Nothing needed to be biopsied.  film of the abdomen demonstrates both stents to be in good  position without calcification. 5 open-ended catheter and a Glidewire  inserted to the renal pelvis on the left. The stent was then removed. Removed the wire and aspirated only about 1 mL of cloudy urine from the  left kidney and retrograde pyelogram demonstrates significant  hydronephrosis. There may be some narrowing of the ureter below the  UPJ. At this point in time, I felt it was better to reinsert the stents  rather than remove them. So, through the [facility], a 6 x 28 [employer]  stent was inserted to the renal and positioned with fluoroscopy of the  bladder under direct visualization. On the right side, 5 open-ended catheter and a Glidewire inserted into  the renal pelvis. The stent was removed. The urine was more cloudy on  this side and approximately 8 mL obtained, sent for culture. Retrograde  pyelogram did not demonstrate significant hydronephrosis. Calices were  not clubbed. The pelvis really not distended as was on the other side,  but I still felt this patient may have radiation fibrosis in the distal  ureter. On that basis, I elected to replace the stent over a Glidewire. A 6 x 28 [employer] stent was placed to renal and positioned with  fluoroscopy of the bladder under direct visualization. 16-[place] López  placed back in her bladder. The bimanual exam demonstrates a short  vagina, but there were no obvious masses. The patient tolerated the  procedure well. Blood loss none. Sent to Recovery in satisfactory  condition.         Kody Garcia MD    D: 07/21/2022 12:17:58       T: 07/21/2022 12:20:59     [email or web address]  Job#: [id number]     Doc#: [id number]    CC:  Adeola Jesus MD

## 2022-07-21 NOTE — DISCHARGE INSTRUCTIONS
You have a stent which will remain until next procedure  You may have back pain with urination  With increased activity you will have blood in urine  You may have frequency and urgency or also burning with urination you may bathe tomorrow  Keep bowels soft with prune juice or  Colace(over the counter--once a day)  My office will call you to schedule a follow-up procedure  Call [phone] for follow up if you do not hear from us  Med -dental bureau number for emergencies [phone]

## 2022-07-21 NOTE — H&P
7/21/2022 10:26 AM  Service: Urology  Group: [facility] ([place])    Valentino Mosher  [id number]     Chief Complaint: Bilateral hydronephrosis with flaccid neurogenic bladder    History of Present Illness: The patient is a 67 y.o. female patient who presents with need for stent exchange or removal.  This patient had a cystoscopy insertion of bilateral stents in September 2021. Her creatinine was stabilized at 2.1. She seemed to do better with the stents and is tolerating them well she has an indwelling López catheter. 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 I advised her sister that if we leave her without stents we have to watch her very carefully for hydronephrosis azotemia or urinary tract infections with bacteremia.   No consideration has been made at this time for suprapubic tube insertion or urinary diversion as she seems to tolerate the [facility] fairly well  She has had proteinuria and has been assessed by nephrology    Past Medical History:   Diagnosis Date    Arthritis     osteoporsis    Cancer ([facility] [address]     colon and uterine    Closed fracture of radius 12/27/2016    Elevated lipids 1/15/2014    Headache     History of anal cancer 2/20/2017    Dr. Amanda Fuentes    Hyperlipidemia     Hypertension     Hypertension     Obesity     Proteinuria 6/25/2014    Type II or unspecified type diabetes mellitus without mention of complication, not stated as uncontrolled     Vaginal cancer ([address] 5/22/2017    Vitamin D deficiency 11/6/2014       Past Surgical History:   Procedure Laterality Date    BLADDER SURGERY N/A 9/24/2021    CYSTOSCOPY BILATERAL RETROGRADE PYELOGRAM, BILATERAL STENT INSERTION performed by Michelle Lambert MD at [address] Left 11/07/2016       Medications Prior to Admission:    Medications Prior to Admission: magnesium oxide (MAG-OX) 400 (240 Mg) MG tablet, take 1 tablet by mouth twice a day  denosumab (PROLIA) 60 MG/ML SOSY SC injection, Inject 1 mL into the skin once for 1 dose  pantoprazole (PROTONIX) 40 MG tablet, take 1 tablet by mouth EVERY MORNING BEFORE BREAKFAST  denosumab (PROLIA) 60 MG/ML SOSY SC injection, Inject 1 mL into the skin once for 1 dose  Lancets (ONETOUCH DELICA PLUS [id number]) MISC, use 1 LANCET to TEST BLOOD SUGAR once daily and if needed  ONETOUCH ULTRA strip, Inject 1 each into the skin daily  ONETOUCH ULTRA strip, Inject 1 each into the skin daily  atenolol (TENORMIN) 50 MG tablet, take 1 tablet by mouth once daily  amLODIPine (NORVASC) 10 MG tablet, take 1 tablet by mouth once daily  atorvastatin (LIPITOR) 20 MG tablet, take 1 tablet by mouth once daily  glipiZIDE (GLUCOTROL XL) 5 MG extended release tablet, Take 1 tablet by mouth daily  hydrALAZINE (APRESOLINE) 25 MG tablet, Take 1 tablet by mouth every 12 hours  aspirin EC 81 MG EC tablet, Take 1 tablet by mouth daily. Allergies:    Betadine [povidone iodine], Lisinopril, Penicillins, and Tylenol [acetaminophen]    Social History:    reports that she has never smoked. She has never used smokeless tobacco. She reports that she does not drink alcohol and does not use drugs. Family History:   Non-contributory to this urological problem  family history includes Cancer in her father; Diabetes in her sister; High Blood Pressure in her mother; Kidney Disease in her mother; Other in her brother.     Review of Systems:  Respiratory: negative for cough and hemoptysis  Cardiovascular: negative for chest pain and dyspnea  Gastrointestinal: negative for abdominal pain, diarrhea, nausea and vomiting. She has a colostomy since 2017  Derm: negative for rash and skin lesion(s)  Neurological: negative for seizures and tremors. She has some stuttering and is mentally challenged  Endocrine: Type 2 diabetes mellitus. Hypertension hyperlipidemia : As above in the HPI, otherwise negative  GYN: She has had a previous vaginal cancer removed 5/22/2017   Musculoskeletal she has age-related osteoporosis with pathologic fracture in her lower extremity  physical Exam:   Vitals: BP (!) 162/78   Pulse 78   Temp 97.8 °F (36.6 °C) (Temporal)   Resp 18   Ht 5' 7\" (1.702 m)   Wt 150 lb (68 kg)   SpO2 96%   BMI 23.49 kg/m²   General:  Awake, alert, oriented X 3. Well developed, well nourished, well groomed. No apparent distress. HEENT:  Normocephalic, atraumatic. Pupils equal, round. No scleral icterus. No conjunctival injection. Normal lips, teeth, and gums. No nasal discharge. Neck:  Supple, no masses. Heart:  RRR  Lungs:  No audible wheezing. Respirations symmetric and non-labored. Abdomen:  soft, nontender, no masses, no organomegaly, no peritoneal signs her colostomy appliance appears to be intact I did not examine her stoma  Extremities:  No clubbing, cyanosis, or edema  Skin:  Warm and dry, no open lesions or rashes  Neuro: No motor or sensory deficits in the 4 quadrant extremities  Rectal: deferred  Genitalia: Deferred to the operating room    Labs:   No results for input(s): WBC, RBC, HGB, HCT, MCV, MCH, MCHC, RDW, PLT, MPV in the last 72 hours. Recent Labs     07/19/22  1131   CREATININE 1.9*       Images:  No orders to display       Assessment: Taylor Mclaughlin 67 y.o. female     . At this point in time still not sure if her hydronephrosis is on the basis of urinary retention or whether she has had radiation fibrosis associated with her distal ureters. Plan:    .   She and her sister were agreeable to cystoscopy bilateral retrograde pyelograms bilateral stent exchange or bilateral stent removal    Electronically signed by Norma Concepcion MD on 7/21/2022 at 10:26 AM

## 2022-07-21 NOTE — ANESTHESIA PRE PROCEDURE
Shashi Wang MD       Current medications:    Current Facility-Administered Medications   Medication Dose Route Frequency Provider Last Rate Last Admin    0.9 % sodium chloride infusion   IntraVENous Continuous MARTIR Wolfe CNP        sodium chloride flush 0.9 % injection 5-40 mL  5-40 mL IntraVENous 2 times per day MARTIR Wolfe - CNP        sodium chloride flush 0.9 % injection 5-40 mL  5-40 mL IntraVENous PRN MARTIR Wolfe - CNP        0.9 % sodium chloride infusion   IntraVENous PRN MARTIR Wolfe CNP        ciprofloxacin (CIPRO) IVPB 400 mg  400 mg IntraVENous On Call to [address], APRN - CNP           Allergies:     Allergies   Allergen Reactions    Betadine [Povidone Iodine]     Lisinopril Swelling    Penicillins     Tylenol [Acetaminophen] Rash       Problem List:    Patient Active Problem List   Diagnosis Code    Hypertension I10    Elevated lipids E78.5    Proteinuria R80.9    Vitamin D deficiency E55.9    Closed fracture of radius S52.90XA    History of anal cancer Z85.048    Colostomy present ([facility]) Z93.3    Vaginal cancer ([facility] C52    Diabetes mellitus type 2 in obese ([facility]) E11.69, E66.9    Stuttering F80.81    Age-related osteoporosis with current pathological fracture with routine healing M80.00XD    Herpes zoster vaccination declined Z28.21    Chronic renal disease, stage III ([facility] [[id number]] N18.30    Hydronephrosis due to obstruction of bladder N13.30, N32.0       Past Medical History:        Diagnosis Date    Arthritis     osteoporsis    [facility] ([facility]     colon and uterine    Closed fracture of radius 12/27/2016    Elevated lipids 1/15/2014    Headache     History of anal cancer 2/20/2017    Dr. Modesta Pedro    Hyperlipidemia     Hypertension     Hypertension     Obesity     Proteinuria 6/25/2014    Type II or unspecified type diabetes mellitus without mention of complication, not stated as uncontrolled     Vaginal cancer ([facility] [address] 5/22/2017    Vitamin D deficiency 11/6/2014       Past Surgical History:        Procedure Laterality Date    BLADDER SURGERY N/A 9/24/2021    CYSTOSCOPY BILATERAL RETROGRADE PYELOGRAM, BILATERAL STENT INSERTION performed by Rashard Ann MD at [address] [place] [address]      RECTAL SURGERY      WRIST FRACTURE SURGERY Left 11/07/2016       Social History:    Social History     Tobacco Use    Smoking status: Never    Smokeless tobacco: Never   Substance Use Topics    Alcohol use: No                                Counseling given: Not Answered      Vital Signs (Current):   Vitals:    07/15/22 1043 07/21/22 0810   BP:  (!) 162/78   Pulse:  78   Resp:  18   Temp:  97.8 °F (36.6 °C)   TempSrc:  Temporal   SpO2:  96%   Weight: 150 lb (68 kg) 150 lb (68 kg)   Height: 5' 7\" (1.702 m) 5' 7\" (1.702 m)                                              BP Readings from Last 3 Encounters:   07/21/22 (!) 162/78   06/06/22 139/78   02/16/22 114/66       NPO Status: Time of last liquid consumption: 2100                        Time of last solid consumption: 2000                        Date of last liquid consumption: 07/20/22                        Date of last solid food consumption: 07/20/22    BMI:   Wt Readings from Last 3 Encounters:   07/21/22 150 lb (68 kg)   06/06/22 176 lb (79.8 kg)   02/16/22 169 lb (76.7 kg)     Body mass index is 23.49 kg/m².     CBC:   Lab Results   Component Value Date/Time    WBC 9.9 01/31/2022 10:00 AM    RBC 4.98 01/31/2022 10:00 AM    HGB 13.9 01/31/2022 10:00 AM    HCT 43.3 01/31/2022 10:00 AM    MCV 86.9 01/31/2022 10:00 AM    RDW 14.1 01/31/2022 10:00 AM     01/31/2022 10:00 AM       CMP:   Lab Results   Component Value Date/Time     07/19/2022 11:31 AM    K 4.6 07/19/2022 11:31 AM    K 3.6 09/23/2021 04:21 AM    CL 99 07/19/2022 11:31 AM    CO2 18 07/19/2022 11:31 AM    BUN 45 07/19/2022 11:31 AM    CREATININE 1.9 07/19/2022 11:31 AM    GFRAA 31 GI/Hepatic/Renal:   (+) renal disease (JEANNINE): kidney stones,          ROS comment: Colostomy present. Endo/Other:    (+) Diabetes (last A1C - 10.0  on 9/9/20)Type II DM, poorly controlled, , malignancy/cancer. ROS comment: History of anal cancer  Hx Cancer colon and uterine  Abdominal:             Vascular: Other Findings:             Anesthesia Plan      MAC     ASA 3       Induction: intravenous. Anesthetic plan and risks discussed with patient. Use of blood products discussed with patient whom consented to blood products. Plan discussed with CRNA and attending. DOS STAFF ADDENDUM:    Patient seen and chart reviewed. Physical exam and history updated as indicated. NPO status confirmed. Anesthesia options and plan discussed including risks benefits with patient/legal guardian and family as available. Concerns and questions addressed. Consent verbalized to proceed. Anesthesia plan, options and intraoperative/postoperative concerns discussed with care team.    Patient appears SOB but she denies discomfort. She is on no breathing medications or inhalers. Lungs are clear.     Roxann Tatum MD, MD  7/21/2022  8:21 AM        Sumit Marcos MD   7/21/2022

## 2022-07-21 NOTE — BRIEF OP NOTE
Brief Postoperative Note      Patient: Jamaal Gong  YOB: 1950  MRN: [id number]    Date of Procedure: 7/21/2022    Pre-Op Diagnosis: Flaccid neurogenic bladder with massive hydronephrosis with tortuous ureters    Post-Op Diagnosis: Bilateral hydronephrosis most likely due to fibrosis of the distal ureters from previous radiation and a nonfunctional bladder flaccid type       Procedure: Cystopanendoscopy bilateral retrograde pyelograms bilateral stent exchange    Surgeon(s):  Makayla Yan MD    Assistant:  None    Anesthesia: Monitor Anesthesia Care   Estimated Blood Loss (mL): Minimal    Complications: None    Specimens:    Urine from each renal pelvis for culture for culture    Implants:  None      Drains:   Colostomy LUQ (Active)       Urethral Catheter Latex; Triple-lumen 22 fr (Active)       Findings: As above    Electronically signed by Makayla Yan MD on 7/21/2022 at 7:38 AM

## 2022-07-21 NOTE — ANESTHESIA POSTPROCEDURE EVALUATION
Department of Anesthesiology  Postprocedure Note    Patient: Tre Ortiz  MRN: [id number]  YOB: 1950  Date of evaluation: 7/21/2022      Procedure Summary     Date: 07/21/22 Room / Location: [facility] OR 11 / [facility]    Anesthesia Start: 1022 Anesthesia Stop: 8671    Procedure: [address] (Bilateral: Pelvis) Diagnosis:       Retention of urine, unspecified      (RETENTION OF URINE)    Surgeons: Mikaela Oliveira MD Responsible Provider: Caroline Bence, MD    Anesthesia Type: MAC ASA Status: 3          Anesthesia Type: No value filed.     Lino Phase I: Lino Score: 10    Lino Phase II: Lino Score: 10      Anesthesia Post Evaluation    Patient location during evaluation: PACU  Patient participation: complete - patient participated  Level of consciousness: awake and alert  Airway patency: patent  Nausea & Vomiting: no nausea and no vomiting  Complications: no  Cardiovascular status: blood pressure returned to baseline and hemodynamically stable  Respiratory status: acceptable and spontaneous ventilation  Hydration status: euvolemic  Multimodal analgesia pain management approach

## 2022-07-25 DIAGNOSIS — E78.5 HYPERLIPIDEMIA, UNSPECIFIED HYPERLIPIDEMIA TYPE: ICD-10-CM

## 2022-07-25 DIAGNOSIS — E11.9 DIABETES MELLITUS WITHOUT COMPLICATION (HCC): ICD-10-CM

## 2022-07-25 DIAGNOSIS — I10 ESSENTIAL HYPERTENSION: ICD-10-CM

## 2022-07-25 LAB
ORGANISM: ABNORMAL
URINE CULTURE, ROUTINE: ABNORMAL

## 2022-07-25 RX ORDER — GLIPIZIDE 5 MG/1
TABLET, FILM COATED, EXTENDED RELEASE ORAL
Qty: 180 TABLET | Refills: 1 | Status: SHIPPED | OUTPATIENT
Start: 2022-07-25

## 2022-07-25 RX ORDER — ATORVASTATIN CALCIUM 20 MG/1
TABLET, FILM COATED ORAL
Qty: 90 TABLET | Refills: 1 | Status: SHIPPED | OUTPATIENT
Start: 2022-07-25

## 2022-07-25 RX ORDER — ATENOLOL 50 MG/1
TABLET ORAL
Qty: 90 TABLET | Refills: 1 | Status: SHIPPED | OUTPATIENT
Start: 2022-07-25

## 2022-08-16 ENCOUNTER — HOSPITAL ENCOUNTER (OUTPATIENT)
Dept: INFUSION THERAPY | Age: 72
Setting detail: INFUSION SERIES
Discharge: HOME | End: 2022-08-16
Payer: MEDICARE

## 2022-08-16 VITALS
TEMPERATURE: 98.4 F | DIASTOLIC BLOOD PRESSURE: 88 MMHG | OXYGEN SATURATION: 100 % | RESPIRATION RATE: 18 BRPM | SYSTOLIC BLOOD PRESSURE: 164 MMHG | HEART RATE: 62 BPM

## 2022-08-16 DIAGNOSIS — M80.00XD AGE-RELATED OSTEOPOROSIS WITH CURRENT PATHOLOGICAL FRACTURE WITH ROUTINE HEALING: Primary | ICD-10-CM

## 2022-08-16 PROCEDURE — 6360000002 HC RX W HCPCS: Performed by: FAMILY MEDICINE

## 2022-08-16 PROCEDURE — 96372 THER/PROPH/DIAG INJ SC/IM: CPT

## 2022-08-16 RX ADMIN — DENOSUMAB 60 MG: 60 INJECTION SUBCUTANEOUS at 10:36

## 2022-08-16 ASSESSMENT — PAIN SCALES - GENERAL: PAINLEVEL_OUTOF10: 0

## 2022-08-16 NOTE — PROGRESS NOTES
Before injection patient declined any infections, open wounds, or change in medical condition. Tolerated infusion well. Reviewed therapy plan, offered education material and/or discharge material, reviewed medication information and signs and symptoms  and educated on possible side effects, verbalizes good knowledge of current plan patient verbalizes understanding, and has no signs or symptoms to report at this time. Patient discharged. Patient alert and oriented x3. No distress noted. Vital signs stable. Patient denies any new or worsening pain. Patient denies any needs. All questions answered. Next appointment scheduled.  Refused copy of AVS.  Declined to stay

## 2022-11-29 RX ORDER — LANOLIN ALCOHOL/MO/W.PET/CERES
CREAM (GRAM) TOPICAL
Qty: 60 TABLET | Refills: 3 | Status: SHIPPED | OUTPATIENT
Start: 2022-11-29

## 2022-11-29 RX ORDER — PANTOPRAZOLE SODIUM 40 MG/1
TABLET, DELAYED RELEASE ORAL
Qty: 30 TABLET | Refills: 3 | Status: SHIPPED | OUTPATIENT
Start: 2022-11-29

## 2022-12-05 ENCOUNTER — OFFICE VISIT (OUTPATIENT)
Dept: FAMILY MEDICINE CLINIC | Age: 72
End: 2022-12-05
Payer: MEDICARE

## 2022-12-05 ENCOUNTER — TELEPHONE (OUTPATIENT)
Dept: FAMILY MEDICINE CLINIC | Age: 72
End: 2022-12-05

## 2022-12-05 VITALS
WEIGHT: 178 LBS | DIASTOLIC BLOOD PRESSURE: 108 MMHG | BODY MASS INDEX: 27.94 KG/M2 | HEIGHT: 67 IN | SYSTOLIC BLOOD PRESSURE: 178 MMHG | OXYGEN SATURATION: 98 % | RESPIRATION RATE: 16 BRPM | HEART RATE: 80 BPM | TEMPERATURE: 97.7 F

## 2022-12-05 DIAGNOSIS — I10 ESSENTIAL HYPERTENSION: ICD-10-CM

## 2022-12-05 DIAGNOSIS — Z23 NEED FOR INFLUENZA VACCINATION: ICD-10-CM

## 2022-12-05 DIAGNOSIS — Z00.00 MEDICARE ANNUAL WELLNESS VISIT, SUBSEQUENT: Primary | ICD-10-CM

## 2022-12-05 PROCEDURE — 3017F COLORECTAL CA SCREEN DOC REV: CPT | Performed by: FAMILY MEDICINE

## 2022-12-05 PROCEDURE — 3078F DIAST BP <80 MM HG: CPT | Performed by: FAMILY MEDICINE

## 2022-12-05 PROCEDURE — G8484 FLU IMMUNIZE NO ADMIN: HCPCS | Performed by: FAMILY MEDICINE

## 2022-12-05 PROCEDURE — 1123F ACP DISCUSS/DSCN MKR DOCD: CPT | Performed by: FAMILY MEDICINE

## 2022-12-05 PROCEDURE — 3074F SYST BP LT 130 MM HG: CPT | Performed by: FAMILY MEDICINE

## 2022-12-05 PROCEDURE — G0439 PPPS, SUBSEQ VISIT: HCPCS | Performed by: FAMILY MEDICINE

## 2022-12-05 PROCEDURE — 90694 VACC AIIV4 NO PRSRV 0.5ML IM: CPT | Performed by: FAMILY MEDICINE

## 2022-12-05 PROCEDURE — G0008 ADMIN INFLUENZA VIRUS VAC: HCPCS | Performed by: FAMILY MEDICINE

## 2022-12-05 RX ORDER — AMLODIPINE BESYLATE 10 MG/1
TABLET ORAL
Qty: 90 TABLET | Refills: 3 | Status: SHIPPED | OUTPATIENT
Start: 2022-12-05

## 2022-12-05 RX ORDER — HYDRALAZINE HYDROCHLORIDE 25 MG/1
25 TABLET, FILM COATED ORAL EVERY 12 HOURS SCHEDULED
Qty: 90 TABLET | Refills: 3 | Status: SHIPPED | OUTPATIENT
Start: 2022-12-05

## 2022-12-05 SDOH — ECONOMIC STABILITY: FOOD INSECURITY: WITHIN THE PAST 12 MONTHS, THE FOOD YOU BOUGHT JUST DIDN'T LAST AND YOU DIDN'T HAVE MONEY TO GET MORE.: NEVER TRUE

## 2022-12-05 SDOH — ECONOMIC STABILITY: FOOD INSECURITY: WITHIN THE PAST 12 MONTHS, YOU WORRIED THAT YOUR FOOD WOULD RUN OUT BEFORE YOU GOT MONEY TO BUY MORE.: NEVER TRUE

## 2022-12-05 ASSESSMENT — PATIENT HEALTH QUESTIONNAIRE - PHQ9
SUM OF ALL RESPONSES TO PHQ QUESTIONS 1-9: 0
1. LITTLE INTEREST OR PLEASURE IN DOING THINGS: 0
SUM OF ALL RESPONSES TO PHQ QUESTIONS 1-9: 0
SUM OF ALL RESPONSES TO PHQ QUESTIONS 1-9: 0
SUM OF ALL RESPONSES TO PHQ9 QUESTIONS 1 & 2: 0
SUM OF ALL RESPONSES TO PHQ QUESTIONS 1-9: 0
2. FEELING DOWN, DEPRESSED OR HOPELESS: 0

## 2022-12-05 ASSESSMENT — SOCIAL DETERMINANTS OF HEALTH (SDOH): HOW HARD IS IT FOR YOU TO PAY FOR THE VERY BASICS LIKE FOOD, HOUSING, MEDICAL CARE, AND HEATING?: NOT HARD AT ALL

## 2022-12-05 ASSESSMENT — LIFESTYLE VARIABLES: HOW OFTEN DO YOU HAVE A DRINK CONTAINING ALCOHOL: NEVER

## 2022-12-05 NOTE — TELEPHONE ENCOUNTER
Patient should be taking all of those meds- atorvastatin, Atenolol Hydralazine and Amlodipine and Glipizide.

## 2022-12-05 NOTE — PATIENT INSTRUCTIONS
Personalized Preventive Plan for Bridgett Obregon - 12/5/2022  Medicare offers a range of preventive health benefits. Some of the tests and screenings are paid in full while other may be subject to a deductible, co-insurance, and/or copay. Some of these benefits include a comprehensive review of your medical history including lifestyle, illnesses that may run in your family, and various assessments and screenings as appropriate. After reviewing your medical record and screening and assessments performed today your provider may have ordered immunizations, labs, imaging, and/or referrals for you. A list of these orders (if applicable) as well as your Preventive Care list are included within your After Visit Summary for your review. Other Preventive Recommendations:    A preventive eye exam performed by an eye specialist is recommended every 1-2 years to screen for glaucoma; cataracts, macular degeneration, and other eye disorders. A preventive dental visit is recommended every 6 months. Try to get at least 150 minutes of exercise per week or 10,000 steps per day on a pedometer . Order or download the FREE \"Exercise & Physical Activity: Your Everyday Guide\" from [employer]. Call 4-764.993.9774 or search [employer] online. You need 8629-4334 mg of calcium and 4334-5976 IU of vitamin D per day. It is possible to meet your calcium requirement with diet alone, but a vitamin D supplement is usually necessary to meet this goal.  When exposed to the sun, use a sunscreen that protects against both UVA and UVB radiation with an SPF of 30 or greater. Reapply every 2 to 3 hours or after sweating, drying off with a towel, or swimming. Always wear a seat belt when traveling in a car. Always wear a helmet when riding a bicycle or motorcycle.

## 2022-12-05 NOTE — TELEPHONE ENCOUNTER
Pt sister calling- pt has at home atorvastatin 20mg and Atenolol 50mg and is unsure if pt should be taking those or what was prescribed to her today at her visit- (Amlodipine 10mg & hydralazine 25mg)

## 2022-12-05 NOTE — PROGRESS NOTES
Medicare Annual Wellness Visit    Shahla Silverman is here for Medicare AWV    Assessment & Plan   Medicare annual wellness visit, subsequent  -     Full code  Need for influenza vaccination  -     Influenza, FLUAD, (age 72 y+), IM, Preservative Free, 0.5 mL  Essential hypertension  -     amLODIPine (NORVASC) 10 MG tablet; take 1 tablet by mouth once daily, Disp-90 tablet, R-3Normal  -     hydrALAZINE (APRESOLINE) 25 MG tablet; Take 1 tablet by mouth every 12 hours, Disp-90 tablet, R-3Normal   Advised to take all of the BP meds as prescribed. Monitor BP at home. Recommendations for Preventive Services Due: see orders and patient instructions/AVS.  Recommended screening schedule for the next 5-10 years is provided to the patient in written form: see Patient Instructions/AVS.     Return for Medicare Annual Wellness Visit in 1 year. Subjective   Here for AWV. BP is elevated. Is not sure if she is taking all of her medications. No cp, sob    Patient's complete Health Risk Assessment and screening values have been reviewed and are found in Flowsheets. The following problems were reviewed today and where indicated follow up appointments were made and/or referrals ordered.     Positive Risk Factor Screenings with Interventions:             General Health and ACP:  General  In general, how would you say your health is?: Fair  In the past 7 days, have you experienced any of the following: New or Increased Pain, New or Increased Fatigue, Loneliness, Social Isolation, Stress or Anger?: No  Do you get the social and emotional support that you need?: Yes  Do you have a Living Will?: (!) No    Advance Directives       Power of  Living Will ACP-Advance Directive ACP-Power of     Not on File Filed on 06/19/12 Filed Not on File          General Health Risk Interventions:  full code    Health Habits/Nutrition:  Physical Activity: Insufficiently Active    Days of Exercise per Week: 7 days    Minutes of Exercise per Session: 10 min     Have you lost any weight without trying in the past 3 months?: No  Body mass index: (!) 27.88  Have you seen the dentist within the past year?: (!) No    Health Habits/Nutrition Interventions:  Dental exam overdue:  patient encouraged to make appointment with his/her dentist      ADLs:  In the past 7 days, did you need help from others to perform any of the following everyday activities: Eating, dressing, grooming, bathing, toileting, or walking/balance?: No  In the past 7 days, did you need help from others to take care of any of the following: Laundry, housekeeping, banking/finances, shopping, telephone use, food preparation, transportation, or taking medications?: (!) Yes  Select all that apply: (!) Transportation    ADL Interventions:  Family helps          Objective   Vitals:    12/05/22 0955 12/05/22 1008   BP: (!) 173/109 (!) 178/108   Pulse: 80    Resp: 16    Temp: 97.7 °F (36.5 °C)    TempSrc: Temporal    SpO2: 98%    Weight: 178 lb (80.7 kg)    Height: 5' 7\" (1.702 m)       Body mass index is 27.88 kg/m². Physical Examination:  General appearance - alert, well appearing, and in no distress  Chest - clear to auscultation, no wheezes, rales or rhonchi, symmetric air entry  Heart - normal rate, regular rhythm, normal S1, S2, no murmurs, rubs, clicks or gallops  Neurological - alert, oriented, normal speech, no focal findings or movement disorder noted  Extremities - no pedal edema  Skin- no rash  Mental status - Normal mood, judgement and thought process          Allergies   Allergen Reactions    Betadine [Povidone Iodine]     Lisinopril Swelling    Penicillins     Tylenol [Acetaminophen] Rash     Prior to Visit Medications    Medication Sig Taking?  Authorizing Provider   pantoprazole (PROTONIX) 40 MG tablet take 1 tablet by mouth every morning before breakfast Yes Marcin Mansfield MD   magnesium oxide (MAG-OX) 400 (240 Mg) MG tablet take 1 tablet by mouth twice a day Yes Danford Libman, MD   atenolol (TENORMIN) 50 MG tablet take 1 tablet by mouth once daily Yes Yumi Schumacher MD   atorvastatin (LIPITOR) 20 MG tablet take 1 tablet by mouth once daily Yes Yumi Schumacher MD   glipiZIDE (GLUCOTROL XL) 5 MG extended release tablet take 1 tablet by mouth once daily Yes Yumi Schumacher MD   Lancets ([address]) [facility] use 1 LANCET to TEST BLOOD SUGAR once daily and if needed Yes Danford Libman, MD   [facility] ULTRA strip Inject 1 each into the skin daily Yes Danford Libman, MD   [facility] ULTRA strip Inject 1 each into the skin daily Yes Danford Libman, MD   amLODIPine (NORVASC) 10 MG tablet take 1 tablet by mouth once daily Yes Danford Libman, MD   hydrALAZINE (APRESOLINE) 25 MG tablet Take 1 tablet by mouth every 12 hours Yes Ramesh Mejia MD   aspirin EC 81 MG EC tablet Take 1 tablet by mouth daily.  Yes Danford Libman, MD   denosumab (PROLIA) 60 MG/ML SOSY SC injection Inject 1 mL into the skin once for 1 dose  Danford Libman, MD   denosumab (PROLIA) 60 MG/ML SOSY SC injection Inject 1 mL into the skin once for 1 dose  Danford Libman, MD       [facility] (Including outside providers/suppliers regularly involved in providing care):   Patient Care Team:  Danford Libman, MD as PCP - General (Family Medicine)  Danford Libman, MD as PCP - [facility] Empaneled Provider     Reviewed and updated this visit:  Tobacco  Allergies  Meds  Problems  Med Hx  Surg Hx  Soc Hx  Fam Hx

## 2022-12-14 ENCOUNTER — TELEPHONE (OUTPATIENT)
Dept: FAMILY MEDICINE CLINIC | Age: 72
End: 2022-12-14

## 2022-12-14 RX ORDER — BLOOD SUGAR DIAGNOSTIC
1 STRIP MISCELLANEOUS DAILY
Qty: 100 EACH | Refills: 5 | Status: SHIPPED | OUTPATIENT
Start: 2022-12-14

## 2022-12-16 ENCOUNTER — TELEPHONE (OUTPATIENT)
Dept: FAMILY MEDICINE CLINIC | Age: 72
End: 2022-12-16

## 2022-12-16 NOTE — TELEPHONE ENCOUNTER
Parul Freed, pts sister, calling very concerned that pts A1C was not checked at the last visit. Pt is scheduled for BP Check on Wednesday 12/21 and would like for pts A1C to be checked as well because pt is diabetic and she feels it needs to be checked.

## 2022-12-21 ENCOUNTER — APPOINTMENT (OUTPATIENT)
Dept: CT IMAGING | Age: 72
DRG: 660 | End: 2022-12-21
Payer: MEDICARE

## 2022-12-21 ENCOUNTER — NURSE ONLY (OUTPATIENT)
Dept: FAMILY MEDICINE CLINIC | Age: 72
End: 2022-12-21
Payer: MEDICARE

## 2022-12-21 ENCOUNTER — ANESTHESIA EVENT (OUTPATIENT)
Dept: OPERATING ROOM | Age: 72
End: 2022-12-21
Payer: MEDICARE

## 2022-12-21 ENCOUNTER — APPOINTMENT (OUTPATIENT)
Dept: GENERAL RADIOLOGY | Age: 72
DRG: 660 | End: 2022-12-21
Payer: MEDICARE

## 2022-12-21 ENCOUNTER — HOSPITAL ENCOUNTER (INPATIENT)
Age: 72
LOS: 7 days | Discharge: NURSING, REHAB OR LONG-TERM CARE | DRG: 660 | End: 2022-12-28
Attending: STUDENT IN AN ORGANIZED HEALTH CARE EDUCATION/TRAINING PROGRAM | Admitting: FAMILY MEDICINE
Payer: MEDICARE

## 2022-12-21 VITALS — DIASTOLIC BLOOD PRESSURE: 75 MMHG | SYSTOLIC BLOOD PRESSURE: 117 MMHG

## 2022-12-21 DIAGNOSIS — E87.1 HYPONATREMIA: ICD-10-CM

## 2022-12-21 DIAGNOSIS — Z96.0 STATUS POST PLACEMENT OF URETERAL STENT: ICD-10-CM

## 2022-12-21 DIAGNOSIS — E86.0 DEHYDRATION: ICD-10-CM

## 2022-12-21 DIAGNOSIS — T83.511S URINARY TRACT INFECTION ASSOCIATED WITH INDWELLING URETHRAL CATHETER, SEQUELA: ICD-10-CM

## 2022-12-21 DIAGNOSIS — N13.30 BILATERAL HYDRONEPHROSIS: ICD-10-CM

## 2022-12-21 DIAGNOSIS — N13.30 HYDRONEPHROSIS, UNSPECIFIED HYDRONEPHROSIS TYPE: ICD-10-CM

## 2022-12-21 DIAGNOSIS — N17.9 ACUTE RENAL FAILURE, UNSPECIFIED ACUTE RENAL FAILURE TYPE (HCC): Primary | ICD-10-CM

## 2022-12-21 DIAGNOSIS — E11.9 DIABETES MELLITUS WITHOUT COMPLICATION (HCC): Primary | ICD-10-CM

## 2022-12-21 DIAGNOSIS — N39.0 URINARY TRACT INFECTION ASSOCIATED WITH INDWELLING URETHRAL CATHETER, SEQUELA: ICD-10-CM

## 2022-12-21 DIAGNOSIS — Z97.8 CHRONIC INDWELLING FOLEY CATHETER: ICD-10-CM

## 2022-12-21 PROBLEM — N18.6 ACUTE RENAL FAILURE SUPERIMPOSED ON CHRONIC KIDNEY DISEASE, ON CHRONIC DIALYSIS, UNSPECIFIED ACUTE RENAL FAILURE TYPE (HCC): Status: ACTIVE | Noted: 2022-12-21

## 2022-12-21 PROBLEM — R00.1 BRADYCARDIA: Status: ACTIVE | Noted: 2022-12-21

## 2022-12-21 PROBLEM — N18.9 ACUTE RENAL FAILURE SUPERIMPOSED ON CHRONIC KIDNEY DISEASE, ON CHRONIC DIALYSIS, UNSPECIFIED ACUTE RENAL FAILURE TYPE (HCC): Status: ACTIVE | Noted: 2022-12-21

## 2022-12-21 PROBLEM — R42 DIZZINESS: Status: ACTIVE | Noted: 2022-12-21

## 2022-12-21 PROBLEM — Z99.2 ACUTE RENAL FAILURE SUPERIMPOSED ON CHRONIC KIDNEY DISEASE, ON CHRONIC DIALYSIS, UNSPECIFIED ACUTE RENAL FAILURE TYPE (HCC): Status: ACTIVE | Noted: 2022-12-21

## 2022-12-21 LAB
ALBUMIN SERPL-MCNC: 2.5 G/DL (ref 3.5–5.2)
ALBUMIN SERPL-MCNC: 3.4 G/DL (ref 3.5–5.2)
ALP BLD-CCNC: 104 U/L (ref 35–104)
ALP BLD-CCNC: 95 U/L (ref 35–104)
ALT SERPL-CCNC: 6 U/L (ref 0–32)
ALT SERPL-CCNC: 7 U/L (ref 0–32)
ANION GAP SERPL CALCULATED.3IONS-SCNC: 16 MMOL/L (ref 7–16)
ANION GAP SERPL CALCULATED.3IONS-SCNC: 18 MMOL/L (ref 7–16)
AST SERPL-CCNC: 11 U/L (ref 0–31)
AST SERPL-CCNC: 12 U/L (ref 0–31)
BACTERIA: ABNORMAL /HPF
BASOPHILS ABSOLUTE: 0 E9/L (ref 0–0.2)
BASOPHILS RELATIVE PERCENT: 0 % (ref 0–2)
BETA-HYDROXYBUTYRATE: 0.82 MMOL/L (ref 0.02–0.27)
BILIRUB SERPL-MCNC: 0.5 MG/DL (ref 0–1.2)
BILIRUB SERPL-MCNC: 0.5 MG/DL (ref 0–1.2)
BILIRUBIN URINE: NEGATIVE
BLOOD, URINE: ABNORMAL
BUN BLDV-MCNC: 75 MG/DL (ref 6–23)
BUN BLDV-MCNC: 76 MG/DL (ref 6–23)
BURR CELLS: ABNORMAL
CALCIUM SERPL-MCNC: 8.9 MG/DL (ref 8.6–10.2)
CALCIUM SERPL-MCNC: 9.3 MG/DL (ref 8.6–10.2)
CHLORIDE BLD-SCNC: 86 MMOL/L (ref 98–107)
CHLORIDE BLD-SCNC: 91 MMOL/L (ref 98–107)
CHLORIDE URINE RANDOM: 22 MMOL/L
CHP ED QC CHECK: NORMAL
CLARITY: ABNORMAL
CO2: 19 MMOL/L (ref 22–29)
CO2: 19 MMOL/L (ref 22–29)
COLOR: YELLOW
CREAT SERPL-MCNC: 6.1 MG/DL (ref 0.5–1)
CREAT SERPL-MCNC: 6.5 MG/DL (ref 0.5–1)
CREATININE URINE: 89 MG/DL (ref 29–226)
EKG ATRIAL RATE: 58 BPM
EKG P AXIS: 33 DEGREES
EKG P-R INTERVAL: 150 MS
EKG Q-T INTERVAL: 468 MS
EKG QRS DURATION: 68 MS
EKG QTC CALCULATION (BAZETT): 459 MS
EKG R AXIS: -3 DEGREES
EKG T AXIS: 30 DEGREES
EKG VENTRICULAR RATE: 58 BPM
EOSINOPHILS ABSOLUTE: 0.18 E9/L (ref 0.05–0.5)
EOSINOPHILS RELATIVE PERCENT: 2.6 % (ref 0–6)
EPITHELIAL CELLS, UA: ABNORMAL /HPF
GFR SERPL CREATININE-BSD FRML MDRD: 6 ML/MIN/1.73
GFR SERPL CREATININE-BSD FRML MDRD: 7 ML/MIN/1.73
GLUCOSE BLD-MCNC: 192 MG/DL (ref 74–99)
GLUCOSE BLD-MCNC: 272 MG/DL
GLUCOSE BLD-MCNC: 272 MG/DL (ref 74–99)
GLUCOSE URINE: NEGATIVE MG/DL
HBA1C MFR BLD: 12.9 %
HCT VFR BLD CALC: 35 % (ref 34–48)
HEMOGLOBIN: 11.3 G/DL (ref 11.5–15.5)
INFLUENZA A BY PCR: NOT DETECTED
INFLUENZA B BY PCR: NOT DETECTED
KETONES, URINE: NEGATIVE MG/DL
LEUKOCYTE ESTERASE, URINE: ABNORMAL
LYMPHOCYTES ABSOLUTE: 0.27 E9/L (ref 1.5–4)
LYMPHOCYTES RELATIVE PERCENT: 3.5 % (ref 20–42)
MCH RBC QN AUTO: 26.6 PG (ref 26–35)
MCHC RBC AUTO-ENTMCNC: 32.3 % (ref 32–34.5)
MCV RBC AUTO: 82.4 FL (ref 80–99.9)
METER GLUCOSE: 215 MG/DL (ref 74–99)
MONOCYTES ABSOLUTE: 0.14 E9/L (ref 0.1–0.95)
MONOCYTES RELATIVE PERCENT: 1.7 % (ref 2–12)
NEUTROPHILS ABSOLUTE: 6.26 E9/L (ref 1.8–7.3)
NEUTROPHILS RELATIVE PERCENT: 92.2 % (ref 43–80)
NITRITE, URINE: NEGATIVE
NUCLEATED RED BLOOD CELLS: 0 /100 WBC
OSMOLALITY URINE: 248 MOSM/KG (ref 300–900)
OSMOLALITY: 309 MOSM/KG (ref 285–310)
OVALOCYTES: ABNORMAL
PDW BLD-RTO: 13.9 FL (ref 11.5–15)
PH UA: 6.5 (ref 5–9)
PH VENOUS: 7.4 (ref 7.35–7.45)
PLATELET # BLD: 408 E9/L (ref 130–450)
PMV BLD AUTO: 11.2 FL (ref 7–12)
POIKILOCYTES: ABNORMAL
POTASSIUM REFLEX MAGNESIUM: 4.3 MMOL/L (ref 3.5–5)
POTASSIUM REFLEX MAGNESIUM: 4.3 MMOL/L (ref 3.5–5)
POTASSIUM, UR: 18.1 MMOL/L
PROTEIN UA: 100 MG/DL
RBC # BLD: 4.25 E12/L (ref 3.5–5.5)
RBC UA: ABNORMAL /HPF (ref 0–2)
SARS-COV-2, NAAT: NOT DETECTED
SODIUM BLD-SCNC: 123 MMOL/L (ref 132–146)
SODIUM BLD-SCNC: 126 MMOL/L (ref 132–146)
SODIUM URINE: 40 MMOL/L
SPECIFIC GRAVITY UA: 1.01 (ref 1–1.03)
TOTAL PROTEIN: 7.2 G/DL (ref 6.4–8.3)
TOTAL PROTEIN: 8.2 G/DL (ref 6.4–8.3)
TROPONIN, HIGH SENSITIVITY: 37 NG/L (ref 0–9)
TROPONIN, HIGH SENSITIVITY: 38 NG/L (ref 0–9)
UROBILINOGEN, URINE: 0.2 E.U./DL
WBC # BLD: 6.8 E9/L (ref 4.5–11.5)
WBC UA: ABNORMAL /HPF (ref 0–5)

## 2022-12-21 PROCEDURE — 87502 INFLUENZA DNA AMP PROBE: CPT

## 2022-12-21 PROCEDURE — 74176 CT ABD & PELVIS W/O CONTRAST: CPT

## 2022-12-21 PROCEDURE — 82010 KETONE BODYS QUAN: CPT

## 2022-12-21 PROCEDURE — 6370000000 HC RX 637 (ALT 250 FOR IP)

## 2022-12-21 PROCEDURE — 70450 CT HEAD/BRAIN W/O DYE: CPT

## 2022-12-21 PROCEDURE — 84133 ASSAY OF URINE POTASSIUM: CPT

## 2022-12-21 PROCEDURE — 87077 CULTURE AEROBIC IDENTIFY: CPT

## 2022-12-21 PROCEDURE — 2580000003 HC RX 258

## 2022-12-21 PROCEDURE — 87088 URINE BACTERIA CULTURE: CPT

## 2022-12-21 PROCEDURE — 6360000002 HC RX W HCPCS

## 2022-12-21 PROCEDURE — 93005 ELECTROCARDIOGRAM TRACING: CPT | Performed by: STUDENT IN AN ORGANIZED HEALTH CARE EDUCATION/TRAINING PROGRAM

## 2022-12-21 PROCEDURE — 2060000000 HC ICU INTERMEDIATE R&B

## 2022-12-21 PROCEDURE — 2580000003 HC RX 258: Performed by: STUDENT IN AN ORGANIZED HEALTH CARE EDUCATION/TRAINING PROGRAM

## 2022-12-21 PROCEDURE — 82436 ASSAY OF URINE CHLORIDE: CPT

## 2022-12-21 PROCEDURE — 6360000002 HC RX W HCPCS: Performed by: UROLOGY

## 2022-12-21 PROCEDURE — 82800 BLOOD PH: CPT

## 2022-12-21 PROCEDURE — 99285 EMERGENCY DEPT VISIT HI MDM: CPT

## 2022-12-21 PROCEDURE — 84484 ASSAY OF TROPONIN QUANT: CPT

## 2022-12-21 PROCEDURE — 82570 ASSAY OF URINE CREATININE: CPT

## 2022-12-21 PROCEDURE — 87186 SC STD MICRODIL/AGAR DIL: CPT

## 2022-12-21 PROCEDURE — 87635 SARS-COV-2 COVID-19 AMP PRB: CPT

## 2022-12-21 PROCEDURE — 51702 INSERT TEMP BLADDER CATH: CPT

## 2022-12-21 PROCEDURE — 84300 ASSAY OF URINE SODIUM: CPT

## 2022-12-21 PROCEDURE — 82962 GLUCOSE BLOOD TEST: CPT

## 2022-12-21 PROCEDURE — 83935 ASSAY OF URINE OSMOLALITY: CPT

## 2022-12-21 PROCEDURE — 93010 ELECTROCARDIOGRAM REPORT: CPT | Performed by: INTERNAL MEDICINE

## 2022-12-21 PROCEDURE — 71045 X-RAY EXAM CHEST 1 VIEW: CPT

## 2022-12-21 PROCEDURE — 85025 COMPLETE CBC W/AUTO DIFF WBC: CPT

## 2022-12-21 PROCEDURE — 81001 URINALYSIS AUTO W/SCOPE: CPT

## 2022-12-21 PROCEDURE — 80053 COMPREHEN METABOLIC PANEL: CPT

## 2022-12-21 PROCEDURE — 84540 ASSAY OF URINE/UREA-N: CPT

## 2022-12-21 PROCEDURE — 83930 ASSAY OF BLOOD OSMOLALITY: CPT

## 2022-12-21 PROCEDURE — 83036 HEMOGLOBIN GLYCOSYLATED A1C: CPT | Performed by: FAMILY MEDICINE

## 2022-12-21 RX ORDER — ASPIRIN 81 MG/1
81 TABLET ORAL EVERY MORNING
Status: CANCELLED | OUTPATIENT
Start: 2022-12-22

## 2022-12-21 RX ORDER — ATORVASTATIN CALCIUM 20 MG/1
20 TABLET, FILM COATED ORAL EVERY MORNING
COMMUNITY

## 2022-12-21 RX ORDER — POLYETHYLENE GLYCOL 3350 17 G/17G
17 POWDER, FOR SOLUTION ORAL DAILY PRN
Status: DISCONTINUED | OUTPATIENT
Start: 2022-12-21 | End: 2022-12-28 | Stop reason: HOSPADM

## 2022-12-21 RX ORDER — SODIUM CHLORIDE 9 MG/ML
INJECTION, SOLUTION INTRAVENOUS PRN
Status: DISCONTINUED | OUTPATIENT
Start: 2022-12-21 | End: 2022-12-28 | Stop reason: HOSPADM

## 2022-12-21 RX ORDER — DEXTROSE MONOHYDRATE 100 MG/ML
INJECTION, SOLUTION INTRAVENOUS CONTINUOUS PRN
Status: DISCONTINUED | OUTPATIENT
Start: 2022-12-21 | End: 2022-12-28 | Stop reason: HOSPADM

## 2022-12-21 RX ORDER — CIPROFLOXACIN 2 MG/ML
200 INJECTION, SOLUTION INTRAVENOUS EVERY 24 HOURS
Status: COMPLETED | OUTPATIENT
Start: 2022-12-21 | End: 2022-12-23

## 2022-12-21 RX ORDER — SODIUM CHLORIDE 0.9 % (FLUSH) 0.9 %
5-40 SYRINGE (ML) INJECTION PRN
Status: DISCONTINUED | OUTPATIENT
Start: 2022-12-21 | End: 2022-12-28 | Stop reason: HOSPADM

## 2022-12-21 RX ORDER — GLIPIZIDE 5 MG/1
5 TABLET, FILM COATED, EXTENDED RELEASE ORAL
COMMUNITY

## 2022-12-21 RX ORDER — ATENOLOL 50 MG/1
50 TABLET ORAL EVERY MORNING
Status: ON HOLD | COMMUNITY
End: 2022-12-28 | Stop reason: SDUPTHER

## 2022-12-21 RX ORDER — ATENOLOL 25 MG/1
25 TABLET ORAL EVERY MORNING
Status: DISCONTINUED | OUTPATIENT
Start: 2022-12-22 | End: 2022-12-28 | Stop reason: HOSPADM

## 2022-12-21 RX ORDER — AMLODIPINE BESYLATE 10 MG/1
10 TABLET ORAL EVERY MORNING
Status: DISCONTINUED | OUTPATIENT
Start: 2022-12-22 | End: 2022-12-28 | Stop reason: HOSPADM

## 2022-12-21 RX ORDER — ASPIRIN 81 MG/1
81 TABLET ORAL EVERY MORNING
COMMUNITY

## 2022-12-21 RX ORDER — AMLODIPINE BESYLATE 10 MG/1
10 TABLET ORAL EVERY MORNING
COMMUNITY

## 2022-12-21 RX ORDER — ONDANSETRON 4 MG/1
4 TABLET, ORALLY DISINTEGRATING ORAL EVERY 8 HOURS PRN
Status: DISCONTINUED | OUTPATIENT
Start: 2022-12-21 | End: 2022-12-28 | Stop reason: HOSPADM

## 2022-12-21 RX ORDER — INSULIN LISPRO 100 [IU]/ML
0-16 INJECTION, SOLUTION INTRAVENOUS; SUBCUTANEOUS
Status: DISCONTINUED | OUTPATIENT
Start: 2022-12-22 | End: 2022-12-28 | Stop reason: HOSPADM

## 2022-12-21 RX ORDER — INSULIN GLARGINE 100 [IU]/ML
10 INJECTION, SOLUTION SUBCUTANEOUS NIGHTLY
Status: DISCONTINUED | OUTPATIENT
Start: 2022-12-21 | End: 2022-12-28 | Stop reason: HOSPADM

## 2022-12-21 RX ORDER — DENOSUMAB 60 MG/ML
60 INJECTION SUBCUTANEOUS
COMMUNITY

## 2022-12-21 RX ORDER — ONDANSETRON 2 MG/ML
4 INJECTION INTRAMUSCULAR; INTRAVENOUS EVERY 6 HOURS PRN
Status: DISCONTINUED | OUTPATIENT
Start: 2022-12-21 | End: 2022-12-28 | Stop reason: HOSPADM

## 2022-12-21 RX ORDER — GLIPIZIDE 5 MG/1
5 TABLET ORAL
Status: DISCONTINUED | OUTPATIENT
Start: 2022-12-22 | End: 2022-12-22

## 2022-12-21 RX ORDER — SODIUM CHLORIDE 0.9 % (FLUSH) 0.9 %
5-40 SYRINGE (ML) INJECTION EVERY 12 HOURS SCHEDULED
Status: DISCONTINUED | OUTPATIENT
Start: 2022-12-21 | End: 2022-12-26 | Stop reason: SDUPTHER

## 2022-12-21 RX ORDER — SODIUM BICARBONATE 650 MG/1
650 TABLET ORAL 2 TIMES DAILY
COMMUNITY

## 2022-12-21 RX ORDER — PANTOPRAZOLE SODIUM 40 MG/1
40 TABLET, DELAYED RELEASE ORAL EVERY MORNING
Status: DISCONTINUED | OUTPATIENT
Start: 2022-12-22 | End: 2022-12-28 | Stop reason: HOSPADM

## 2022-12-21 RX ORDER — PANTOPRAZOLE SODIUM 40 MG/1
40 TABLET, DELAYED RELEASE ORAL EVERY MORNING
COMMUNITY

## 2022-12-21 RX ORDER — ATORVASTATIN CALCIUM 20 MG/1
20 TABLET, FILM COATED ORAL EVERY MORNING
Status: DISCONTINUED | OUTPATIENT
Start: 2022-12-22 | End: 2022-12-28 | Stop reason: HOSPADM

## 2022-12-21 RX ORDER — MAGNESIUM OXIDE 400 MG/1
400 TABLET ORAL 2 TIMES DAILY
COMMUNITY

## 2022-12-21 RX ORDER — HEPARIN SODIUM 10000 [USP'U]/ML
5000 INJECTION, SOLUTION INTRAVENOUS; SUBCUTANEOUS EVERY 8 HOURS SCHEDULED
Status: DISCONTINUED | OUTPATIENT
Start: 2022-12-21 | End: 2022-12-22

## 2022-12-21 RX ORDER — 0.9 % SODIUM CHLORIDE 0.9 %
1000 INTRAVENOUS SOLUTION INTRAVENOUS ONCE
Status: COMPLETED | OUTPATIENT
Start: 2022-12-21 | End: 2022-12-21

## 2022-12-21 RX ORDER — SODIUM CHLORIDE 9 MG/ML
INJECTION, SOLUTION INTRAVENOUS ONCE
Status: COMPLETED | OUTPATIENT
Start: 2022-12-21 | End: 2022-12-21

## 2022-12-21 RX ORDER — INSULIN LISPRO 100 [IU]/ML
0-4 INJECTION, SOLUTION INTRAVENOUS; SUBCUTANEOUS NIGHTLY
Status: DISCONTINUED | OUTPATIENT
Start: 2022-12-21 | End: 2022-12-28 | Stop reason: HOSPADM

## 2022-12-21 RX ORDER — HYDRALAZINE HYDROCHLORIDE 25 MG/1
25 TABLET, FILM COATED ORAL EVERY 12 HOURS SCHEDULED
Status: DISCONTINUED | OUTPATIENT
Start: 2022-12-21 | End: 2022-12-28 | Stop reason: HOSPADM

## 2022-12-21 RX ADMIN — SODIUM CHLORIDE 1000 ML: 9 INJECTION, SOLUTION INTRAVENOUS at 14:10

## 2022-12-21 RX ADMIN — SODIUM CHLORIDE, PRESERVATIVE FREE 10 ML: 5 INJECTION INTRAVENOUS at 22:16

## 2022-12-21 RX ADMIN — HYDRALAZINE HYDROCHLORIDE 25 MG: 25 TABLET, FILM COATED ORAL at 22:16

## 2022-12-21 RX ADMIN — CIPROFLOXACIN 200 MG: 2 INJECTION, SOLUTION INTRAVENOUS at 17:29

## 2022-12-21 RX ADMIN — HEPARIN SODIUM 5000 UNITS: 10000 INJECTION INTRAVENOUS; SUBCUTANEOUS at 22:16

## 2022-12-21 RX ADMIN — SODIUM CHLORIDE: 9 INJECTION, SOLUTION INTRAVENOUS at 17:29

## 2022-12-21 ASSESSMENT — ENCOUNTER SYMPTOMS
ABDOMINAL PAIN: 0
TROUBLE SWALLOWING: 0
SHORTNESS OF BREATH: 0
VOMITING: 0
EYE DISCHARGE: 0
PHOTOPHOBIA: 0
VOICE CHANGE: 0
SINUS PAIN: 0
NAUSEA: 0
DIARRHEA: 0
SINUS PRESSURE: 0
RHINORRHEA: 0
COUGH: 0
BLOOD IN STOOL: 0
EYE PAIN: 0
EYE REDNESS: 0
CHEST TIGHTNESS: 0

## 2022-12-21 ASSESSMENT — PAIN - FUNCTIONAL ASSESSMENT
PAIN_FUNCTIONAL_ASSESSMENT: NONE - DENIES PAIN
PAIN_FUNCTIONAL_ASSESSMENT: NONE - DENIES PAIN

## 2022-12-21 ASSESSMENT — LIFESTYLE VARIABLES: SMOKING_STATUS: 0

## 2022-12-21 NOTE — H&P
12/21/2022 4:29 PM  Service: Urology  Group: [facility] ([place])    Harry Rudd  [id number]     Chief Complaint: Acute mental status change and instability    History of Present Illness: The patient is a 67 y.o. female patient who presents with the above symptoms. 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 Her sister is the historian. She has a colostomy which is functional    The patient has had a poorly functioning bladder and is tolerated a López catheter which has been changed at home once a month. She has been able to ambulate at home without difficulty until today when she was unstable on her feet. It seems awake and alert at this time but is really not able to contribute detailed history as her sister is able to provide. She has been eating she has no nausea or vomiting.      Past Medical History:   Diagnosis Date    Arthritis     osteoporsis    Cancer ([address]     colon and uterine    Closed fracture of radius 12/27/2016    Elevated lipids 1/15/2014    Headache     History of anal cancer 2/20/2017    Dr. Myra Joy    Hyperlipidemia     Hypertension     Hypertension     Obesity     Proteinuria 6/25/2014    Type II or unspecified type diabetes mellitus without mention of complication, not stated as uncontrolled     Vaginal cancer ([address] 5/22/2017    Vitamin D deficiency 11/6/2014       Past Surgical History:   Procedure Laterality Date    BLADDER SURGERY N/A 9/24/2021    CYSTOSCOPY BILATERAL RETROGRADE PYELOGRAM, BILATERAL STENT INSERTION performed by Dustin Urbano MD at [address] [facility],[address] [facility] Bilateral 7/21/2022    CYSTOSCOPY RETROGRADE PYELOGRAM BILATERIAL STENT EXCHANGE performed by Dustin Urbano MD at [address] [place] [address],Third Floor WRIST FRACTURE SURGERY Left 11/07/2016       Medications Prior to Admission:    Not in a hospital admission. Allergies:    Betadine [povidone iodine], Lisinopril, Penicillins, and Tylenol [acetaminophen]    Social History:    reports that she has never smoked. She has never used smokeless tobacco. She reports that she does not drink alcohol and does not use drugs. Family History:   Non-contributory to this urological problem  family history includes Cancer in her father; Diabetes in her sister; High Blood Pressure in her mother; Kidney Disease in her mother; Other in her brother. Review of Systems:  Respiratory: negative for cough and hemoptysis  Cardiovascular: negative for chest pain and dyspnea  Gastrointestinal: negative for abdominal pain, diarrhea, nausea and vomiting  Derm: negative for rash and skin lesion(s)  Neurological: negative for seizures and tremors a stuttering condition. She is mentally challenged  Endocrine: Hypertension hyperlipidemia vitamin D deficiency type 2 diabetes mellitus   : As above in the HPI, otherwise negative  Musculoskeletal.  Closed fracture of her radius past.  Age-related osteoporosis    Physical Exam:   Vitals: BP (!) 126/90   Pulse 60   Temp 98.1 °F (36.7 °C)   Resp 16   Wt 178 lb (80.7 kg)   SpO2 100%   BMI 27.88 kg/m²   General:  Awake, alert, oriented X 3. Well developed, well nourished, well groomed. No apparent distress. HEENT:  Normocephalic, atraumatic. Pupils equal, round. No scleral icterus. No conjunctival injection. Normal lips, teeth, and gums. No nasal discharge. Neck:  Supple, no masses. Heart:  RRR  Lungs:  No audible wheezing. Respirations symmetric and non-labored.   Abdomen:  soft, nontender, no masses, no organomegaly, no peritoneal signs large panniculus colostomy intact  Extremities:  No clubbing, cyanosis, or edema  Skin:  Warm and dry, no open lesions or rashes  Neuro: No motor or sensory deficits in the 4 quadrant extremities  Rectal: deferred  Genitalia: Deferred López catheter looks in tact and is draining clear urine and good amount    Labs:   Recent Labs     12/21/22  1212   WBC 6.8   RBC 4.25   HGB 11.3*   HCT 35.0   MCV 82.4   MCH 26.6   MCHC 32.3   RDW 13.9      MPV 11.2       Recent Labs     12/21/22  1212 12/21/22  1507   CREATININE 6.5* 6.1*       Images:  CT ABDOMEN PELVIS WO CONTRAST Additional Contrast? None   Final Result   1. Bilateral moderate hydronephrosis and hydroureter despite the presence of   bilateral double-J ureteral catheters. The proximal aspect of the catheters   are in the renal pelvis and proximal ureter on the right and left   respectively. 2.  No acute inflammatory changes in the abdomen or pelvis. 3.  40% superior endplate wedge compression deformity of L2, age   indeterminate. XR CHEST PORTABLE   Final Result   No acute process. CT HEAD WO CONTRAST   Final Result   1. There is no acute intracranial abnormality. Specifically, there is no   intracranial hemorrhage.    2. Atrophy and periventricular leukomalacia,             Assessment: Riccardo Reyes 67 y.o. female     She has extrinsic ureteral obstruction presumably from scar or previous treatment I do not think there is active tumor  Last exchange was in July but she has had progressive azotemia since that  There may be an element of dehydration we will hydrate her overnight and consider cystoscopy retrograde pyelograms and stent exchange tomorrow   plan:  As above        Electronically signed by Ariana Diaz MD on 12/21/2022 at 4:29 PM

## 2022-12-21 NOTE — PROGRESS NOTES
[facility] [address]  Progress Note    Chief complaint :  Chief Complaint   Patient presents with    Aphasia     Slurring words since waking up this morning. Went to bed normal last night       Extremity Weakness     Right side feels different than the left        Subjective:  No acute events overnight. Patient denies any pain this morning however she does report chills. She does report feeling dizzy when she stands. She has not been eating because she is n.p.o. She denies chest pain, shortness of breath, abdominal bloating, and swelling in her legs. No other concerns or complaints at this time. Past medical, surgical, family and social history were reviewed, non-contributory, and unchanged unless otherwise stated. Review of systems: Negative except for stated above. Objective:  BP (!) 126/90   Pulse 60   Temp 98.1 °F (36.7 °C)   Resp 16   Wt 178 lb (80.7 kg)   SpO2 100%   BMI 27.88 kg/m²       Intake/Output Summary (Last 24 hours) at 12/22/2022 0708  Last data filed at 12/22/2022 0556  Gross per 24 hour   Intake --   Output 700 ml   Net -700 ml         Physical Exam  Vitals reviewed. Constitutional:       General: She is not in acute distress. Appearance: Normal appearance. She is not ill-appearing. HENT:      Head: Normocephalic and atraumatic. Nose: Nose normal. No congestion or rhinorrhea. Eyes:      General:         Right eye: No discharge. Left eye: No discharge. Conjunctiva/sclera: Conjunctivae normal.      Comments: Left oval pupil   Cardiovascular:      Rate and Rhythm: Normal rate and regular rhythm. Heart sounds: Normal heart sounds. No murmur heard. Pulmonary:      Effort: Pulmonary effort is normal.      Breath sounds: Normal breath sounds. No wheezing. Abdominal:      General: Abdomen is flat. There is no distension. Palpations: Abdomen is soft. Tenderness: There is no abdominal tenderness.       Comments: Ostomy bag in place. Did not have stool in it during evaluation. No erythema around ostomy site noted   Genitourinary:     Comments: López in place. Cloudy/yellow urine draining into López bag. Musculoskeletal:         General: No swelling or deformity. Normal range of motion. Cervical back: No rigidity or tenderness. Skin:     General: Skin is warm and dry. Findings: No rash. Neurological:      Mental Status: She is alert and oriented to person, place, and time. Comments: She has a speech delay but responds to questions appropriately.    Psychiatric:         Mood and Affect: Mood normal.         Behavior: Behavior normal.       Labs:  Recent Results (from the past 24 hour(s))   POCT glycosylated hemoglobin (Hb A1C)    Collection Time: 12/21/22 11:24 AM   Result Value Ref Range    Hemoglobin A1C 12.9 %   CBC with Auto Differential    Collection Time: 12/21/22 12:12 PM   Result Value Ref Range    WBC 6.8 4.5 - 11.5 E9/L    RBC 4.25 3.50 - 5.50 E12/L    Hemoglobin 11.3 (L) 11.5 - 15.5 g/dL    Hematocrit 35.0 34.0 - 48.0 %    MCV 82.4 80.0 - 99.9 fL    MCH 26.6 26.0 - 35.0 pg    MCHC 32.3 32.0 - 34.5 %    RDW 13.9 11.5 - 15.0 fL    Platelets 207 907 - 446 E9/L    MPV 11.2 7.0 - 12.0 fL    Neutrophils % 92.2 (H) 43.0 - 80.0 %    Lymphocytes % 3.5 (L) 20.0 - 42.0 %    Monocytes % 1.7 (L) 2.0 - 12.0 %    Eosinophils % 2.6 0.0 - 6.0 %    Basophils % 0.0 0.0 - 2.0 %    Neutrophils Absolute 6.26 1.80 - 7.30 E9/L    Lymphocytes Absolute 0.27 (L) 1.50 - 4.00 E9/L    Monocytes Absolute 0.14 0.10 - 0.95 E9/L    Eosinophils Absolute 0.18 0.05 - 0.50 E9/L    Basophils Absolute 0.00 0.00 - 0.20 E9/L    nRBC 0.0 /100 WBC    Poikilocytes 1+     Douds Cells 1+     Ovalocytes 1+    Comprehensive Metabolic Panel w/ Reflex to MG    Collection Time: 12/21/22 12:12 PM   Result Value Ref Range    Sodium 123 (L) 132 - 146 mmol/L    Potassium reflex Magnesium 4.3 3.5 - 5.0 mmol/L    Chloride 86 (L) 98 - 107 mmol/L    CO2 Sodium, Ur 40 Not Established mmol/L    Potassium, Ur 18.1 Not Established mmol/L    Chloride 22 Not Established mmol/L   EKG 12 Lead    Collection Time: 12/21/22 12:19 PM   Result Value Ref Range    Ventricular Rate 58 BPM    Atrial Rate 58 BPM    P-R Interval 150 ms    QRS Duration 68 ms    Q-T Interval 468 ms    QTc Calculation (Bazett) 459 ms    P Axis 33 degrees    R Axis -3 degrees    T Axis 30 degrees   POCT glucose    Collection Time: 12/21/22  1:10 PM   Result Value Ref Range    Glucose 272 mg/dL    QC OK? ok    Troponin    Collection Time: 12/21/22  1:47 PM   Result Value Ref Range    Troponin, High Sensitivity 37 (H) 0 - 9 ng/L   OSMOLALITY, SERUM    Collection Time: 12/21/22  3:07 PM   Result Value Ref Range    Osmolality 309 285 - 310 mOsm/Kg   Beta-Hydroxybutyrate    Collection Time: 12/21/22  3:07 PM   Result Value Ref Range    Beta-Hydroxybutyrate 0.82 (H) 0.02 - 0.27 mmol/L   PH, VENOUS    Collection Time: 12/21/22  3:07 PM   Result Value Ref Range    pH, Sae 7.40 7.35 - 7.45   Comprehensive Metabolic Panel w/ Reflex to MG    Collection Time: 12/21/22  3:07 PM   Result Value Ref Range    Sodium 126 (L) 132 - 146 mmol/L    Potassium reflex Magnesium 4.3 3.5 - 5.0 mmol/L    Chloride 91 (L) 98 - 107 mmol/L    CO2 19 (L) 22 - 29 mmol/L    Anion Gap 16 7 - 16 mmol/L    Glucose 192 (H) 74 - 99 mg/dL    BUN 75 (H) 6 - 23 mg/dL    Creatinine 6.1 (H) 0.5 - 1.0 mg/dL    Est, Glom Filt Rate 7 >=60 mL/min/1.73    Calcium 8.9 8.6 - 10.2 mg/dL    Total Protein 7.2 6.4 - 8.3 g/dL    Albumin 2.5 (L) 3.5 - 5.2 g/dL    Total Bilirubin 0.5 0.0 - 1.2 mg/dL    Alkaline Phosphatase 95 35 - 104 U/L    ALT 6 0 - 32 U/L    AST 12 0 - 31 U/L       Radiology and other tests reviewed:  CT ABDOMEN PELVIS WO CONTRAST Additional Contrast? None   Final Result   1. Bilateral moderate hydronephrosis and hydroureter despite the presence of   bilateral double-J ureteral catheters.   The proximal aspect of the catheters   are in the ureteral catheters  Urine analysis positive for turbid, moderate blood, protein large leukocytes negative for nitrites, WBC and RBC see positive. Many bacteria  - Urine culture pending   - Ciprofloxacin 200 mg IV every 24 hours for 3 doses for UTI ( day 2)   -NPO in anticipation of cystoscopy, retrograde pyelogram, and stent exchange 2-day. Home aspirin is not ordered. - Consult: Urology (12/21/22) - Dr. Casiano Proper: Extrinsic ureteral obstruction likely from scar from previous treatment, unlikely active tumor. Has had progressive azotemia since prior stent exchange. May be component of dehydration will need hydration overnight. Anticipate cystoscopy, retrograde pyelogram and stent exchange today. Anemia  On admission hemoglobin 11.3  Last iron studies to 48 ferritin, 75 iron, 25 iron saturation, 305 TIBC, folate 3.8, vitamin B12 247 9/21/2021  - Continue to monitor  - CBC daily  - Ferritin, TIBC, iron ordered  - B12 and folate ordered     Elevated troponin   - Currently asymptomatic for chest pain  - On admission 38 , repeat 37 delta change of -1. Sinus bradycardia  - Continue to monitor, repeat EKG in the morning  - Patient on atenolol 50 mg every morning-half dose ordered   - Telemetry     Lightheadedness  CT head reassuring for no acute concerns, stroke protocol in ER negative  -Orthostatics ordered   -Cortisol in the morning  -PT, OT consulted, appreciate input     Diabetes   Last HbA1C 12.9 on admission  patients home medications include glipizide 5 mg.   Hyperglycemic to 272 on admission, anion gap of 18 down to 16, beta hydroxybutyrate  0.82  - AM hydroxybutyrate is pending  - 10 of lantus nightly  - Glipizide 5mg   - HDSSI - will need additional insulin  - Hypoglycemia protocol  -P OCT glucose checks     Hypertension  - Amlodipine 10 mg every morning  - Hydralazine 25 mg BID      Hyperlipidemia  - Atorvastatin 20 mg daily     GERD  - Protonix 40 mg daily        Pain Control:  Tylenol 650 mg Q6HR PRN for mild pain  DVT ppx: PCDs  GI ppx: Protonix 40 mg daily  Code Status: Full  Diet: NPO      Disposition: Discharge to pending clinical course in 2 day(s)    Martha Patel MD, PhD   Family Medicine Resident PGY-1  12/21/22   5:24 PM

## 2022-12-21 NOTE — CONSULTS
[facility] Nephrology Consult       Patient's Name:  Conchita Amaya  Date of Service:  12/21/2022  Requesting    James Paul MD    Reason for Consult: JEANNINE     Chief Complaint:                    AMS     Information obtained from:  Chart     History of Present Illness: 67 yrs old AAF     Known prior h/o Colonic CA / uterine involvement s/p Colostomy and tumor resection 1997   S/p Chemo , radiation , post op ureteral obstruction - ?  Radiation induced , obstructive uropathy   S/p Jean Claude stent insertion every 6 monthly , last stents replaced August this year , indwelling knutson catheter   Chronic     Was noted not right by her sister , slurred speech , rt sided weakness   Denies any Nausea , vomiting of diarrhea , po intake has been fair so far     Work up in er suggested - Hyponatremia , M acidosis , JEANNINE , high A1c , HB 11.3 ,   Neg influenza or covid , CT brain nil acute , Cxray Normal , UA blood and some protein ++     CT abdo - awaited results     Initiated on IVF , she appears to be stable , in no distress , alert and responsive       Past Medical History:   Diagnosis Date    Arthritis     osteoporsis    Cancer ([address]     colon and uterine    Closed fracture of radius 12/27/2016    Elevated lipids 1/15/2014    Headache     History of anal cancer 2/20/2017    Dr. Cecile Kaufman    Hyperlipidemia     Hypertension     Hypertension     Obesity     Proteinuria 6/25/2014    Type II or unspecified type diabetes mellitus without mention of complication, not stated as uncontrolled     Vaginal cancer ([address] 5/22/2017    Vitamin D deficiency 11/6/2014         Past Surgical History:   Procedure Laterality Date    BLADDER SURGERY N/A 9/24/2021    CYSTOSCOPY BILATERAL RETROGRADE PYELOGRAM, BILATERAL STENT INSERTION performed by Andrea Ayoub MD at [address] [place] [address],[address] Bilateral 7/21/2022    CYSTOSCOPY RETROGRADE PYELOGRAM BILATERIAL STENT EXCHANGE performed by Chad Justin [place] at [address] Left 11/07/2016         Social History     Socioeconomic History    Marital status: Single     Spouse name: Not on file    Number of children: Not on file    Years of education: Not on file    Highest education level: Not on file   Occupational History    Not on file   Tobacco Use    Smoking status: Never    Smokeless tobacco: Never   Substance and Sexual Activity    Alcohol use: No    Drug use: No    Sexual activity: Not on file   Other Topics Concern    Not on file   Social History Narrative    Not on file     Social Determinants of Health     Financial Resource Strain: Low Risk     Difficulty of Paying Living Expenses: Not hard at all   Food Insecurity: No Food Insecurity    Worried About Running Out of Food in the Last Year: Never true    Cristhian of Food in the Last Year: Never true   Transportation Needs: Not on file   Physical Activity: Insufficiently Active    Days of Exercise per Week: 7 days    Minutes of Exercise per Session: 10 min   Stress: Not on file   Social Connections: Not on file   Intimate Partner Violence: Not on file   Housing Stability: Not on file       Family History  Family History   Problem Relation Age of Onset    Kidney Disease Mother     High Blood Pressure Mother     Cancer Father     Diabetes Sister     Other Brother        Scheduled Meds:      Continuous Infusions:  [unfilled]    PRN meds      Allergies:  Betadine [povidone iodine], Lisinopril, Penicillins, and Tylenol [acetaminophen]    Review of Systems     Pertinent positives and negatives as in HPI. Other systems reviewed and were negative.      LAST 3 CMP  Recent Labs     12/21/22  1212 12/21/22  1310   *  --    K 4.3  --    CL 86*  --    CO2 19*  --    BUN 76*  --    CREATININE 6.5*  --    GLUCOSE 272* 272   CALCIUM 9.3  --    PROT 8.2  --    LABALBU 3.4*  --    BILITOT 0.5  --    ALKPHOS 104  --    AST 11  --        LAST 3 CBC:  Recent Labs 12/21/22  1212   WBC 6.8   RBC 4.25   HGB 11.3*   HCT 35.0          No intake or output data in the 24 hours ending 12/21/22 1449  Patient Vitals for the past 24 hrs:   BP Temp Pulse Resp SpO2 Weight   12/21/22 1409 (!) 126/90 -- 60 16 100 % --   12/21/22 1148 105/69 -- -- -- -- --   12/21/22 1146 -- 98.1 °F (36.7 °C) 67 18 99 % 178 lb (80.7 kg)       General appearance:  Appears stated age  Skin: color, texture, turgor normal. No rashes or lesions. Neck: supple, no masses, no JVD, No carotid bruits, No thyromegaly  Lungs: respirations unlabored. Clear to auscultation. No rales, wheezes, no rhonchi. Equal chest excursion with respirations. Heart RRR. pmi not laterally displaced. No S3 or S4, no rub  Abdomen:  Soft, ND, NT. Bowel sounds present. No HSM. No epigastric bruit, no increased Ao pulsation,  Extremities: warm to touch. No LE edema or cyanosis. No fem bruit. 2+ upstrokes and equal bilaterally. PT/Pedal 2+ equal bilat  Neuro: Cr N 2-12 grossly intact. No focal motor neuro deficit. No alteration in recent remote memory.     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

## 2022-12-21 NOTE — PROGRESS NOTES
Anticipated admission: Database initiated. Patient is A&O from home. States she uses a cane to ambulate and is RA at baseline.

## 2022-12-21 NOTE — H&P
[place] [id number]  Resident History and Physical      CHIEF COMPLAINT:    Chief Complaint   Patient presents with    Aphasia     Slurring words since waking up this morning. Went to bed normal last night       Extremity Weakness     Right side feels different than the left         History of Present Illness:   Riccardo Reyes  is a 67 y.o. female patient of [place] Villeda MD  with a pertinent PMHx of hyperension, hyperlipidemia, DM, ostomy present history of colon cancer now with ostomy bag, cancer with uterine involvement, patient has López catheter in place for over a year, bilateral J stents in place from 2021. She presented to the ER from home with sister with chief complaint of slurred speech and overall not feeling well. Sister is present during examination and states she went to go pick the patient up to get some lab draws done as scheduled however she was taking much longer to come down and noticed some slurred speech on top of her known speech delay and just blankly standing in her room. . Sister states that the speech is much improved since they have been here. Patient states that she just felt a little lightheaded and unsteady,  denies any falls, no changes in vision or blurry vision. She states that she had headaches with some watery eyes which is now improved. Patient denies having any symptoms including nausea, vomiting , fever, chills, she states that she has had no changes in appetite and has been drinking plenty of water. Patient denies any shortness of breath or chest pain no radiation to her left arm or jaw. Patient denies having any changes in urination, burning, states that her López catheter was just changed a month ago. Patient denies any sick contacts. Patient denies any alcohol use, drug use, smoking history. CODE STATUS discussed and confirmed with Riccardo Reyes : Full     ED course: Vitals were stable. .  98.1, respiratory 18, pulse 67. Labs were remarkable for hyponatremia 123, low chloride 86, elevated anion gap of 18, creatinine 6.5 BUN of 76 , albumin 3.4 , CBC remarkable for 11.3 CXR unremarkable no acute process . Elevated troponin of 38. Les Pimple COVID and influenza negative. CT abdomen pelvis was remarkable for bilateral moderate hydronephrosis and hydroureter despite the presence of a bilateral double-J ureteral catheters. No acute inflammatory changes in the abdomen or pelvis, . CT- head WO contrast showed no acute intracranial abnormalities, no intracranial hemorrhage, atrophy and periventricular leukomalacia. EKG was remarkable for sinus bradycardia. Due to original presentation patient underwent a stroke protocol with NIH stroke scale score of 0 . Urology and nephrology were consulted from the ED. .       Family Medicine was consulted to admit the patient for JEANNINE and hyponatremia    ROS:     Review of Systems   Constitutional:  Negative for fatigue, fever and unexpected weight change. HENT:  Negative for ear pain, sinus pressure and sinus pain. Eyes:  Negative for pain, discharge, redness and visual disturbance. Respiratory:  Negative for cough, chest tightness and shortness of breath. Cardiovascular:  Negative for chest pain and leg swelling. Gastrointestinal:  Negative for abdominal pain, blood in stool, diarrhea, nausea and vomiting. Genitourinary:  Negative for difficulty urinating. Musculoskeletal:  Negative for arthralgias, gait problem, myalgias, neck pain and neck stiffness. Skin:  Negative for wound. Neurological:  Positive for speech difficulty, light-headedness and headaches. Negative for tremors, syncope, weakness and numbness. Hematological: Negative. Psychiatric/Behavioral:  Negative for sleep disturbance and suicidal ideas.         Past Medical History:   Diagnosis Date    Arthritis     osteoporsis    Cancer ([facility] [address]     colon and uterine    Closed fracture of radius 12/27/2016    Elevated lipids 1/15/2014    Headache     History of anal cancer 2/20/2017    Dr. Polo Hogan    Hyperlipidemia     Hypertension     Hypertension     Obesity     Proteinuria 6/25/2014    Type II or unspecified type diabetes mellitus without mention of complication, not stated as uncontrolled     Vaginal cancer ([facility] [address] 5/22/2017    Vitamin D deficiency 11/6/2014         Past Surgical History:   Procedure Laterality Date    BLADDER SURGERY N/A 9/24/2021    CYSTOSCOPY BILATERAL RETROGRADE PYELOGRAM, BILATERAL STENT INSERTION performed by Adarsh Sal MD at [address] [facility] Bilateral 7/21/2022    CYSTOSCOPY RETROGRADE PYELOGRAM BILATERIAL STENT EXCHANGE performed by Adarsh Sla MD at [address] Left 11/07/2016       Medications Prior to Admission:    Prior to Admission medications    Medication Sig Start Date End Date Taking?  Authorizing Provider   amLODIPine (NORVASC) 10 MG tablet Take 10 mg by mouth every morning   Yes Historical Provider, MD   aspirin 81 MG EC tablet Take 81 mg by mouth every morning   Yes Historical Provider, MD   atenolol (TENORMIN) 50 MG tablet Take 50 mg by mouth every morning   Yes Historical Provider, MD   atorvastatin (LIPITOR) 20 MG tablet Take 20 mg by mouth every morning   Yes Historical Provider, MD   glipiZIDE (GLUCOTROL XL) 5 MG extended release tablet Take 5 mg by mouth Daily with lunch   Yes Historical Provider, MD   magnesium oxide (MAG-OX) 400 MG tablet Take 400 mg by mouth 2 times daily   Yes Historical Provider, MD   pantoprazole (PROTONIX) 40 MG tablet Take 40 mg by mouth every morning   Yes Historical Provider, MD   sodium bicarbonate 650 MG tablet Take 650 mg by mouth 2 times daily   Yes Historical Provider, MD   hydrALAZINE (APRESOLINE) 25 MG tablet Take 1 tablet by mouth every 12 hours 12/5/22  Yes Marcin Mansfield MD   denosumab (PROLIA) 60 MG/ML SOSY SC injection Inject 60 mg into the skin every 6 months NEXT INJ DUE @ [facility] IN[facility]: 02/2023    Historical Provider, MD        Allergies:   Betadine [povidone iodine], Lisinopril, Penicillins, and Tylenol [acetaminophen]    Social History:    reports that she has never smoked. She has never used smokeless tobacco. She reports that she does not drink alcohol and does not use drugs. Family History:   family history includes Cancer in her father; Diabetes in her sister; High Blood Pressure in her mother; Kidney Disease in her mother; Other in her brother. PHYSICAL EXAM:    Vitals:  /81   Pulse 56   Temp 98.1 °F (36.7 °C)   Resp 11   Wt 178 lb (80.7 kg)   SpO2 98%   BMI 27.88 kg/m²     Physical Exam  Vitals reviewed. Constitutional:       Appearance: Normal appearance. Comments: Known speech delay    HENT:      Head: Normocephalic and atraumatic. Nose: Nose normal. No congestion or rhinorrhea. Mouth/Throat:      Mouth: Mucous membranes are moist.      Pharynx: Oropharynx is clear. Eyes:      General: No scleral icterus. Extraocular Movements: Extraocular movements intact. Conjunctiva/sclera: Conjunctivae normal.      Pupils: Pupils are equal, round, and reactive to light. Comments: Patient has a oval pupil due in left eye secondary to cataract sx    Neck:      Vascular: No carotid bruit. Cardiovascular:      Rate and Rhythm: Normal rate and regular rhythm. Heart sounds: Normal heart sounds. No murmur heard. No gallop. Pulmonary:      Effort: Pulmonary effort is normal.      Breath sounds: Normal breath sounds. No wheezing or rales. Abdominal:      General: Bowel sounds are normal. There is no distension. Palpations: Abdomen is soft. Tenderness: There is no abdominal tenderness. There is no guarding. Comments: colostomy bag in place   Genitourinary:     Comments: López catheter in place  Musculoskeletal:         General: Normal range of motion.       Cervical back: Normal range of motion and neck supple. Right lower leg: No edema. Left lower leg: No edema. Skin:     Coloration: Skin is not jaundiced. Neurological:      General: No focal deficit present. Mental Status: She is alert and oriented to person, place, and time. Cranial Nerves: No cranial nerve deficit. Motor: Weakness (b/l upper and lower weakness) present. Comments: Uses a cane to ambulate   Patient has a delayed response to questions however answers them all appropriately. Psychiatric:         Mood and Affect: Mood normal.         Behavior: Behavior normal.         Thought Content:  Thought content normal.         Judgment: Judgment normal.       LABS:  Recent Results (from the past 24 hour(s))   POCT glycosylated hemoglobin (Hb A1C)    Collection Time: 12/21/22 11:24 AM   Result Value Ref Range    Hemoglobin A1C 12.9 %   CBC with Auto Differential    Collection Time: 12/21/22 12:12 PM   Result Value Ref Range    WBC 6.8 4.5 - 11.5 E9/L    RBC 4.25 3.50 - 5.50 E12/L    Hemoglobin 11.3 (L) 11.5 - 15.5 g/dL    Hematocrit 35.0 34.0 - 48.0 %    MCV 82.4 80.0 - 99.9 fL    MCH 26.6 26.0 - 35.0 pg    MCHC 32.3 32.0 - 34.5 %    RDW 13.9 11.5 - 15.0 fL    Platelets 995 500 - 037 E9/L    MPV 11.2 7.0 - 12.0 fL    Neutrophils % 92.2 (H) 43.0 - 80.0 %    Lymphocytes % 3.5 (L) 20.0 - 42.0 %    Monocytes % 1.7 (L) 2.0 - 12.0 %    Eosinophils % 2.6 0.0 - 6.0 %    Basophils % 0.0 0.0 - 2.0 %    Neutrophils Absolute 6.26 1.80 - 7.30 E9/L    Lymphocytes Absolute 0.27 (L) 1.50 - 4.00 E9/L    Monocytes Absolute 0.14 0.10 - 0.95 E9/L    Eosinophils Absolute 0.18 0.05 - 0.50 E9/L    Basophils Absolute 0.00 0.00 - 0.20 E9/L    nRBC 0.0 /100 WBC    Poikilocytes 1+     [place] Cells 1+     Ovalocytes 1+    Comprehensive Metabolic Panel w/ Reflex to MG    Collection Time: 12/21/22 12:12 PM   Result Value Ref Range    Sodium 123 (L) 132 - 146 mmol/L    Potassium reflex Magnesium 4.3 3.5 - 5.0 mmol/L    Chloride 86 (L) 98 - 107 mmol/L    CO2 19 (L) 22 - 29 mmol/L    Anion Gap 18 (H) 7 - 16 mmol/L    Glucose 272 (H) 74 - 99 mg/dL    BUN 76 (H) 6 - 23 mg/dL    Creatinine 6.5 (H) 0.5 - 1.0 mg/dL    Est, Glom Filt Rate 6 >=60 mL/min/1.73    Calcium 9.3 8.6 - 10.2 mg/dL    Total Protein 8.2 6.4 - 8.3 g/dL    Albumin 3.4 (L) 3.5 - 5.2 g/dL    Total Bilirubin 0.5 0.0 - 1.2 mg/dL    Alkaline Phosphatase 104 35 - 104 U/L    ALT 7 0 - 32 U/L    AST 11 0 - 31 U/L   Troponin    Collection Time: 12/21/22 12:12 PM   Result Value Ref Range    Troponin, High Sensitivity 38 (H) 0 - 9 ng/L   Urinalysis with Microscopic    Collection Time: 12/21/22 12:12 PM   Result Value Ref Range    Color, UA Yellow Straw/Yellow    Clarity, UA TURBID (A) Clear    Glucose, Ur Negative Negative mg/dL    Bilirubin Urine Negative Negative    Ketones, Urine Negative Negative mg/dL    Specific Gravity, UA 1.010 1.005 - 1.030    Blood, Urine MODERATE (A) Negative    pH, UA 6.5 5.0 - 9.0    Protein,  (A) Negative mg/dL    Urobilinogen, Urine 0.2 <2.0 E.U./dL    Nitrite, Urine Negative Negative    Leukocyte Esterase, Urine LARGE (A) Negative    WBC, UA PACKED (A) 0 - 5 /HPF    RBC, UA 10-20 (A) 0 - 2 /HPF    Epithelial Cells, UA MODERATE /HPF    Bacteria, UA MANY (A) None Seen /HPF   COVID-19, Rapid    Collection Time: 12/21/22 12:12 PM    Specimen: Nasopharyngeal Swab   Result Value Ref Range    SARS-CoV-2, NAAT Not Detected Not Detected   Rapid influenza A/B antigens    Collection Time: 12/21/22 12:12 PM    Specimen: Nasopharyngeal   Result Value Ref Range    Influenza A by PCR Not Detected Not Detected    Influenza B by PCR Not Detected Not Detected   OSMOLALITY, URINE    Collection Time: 12/21/22 12:12 PM   Result Value Ref Range    Osmolality, Ur 248 (L) 300 - 900 mOsm/kg   CREATININE, RANDOM URINE    Collection Time: 12/21/22 12:12 PM   Result Value Ref Range    Creatinine, Ur 89 29 - 226 mg/dL   URINE ELECTROLYTES    Collection Time: 12/21/22 12:12 PM   Result Value Ref Range Sodium, Ur 40 Not Established mmol/L    Potassium, Ur 18.1 Not Established mmol/L    Chloride 22 Not Established mmol/L   EKG 12 Lead    Collection Time: 12/21/22 12:19 PM   Result Value Ref Range    Ventricular Rate 58 BPM    Atrial Rate 58 BPM    P-R Interval 150 ms    QRS Duration 68 ms    Q-T Interval 468 ms    QTc Calculation (Bazett) 459 ms    P Axis 33 degrees    R Axis -3 degrees    T Axis 30 degrees   POCT glucose    Collection Time: 12/21/22  1:10 PM   Result Value Ref Range    Glucose 272 mg/dL    QC OK? ok    Troponin    Collection Time: 12/21/22  1:47 PM   Result Value Ref Range    Troponin, High Sensitivity 37 (H) 0 - 9 ng/L   OSMOLALITY, SERUM    Collection Time: 12/21/22  3:07 PM   Result Value Ref Range    Osmolality 309 285 - 310 mOsm/Kg   Beta-Hydroxybutyrate    Collection Time: 12/21/22  3:07 PM   Result Value Ref Range    Beta-Hydroxybutyrate 0.82 (H) 0.02 - 0.27 mmol/L   PH, VENOUS    Collection Time: 12/21/22  3:07 PM   Result Value Ref Range    pH, Sae 7.40 7.35 - 7.45   Comprehensive Metabolic Panel w/ Reflex to MG    Collection Time: 12/21/22  3:07 PM   Result Value Ref Range    Sodium 126 (L) 132 - 146 mmol/L    Potassium reflex Magnesium 4.3 3.5 - 5.0 mmol/L    Chloride 91 (L) 98 - 107 mmol/L    CO2 19 (L) 22 - 29 mmol/L    Anion Gap 16 7 - 16 mmol/L    Glucose 192 (H) 74 - 99 mg/dL    BUN 75 (H) 6 - 23 mg/dL    Creatinine 6.1 (H) 0.5 - 1.0 mg/dL    Est, Glom Filt Rate 7 >=60 mL/min/1.73    Calcium 8.9 8.6 - 10.2 mg/dL    Total Protein 7.2 6.4 - 8.3 g/dL    Albumin 2.5 (L) 3.5 - 5.2 g/dL    Total Bilirubin 0.5 0.0 - 1.2 mg/dL    Alkaline Phosphatase 95 35 - 104 U/L    ALT 6 0 - 32 U/L    AST 12 0 - 31 U/L       CT ABDOMEN PELVIS WO CONTRAST Additional Contrast? None   Final Result   1. Bilateral moderate hydronephrosis and hydroureter despite the presence of   bilateral double-J ureteral catheters.   The proximal aspect of the catheters   are in the renal pelvis and proximal ureter on the right and left   respectively. 2.  No acute inflammatory changes in the abdomen or pelvis. 3.  40% superior endplate wedge compression deformity of L2, age   indeterminate. XR CHEST PORTABLE   Final Result   No acute process. CT HEAD WO CONTRAST   Final Result   1. There is no acute intracranial abnormality. Specifically, there is no   intracranial hemorrhage. 2. Atrophy and periventricular leukomalacia,             ASSESSMENT/PLAN:      Active Hospital Problems    Diagnosis Date Noted    Acute renal failure superimposed on chronic kidney disease, on chronic dialysis, unspecified acute renal failure type ([facility] [address] [N17.9, N18.9, Z99.2] 12/21/2022     Priority: Medium    Bradycardia [R00.1] 12/21/2022     Priority: Medium    Hyponatremia [E87.1] 12/21/2022     Priority: Medium    Dizziness [R42] 12/21/2022     Priority: Medium     Hyponatremia  On admission sodium level 123, most likely due to hypervolemia/medication . Continue to monitor closely, to be increased plan: no more than 6-8  CMP daily , continue to monitor   -Urine sodium, urine osmolality pending , serum osmolality 309  -At home patient's been taking sodium bicarbonate 650 mg twice daily -we will hold for now and will replace as per nephrology   Nephrology consulted from the ER appreciate input    Acute renal failure   Baseline creatinine 2. Creatinine on admission creatinine 6.5 BUN/Cr ratio >11. Possibly Pre-renal from dehydration or Post-renal from obstruction.    - mL an hour, completed 1 L bolus in the ED  -Urine electrolytes-sodium 140, potassium of 18 chloride of 22, urine osmoles 309,creatinine pending , urea ordered  -FeNa to be calcuated once urine studies are in.   -Hold nephrotoxic medications  -Nephrology consulted from the ER, appreciate input    Bilateral moderate hydronephrosis and hydroureter  CT abdomen pelvis was remarkable for bilateral moderate hydronephrosis and hydroureter despite the presence of a bilateral double-J ureteral catheters  Urine analysis positive for turbid, moderate blood, protein large leukocytes negative for nitrites, WBC and RBC see positive. Many bacteria  Urine culture pending   Ciprofloxacin 200 mg IV every 24 hours for 3 doses for UTI ( day 1)   N.p.o. for possible cystoscopy retrograde pyelograms and stent exchange tomorrow  tomorrow - home Asprin not ordered   Urology consulted from ED appreciate input    Anemia  On admission hemoglobin 11.3  Last iron studies to 48 ferritin, 75 iron, 25 iron saturation, 305 TIBC, folate 3.8, vitamin B12 247 9/21/2021  -Continue to monitor  -CBC daily  -Ferritin, TIBC, iron ordered  -B12 and folate ordered    Elevated troponin   Currently asymptomatic for chest pain  On admission 38 , repeat 37 delta change of -1. Sinus bradycardia  Continue to monitor repeat EKG in the morning  Patient on atenolol 50 mg every morning-half dose ordered   Telemetry    Lightheadedness  -CT head reassuring for no acute concerns, stroke protocol in ER negative  -Orthostatics ordered   -Cortisol in the morning  -PT, OT consulted, appreciate input    Diabetes   Last HbA1C 12.9 on admission  patients home medications include glipizide 5 mg.   Hyperglycemic to 272 on admission, anion gap of 18 down to 16, beta hydroxybutyrate  0.82  Hydroxybutrate recheck 7 PM today  10 of lantus nightly  Glipizide 5mg   -HDSSI -will need additional insulin  -Hypoglycemia protocol  -POCT glucose checks    Hypertension  Amlodipine 10 mg every morning  Hydralazine 25 mg BID     Hyperlipidemia  Atorvastatin 20 mg daily    GERD  Protonix 40 mg daily      Pain Control:  Tylenol 650 mg Q6HR PRN for mild pain  DVT ppx: Heparin 5000 units TID  GI ppx: Protonix 40 mg daily  Code Status: Full  Diet: Diabetic diet, Diet NPO starting midnight       Case discussed with attending on call Dr. Guillermina Kennedy MD   Family Medicine Resident Physician PGY-2  12/21/2022

## 2022-12-21 NOTE — ED PROVIDER NOTES
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 Patient sister is at bedside as well on her provides some history. Apparently last known well was yesterday night. Patient was supposed to go to the doctors today and her sister came to pick her up to go to her appointment. She states that she was taking a long time to get out of the house so her sister came inside and found the patient blankly staring. Patient apparently had slurred speech at that point. Later, patient was complaining of right-sided weakness as well. On initial evaluation, patient is awake alert and oriented x3. She is slow to respond but able to answer questions appropriately. She is moving all 4 extremities without difficulty. Her symptoms have resolved since coming to the emergency department. NIH is 0 now. Patient is endorsing some vague chest pain that is dull, intermittent and nonradiating. Nothing in particular makes it better or worse. It is mild in severity. Patient otherwise has no other symptoms including nausea, vomiting, fever, chills, cough, congestion, sore throat, myalgias, urinary symptoms, abdominal pain, constipation or diarrhea. On initial assessment, patient is well-appearing and in no acute distress. Review of Systems   Constitutional:  Negative for chills and fever. HENT:  Positive for congestion. Negative for rhinorrhea, trouble swallowing and voice change. Eyes:  Negative for photophobia and visual disturbance. Respiratory:  Negative for cough and shortness of breath. Cardiovascular:  Positive for chest pain. Negative for palpitations. Gastrointestinal:  Negative for abdominal pain, diarrhea, nausea and vomiting. Endocrine: Negative for polydipsia, polyphagia and polyuria.    Genitourinary: Negative for dysuria, flank pain, frequency and urgency. Musculoskeletal:  Negative for arthralgias. Skin:  Negative for rash and wound. Neurological:  Positive for speech difficulty and weakness. Negative for dizziness and headaches. Psychiatric/Behavioral:  Negative for behavioral problems and confusion. Physical Exam  Constitutional:       General: She is not in acute distress. Appearance: Normal appearance. She is not ill-appearing. HENT:      Head: Normocephalic and atraumatic. Right Ear: External ear normal.      Left Ear: External ear normal.      Nose: Nose normal. No congestion or rhinorrhea. Mouth/Throat:      Mouth: Mucous membranes are moist.      Pharynx: Oropharynx is clear. Eyes:      Conjunctiva/sclera: Conjunctivae normal.      Pupils: Pupils are equal, round, and reactive to light. Cardiovascular:      Rate and Rhythm: Normal rate and regular rhythm. Pulses: Normal pulses. Heart sounds: Normal heart sounds. Pulmonary:      Effort: Pulmonary effort is normal. No respiratory distress. Breath sounds: Normal breath sounds. No wheezing or rales. Abdominal:      General: Abdomen is flat. Palpations: Abdomen is soft. Tenderness: There is no abdominal tenderness. There is no guarding or rebound. Musculoskeletal:         General: No tenderness or deformity. Cervical back: Normal range of motion and neck supple. No rigidity or tenderness. Right lower leg: No edema. Left lower leg: No edema. Skin:     General: Skin is warm and dry. Capillary Refill: Capillary refill takes less than 2 seconds. Coloration: Skin is not pale. Findings: No bruising. Neurological:      General: No focal deficit present. Mental Status: She is alert and oriented to person, place, and time. Mental status is at baseline. Sensory: No sensory deficit. Motor: No weakness.    Psychiatric:         Mood and Affect: Mood normal. Behavior: Behavior normal.      NIH Stroke Scale/Score at time of initial evaluation:  1A: Level of Consciousness 0 - alert; keenly responsive   1B: Ask Month and Age 0 - answers both questions correctly   1C: Tell Patient To Open and Close Eyes, then Hand  Squeeze 0 - performs both tasks correctly   2: Test Horizontal Extraocular Movements 0 - normal   3: Test Visual Fields 0 - no visual loss   4: Test Facial Palsy 0 - normal symmetric movement   5A: Test Left Arm Motor Drift 0 - no drift, limb holds 90 (or 45) degrees for full 10 seconds   5B: Test Right Arm Motor Drift 0 - no drift, limb holds 90 (or 45) degrees for full 10 seconds   6A: Test Left Leg Motor Drift 0 - no drift; leg holds 30 degree position for full 5 seconds   6B: Test Right Leg Motor Drift 0 - no drift; leg holds 30 degree position for full 5 seconds   7: Test Limb Ataxia   (FNF/Heel-Shin) 0 - absent   8: Test Sensation 0 - normal; no sensory loss   9: Test Language/Aphasia 0 - no aphasia, normal   10: Test Dysarthria 0 - normal   11: Test Extinction/Inattention 0 - no abnormality   Total Score: 0   12/21/22     Procedures   EKG: This EKG is signed and interpreted by Dr. Nicki Dawson. 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 Last known well was yesterday night. NIH is 0 on arrival to the ED. Symptoms had completely resolved according to the patient when she came to the emergency department. Work-up in the emergency department was significant for acute renal failure on CMP with creatinine 6.1 and BUN of 75. Baseline creatinine for the patient is 1.0.   Patient has no known history of chronic kidney disease. She does have a history of hydronephrosis from obstructive pathology and has bilateral stents in place and a chronic López catheter. Patient follows with Dr. Dona Faye and generally gets her stents replaced every 6 months apparently. Patient was given 1 L bolus of IV fluids and started on normal saline infusion at 100 cc/h as recommended by nephrology. Urinalysis also suggest acute UTI although patient has a chronic indwelling López and this could reflect colonization. Viral testing was negative in the ED. CBC with no leukocytosis or significant anemia noted. Remaining work-up was unremarkable. CT head obtained in insignificant with no evidence of acute intracranial process. Chest x-ray was clear. CT abdomen pelvis noting bilateral hydronephrosis despite bilateral double-J ureteral catheters. Urology was also consulted as well for further recommendations work-up and evaluation. Patient was agree for admission by admitting team.  She was agreeable with this plan. She remained hemodynamically stable in the ED. ED Course as of 12/21/22 1917   Wed Dec 21, 2022   1244 EKG: This EKG is signed and interpreted by me. Rate: 58  Rhythm: Sinus  Interpretation: Sinus bradycardia, left axis deviation, nonspecific ST changes, qtc is 459  Comparison: no previous EKG available    [KG]   8355 Spoke up with Dr. Lisa Fowler concerning the patient. He recommends López catheter placement, CT abdomen pelvis and IV fluids at this time. We will come and evaluate the patient in the emergency department as well and provide consultation when patient is admitted. [PP]   [phone] Patient accepted for admission by family medicine team. [PP]   7974 Consulted with Dr. Lisa Fowler, nephrology, who evaluated the patient and agreed to consult.   [KG]      ED Course User Index  [KG] Robb Cassidy DO  [PP] Zeina Ventura DO     --------------------------------------------- PAST HISTORY ---------------------------------------------  Past Medical History:  has a past medical history of Arthritis, Cancer ([facility].), Closed fracture of radius, Elevated lipids, Headache, History of anal cancer, Hyperlipidemia, Hypertension, Hypertension, Obesity, Proteinuria, Type II or unspecified type diabetes mellitus without mention of complication, not stated as uncontrolled, Vaginal cancer ([facility].), and Vitamin D deficiency. Past Surgical History:  has a past surgical history that includes colostomy; Rectal surgery; Breast biopsy; Wrist fracture surgery (Left, 11/07/2016); Bladder surgery (N/A, 9/24/2021); and Cystoscopy (Bilateral, 7/21/2022). Social History:  reports that she has never smoked. She has never used smokeless tobacco. She reports that she does not drink alcohol and does not use drugs. Family History: family history includes Cancer in her father; Diabetes in her sister; High Blood Pressure in her mother; Kidney Disease in her mother; Other in her brother. The patients home medications have been reviewed.     Allergies: Betadine [povidone iodine], Lisinopril, Penicillins, and Tylenol [acetaminophen]    -------------------------------------------------- RESULTS -------------------------------------------------    LABS:  Results for orders placed or performed during the hospital encounter of 12/21/22   COVID-19, Rapid    Specimen: Nasopharyngeal Swab   Result Value Ref Range    SARS-CoV-2, NAAT Not Detected Not Detected   Rapid influenza A/B antigens    Specimen: Nasopharyngeal   Result Value Ref Range    Influenza A by PCR Not Detected Not Detected    Influenza B by PCR Not Detected Not Detected   CBC with Auto Differential   Result Value Ref Range    WBC 6.8 4.5 - 11.5 E9/L    RBC 4.25 3.50 - 5.50 E12/L    Hemoglobin 11.3 (L) 11.5 - 15.5 g/dL    Hematocrit 35.0 34.0 - 48.0 %    MCV 82.4 80.0 - 99.9 fL    MCH 26.6 26.0 - 35.0 pg    MCHC 32.3 32.0 - 34.5 %    RDW 13.9 11.5 - 15.0 fL    Platelets 408 130 - 450 E9/L    MPV 11.2 7.0 - 12.0 fL    Neutrophils % 92.2 (H) 43.0 - 80.0 %    Lymphocytes % 3.5 (L) 20.0 - 42.0 %    Monocytes % 1.7 (L) 2.0 - 12.0 %    Eosinophils % 2.6 0.0 - 6.0 %    Basophils % 0.0 0.0 - 2.0 %    Neutrophils Absolute 6.26 1.80 - 7.30 E9/L    Lymphocytes Absolute 0.27 (L) 1.50 - 4.00 E9/L    Monocytes Absolute 0.14 0.10 - 0.95 E9/L    Eosinophils Absolute 0.18 0.05 - 0.50 E9/L    Basophils Absolute 0.00 0.00 - 0.20 E9/L    nRBC 0.0 /100 WBC    Poikilocytes 1+     [place] Cells 1+     Ovalocytes 1+    Comprehensive Metabolic Panel w/ Reflex to MG   Result Value Ref Range    Sodium 123 (L) 132 - 146 mmol/L    Potassium reflex Magnesium 4.3 3.5 - 5.0 mmol/L    Chloride 86 (L) 98 - 107 mmol/L    CO2 19 (L) 22 - 29 mmol/L    Anion Gap 18 (H) 7 - 16 mmol/L    Glucose 272 (H) 74 - 99 mg/dL    BUN 76 (H) 6 - 23 mg/dL    Creatinine 6.5 (H) 0.5 - 1.0 mg/dL    Est, Glom Filt Rate 6 >=60 mL/min/1.73    Calcium 9.3 8.6 - 10.2 mg/dL    Total Protein 8.2 6.4 - 8.3 g/dL    Albumin 3.4 (L) 3.5 - 5.2 g/dL    Total Bilirubin 0.5 0.0 - 1.2 mg/dL    Alkaline Phosphatase 104 35 - 104 U/L    ALT 7 0 - 32 U/L    AST 11 0 - 31 U/L   Troponin   Result Value Ref Range    Troponin, High Sensitivity 38 (H) 0 - 9 ng/L   Urinalysis with Microscopic   Result Value Ref Range    Color, UA Yellow Straw/Yellow    Clarity, UA TURBID (A) Clear    Glucose, Ur Negative Negative mg/dL    Bilirubin Urine Negative Negative    Ketones, Urine Negative Negative mg/dL    Specific Gravity, UA 1.010 1.005 - 1.030    Blood, Urine MODERATE (A) Negative    pH, UA 6.5 5.0 - 9.0    Protein,  (A) Negative mg/dL    Urobilinogen, Urine 0.2 <2.0 E.U./dL    Nitrite, Urine Negative Negative    Leukocyte Esterase, Urine LARGE (A) Negative    WBC, UA PACKED (A) 0 - 5 /HPF    RBC, UA 10-20 (A) 0 - 2 /HPF    Epithelial Cells, UA MODERATE /HPF    Bacteria, UA MANY (A) None Seen /HPF   Troponin   Result Value Ref Range    Troponin, High Sensitivity 37 (H) 0 - 9 ng/L   OSMOLALITY, URINE   Result Value Ref Range    Osmolality, Ur 248 (L) 300 - 900 mOsm/kg   OSMOLALITY, SERUM   Result Value Ref Range    Osmolality 309 285 - 310 mOsm/Kg   CREATININE, RANDOM URINE   Result Value Ref Range    Creatinine, Ur 89 29 - 226 mg/dL   URINE ELECTROLYTES   Result Value Ref Range    Sodium, Ur 40 Not Established mmol/L    Potassium, Ur 18.1 Not Established mmol/L    Chloride 22 Not Established mmol/L   Beta-Hydroxybutyrate   Result Value Ref Range    Beta-Hydroxybutyrate 0.82 (H) 0.02 - 0.27 mmol/L   PH, VENOUS   Result Value Ref Range    pH, Sae 7.40 7.35 - 7.45   Comprehensive Metabolic Panel w/ Reflex to MG   Result Value Ref Range    Sodium 126 (L) 132 - 146 mmol/L    Potassium reflex Magnesium 4.3 3.5 - 5.0 mmol/L    Chloride 91 (L) 98 - 107 mmol/L    CO2 19 (L) 22 - 29 mmol/L    Anion Gap 16 7 - 16 mmol/L    Glucose 192 (H) 74 - 99 mg/dL    BUN 75 (H) 6 - 23 mg/dL    Creatinine 6.1 (H) 0.5 - 1.0 mg/dL    Est, Glom Filt Rate 7 >=60 mL/min/1.73    Calcium 8.9 8.6 - 10.2 mg/dL    Total Protein 7.2 6.4 - 8.3 g/dL    Albumin 2.5 (L) 3.5 - 5.2 g/dL    Total Bilirubin 0.5 0.0 - 1.2 mg/dL    Alkaline Phosphatase 95 35 - 104 U/L    ALT 6 0 - 32 U/L    AST 12 0 - 31 U/L   POCT glucose   Result Value Ref Range    Glucose 272 mg/dL    QC OK? ok    EKG 12 Lead   Result Value Ref Range    Ventricular Rate 58 BPM    Atrial Rate 58 BPM    P-R Interval 150 ms    QRS Duration 68 ms    Q-T Interval 468 ms    QTc Calculation (Bazett) 459 ms    P Axis 33 degrees    R Axis -3 degrees    T Axis 30 degrees       RADIOLOGY:  CT ABDOMEN PELVIS WO CONTRAST Additional Contrast? None   Final Result   1. Bilateral moderate hydronephrosis and hydroureter despite the presence of   bilateral double-J ureteral catheters. The proximal aspect of the catheters   are in the renal pelvis and proximal ureter on the right and left   respectively.       2.  No acute inflammatory changes in the abdomen or pelvis. 3.  40% superior endplate wedge compression deformity of L2, age   indeterminate. XR CHEST PORTABLE   Final Result   No acute process. CT HEAD WO CONTRAST   Final Result   1. There is no acute intracranial abnormality. Specifically, there is no   intracranial hemorrhage. 2. Atrophy and periventricular leukomalacia,             ------------------------- NURSING NOTES AND VITALS REVIEWED ---------------------------  Date / Time Roomed:  12/21/2022 11:53 AM  ED Bed Assignment:  17/17    The nursing notes within the ED encounter and vital signs as below have been reviewed. Patient Vitals for the past 24 hrs:   BP Temp Pulse Resp SpO2 Weight   12/21/22 1731 119/81 -- 56 11 98 % --   12/21/22 1409 (!) 126/90 -- 60 16 100 % --   12/21/22 1148 105/69 -- -- -- -- --   12/21/22 1146 -- 98.1 °F (36.7 °C) 67 18 99 % 178 lb (80.7 kg)       Oxygen Saturation Interpretation: Normal    ------------------------------------------ PROGRESS NOTES ------------------------------------------  Counseling:  I have spoken with the patient and discussed todays results, in addition to providing specific details for the plan of care and counseling regarding the diagnosis and prognosis. Their questions are answered at this time and they are agreeable with the plan of admission.    --------------------------------- ADDITIONAL PROVIDER NOTES ---------------------------------  Consultations:  Time: 2941. Spoke with admitting team.  Discussed case. They will admit the patient. This patient's ED course included: a personal history and physicial examination, re-evaluation prior to disposition, multiple bedside re-evaluations, IV medications, cardiac monitoring, and continuous pulse oximetry    This patient has remained hemodynamically stable during their ED course. Diagnosis:  1. Acute renal failure, unspecified acute renal failure type ([address]    2. Chronic indwelling López catheter    3.  Status post placement of ureteral stent    4. Bilateral hydronephrosis    5. Dehydration    6. Hyponatremia    7. Urinary tract infection associated with indwelling urethral catheter, sequela        Disposition:  Patient's disposition: Admit to telemetry  Patient's condition is stable.          Sage Santana DO  Resident  12/21/22 0414

## 2022-12-21 NOTE — PROGRESS NOTES
Patient's daughter here with her stating that patient not feeling well with confusion. She may take her to ED.

## 2022-12-21 NOTE — PROGRESS NOTES
Patient here for Blood Pressure Check  Vitals:    12/21/22 1120   BP: 117/75       If CHANGES are needed please open this encounter, place orders or make changes as needed and route back to the clinical pool.

## 2022-12-22 ENCOUNTER — ANESTHESIA (OUTPATIENT)
Dept: OPERATING ROOM | Age: 72
End: 2022-12-22
Payer: MEDICARE

## 2022-12-22 ENCOUNTER — APPOINTMENT (OUTPATIENT)
Dept: GENERAL RADIOLOGY | Age: 72
DRG: 660 | End: 2022-12-22
Payer: MEDICARE

## 2022-12-22 PROBLEM — N13.30 BILATERAL HYDRONEPHROSIS: Status: ACTIVE | Noted: 2022-12-22

## 2022-12-22 PROBLEM — T83.511A URINARY TRACT INFECTION ASSOCIATED WITH INDWELLING URETHRAL CATHETER (HCC): Status: ACTIVE | Noted: 2022-12-22

## 2022-12-22 PROBLEM — Z96.0 STATUS POST PLACEMENT OF URETERAL STENT: Status: ACTIVE | Noted: 2022-12-22

## 2022-12-22 PROBLEM — Z97.8 CHRONIC INDWELLING FOLEY CATHETER: Status: ACTIVE | Noted: 2022-12-22

## 2022-12-22 PROBLEM — N39.0 URINARY TRACT INFECTION ASSOCIATED WITH INDWELLING URETHRAL CATHETER (HCC): Status: ACTIVE | Noted: 2022-12-22

## 2022-12-22 LAB
ALBUMIN SERPL-MCNC: 3.1 G/DL (ref 3.5–5.2)
ALP BLD-CCNC: 95 U/L (ref 35–104)
ALT SERPL-CCNC: 6 U/L (ref 0–32)
ANION GAP SERPL CALCULATED.3IONS-SCNC: 13 MMOL/L (ref 7–16)
AST SERPL-CCNC: 10 U/L (ref 0–31)
BASOPHILS ABSOLUTE: 0.04 E9/L (ref 0–0.2)
BASOPHILS RELATIVE PERCENT: 0.7 % (ref 0–2)
BETA-HYDROXYBUTYRATE: 0.73 MMOL/L (ref 0.02–0.27)
BILIRUB SERPL-MCNC: 0.4 MG/DL (ref 0–1.2)
BUN BLDV-MCNC: 70 MG/DL (ref 6–23)
CALCIUM SERPL-MCNC: 8.9 MG/DL (ref 8.6–10.2)
CHLORIDE BLD-SCNC: 94 MMOL/L (ref 98–107)
CO2: 22 MMOL/L (ref 22–29)
CORTISOL TOTAL: 17.95 MCG/DL (ref 2.68–18.4)
CREAT SERPL-MCNC: 5.9 MG/DL (ref 0.5–1)
EKG ATRIAL RATE: 55 BPM
EKG P AXIS: 47 DEGREES
EKG P-R INTERVAL: 162 MS
EKG Q-T INTERVAL: 470 MS
EKG QRS DURATION: 70 MS
EKG QTC CALCULATION (BAZETT): 449 MS
EKG R AXIS: -3 DEGREES
EKG T AXIS: 23 DEGREES
EKG VENTRICULAR RATE: 55 BPM
EOSINOPHILS ABSOLUTE: 0.14 E9/L (ref 0.05–0.5)
EOSINOPHILS RELATIVE PERCENT: 2.3 % (ref 0–6)
FERRITIN: 342 NG/ML
FOLATE: 6.4 NG/ML (ref 4.8–24.2)
GFR SERPL CREATININE-BSD FRML MDRD: 7 ML/MIN/1.73
GLUCOSE BLD-MCNC: 146 MG/DL (ref 74–99)
HCT VFR BLD CALC: 34.5 % (ref 34–48)
HEMOGLOBIN: 10.9 G/DL (ref 11.5–15.5)
IMMATURE GRANULOCYTES #: 0.03 E9/L
IMMATURE GRANULOCYTES %: 0.5 % (ref 0–5)
IRON SATURATION: 41 % (ref 15–50)
IRON: 98 MCG/DL (ref 37–145)
LYMPHOCYTES ABSOLUTE: 0.89 E9/L (ref 1.5–4)
LYMPHOCYTES RELATIVE PERCENT: 14.8 % (ref 20–42)
MCH RBC QN AUTO: 27 PG (ref 26–35)
MCHC RBC AUTO-ENTMCNC: 31.6 % (ref 32–34.5)
MCV RBC AUTO: 85.4 FL (ref 80–99.9)
METER GLUCOSE: 141 MG/DL (ref 74–99)
METER GLUCOSE: 166 MG/DL (ref 74–99)
METER GLUCOSE: 194 MG/DL (ref 74–99)
METER GLUCOSE: 210 MG/DL (ref 74–99)
MONOCYTES ABSOLUTE: 0.41 E9/L (ref 0.1–0.95)
MONOCYTES RELATIVE PERCENT: 6.8 % (ref 2–12)
NEUTROPHILS ABSOLUTE: 4.49 E9/L (ref 1.8–7.3)
NEUTROPHILS RELATIVE PERCENT: 74.9 % (ref 43–80)
PDW BLD-RTO: 14.1 FL (ref 11.5–15)
PLATELET # BLD: 366 E9/L (ref 130–450)
PMV BLD AUTO: 11.1 FL (ref 7–12)
POTASSIUM REFLEX MAGNESIUM: 4.3 MMOL/L (ref 3.5–5)
RBC # BLD: 4.04 E12/L (ref 3.5–5.5)
SODIUM BLD-SCNC: 129 MMOL/L (ref 132–146)
TOTAL IRON BINDING CAPACITY: 241 MCG/DL (ref 250–450)
TOTAL PROTEIN: 7.5 G/DL (ref 6.4–8.3)
UREA NITROGEN, UR: 314 MG/DL (ref 800–1666)
VITAMIN B-12: 403 PG/ML (ref 211–946)
WBC # BLD: 6 E9/L (ref 4.5–11.5)

## 2022-12-22 PROCEDURE — 93005 ELECTROCARDIOGRAM TRACING: CPT

## 2022-12-22 PROCEDURE — 6360000002 HC RX W HCPCS: Performed by: NURSE ANESTHETIST, CERTIFIED REGISTERED

## 2022-12-22 PROCEDURE — 83550 IRON BINDING TEST: CPT

## 2022-12-22 PROCEDURE — 3600000013 HC SURGERY LEVEL 3 ADDTL 15MIN: Performed by: UROLOGY

## 2022-12-22 PROCEDURE — 6360000002 HC RX W HCPCS: Performed by: STUDENT IN AN ORGANIZED HEALTH CARE EDUCATION/TRAINING PROGRAM

## 2022-12-22 PROCEDURE — 82533 TOTAL CORTISOL: CPT

## 2022-12-22 PROCEDURE — 2580000003 HC RX 258: Performed by: NURSE ANESTHETIST, CERTIFIED REGISTERED

## 2022-12-22 PROCEDURE — 2060000000 HC ICU INTERMEDIATE R&B

## 2022-12-22 PROCEDURE — C1769 GUIDE WIRE: HCPCS | Performed by: UROLOGY

## 2022-12-22 PROCEDURE — 82607 VITAMIN B-12: CPT

## 2022-12-22 PROCEDURE — 83540 ASSAY OF IRON: CPT

## 2022-12-22 PROCEDURE — 74420 UROGRAPHY RTRGR +-KUB: CPT

## 2022-12-22 PROCEDURE — 3700000001 HC ADD 15 MINUTES (ANESTHESIA): Performed by: UROLOGY

## 2022-12-22 PROCEDURE — 0T2BX0Z CHANGE DRAINAGE DEVICE IN BLADDER, EXTERNAL APPROACH: ICD-10-PCS | Performed by: UROLOGY

## 2022-12-22 PROCEDURE — 3600000003 HC SURGERY LEVEL 3 BASE: Performed by: UROLOGY

## 2022-12-22 PROCEDURE — 0TP98DZ REMOVAL OF INTRALUMINAL DEVICE FROM URETER, VIA NATURAL OR ARTIFICIAL OPENING ENDOSCOPIC: ICD-10-PCS | Performed by: UROLOGY

## 2022-12-22 PROCEDURE — 0T788DZ DILATION OF BILATERAL URETERS WITH INTRALUMINAL DEVICE, VIA NATURAL OR ARTIFICIAL OPENING ENDOSCOPIC: ICD-10-PCS | Performed by: UROLOGY

## 2022-12-22 PROCEDURE — C2617 STENT, NON-COR, TEM W/O DEL: HCPCS | Performed by: UROLOGY

## 2022-12-22 PROCEDURE — 87088 URINE BACTERIA CULTURE: CPT

## 2022-12-22 PROCEDURE — 87186 SC STD MICRODIL/AGAR DIL: CPT

## 2022-12-22 PROCEDURE — 7100000010 HC PHASE II RECOVERY - FIRST 15 MIN: Performed by: UROLOGY

## 2022-12-22 PROCEDURE — 6360000002 HC RX W HCPCS: Performed by: ANESTHESIOLOGY

## 2022-12-22 PROCEDURE — 7100000011 HC PHASE II RECOVERY - ADDTL 15 MIN: Performed by: UROLOGY

## 2022-12-22 PROCEDURE — 85025 COMPLETE CBC W/AUTO DIFF WBC: CPT

## 2022-12-22 PROCEDURE — 82728 ASSAY OF FERRITIN: CPT

## 2022-12-22 PROCEDURE — 82962 GLUCOSE BLOOD TEST: CPT

## 2022-12-22 PROCEDURE — 6370000000 HC RX 637 (ALT 250 FOR IP)

## 2022-12-22 PROCEDURE — 2580000003 HC RX 258: Performed by: NURSE PRACTITIONER

## 2022-12-22 PROCEDURE — 80053 COMPREHEN METABOLIC PANEL: CPT

## 2022-12-22 PROCEDURE — 99222 1ST HOSP IP/OBS MODERATE 55: CPT | Performed by: FAMILY MEDICINE

## 2022-12-22 PROCEDURE — 6360000002 HC RX W HCPCS

## 2022-12-22 PROCEDURE — 87077 CULTURE AEROBIC IDENTIFY: CPT

## 2022-12-22 PROCEDURE — 6360000002 HC RX W HCPCS: Performed by: UROLOGY

## 2022-12-22 PROCEDURE — 3700000000 HC ANESTHESIA ATTENDED CARE: Performed by: UROLOGY

## 2022-12-22 PROCEDURE — 2500000003 HC RX 250 WO HCPCS: Performed by: NURSE ANESTHETIST, CERTIFIED REGISTERED

## 2022-12-22 PROCEDURE — 36415 COLL VENOUS BLD VENIPUNCTURE: CPT

## 2022-12-22 PROCEDURE — 82010 KETONE BODYS QUAN: CPT

## 2022-12-22 PROCEDURE — 2709999900 HC NON-CHARGEABLE SUPPLY: Performed by: UROLOGY

## 2022-12-22 PROCEDURE — 51700 IRRIGATION OF BLADDER: CPT

## 2022-12-22 PROCEDURE — 93010 ELECTROCARDIOGRAM REPORT: CPT | Performed by: INTERNAL MEDICINE

## 2022-12-22 PROCEDURE — 82746 ASSAY OF FOLIC ACID SERUM: CPT

## 2022-12-22 DEVICE — URETERAL STENT
Type: IMPLANTABLE DEVICE | Site: URETER | Status: FUNCTIONAL
Brand: PERCUFLEX™

## 2022-12-22 RX ORDER — SODIUM CHLORIDE, SODIUM LACTATE, POTASSIUM CHLORIDE, CALCIUM CHLORIDE 600; 310; 30; 20 MG/100ML; MG/100ML; MG/100ML; MG/100ML
INJECTION, SOLUTION INTRAVENOUS CONTINUOUS
Status: DISCONTINUED | OUTPATIENT
Start: 2022-12-22 | End: 2022-12-28 | Stop reason: HOSPADM

## 2022-12-22 RX ORDER — HEPARIN SODIUM 10000 [USP'U]/ML
5000 INJECTION, SOLUTION INTRAVENOUS; SUBCUTANEOUS 3 TIMES DAILY
Status: DISCONTINUED | OUTPATIENT
Start: 2022-12-22 | End: 2022-12-28 | Stop reason: HOSPADM

## 2022-12-22 RX ORDER — DIPHENHYDRAMINE HYDROCHLORIDE 50 MG/ML
12.5 INJECTION INTRAMUSCULAR; INTRAVENOUS
Status: DISCONTINUED | OUTPATIENT
Start: 2022-12-22 | End: 2022-12-22 | Stop reason: HOSPADM

## 2022-12-22 RX ORDER — PROPOFOL 10 MG/ML
INJECTION, EMULSION INTRAVENOUS CONTINUOUS PRN
Status: DISCONTINUED | OUTPATIENT
Start: 2022-12-22 | End: 2022-12-22 | Stop reason: SDUPTHER

## 2022-12-22 RX ORDER — FENTANYL CITRATE 50 UG/ML
25 INJECTION, SOLUTION INTRAMUSCULAR; INTRAVENOUS EVERY 5 MIN PRN
Status: DISCONTINUED | OUTPATIENT
Start: 2022-12-22 | End: 2022-12-22 | Stop reason: HOSPADM

## 2022-12-22 RX ORDER — PROCHLORPERAZINE EDISYLATE 5 MG/ML
5 INJECTION INTRAMUSCULAR; INTRAVENOUS
Status: ACTIVE | OUTPATIENT
Start: 2022-12-22 | End: 2022-12-23

## 2022-12-22 RX ORDER — SODIUM CHLORIDE 0.9 % (FLUSH) 0.9 %
5-40 SYRINGE (ML) INJECTION PRN
Status: DISCONTINUED | OUTPATIENT
Start: 2022-12-22 | End: 2022-12-28 | Stop reason: HOSPADM

## 2022-12-22 RX ORDER — HYDRALAZINE HYDROCHLORIDE 20 MG/ML
5 INJECTION INTRAMUSCULAR; INTRAVENOUS
Status: DISCONTINUED | OUTPATIENT
Start: 2022-12-22 | End: 2022-12-28 | Stop reason: HOSPADM

## 2022-12-22 RX ORDER — PROPOFOL 10 MG/ML
INJECTION, EMULSION INTRAVENOUS PRN
Status: DISCONTINUED | OUTPATIENT
Start: 2022-12-22 | End: 2022-12-22 | Stop reason: SDUPTHER

## 2022-12-22 RX ORDER — LIDOCAINE HYDROCHLORIDE 20 MG/ML
INJECTION, SOLUTION EPIDURAL; INFILTRATION; INTRACAUDAL; PERINEURAL PRN
Status: DISCONTINUED | OUTPATIENT
Start: 2022-12-22 | End: 2022-12-22 | Stop reason: SDUPTHER

## 2022-12-22 RX ORDER — SODIUM CHLORIDE 9 MG/ML
INJECTION, SOLUTION INTRAVENOUS PRN
Status: DISCONTINUED | OUTPATIENT
Start: 2022-12-22 | End: 2022-12-28 | Stop reason: HOSPADM

## 2022-12-22 RX ORDER — LABETALOL HYDROCHLORIDE 5 MG/ML
5 INJECTION, SOLUTION INTRAVENOUS
Status: DISCONTINUED | OUTPATIENT
Start: 2022-12-22 | End: 2022-12-28 | Stop reason: HOSPADM

## 2022-12-22 RX ORDER — SODIUM CHLORIDE 9 MG/ML
INJECTION, SOLUTION INTRAVENOUS CONTINUOUS PRN
Status: DISCONTINUED | OUTPATIENT
Start: 2022-12-22 | End: 2022-12-22 | Stop reason: SDUPTHER

## 2022-12-22 RX ORDER — SODIUM CHLORIDE 9 MG/ML
INJECTION, SOLUTION INTRAVENOUS CONTINUOUS
Status: DISCONTINUED | OUTPATIENT
Start: 2022-12-22 | End: 2022-12-26 | Stop reason: SDUPTHER

## 2022-12-22 RX ORDER — SODIUM CHLORIDE 0.9 % (FLUSH) 0.9 %
5-40 SYRINGE (ML) INJECTION EVERY 12 HOURS SCHEDULED
Status: DISCONTINUED | OUTPATIENT
Start: 2022-12-22 | End: 2022-12-28 | Stop reason: HOSPADM

## 2022-12-22 RX ORDER — MEPERIDINE HYDROCHLORIDE 25 MG/ML
12.5 INJECTION INTRAMUSCULAR; INTRAVENOUS; SUBCUTANEOUS
Status: DISCONTINUED | OUTPATIENT
Start: 2022-12-22 | End: 2022-12-22 | Stop reason: HOSPADM

## 2022-12-22 RX ADMIN — ONDANSETRON 4 MG: 2 INJECTION INTRAMUSCULAR; INTRAVENOUS at 13:31

## 2022-12-22 RX ADMIN — PROPOFOL 150 MCG/KG/MIN: 10 INJECTION, EMULSION INTRAVENOUS at 10:47

## 2022-12-22 RX ADMIN — CIPROFLOXACIN 200 MG: 2 INJECTION, SOLUTION INTRAVENOUS at 18:19

## 2022-12-22 RX ADMIN — INSULIN GLARGINE 10 UNITS: 100 INJECTION, SOLUTION SUBCUTANEOUS at 20:21

## 2022-12-22 RX ADMIN — SODIUM CHLORIDE: 9 INJECTION, SOLUTION INTRAVENOUS at 13:32

## 2022-12-22 RX ADMIN — INSULIN LISPRO 4 UNITS: 100 INJECTION, SOLUTION INTRAVENOUS; SUBCUTANEOUS at 16:20

## 2022-12-22 RX ADMIN — SODIUM CHLORIDE: 9 INJECTION, SOLUTION INTRAVENOUS at 21:32

## 2022-12-22 RX ADMIN — SODIUM CHLORIDE: 9 INJECTION, SOLUTION INTRAVENOUS at 10:10

## 2022-12-22 RX ADMIN — LIDOCAINE HYDROCHLORIDE 100 MG: 20 INJECTION, SOLUTION EPIDURAL; INFILTRATION; INTRACAUDAL; PERINEURAL at 10:47

## 2022-12-22 RX ADMIN — PROPOFOL 70 MG: 10 INJECTION, EMULSION INTRAVENOUS at 10:47

## 2022-12-22 RX ADMIN — HYDROMORPHONE HYDROCHLORIDE 0.5 MG: 1 INJECTION, SOLUTION INTRAMUSCULAR; INTRAVENOUS; SUBCUTANEOUS at 11:46

## 2022-12-22 RX ADMIN — HYDRALAZINE HYDROCHLORIDE 25 MG: 25 TABLET, FILM COATED ORAL at 20:15

## 2022-12-22 RX ADMIN — HEPARIN SODIUM 5000 UNITS: 10000 INJECTION INTRAVENOUS; SUBCUTANEOUS at 06:04

## 2022-12-22 RX ADMIN — HEPARIN SODIUM 5000 UNITS: 10000 INJECTION INTRAVENOUS; SUBCUTANEOUS at 20:22

## 2022-12-22 RX ADMIN — HEPARIN SODIUM 5000 UNITS: 10000 INJECTION INTRAVENOUS; SUBCUTANEOUS at 18:00

## 2022-12-22 ASSESSMENT — PAIN DESCRIPTION - ORIENTATION: ORIENTATION: LEFT

## 2022-12-22 ASSESSMENT — ENCOUNTER SYMPTOMS: DYSPNEA ACTIVITY LEVEL: NO INTERVAL CHANGE

## 2022-12-22 ASSESSMENT — PAIN DESCRIPTION - LOCATION: LOCATION: FLANK

## 2022-12-22 ASSESSMENT — PAIN - FUNCTIONAL ASSESSMENT: PAIN_FUNCTIONAL_ASSESSMENT: PREVENTS OR INTERFERES SOME ACTIVE ACTIVITIES AND ADLS

## 2022-12-22 ASSESSMENT — PAIN SCALES - GENERAL: PAINLEVEL_OUTOF10: 7

## 2022-12-22 ASSESSMENT — PAIN DESCRIPTION - DESCRIPTORS: DESCRIPTORS: DISCOMFORT

## 2022-12-22 NOTE — PROGRESS NOTES
Patient complaining of nausea at this time. Given PRN zofran and a trial of clear liquids at this time. Will monitor patient.

## 2022-12-22 NOTE — OP NOTE
[address] [address]                                OPERATIVE REPORT    PATIENT NAME: Jack Lewis                    :        1950  MED REC NO:   [id number]                            ROOM:  ACCOUNT NO:   [de-identified]                           ADMIT DATE: 2022  PROVIDER:     Sue Castro MD    DATE OF PROCEDURE:  2022    PREOPERATIVE DIAGNOSIS:  Bilateral hydronephrosis, status post stenting. POSTOPERATIVE DIAGNOSIS:  Bilateral hydronephrosis, status post  stenting. PROCEDURES PERFORMED:  Cystopanendoscopy, retrograde pyelogram,  ureteroscopy, and stent exchange. SURGEON:  Sue Castro M.D. ANESTHESIA:  LMAC. ESTIMATED BLOOD LOSS:  Less than 50. DESCRIPTION OF PROCEDURE:  With the patient in the lithotomy position  under satisfactory sedation, the genitalia were prepped and draped in a  sterile fashion. A 21-Malagasy panendoscope passed easily into the  bladder. Inspection revealed bilateral stents from each ureteral  orifice. The stents were very clean. There was minimal if any  calcification on either stent. It was noted that the stents were  somewhat low lying and although their upper part were curled, the curls  were not in the renal pelvis. They were in the ureter. Attention was  first directed to the right side, where an open-ended catheter was  passed alongside the stent and a wire was inserted up the ureter. The  stent was then removed. It was difficulty manipulating the wire around  the tortuous and segmentally constricted ureter; however, a ureteroscope  was passed over the wire and the wire was then manipulated into the area  of the renal pelvis straightening out the ureter. A 28-cm 6-Malagasy  double-J stent was placed on the right side.   The procedure was repeated  on the left side and again the ureter was segmentally dilated with  constricted areas and marked tortuosity; however, the wire was able to  negotiate into the renal pelvis. When the open-ended catheter was  passed into the left renal pelvis, purulent urine was obtained. This  was sent for culture and sensitivity. A 26-cm 6-Divehi double-J stent  was placed on her left side. Bladder was drained with a 22-Divehi López  catheter. The patient was sent to the recovery room in satisfactory  condition.         Lieutenant Onel MD    D: 12/22/2022 11:41:20       T: 12/22/2022 11:43:54     [id number]  Job#: [id number]     Doc#: [id number]    CC:

## 2022-12-22 NOTE — ANESTHESIA POSTPROCEDURE EVALUATION
Department of Anesthesiology  Postprocedure Note    Patient: Amy Unger  MRN: [id number]  YOB: 1950  Date of evaluation: 12/22/2022      Procedure Summary     Date: 12/22/22 Room / Location: [facility] OR 06 / [facility]    Anesthesia Start: 1034 Anesthesia Stop:     Procedure: CYSTOSCOPY, RETROGRADE PYELOGRAM, BILATERAL URETERAL STENT EXCHANGE, URENA CATHETER EXCHANGE (Bilateral) Diagnosis:       Hydronephrosis, unspecified hydronephrosis type      (Hydronephrosis, unspecified hydronephrosis type [N13.30])    Surgeons: Burnie Lanes, MD Responsible Provider: [address] [place] [address]    Anesthesia Type: MAC ASA Status: 4          Anesthesia Type: No value filed. Lino Phase I:      Lino Phase II:        Anesthesia Post Evaluation    Patient location during evaluation: PACU  Patient participation: complete - patient participated  Level of consciousness: awake  Airway patency: patent  Nausea & Vomiting: no nausea and no vomiting  Complications: no  Cardiovascular status: hemodynamically stable  Respiratory status: acceptable  Hydration status: euvolemic  Comments: Seen and examined. Progressing as expected.

## 2022-12-22 NOTE — PROGRESS NOTES
[facility]  Nephrology Attending Progress Note    SUBJECTIVE:     12/22/22-she is just returned from surgery where she had a cystoscopy with bilateral stent exchange, with López catheter exchanges     PROBLEM LIST:    Patient Active Problem List   Diagnosis    Hypertension    Elevated lipids    Proteinuria    Vitamin D deficiency    Closed fracture of radius    History of anal cancer    Colostomy present ([facility]    Vaginal cancer ([facility]    Diabetes mellitus type 2 in obese ([facility]    Stuttering    Age-related osteoporosis with current pathological fracture with routine healing    Herpes zoster vaccination declined    Chronic renal disease, stage III ([facility] [103070]    Hydronephrosis due to obstruction of bladder    Acute renal failure superimposed on chronic kidney disease, on chronic dialysis, unspecified acute renal failure type ([facility]    Bradycardia    Hyponatremia    Dizziness        PAST MEDICAL HISTORY:    Past Medical History:   Diagnosis Date    Arthritis     osteoporsis    Cancer ([facility]     colon and uterine    Closed fracture of radius 12/27/2016    Elevated lipids 1/15/2014    Headache     History of anal cancer 2/20/2017    Dr. Iesha Stauffer    Hyperlipidemia     Hypertension     Hypertension     Obesity     Proteinuria 6/25/2014    Type II or unspecified type diabetes mellitus without mention of complication, not stated as uncontrolled     Vaginal cancer ([facility] 5/22/2017    Vitamin D deficiency 11/6/2014       DIET:    Diet NPO     PHYSICAL EXAM:     Patient Vitals for the past 24 hrs:   BP Temp Temp src Pulse Resp SpO2 Height Weight   12/22/22 0644 124/75 97.4 °F (36.3 °C) Oral 55 18 100 % -- --   12/22/22 0200 116/73 97.3 °F (36.3 °C) Temporal 55 16 100 % 5' 7\" (1.702 m) 200 lb (90.7 kg)   12/21/22 2130 135/77 96.8 °F (36 °C) Temporal 62 16 100 % -- --   12/21/22 1930 119/68 97.7 °F (36.5 °C) Oral 57 14 98 % -- --   12/21/22 1731 119/81 -- -- 56 11 98 % -- --   12/21/22 1409 (!) 126/90 -- -- 60 16 100 % -- -- 12/21/22 1148 105/69 -- -- -- -- -- -- --   12/21/22 1146 -- 98.1 °F (36.7 °C) -- 67 18 99 % -- 178 lb (80.7 kg)   @      Intake/Output Summary (Last 24 hours) at 12/22/2022 0945  Last data filed at 12/22/2022 0556  Gross per 24 hour   Intake --   Output 700 ml   Net -700 ml         Wt Readings from Last 3 Encounters:   12/22/22 200 lb (90.7 kg)   12/05/22 178 lb (80.7 kg)   07/21/22 150 lb (68 kg)       Constitutional:  Pt is in no acute distress  Head: normocephalic, atraumatic  Neck: no JVD  Cardiovascular:  S1-S2 no S3 or rub  Respiratory: Clear with equal expansion  Gastrointestinal:  Soft, nontender, nondistended, bowel sounds x 4  Ext: No edema  Skin: dry, no rash  Neuro: Awake alert and oriented    MEDS (scheduled):    ciprofloxacin  200 mg IntraVENous Q24H    amLODIPine  10 mg Oral QAM    atenolol  25 mg Oral QAM    atorvastatin  20 mg Oral QAM    glipiZIDE  5 mg Oral QAM AC    pantoprazole  40 mg Oral QAM    hydrALAZINE  25 mg Oral 2 times per day    sodium chloride flush  5-40 mL IntraVENous 2 times per day    [Held by provider] heparin (porcine)  5,000 Units SubCUTAneous 3 times per day    insulin glargine  10 Units SubCUTAneous Nightly    insulin lispro  0-16 Units SubCUTAneous TID WC    insulin lispro  0-4 Units SubCUTAneous Nightly       MEDS (infusions):   sodium chloride      dextrose         MEDS (prn):  sodium chloride flush, sodium chloride, ondansetron **OR** ondansetron, polyethylene glycol, glucose, dextrose bolus **OR** dextrose bolus, glucagon (rDNA), dextrose    DATA:    Recent Labs     12/21/22  1212 12/22/22  0610   WBC 6.8 6.0   HGB 11.3* 10.9*   HCT 35.0 34.5   MCV 82.4 85.4    366     Recent Labs     12/21/22  1212 12/21/22  1310 12/21/22  1507 12/22/22  0610   *  --  126* 129*   K 4.3  --  4.3 4.3   CL 86*  --  91* 94*   CO2 19*  --  19* 22   BUN 76*  --  75* 70*   CREATININE 6.5*  --  6.1* 5.9*   LABGLOM 6  --  7 7   GLUCOSE 272* 272 192* 146*   CALCIUM 9.3  --  8.9 8. 9   ALT 7  --  6 6   AST 11  --  12 10   BILITOT 0.5  --  0.5 0.4   ALKPHOS 104  --  95 95       Lab Results   Component Value Date    LABALBU 3.1 (L) 12/22/2022    LABALBU 2.5 (L) 12/21/2022    LABALBU 3.4 (L) 12/21/2022     Lab Results   Component Value Date    TSH 2.000 01/31/2022       Iron Studies  Lab Results   Component Value Date    IRON 98 12/22/2022    TIBC 241 (L) 12/22/2022    FERRITIN 342 12/22/2022     Vitamin B-12   Date Value Ref Range Status   12/22/2022 403 211 - 946 pg/mL Final     Folate   Date Value Ref Range Status   12/22/2022 6.4 4.8 - 24.2 ng/mL Final       Vit D, 25-Hydroxy   Date Value Ref Range Status   01/31/2022 32 30 - 100 ng/mL Final     Comment:     <20 ng/mL. ........... [facility] Deficient  20-30 ng/mL. ......... [facility] Insufficient   ng/mL. ........ [facility] Sufficient  >100 ng/mL. .......... [facility] Toxic       No results found for: PTH    No components found for: URIC    Lab Results   Component Value Date/Time    COLORU Yellow 12/21/2022 12:12 PM    NITRU Negative 12/21/2022 12:12 PM    GLUCOSEU Negative 12/21/2022 12:12 PM    KETUA Negative 12/21/2022 12:12 PM    UROBILINOGEN 0.2 12/21/2022 12:12 PM    BILIRUBINUR Negative 12/21/2022 12:12 PM    BILIRUBINUR neg 10/07/2013 11:12 AM       No results found for: Claudette Erichsen      IMPRESSION/RECOMMENDATIONS:      Acute kidney injury-  In the setting of obstructive uropathy  FeNa>1% suggestive of intrinsic cause  Baseline creatinine 1.6 to 1.9 mg/dL  She has a history of obstructive uropathy follows with urology with stents being replaced in July 2022. Creatinine at presentation 6.5 mg/dL and has trended to 5.9 mg/dL this morning with IV hydration. S/P cystoscopy with retrograde pyelogram and stent exchange and López replacement 12/22/22    2. Hypertension with chronic kidney disease stage I-4-  Blood pressure goal less than 130/80  Blood pressure is at goal    3. Metabolic acidosis-  In the setting of acute kidney injury  CO2 now at 22 mmol/L    4. Hyponatremia-  Likely in the setting of poor intake  Sodium improving with IV hydration  Will continue    5. Urinary tract infection-  Patient with chronic indwelling López catheter, which was changed during stent exchange today  She has been empirically started on IV antibiotics    MARTIR Fischer - CNP    Patient seen and examined. Patient sister is present at the bedside. Patient is status post cystoscopy with retrograde pyelogram and stent exchange. Patient does have some gross hematuria. López catheter replaced. Chart reviewed. I had a face to face encounter with the patient. Agree with exam.    Agree with  formulation, assessment and plan as outlined above and directed by me. Addition and corrections were done as deemed appropriate. My exam and plan include:     Continue current treatment as outlined above. Renal function is slightly improved. We will leave the patient on IV fluids.       Naveen Mckoy MD  Nephrology        Electronically signed by Naveen Mckoy MD on 12/22/2022 at 3:04 PM

## 2022-12-22 NOTE — ANESTHESIA PRE PROCEDURE
Department of Anesthesiology  Preprocedure Note       Name:  Alisha Red   Age:  67 y.o.  :  1950                                          MRN:  [id number]         Date:  2022      Surgeon: Tonia Brasher):  Arnaud Boyce MD    Procedure: Procedure(s):  CYSTOSCOPY RETROGRADE PYELOGRAM BILATERAL STENT EXCHANGE    Medications prior to admission:   Prior to Admission medications    Medication Sig Start Date End Date Taking? Authorizing Provider   amLODIPine (NORVASC) 10 MG tablet Take 10 mg by mouth every morning    Historical Provider, MD   aspirin 81 MG EC tablet Take 81 mg by mouth every morning    Historical Provider, MD   atenolol (TENORMIN) 50 MG tablet Take 50 mg by mouth every morning    Historical Provider, MD   atorvastatin (LIPITOR) 20 MG tablet Take 20 mg by mouth every morning    Historical Provider, MD   denosumab (PROLIA) 60 MG/ML SOSY SC injection Inject 60 mg into the skin every 6 months NEXT INJ DUE @ SEX INF: 2023    Historical Provider, MD   glipiZIDE (GLUCOTROL XL) 5 MG extended release tablet Take 5 mg by mouth Daily with lunch    Historical Provider, MD   magnesium oxide (MAG-OX) 400 MG tablet Take 400 mg by mouth 2 times daily    Historical Provider, MD   pantoprazole (PROTONIX) 40 MG tablet Take 40 mg by mouth every morning    Historical Provider, MD   sodium bicarbonate 650 MG tablet Take 650 mg by mouth 2 times daily    Historical Provider, MD   hydrALAZINE (APRESOLINE) 25 MG tablet Take 1 tablet by mouth every 12 hours 22   Osmany Fonseca MD       Current medications:    No current facility-administered medications for this visit. No current outpatient medications on file.      Facility-Administered Medications Ordered in Other Visits   Medication Dose Route Frequency Provider Last Rate Last Admin    ciprofloxacin (CIPRO) IVPB 200 mg  200 mg IntraVENous Q24H Marge Flores MD   Stopped at 22 184    [START ON 2022] amLODIPine (Trish Flores) tablet 10 mg  10 mg Oral BRUNA Hazel MD        [START ON 12/22/2022] atenolol (TENORMIN) tablet 25 mg  25 mg Oral BRUNA Hazel MD        [START ON 12/22/2022] atorvastatin (LIPITOR) tablet 20 mg  20 mg Oral BRUNA Aguilar MD        [START ON 12/22/2022] glipiZIDE (GLUCOTROL) tablet 5 mg  5 mg Oral QAM AC Amy Aguilar MD        [START ON 12/22/2022] pantoprazole (PROTONIX) tablet 40 mg  40 mg Oral BRUNA Hazel MD        hydrALAZINE (APRESOLINE) tablet 25 mg  25 mg Oral 2 times per day Emily Hazel MD        sodium chloride flush 0.9 % injection 5-40 mL  5-40 mL IntraVENous 2 times per day Emily Hazel MD        sodium chloride flush 0.9 % injection 5-40 mL  5-40 mL IntraVENous PRN Emily Hazel MD        0.9 % sodium chloride infusion   IntraVENous PRN Emily Hazel MD        heparin (porcine) injection 5,000 Units  5,000 Units SubCUTAneous 3 times per day Emily Hazel MD        ondansetron (ZOFRAN-ODT) disintegrating tablet 4 mg  4 mg Oral Q8H PRN Emily Hazel MD        Or    ondansetron (ZOFRAN) injection 4 mg  4 mg IntraVENous Q6H PRN Emily Hazel MD        polyethylene glycol (GLYCOLAX) packet 17 g  17 g Oral Daily PRN Emily Hazel MD        insulin glargine (LANTUS) injection vial 10 Units  10 Units SubCUTAneous Nightly Emily Hazel MD        [START ON 12/22/2022] insulin lispro (HUMALOG) injection vial 0-16 Units  0-16 Units SubCUTAneous TID WC Emily Hazel MD        insulin lispro (HUMALOG) injection vial 0-4 Units  0-4 Units SubCUTAneous Nightly Emily Hazel MD        glucose chewable tablet 16 g  4 tablet Oral PRN Emily Hazel MD        dextrose bolus 10% 125 mL  125 mL IntraVENous PRN Emily Hazel MD        Or    dextrose bolus 10% 250 mL  250 mL IntraVENous PRN Emily Hazel MD        glucagon (rDNA) injection 1 mg  1 mg SubCUTAneous PRN Emily Hazel MD        dextrose 10 % infusion   IntraVENous Continuous PRN Emily Hazel MD Allergies:     Allergies   Allergen Reactions    Betadine [Povidone Iodine] Itching    Lisinopril Swelling     UNKNOWN AREA OF SWELLING    Penicillins Hives     BLISTERS      Tylenol [Acetaminophen] Rash       Problem List:    Patient Active Problem List   Diagnosis Code    Hypertension I10    Elevated lipids E78.5    Proteinuria R80.9    Vitamin D deficiency E55.9    Closed fracture of radius S52.90XA    History of anal cancer Z85.048    Colostomy present ([facility] Z93.3    Vaginal cancer ([facility] C52    Diabetes mellitus type 2 in obese ([facility]) E11.69, E66.9    Stuttering F80.81    Age-related osteoporosis with current pathological fracture with routine healing M80.00XD    Herpes zoster vaccination declined Z28.21    Chronic renal disease, stage III ([facility] [943505] N18.30    Hydronephrosis due to obstruction of bladder N13.30, N32.0    Acute renal failure superimposed on chronic kidney disease, on chronic dialysis, unspecified acute renal failure type ([facility]) N17.9, N18.9, Z99.2    Bradycardia R00.1    Hyponatremia E87.1    Dizziness R42       Past Medical History:        Diagnosis Date    Arthritis     osteoporsis    Cancer ([facility]     colon and uterine    Closed fracture of radius 12/27/2016    Elevated lipids 1/15/2014    Headache     History of anal cancer 2/20/2017    Dr. Jaymie Torres    Hyperlipidemia     Hypertension     Hypertension     Obesity     Proteinuria 6/25/2014    Type II or unspecified type diabetes mellitus without mention of complication, not stated as uncontrolled     Vaginal cancer ([facility] 5/22/2017    Vitamin D deficiency 11/6/2014       Past Surgical History:        Procedure Laterality Date    BLADDER SURGERY N/A 9/24/2021    CYSTOSCOPY BILATERAL RETROGRADE PYELOGRAM, BILATERAL STENT INSERTION performed by Joya [facility]ue [facility]      COLOSTOMY      CYSTOSCOPY Bilateral 7/21/2022    CYSTOSCOPY RETROGRADE PYELOGRAM BILATERIAL STENT EXCHANGE performed by Geneva Mckeon MD at [facility] Left 11/07/2016       Social History:    Social History     Tobacco Use    Smoking status: Never    Smokeless tobacco: Never   Substance Use Topics    Alcohol use: No                                Counseling given: Not Answered      Vital Signs (Current): There were no vitals filed for this visit. BP Readings from Last 3 Encounters:   12/21/22 135/77   12/21/22 117/75   12/05/22 (!) 178/108       NPO Status:                                                                                 BMI:   Wt Readings from Last 3 Encounters:   12/21/22 178 lb (80.7 kg)   12/05/22 178 lb (80.7 kg)   07/21/22 150 lb (68 kg)     There is no height or weight on file to calculate BMI.    CBC:   Lab Results   Component Value Date/Time    WBC 6.8 12/21/2022 12:12 PM    RBC 4.25 12/21/2022 12:12 PM    HGB 11.3 12/21/2022 12:12 PM    HCT 35.0 12/21/2022 12:12 PM    MCV 82.4 12/21/2022 12:12 PM    RDW 13.9 12/21/2022 12:12 PM     12/21/2022 12:12 PM       CMP:   Lab Results   Component Value Date/Time     12/21/2022 03:07 PM    K 4.3 12/21/2022 03:07 PM    CL 91 12/21/2022 03:07 PM    CO2 19 12/21/2022 03:07 PM    BUN 75 12/21/2022 03:07 PM    CREATININE 6.1 12/21/2022 03:07 PM    GFRAA 31 07/19/2022 11:31 AM    LABGLOM 7 12/21/2022 03:07 PM    GLUCOSE 192 12/21/2022 03:07 PM    GLUCOSE 123 03/05/2012 11:17 AM    PROT 7.2 12/21/2022 03:07 PM    CALCIUM 8.9 12/21/2022 03:07 PM    BILITOT 0.5 12/21/2022 03:07 PM    ALKPHOS 95 12/21/2022 03:07 PM    AST 12 12/21/2022 03:07 PM    ALT 6 12/21/2022 03:07 PM       POC Tests: No results for input(s): POCGLU, POCNA, POCK, POCCL, POCBUN, POCHEMO, POCHCT in the last 72 hours.     Coags: No results found for: PROTIME, INR, APTT    HCG (If Applicable): No results found for: PREGTESTUR, PREGSERUM, HCG, HCGQUANT     ABGs: No results found for: PHART, PO2ART, BSE7MSZ, XRH8YQL, BEART, D1HGYGQN     Type & Screen (If Applicable):  No results found for: LABABO, LABRH    Drug/Infectious Status (If Applicable):  No results found for: HIV, HEPCAB    COVID-19 Screening (If Applicable):   Lab Results   Component Value Date/Time    COVID19 Not Detected 12/21/2022 12:12 PM        EKG 9/19/20  Ventricular Rate BPM 70    Atrial Rate BPM 70    P-R Interval ms 128    QRS Duration ms 66    Q-T Interval ms 394    QTc Calculation (Bazett) ms 425    P Axis degrees 33    R Axis degrees -10    T Axis degrees 31    Resulting Agency  [facility]        CT:  Impression   1.  Bilateral moderate hydronephrosis and hydroureter despite the presence of   bilateral double-J ureteral catheters.  The proximal aspect of the catheters   are in the renal pelvis and proximal ureter on the right and left   respectively.       2.  No acute inflammatory changes in the abdomen or pelvis.       3.  40% superior endplate wedge compression deformity of L2, age   indeterminate.           Anesthesia Evaluation  Patient summary reviewed and Nursing notes reviewed no history of anesthetic complications:   Airway: Mallampati: II  TM distance: >3 FB   Neck ROM: full  Mouth opening: > = 3 FB   Dental:    (+) upper dentures      Pulmonary:Negative Pulmonary ROS   (+) decreased breath sounds: bilateral     (-) not a current smoker                           Cardiovascular:  Exercise tolerance: poor (<4 METS),   (+) hypertension: mild and no interval change, PRATT: no interval change, hyperlipidemia      ECG reviewed  Rhythm: regular  Rate: abnormal           Beta Blocker:  Dose within 24 Hrs      ROS comment: EKG:  Sinus bradycardia  Otherwise normal ECG  When compared with ECG of 21-DEC-2022 12:19,  No significant change was found     Neuro/Psych:   (+) headaches: migraine headaches,              ROS comment: Hx. stuttering   Ambulatory dysfunction - uses a cane GI/Hepatic/Renal:   (+) GERD: no interval change, renal disease (JEANNINE:  creatinine 5.9 & GFR 7): kidney stones, CRI, ARF and dialysis,          ROS comment: Colostomy present. Endo/Other:    (+) Diabetes (last A1C - 10.0  on 9/9/20)Type II DM, poorly controlled, , blood dyscrasia (Hbg 10.9 g/dL): anemia:., electrolyte abnormalities (Hyponatremia (Na+ 129 mmol/L)), malignancy/cancer (Anal CA & vaginal CA). Pt had no PAT visit        ROS comment: History of anal cancer  Hx Cancer colon and uterine   Osteoporosis Abdominal:   (+) obese,           Vascular: negative vascular ROS. Other Findings:             Anesthesia Plan      MAC     ASA 4     (MAC with conversion to GA if clinically approriate)  Induction: intravenous. MIPS: Postoperative opioids intended and Prophylactic antiemetics administered. Anesthetic plan and risks discussed with patient. Plan discussed with CRNA. Patient to be re-evaluated by DOS Anesthesiologist    Patient seen and evaluated     Aissatou Machuca MD   12/21/2022      DOS STAFF ADDENDUM:    Patient seen and chart reviewed on DOS. No interval change in history or exam.   I agree with the anesthesia pre-operative assessment written above and have made the appropriate addendums and/or changes. Anesthesia plan discussed and risks/benefits addressed. Patient's concerns and questions answered. NPO >8 hours.      Robi Dunn DO  December 22, 2022  10:22 AM

## 2022-12-22 NOTE — PROGRESS NOTES
Bright red urine with some sediments and clots noted in knutson catheter bag. Irrigated patient's knutson catheter with 100 ml's of sterile water. Knutson catheter draining back pink urine. Will continue to monitor for clots.

## 2022-12-22 NOTE — CARE COORDINATION
Social Work/Discharge Planning:  Met with patient and completed initial assessment. Explained Social Work role and discussed transition of care/discharge planning. Patient lives alone in an apartment. PTA she uses a cane. She has no snf history. She is active with [address] [place] [address] for monthly knutson catheter changes. Patient PCP is Dr. Max Atkinson and pharmacy is [address] [place] [address] in [place]. She had a stent exchange today. She plans to return home at discharge and denies any home care needs at this time. Will continue to follow and assist with discharge planning.   Electronically signed by CORNELIUS Oneill on 12/22/2022 at 2:39 PM

## 2022-12-22 NOTE — PROGRESS NOTES
[address]  Progress Note    Chief complaint :  Chief Complaint   Patient presents with    Aphasia     Slurring words since waking up this morning. Went to bed normal last night       Extremity Weakness     Right side feels different than the left        Subjective:  No acute events overnight. She has tolerated her breakfast well today. Denies fever, chills, or pain. Past medical, surgical, family and social history were reviewed, non-contributory, and unchanged unless otherwise stated. Review of systems: Negative except for stated above. Objective:  /69   Pulse 52   Temp 97.9 °F (36.6 °C) (Oral)   Resp 20   Ht 5' 7\" (1.702 m)   Wt 200 lb (90.7 kg)   SpO2 100%   BMI 31.32 kg/m²       Intake/Output Summary (Last 24 hours) at 12/22/2022 1605  Last data filed at 12/22/2022 1133  Gross per 24 hour   Intake 200 ml   Output 700 ml   Net -500 ml           Physical Exam  Vitals reviewed. Constitutional:       General: She is not in acute distress. Appearance: Normal appearance. She is not ill-appearing. HENT:      Head: Normocephalic and atraumatic. Nose: Nose normal. No congestion or rhinorrhea. Eyes:      General:         Right eye: No discharge. Left eye: No discharge. Conjunctiva/sclera: Conjunctivae normal.      Comments: Left oval pupil   Cardiovascular:      Rate and Rhythm: Normal rate and regular rhythm. Heart sounds: Normal heart sounds. No murmur heard. Pulmonary:      Effort: Pulmonary effort is normal.      Breath sounds: Normal breath sounds. No wheezing. Abdominal:      General: Abdomen is flat. There is no distension. Palpations: Abdomen is soft. Tenderness: There is no abdominal tenderness. Comments: Ostomy bag in place. Did not have stool in it during evaluation. No erythema around ostomy site noted   Genitourinary:     Comments: López in place.   Cloudy/yellow urine draining into López bag.  Musculoskeletal:         General: No swelling or deformity. Normal range of motion. Cervical back: No rigidity or tenderness. Skin:     General: Skin is warm and dry. Findings: No rash. Neurological:      Mental Status: She is alert and oriented to person, place, and time. Comments: She has a speech delay but responds to questions appropriately.    Psychiatric:         Mood and Affect: Mood normal.         Behavior: Behavior normal.       Labs:  Recent Results (from the past 24 hour(s))   POCT Glucose    Collection Time: 12/21/22  9:43 PM   Result Value Ref Range    Meter Glucose 215 (H) 74 - 99 mg/dL   UREA NITROGEN, URINE    Collection Time: 12/21/22 10:25 PM   Result Value Ref Range    Urea Nitrogen, Ur 314 (L) 800 - 1666 mg/dL   EKG 12 lead    Collection Time: 12/22/22  6:00 AM   Result Value Ref Range    Ventricular Rate 55 BPM    Atrial Rate 55 BPM    P-R Interval 162 ms    QRS Duration 70 ms    Q-T Interval 470 ms    QTc Calculation (Bazett) 449 ms    P Axis 47 degrees    R Axis -3 degrees    T Axis 23 degrees   POCT Glucose    Collection Time: 12/22/22  6:02 AM   Result Value Ref Range    Meter Glucose 141 (H) 74 - 99 mg/dL   Comprehensive Metabolic Panel w/ Reflex to MG    Collection Time: 12/22/22  6:10 AM   Result Value Ref Range    Sodium 129 (L) 132 - 146 mmol/L    Potassium reflex Magnesium 4.3 3.5 - 5.0 mmol/L    Chloride 94 (L) 98 - 107 mmol/L    CO2 22 22 - 29 mmol/L    Anion Gap 13 7 - 16 mmol/L    Glucose 146 (H) 74 - 99 mg/dL    BUN 70 (H) 6 - 23 mg/dL    Creatinine 5.9 (H) 0.5 - 1.0 mg/dL    Est, Glom Filt Rate 7 >=60 mL/min/1.73    Calcium 8.9 8.6 - 10.2 mg/dL    Total Protein 7.5 6.4 - 8.3 g/dL    Albumin 3.1 (L) 3.5 - 5.2 g/dL    Total Bilirubin 0.4 0.0 - 1.2 mg/dL    Alkaline Phosphatase 95 35 - 104 U/L    ALT 6 0 - 32 U/L    AST 10 0 - 31 U/L   CBC with Auto Differential    Collection Time: 12/22/22  6:10 AM   Result Value Ref Range    WBC 6.0 4.5 - 11.5 E9/L    RBC 4. 04 3.50 - 5.50 E12/L    Hemoglobin 10.9 (L) 11.5 - 15.5 g/dL    Hematocrit 34.5 34.0 - 48.0 %    MCV 85.4 80.0 - 99.9 fL    MCH 27.0 26.0 - 35.0 pg    MCHC 31.6 (L) 32.0 - 34.5 %    RDW 14.1 11.5 - 15.0 fL    Platelets 335 952 - 745 E9/L    MPV 11.1 7.0 - 12.0 fL    Neutrophils % 74.9 43.0 - 80.0 %    Immature Granulocytes % 0.5 0.0 - 5.0 %    Lymphocytes % 14.8 (L) 20.0 - 42.0 %    Monocytes % 6.8 2.0 - 12.0 %    Eosinophils % 2.3 0.0 - 6.0 %    Basophils % 0.7 0.0 - 2.0 %    Neutrophils Absolute 4.49 1.80 - 7.30 E9/L    Immature Granulocytes # 0.03 E9/L    Lymphocytes Absolute 0.89 (L) 1.50 - 4.00 E9/L    Monocytes Absolute 0.41 0.10 - 0.95 E9/L    Eosinophils Absolute 0.14 0.05 - 0.50 E9/L    Basophils Absolute 0.04 0.00 - 0.20 E9/L   Beta-Hydroxybutyrate    Collection Time: 12/22/22  6:10 AM   Result Value Ref Range    Beta-Hydroxybutyrate 0.73 (H) 0.02 - 0.27 mmol/L   Vitamin B12 & Folate    Collection Time: 12/22/22  6:10 AM   Result Value Ref Range    Vitamin B-12 403 211 - 946 pg/mL    Folate 6.4 4.8 - 24.2 ng/mL   Iron and TIBC    Collection Time: 12/22/22  6:10 AM   Result Value Ref Range    Iron 98 37 - 145 mcg/dL    TIBC 241 (L) 250 - 450 mcg/dL    Iron Saturation 41 15 - 50 %   Ferritin    Collection Time: 12/22/22  6:10 AM   Result Value Ref Range    Ferritin 342 ng/mL   Cortisol Total    Collection Time: 12/22/22  6:10 AM   Result Value Ref Range    Cortisol 17.95 2.68 - 18.40 mcg/dL   POCT Glucose    Collection Time: 12/22/22  1:47 PM   Result Value Ref Range    Meter Glucose 194 (H) 74 - 99 mg/dL       Radiology and other tests reviewed:  FL RETROGRADE PYELOGRAM W WO KUB   Final Result   Intraprocedural fluoroscopic spot images as above. See separate procedure   report for more information. CT ABDOMEN PELVIS WO CONTRAST Additional Contrast? None   Final Result   1. Bilateral moderate hydronephrosis and hydroureter despite the presence of   bilateral double-J ureteral catheters.   The proximal aspect of the catheters   are in the renal pelvis and proximal ureter on the right and left   respectively. 2.  No acute inflammatory changes in the abdomen or pelvis. 3.  40% superior endplate wedge compression deformity of L2, age   indeterminate. XR CHEST PORTABLE   Final Result   No acute process. CT HEAD WO CONTRAST   Final Result   1. There is no acute intracranial abnormality. Specifically, there is no   intracranial hemorrhage. 2. Atrophy and periventricular leukomalacia,             Assessment:  Active Hospital Problems    Diagnosis Date Noted    Chronic indwelling López catheter [Z97.8] 12/22/2022     Priority: Medium    Status post placement of ureteral stent [Z96.0] 12/22/2022     Priority: Medium    Urinary tract infection associated with indwelling urethral catheter ([address] [J35.309P, N39.0] 12/22/2022     Priority: Medium    Bilateral hydronephrosis [N13.30] 12/22/2022     Priority: Medium    Acute renal failure superimposed on chronic kidney disease, on chronic dialysis, unspecified acute renal failure type ([address] [N17.9, N18.9, Z99.2] 12/21/2022     Priority: Medium    Bradycardia [R00.1] 12/21/2022     Priority: Medium    Hyponatremia [E87.1] 12/21/2022     Priority: Medium    Dizziness [R42] 12/21/2022     Priority: Medium       Plan:  Hyponatremia, improving  Na+ = 123, most likely due to hypervolemia/medication. We will need to ensure that sodium is not raised by more than 6-8 mEq per day. - CMP daily  - Urine Na = 40, UrOsm = 248 (low), SerOsm = 309.   - Urine Cr = 89  - Nephrology following     Acute renal failure   Baseline creatinine 2. Creatinine on admission creatinine 6.5 BUN/Cr ratio >11. FeNa = 2.4, most likely 2/2 obstruction.   - Currently not on fluids  - FeNa = 2.4  - Hold nephrotoxic medications  - Nephrology following, appreciate input     Bilateral moderate hydronephrosis and hydroureter  CT abdomen pelvis was remarkable for bilateral moderate hydronephrosis and hydroureter despite the presence of a bilateral double-J ureteral catheters  Urine analysis positive for turbid, moderate blood, protein large leukocytes negative for nitrites, WBC and RBC see positive. Many bacteria  - Urine culture pending   - Ciprofloxacin 200 mg IV every 24 hours for 3 doses for UTI ( day 2)   -NPO in anticipation of cystoscopy, retrograde pyelogram, and stent exchange 2-day. Home aspirin is not ordered.  -Patient postop day 1 of cystoscopy grade pyelogram and stent exchange  - Urology following     Complicated urinary tract infection  -Patient on day 3 of Levaquin  -Culture positive for gram-negative rods and staph aureus    Anemia  On admission hemoglobin 11.3  Last iron studies to 48 ferritin, 75 iron, 25 iron saturation, 305 TIBC, folate 3.8, vitamin B12 247 9/21/2021  - Continue to monitor  - CBC daily  -Studies indicative of anemia of chronic disease    Sinus bradycardia, improving  - Continue to monitor, repeat EKG in the morning  - Patient on atenolol 50 mg every morning-half dose ordered  - Telemetry     Diabetes   Last HbA1C 12.9 on admission  patients home medications include glipizide 5 mg.   Hyperglycemic to 272 on admission, anion gap of 18 down to 16, beta hydroxybutyrate  0.82  - 10U lantus nightly  - Hold Glipizide 5mg   - HDSSI   - Hypoglycemia protocol  - POCT glucose checks     Hypertension  - Amlodipine 10 mg every morning  - Hydralazine 25 mg BID      Hyperlipidemia  - Atorvastatin 20 mg daily     GERD  - Protonix 40 mg daily    Pain Control:  Tylenol 650 mg Q6HR PRN for mild pain  DVT ppx: PCDs  GI ppx: Protonix 40 mg daily  Code Status: Full  Diet: NPO      Disposition: Discharge to pending clinical course    Lamont Dasilva DO  Family Medicine Resident PGY-3  12/22/22   4:05 PM

## 2022-12-22 NOTE — PROGRESS NOTES
Physician Progress Note      Evelin James  CSN #:                  [id number]  :                       1950  ADMIT DATE:       2022 11:53 AM  DISCH DATE:  RESPONDING  PROVIDER #:        Erica Dvais MD          QUERY TEXT:      Patient admitted with UTI and noted to have chronic indwelling urinary   catheter and bilateral ureteral stents. If possible, please document in the   progress notes and discharge summary if you are evaluating and/or treating any   of the following: The medical record reflects the following:  Risk Factors: Obstructive uropathy with bilateral ureteral stents and chronic   knutson catheter  Clinical Indicators: per the H&P \" patient has Knutson catheter in place for   over a year, bilateral J stents in place from . \" The CT shows \"bilateral   hydronephrosis and hydroureter despite the presence of double-J ureteral   stents\", UA + - urine culture - Gram negative rods/Gram positive organism  Treatment: IV antibiotics, Knutson/stent exchange    Thank you,  Timbo Winston RN  Clinical Documentation [email or web address]  Options provided:  -- UTI due to chronic indwelling urinary catheter  -- UTI due to ureteral stents  -- Other - I will add my own diagnosis  -- Disagree - Not applicable / Not valid  -- Disagree - Clinically unable to determine / Unknown  -- Refer to Clinical Documentation Reviewer    PROVIDER RESPONSE TEXT:    Provider is clinically unable to determine a response to this query. AbnormalUA - may be colonization of bacteria -defer to urology    Query created by: Maylin Mendoza on 2022 1:43 PM      Electronically signed by:   Erica Davis MD 2022 4:50 PM

## 2022-12-22 NOTE — BRIEF OP NOTE
Brief Postoperative Note      Patient: Herbert Ceballos  YOB: 1950  MRN: [id number]    Date of Procedure: 12/22/2022    Pre-Op Diagnosis: Hydronephrosis, unspecified hydronephrosis     Post-Op Diagnosis: Same       Procedure(s):  CYSTOSCOPY, RETROGRADE PYELOGRAM, BILATERAL URETERAL STENT EXCHANGE, URENA CATHETER EXCHANGE    Surgeon(s):  Cherry Mckeon MD    Assistant:    Anesthesia: General    Estimated Blood Loss (mL): Minimal    Complications: None    Specimens:   ID Type Source Tests Collected by Time Destination   1 : URINE CULTURE Urine Urine, Cystoscopic CULTURE, URINE Cherry Mckeon MD 12/22/2022 1128        Implants:  Implant Name Type Inv.  Item Serial No.  Lot No. LRB No. Used Action   AUGMENT FEM [id number] [id number] RT MEDL LT LAT DSTL KNEE CONSTRN - [id number]  AUGMENT FEM L70MM [id number] RT MEDL LT LAT DSTL KNEE CONSTRN  [employer] [id number] Right 1 Implanted   AUGMENT FEM L65MM [id number] RT MEDL LT LAT DSTL KNEE CONSTRN - [id number]  AUGMENT FEM L65MM [id number] RT MEDL LT LAT DSTL KNEE CONSTRN  [employer] [id number] Left 1 Implanted         Drains:   Colostomy LUQ (Active)   Stomal Appliance 1 piece 12/22/22 0621   Stoma  Assessment Pink 12/22/22 0621   Peristomal Assessment Clean, dry & intact 12/22/22 0621   Stool Appearance Soft 12/22/22 0621   Stool Color Brown 12/22/22 0621   Stool Amount Smear 12/22/22 0621       Urinary Catheter 12/22/22 2 Way (Active)       [REMOVED] Urinary Catheter 12/21/22 (Removed)   Catheter Indications Urinary retention (acute or chronic), continuous bladder irrigation or bladder outlet obstruction 12/21/22 2130   Site Assessment [place] 12/21/22 2130   Urine Color Yellow 12/21/22 2130   Urine Appearance Cloudy 12/21/22 2130   Collection Container Standard 12/21/22 2130   Securement Method Leg strap 12/21/22 2130   Catheter Care Completed Yes 12/21/22 2130   Catheter Best Practices  Drainage tube clipped to bed;Catheter

## 2022-12-23 LAB
ALBUMIN SERPL-MCNC: 3.4 G/DL (ref 3.5–5.2)
ALP BLD-CCNC: 105 U/L (ref 35–104)
ALT SERPL-CCNC: 6 U/L (ref 0–32)
ANION GAP SERPL CALCULATED.3IONS-SCNC: 14 MMOL/L (ref 7–16)
AST SERPL-CCNC: 12 U/L (ref 0–31)
BASOPHILS ABSOLUTE: 0.02 E9/L (ref 0–0.2)
BASOPHILS RELATIVE PERCENT: 0.1 % (ref 0–2)
BILIRUB SERPL-MCNC: 0.4 MG/DL (ref 0–1.2)
BUN BLDV-MCNC: 64 MG/DL (ref 6–23)
CALCIUM SERPL-MCNC: 9.1 MG/DL (ref 8.6–10.2)
CHLORIDE BLD-SCNC: 96 MMOL/L (ref 98–107)
CO2: 20 MMOL/L (ref 22–29)
CREAT SERPL-MCNC: 5.1 MG/DL (ref 0.5–1)
EOSINOPHILS ABSOLUTE: 0.03 E9/L (ref 0.05–0.5)
EOSINOPHILS RELATIVE PERCENT: 0.2 % (ref 0–6)
GFR SERPL CREATININE-BSD FRML MDRD: 8 ML/MIN/1.73
GLUCOSE BLD-MCNC: 153 MG/DL (ref 74–99)
HCT VFR BLD CALC: 34.2 % (ref 34–48)
HEMOGLOBIN: 10.8 G/DL (ref 11.5–15.5)
IMMATURE GRANULOCYTES #: 0.08 E9/L
IMMATURE GRANULOCYTES %: 0.5 % (ref 0–5)
LYMPHOCYTES ABSOLUTE: 0.71 E9/L (ref 1.5–4)
LYMPHOCYTES RELATIVE PERCENT: 4.4 % (ref 20–42)
MCH RBC QN AUTO: 26.7 PG (ref 26–35)
MCHC RBC AUTO-ENTMCNC: 31.6 % (ref 32–34.5)
MCV RBC AUTO: 84.7 FL (ref 80–99.9)
METER GLUCOSE: 132 MG/DL (ref 74–99)
METER GLUCOSE: 168 MG/DL (ref 74–99)
METER GLUCOSE: 178 MG/DL (ref 74–99)
METER GLUCOSE: 192 MG/DL (ref 74–99)
MONOCYTES ABSOLUTE: 0.75 E9/L (ref 0.1–0.95)
MONOCYTES RELATIVE PERCENT: 4.6 % (ref 2–12)
NEUTROPHILS ABSOLUTE: 14.66 E9/L (ref 1.8–7.3)
NEUTROPHILS RELATIVE PERCENT: 90.2 % (ref 43–80)
PARATHYROID HORMONE INTACT: 151 PG/ML (ref 15–65)
PDW BLD-RTO: 14 FL (ref 11.5–15)
PLATELET # BLD: 366 E9/L (ref 130–450)
PMV BLD AUTO: 10.7 FL (ref 7–12)
POTASSIUM REFLEX MAGNESIUM: 4.3 MMOL/L (ref 3.5–5)
POTASSIUM SERPL-SCNC: 4.3 MMOL/L (ref 3.5–5)
RBC # BLD: 4.04 E12/L (ref 3.5–5.5)
SODIUM BLD-SCNC: 130 MMOL/L (ref 132–146)
TOTAL PROTEIN: 8 G/DL (ref 6.4–8.3)
URIC ACID, SERUM: 9.1 MG/DL (ref 2.4–5.7)
VITAMIN D 25-HYDROXY: 26 NG/ML (ref 30–100)
WBC # BLD: 16.3 E9/L (ref 4.5–11.5)

## 2022-12-23 PROCEDURE — 83970 ASSAY OF PARATHORMONE: CPT

## 2022-12-23 PROCEDURE — 2060000000 HC ICU INTERMEDIATE R&B

## 2022-12-23 PROCEDURE — 97165 OT EVAL LOW COMPLEX 30 MIN: CPT

## 2022-12-23 PROCEDURE — 99232 SBSQ HOSP IP/OBS MODERATE 35: CPT | Performed by: FAMILY MEDICINE

## 2022-12-23 PROCEDURE — 80048 BASIC METABOLIC PNL TOTAL CA: CPT

## 2022-12-23 PROCEDURE — 2580000003 HC RX 258: Performed by: NURSE PRACTITIONER

## 2022-12-23 PROCEDURE — 6360000002 HC RX W HCPCS: Performed by: UROLOGY

## 2022-12-23 PROCEDURE — 6360000002 HC RX W HCPCS: Performed by: STUDENT IN AN ORGANIZED HEALTH CARE EDUCATION/TRAINING PROGRAM

## 2022-12-23 PROCEDURE — 80053 COMPREHEN METABOLIC PANEL: CPT

## 2022-12-23 PROCEDURE — 36415 COLL VENOUS BLD VENIPUNCTURE: CPT

## 2022-12-23 PROCEDURE — 97161 PT EVAL LOW COMPLEX 20 MIN: CPT

## 2022-12-23 PROCEDURE — 84550 ASSAY OF BLOOD/URIC ACID: CPT

## 2022-12-23 PROCEDURE — 82962 GLUCOSE BLOOD TEST: CPT

## 2022-12-23 PROCEDURE — 85025 COMPLETE CBC W/AUTO DIFF WBC: CPT

## 2022-12-23 PROCEDURE — 82306 VITAMIN D 25 HYDROXY: CPT

## 2022-12-23 PROCEDURE — 6370000000 HC RX 637 (ALT 250 FOR IP)

## 2022-12-23 RX ADMIN — PANTOPRAZOLE SODIUM 40 MG: 40 TABLET, DELAYED RELEASE ORAL at 07:56

## 2022-12-23 RX ADMIN — CIPROFLOXACIN 200 MG: 2 INJECTION, SOLUTION INTRAVENOUS at 17:27

## 2022-12-23 RX ADMIN — ATORVASTATIN CALCIUM 20 MG: 20 TABLET, FILM COATED ORAL at 07:56

## 2022-12-23 RX ADMIN — HYDRALAZINE HYDROCHLORIDE 25 MG: 25 TABLET, FILM COATED ORAL at 07:56

## 2022-12-23 RX ADMIN — SODIUM CHLORIDE: 9 INJECTION, SOLUTION INTRAVENOUS at 11:19

## 2022-12-23 RX ADMIN — HEPARIN SODIUM 5000 UNITS: 10000 INJECTION INTRAVENOUS; SUBCUTANEOUS at 07:57

## 2022-12-23 RX ADMIN — HEPARIN SODIUM 5000 UNITS: 10000 INJECTION INTRAVENOUS; SUBCUTANEOUS at 21:30

## 2022-12-23 RX ADMIN — ATENOLOL 25 MG: 25 TABLET ORAL at 07:55

## 2022-12-23 RX ADMIN — HYDRALAZINE HYDROCHLORIDE 25 MG: 25 TABLET, FILM COATED ORAL at 21:30

## 2022-12-23 RX ADMIN — AMLODIPINE BESYLATE 10 MG: 10 TABLET ORAL at 07:56

## 2022-12-23 RX ADMIN — HEPARIN SODIUM 5000 UNITS: 10000 INJECTION INTRAVENOUS; SUBCUTANEOUS at 14:41

## 2022-12-23 NOTE — PROGRESS NOTES
Physical Therapy  Facility/Department: [address]  Physical Therapy Initial Assessment    Name: Herbert Ceballos  : 1950  MRN: [id number]  Date of Service: 2022      Patient Diagnosis(es): The primary encounter diagnosis was Acute renal failure, unspecified acute renal failure type ([address] Diagnoses of Chronic indwelling López catheter, Status post placement of ureteral stent, Bilateral hydronephrosis, Dehydration, Hyponatremia, Urinary tract infection associated with indwelling urethral catheter, sequela, and Hydronephrosis, unspecified hydronephrosis type were also pertinent to this visit. Past Medical History:  has a past medical history of Arthritis, Cancer ([address] Closed fracture of radius, Elevated lipids, Headache, History of anal cancer, Hyperlipidemia, Hypertension, Hypertension, Obesity, Proteinuria, Type II or unspecified type diabetes mellitus without mention of complication, not stated as uncontrolled, Vaginal cancer ([address] and Vitamin D deficiency. Past Surgical History:  has a past surgical history that includes colostomy; Rectal surgery; Breast biopsy; Wrist fracture surgery (Left, 2016); Bladder surgery (N/A, 2021); Cystoscopy (Bilateral, 2022); and Bladder surgery (Bilateral, 2022).       Evaluating Therapist: Kaylin PT    Equipment Recommendation wheeled walker    Room #:  0064/3114-V  Diagnosis:  Dehydration [E86.0]  Hyponatremia [E87.1]  Bilateral hydronephrosis [N13.30]  Chronic indwelling López catheter [Z97.8]  Acute renal failure, unspecified acute renal failure type ([address] [N17.9]  Urinary tract infection associated with indwelling urethral catheter, sequela [T83.511S, N39.0]  Acute renal failure superimposed on chronic kidney disease, on chronic dialysis, unspecified acute renal failure type ([address] [N17.9, N18.9, Z99.2]  Status post placement of ureteral stent [Z96.0]  PMHx/PSHx:  CA, HTN  Procedure: cysto with stent  Precautions:  falls, alarm      Social:  Pt lives alone in a 1 floor plan 0 steps  to enter. Sister provides transportation  Prior to admission ambulates with cane     Initial Evaluation  Date: 12/23 Treatment      Short Term/ Long Term   Goals   Was pt agreeable to Eval/treatment? yes     Does pt have pain? No c/o pain     Bed Mobility  Rolling: min assist  Supine to sit: min assist  Sit to supine: NT  Scooting: min assist  SBA   Transfers Sit to stand: min assist  Stand to sit: min assist  Stand pivot: min assist  SBA   Ambulation    10 feet with ww with min assist  75 feet with ww with SBA   Stair Negotiation  Ascended and descended  NT   N/A   LE strength     3+/5    4/5   balance      fair     AM-PAC Raw score               16/24         Pt is alert and grossly oriented  LE ROM: WFL  Sensation: intact  Edema: none  Endurance: fair  Chair alarm: yes     ASSESSMENT:    Pt displays functional ability as noted in the objective portion of this evaluation. Patient education  Pt educated on PT objectives    Patient response to education:   Pt verbalized understanding Pt demonstrated skill Pt requires further education in this area   yes           Comments:  Pt with increased processing time. C/o dizziness seated and standing. Decreased step length and foot clearance with ambulation. Fatigued quickly and needed chair brought up to her to sit. Conditions Requiring Skilled Therapeutic Intervention:    [x]Decreased strength     []Decreased ROM  [x]Decreased functional mobility  [x]Decreased balance   [x]Decreased endurance   []Decreased posture  []Decreased sensation  []Decreased coordination   []Decreased vision  []Decreased safety awareness   []Increased pain       Patient and or family understand(s) diagnosis, prognosis, and plan of care.     Prognosis is good for reaching above PT goals    PHYSICAL THERAPY PLAN OF CARE:    PT POC is established based on physician order and patient diagnosis     Referring provider/PT Order: Melisa Adkins

## 2022-12-23 NOTE — CARE COORDINATION
Social Work/Discharge Planning:  Met with patient and reviewed therapy score indicating need for rehab. She is not interested in rehab and prefers to return home. Discussed option for home health care and she states she will discuss with her sister before making a decision. Plan remains home. Patient active with [facility] but only for monthly knutson catheter changes. Will continue to follow.  Electronically signed by CORNELIUS Morris on 12/23/2022 at 2:10 PM

## 2022-12-23 NOTE — PROGRESS NOTES
Occupational Therapy  OCCUPATIONAL THERAPY INITIAL EVALUATION     Pat [facility], [place]        QKRP:                                                  Patient Name: Eduar Thompson    MRN: [id number]    : 1950    Room: [address] [place] [address]      Evaluating OT: Florentino Navas OTR/L [id number]      Referring Provider: Gracie Garsia MD    Specific Provider Orders/Date:OT eval and treat 22      Diagnosis:  Dehydration [E86.0]  Hyponatremia [E87.1]  Bilateral hydronephrosis [N13.30]  Chronic indwelling Knutson catheter [Z97.8]  Acute renal failure, unspecified acute renal failure type ([address] [N17.9]  Urinary tract infection associated with indwelling urethral catheter, sequela [T83.511S, N39.0]  Acute renal failure superimposed on chronic kidney disease, on chronic dialysis, unspecified acute renal failure type ([address] [N17.9, N18.9, Z99.2]  Status post placement of ureteral stent [Z96.0]    Surgery: cystoscopy, retrograde pyelogram, B ureteral stent exchange knutson catheter exchange 22    Pertinent Medical History: OA, CA, HTN, DM type 2      Precautions:  Fall Risk, ostomy     Assessment of current deficits    [x] Functional mobility  [x]ADLs  [x] Strength               [x]Cognition    [x] Functional transfers   [x] IADLs         [x] Safety Awareness   [x]Endurance    [] Fine Coordination              [x] Balance      [] Vision/perception   []Sensation     []Gross Motor Coordination  [] ROM  [] Delirium                   [] Motor Control     OT PLAN OF CARE   OT POC based on physician orders, patient diagnosis and results of clinical assessment    Frequency/Duration 2-4 days/wk for 2 weeks PRN   Specific OT Treatment Interventions to include:   * Instruction/training on adapted ADL techniques and AE recommendations to increase functional independence within precautions       * Training on energy conservation strategies, correct breathing pattern and techniques to improve independence/tolerance for self-care routine  * Functional transfer/mobility training/DME recommendations for increased independence, safety, and fall prevention  * Patient/Family education to increase follow through with safety techniques and functional independence  * Recommendation of environmental modifications for increased safety with functional transfers/mobility and ADLs  * Therapeutic exercise to improve motor endurance, ROM, and functional strength for ADLs/functional transfers  * Therapeutic activities to facilitate/challenge dynamic balance, stand tolerance for increased safety and independence with ADLs  * Positioning to improve skin integrity, interaction with environment and functional independence        Recommended Adaptive Equipment: TBD     Home Living: Pt lives alone in 1 story house with 0 ISABELL. Bathroom setup: walk in shower with shower chair and grab bars   Equipment owned: cane    Prior Level of Function: independent with ADLs , assist from sister with IADLs; functional mobility: cane    Pain Level: pt did not report pain this date; pt agreeable to therapy  Cognition: A&O: pt alert and oriented grossly; 2 step command follow demonstrated with increased time. Pt with some speech difficulties noted.    Memory:  Fair   Sequencing:  Fair   Problem solving:  Fair   Judgement/safety:  Fair     Functional Assessment:  AM-PAC Daily Activity Raw Score: 16/24   Initial Eval Status  Date: 12/23/22 Treatment Status  Date: STGs = LTGs  Time frame: 10-14 days   Feeding Set up     Grooming Min A  Sup   UB Dressing Min A  To don gown around back, seated  Sup   LB Dressing Mod A   SBA-with use of AD as appropriate/needed   Bathing Mod A  SBA -with use of AD as appropriate/needed   Toileting Pt has ostomy and knutson catheter  Sup    Bed Mobility  Supine to sit: Min A    Sup   Functional Transfers Min A with wheeled walker  Sit to stand from EOB  Stand to sit to chair  Cues for hand placement and safe technique   Sup    Functional Mobility Min A with wheeled walker  Short distance in room  Cues for safe wheeled walker management  Pt reported feeling dizzy and chair brought up behind her to sit. Pt reported dizziness subsided with sitting. SBA -with device as needed to maximize independence with ADLs and functional task completion   Balance Sitting:     Static:  Sup    Dynamic:SBA  Standing: Min A with wheeled walker  I for static/dynamic sitting balance to maximize independence with ADLs and functional task completion    Sup for standing balance to maximize independence with ADLs and functional task completion   Activity Tolerance Fair- with light activity. Pt limited by weakness and dizziness. Fair+ with ADL activity. Pt will demonstrate good understanding of education provided on EC/WS techniques    Visual/  Perceptual Glasses: none at bedside                Additional long-term goal: Pt will increase functional independence to PLOF to allow pt to live in least restrictive environment. Hand Dominance R   AROM (PROM) Strength Additional Info:    RUE  Shoulder ~90  Distally WFL grossly Shoulder grossly 3-/5  Distally grossly 3+/5 Fair  and FMC/dexterity noted during ADL tasks and functional bed mobility. Pt able to oppose all fingers with increased time. Slow movement noted. LUE Shoulder ~90  Distally WFL grossly Shoulder grossly 3-/5  Distally grossly 3+/5 Fair  and FMC/dexterity noted during ADL tasks and functional bed mobility. Pt able to oppose all fingers with increased time. Slow movement noted. Hearing: WFL   Sensation:  No c/o numbness or tingling   Tone: WFL   Edema: none noted    Comments: Upon arrival patient lying in bed. At end of session, patient sitting in chair with call light and phone within reach, all lines and tubes intact, and alarm set.   Overall patient demonstrated decreased independence and safety during completion of ADL/functional transfer/mobility tasks. Pt would benefit from continued skilled OT to increase safety and independence with completion of ADL/IADL tasks for functional independence and quality of life. Rehab Potential: Good for established goals     Patient / Family Goal: none stated    Patient and/or family were instructed on functional diagnosis, prognosis/goals and OT plan of care. Demonstrated fair understanding. Eval Complexity: Low    Time In: 0915  Time Out: 0928    Min Units   OT Eval Low 97165  x  1   OT Eval Medium 46533      OT Eval High 25415      OT Re-Eval [id number]       Therapeutic Ex [phone]       Therapeutic Activities 50946       ADL/Self Care 25946       Orthotic Management 65643       Manual 26605     Neuro Re-Ed 86403       Non-Billable Time          Evaluation Time additionally includes thorough review of current medical information, gathering information on past medical history/social history and prior level of function, interpretation of standardized testing/informal observation of tasks, assessment of data and development of plan of care and goals.             Tod East, OTR/L, [id number]

## 2022-12-23 NOTE — PROGRESS NOTES
Progress Note  12/23/2022 8:42 AM  Subjective:   Admit Date: 12/21/2022  PCP: Prashant Mcbride MD  Interval History: Patient examined , Hematuria + , well otherwise     Diet: ADULT DIET; Regular; 4 carb choices (60 gm/meal)    Data:   Scheduled Meds:   sodium chloride flush  5-40 mL IntraVENous 2 times per day    heparin (porcine)  5,000 Units SubCUTAneous TID    ciprofloxacin  200 mg IntraVENous Q24H    amLODIPine  10 mg Oral QAM    atenolol  25 mg Oral QAM    atorvastatin  20 mg Oral QAM    pantoprazole  40 mg Oral QAM    hydrALAZINE  25 mg Oral 2 times per day    sodium chloride flush  5-40 mL IntraVENous 2 times per day    insulin glargine  10 Units SubCUTAneous Nightly    insulin lispro  0-16 Units SubCUTAneous TID WC    insulin lispro  0-4 Units SubCUTAneous Nightly     Continuous Infusions:   sodium chloride 75 mL/hr at 12/23/22 0458    sodium chloride      lactated ringers Stopped (12/22/22 1812)    sodium chloride      dextrose       PRN Meds:sodium chloride flush, sodium chloride, prochlorperazine, labetalol **OR** hydrALAZINE, sodium chloride flush, sodium chloride, ondansetron **OR** ondansetron, polyethylene glycol, glucose, dextrose bolus **OR** dextrose bolus, glucagon (rDNA), dextrose  I/O last 3 completed shifts: In: 1358.7 [I.V.:1258.7;  Other:100]  Out: 2400 [Urine:2400]  I/O this shift:  In: 240 [P.O.:240]  Out: -     Intake/Output Summary (Last 24 hours) at 12/23/2022 0842  Last data filed at 12/23/2022 0831  Gross per 24 hour   Intake 1598.74 ml   Output 1700 ml   Net -101.26 ml     CBC:   Recent Labs     12/21/22  1212 12/22/22  0610 12/23/22  0256   WBC 6.8 6.0 16.3*   HGB 11.3* 10.9* 10.8*    366 366     BMP:    Recent Labs     12/21/22  1507 12/22/22  0610 12/23/22  0256   * 129* 130*   K 4.3 4.3 4.3  4.3   CL 91* 94* 96*   CO2 19* 22 20*   BUN 75* 70* 64*   CREATININE 6.1* 5.9* 5.1*   GLUCOSE 192* 146* 153*     Hepatic:   Recent Labs     12/21/22  1507 12/22/22  0610 12/23/22  0256   AST 12 10 12   ALT 6 6 6   BILITOT 0.5 0.4 0.4   ALKPHOS 95 95 105*     Troponin: No results for input(s): TROPONINI in the last 72 hours. BNP: No results for input(s): BNP in the last 72 hours. Lipids: No results for input(s): CHOL, HDL in the last 72 hours. Invalid input(s): LDLCALCU  ABGs: No results found for: PHART, PO2ART, VVC1ZGP  INR: No results for input(s): INR in the last 72 hours. -----------------------------------------------------------------  RAD: CT ABDOMEN PELVIS WO CONTRAST Additional Contrast? None    Result Date: 12/21/2022  EXAMINATION: CT OF THE ABDOMEN AND PELVIS WITHOUT CONTRAST 12/21/2022 2:16 pm TECHNIQUE: CT of the abdomen and pelvis was performed without the administration of intravenous contrast. Multiplanar reformatted images are provided for review. Automated exposure control, iterative reconstruction, and/or weight based adjustment of the mA/kV was utilized to reduce the radiation dose to as low as reasonably achievable. COMPARISON: None. HISTORY: ORDERING SYSTEM PROVIDED HISTORY: obstructive pathology TECHNOLOGIST PROVIDED HISTORY: Reason for exam:->obstructive pathology Additional Contrast?->None Decision Support Exception - unselect if not a suspected or confirmed emergency medical condition->Emergency Medical Condition (MA) FINDINGS: Lower Chest: Lung bases are clear. No pleural fluid. Organs: Liver and spleen are normal in size without focal lesion. Stomach has a normal configuration. Pancreas appears normal.  Gallbladder and bile ducts are within normal limits. The adrenal glands are normal. There is bilateral hydronephrosis and hydroureter despite the presence of double-J ureteral stents which extend into the urinary bladder. Double-J stents appear to be within the proximal ureter on the left and the renal pelvis on the right. Both kidneys demonstrate multiple low-density cortical lesions which may represent renal cortical cysts.  GI/Bowel: No evidence of bowel obstruction or inflammation. Pelvis: Urinary bladder is contracted and contains a López catheter and double-J ureteral stents. Peritoneum/Retroperitoneum: No evidence of abdominal aortic aneurysm. There is retroaortic left renal vein. There is mild left periaortic lymphadenopathy with a lymph node on image number 58 measuring 9 mm in short axis. Both ureters are dilated to the pelvis. There is a peristomal hernia containing fat in the left lower quadrant related to the colostomy. Bones/Soft Tissues: Subcutaneous tissues are generally unremarkable. Degenerative disc disease is noted at the lower 3 lumbar levels, multiple disc bulges in the lower lumbar spine. There is a moderate 40% superior endplate compression fracture of L2, new compared to the prior study of 09/23/2021. unchanged mild sclerosis associated with the right iliac bone near the SI joint. 1.  Bilateral moderate hydronephrosis and hydroureter despite the presence of bilateral double-J ureteral catheters. The proximal aspect of the catheters are in the renal pelvis and proximal ureter on the right and left respectively. 2.  No acute inflammatory changes in the abdomen or pelvis. 3.  40% superior endplate wedge compression deformity of L2, age indeterminate. CT HEAD WO CONTRAST    Result Date: 12/21/2022  EXAMINATION: CT OF THE HEAD WITHOUT CONTRAST  12/21/2022 1:24 pm TECHNIQUE: CT of the head was performed without the administration of intravenous contrast. Automated exposure control, iterative reconstruction, and/or weight based adjustment of the mA/kV was utilized to reduce the radiation dose to as low as reasonably achievable. COMPARISON: None. HISTORY: ORDERING SYSTEM PROVIDED HISTORY: stroke like symptoms TECHNOLOGIST PROVIDED HISTORY: Has a \"code stroke\" or \"stroke alert\" been called? ->No Reason for exam:->stroke like symptoms Decision Support Exception - unselect if not a suspected or confirmed emergency medical September 2021 HISTORY: ORDERING SYSTEM PROVIDED HISTORY: weakness TECHNOLOGIST PROVIDED HISTORY: Reason for exam:->weakness FINDINGS: The lungs are without acute focal process. There is no effusion or pneumothorax. The cardiomediastinal silhouette is without acute process. The osseous structures are without acute process. No acute process. Objective:   Vitals: /78   Pulse 63   Temp 97.9 °F (36.6 °C) (Oral)   Resp 18   Ht 5' 7\" (1.702 m)   Wt 200 lb (90.7 kg)   SpO2 99%   BMI 31.32 kg/m²   General appearance: appears stated age   Skin:  No rashes or lesions  HEENT: Head: Normocephalic, no lesions, without obvious abnormality.   Neck: no adenopathy, no carotid bruit, no JVD, supple, symmetrical, trachea midline, and thyroid not enlarged, symmetric, no tenderness/mass/nodules  Lungs: clear to auscultation bilaterally  Heart: regular rate and rhythm, S1, S2 normal, no murmur, click, rub or gallop  Abdomen: soft, non-tender; bowel sounds normal; no masses,  no organomegaly  Extremities: extremities normal, atraumatic, no cyanosis or edema  Neurologic: Mental status: Alert, oriented, thought content appropriate    Assessment:   Patient Active Problem List:     Hypertension     Elevated lipids     Proteinuria     Vitamin D deficiency     Closed fracture of radius     History of anal cancer     Colostomy present (HCC)     Vaginal cancer (HCC)     Diabetes mellitus type 2 in obese ([address]     Stuttering     Age-related osteoporosis with current pathological fracture with routine healing     Herpes zoster vaccination declined     Chronic renal disease, stage III ([address] [329335]     Hydronephrosis due to obstruction of bladder     Acute renal failure superimposed on chronic kidney disease, on chronic dialysis, unspecified acute renal failure type (HCC)     Bradycardia     Hyponatremia     Dizziness     Chronic indwelling López catheter     Status post placement of ureteral stent     Urinary tract infection associated with indwelling urethral catheter ([facility]     Bilateral hydronephrosis    Plan:     Assessment/Plan     1) JEANNINE in the setting of Yoan renal stents ( known obstructive uropathy ) Follows up with Dr Josue Morillo      Stents replaced in August 2022 , possible underlying obstructive uropathy , will continue IVF          2) 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

## 2022-12-23 NOTE — PROGRESS NOTES
12/23/2022 11:42 AM  Service: Urology  Group: [facility] ([place])    Kevin Donnelly  [id number]    Subjective:  up in chair   Overall she is feeling better  She ate about 1/2 of her lunch  And tolerated it well  Urine is red  Knutson is draining   No stent pain  Creatinine is trending down       Review of Systems  Constitutional: no chills or sweats   Respiratory: negative for cough and shortness of breath  Cardiovascular: negative for chest pain  Gastrointestinal: negative for abdominal pain, nausea, and vomiting  Dermatologic: no pruritis   Hematologic/lymphatic: positive for hematuria  Musculoskeletal:negative  Neurological: negative for seizures and tremors  Endocrine: negative  Psychiatric: negative      Scheduled Meds:   sodium chloride flush  5-40 mL IntraVENous 2 times per day    heparin (porcine)  5,000 Units SubCUTAneous TID    ciprofloxacin  200 mg IntraVENous Q24H    amLODIPine  10 mg Oral QAM    atenolol  25 mg Oral QAM    atorvastatin  20 mg Oral QAM    pantoprazole  40 mg Oral QAM    hydrALAZINE  25 mg Oral 2 times per day    sodium chloride flush  5-40 mL IntraVENous 2 times per day    insulin glargine  10 Units SubCUTAneous Nightly    insulin lispro  0-16 Units SubCUTAneous TID WC    insulin lispro  0-4 Units SubCUTAneous Nightly       Objective:  Vitals:    12/23/22 0714   BP: 121/78   Pulse: 63   Resp: 18   Temp: 97.9 °F (36.6 °C)   SpO2: 99%         Allergies: Betadine [povidone iodine], Lisinopril, Penicillins, and Tylenol [acetaminophen]    General Appearance: alert and conversing, no acute distress  Skin: no rash or erythema  Head: normocephalic and atraumatic  Pulmonary/Chest: normal air movement, no respiratory distress Abdomen: soft, non-tender, colostomy, no stool present   Genitourinary: knutson catheter with red urine   Extremities: no cyanosis, clubbing or edema       Labs:     Recent Labs     12/23/22  0256   *   K 4.3  4.3   CL 96*   CO2 20*   BUN 64* CREATININE 5.1*   GLUCOSE 153*   CALCIUM 9.1       Lab Results   Component Value Date/Time    HGB 10.8 12/23/2022 02:56 AM    HCT 34.2 12/23/2022 02:56 AM     12/21/22 1212     Urine Culture, Routine  Abnormal   Growth present, evaluating for:   Gram negative rods   Gram positive organism   P      Organism Gram negative vahid Abnormal  P    Urine Culture, Routine >100,000 CFU/ml   Identification and sensitivity to follow  P      Organism Staphylococcus aureus Abnormal  P    Urine Culture, Routine >100,000 CFU/ml   Sensitivity to follow             Assessment/Plan:  Hx of anal and vaginal carcinoma   Bilateral hydronephrosis managed with bilateral stents  Chronic knutson catheter    Cystopanendoscopy, retrograde pyelogram,  ureteroscopy, and stent exchange post op day 1  UTI    Creatinine trending down 6.5 >>>>5.1   Good urine output   Continue to monitor creatinine  Nephrology following   Manually irrigate knutson q4 hours and prn to keep patent  Antibiotics per primary  Continue knutson catheter at discharge  Continue monthly catheter changes at home       Pierre Busby, APRN - [facility]  Urology       Agree with above  Seen and examined  Agree with the plan and treatment  Does not have family present  Needs to have the stents need to be changed   Needs FU in the office   Cr is getting better     [employer], DO

## 2022-12-23 NOTE — DISCHARGE INSTRUCTIONS
A ureteral stent (also know as a double J stent) was inserted during your recent procedure. Unlike a heart \"stent\" which is metal, short, and permanent, this ureteral stent plastic, and temporary. It spans from your kidney, down the ureter, and into your bladder. This will need to be removed either via a procedure in the office or a string in the next week or two. Sometime these stents are left in for long term drainage, but they need to changed every few months. The instructions will be given to you with regards to removal by Dr. Bethea/Geremias    IMPORTANT - This ureteral stent will likely cause some frequency with urination, urgency with urination, back/flank pain with urination, and/or blood in the urine. This is very normal.  Taking the pain medications and/or anti-inflammatories will help to manage this discomfort if present. If you have any questions or concerns you can contact Dr. Bethea/[place]/Terell's office at [phone].

## 2022-12-23 NOTE — PROGRESS NOTES
Red urine with small clots noted in knutson catheter bag. Irrigated patient's knutson catheter with 100 ml's of sterile water. Knutson catheter draining back pink urine with no clots.

## 2022-12-24 LAB
ALBUMIN SERPL-MCNC: 3.2 G/DL (ref 3.5–5.2)
ALP BLD-CCNC: 100 U/L (ref 35–104)
ALT SERPL-CCNC: 6 U/L (ref 0–32)
ANION GAP SERPL CALCULATED.3IONS-SCNC: 13 MMOL/L (ref 7–16)
AST SERPL-CCNC: 9 U/L (ref 0–31)
BASOPHILS ABSOLUTE: 0.03 E9/L (ref 0–0.2)
BASOPHILS RELATIVE PERCENT: 0.3 % (ref 0–2)
BILIRUB SERPL-MCNC: 0.5 MG/DL (ref 0–1.2)
BUN BLDV-MCNC: 55 MG/DL (ref 6–23)
CALCIUM SERPL-MCNC: 8.7 MG/DL (ref 8.6–10.2)
CHLORIDE BLD-SCNC: 100 MMOL/L (ref 98–107)
CO2: 19 MMOL/L (ref 22–29)
CREAT SERPL-MCNC: 4.4 MG/DL (ref 0.5–1)
EOSINOPHILS ABSOLUTE: 0.13 E9/L (ref 0.05–0.5)
EOSINOPHILS RELATIVE PERCENT: 1.1 % (ref 0–6)
GFR SERPL CREATININE-BSD FRML MDRD: 10 ML/MIN/1.73
GLUCOSE BLD-MCNC: 161 MG/DL (ref 74–99)
HCT VFR BLD CALC: 33.2 % (ref 34–48)
HEMOGLOBIN: 10.3 G/DL (ref 11.5–15.5)
IMMATURE GRANULOCYTES #: 0.08 E9/L
IMMATURE GRANULOCYTES %: 0.7 % (ref 0–5)
LYMPHOCYTES ABSOLUTE: 0.69 E9/L (ref 1.5–4)
LYMPHOCYTES RELATIVE PERCENT: 6 % (ref 20–42)
MCH RBC QN AUTO: 26.7 PG (ref 26–35)
MCHC RBC AUTO-ENTMCNC: 31 % (ref 32–34.5)
MCV RBC AUTO: 86 FL (ref 80–99.9)
METER GLUCOSE: 146 MG/DL (ref 74–99)
METER GLUCOSE: 162 MG/DL (ref 74–99)
METER GLUCOSE: 189 MG/DL (ref 74–99)
METER GLUCOSE: 198 MG/DL (ref 74–99)
MONOCYTES ABSOLUTE: 0.67 E9/L (ref 0.1–0.95)
MONOCYTES RELATIVE PERCENT: 5.8 % (ref 2–12)
NEUTROPHILS ABSOLUTE: 9.94 E9/L (ref 1.8–7.3)
NEUTROPHILS RELATIVE PERCENT: 86.1 % (ref 43–80)
ORGANISM: ABNORMAL
PDW BLD-RTO: 14.3 FL (ref 11.5–15)
PLATELET # BLD: 314 E9/L (ref 130–450)
PMV BLD AUTO: 10.9 FL (ref 7–12)
POTASSIUM REFLEX MAGNESIUM: 4.2 MMOL/L (ref 3.5–5)
RBC # BLD: 3.86 E12/L (ref 3.5–5.5)
SODIUM BLD-SCNC: 132 MMOL/L (ref 132–146)
TOTAL PROTEIN: 7 G/DL (ref 6.4–8.3)
URINE CULTURE, ROUTINE: ABNORMAL
WBC # BLD: 11.5 E9/L (ref 4.5–11.5)

## 2022-12-24 PROCEDURE — 2060000000 HC ICU INTERMEDIATE R&B

## 2022-12-24 PROCEDURE — 82962 GLUCOSE BLOOD TEST: CPT

## 2022-12-24 PROCEDURE — 85025 COMPLETE CBC W/AUTO DIFF WBC: CPT

## 2022-12-24 PROCEDURE — 80053 COMPREHEN METABOLIC PANEL: CPT

## 2022-12-24 PROCEDURE — 99232 SBSQ HOSP IP/OBS MODERATE 35: CPT | Performed by: FAMILY MEDICINE

## 2022-12-24 PROCEDURE — 36415 COLL VENOUS BLD VENIPUNCTURE: CPT

## 2022-12-24 PROCEDURE — 6370000000 HC RX 637 (ALT 250 FOR IP)

## 2022-12-24 PROCEDURE — 2580000003 HC RX 258

## 2022-12-24 RX ADMIN — HYDRALAZINE HYDROCHLORIDE 25 MG: 25 TABLET, FILM COATED ORAL at 21:18

## 2022-12-24 RX ADMIN — PANTOPRAZOLE SODIUM 40 MG: 40 TABLET, DELAYED RELEASE ORAL at 08:53

## 2022-12-24 RX ADMIN — SODIUM CHLORIDE, PRESERVATIVE FREE 10 ML: 5 INJECTION INTRAVENOUS at 21:19

## 2022-12-24 RX ADMIN — INSULIN GLARGINE 10 UNITS: 100 INJECTION, SOLUTION SUBCUTANEOUS at 21:20

## 2022-12-24 RX ADMIN — HYDRALAZINE HYDROCHLORIDE 25 MG: 25 TABLET, FILM COATED ORAL at 08:53

## 2022-12-24 RX ADMIN — ATENOLOL 25 MG: 25 TABLET ORAL at 08:53

## 2022-12-24 RX ADMIN — ATORVASTATIN CALCIUM 20 MG: 20 TABLET, FILM COATED ORAL at 08:53

## 2022-12-24 RX ADMIN — AMLODIPINE BESYLATE 10 MG: 10 TABLET ORAL at 08:53

## 2022-12-24 NOTE — PROGRESS NOTES
[address]  Progress Note    Chief complaint :  Chief Complaint   Patient presents with    Aphasia     Slurring words since waking up this morning. Went to bed normal last night       Extremity Weakness     Right side feels different than the left        Subjective:  No acute events overnight. She has tolerated her breakfast well today. She denies any suprapubic pain or pressure bladder. Patient is asking for physical therapy to see her today. Patient sister will be visiting today. Denies fever, chills, or pain. Past medical, surgical, family and social history were reviewed, non-contributory, and unchanged unless otherwise stated. Review of systems: Negative except for stated above. Objective:  BP (!) 141/80   Pulse 71   Temp 97.8 °F (36.6 °C) (Oral)   Resp 16   Ht 5' 7\" (1.702 m)   Wt 190 lb (86.2 kg)   SpO2 98%   BMI 29.76 kg/m²       Intake/Output Summary (Last 24 hours) at 12/25/2022 0916  Last data filed at 12/25/2022 0835  Gross per 24 hour   Intake --   Output 2500 ml   Net -2500 ml         Physical Exam  Vitals reviewed. Constitutional:       General: She is not in acute distress. Appearance: Normal appearance. She is not ill-appearing. HENT:      Head: Normocephalic and atraumatic. Nose: Nose normal. No congestion or rhinorrhea. Eyes:      General:         Right eye: No discharge. Left eye: No discharge. Conjunctiva/sclera: Conjunctivae normal.      Comments: Left oval pupil   Cardiovascular:      Rate and Rhythm: Normal rate and regular rhythm. Heart sounds: Normal heart sounds. No murmur heard. Pulmonary:      Effort: Pulmonary effort is normal.      Breath sounds: Normal breath sounds. No wheezing. Abdominal:      General: Abdomen is flat. There is no distension. Palpations: Abdomen is soft. Tenderness: There is no abdominal tenderness. Comments: Ostomy bag in place.   Did not have stool in it during evaluation. No erythema around ostomy site noted   Genitourinary:     Comments: López in place. Cloudy/yellow urine draining into López bag. Musculoskeletal:         General: No swelling or deformity. Normal range of motion. Cervical back: No rigidity or tenderness. Skin:     General: Skin is warm and dry. Findings: No rash. Neurological:      Mental Status: She is alert and oriented to person, place, and time. Comments: She has a speech delay but responds to questions appropriately.    Psychiatric:         Mood and Affect: Mood normal.         Behavior: Behavior normal.       Labs:  Recent Results (from the past 24 hour(s))   POCT Glucose    Collection Time: 12/24/22 10:15 AM   Result Value Ref Range    Meter Glucose 189 (H) 74 - 99 mg/dL   POCT Glucose    Collection Time: 12/24/22  3:28 PM   Result Value Ref Range    Meter Glucose 198 (H) 74 - 99 mg/dL   POCT Glucose    Collection Time: 12/24/22  9:11 PM   Result Value Ref Range    Meter Glucose 162 (H) 74 - 99 mg/dL   Comprehensive Metabolic Panel w/ Reflex to MG    Collection Time: 12/25/22  4:32 AM   Result Value Ref Range    Sodium 134 132 - 146 mmol/L    Potassium reflex Magnesium 4.4 3.5 - 5.0 mmol/L    Chloride 104 98 - 107 mmol/L    CO2 18 (L) 22 - 29 mmol/L    Anion Gap 12 7 - 16 mmol/L    Glucose 160 (H) 74 - 99 mg/dL    BUN 46 (H) 6 - 23 mg/dL    Creatinine 3.6 (H) 0.5 - 1.0 mg/dL    Est, Glom Filt Rate 13 >=60 mL/min/1.73    Calcium 8.9 8.6 - 10.2 mg/dL    Total Protein 7.3 6.4 - 8.3 g/dL    Albumin 3.2 (L) 3.5 - 5.2 g/dL    Total Bilirubin 0.5 0.0 - 1.2 mg/dL    Alkaline Phosphatase 119 (H) 35 - 104 U/L    ALT 7 0 - 32 U/L    AST 11 0 - 31 U/L   CBC with Auto Differential    Collection Time: 12/25/22  4:32 AM   Result Value Ref Range    WBC 9.3 4.5 - 11.5 E9/L    RBC 3.79 3.50 - 5.50 E12/L    Hemoglobin 10.2 (L) 11.5 - 15.5 g/dL    Hematocrit 32.4 (L) 34.0 - 48.0 %    MCV 85.5 80.0 - 99.9 fL    MCH 26.9 26.0 - 35.0 pg MCHC 31.5 (L) 32.0 - 34.5 %    RDW 14.4 11.5 - 15.0 fL    Platelets 152 653 - 933 E9/L    MPV 10.6 7.0 - 12.0 fL    Neutrophils % 82.3 (H) 43.0 - 80.0 %    Immature Granulocytes % 0.8 0.0 - 5.0 %    Lymphocytes % 9.6 (L) 20.0 - 42.0 %    Monocytes % 5.7 2.0 - 12.0 %    Eosinophils % 1.4 0.0 - 6.0 %    Basophils % 0.2 0.0 - 2.0 %    Neutrophils Absolute 7.67 (H) 1.80 - 7.30 E9/L    Immature Granulocytes # 0.07 E9/L    Lymphocytes Absolute 0.89 (L) 1.50 - 4.00 E9/L    Monocytes Absolute 0.53 0.10 - 0.95 E9/L    Eosinophils Absolute 0.13 0.05 - 0.50 E9/L    Basophils Absolute 0.02 0.00 - 0.20 E9/L   POCT Glucose    Collection Time: 12/25/22  6:25 AM   Result Value Ref Range    Meter Glucose 141 (H) 74 - 99 mg/dL       Radiology and other tests reviewed:  FL RETROGRADE PYELOGRAM W WO KUB   Final Result   Intraprocedural fluoroscopic spot images as above. See separate procedure   report for more information. CT ABDOMEN PELVIS WO CONTRAST Additional Contrast? None   Final Result   1. Bilateral moderate hydronephrosis and hydroureter despite the presence of   bilateral double-J ureteral catheters. The proximal aspect of the catheters   are in the renal pelvis and proximal ureter on the right and left   respectively. 2.  No acute inflammatory changes in the abdomen or pelvis. 3.  40% superior endplate wedge compression deformity of L2, age   indeterminate. XR CHEST PORTABLE   Final Result   No acute process. CT HEAD WO CONTRAST   Final Result   1. There is no acute intracranial abnormality. Specifically, there is no   intracranial hemorrhage.    2. Atrophy and periventricular leukomalacia,             Assessment:  Active Hospital Problems    Diagnosis Date Noted    Chronic indwelling López catheter [Z97.8] 12/22/2022     Priority: Medium    Status post placement of ureteral stent [Z96.0] 12/22/2022     Priority: Medium    Urinary tract infection associated with indwelling urethral catheter ([facility] [address] [I23.390Y, N39.0] 12/22/2022     Priority: Medium    Bilateral hydronephrosis [N13.30] 12/22/2022     Priority: Medium    Acute renal failure ([address] [N17.9] 12/21/2022     Priority: Medium    Bradycardia [R00.1] 12/21/2022     Priority: Medium    Hyponatremia [E87.1] 12/21/2022     Priority: Medium    Dizziness [R42] 12/21/2022     Priority: Medium       Plan:    Acute renal failure, improving  Baseline creatinine 2. Creatinine on admission creatinine 6.5 BUN/Cr ratio >11. FeNa = 2.4, most likely 2/2 obstruction.  -Creatinine continues to improve 3.4 this morning  - Hold nephrotoxic medications  - Nephrology following, appreciate input     Bilateral moderate hydronephrosis and hydroureter  CT abdomen pelvis was remarkable for bilateral moderate hydronephrosis and hydroureter despite the presence of a bilateral double-J ureteral catheters  -Patient postop day 3 of cystoscopy grade pyelogram and stent exchange  -Continue catheter irrigation  - Urology following, appreciate input    Urinary tract infection, resolved  Culture positive for gram-negative rods and staph aureus. Completed 3 days ciprofloxacin  -Urine culture growing staph aureus. Possible colonization from catheter  -Symptoms resolved at this time    Anemia  On admission hemoglobin 11.3. Iron studies indicative of anemia of chronic disease  - Continue to monitor  - CBC daily  -  Sinus bradycardia, resolved  - Continue to monitor, repeat EKG in the morning  - Patient on atenolol 50 mg every morning-half dose ordered  - Telemetry     Diabetes   Last HbA1C 12.9 on admission  patients home medications include glipizide 5 mg. Hyperglycemic to 272 on admission, anion gap of 18 down to 16, beta hydroxybutyrate  0.82  - 10U lantus nightly  - Hold Glipizide 5mg   - HDSSI   - Hypoglycemia protocol  - POCT glucose checks     Hypertension  - Amlodipine 10 mg every morning  - Hydralazine 25 mg BID      Hyperlipidemia  - Atorvastatin 20 mg daily GERD  - Protonix 40 mg daily    Hyponatremia, resolved  Na+ = 123, most likely due to hypervolemia/medication. We will need to ensure that sodium is not raised by more than 6-8 mEq per day. - CMP daily  - Nephrology following      Pain Control:  Tylenol 650 mg Q6HR PRN for mild pain  DVT ppx: PCDs  GI ppx: Protonix 40 mg daily  Code Status: Full  Diet: Diabetic diet      Disposition: Discharge home pending clinical course. Will discuss with nephrology for possible discharge today.     Tyesha Hurst MD  Family Medicine Resident PGY-2  12/25/22   9:16 AM

## 2022-12-24 NOTE — PROGRESS NOTES
Progress Note  12/24/2022 8:55 AM  Subjective:   Admit Date: 12/21/2022  PCP: Florentin York MD  Interval History: patient examined doing well feels ok     Diet: ADULT DIET; Regular; 4 carb choices (60 gm/meal)    Data:   Scheduled Meds:   sodium chloride flush  5-40 mL IntraVENous 2 times per day    [Held by provider] heparin (porcine)  5,000 Units SubCUTAneous TID    amLODIPine  10 mg Oral QAM    atenolol  25 mg Oral QAM    atorvastatin  20 mg Oral QAM    pantoprazole  40 mg Oral QAM    hydrALAZINE  25 mg Oral 2 times per day    sodium chloride flush  5-40 mL IntraVENous 2 times per day    insulin glargine  10 Units SubCUTAneous Nightly    insulin lispro  0-16 Units SubCUTAneous TID WC    insulin lispro  0-4 Units SubCUTAneous Nightly     Continuous Infusions:   sodium chloride 75 mL/hr at 12/23/22 1119    sodium chloride      lactated ringers Stopped (12/22/22 1812)    sodium chloride      dextrose       PRN Meds:sodium chloride flush, sodium chloride, labetalol **OR** hydrALAZINE, sodium chloride flush, sodium chloride, ondansetron **OR** ondansetron, polyethylene glycol, glucose, dextrose bolus **OR** dextrose bolus, glucagon (rDNA), dextrose  I/O last 3 completed shifts: In: 1298.7 [P.O.:240; I.V.:1058.7]  Out: 1150 [Urine:1150]  No intake/output data recorded. No intake or output data in the 24 hours ending 12/24/22 0855  CBC:   Recent Labs     12/22/22  0610 12/23/22  0256 12/24/22  0209   WBC 6.0 16.3* 11.5   HGB 10.9* 10.8* 10.3*    366 314     BMP:    Recent Labs     12/22/22  0610 12/23/22  0256 12/24/22  0209   * 130* 132   K 4.3 4.3  4.3 4.2   CL 94* 96* 100   CO2 22 20* 19*   BUN 70* 64* 55*   CREATININE 5.9* 5.1* 4.4*   GLUCOSE 146* 153* 161*     Hepatic:   Recent Labs     12/22/22  0610 12/23/22  0256 12/24/22  0209   AST 10 12 9   ALT 6 6 6   BILITOT 0.4 0.4 0.5   ALKPHOS 95 105* 100     Troponin: No results for input(s): TROPONINI in the last 72 hours.   BNP: No results for input(s): BNP in the last 72 hours. Lipids: No results for input(s): CHOL, HDL in the last 72 hours. Invalid input(s): LDLCALCU  ABGs: No results found for: PHART, PO2ART, KQV2LNJ  INR: No results for input(s): INR in the last 72 hours. -----------------------------------------------------------------  RAD: CT ABDOMEN PELVIS WO CONTRAST Additional Contrast? None    Result Date: 12/21/2022  EXAMINATION: CT OF THE ABDOMEN AND PELVIS WITHOUT CONTRAST 12/21/2022 2:16 pm TECHNIQUE: CT of the abdomen and pelvis was performed without the administration of intravenous contrast. Multiplanar reformatted images are provided for review. Automated exposure control, iterative reconstruction, and/or weight based adjustment of the mA/kV was utilized to reduce the radiation dose to as low as reasonably achievable. COMPARISON: None. HISTORY: ORDERING SYSTEM PROVIDED HISTORY: obstructive pathology TECHNOLOGIST PROVIDED HISTORY: Reason for exam:->obstructive pathology Additional Contrast?->None Decision Support Exception - unselect if not a suspected or confirmed emergency medical condition->Emergency Medical Condition (MA) FINDINGS: Lower Chest: Lung bases are clear. No pleural fluid. Organs: Liver and spleen are normal in size without focal lesion. Stomach has a normal configuration. Pancreas appears normal.  Gallbladder and bile ducts are within normal limits. The adrenal glands are normal. There is bilateral hydronephrosis and hydroureter despite the presence of double-J ureteral stents which extend into the urinary bladder. Double-J stents appear to be within the proximal ureter on the left and the renal pelvis on the right. Both kidneys demonstrate multiple low-density cortical lesions which may represent renal cortical cysts. GI/Bowel: No evidence of bowel obstruction or inflammation. Pelvis: Urinary bladder is contracted and contains a López catheter and double-J ureteral stents.  Peritoneum/Retroperitoneum: No evidence of abdominal aortic aneurysm. There is retroaortic left renal vein. There is mild left periaortic lymphadenopathy with a lymph node on image number 58 measuring 9 mm in short axis. Both ureters are dilated to the pelvis. There is a peristomal hernia containing fat in the left lower quadrant related to the colostomy. Bones/Soft Tissues: Subcutaneous tissues are generally unremarkable. Degenerative disc disease is noted at the lower 3 lumbar levels, multiple disc bulges in the lower lumbar spine. There is a moderate 40% superior endplate compression fracture of L2, new compared to the prior study of 09/23/2021. unchanged mild sclerosis associated with the right iliac bone near the SI joint. 1.  Bilateral moderate hydronephrosis and hydroureter despite the presence of bilateral double-J ureteral catheters. The proximal aspect of the catheters are in the renal pelvis and proximal ureter on the right and left respectively. 2.  No acute inflammatory changes in the abdomen or pelvis. 3.  40% superior endplate wedge compression deformity of L2, age indeterminate. CT HEAD WO CONTRAST    Result Date: 12/21/2022  EXAMINATION: CT OF THE HEAD WITHOUT CONTRAST  12/21/2022 1:24 pm TECHNIQUE: CT of the head was performed without the administration of intravenous contrast. Automated exposure control, iterative reconstruction, and/or weight based adjustment of the mA/kV was utilized to reduce the radiation dose to as low as reasonably achievable. COMPARISON: None. HISTORY: ORDERING SYSTEM PROVIDED HISTORY: stroke like symptoms TECHNOLOGIST PROVIDED HISTORY: Has a \"code stroke\" or \"stroke alert\" been called? ->No Reason for exam:->stroke like symptoms Decision Support Exception - unselect if not a suspected or confirmed emergency medical condition->Emergency Medical Condition (MA) FINDINGS: BRAIN/VENTRICLES: There is no acute intracranial hemorrhage, mass effect or midline shift.   No abnormal extra-axial fluid collection. The gray-white differentiation is maintained without evidence of an acute infarct. There is no evidence of hydrocephalus. The ventricles, cisterns and sulci are prominent consistent with atrophy. There is decreased attenuation within the periventricular white matter consistent with periventricular leukomalacia. ORBITS: The visualized portion of the orbits demonstrate no acute abnormality. SINUSES: The visualized paranasal sinuses and mastoid air cells demonstrate no acute abnormality. SOFT TISSUES/SKULL:  No acute abnormality of the visualized skull or soft tissues. 1. There is no acute intracranial abnormality. Specifically, there is no intracranial hemorrhage. 2. Atrophy and periventricular leukomalacia,     FL RETROGRADE PYELOGRAM W WO KUB    Result Date: 12/22/2022  EXAMINATION: SPOT FLUOROSCOPIC IMAGES 12/22/2022 10:32 am TECHNIQUE: Fluoroscopy was provided by the radiology department for procedure. Radiologist was not present during examination. FLUOROSCOPY DOSE AND TYPE OR TIME AND EXPOSURES: 3.8 minutes fluoroscopy time at 16 images were obtained. COMPARISON: 12/21/2022 abdomen/pelvis CT. HISTORY: ORDERING SYSTEM PROVIDED HISTORY: cysto TECHNOLOGIST PROVIDED HISTORY: Reason for exam:->cysto Intraprocedural imaging. FINDINGS: 16 spot images of the abdomen were obtained. Images demonstrate retrograde pyelogram with bilateral dilated renal collecting systems. Ureteral stents were repositioned so that the proximal J loops are located within the left renal pelvis and a right mid renal calyx. Intraprocedural fluoroscopic spot images as above. See separate procedure report for more information.      XR CHEST PORTABLE    Result Date: 12/21/2022  EXAMINATION: ONE XRAY VIEW OF THE CHEST 12/21/2022 12:44 pm COMPARISON: 23 September 2021 HISTORY: ORDERING SYSTEM PROVIDED HISTORY: weakness TECHNOLOGIST PROVIDED HISTORY: Reason for exam:->weakness FINDINGS: The lungs are without acute focal process. There is no effusion or pneumothorax. The cardiomediastinal silhouette is without acute process. The osseous structures are without acute process. No acute process. Objective:   Vitals: /85   Pulse 69   Temp 97.8 °F (36.6 °C) (Oral)   Resp 18   Ht 5' 7\" (1.702 m)   Wt 205 lb (93 kg)   SpO2 96%   BMI 32.11 kg/m²   General appearance: appears stated age   Skin:  No rashes or lesions  HEENT: Head: Normocephalic, no lesions, without obvious abnormality.   Neck: no adenopathy, no carotid bruit, no JVD, supple, symmetrical, trachea midline, and thyroid not enlarged, symmetric, no tenderness/mass/nodules  Lungs: clear to auscultation bilaterally  Heart: regular rate and rhythm, S1, S2 normal, no murmur, click, rub or gallop  Abdomen: soft, non-tender; bowel sounds normal; no masses,  no organomegaly  Extremities: extremities normal, atraumatic, no cyanosis or edema  Neurologic: Mental status: Alert, oriented, thought content appropriate    Assessment:   Patient Active Problem List:     Hypertension     Elevated lipids     Proteinuria     Vitamin D deficiency     Closed fracture of radius     History of anal cancer     Colostomy present (HCC)     Vaginal cancer (HCC)     Diabetes mellitus type 2 in obese (HCC)     Stuttering     Age-related osteoporosis with current pathological fracture with routine healing     Herpes zoster vaccination declined     Chronic renal disease, stage III ([address] [[id number]]     Hydronephrosis due to obstruction of bladder     Acute renal failure (HCC)     Bradycardia     Hyponatremia     Dizziness     Chronic indwelling López catheter     Status post placement of ureteral stent     Urinary tract infection associated with indwelling urethral catheter (HCC)     Bilateral hydronephrosis    Plan:     Assessment/Plan     1) JEANNINE in the setting of Jean Claude renal stents ( known obstructive uropathy ) Follows up with Dr Cordelia Mancini      Stents replaced in August 2022 , possible underlying obstructive uropathy          2) 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 , colostomy radiation and chemo , chronic        Obstructive uropathy , s/p jean claude ureteral stents      3) HTN will controlled      4) Type 2 DM uncontrolled -- high A1c at 12.9      5) HLD       Urine suggestive of UTI , possible colonization from catheter , -- urine culture - staph Aureus      S/p Jean Claude Ureteral stents replacement ( 12/22/2022) - Hematuria , renal functions improving , Acidosis better ,  hyponatremia - improving , u/o > 1 lit , continue present care            Thank you for allowing us to participate in the care of Elzbieta Phelps, we will monitor     Rey Villarreal MD

## 2022-12-24 NOTE — PROGRESS NOTES
Reddish urine with small clots and sediments noted in knutson catheter bag. Irrigated patient's knutson catheter with 100 ml's of sterile water. Knutson catheter draining back pink urine with small clots.

## 2022-12-24 NOTE — PROGRESS NOTES
[address]  Progress Note    Chief complaint :  Chief Complaint   Patient presents with    Aphasia     Slurring words since waking up this morning. Went to bed normal last night       Extremity Weakness     Right side feels different than the left        Subjective:  No acute events overnight. She has tolerated her breakfast well today. She states that she is feeling well and would like us to update her sister. She denies any suprapubic pain or pressure bladder. Denies fever, chills, or pain. Past medical, surgical, family and social history were reviewed, non-contributory, and unchanged unless otherwise stated. Review of systems: Negative except for stated above. Objective:  /85   Pulse 69   Temp 97.8 °F (36.6 °C) (Oral)   Resp 18   Ht 5' 7\" (1.702 m)   Wt 205 lb (93 kg)   SpO2 97%   BMI 32.11 kg/m²     No intake or output data in the 24 hours ending 12/24/22 0831        Physical Exam  Vitals reviewed. Constitutional:       General: She is not in acute distress. Appearance: Normal appearance. She is not ill-appearing. HENT:      Head: Normocephalic and atraumatic. Nose: Nose normal. No congestion or rhinorrhea. Eyes:      General:         Right eye: No discharge. Left eye: No discharge. Conjunctiva/sclera: Conjunctivae normal.      Comments: Left oval pupil   Cardiovascular:      Rate and Rhythm: Normal rate and regular rhythm. Heart sounds: Normal heart sounds. No murmur heard. Pulmonary:      Effort: Pulmonary effort is normal.      Breath sounds: Normal breath sounds. No wheezing. Abdominal:      General: Abdomen is flat. There is no distension. Palpations: Abdomen is soft. Tenderness: There is no abdominal tenderness. Comments: Ostomy bag in place. Did not have stool in it during evaluation. No erythema around ostomy site noted   Genitourinary:     Comments: López in place.   Cloudy/yellow urine draining into López bag. Musculoskeletal:         General: No swelling or deformity. Normal range of motion. Cervical back: No rigidity or tenderness. Skin:     General: Skin is warm and dry. Findings: No rash. Neurological:      Mental Status: She is alert and oriented to person, place, and time. Comments: She has a speech delay but responds to questions appropriately.    Psychiatric:         Mood and Affect: Mood normal.         Behavior: Behavior normal.       Labs:  Recent Results (from the past 24 hour(s))   POCT Glucose    Collection Time: 12/23/22 10:21 AM   Result Value Ref Range    Meter Glucose 178 (H) 74 - 99 mg/dL   POCT Glucose    Collection Time: 12/23/22  3:36 PM   Result Value Ref Range    Meter Glucose 192 (H) 74 - 99 mg/dL   POCT Glucose    Collection Time: 12/23/22  9:27 PM   Result Value Ref Range    Meter Glucose 168 (H) 74 - 99 mg/dL   Comprehensive Metabolic Panel w/ Reflex to MG    Collection Time: 12/24/22  2:09 AM   Result Value Ref Range    Sodium 132 132 - 146 mmol/L    Potassium reflex Magnesium 4.2 3.5 - 5.0 mmol/L    Chloride 100 98 - 107 mmol/L    CO2 19 (L) 22 - 29 mmol/L    Anion Gap 13 7 - 16 mmol/L    Glucose 161 (H) 74 - 99 mg/dL    BUN 55 (H) 6 - 23 mg/dL    Creatinine 4.4 (H) 0.5 - 1.0 mg/dL    Est, Glom Filt Rate 10 >=60 mL/min/1.73    Calcium 8.7 8.6 - 10.2 mg/dL    Total Protein 7.0 6.4 - 8.3 g/dL    Albumin 3.2 (L) 3.5 - 5.2 g/dL    Total Bilirubin 0.5 0.0 - 1.2 mg/dL    Alkaline Phosphatase 100 35 - 104 U/L    ALT 6 0 - 32 U/L    AST 9 0 - 31 U/L   CBC with Auto Differential    Collection Time: 12/24/22  2:09 AM   Result Value Ref Range    WBC 11.5 4.5 - 11.5 E9/L    RBC 3.86 3.50 - 5.50 E12/L    Hemoglobin 10.3 (L) 11.5 - 15.5 g/dL    Hematocrit 33.2 (L) 34.0 - 48.0 %    MCV 86.0 80.0 - 99.9 fL    MCH 26.7 26.0 - 35.0 pg    MCHC 31.0 (L) 32.0 - 34.5 %    RDW 14.3 11.5 - 15.0 fL    Platelets 859 135 - 667 E9/L    MPV 10.9 7.0 - 12.0 fL    Neutrophils % Acute renal failure superimposed on chronic kidney disease, on chronic dialysis, unspecified acute renal failure type ([facility] [address] [N17.9, N18.9, Z99.2] 12/21/2022     Priority: Medium    Bradycardia [R00.1] 12/21/2022     Priority: Medium    Hyponatremia [E87.1] 12/21/2022     Priority: Medium    Dizziness [R42] 12/21/2022     Priority: Medium       Plan:  Hyponatremia, resolved  Na+ = 123, most likely due to hypervolemia/medication. We will need to ensure that sodium is not raised by more than 6-8 mEq per day. - CMP daily  - Urine Na = 40, UrOsm = 248 (low), SerOsm = 309.   - Urine Cr = 89  - Nephrology following     Acute renal failure   Baseline creatinine 2. Creatinine on admission creatinine 6.5 BUN/Cr ratio >11. FeNa = 2.4, most likely 2/2 obstruction.  - FeNa = 2.4  -Creatinine continues to improve 4.4 this morning  - Hold nephrotoxic medications  - Nephrology following, appreciate input     Bilateral moderate hydronephrosis and hydroureter  CT abdomen pelvis was remarkable for bilateral moderate hydronephrosis and hydroureter despite the presence of a bilateral double-J ureteral catheters  Urine analysis positive for turbid, moderate blood, protein large leukocytes negative for nitrites, WBC and RBC see positive.   Many bacteria  -Patient postop day 2 of cystoscopy grade pyelogram and stent exchange  -Continue catheter irrigation  - Urology following, appreciate input     Urinary tract infection  -Completed 3 days ciprofloxacin  -Culture positive for gram-negative rods and staph aureus  -Symptoms resolved at this time    Anemia  On admission hemoglobin 11.3  Last iron studies to 48 ferritin, 75 iron, 25 iron saturation, 305 TIBC, folate 3.8, vitamin B12 247 9/21/2021  - Continue to monitor  - CBC daily  -Studies indicative of anemia of chronic disease    Sinus bradycardia, resolved  - Continue to monitor, repeat EKG in the morning  - Patient on atenolol 50 mg every morning-half dose ordered  - Telemetry Diabetes   Last HbA1C 12.9 on admission  patients home medications include glipizide 5 mg.   Hyperglycemic to 272 on admission, anion gap of 18 down to 16, beta hydroxybutyrate  0.82  - 10U lantus nightly  - Hold Glipizide 5mg   - HDSSI   - Hypoglycemia protocol  - POCT glucose checks     Hypertension  - Amlodipine 10 mg every morning  - Hydralazine 25 mg BID      Hyperlipidemia  - Atorvastatin 20 mg daily     GERD  - Protonix 40 mg daily    Pain Control:  Tylenol 650 mg Q6HR PRN for mild pain  DVT ppx: PCDs  GI ppx: Protonix 40 mg daily  Code Status: Full  Diet: Diabetic diet      Disposition: Discharge to pending clinical course    Jos Lima DO  Family Medicine Resident PGY-3  12/24/22   8:31 AM

## 2022-12-25 LAB
ALBUMIN SERPL-MCNC: 3.2 G/DL (ref 3.5–5.2)
ALP BLD-CCNC: 119 U/L (ref 35–104)
ALT SERPL-CCNC: 7 U/L (ref 0–32)
ANION GAP SERPL CALCULATED.3IONS-SCNC: 12 MMOL/L (ref 7–16)
AST SERPL-CCNC: 11 U/L (ref 0–31)
BASOPHILS ABSOLUTE: 0.02 E9/L (ref 0–0.2)
BASOPHILS RELATIVE PERCENT: 0.2 % (ref 0–2)
BILIRUB SERPL-MCNC: 0.5 MG/DL (ref 0–1.2)
BUN BLDV-MCNC: 46 MG/DL (ref 6–23)
CALCIUM SERPL-MCNC: 8.9 MG/DL (ref 8.6–10.2)
CHLORIDE BLD-SCNC: 104 MMOL/L (ref 98–107)
CO2: 18 MMOL/L (ref 22–29)
CREAT SERPL-MCNC: 3.6 MG/DL (ref 0.5–1)
EOSINOPHILS ABSOLUTE: 0.13 E9/L (ref 0.05–0.5)
EOSINOPHILS RELATIVE PERCENT: 1.4 % (ref 0–6)
GFR SERPL CREATININE-BSD FRML MDRD: 13 ML/MIN/1.73
GLUCOSE BLD-MCNC: 160 MG/DL (ref 74–99)
HCT VFR BLD CALC: 32.4 % (ref 34–48)
HEMOGLOBIN: 10.2 G/DL (ref 11.5–15.5)
IMMATURE GRANULOCYTES #: 0.07 E9/L
IMMATURE GRANULOCYTES %: 0.8 % (ref 0–5)
LYMPHOCYTES ABSOLUTE: 0.89 E9/L (ref 1.5–4)
LYMPHOCYTES RELATIVE PERCENT: 9.6 % (ref 20–42)
MCH RBC QN AUTO: 26.9 PG (ref 26–35)
MCHC RBC AUTO-ENTMCNC: 31.5 % (ref 32–34.5)
MCV RBC AUTO: 85.5 FL (ref 80–99.9)
METER GLUCOSE: 141 MG/DL (ref 74–99)
METER GLUCOSE: 142 MG/DL (ref 74–99)
METER GLUCOSE: 160 MG/DL (ref 74–99)
METER GLUCOSE: 189 MG/DL (ref 74–99)
MONOCYTES ABSOLUTE: 0.53 E9/L (ref 0.1–0.95)
MONOCYTES RELATIVE PERCENT: 5.7 % (ref 2–12)
NEUTROPHILS ABSOLUTE: 7.67 E9/L (ref 1.8–7.3)
NEUTROPHILS RELATIVE PERCENT: 82.3 % (ref 43–80)
ORGANISM: ABNORMAL
ORGANISM: ABNORMAL
PDW BLD-RTO: 14.4 FL (ref 11.5–15)
PLATELET # BLD: 317 E9/L (ref 130–450)
PMV BLD AUTO: 10.6 FL (ref 7–12)
POTASSIUM REFLEX MAGNESIUM: 4.4 MMOL/L (ref 3.5–5)
RBC # BLD: 3.79 E12/L (ref 3.5–5.5)
SODIUM BLD-SCNC: 134 MMOL/L (ref 132–146)
TOTAL PROTEIN: 7.3 G/DL (ref 6.4–8.3)
URINE CULTURE, ROUTINE: ABNORMAL
URINE CULTURE, ROUTINE: ABNORMAL
WBC # BLD: 9.3 E9/L (ref 4.5–11.5)

## 2022-12-25 PROCEDURE — 2580000003 HC RX 258: Performed by: NURSE PRACTITIONER

## 2022-12-25 PROCEDURE — 6370000000 HC RX 637 (ALT 250 FOR IP)

## 2022-12-25 PROCEDURE — 36415 COLL VENOUS BLD VENIPUNCTURE: CPT

## 2022-12-25 PROCEDURE — 80053 COMPREHEN METABOLIC PANEL: CPT

## 2022-12-25 PROCEDURE — 2060000000 HC ICU INTERMEDIATE R&B

## 2022-12-25 PROCEDURE — 99232 SBSQ HOSP IP/OBS MODERATE 35: CPT | Performed by: FAMILY MEDICINE

## 2022-12-25 PROCEDURE — 82962 GLUCOSE BLOOD TEST: CPT

## 2022-12-25 PROCEDURE — 85025 COMPLETE CBC W/AUTO DIFF WBC: CPT

## 2022-12-25 PROCEDURE — 2580000003 HC RX 258: Performed by: UROLOGY

## 2022-12-25 RX ADMIN — HYDRALAZINE HYDROCHLORIDE 25 MG: 25 TABLET, FILM COATED ORAL at 21:44

## 2022-12-25 RX ADMIN — SODIUM CHLORIDE: 9 INJECTION, SOLUTION INTRAVENOUS at 16:54

## 2022-12-25 RX ADMIN — AMLODIPINE BESYLATE 10 MG: 10 TABLET ORAL at 08:46

## 2022-12-25 RX ADMIN — SODIUM CHLORIDE, POTASSIUM CHLORIDE, SODIUM LACTATE AND CALCIUM CHLORIDE: 600; 310; 30; 20 INJECTION, SOLUTION INTRAVENOUS at 20:49

## 2022-12-25 RX ADMIN — PANTOPRAZOLE SODIUM 40 MG: 40 TABLET, DELAYED RELEASE ORAL at 08:46

## 2022-12-25 RX ADMIN — HYDRALAZINE HYDROCHLORIDE 25 MG: 25 TABLET, FILM COATED ORAL at 08:46

## 2022-12-25 RX ADMIN — INSULIN GLARGINE 10 UNITS: 100 INJECTION, SOLUTION SUBCUTANEOUS at 21:47

## 2022-12-25 RX ADMIN — ATORVASTATIN CALCIUM 20 MG: 20 TABLET, FILM COATED ORAL at 08:46

## 2022-12-25 RX ADMIN — ATENOLOL 25 MG: 25 TABLET ORAL at 08:46

## 2022-12-25 RX ADMIN — SODIUM CHLORIDE: 9 INJECTION, SOLUTION INTRAVENOUS at 03:16

## 2022-12-25 ASSESSMENT — PAIN DESCRIPTION - FREQUENCY: FREQUENCY: INTERMITTENT

## 2022-12-25 ASSESSMENT — PAIN - FUNCTIONAL ASSESSMENT: PAIN_FUNCTIONAL_ASSESSMENT: ACTIVITIES ARE NOT PREVENTED

## 2022-12-25 ASSESSMENT — PAIN SCALES - GENERAL
PAINLEVEL_OUTOF10: 0
PAINLEVEL_OUTOF10: 0

## 2022-12-25 ASSESSMENT — PAIN DESCRIPTION - ONSET: ONSET: ON-GOING

## 2022-12-25 NOTE — PROGRESS NOTES
Progress Note  12/25/2022 9:38 AM  Subjective:   Admit Date: 12/21/2022  PCP: Tom Monte MD  Interval History: Patient examined doing well     Diet: ADULT DIET;  Regular; 4 carb choices (60 gm/meal)    Data:   Scheduled Meds:   sodium chloride flush  5-40 mL IntraVENous 2 times per day    [Held by provider] heparin (porcine)  5,000 Units SubCUTAneous TID    amLODIPine  10 mg Oral QAM    atenolol  25 mg Oral QAM    atorvastatin  20 mg Oral QAM    pantoprazole  40 mg Oral QAM    hydrALAZINE  25 mg Oral 2 times per day    sodium chloride flush  5-40 mL IntraVENous 2 times per day    insulin glargine  10 Units SubCUTAneous Nightly    insulin lispro  0-16 Units SubCUTAneous TID WC    insulin lispro  0-4 Units SubCUTAneous Nightly     Continuous Infusions:   sodium chloride 75 mL/hr at 12/25/22 0316    sodium chloride      lactated ringers Stopped (12/22/22 1812)    sodium chloride      dextrose       PRN Meds:sodium chloride flush, sodium chloride, labetalol **OR** hydrALAZINE, sodium chloride flush, sodium chloride, ondansetron **OR** ondansetron, polyethylene glycol, glucose, dextrose bolus **OR** dextrose bolus, glucagon (rDNA), dextrose  I/O last 3 completed shifts:  In: -   Out: 5969 [Urine:1650]  I/O this shift:  In: -   Out: 850 [Urine:850]    Intake/Output Summary (Last 24 hours) at 12/25/2022 0938  Last data filed at 12/25/2022 0835  Gross per 24 hour   Intake --   Output 2500 ml   Net -2500 ml     CBC:   Recent Labs     12/23/22  0256 12/24/22  0209 12/25/22  0432   WBC 16.3* 11.5 9.3   HGB 10.8* 10.3* 10.2*    314 317     BMP:    Recent Labs     12/23/22  0256 12/24/22  0209 12/25/22  0432   * 132 134   K 4.3  4.3 4.2 4.4   CL 96* 100 104   CO2 20* 19* 18*   BUN 64* 55* 46*   CREATININE 5.1* 4.4* 3.6*   GLUCOSE 153* 161* 160*     Hepatic:   Recent Labs     12/23/22  0256 12/24/22  0209 12/25/22  0432   AST 12 9 11   ALT 6 6 7   BILITOT 0.4 0.5 0.5   ALKPHOS 105* 100 119* Troponin: No results for input(s): TROPONINI in the last 72 hours. BNP: No results for input(s): BNP in the last 72 hours. Lipids: No results for input(s): CHOL, HDL in the last 72 hours. Invalid input(s): LDLCALCU  ABGs: No results found for: PHART, PO2ART, [id number]  INR: No results for input(s): INR in the last 72 hours. -----------------------------------------------------------------  RAD: CT ABDOMEN PELVIS WO CONTRAST Additional Contrast? None    Result Date: 12/21/2022  EXAMINATION: CT OF THE ABDOMEN AND PELVIS WITHOUT CONTRAST 12/21/2022 2:16 pm TECHNIQUE: CT of the abdomen and pelvis was performed without the administration of intravenous contrast. Multiplanar reformatted images are provided for review. Automated exposure control, iterative reconstruction, and/or weight based adjustment of the mA/kV was utilized to reduce the radiation dose to as low as reasonably achievable. COMPARISON: None. HISTORY: ORDERING SYSTEM PROVIDED HISTORY: obstructive pathology TECHNOLOGIST PROVIDED HISTORY: Reason for exam:->obstructive pathology Additional Contrast?->None Decision Support Exception - unselect if not a suspected or confirmed emergency medical condition->Emergency Medical Condition (MA) FINDINGS: Lower Chest: Lung bases are clear. No pleural fluid. Organs: Liver and spleen are normal in size without focal lesion. Stomach has a normal configuration. Pancreas appears normal.  Gallbladder and bile ducts are within normal limits. The adrenal glands are normal. There is bilateral hydronephrosis and hydroureter despite the presence of double-J ureteral stents which extend into the urinary bladder. Double-J stents appear to be within the proximal ureter on the left and the renal pelvis on the right. Both kidneys demonstrate multiple low-density cortical lesions which may represent renal cortical cysts. GI/Bowel: No evidence of bowel obstruction or inflammation.  Pelvis: Urinary bladder is contracted and contains a López catheter and double-J ureteral stents. Peritoneum/Retroperitoneum: No evidence of abdominal aortic aneurysm. There is retroaortic left renal vein. There is mild left periaortic lymphadenopathy with a lymph node on image number 58 measuring 9 mm in short axis. Both ureters are dilated to the pelvis. There is a peristomal hernia containing fat in the left lower quadrant related to the colostomy. Bones/Soft Tissues: Subcutaneous tissues are generally unremarkable. Degenerative disc disease is noted at the lower 3 lumbar levels, multiple disc bulges in the lower lumbar spine. There is a moderate 40% superior endplate compression fracture of L2, new compared to the prior study of 09/23/2021. unchanged mild sclerosis associated with the right iliac bone near the SI joint. 1.  Bilateral moderate hydronephrosis and hydroureter despite the presence of bilateral double-J ureteral catheters. The proximal aspect of the catheters are in the renal pelvis and proximal ureter on the right and left respectively. 2.  No acute inflammatory changes in the abdomen or pelvis. 3.  40% superior endplate wedge compression deformity of L2, age indeterminate. CT HEAD WO CONTRAST    Result Date: 12/21/2022  EXAMINATION: CT OF THE HEAD WITHOUT CONTRAST  12/21/2022 1:24 pm TECHNIQUE: CT of the head was performed without the administration of intravenous contrast. Automated exposure control, iterative reconstruction, and/or weight based adjustment of the mA/kV was utilized to reduce the radiation dose to as low as reasonably achievable. COMPARISON: None. HISTORY: ORDERING SYSTEM PROVIDED HISTORY: stroke like symptoms TECHNOLOGIST PROVIDED HISTORY: Has a \"code stroke\" or \"stroke alert\" been called? ->No Reason for exam:->stroke like symptoms Decision Support Exception - unselect if not a suspected or confirmed emergency medical condition->Emergency Medical Condition (MA) FINDINGS: BRAIN/VENTRICLES: There is no acute intracranial hemorrhage, mass effect or midline shift. No abnormal extra-axial fluid collection. The gray-white differentiation is maintained without evidence of an acute infarct. There is no evidence of hydrocephalus. The ventricles, cisterns and sulci are prominent consistent with atrophy. There is decreased attenuation within the periventricular white matter consistent with periventricular leukomalacia. ORBITS: The visualized portion of the orbits demonstrate no acute abnormality. SINUSES: The visualized paranasal sinuses and mastoid air cells demonstrate no acute abnormality. SOFT TISSUES/SKULL:  No acute abnormality of the visualized skull or soft tissues. 1. There is no acute intracranial abnormality. Specifically, there is no intracranial hemorrhage. 2. Atrophy and periventricular leukomalacia,     FL RETROGRADE PYELOGRAM W WO KUB    Result Date: 12/22/2022  EXAMINATION: SPOT FLUOROSCOPIC IMAGES 12/22/2022 10:32 am TECHNIQUE: Fluoroscopy was provided by the radiology department for procedure. Radiologist was not present during examination. FLUOROSCOPY DOSE AND TYPE OR TIME AND EXPOSURES: 3.8 minutes fluoroscopy time at 16 images were obtained. COMPARISON: 12/21/2022 abdomen/pelvis CT. HISTORY: ORDERING SYSTEM PROVIDED HISTORY: cysto TECHNOLOGIST PROVIDED HISTORY: Reason for exam:->cysto Intraprocedural imaging. FINDINGS: 16 spot images of the abdomen were obtained. Images demonstrate retrograde pyelogram with bilateral dilated renal collecting systems. Ureteral stents were repositioned so that the proximal J loops are located within the left renal pelvis and a right mid renal calyx. Intraprocedural fluoroscopic spot images as above. See separate procedure report for more information.      XR CHEST PORTABLE    Result Date: 12/21/2022  EXAMINATION: ONE XRAY VIEW OF THE CHEST 12/21/2022 12:44 pm COMPARISON: 23 September 2021 HISTORY: ORDERING SYSTEM PROVIDED HISTORY: weakness TECHNOLOGIST PROVIDED HISTORY: Reason for exam:->weakness FINDINGS: The lungs are without acute focal process. There is no effusion or pneumothorax. The cardiomediastinal silhouette is without acute process. The osseous structures are without acute process. No acute process. Objective:   Vitals: BP (!) 141/80   Pulse 71   Temp 97.8 °F (36.6 °C) (Oral)   Resp 16   Ht 5' 7\" (1.702 m)   Wt 190 lb (86.2 kg)   SpO2 98%   BMI 29.76 kg/m²   General appearance: appears stated age   Skin:  No rashes or lesions  HEENT: Head: Normocephalic, no lesions, without obvious abnormality.   Neck: no adenopathy, no carotid bruit, no JVD, supple, symmetrical, trachea midline, and thyroid not enlarged, symmetric, no tenderness/mass/nodules  Lungs: clear to auscultation bilaterally  Heart: regular rate and rhythm, S1, S2 normal, no murmur, click, rub or gallop  Abdomen: soft, non-tender; bowel sounds normal; no masses,  no organomegaly  Extremities: extremities normal, atraumatic, no cyanosis or edema  Neurologic: Mental status: Alert, oriented, thought content appropriate    Assessment:   Patient Active Problem List:     Hypertension     Elevated lipids     Proteinuria     Vitamin D deficiency     Closed fracture of radius     History of anal cancer     Colostomy present (HCC)     Vaginal cancer (HCC)     Diabetes mellitus type 2 in obese ([address]     Stuttering     Age-related osteoporosis with current pathological fracture with routine healing     Herpes zoster vaccination declined     Chronic renal disease, stage III ([address] [[id number]]     Hydronephrosis due to obstruction of bladder     Acute renal failure (HCC)     Bradycardia     Hyponatremia     Dizziness     Chronic indwelling López catheter     Status post placement of ureteral stent     Urinary tract infection associated with indwelling urethral catheter (HCC)     Bilateral hydronephrosis    Plan:     Assessment/Plan     1) JEANNINE in the setting of Jean Claude renal stents ( known obstructive uropathy ) Follows up with Dr Quynh Joseph      Stents replaced in August 2022 , possible underlying obstructive uropathy          2) 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

## 2022-12-26 LAB
ALBUMIN SERPL-MCNC: 3.3 G/DL (ref 3.5–5.2)
ALP BLD-CCNC: 96 U/L (ref 35–104)
ALT SERPL-CCNC: 7 U/L (ref 0–32)
ANION GAP SERPL CALCULATED.3IONS-SCNC: 10 MMOL/L (ref 7–16)
AST SERPL-CCNC: 10 U/L (ref 0–31)
BASOPHILS ABSOLUTE: 0.04 E9/L (ref 0–0.2)
BASOPHILS RELATIVE PERCENT: 0.5 % (ref 0–2)
BILIRUB SERPL-MCNC: 0.4 MG/DL (ref 0–1.2)
BUN BLDV-MCNC: 39 MG/DL (ref 6–23)
CALCIUM SERPL-MCNC: 9.1 MG/DL (ref 8.6–10.2)
CHLORIDE BLD-SCNC: 103 MMOL/L (ref 98–107)
CO2: 20 MMOL/L (ref 22–29)
CREAT SERPL-MCNC: 3.1 MG/DL (ref 0.5–1)
EOSINOPHILS ABSOLUTE: 0.18 E9/L (ref 0.05–0.5)
EOSINOPHILS RELATIVE PERCENT: 2.3 % (ref 0–6)
GFR SERPL CREATININE-BSD FRML MDRD: 15 ML/MIN/1.73
GLUCOSE BLD-MCNC: 147 MG/DL (ref 74–99)
HCT VFR BLD CALC: 32 % (ref 34–48)
HEMOGLOBIN: 10.3 G/DL (ref 11.5–15.5)
IMMATURE GRANULOCYTES #: 0.05 E9/L
IMMATURE GRANULOCYTES %: 0.6 % (ref 0–5)
LYMPHOCYTES ABSOLUTE: 0.72 E9/L (ref 1.5–4)
LYMPHOCYTES RELATIVE PERCENT: 9.1 % (ref 20–42)
MCH RBC QN AUTO: 27.4 PG (ref 26–35)
MCHC RBC AUTO-ENTMCNC: 32.2 % (ref 32–34.5)
MCV RBC AUTO: 85.1 FL (ref 80–99.9)
METER GLUCOSE: 127 MG/DL (ref 74–99)
METER GLUCOSE: 134 MG/DL (ref 74–99)
METER GLUCOSE: 144 MG/DL (ref 74–99)
METER GLUCOSE: 151 MG/DL (ref 74–99)
METER GLUCOSE: 154 MG/DL (ref 74–99)
MONOCYTES ABSOLUTE: 0.39 E9/L (ref 0.1–0.95)
MONOCYTES RELATIVE PERCENT: 4.9 % (ref 2–12)
NEUTROPHILS ABSOLUTE: 6.55 E9/L (ref 1.8–7.3)
NEUTROPHILS RELATIVE PERCENT: 82.6 % (ref 43–80)
PDW BLD-RTO: 14.5 FL (ref 11.5–15)
PLATELET # BLD: 289 E9/L (ref 130–450)
PMV BLD AUTO: 10.1 FL (ref 7–12)
POTASSIUM REFLEX MAGNESIUM: 4 MMOL/L (ref 3.5–5)
RBC # BLD: 3.76 E12/L (ref 3.5–5.5)
SODIUM BLD-SCNC: 133 MMOL/L (ref 132–146)
TOTAL PROTEIN: 7.5 G/DL (ref 6.4–8.3)
WBC # BLD: 7.9 E9/L (ref 4.5–11.5)

## 2022-12-26 PROCEDURE — 2580000003 HC RX 258: Performed by: ANESTHESIOLOGY

## 2022-12-26 PROCEDURE — 82962 GLUCOSE BLOOD TEST: CPT

## 2022-12-26 PROCEDURE — 6360000002 HC RX W HCPCS

## 2022-12-26 PROCEDURE — 6370000000 HC RX 637 (ALT 250 FOR IP)

## 2022-12-26 PROCEDURE — 36415 COLL VENOUS BLD VENIPUNCTURE: CPT

## 2022-12-26 PROCEDURE — 80053 COMPREHEN METABOLIC PANEL: CPT

## 2022-12-26 PROCEDURE — 2580000003 HC RX 258: Performed by: UROLOGY

## 2022-12-26 PROCEDURE — 99232 SBSQ HOSP IP/OBS MODERATE 35: CPT | Performed by: FAMILY MEDICINE

## 2022-12-26 PROCEDURE — 2060000000 HC ICU INTERMEDIATE R&B

## 2022-12-26 PROCEDURE — 85025 COMPLETE CBC W/AUTO DIFF WBC: CPT

## 2022-12-26 RX ADMIN — ATORVASTATIN CALCIUM 20 MG: 20 TABLET, FILM COATED ORAL at 11:23

## 2022-12-26 RX ADMIN — ONDANSETRON 4 MG: 2 INJECTION INTRAMUSCULAR; INTRAVENOUS at 01:07

## 2022-12-26 RX ADMIN — PANTOPRAZOLE SODIUM 40 MG: 40 TABLET, DELAYED RELEASE ORAL at 11:22

## 2022-12-26 RX ADMIN — ONDANSETRON 4 MG: 2 INJECTION INTRAMUSCULAR; INTRAVENOUS at 06:47

## 2022-12-26 RX ADMIN — SODIUM CHLORIDE, PRESERVATIVE FREE 10 ML: 5 INJECTION INTRAVENOUS at 12:16

## 2022-12-26 RX ADMIN — HYDRALAZINE HYDROCHLORIDE 25 MG: 25 TABLET, FILM COATED ORAL at 21:50

## 2022-12-26 RX ADMIN — HYDRALAZINE HYDROCHLORIDE 25 MG: 25 TABLET, FILM COATED ORAL at 11:21

## 2022-12-26 RX ADMIN — AMLODIPINE BESYLATE 10 MG: 10 TABLET ORAL at 11:21

## 2022-12-26 RX ADMIN — INSULIN GLARGINE 10 UNITS: 100 INJECTION, SOLUTION SUBCUTANEOUS at 21:50

## 2022-12-26 RX ADMIN — SODIUM CHLORIDE, POTASSIUM CHLORIDE, SODIUM LACTATE AND CALCIUM CHLORIDE: 600; 310; 30; 20 INJECTION, SOLUTION INTRAVENOUS at 05:44

## 2022-12-26 RX ADMIN — ATENOLOL 25 MG: 25 TABLET ORAL at 11:22

## 2022-12-26 NOTE — PROGRESS NOTES
[address]  Progress Note    Chief complaint :  Chief Complaint   Patient presents with    Aphasia     Slurring words since waking up this morning. Went to bed normal last night       Extremity Weakness     Right side feels different than the left        Subjective:  Patient states she is feeling much better than when she first came in. She has no acute complaints today and has been tolerating her diet well. Past medical, surgical, family and social history were reviewed, non-contributory, and unchanged unless otherwise stated. Review of systems: Negative except for stated above. Objective:  /78   Pulse 71   Temp 98.5 °F (36.9 °C) (Oral)   Resp 18   Ht 5' 7\" (1.702 m)   Wt 190 lb (86.2 kg)   SpO2 97%   BMI 29.76 kg/m²       Intake/Output Summary (Last 24 hours) at 12/26/2022 1028  Last data filed at 12/26/2022 0557  Gross per 24 hour   Intake 1100 ml   Output 3000 ml   Net -1900 ml         Physical Exam  Vitals reviewed. Constitutional:       General: She is not in acute distress. Appearance: Normal appearance. She is not ill-appearing. HENT:      Head: Normocephalic and atraumatic. Nose: Nose normal. No congestion or rhinorrhea. Eyes:      General:         Right eye: No discharge. Left eye: No discharge. Conjunctiva/sclera: Conjunctivae normal.      Comments: Left oval pupil   Cardiovascular:      Rate and Rhythm: Normal rate and regular rhythm. Heart sounds: Normal heart sounds. No murmur heard. Pulmonary:      Effort: Pulmonary effort is normal.      Breath sounds: Normal breath sounds. No wheezing. Abdominal:      General: Abdomen is flat. There is no distension. Palpations: Abdomen is soft. Tenderness: There is no abdominal tenderness. Comments: Ostomy bag in place. No erythema around ostomy site noted   Genitourinary:     Comments: López in place.   Yellow, slightly pink, urine draining into López bag.  Musculoskeletal:         General: No swelling or deformity. Cervical back: No rigidity or tenderness. Skin:     General: Skin is warm and dry. Findings: No rash. Neurological:      Mental Status: She is alert and oriented to person, place, and time. Comments: She has a speech delay but responds to questions appropriately.    Psychiatric:         Mood and Affect: Mood normal.         Behavior: Behavior normal.       Labs:  Recent Results (from the past 24 hour(s))   POCT Glucose    Collection Time: 12/25/22 10:34 AM   Result Value Ref Range    Meter Glucose 189 (H) 74 - 99 mg/dL   POCT Glucose    Collection Time: 12/25/22  3:49 PM   Result Value Ref Range    Meter Glucose 160 (H) 74 - 99 mg/dL   POCT Glucose    Collection Time: 12/25/22  9:46 PM   Result Value Ref Range    Meter Glucose 142 (H) 74 - 99 mg/dL   POCT Glucose    Collection Time: 12/26/22 12:31 AM   Result Value Ref Range    Meter Glucose 134 (H) 74 - 99 mg/dL   Comprehensive Metabolic Panel w/ Reflex to MG    Collection Time: 12/26/22  2:26 AM   Result Value Ref Range    Sodium 133 132 - 146 mmol/L    Potassium reflex Magnesium 4.0 3.5 - 5.0 mmol/L    Chloride 103 98 - 107 mmol/L    CO2 20 (L) 22 - 29 mmol/L    Anion Gap 10 7 - 16 mmol/L    Glucose 147 (H) 74 - 99 mg/dL    BUN 39 (H) 6 - 23 mg/dL    Creatinine 3.1 (H) 0.5 - 1.0 mg/dL    Est, Glom Filt Rate 15 >=60 mL/min/1.73    Calcium 9.1 8.6 - 10.2 mg/dL    Total Protein 7.5 6.4 - 8.3 g/dL    Albumin 3.3 (L) 3.5 - 5.2 g/dL    Total Bilirubin 0.4 0.0 - 1.2 mg/dL    Alkaline Phosphatase 96 35 - 104 U/L    ALT 7 0 - 32 U/L    AST 10 0 - 31 U/L   CBC with Auto Differential    Collection Time: 12/26/22  2:26 AM   Result Value Ref Range    WBC 7.9 4.5 - 11.5 E9/L    RBC 3.76 3.50 - 5.50 E12/L    Hemoglobin 10.3 (L) 11.5 - 15.5 g/dL    Hematocrit 32.0 (L) 34.0 - 48.0 %    MCV 85.1 80.0 - 99.9 fL    MCH 27.4 26.0 - 35.0 pg    MCHC 32.2 32.0 - 34.5 %    RDW 14.5 11.5 - 15.0 fL Platelets 366 149 - 577 E9/L    MPV 10.1 7.0 - 12.0 fL    Neutrophils % 82.6 (H) 43.0 - 80.0 %    Immature Granulocytes % 0.6 0.0 - 5.0 %    Lymphocytes % 9.1 (L) 20.0 - 42.0 %    Monocytes % 4.9 2.0 - 12.0 %    Eosinophils % 2.3 0.0 - 6.0 %    Basophils % 0.5 0.0 - 2.0 %    Neutrophils Absolute 6.55 1.80 - 7.30 E9/L    Immature Granulocytes # 0.05 E9/L    Lymphocytes Absolute 0.72 (L) 1.50 - 4.00 E9/L    Monocytes Absolute 0.39 0.10 - 0.95 E9/L    Eosinophils Absolute 0.18 0.05 - 0.50 E9/L    Basophils Absolute 0.04 0.00 - 0.20 E9/L   POCT Glucose    Collection Time: 12/26/22  6:19 AM   Result Value Ref Range    Meter Glucose 144 (H) 74 - 99 mg/dL       Radiology and other tests reviewed:  FL RETROGRADE PYELOGRAM W WO KUB   Final Result   Intraprocedural fluoroscopic spot images as above. See separate procedure   report for more information. CT ABDOMEN PELVIS WO CONTRAST Additional Contrast? None   Final Result   1. Bilateral moderate hydronephrosis and hydroureter despite the presence of   bilateral double-J ureteral catheters. The proximal aspect of the catheters   are in the renal pelvis and proximal ureter on the right and left   respectively. 2.  No acute inflammatory changes in the abdomen or pelvis. 3.  40% superior endplate wedge compression deformity of L2, age   indeterminate. XR CHEST PORTABLE   Final Result   No acute process. CT HEAD WO CONTRAST   Final Result   1. There is no acute intracranial abnormality. Specifically, there is no   intracranial hemorrhage.    2. Atrophy and periventricular leukomalacia,             Assessment:  Active Hospital Problems    Diagnosis Date Noted    Chronic indwelling López catheter [Z97.8] 12/22/2022     Priority: Medium    Status post placement of ureteral stent [Z96.0] 12/22/2022     Priority: Medium    Urinary tract infection associated with indwelling urethral catheter ([facility] [address] [D16.296U, N39.0] 12/22/2022     Priority: Medium Bilateral hydronephrosis [N13.30] 12/22/2022     Priority: Medium    Acute renal failure ([address] [N17.9] 12/21/2022     Priority: Medium    Bradycardia [R00.1] 12/21/2022     Priority: Medium    Hyponatremia [E87.1] 12/21/2022     Priority: Medium    Dizziness [R42] 12/21/2022     Priority: Medium       Plan:    Acute renal failure, improving  Baseline creatinine 2. Creatinine on admission creatinine 6.5 BUN/Cr ratio >11. FeNa = 2.4, most likely 2/2 obstruction.  -Creatinine continues to improve 3.1 this morning  - Hold nephrotoxic medications  -Continue LR at 125 cc/h  - Nephrology following, appreciate input     Bilateral moderate hydronephrosis and hydroureter  CT abdomen pelvis was remarkable for bilateral moderate hydronephrosis and hydroureter despite the presence of a bilateral double-J ureteral catheters  -Patient postop day 3 of cystoscopy grade pyelogram and stent exchange  -Continue catheter irrigation  - Urology following, appreciate input; will need to know if patient's MRSA in urine is colonization or infection so that it can be treated appropriately    Anemia, stable  On admission hemoglobin 11.3. Iron studies indicative of anemia of chronic disease  - Continue to monitor  - CBC daily     Diabetes   Last HbA1C 12.9 on admission  patients home medications include glipizide 5 mg. Hyperglycemic to 272 on admission, anion gap of 18 down to 16, beta hydroxybutyrate  0.82  - 10U lantus nightly  - Hold Glipizide 5mg   - HDSSI   - Hypoglycemia protocol  - POCT glucose checks     Hypertension  - Amlodipine 10 mg every morning  - Hydralazine 25 mg BID      Hyperlipidemia  - Atorvastatin 20 mg daily     GERD  - Protonix 40 mg daily    Pain Control:  Tylenol 650 mg Q6HR PRN for mild pain  DVT ppx: PCDs  GI ppx: Protonix 40 mg daily  Code Status: Full  Diet: Diabetic diet      Disposition: Discharge home pending clinical course.     El Culp DO  Family Medicine Resident PGY-3  12/26/22   10:28 AM

## 2022-12-27 PROBLEM — R82.71 BACTERIURIA: Status: ACTIVE | Noted: 2022-12-27

## 2022-12-27 LAB
ALBUMIN SERPL-MCNC: 3.1 G/DL (ref 3.5–5.2)
ALP BLD-CCNC: 91 U/L (ref 35–104)
ALT SERPL-CCNC: 6 U/L (ref 0–32)
ANION GAP SERPL CALCULATED.3IONS-SCNC: 14 MMOL/L (ref 7–16)
AST SERPL-CCNC: 12 U/L (ref 0–31)
BASOPHILS ABSOLUTE: 0.03 E9/L (ref 0–0.2)
BASOPHILS RELATIVE PERCENT: 0.4 % (ref 0–2)
BILIRUB SERPL-MCNC: 0.5 MG/DL (ref 0–1.2)
BUN BLDV-MCNC: 31 MG/DL (ref 6–23)
CALCIUM SERPL-MCNC: 9.2 MG/DL (ref 8.6–10.2)
CHLORIDE BLD-SCNC: 100 MMOL/L (ref 98–107)
CO2: 18 MMOL/L (ref 22–29)
CREAT SERPL-MCNC: 2.9 MG/DL (ref 0.5–1)
EOSINOPHILS ABSOLUTE: 0.17 E9/L (ref 0.05–0.5)
EOSINOPHILS RELATIVE PERCENT: 2.2 % (ref 0–6)
GFR SERPL CREATININE-BSD FRML MDRD: 17 ML/MIN/1.73
GLUCOSE BLD-MCNC: 149 MG/DL (ref 74–99)
HCT VFR BLD CALC: 32.4 % (ref 34–48)
HEMOGLOBIN: 10.2 G/DL (ref 11.5–15.5)
IMMATURE GRANULOCYTES #: 0.06 E9/L
IMMATURE GRANULOCYTES %: 0.8 % (ref 0–5)
LYMPHOCYTES ABSOLUTE: 0.84 E9/L (ref 1.5–4)
LYMPHOCYTES RELATIVE PERCENT: 10.7 % (ref 20–42)
MCH RBC QN AUTO: 26.8 PG (ref 26–35)
MCHC RBC AUTO-ENTMCNC: 31.5 % (ref 32–34.5)
MCV RBC AUTO: 85 FL (ref 80–99.9)
METER GLUCOSE: 136 MG/DL (ref 74–99)
METER GLUCOSE: 138 MG/DL (ref 74–99)
METER GLUCOSE: 144 MG/DL (ref 74–99)
METER GLUCOSE: 151 MG/DL (ref 74–99)
MONOCYTES ABSOLUTE: 0.34 E9/L (ref 0.1–0.95)
MONOCYTES RELATIVE PERCENT: 4.3 % (ref 2–12)
NEUTROPHILS ABSOLUTE: 6.41 E9/L (ref 1.8–7.3)
NEUTROPHILS RELATIVE PERCENT: 81.6 % (ref 43–80)
PDW BLD-RTO: 14.6 FL (ref 11.5–15)
PLATELET # BLD: 339 E9/L (ref 130–450)
PMV BLD AUTO: 10.3 FL (ref 7–12)
POTASSIUM REFLEX MAGNESIUM: 4.2 MMOL/L (ref 3.5–5)
RBC # BLD: 3.81 E12/L (ref 3.5–5.5)
SODIUM BLD-SCNC: 132 MMOL/L (ref 132–146)
TOTAL PROTEIN: 7.3 G/DL (ref 6.4–8.3)
WBC # BLD: 7.9 E9/L (ref 4.5–11.5)

## 2022-12-27 PROCEDURE — 82962 GLUCOSE BLOOD TEST: CPT

## 2022-12-27 PROCEDURE — 85025 COMPLETE CBC W/AUTO DIFF WBC: CPT

## 2022-12-27 PROCEDURE — 36415 COLL VENOUS BLD VENIPUNCTURE: CPT

## 2022-12-27 PROCEDURE — 2580000003 HC RX 258: Performed by: UROLOGY

## 2022-12-27 PROCEDURE — 80053 COMPREHEN METABOLIC PANEL: CPT

## 2022-12-27 PROCEDURE — 6370000000 HC RX 637 (ALT 250 FOR IP)

## 2022-12-27 PROCEDURE — 87040 BLOOD CULTURE FOR BACTERIA: CPT

## 2022-12-27 PROCEDURE — 2060000000 HC ICU INTERMEDIATE R&B

## 2022-12-27 PROCEDURE — 2580000003 HC RX 258: Performed by: ANESTHESIOLOGY

## 2022-12-27 PROCEDURE — 99231 SBSQ HOSP IP/OBS SF/LOW 25: CPT | Performed by: FAMILY MEDICINE

## 2022-12-27 PROCEDURE — 87081 CULTURE SCREEN ONLY: CPT

## 2022-12-27 PROCEDURE — 6370000000 HC RX 637 (ALT 250 FOR IP): Performed by: INTERNAL MEDICINE

## 2022-12-27 RX ORDER — SODIUM BICARBONATE 650 MG/1
1300 TABLET ORAL 2 TIMES DAILY
Status: DISCONTINUED | OUTPATIENT
Start: 2022-12-27 | End: 2022-12-28 | Stop reason: HOSPADM

## 2022-12-27 RX ADMIN — ATORVASTATIN CALCIUM 20 MG: 20 TABLET, FILM COATED ORAL at 07:54

## 2022-12-27 RX ADMIN — SODIUM CHLORIDE, PRESERVATIVE FREE 10 ML: 5 INJECTION INTRAVENOUS at 20:34

## 2022-12-27 RX ADMIN — INSULIN GLARGINE 10 UNITS: 100 INJECTION, SOLUTION SUBCUTANEOUS at 20:34

## 2022-12-27 RX ADMIN — SODIUM CHLORIDE, POTASSIUM CHLORIDE, SODIUM LACTATE AND CALCIUM CHLORIDE: 600; 310; 30; 20 INJECTION, SOLUTION INTRAVENOUS at 15:27

## 2022-12-27 RX ADMIN — HYDRALAZINE HYDROCHLORIDE 25 MG: 25 TABLET, FILM COATED ORAL at 07:54

## 2022-12-27 RX ADMIN — AMLODIPINE BESYLATE 10 MG: 10 TABLET ORAL at 07:54

## 2022-12-27 RX ADMIN — HYDRALAZINE HYDROCHLORIDE 25 MG: 25 TABLET, FILM COATED ORAL at 20:34

## 2022-12-27 RX ADMIN — ATENOLOL 25 MG: 25 TABLET ORAL at 07:54

## 2022-12-27 RX ADMIN — SODIUM BICARBONATE 1300 MG: 650 TABLET ORAL at 20:34

## 2022-12-27 RX ADMIN — PANTOPRAZOLE SODIUM 40 MG: 40 TABLET, DELAYED RELEASE ORAL at 07:54

## 2022-12-27 ASSESSMENT — PAIN SCALES - GENERAL: PAINLEVEL_OUTOF10: 0

## 2022-12-27 NOTE — PROGRESS NOTES
Tenderness: There is no abdominal tenderness. There is no right CVA tenderness or left CVA tenderness. Comments: Ostomy bag in place. No erythema around ostomy site noted   Genitourinary:     Comments: López in place. Urine is yellow minimal reddish tissue vs. clot. No luis m red blood. Musculoskeletal:         General: No swelling or deformity. Cervical back: No rigidity or tenderness. Comments: SCDs are in place   Skin:     General: Skin is warm and dry. Findings: No rash. Neurological:      Mental Status: She is alert and oriented to person, place, and time. Comments: She has a speech delay but responds to questions appropriately.    Psychiatric:         Mood and Affect: Mood normal.         Behavior: Behavior normal.       Labs:  Recent Results (from the past 24 hour(s))   POCT Glucose    Collection Time: 12/27/22 10:41 AM   Result Value Ref Range    Meter Glucose 151 (H) 74 - 99 mg/dL   POCT Glucose    Collection Time: 12/27/22  3:49 PM   Result Value Ref Range    Meter Glucose 136 (H) 74 - 99 mg/dL   POCT Glucose    Collection Time: 12/27/22  8:33 PM   Result Value Ref Range    Meter Glucose 138 (H) 74 - 99 mg/dL   CBC with Auto Differential    Collection Time: 12/28/22  6:26 AM   Result Value Ref Range    WBC 7.8 4.5 - 11.5 E9/L    RBC 3.72 3.50 - 5.50 E12/L    Hemoglobin 10.0 (L) 11.5 - 15.5 g/dL    Hematocrit 31.3 (L) 34.0 - 48.0 %    MCV 84.1 80.0 - 99.9 fL    MCH 26.9 26.0 - 35.0 pg    MCHC 31.9 (L) 32.0 - 34.5 %    RDW 14.7 11.5 - 15.0 fL    Platelets 611 713 - 911 E9/L    MPV 10.1 7.0 - 12.0 fL    Neutrophils % 75.5 43.0 - 80.0 %    Immature Granulocytes % 0.9 0.0 - 5.0 %    Lymphocytes % 14.3 (L) 20.0 - 42.0 %    Monocytes % 6.3 2.0 - 12.0 %    Eosinophils % 2.6 0.0 - 6.0 %    Basophils % 0.4 0.0 - 2.0 %    Neutrophils Absolute 5.93 1.80 - 7.30 E9/L    Immature Granulocytes # 0.07 E9/L    Lymphocytes Absolute 1.12 (L) 1.50 - 4.00 E9/L    Monocytes Absolute 0.49 0.10 - 0.95 E9/L    Eosinophils Absolute 0.20 0.05 - 0.50 E9/L    Basophils Absolute 0.03 0.00 - 0.20 E9/L   POCT Glucose    Collection Time: 12/28/22  6:56 AM   Result Value Ref Range    Meter Glucose 118 (H) 74 - 99 mg/dL       Radiology and other tests reviewed:  FL RETROGRADE PYELOGRAM W WO KUB   Final Result   Intraprocedural fluoroscopic spot images as above. See separate procedure   report for more information. CT ABDOMEN PELVIS WO CONTRAST Additional Contrast? None   Final Result   1. Bilateral moderate hydronephrosis and hydroureter despite the presence of   bilateral double-J ureteral catheters. The proximal aspect of the catheters   are in the renal pelvis and proximal ureter on the right and left   respectively. 2.  No acute inflammatory changes in the abdomen or pelvis. 3.  40% superior endplate wedge compression deformity of L2, age   indeterminate. XR CHEST PORTABLE   Final Result   No acute process. CT HEAD WO CONTRAST   Final Result   1. There is no acute intracranial abnormality. Specifically, there is no   intracranial hemorrhage. 2. Atrophy and periventricular leukomalacia,             Assessment:  Active Hospital Problems    Diagnosis Date Noted    Bacteriuria [R82.71] 12/27/2022     Priority: Medium    Chronic indwelling López catheter [Z97.8] 12/22/2022     Priority: Medium    Status post placement of ureteral stent [Z96.0] 12/22/2022     Priority: Medium    Urinary tract infection associated with indwelling urethral catheter ([address] [T83.511A, N39.0] 12/22/2022     Priority: Medium    Bilateral hydronephrosis [N13.30] 12/22/2022     Priority: Medium    Acute renal failure ([address] [N17.9] 12/21/2022     Priority: Medium    Bradycardia [R00.1] 12/21/2022     Priority: Medium    Hyponatremia [E87.1] 12/21/2022     Priority: Medium    Dizziness [R42] 12/21/2022     Priority: Medium       Plan:    Acute renal failure, improving  Baseline creatinine 2.0.  Creatinine on admission was elevated 6.5 with BUN/Cr ratio >11. FeNa = 2.4, most likely 2/2 obstruction. - Creatinine = pending today today. her creatinine has steadily improved throughout this visit (baseline creatinine is 2.0)  - Continue sodium bicarb, PO, 1300mg BID  - Hold nephrotoxic medications  - Continue LR at 125 cc/h.   - UOP improving, 1650 cc over previous 24 hours  - Consult: Nephrology (12/27/22) - Dr. Sugar Gonzalez: Start p.o. sodium bicarb and at 1300mg BID. Continue IV fluids. Bilateral moderate hydronephrosis and hydroureter  CT abdomen pelvis In the ER was remarkable for bilateral moderate hydronephrosis and hydroureter despite the presence of a bilateral double-J ureteral catheters. No repeat imaging since procedure. - Patient postop day 6 of cystoscopy grade pyelogram and stent exchange  - Continue catheter irrigation  - Follow-up blood cultures  - Follow-up MRSA swab ordered  - Urine cultures on 12/21 and 12/22 grew MRSA and S. Aureus + pseudomonas respectively  - Urology following, appreciate input  - Consult: ID (12/27/22) - Dr. Lauren Martinez: Stent or López may be colonized but are not causing infection. Antibiotics are not warranted at this time. Patient should have a repeat urine culture prior to her next scheduled stent procedure. ID will sign off. Anemia, stable  On admission hemoglobin 11.3. Iron studies indicative of anemia of chronic disease  - Hgb = pending today, although this has been stable over the previous 5 days  - Continue to monitor  - CBC daily     DMT2  A1c = 12.9 on admission (most recent before this was 7.4 on 6/6/22). Home medications include glipizide 5 mg. Beta hydroxybutyrate = 0.82 on admission.    - 10U lantus nightly  - Hold Glipizide 5mg   - HDSSI   - Hypoglycemia protocol  - ACHS finger sticks     Hypertension  - Amlodipine 10 mg qDaily  - Hydralazine, PO, 25 mg BID   - Atenolol, PO, 25mg qDaily    Hyperlipidemia  - Atorvastatin 20 mg daily     GERD  - Protonix 40 mg daily    Pain Control:  Tylenol 650 mg Q6HR PRN for mild pain  DVT ppx: PCDs  GI ppx: Protonix 40 mg daily  Code Status: Full  Diet: Diabetic diet      Disposition: Discharge home pending clinical course.     Martha Patel MD, PhD  Family Medicine Resident PGY-1  12/28/22   7:11 AM

## 2022-12-27 NOTE — PROGRESS NOTES
Nephrology Progress Note  [facility]    Reason for Consult:   JEANNINE  Date of Service: 12/27/2022     Subjective    Patient seen and examined  No complaints  No chest pain, sob, abd pain, nausea         Past Medical History:        Diagnosis Date    Arthritis     osteoporsis    Cancer ([facility] [address]     colon and uterine    Closed fracture of radius 12/27/2016    Elevated lipids 1/15/2014    Headache     History of anal cancer 2/20/2017    Dr. Maciej Phan    Hyperlipidemia     Hypertension     Hypertension     Obesity     Proteinuria 6/25/2014    Type II or unspecified type diabetes mellitus without mention of complication, not stated as uncontrolled     Vaginal cancer ([facility] [address] 5/22/2017    Vitamin D deficiency 11/6/2014       Past Surgical History:        Procedure Laterality Date    BLADDER SURGERY N/A 9/24/2021    CYSTOSCOPY BILATERAL RETROGRADE PYELOGRAM, BILATERAL STENT INSERTION performed by Dalila Brooks MD at [place] [address] Bilateral 12/22/2022    CYSTOSCOPY, RETROGRADE PYELOGRAM, BILATERAL URETERAL STENT EXCHANGE, URENA CATHETER EXCHANGE performed by Osman Dickson MD at [address] [place] [address] Bilateral 7/21/2022    CYSTOSCOPY RETROGRADE PYELOGRAM BILATERIAL STENT EXCHANGE performed by Dalila Brooks MD at [address] [place] [address] Left 11/07/2016       Current Medications:    Current Facility-Administered Medications: sodium chloride flush 0.9 % injection 5-40 mL, 5-40 mL, IntraVENous, 2 times per day  sodium chloride flush 0.9 % injection 5-40 mL, 5-40 mL, IntraVENous, PRN  0.9 % sodium chloride infusion, , IntraVENous, PRN  labetalol (NORMODYNE;TRANDATE) injection 5 mg, 5 mg, IntraVENous, Q15 Min PRN **OR** hydrALAZINE (APRESOLINE) injection 5 mg, 5 mg, IntraVENous, Q15 Min PRN  lactated ringers infusion, , IntraVENous, Continuous  [Held by provider] heparin (porcine) injection 5,000 Units, 5,000 Units, SubCUTAneous, TID  amLODIPine (NORVASC) tablet 10 mg, 10 mg, Oral, QAM  atenolol (TENORMIN) tablet 25 mg, 25 mg, Oral, QAM  atorvastatin (LIPITOR) tablet 20 mg, 20 mg, Oral, QAM  pantoprazole (PROTONIX) tablet 40 mg, 40 mg, Oral, QAM  hydrALAZINE (APRESOLINE) tablet 25 mg, 25 mg, Oral, 2 times per day  sodium chloride flush 0.9 % injection 5-40 mL, 5-40 mL, IntraVENous, PRN  0.9 % sodium chloride infusion, , IntraVENous, PRN  ondansetron (ZOFRAN-ODT) disintegrating tablet 4 mg, 4 mg, Oral, Q8H PRN **OR** ondansetron (ZOFRAN) injection 4 mg, 4 mg, IntraVENous, Q6H PRN  polyethylene glycol (GLYCOLAX) packet 17 g, 17 g, Oral, Daily PRN  insulin glargine (LANTUS) injection vial 10 Units, 10 Units, SubCUTAneous, Nightly  insulin lispro (HUMALOG) injection vial 0-16 Units, 0-16 Units, SubCUTAneous, TID WC  insulin lispro (HUMALOG) injection vial 0-4 Units, 0-4 Units, SubCUTAneous, Nightly  glucose chewable tablet 16 g, 4 tablet, Oral, PRN  dextrose bolus 10% 125 mL, 125 mL, IntraVENous, PRN **OR** dextrose bolus 10% 250 mL, 250 mL, IntraVENous, PRN  glucagon (rDNA) injection 1 mg, 1 mg, SubCUTAneous, PRN  dextrose 10 % infusion, , IntraVENous, Continuous PRN    Allergies:  Betadine [povidone iodine], Lisinopril, Penicillins, and Tylenol [acetaminophen]      Physical exam:   Constitutional:  VITALS:  BP (!) 145/80   Pulse 76   Temp 98.5 °F (36.9 °C) (Temporal)   Resp 18   Ht 5' 7\" (1.702 m)   Wt 188 lb 6.4 oz (85.5 kg)   SpO2 98%   BMI 29.51 kg/m²     Gen: alert, awake, no acute distress  Eyes: anicteric sclerae, eomi  HEENT: atraumatic/normocephalic  Lungs: clear to ascultation bilaterally, equal lung expansion  CV: RRR, no murmurs  Abdomen: soft, nontender, nondistended, normoactive bowel sounds +ostomy   Extremitiy: no edema  : +knutson  Skin: no rash, tugor wnl  Neuro: no focal deficits  Psych: cooperative and appropriate      Data:         Last 3 BMP  Recent Labs     12/25/22  0432 12/26/22  0226 12/27/22  0554    133 132   K 4.4 4.0 4.2    103 100   CO2 18* 20* 18*   BUN 46* 39* 31*   CREATININE 3.6* 3.1* 2.9*   GLUCOSE 160* 147* 149*   CALCIUM 8.9 9.1 9.2         Last 3 CMP:    Recent Labs     12/25/22  0432 12/26/22 0226 12/27/22  0554    133 132   K 4.4 4.0 4.2    103 100   CO2 18* 20* 18*   BUN 46* 39* 31*   CREATININE 3.6* 3.1* 2.9*   GLUCOSE 160* 147* 149*   CALCIUM 8.9 9.1 9.2   PROT 7.3 7.5 7.3   LABALBU 3.2* 3.3* 3.1*   BILITOT 0.5 0.4 0.5   ALKPHOS 119* 96 91   AST 11 10 12   ALT 7 7 6         Last 3 Glucose:     Recent Labs     12/25/22 0432 12/26/22 0226 12/27/22  0554   GLUCOSE 160* 147* 149*         Last 3 POC Glucose:     No results for input(s): POCGLU in the last 72 hours. Last 3 CK, CKMB, Troponin  No results for input(s): CKTOTAL, CKMB, TROPONINI in the last 72 hours. Last 3 CBC:  Recent Labs     12/25/22 0432 12/26/22 0226 12/27/22  0554   WBC 9.3 7.9 7.9   RBC 3.79 3.76 3.81   HGB 10.2* 10.3* 10.2*   HCT 32.4* 32.0* 32.4*   MCV 85.5 85.1 85.0   MCH 26.9 27.4 26.8   MCHC 31.5* 32.2 31.5*   RDW 14.4 14.5 14.6    289 339   MPV 10.6 10.1 10.3       Last 3 Hepatic Function Panel:    Recent Labs     12/25/22 0432 12/26/22 0226 12/27/22  0554   ALKPHOS 119* 96 91   ALT 7 7 6   AST 11 10 12   PROT 7.3 7.5 7.3   BILITOT 0.5 0.4 0.5   LABALBU 3.2* 3.3* 3.1*       Albumin:  Recent Labs     12/27/22  0554   LABALBU 3.1*       Calcium:  Recent Labs     12/27/22  0554   CALCIUM 9.2       Ionized Calcium:  No results for input(s): IONCA in the last 72 hours. Magnesium:    No results for input(s): MG in the last 72 hours. ABGs:  No results for input(s): PHART, PO2ART, MCN5GTB, ATT1GND, BEART, J4CLJMAO in the last 72 hours. Lactic Acid:  No results for input(s): LACTA in the last 72 hours. Last 3 Amylase:  No results for input(s): AMYLASE in the last 72 hours. Last 3 Lipase:  No results for input(s): LIPASE in the last 72 hours.     Last 3 BNP:  No results for input(s): BNP in the last 72 hours. Assessment/Plan      1. JEANNINE  With hydronephrosis  Stents placed August 2022  Urology following  Possible changes in perfusion with ostomy output    Renal function improving  Tolerating IVF - continue for now      2. Hypertension  Blood pressure under acceptable control      3.  Metabolic acidosis  With renal failure and GI losses   Give bicarbonate            Thank you for the opportunity to participate in the care of Ms Joy Reveal     ______________________________      Audrey Turner MD  12/27/2022  4:07 PM

## 2022-12-27 NOTE — PROGRESS NOTES
[address]  Progress Note    Chief complaint :  Chief Complaint   Patient presents with    Aphasia     Slurring words since waking up this morning. Went to bed normal last night       Extremity Weakness     Right side feels different than the left        Subjective:  No acute overnight events. Patient has no concerns or complaints this morning. She has been tolerating p.o. intake. Reports what she believes to be good output from her ostomy and López catheter. She has been out of bed and sitting in her chair. She has not felt dizzy when she stands up. She denies fevers, chills, chest pain, shortness of breath, and abdominal pain. Past medical, surgical, family and social history were reviewed, non-contributory, and unchanged unless otherwise stated. Review of systems: Negative except for stated above. Objective:  /77   Pulse 85   Temp 98 °F (36.7 °C) (Oral)   Resp 22   Ht 5' 7\" (1.702 m)   Wt 188 lb 6.4 oz (85.5 kg)   SpO2 95%   BMI 29.51 kg/m²       Intake/Output Summary (Last 24 hours) at 12/27/2022 0715  Last data filed at 12/27/2022 0510  Gross per 24 hour   Intake --   Output 3100 ml   Net -3100 ml         Physical Exam  Vitals reviewed. Constitutional:       General: She is not in acute distress. Appearance: Normal appearance. She is not ill-appearing. HENT:      Head: Normocephalic and atraumatic. Nose: Nose normal. No congestion or rhinorrhea. Eyes:      General:         Right eye: No discharge. Left eye: No discharge. Conjunctiva/sclera: Conjunctivae normal.      Comments: Left oval pupil   Cardiovascular:      Rate and Rhythm: Normal rate and regular rhythm. Heart sounds: Normal heart sounds. No murmur heard. Pulmonary:      Effort: Pulmonary effort is normal.      Breath sounds: Normal breath sounds. No wheezing. Abdominal:      General: Abdomen is flat. There is no distension.       Palpations: Abdomen is soft. Tenderness: There is no abdominal tenderness. There is no right CVA tenderness or left CVA tenderness. Comments: Ostomy bag in place. No erythema around ostomy site noted   Genitourinary:     Comments: López in place. Urine is clear/yellow. There are no signs of bleeding or blood clots at bedside this morning. Musculoskeletal:         General: No swelling or deformity. Cervical back: No rigidity or tenderness. Skin:     General: Skin is warm and dry. Findings: No rash. Neurological:      Mental Status: She is alert and oriented to person, place, and time. Comments: She has a speech delay but responds to questions appropriately.    Psychiatric:         Mood and Affect: Mood normal.         Behavior: Behavior normal.       Labs:  Recent Results (from the past 24 hour(s))   POCT Glucose    Collection Time: 12/26/22 11:20 AM   Result Value Ref Range    Meter Glucose 151 (H) 74 - 99 mg/dL   POCT Glucose    Collection Time: 12/26/22  4:55 PM   Result Value Ref Range    Meter Glucose 154 (H) 74 - 99 mg/dL   POCT Glucose    Collection Time: 12/26/22  9:28 PM   Result Value Ref Range    Meter Glucose 127 (H) 74 - 99 mg/dL   POCT Glucose    Collection Time: 12/27/22  5:47 AM   Result Value Ref Range    Meter Glucose 144 (H) 74 - 99 mg/dL   Comprehensive Metabolic Panel w/ Reflex to MG    Collection Time: 12/27/22  5:54 AM   Result Value Ref Range    Sodium 132 132 - 146 mmol/L    Potassium reflex Magnesium 4.2 3.5 - 5.0 mmol/L    Chloride 100 98 - 107 mmol/L    CO2 18 (L) 22 - 29 mmol/L    Anion Gap 14 7 - 16 mmol/L    Glucose 149 (H) 74 - 99 mg/dL    BUN 31 (H) 6 - 23 mg/dL    Creatinine 2.9 (H) 0.5 - 1.0 mg/dL    Est, Glom Filt Rate 17 >=60 mL/min/1.73    Calcium 9.2 8.6 - 10.2 mg/dL    Total Protein 7.3 6.4 - 8.3 g/dL    Albumin 3.1 (L) 3.5 - 5.2 g/dL    Total Bilirubin 0.5 0.0 - 1.2 mg/dL    Alkaline Phosphatase 91 35 - 104 U/L    ALT 6 0 - 32 U/L    AST 12 0 - 31 U/L   CBC with Auto Differential    Collection Time: 12/27/22  5:54 AM   Result Value Ref Range    WBC 7.9 4.5 - 11.5 E9/L    RBC 3.81 3.50 - 5.50 E12/L    Hemoglobin 10.2 (L) 11.5 - 15.5 g/dL    Hematocrit 32.4 (L) 34.0 - 48.0 %    MCV 85.0 80.0 - 99.9 fL    MCH 26.8 26.0 - 35.0 pg    MCHC 31.5 (L) 32.0 - 34.5 %    RDW 14.6 11.5 - 15.0 fL    Platelets 243 011 - 473 E9/L    MPV 10.3 7.0 - 12.0 fL    Neutrophils % 81.6 (H) 43.0 - 80.0 %    Immature Granulocytes % 0.8 0.0 - 5.0 %    Lymphocytes % 10.7 (L) 20.0 - 42.0 %    Monocytes % 4.3 2.0 - 12.0 %    Eosinophils % 2.2 0.0 - 6.0 %    Basophils % 0.4 0.0 - 2.0 %    Neutrophils Absolute 6.41 1.80 - 7.30 E9/L    Immature Granulocytes # 0.06 E9/L    Lymphocytes Absolute 0.84 (L) 1.50 - 4.00 E9/L    Monocytes Absolute 0.34 0.10 - 0.95 E9/L    Eosinophils Absolute 0.17 0.05 - 0.50 E9/L    Basophils Absolute 0.03 0.00 - 0.20 E9/L       Radiology and other tests reviewed:  FL RETROGRADE PYELOGRAM W WO KUB   Final Result   Intraprocedural fluoroscopic spot images as above. See separate procedure   report for more information. CT ABDOMEN PELVIS WO CONTRAST Additional Contrast? None   Final Result   1. Bilateral moderate hydronephrosis and hydroureter despite the presence of   bilateral double-J ureteral catheters. The proximal aspect of the catheters   are in the renal pelvis and proximal ureter on the right and left   respectively. 2.  No acute inflammatory changes in the abdomen or pelvis. 3.  40% superior endplate wedge compression deformity of L2, age   indeterminate. XR CHEST PORTABLE   Final Result   No acute process. CT HEAD WO CONTRAST   Final Result   1. There is no acute intracranial abnormality. Specifically, there is no   intracranial hemorrhage.    2. Atrophy and periventricular leukomalacia,             Assessment:  Active Hospital Problems    Diagnosis Date Noted    Chronic indwelling López catheter [Z97.8] 12/22/2022     Priority: Medium Status post placement of ureteral stent [Z96.0] 12/22/2022     Priority: Medium    Urinary tract infection associated with indwelling urethral catheter ([facility] [T83.511A, N39.0] 12/22/2022     Priority: Medium    Bilateral hydronephrosis [N13.30] 12/22/2022     Priority: Medium    Acute renal failure ([address] [N17.9] 12/21/2022     Priority: Medium    Bradycardia [R00.1] 12/21/2022     Priority: Medium    Hyponatremia [E87.1] 12/21/2022     Priority: Medium    Dizziness [R42] 12/21/2022     Priority: Medium       Plan:    Acute renal failure, improving  Baseline creatinine 2.0. Creatinine on admission was elevated 6.5 with BUN/Cr ratio >11. FeNa = 2.4, most likely 2/2 obstruction. - Creatinine = 2.9 today, continues to steadily improve (baseline creatinine is 2.0)  - Hold nephrotoxic medications  - Continue LR at 125 cc/h. Consider changing to normal saline as patient's sodium and chloride are both on the lower end of normal.  - UOP improving, 3100cc over previous 24 hours  - Consult: Nephrology (12 /26/3 2) -Dr. Friend Oar: Most likely obstructive uropathy. UTI may be colonization from catheter. Continue with current care. Bilateral moderate hydronephrosis and hydroureter  CT abdomen pelvis In the ER was remarkable for bilateral moderate hydronephrosis and hydroureter despite the presence of a bilateral double-J ureteral catheters. No repeat imaging since procedure. - Patient postop day 5 of cystoscopy grade pyelogram and stent exchange  - Continue catheter irrigation  - Urine cultures on consecutive days grew the following:  ------- 12/21:  MRSA (taken during cystoscopy)  ------- 12/22:  S. Aureus and Pseudomonas (clean catch)  - MRSA swab ordered  - Urology following, appreciate input; will need to know if patient's MRSA in urine is colonization or infection so that it can be treated appropriately  - ID consulted, appreciate input    Anemia, stable  On admission hemoglobin 11.3.   Iron studies indicative of anemia of chronic disease  - Hgb = 10.3 today, very stable over the previous 5 days  - Continue to monitor  - CBC daily     DMT2  A1c = 12.9 on admission (most recent before this was 7.4 on 6/6/22). Home medications include glipizide 5 mg. Beta hydroxybutyrate = 0.82 on admission. - 10U lantus nightly  - Hold Glipizide 5mg   - HDSSI   - Hypoglycemia protocol  - ACHS finger sticks     Hypertension  - Amlodipine 10 mg qDaily  - Hydralazine, PO, 25 mg BID   - Atenolol, PO, 25mg qDaily    Hyperlipidemia  - Atorvastatin 20 mg daily     GERD  - Protonix 40 mg daily    Pain Control:  Tylenol 650 mg Q6HR PRN for mild pain  DVT ppx: PCDs  GI ppx: Protonix 40 mg daily  Code Status: Full  Diet: Diabetic diet      Disposition: Discharge home pending clinical course.     Keyanna Bee MD, PhD  Family Medicine Resident PGY-1  12/27/22   7:15 AM

## 2022-12-27 NOTE — CONSULTS
[address] [place] [address] [facility]  Consultation Note     Admit Date: 12/21/2022 11:53 AM    Reason for Consult:   Pseudomonas and MRSA in urine. Possibly colonization? Question need for treatment    Attending Physician:  Aviva Lea MD    HISTORY OF PRESENT ILLNESS:             The history is obtained from extensive review of available past medical records. The patient is a 67 y.o. female who is note known to the ID service. The patient presented to the ED at [facility] on 12/21/2022 with strokelike symptoms. She has a history of bilateral obstructive uropathy and had stents inserted in July 2022. She also has a chronic indwelling Urena catheter that is changed at home once a month. She was seen by urology and underwent cystoscopy with bilateral ureteral stent exchange on 12/22/2022. She was treated with Ciprofloxacin. She was seen by nephrology for acute kidney injury. The patient says that her strokelike symptoms have improved but are now back to baseline. She has not had any fever.     Past Medical History:        Diagnosis Date    Arthritis     osteoporsis    Cancer ([address]     colon and uterine    Closed fracture of radius 12/27/2016    Elevated lipids 1/15/2014    Headache     History of anal cancer 2/20/2017    Dr. Kevin Ramos    Hyperlipidemia     Hypertension     Hypertension     Obesity     Proteinuria 6/25/2014    Type II or unspecified type diabetes mellitus without mention of complication, not stated as uncontrolled     Vaginal cancer ([address] 5/22/2017    Vitamin D deficiency 11/6/2014     Past Surgical History:        Procedure Laterality Date    BLADDER SURGERY N/A 9/24/2021    CYSTOSCOPY BILATERAL RETROGRADE PYELOGRAM, BILATERAL STENT INSERTION performed by Lexy Smith MD at [address] [facility] Bilateral 12/22/2022    CYSTOSCOPY, RETROGRADE PYELOGRAM, BILATERAL URETERAL STENT EXCHANGE, URENA CATHETER EXCHANGE performed by Burnie Lanes, MD at [address] [facility] BREAST BIOPSY      COLOSTOMY      CYSTOSCOPY Bilateral 7/21/2022    CYSTOSCOPY RETROGRADE PYELOGRAM BILATERIAL STENT EXCHANGE performed by Adarsh Sal MD at [address] Left 11/07/2016     Current Medications:   Scheduled Meds:   sodium chloride flush  5-40 mL IntraVENous 2 times per day    [Held by provider] heparin (porcine)  5,000 Units SubCUTAneous TID    amLODIPine  10 mg Oral QAM    atenolol  25 mg Oral QAM    atorvastatin  20 mg Oral QAM    pantoprazole  40 mg Oral QAM    hydrALAZINE  25 mg Oral 2 times per day    insulin glargine  10 Units SubCUTAneous Nightly    insulin lispro  0-16 Units SubCUTAneous TID WC    insulin lispro  0-4 Units SubCUTAneous Nightly     Continuous Infusions:   sodium chloride      lactated ringers 125 mL/hr at 12/26/22 0544    sodium chloride      dextrose       PRN Meds:sodium chloride flush, sodium chloride, labetalol **OR** hydrALAZINE, sodium chloride flush, sodium chloride, ondansetron **OR** ondansetron, polyethylene glycol, glucose, dextrose bolus **OR** dextrose bolus, glucagon (rDNA), dextrose    Allergies:  Betadine [povidone iodine], Lisinopril, Penicillins, and Tylenol [acetaminophen]    Social History:   Social History     Socioeconomic History    Marital status: Single   Tobacco Use    Smoking status: Never    Smokeless tobacco: Never   Substance and Sexual Activity    Alcohol use: No    Drug use: No     Social Determinants of Health     Financial Resource Strain: Low Risk     Difficulty of Paying Living Expenses: Not hard at all   Food Insecurity: No Food Insecurity    Worried About Running Out of Food in the Last Year: Never true    Ran Out of Food in the Last Year: Never true   Physical Activity: Insufficiently Active    Days of Exercise per Week: 7 days    Minutes of Exercise per Session: 10 min      Pets: None  Travel: No  The patient is on Social Security.     Family History:       Problem Relation Age of Onset Kidney Disease Mother     High Blood Pressure Mother     Cancer Father     Diabetes Sister     Other Brother    . Otherwise non-pertinent to the chief complaint. REVIEW OF SYSTEMS:    Constitutional: Negative for fevers, chills, diaphoresis  Neurologic: Negative   Psychiatric: Negative  Rheumatologic: Negative   Endocrine: Negative  Hematologic: Negative  Immunologic: Negative  ENT: Negative  Respiratory: Negative   Cardiovascular: Negative  GI: Negative  : As in the HPI  Musculoskeletal: Negative  Skin: No rashes. PHYSICAL EXAM:    Vitals:   BP (!) 143/96   Pulse 87   Temp 98.2 °F (36.8 °C) (Oral)   Resp 18   Ht 5' 7\" (1.702 m)   Wt 188 lb 6.4 oz (85.5 kg)   SpO2 98%   BMI 29.51 kg/m²   Constitutional: The patient is awake, alert, and oriented. She is in no distress. Speech is somewhat slow. Skin: Warm and dry. No rashes were noted. HEENT: Eyes show round, and reactive pupils. No jaundice. Moist mucous membranes, no ulcerations, no thrush. Neck: Supple to movements. No lymphadenopathy. Chest: No use of accessory muscles to breathe. Symmetrical expansion. Auscultation reveals no wheezing, crackles, or rhonchi. Cardiovascular: S1 and S2 are rhythmic and regular. No murmurs appreciated. Abdomen: Positive bowel sounds to auscultation. Benign to palpation. No masses felt. No hepatosplenomegaly. Extremities: No clubbing, no cyanosis, no edema.   Lines: Peripheral.      CBC+dif:  Recent Labs     12/25/22  0432 12/26/22  0226 12/27/22  0554   WBC 9.3 7.9 7.9   HGB 10.2* 10.3* 10.2*   HCT 32.4* 32.0* 32.4*   MCV 85.5 85.1 85.0    289 339   NEUTROABS 7.67* 6.55 6.41     No results found for: CRP   No results found for: CRPHS  No results found for: SEDRATE  Lab Results   Component Value Date    ALT 6 12/27/2022    AST 12 12/27/2022    ALKPHOS 91 12/27/2022    BILITOT 0.5 12/27/2022     Lab Results   Component Value Date/Time     12/27/2022 05:54 AM    K 4.2 12/27/2022 05:54 AM    CL 100 12/27/2022 05:54 AM    CO2 18 12/27/2022 05:54 AM    BUN 31 12/27/2022 05:54 AM    CREATININE 2.9 12/27/2022 05:54 AM    GFRAA 31 07/19/2022 11:31 AM    LABGLOM 17 12/27/2022 05:54 AM    GLUCOSE 149 12/27/2022 05:54 AM    GLUCOSE 123 03/05/2012 11:17 AM    PROT 7.3 12/27/2022 05:54 AM    LABALBU 3.1 12/27/2022 05:54 AM    LABALBU 4.7 03/05/2012 11:17 AM    CALCIUM 9.2 12/27/2022 05:54 AM    BILITOT 0.5 12/27/2022 05:54 AM    ALKPHOS 91 12/27/2022 05:54 AM    AST 12 12/27/2022 05:54 AM    ALT 6 12/27/2022 05:54 AM       No results found for: PROTIME, INR    Lab Results   Component Value Date/Time    TSH 2.000 01/31/2022 10:00 AM       Lab Results   Component Value Date/Time    NITRITE pos 10/07/2013 11:12 AM    COLORU Yellow 12/21/2022 12:12 PM    PHUR 6.5 12/21/2022 12:12 PM    WBCUA PACKED 12/21/2022 12:12 PM    RBCUA 10-20 12/21/2022 12:12 PM    BACTERIA MANY 12/21/2022 12:12 PM    CLARITYU TURBID 12/21/2022 12:12 PM    SPECGRAV 1.010 12/21/2022 12:12 PM    LEUKOCYTESUR LARGE 12/21/2022 12:12 PM    UROBILINOGEN 0.2 12/21/2022 12:12 PM    BILIRUBINUR Negative 12/21/2022 12:12 PM    BILIRUBINUR neg 10/07/2013 11:12 AM    BLOODU MODERATE 12/21/2022 12:12 PM    GLUCOSEU Negative 12/21/2022 12:12 PM       No results found for: HCO3, BE, O2SAT, PH, THGB, PCO2, PO2, TCO2  Radiology:  Noted    Microbiology:  Pending  No results for input(s): BC in the last 72 hours. No results for input(s): ORG in the last 72 hours. No results for input(s): James Stade in the last 72 hours. No results for input(s): STREPNEUMAGU in the last 72 hours. No results for input(s): LP1UAG in the last 72 hours. No results for input(s): ASO in the last 72 hours. No results for input(s): CULTRESP in the last 72 hours. No results for input(s): PROCAL in the last 72 hours. 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   She is currently asymptomatic. The stents are in the López catheter most likely colonized but not causing an infection    Plan:    The patient does not warrant any further antibiotic therapy  Please repeat urine culture before her next scheduled stent  No further recommendations  ID will sign off    Thank you for having us see this patient in consultation. I will be discussing this case with the treating physicians.     Yandel Velez MD  9:15 AM  12/27/2022

## 2022-12-27 NOTE — PROGRESS NOTES
Spoke with patient's Sister David. She had questions about the urine culture. We discussed that we have infectious disease specialist on board who have reviewed her current situation and do not feel antibiotics are necessary at this time which we agree with. We discussed that some patients can be colonized with MRSA that is not necessarily related to an infection and that currently her laboratory work and vitals are not indicative of an infection. We discussed that blood cultures were done out of caution to ensure that there is no bacteremia that may have seeded the  system. All questions answered. We will follow-up with her tomorrow after rounds to discuss any further updates.     Tory Lindsay MD, PhD  Family Medicine - PGY1

## 2022-12-27 NOTE — PROGRESS NOTES
Attempted to call Pts sister, Damian Lundborg @ [phone]. Phone rang for several minutes, did not go to voicemail. Will try her again later this afternoon.     Jovanna Ponce MD, PhD  Family Medicine - PGY1

## 2022-12-27 NOTE — CARE COORDINATION
Social Work/Discharge Planning:  Called patient sister Jay Gallardo (ph: [phone]) and reviewed therapy score indicating need for rehab. Jay Gallardo states plan is home and she prefers [facility]. Sapphire Alegria with [facility] and left message with referral.  Patient will need a home health care order. Will continue to follow. Electronically signed by CORNELIUS Srinivasan on 12/27/2022 at 3:13 PM    Addendum:  Barbie Wilkinson with [facility] states they can add therapy for patient at home and will need a home health care order.   Electronically signed by CORNELIUS Srinivasan on 12/27/2022 at 3:23 PM

## 2022-12-28 VITALS
BODY MASS INDEX: 30.4 KG/M2 | WEIGHT: 193.7 LBS | OXYGEN SATURATION: 97 % | HEART RATE: 83 BPM | SYSTOLIC BLOOD PRESSURE: 132 MMHG | RESPIRATION RATE: 18 BRPM | TEMPERATURE: 97.6 F | DIASTOLIC BLOOD PRESSURE: 89 MMHG | HEIGHT: 67 IN

## 2022-12-28 PROBLEM — E87.1 HYPONATREMIA: Status: RESOLVED | Noted: 2022-12-21 | Resolved: 2022-12-28

## 2022-12-28 PROBLEM — R00.1 BRADYCARDIA: Status: RESOLVED | Noted: 2022-12-21 | Resolved: 2022-12-28

## 2022-12-28 PROBLEM — R42 DIZZINESS: Status: RESOLVED | Noted: 2022-12-21 | Resolved: 2022-12-28

## 2022-12-28 PROBLEM — Z96.0 STATUS POST PLACEMENT OF URETERAL STENT: Status: RESOLVED | Noted: 2022-12-22 | Resolved: 2022-12-28

## 2022-12-28 PROBLEM — T83.511A URINARY TRACT INFECTION ASSOCIATED WITH INDWELLING URETHRAL CATHETER (HCC): Status: RESOLVED | Noted: 2022-12-22 | Resolved: 2022-12-28

## 2022-12-28 PROBLEM — N39.0 URINARY TRACT INFECTION ASSOCIATED WITH INDWELLING URETHRAL CATHETER (HCC): Status: RESOLVED | Noted: 2022-12-22 | Resolved: 2022-12-28

## 2022-12-28 LAB
ALBUMIN SERPL-MCNC: 3 G/DL (ref 3.5–5.2)
ALP BLD-CCNC: 84 U/L (ref 35–104)
ALT SERPL-CCNC: 7 U/L (ref 0–32)
ANION GAP SERPL CALCULATED.3IONS-SCNC: 12 MMOL/L (ref 7–16)
AST SERPL-CCNC: 12 U/L (ref 0–31)
BASOPHILS ABSOLUTE: 0.03 E9/L (ref 0–0.2)
BASOPHILS RELATIVE PERCENT: 0.4 % (ref 0–2)
BILIRUB SERPL-MCNC: 0.5 MG/DL (ref 0–1.2)
BUN BLDV-MCNC: 31 MG/DL (ref 6–23)
CALCIUM SERPL-MCNC: 9.1 MG/DL (ref 8.6–10.2)
CHLORIDE BLD-SCNC: 101 MMOL/L (ref 98–107)
CO2: 21 MMOL/L (ref 22–29)
CREAT SERPL-MCNC: 2.8 MG/DL (ref 0.5–1)
EOSINOPHILS ABSOLUTE: 0.2 E9/L (ref 0.05–0.5)
EOSINOPHILS RELATIVE PERCENT: 2.6 % (ref 0–6)
GFR SERPL CREATININE-BSD FRML MDRD: 17 ML/MIN/1.73
GLUCOSE BLD-MCNC: 109 MG/DL (ref 74–99)
HCT VFR BLD CALC: 31.3 % (ref 34–48)
HEMOGLOBIN: 10 G/DL (ref 11.5–15.5)
IMMATURE GRANULOCYTES #: 0.07 E9/L
IMMATURE GRANULOCYTES %: 0.9 % (ref 0–5)
LYMPHOCYTES ABSOLUTE: 1.12 E9/L (ref 1.5–4)
LYMPHOCYTES RELATIVE PERCENT: 14.3 % (ref 20–42)
MAGNESIUM: 1.7 MG/DL (ref 1.6–2.6)
MCH RBC QN AUTO: 26.9 PG (ref 26–35)
MCHC RBC AUTO-ENTMCNC: 31.9 % (ref 32–34.5)
MCV RBC AUTO: 84.1 FL (ref 80–99.9)
METER GLUCOSE: 118 MG/DL (ref 74–99)
METER GLUCOSE: 119 MG/DL (ref 74–99)
METER GLUCOSE: 161 MG/DL (ref 74–99)
METER GLUCOSE: 169 MG/DL (ref 74–99)
MONOCYTES ABSOLUTE: 0.49 E9/L (ref 0.1–0.95)
MONOCYTES RELATIVE PERCENT: 6.3 % (ref 2–12)
MRSA CULTURE ONLY: NORMAL
NEUTROPHILS ABSOLUTE: 5.93 E9/L (ref 1.8–7.3)
NEUTROPHILS RELATIVE PERCENT: 75.5 % (ref 43–80)
PDW BLD-RTO: 14.7 FL (ref 11.5–15)
PHOSPHORUS: 3.4 MG/DL (ref 2.5–4.5)
PLATELET # BLD: 304 E9/L (ref 130–450)
PMV BLD AUTO: 10.1 FL (ref 7–12)
POTASSIUM REFLEX MAGNESIUM: 3.9 MMOL/L (ref 3.5–5)
POTASSIUM SERPL-SCNC: 3.9 MMOL/L (ref 3.5–5)
RBC # BLD: 3.72 E12/L (ref 3.5–5.5)
SARS-COV-2, NAAT: NOT DETECTED
SODIUM BLD-SCNC: 134 MMOL/L (ref 132–146)
TOTAL PROTEIN: 7.1 G/DL (ref 6.4–8.3)
WBC # BLD: 7.8 E9/L (ref 4.5–11.5)

## 2022-12-28 PROCEDURE — 82962 GLUCOSE BLOOD TEST: CPT

## 2022-12-28 PROCEDURE — 6370000000 HC RX 637 (ALT 250 FOR IP): Performed by: INTERNAL MEDICINE

## 2022-12-28 PROCEDURE — 97530 THERAPEUTIC ACTIVITIES: CPT

## 2022-12-28 PROCEDURE — 6370000000 HC RX 637 (ALT 250 FOR IP)

## 2022-12-28 PROCEDURE — 80053 COMPREHEN METABOLIC PANEL: CPT

## 2022-12-28 PROCEDURE — 84100 ASSAY OF PHOSPHORUS: CPT

## 2022-12-28 PROCEDURE — 83735 ASSAY OF MAGNESIUM: CPT

## 2022-12-28 PROCEDURE — 87635 SARS-COV-2 COVID-19 AMP PRB: CPT

## 2022-12-28 PROCEDURE — 97110 THERAPEUTIC EXERCISES: CPT

## 2022-12-28 PROCEDURE — 85025 COMPLETE CBC W/AUTO DIFF WBC: CPT

## 2022-12-28 PROCEDURE — 36415 COLL VENOUS BLD VENIPUNCTURE: CPT

## 2022-12-28 PROCEDURE — 99231 SBSQ HOSP IP/OBS SF/LOW 25: CPT | Performed by: FAMILY MEDICINE

## 2022-12-28 PROCEDURE — 97161 PT EVAL LOW COMPLEX 20 MIN: CPT

## 2022-12-28 PROCEDURE — 80048 BASIC METABOLIC PNL TOTAL CA: CPT

## 2022-12-28 PROCEDURE — 6360000002 HC RX W HCPCS: Performed by: STUDENT IN AN ORGANIZED HEALTH CARE EDUCATION/TRAINING PROGRAM

## 2022-12-28 RX ORDER — ATENOLOL 50 MG/1
25 TABLET ORAL EVERY MORNING
Qty: 30 TABLET | Refills: 3 | DISCHARGE
Start: 2022-12-28

## 2022-12-28 RX ADMIN — AMLODIPINE BESYLATE 10 MG: 10 TABLET ORAL at 11:12

## 2022-12-28 RX ADMIN — HEPARIN SODIUM 5000 UNITS: 10000 INJECTION INTRAVENOUS; SUBCUTANEOUS at 16:02

## 2022-12-28 RX ADMIN — HYDRALAZINE HYDROCHLORIDE 25 MG: 25 TABLET, FILM COATED ORAL at 11:12

## 2022-12-28 RX ADMIN — PANTOPRAZOLE SODIUM 40 MG: 40 TABLET, DELAYED RELEASE ORAL at 11:12

## 2022-12-28 RX ADMIN — ATENOLOL 25 MG: 25 TABLET ORAL at 11:12

## 2022-12-28 RX ADMIN — SODIUM BICARBONATE 1300 MG: 650 TABLET ORAL at 11:13

## 2022-12-28 RX ADMIN — ATORVASTATIN CALCIUM 20 MG: 20 TABLET, FILM COATED ORAL at 11:13

## 2022-12-28 ASSESSMENT — PAIN SCALES - GENERAL
PAINLEVEL_OUTOF10: 0
PAINLEVEL_OUTOF10: 0

## 2022-12-28 NOTE — DISCHARGE SUMMARY
[address]  Discharge Summary      Patient ID:  Ang Little  [id number]  52 y.o.  1950    Admit date: 12/21/2022    Discharge date and time: 12/28/2022    Location of discharge: [facility]     Admitting Physician: Ysabel David MD     Discharge Physician: Segundo Barrientos MD    Consults: Nephrology, Urology, and ID    Admission Diagnoses: Dehydration [E86.0]  Hyponatremia [E87.1]  Bilateral hydronephrosis [N13.30]  Chronic indwelling López catheter [Z97.8]  Acute renal failure, unspecified acute renal failure type ([address] [N17.9]  Urinary tract infection associated with indwelling urethral catheter, sequela [T83.511S, N39.0]  Acute renal failure superimposed on chronic kidney disease, on chronic dialysis, unspecified acute renal failure type ([address] [N17.9, N18.9, Z99.2]  Status post placement of ureteral stent [Z96.0]    Discharge Diagnoses: Principal Problem:    Acute renal failure ([address]  Active Problems:    Chronic indwelling López catheter    Bilateral hydronephrosis    Bacteriuria  Resolved Problems:    Bradycardia    Hyponatremia    Dizziness    Status post placement of ureteral stent    Urinary tract infection associated with indwelling urethral catheter [facility])        Hospital Course:      Pre-hospital course:  78-year-old female past medical history of colon cancer with ostomy and uterine involvement and bilateral J stents placed in 2021, monthly López catheter, hypertension, hyperlipidemia, and speech delay presented to the ER with concern of slurred speech, fatigue, and lightheadedness. There was concern that her speech delay was worsened from her baseline level. ER course: In the ER, she was found to be hyponatremic 123, hypochloremic 86 with an elevated anion gap of 18. Her creatinine was also elevated at 6.5 (based = 2.0). Her anion gap did close with IV hydration. CT head without contrast showed no acute abnormalities.   EKG was remarkable for sinus bradycardia. Family medicine was consulted to admit the patient for JEANNINE and hyponatremia. Remaining hospital course: Both nephrology and urology were consulted to help during this admission. For her hydronephrosis, she was taken to the OR where a cystoscopy, retrograde pyelogram, and bilateral ureteral stents were exchanged. Please see operative note for more details. Her urine sample taken via clean-catch in the emergency room grew Staph aureus and Pseudomonas potida. However, during her procedure a urine sample was also collected which grew MRSA. For help managing this, ID was consulted. They determined that this was likely a result of colonization or contaminant from the procedure as she was not showing signs of infection so antibiotics were not initiated. Nephrology followed for her JEANNINE. She was continued on IV lactated Ringer's throughout her hospital stay. After replacement of her stents, her urine output improved substantially. However, she was noted to have gross hematuria so her anticoagulation was held and she was placed on PCD's for anticoagulation and her hematuria resolved. Her creatinine was 2.8 on the day of discharge. For her diabetes, her home medications were held on admission. She was started on 10 units Lantus in the evening and a sliding scale. However, her sugars were very well controlled while inpatient. She can continue her medications on an outpatient basis. She was discharged was discharged to SNF in stable condition on HD#8 (12/28/22). Day of Discharge Physical Exam:  Vitals reviewed. Constitutional:       General: She is not in acute distress. Appearance: Normal appearance. She is not ill-appearing. HENT:      Head: Normocephalic and atraumatic. Nose: Nose normal. No congestion or rhinorrhea. Eyes:      General:         Right eye: No discharge. Left eye: No discharge.       Conjunctiva/sclera: Conjunctivae normal.      Comments: Left oval pupil   Cardiovascular:      Rate and Rhythm: Normal rate and regular rhythm. Heart sounds: Normal heart sounds. No murmur heard. Pulmonary:      Effort: Pulmonary effort is normal.      Breath sounds: Normal breath sounds. No wheezing. Abdominal:      General: Abdomen is flat. There is no distension. Palpations: Abdomen is soft. Tenderness: There is no abdominal tenderness. There is no right CVA tenderness or left CVA tenderness. Comments: Ostomy bag in place. No erythema around ostomy site noted   Genitourinary:     Comments: López in place. Urine is yellow minimal reddish tissue vs. clot. No luis m red blood. Musculoskeletal:         General: No swelling or deformity. Cervical back: No rigidity or tenderness. Comments: SCDs are in place   Skin:     General: Skin is warm and dry. Findings: No rash. Neurological:      Mental Status: She is alert and oriented to person, place, and time. Comments: She has a speech delay but responds to questions appropriately. Psychiatric:         Mood and Affect: Mood normal.         Behavior: Behavior normal.         Post Discharge Follow Up Issues:     - Bacteria: Pt with asymptomatic bacteriuria on admission. ID recommended that she have a urine culture collected prior to any future  related procedures. - JEANNINE: Creatinine had improved from 6.5 to 2.8 throughout her hospital stay but was still elevated from her baseline at time of discharge (baseline = 2.0). Her creatinine will need to be followed as an outpatient to ensure resolution.    - Diabetes: Sugars were well controlled in patient on Lantus. Her sugars will need to be closely monitored as she transitions back to her home glipizide at discharge. - Speech and Swallow Eval: Patient will need a speech and swallow eval while at SNF.         Discharged Condition: stable    Significant Diagnostic Studies:   FL RETROGRADE PYELOGRAM W WO KUB Final Result   Intraprocedural fluoroscopic spot images as above. See separate procedure   report for more information. CT ABDOMEN PELVIS WO CONTRAST Additional Contrast? None   Final Result   1. Bilateral moderate hydronephrosis and hydroureter despite the presence of   bilateral double-J ureteral catheters. The proximal aspect of the catheters   are in the renal pelvis and proximal ureter on the right and left   respectively. 2.  No acute inflammatory changes in the abdomen or pelvis. 3.  40% superior endplate wedge compression deformity of L2, age   indeterminate. XR CHEST PORTABLE   Final Result   No acute process. CT HEAD WO CONTRAST   Final Result   1. There is no acute intracranial abnormality. Specifically, there is no   intracranial hemorrhage.    2. Atrophy and periventricular leukomalacia,              Disposition: [facility]    Patient Instructions:      Medication List        CHANGE how you take these medications      atenolol 50 MG tablet  Commonly known as: TENORMIN  Take 0.5 tablets by mouth every morning  What changed: how much to take            CONTINUE taking these medications      amLODIPine 10 MG tablet  Commonly known as: NORVASC     aspirin 81 MG EC tablet     atorvastatin 20 MG tablet  Commonly known as: LIPITOR     glipiZIDE 5 MG extended release tablet  Commonly known as: GLUCOTROL XL     hydrALAZINE 25 MG tablet  Commonly known as: APRESOLINE  Take 1 tablet by mouth every 12 hours     magnesium oxide 400 MG tablet  Commonly known as: MAG-OX     pantoprazole 40 MG tablet  Commonly known as: PROTONIX     Prolia 60 MG/ML Sosy SC injection  Generic drug: denosumab     sodium bicarbonate 650 MG tablet               Where to Get Your Medications        Information about where to get these medications is not yet available    Ask your nurse or doctor about these medications  atenolol 50 MG tablet       Activity: activity as tolerated  Diet: diabetic diet    [facility] [address] [place] [address], [address] [phone]    Call  hospital followup when you leave the [address]es[address] MD  [address] [facility] [address] [phone]          Kem Jonas MD  12/28/2022  4:30 PM

## 2022-12-28 NOTE — PROGRESS NOTES
Nephrology Progress Note  [facility]    Reason for Consult: JEANNINE  Date of Service: 12/28/2022     Subjective    12/28/22-she is awake and alert. She is up in the chair and is asking to go back to bed. Spoke with nursing we will get her back to bed.       Past Medical History:        Diagnosis Date    Arthritis     osteoporsis    Cancer [address]     colon and uterine    Closed fracture of radius 12/27/2016    Elevated lipids 1/15/2014    Headache     History of anal cancer 2/20/2017    Dr. Tunde Hyman    Hyperlipidemia     Hypertension     Hypertension     Obesity     Proteinuria 6/25/2014    Type II or unspecified type diabetes mellitus without mention of complication, not stated as uncontrolled     Vaginal cancer ([facility] 5/22/2017    Vitamin D deficiency 11/6/2014       Past Surgical History:        Procedure Laterality Date    BLADDER SURGERY N/A 9/24/2021    CYSTOSCOPY BILATERAL RETROGRADE PYELOGRAM, BILATERAL STENT INSERTION performed by Elisabeth Burris MD at [facility] Bilateral 12/22/2022    CYSTOSCOPY, RETROGRADE PYELOGRAM, BILATERAL URETERAL STENT EXCHANGE, URENA CATHETER EXCHANGE performed by Gayatri Roberto MD at [address] [place] [address] Bilateral 7/21/2022    CYSTOSCOPY RETROGRADE PYELOGRAM BILATERIAL STENT EXCHANGE performed by Elisabeth Burris MD at [address] [place] [address] Left 11/07/2016       Current Medications:    Current Facility-Administered Medications: sodium bicarbonate tablet 1,300 mg, 1,300 mg, Oral, BID  sodium chloride flush 0.9 % injection 5-40 mL, 5-40 mL, IntraVENous, 2 times per day  sodium chloride flush 0.9 % injection 5-40 mL, 5-40 mL, IntraVENous, PRN  0.9 % sodium chloride infusion, , IntraVENous, PRN  labetalol (NORMODYNE;TRANDATE) injection 5 mg, 5 mg, IntraVENous, Q15 Min PRN **OR** hydrALAZINE (APRESOLINE) injection 5 mg, 5 mg, IntraVENous, Q15 Min PRN  lactated ringers infusion, , IntraVENous, Continuous  heparin (porcine) injection 5,000 Units, 5,000 Units, SubCUTAneous, TID  amLODIPine (NORVASC) tablet 10 mg, 10 mg, Oral, QAM  atenolol (TENORMIN) tablet 25 mg, 25 mg, Oral, QAM  atorvastatin (LIPITOR) tablet 20 mg, 20 mg, Oral, QAM  pantoprazole (PROTONIX) tablet 40 mg, 40 mg, Oral, QAM  hydrALAZINE (APRESOLINE) tablet 25 mg, 25 mg, Oral, 2 times per day  sodium chloride flush 0.9 % injection 5-40 mL, 5-40 mL, IntraVENous, PRN  0.9 % sodium chloride infusion, , IntraVENous, PRN  ondansetron (ZOFRAN-ODT) disintegrating tablet 4 mg, 4 mg, Oral, Q8H PRN **OR** ondansetron (ZOFRAN) injection 4 mg, 4 mg, IntraVENous, Q6H PRN  polyethylene glycol (GLYCOLAX) packet 17 g, 17 g, Oral, Daily PRN  insulin glargine (LANTUS) injection vial 10 Units, 10 Units, SubCUTAneous, Nightly  insulin lispro (HUMALOG) injection vial 0-16 Units, 0-16 Units, SubCUTAneous, TID WC  insulin lispro (HUMALOG) injection vial 0-4 Units, 0-4 Units, SubCUTAneous, Nightly  glucose chewable tablet 16 g, 4 tablet, Oral, PRN  dextrose bolus 10% 125 mL, 125 mL, IntraVENous, PRN **OR** dextrose bolus 10% 250 mL, 250 mL, IntraVENous, PRN  glucagon (rDNA) injection 1 mg, 1 mg, SubCUTAneous, PRN  dextrose 10 % infusion, , IntraVENous, Continuous PRN    Allergies:  Betadine [povidone iodine], Lisinopril, Penicillins, and Tylenol [acetaminophen]      Physical exam:   Constitutional:  VITALS:  /74   Pulse 71   Temp 98.3 °F (36.8 °C) (Oral)   Resp 17   Ht 5' 7\" (1.702 m)   Wt 193 lb 11.2 oz (87.9 kg)   SpO2 98%   BMI 30.34 kg/m²     Gen: alert, awake, no acute distress  Eyes: anicteric sclerae, eomi  HEENT: atraumatic/normocephalic  Lungs: clear to ascultation bilaterally, equal lung expansion  CV: RRR, no murmurs  Abdomen: soft, nontender, nondistended, +ostomy   Extremitiy: no edema  : +knutson  Skin: no rash, tugor wnl  Neuro: Awake alert and oriented      Data:         Last 3 BMP  Recent Labs     12/26/22  6338 12/27/22  0554 12/28/22  0626    132 134   K 4.0 4.2 3.9  3.9    100 101   CO2 20* 18* 21*   BUN 39* 31* 31*   CREATININE 3.1* 2.9* 2.8*   GLUCOSE 147* 149* 109*   CALCIUM 9.1 9.2 9.1         Last 3 CMP:    Recent Labs     12/26/22 0226 12/27/22  0554 12/28/22  0626    132 134   K 4.0 4.2 3.9  3.9    100 101   CO2 20* 18* 21*   BUN 39* 31* 31*   CREATININE 3.1* 2.9* 2.8*   GLUCOSE 147* 149* 109*   CALCIUM 9.1 9.2 9.1   PROT 7.5 7.3 7.1   LABALBU 3.3* 3.1* 3.0*   BILITOT 0.4 0.5 0.5   ALKPHOS 96 91 84   AST 10 12 12   ALT 7 6 7         Last 3 Glucose:     Recent Labs     12/26/22 0226 12/27/22  0554 12/28/22  0626   GLUCOSE 147* 149* 109*         Last 3 POC Glucose:     No results for input(s): POCGLU in the last 72 hours. Last 3 CK, CKMB, Troponin  No results for input(s): CKTOTAL, CKMB, TROPONINI in the last 72 hours. Last 3 CBC:  Recent Labs     12/26/22 0226 12/27/22  0554 12/28/22  0626   WBC 7.9 7.9 7.8   RBC 3.76 3.81 3.72   HGB 10.3* 10.2* 10.0*   HCT 32.0* 32.4* 31.3*   MCV 85.1 85.0 84.1   MCH 27.4 26.8 26.9   MCHC 32.2 31.5* 31.9*   RDW 14.5 14.6 14.7    339 304   MPV 10.1 10.3 10.1       Last 3 Hepatic Function Panel:    Recent Labs     12/26/22 0226 12/27/22  0554 12/28/22  0626   ALKPHOS 96 91 84   ALT 7 6 7   AST 10 12 12   PROT 7.5 7.3 7.1   BILITOT 0.4 0.5 0.5   LABALBU 3.3* 3.1* 3.0*       Albumin:  Recent Labs     12/28/22  0626   LABALBU 3.0*       Calcium:  Recent Labs     12/28/22 0626   CALCIUM 9.1       Ionized Calcium:  No results for input(s): IONCA in the last 72 hours. Magnesium:    Recent Labs     12/28/22 0626   MG 1.7         ABGs:  No results for input(s): PHART, PO2ART, ZVQ3YVP, PWN4SFK, BEART, A8UQQVPA in the last 72 hours. Lactic Acid:  No results for input(s): LACTA in the last 72 hours. Last 3 Amylase:  No results for input(s): AMYLASE in the last 72 hours.     Last 3 Lipase:  No results for input(s): LIPASE in the last 72 hours. Last 3 BNP:  No results for input(s): BNP in the last 72 hours. Assessment/Plan    Acute kidney injury on chronic kidney disease stage 3b-  In the setting of obstructive uropathy  FeNa>1% suggestive of intrinsic cause  Baseline creatinine 1.6 to 1.9 mg/dL  She has a history of obstructive uropathy follows with urology. Creatinine at presentation 6.5 mg/dL and has trended to 2.8 mg/dL this morning. S/P cystoscopy with retrograde pyelogram and stent exchange and López replacement 12/22/22     2. Hypertension with chronic kidney disease stage I-4-  Blood pressure goal less than 130/80  Blood pressure is at goal     3. Metabolic acidosis-  In the setting of acute kidney injury  Sodium bicarbonate started 12/27  CO2 21 mmol/L    4. Anemia of chronic kidney disease-  Hemoglobin at goal greater than 10.0  Iron studies with iron saturation 41%     5.   Urinary tract infection-  Patient with chronic indwelling López catheter, which was changed during stent exchange 12/22/22  Completed course of MARTIR Cameron IV - CNP  12/28/2022  9:55 AM

## 2022-12-28 NOTE — PROGRESS NOTES
Occupational Therapy  OT BEDSIDE TREATMENT NOTE      Date:2022  Patient Name: Lluvia Nunes  MRN: [id number]  : 1950  Room: [address] [place] [address]     Evaluating OT: Padmini Smith OTR/L [id number]       Referring Provider: Dasha Romo MD    Specific Provider Orders/Date:OT eval and treat 22       Diagnosis:  Dehydration [E86.0]  Hyponatremia [E87.1]  Bilateral hydronephrosis [N13.30]  Chronic indwelling Knutson catheter [Z97.8]  Acute renal failure, unspecified acute renal failure type ([address] [N17.9]  Urinary tract infection associated with indwelling urethral catheter, sequela [T83.511S, N39.0]  Acute renal failure superimposed on chronic kidney disease, on chronic dialysis, unspecified acute renal failure type ([address] [N17.9, N18.9, Z99.2]  Status post placement of ureteral stent [Z96.0]    Surgery: cystoscopy, retrograde pyelogram, B ureteral stent exchange knutson catheter exchange 22    Pertinent Medical History: OA, CA, HTN, DM type 2       Precautions:  Fall Risk, ostomy      Assessment of current deficits    [x] Functional mobility            [x]ADLs           [x] Strength                  [x]Cognition    [x] Functional transfers          [x] IADLs         [x] Safety Awareness   [x]Endurance    [] Fine Coordination                         [x] Balance      [] Vision/perception   []Sensation      []Gross Motor Coordination             [] ROM           [] Delirium                   [] Motor Control      OT PLAN OF CARE   OT POC based on physician orders, patient diagnosis and results of clinical assessment     Frequency/Duration 2-4 days/wk for 2 weeks PRN   Specific OT Treatment Interventions to include:   * Instruction/training on adapted ADL techniques and AE recommendations to increase functional independence within precautions       * Training on energy conservation strategies, correct breathing pattern and techniques to improve independence/tolerance for self-care routine  * Functional transfer/mobility training/DME recommendations for increased independence, safety, and fall prevention  * Patient/Family education to increase follow through with safety techniques and functional independence  * Recommendation of environmental modifications for increased safety with functional transfers/mobility and ADLs  * Therapeutic exercise to improve motor endurance, ROM, and functional strength for ADLs/functional transfers  * Therapeutic activities to facilitate/challenge dynamic balance, stand tolerance for increased safety and independence with ADLs  * Positioning to improve skin integrity, interaction with environment and functional independence           Recommended Adaptive Equipment: TBD      Home Living: Pt lives alone in 1 story house with 0 ISABELL. Bathroom setup: walk in shower with shower chair and grab bars   Equipment owned: cane     Prior Level of Function: independent with ADLs , assist from sister with IADLs; functional mobility: cane     Pain Level: pt did not report pain this date; pt agreeable to therapy  Cognition: A&O: pt alert and oriented grossly; 2 step command follow demonstrated with increased time. Pt with some speech difficulties noted.               Memory:  Fair              Sequencing:  Fair              Problem solving:  Fair              Judgement/safety:  Fair                Functional Assessment:  AM-PAC Daily Activity Raw Score: 16/24    Initial Eval Status  Date: 12/23/22 Treatment Status  Date: 12/28/22 STGs = LTGs  Time frame: 10-14 days   Feeding Set up       Grooming Min A   Sup   UB Dressing Min A  To don gown around back, seated  min A Sup   LB Dressing Mod A  mod A  SBA-with use of AD as appropriate/needed   Bathing Mod A   SBA -with use of AD as appropriate/needed   Toileting Pt has ostomy and knutson catheter  ostomy and knutson Sup    Bed Mobility  Supine to sit: Min A    supine to sit SBA Sup   Functional Transfers Min A with wheeled walker  Sit to stand from EOB  Stand to sit to chair  Cues for hand placement and safe technique  Sit <> stand CGA  Sup    Functional Mobility Min A with wheeled walker  Short distance in room  Cues for safe wheeled walker management  Pt reported feeling dizzy and chair brought up behind her to sit. Pt reported dizziness subsided with sitting. Min A with [address] in room, mildly unsteady  SBA -with device as needed to maximize independence with ADLs and functional task completion   Balance Sitting:     Static:  Sup    Dynamic:SBA  Standing: Min A with wheeled walker Sitting balance supervision  Standing balance min A with [address]  I for static/dynamic sitting balance to maximize independence with ADLs and functional task completion     Sup for standing balance to maximize independence with ADLs and functional task completion   Activity Tolerance Fair- with light activity. Pt limited by weakness and dizziness.  fair. Patient quick to fatigue with functional activity in room Fair+ with ADL activity. Pt will demonstrate good understanding of education provided on EC/WS techniques      Comments:  patient cleared with nursing and agreeable to session. Patient fatigued with activity but good effort demonstrated. Would benefit from [address] due to mild unsteadiness. Social work notified. Patient in chair with call light in reach and alarm on    Exercise: BUE AROM and strengthening vahid exercises performed using cane x 10-15 reps for 7 exercises in all available planes. Patient reports UE fatigue but states that exercises feel good. Patient was encouraged to continue exercises throughout the day to improve endurance and strength for functional self care tasks. Education/treatment: ADL and functional transfer/activity performed to increase safety and independence during self care tasks. Education provided on safety awareness, adl reeducation, functional transfer training, UE exercise    Pt has made progress towards set goals.      Time In: 9:10  Time Out: 9:35 Min Units   Therapeutic Ex 45703 10 1   Therapeutic Activities 86042 32 6   ADL/Self Care [id number]     Orthotic Management 80575     Neuro Re-Ed [id number]     Non-Billable Time     TOTAL TIMED TREATMENT 25 2       Tamiko SCHWAB/JOSEFINA [id number]

## 2022-12-28 NOTE — CARE COORDINATION
Social Work/Discharge Planning:  Received call from patient sister India Angelo that she is now agreeable for her sister to go for rehab. Reviewed snf choices and her first choice is [address] [place] [address] and [facility]. Referral made to [facility] with [address] [place] [address] and facility will review patient information. Referral made to [facility] with [facility] and facility will review patient information. Will continue to follow. Electronically signed by CORNELIUS Schmidt on 12/28/2022 at 10:32 AM    Addendum:  [facility] with [address] [place] [address] states facility can accept patient. No pre-cert needed, but will need a negative covid result on day of discharge. Notified patient sister India Angelo. Cancelled referral to [facility]. Met with patient and provided her with an update.  will need to completed [id number] once patient has a discharge order. Will continue to follow. Electronically signed by CORNELIUS Schmidt on 12/28/2022 at 2:05 PM    Addendum:  Discharge order noted. Called [facility] and arranged ambulette transport for 4:30pm-5:00pm. Notified patient sister India Angelo, patient, Physician, RN and liaison [facility] at [address] [place] [address] of discharge time. Informed [facility] of need for speech therapy to evaluate and treat patient and she states she will notify facility. Cancelled referral to Shawna Elizalde with [facility].  [id number] completed.   Electronically signed by CORNELIUS Schmidt on 12/28/2022 at 3:14 PM

## 2022-12-28 NOTE — DISCHARGE INSTR - COC
Continuity of Care Form    Patient Name: Beckie Koroma   :  1950  MRN:  [id number]    Admit date:  2022  Discharge date:  22    Code Status Order: Full Code   Advance Directives:     Admitting Physician:  Tabatha Arrington MD  PCP: Jenifer Boyce MD    Discharging Nurse:   Conrado Ramirez Unit/Room#: 0137/9824-P  Discharging Unit Phone Number: [phone]    Emergency Contact:   Extended Emergency Contact Information  Primary Emergency Contact: Stephanie Camp  Address: [address] Phone: [phone]  Mobile Phone: [phone]  Relation: Brother/Sister    Past Surgical History:  Past Surgical History:   Procedure Laterality Date    BLADDER SURGERY N/A 2021    CYSTOSCOPY BILATERAL RETROGRADE PYELOGRAM, BILATERAL STENT INSERTION performed by Adarsh Sal MD at [address] Bilateral 2022    CYSTOSCOPY, RETROGRADE PYELOGRAM, BILATERAL URETERAL STENT EXCHANGE, URENA CATHETER EXCHANGE performed by Ibeth Hanley MD at [address] [facility] Bilateral 2022    CYSTOSCOPY RETROGRADE PYELOGRAM BILATERIAL STENT EXCHANGE performed by Adarsh Sal MD at [address] Left 2016       Immunization History:   Immunization History   Administered Date(s) Administered    COVID-19, MODERNA BLUE border, Primary or Immunocompromised, (age 12y+), IM, 100 mcg/0.5mL 2021, 2021    COVID-19, MODERNA Booster BLUE border, (age 18y+), IM, 50mcg/0.25mL 2022    Influenza, FLUAD, (age 72 y+), Adjuvanted, 0.5mL 2022    Pneumococcal Conjugate 13-valent (Folzhiy85) 2016    Pneumococcal Polysaccharide (Xgueradcb18) 2017       Active Problems:  Patient Active Problem List   Diagnosis Code    Hypertension I10    Elevated lipids E78.5    Proteinuria R80.9    Vitamin D deficiency E55.9    Closed fracture of radius S52.90XA    History of anal cancer Z85.048    Colostomy present ([facility] [address] Z93.3    Vaginal cancer ([facility] [address] C52    Diabetes mellitus type 2 in obese ([facility] [address] E11.69, E66.9    Stuttering F80.81    Age-related osteoporosis with current pathological fracture with routine healing M80.00XD    Herpes zoster vaccination declined Z28.21    Chronic renal disease, stage III [facility]) [149966] N18.30    Hydronephrosis due to obstruction of bladder N13.30, N32.0    Acute renal failure (HCC) N17.9    Bradycardia R00.1    Hyponatremia E87.1    Dizziness R42    Chronic indwelling López catheter Z97.8    Status post placement of ureteral stent Z96.0    Urinary tract infection associated with indwelling urethral catheter ([facility] [address] T83.511A, N39.0    Bilateral hydronephrosis N13.30    Bacteriuria R82.71       Isolation/Infection:   Isolation            Contact          Patient Infection Status       Infection Onset Added Last Indicated Last Indicated By Review Planned Expiration Resolved Resolved By    MRSA 12/22/22 12/24/22 12/22/22 Culture, Urine        Urine, 12/22/2022    Resolved    COVID-19 (Rule Out) 12/21/22 12/21/22 12/21/22 COVID-19, Rapid (Ordered)   12/21/22 Rule-Out Test Resulted            Nurse Assessment:  Last Vital Signs: BP (!) 139/92   Pulse 71   Temp 97 °F (36.1 °C) (Infrared)   Resp 16   Ht 5' 7\" (1.702 m)   Wt 193 lb 11.2 oz (87.9 kg)   SpO2 97%   BMI 30.34 kg/m²     Last documented pain score (0-10 scale): Pain Level: 0  Last Weight:   Wt Readings from Last 1 Encounters:   12/28/22 193 lb 11.2 oz (87.9 kg)     Mental Status:  oriented    IV Access:  - None    Nursing Mobility/ADLs:  Walking   Assisted  Transfer  Assisted  Bathing  Dependent  Dressing  Assisted  Toileting  Assisted  Feeding  Independent  Med Admin  Assisted  Med Delivery   none    Wound Care Documentation and Therapy:        Elimination:  Continence:    Bowel: Yes  Bladder: Yes  Urinary Catheter: exch;matt of stent and catheter 12/27/22 Colostomy/Ileostomy/Ileal Conduit: Yes  Colostomy LUQ-Stomal Appliance: 1 piece  Colostomy LUQ-Stoma  Assessment: Unable to assess (bag intact)  Colostomy LUQ-Peristomal Assessment: Clean, dry & intact  Colostomy LUQ-Treatment: Bag change  Colostomy LUQ-Stool Appearance: Other (Comment) (no stool)  Colostomy LUQ-Stool Color: Other (Comment) (no stool)  Colostomy LUQ-Stool Amount: Smear    Date of Last BM: 22      Intake/Output Summary (Last 24 hours) at 2022 1408  Last data filed at 2022 1240  Gross per 24 hour   Intake 180 ml   Output 2650 ml   Net -2470 ml     I/O last 3 completed shifts: In: 600 [P.O.:600]  Out: 4100 [Urine:4100]    Safety Concerns:     None    Impairments/Disabilities:      None    Nutrition Therapy:  Current Nutrition Therapy:   - Oral Diet:  Carb Control 3 carbs/meal (1500kcals/day)    Routes of Feeding: Oral  Liquids:  Thin Liquids  Daily Fluid Restriction: no  Last Modified Barium Swallow with Video (Video Swallowing Test): not done    Treatments at the Time of Hospital Discharge:   Respiratory Treatments: ***  Oxygen Therapy:  {Therapy; copd oxygen:15301}  Ventilator:    { CC Vent MNDH:517174501}    Rehab Therapies: {THERAPEUTIC INTERVENTION:8439638821}  Weight Bearing Status/Restrictions: { CC Weight Bearin}  Other Medical Equipment (for information only, NOT a DME order):  {EQUIPMENT:720710334}  Other Treatments: ***    Patient's personal belongings (please select all that are sent with patient):  {[facility] DME Belongings:761484158}    RN SIGNATURE:  Electronically signed by Melia Philip RN on 22 at 3:33 PM EST    CASE MANAGEMENT/SOCIAL WORK SECTION    Inpatient Status Date: 2022    Readmission Risk Assessment Score:  Readmission Risk              Risk of Unplanned Readmission:  23           Discharging to Facility/ Agency   Name: [facility]   Address: [address] [place] [address], [address] [address]  Phone: [phone]  Fax: [phone]    Dialysis Facility (if applicable)   Name:  Address:  Dialysis Schedule:  Phone:  Fax:    / signature: Electronically signed by CORNELIUS Doshi on 12/28/22 at 2:09 PM EST    PHYSICIAN SECTION    Prognosis: Good    Condition at Discharge: Stable    Rehab Potential (if transferring to Rehab): Fair    Recommended Labs or Other Treatments After Discharge: Continue to monitor creatinine    Physician Certification: I certify the above information and transfer of Ant Paez  is necessary for the continuing treatment of the diagnosis listed and that she requires [facility] for less 30 days. Update Admission H&P: Changes in H&P as follows - Replacement of renal stents with Urology.     PHYSICIAN SIGNATURE:  Electronically signed by Fernando Martinez MD on 12/28/22 at 2:13 PM EST

## 2022-12-28 NOTE — PROGRESS NOTES
Physical Therapy  Facility/Department: [address] INTERMEDIATE 1  Daily Treatment Note  NAME: Aidee Schroeder  : 1950  MRN: [id number]    Date of Service: 2022    Patient Diagnosis(es): The primary encounter diagnosis was Acute renal failure, unspecified acute renal failure type ([facility] [address] Diagnoses of Chronic indwelling López catheter, Status post placement of ureteral stent, Bilateral hydronephrosis, Dehydration, Hyponatremia, Urinary tract infection associated with indwelling urethral catheter, sequela, and Hydronephrosis, unspecified hydronephrosis type were also pertinent to this visit. Evaluating Therapist: Ashanti Cartagena PT     Equipment Recommendation wheeled walker     Room #:  5378/7415-P  Diagnosis:  Dehydration [E86.0]  Hyponatremia [E87.1]  Bilateral hydronephrosis [N13.30]  Chronic indwelling López catheter [Z97.8]  Acute renal failure, unspecified acute renal failure type ([facility] [address] [N17.9]  Urinary tract infection associated with indwelling urethral catheter, sequela [T83.511S, N39.0]  Acute renal failure superimposed on chronic kidney disease, on chronic dialysis, unspecified acute renal failure type ([facility] [address] [N17.9, N18.9, Z99.2]  Status post placement of ureteral stent [Z96.0]  PMHx/PSHx:  CA, HTN  Procedure: cysto with stent  Precautions:  falls, alarm        Social:  Pt lives alone in a 1 floor plan 0 steps  to enter. Sister provides transportation  Prior to admission ambulates with cane       Initial Evaluation  Date:  Treatment  22    Short Term/ Long Term   Goals   Was pt agreeable to Eval/treatment? yes       Does pt have pain?  No c/o pain No c/o pain      Bed Mobility  Rolling: min assist  Supine to sit: min assist  Sit to supine: NT  Scooting: min assist  Pt varies max assist to SBA, see comments SBA   Transfers Sit to stand: min assist  Stand to sit: min assist  Stand pivot: min assist  sit <> stand min assist SBA   Ambulation    10 feet with ww with min assist  10 feet with ww min assist 75 feet with ww with SBA   Stair Negotiation  Ascended and descended  NT    N/A   LE strength     3+/5     4/5   balance      fair       AM-PAC Raw score               16/24  15/24          Additional Comments: Upon arrival to room pt attempting to get out of bed and indicating she wanted to get it chair. After donning isolation gown and going into room pt was semi-sitting in bed and saying \"emergency\". When pt asked what she needed just yelled out \"help, help\"  pt then was non verbal, not able to answer any questions and not following commands. Attempted to assist pt to edge of bed but pt resisting mobility max assist to attempt. Dr arrived to room and pt was able to answer his questions about orientation. But when asked about pain pt again became non-verbal and was unable to answer and staring past Dr. .  Nurse arrived to check blood sugar which was 161. Pt then began to be able to talk again. Speech slow and hesitant with some word finding difficulties. Pt then able to follow commands and was able to get up to chair. When asked if she had difficulty with speech prior to admit pt stated \"no\"    Pt was left in chair with call light left by patient. Chair/bed alarm: yes    Time in: 110  Time out: 140    Total treatment time 30 minutes    Pt is making  progress toward established Physical Therapy goals. Continue with physical therapy current plan of care.     Kaylin JOHNSTON [id number]      CPT codes:  [] Low Complexity PT evaluation 80248  [] Moderate Complexity PT evaluation 75377  [] High Complexity PT evaluation 03702  [] PT Re-evaluation 39128  [] Gait training 61611  minutes  [] Manual therapy 33706    minutes  [x] Therapeutic activities 16325  30  minutes  [] Therapeutic exercises 18711     minutes  [] Neuromuscular reeducation 26014     minutes

## 2022-12-29 ENCOUNTER — TELEPHONE (OUTPATIENT)
Dept: FAMILY MEDICINE CLINIC | Age: 72
End: 2022-12-29

## 2022-12-29 NOTE — TELEPHONE ENCOUNTER
Patient's sister is calling regarding patient's health. Patient was discharged from [facility] yesterday and her sister says she can hardly speak. She is wondering about the results of testing for stroke. Please call Terence Angel at [phone].

## 2022-12-29 NOTE — TELEPHONE ENCOUNTER
CT head done on 12/21 was negative for stroke.  Please advice her sister to take her back to ER if she is having slurring of speech, weakness, confusion,severe headaches etc.

## 2023-01-01 LAB
BLOOD CULTURE, ROUTINE: NORMAL
CULTURE, BLOOD 2: NORMAL

## 2023-01-03 ENCOUNTER — TELEPHONE (OUTPATIENT)
Dept: FAMILY MEDICINE CLINIC | Age: 73
End: 2023-01-03

## 2023-01-03 LAB
ALBUMIN SERPL-MCNC: 3.7 G/DL
ALP BLD-CCNC: 84 U/L
ALT SERPL-CCNC: 14 U/L
ANION GAP SERPL CALCULATED.3IONS-SCNC: ABNORMAL MMOL/L
AST SERPL-CCNC: 20 U/L
AVERAGE GLUCOSE: ABNORMAL
BASOPHILS ABSOLUTE: 0.01 /ΜL
BASOPHILS RELATIVE PERCENT: 0.1 %
BILIRUB SERPL-MCNC: 0.6 MG/DL (ref 0.1–1.4)
BUN BLDV-MCNC: 39 MG/DL
CALCIUM SERPL-MCNC: 9.7 MG/DL
CHLORIDE BLD-SCNC: 102 MMOL/L
CO2: 26 MMOL/L
CREAT SERPL-MCNC: 3 MG/DL
EOSINOPHILS ABSOLUTE: 0.16 /ΜL
EOSINOPHILS RELATIVE PERCENT: 2 %
GFR CALCULATED: ABNORMAL
GLUCOSE BLD-MCNC: 63 MG/DL
HBA1C MFR BLD: 12.1 %
HCT VFR BLD CALC: 32.3 % (ref 36–46)
HEMOGLOBIN: 10.3 G/DL (ref 12–16)
LYMPHOCYTES ABSOLUTE: 0.8 /ΜL
LYMPHOCYTES RELATIVE PERCENT: 10 %
MCH RBC QN AUTO: 27.1 PG
MCHC RBC AUTO-ENTMCNC: 32 G/DL
MCV RBC AUTO: 84.8 FL
MONOCYTES ABSOLUTE: 0.3 /ΜL
MONOCYTES RELATIVE PERCENT: 3.7 %
NEUTROPHILS ABSOLUTE: 6.6 /ΜL
NEUTROPHILS RELATIVE PERCENT: 83 %
PLATELET # BLD: 277 K/ΜL
PMV BLD AUTO: 11.4 FL
POTASSIUM SERPL-SCNC: 3.9 MMOL/L
RBC # BLD: 3.8 10^6/ΜL
SODIUM BLD-SCNC: 140 MMOL/L
TOTAL PROTEIN: 6.9
WBC # BLD: 7.95 10^3/ML

## 2023-01-03 NOTE — TELEPHONE ENCOUNTER
Patient's sister Karin Shin would like to speak to Dr. Dallas Goodwin regarding her sister. No other details were given.

## 2023-01-03 NOTE — TELEPHONE ENCOUNTER
At [facility] with patient. Did recent blood work but no results back yet. Having episodes of shaking and can hardly talk. Almost seems like a panic attack which seems to be getting worse. She is to be doing PT but once she goes she freezes up and won't participate. The doctor on call did evaluate and ordered the blood work but they cannot order anything else like an MRI. Can you order? Please advise. Thanks.

## 2023-01-04 NOTE — TELEPHONE ENCOUNTER
Talked to patient's sister, Joanna Marino. Patient is at [address] and is not cooperating well. She is now having limited conversations. There are episodes of shakiness. She was evaluated by a physician there and labs were ordered. Malinda May states that it seems like patient has panic attacks which she discussed with the caregivers there including a . Per Malinda May, it was felt that patient should not receive any \"mild altering\" drugs and possible neurological causes should be ruled out. Melissa Dewitt to discuss her concerns with the physician on staff. Also discussed that if she is observing any acute deterioration of her mental status, she should be brought back to the emergency department to be reevaluated. Malinda May understood and agreed with the plan. [FreeTextEntry1] : NIKA TREVIÑO is a 39 year y.o. F with medical history significant for active chronic smoker.\par She is here for cardiac evaluation of chest pain\par She reports having episodes of chest pain during the past 6 mo. Occurs mostly when she is walking. described as a pinch sensation.  Lasts <1 min. Not a/w SOB, nausea. Non-radiating\par Denies SOB, palpitations, orthopnea, dizziness, syncope, LH, CRUZ\par Patient denies changes in her exercise tolerance during the past 6 months. She can walk 10 blocks and can 2 climb  flights of stairs. \par Sleeps with 1-2 pillow\par \par She denies history of MI, CVA, DM. \par Father had an MI in his late 40s. \par \par No hospitalizations in the past 3 years.\par \par \par \par LABS (8/25/22): A1c 5.8\par (8/12/22): Hgb 15.7; Hct 50.2; TG 89; ; ; HDL 53; NonHDL 121; Cre 062; K 4.7; eGFR 116; LFTs wnl; TSH 1.48\par \par EKG (8/12/22): NSR at 75 bpm with normal axis, PRWP and no STT abnormality \par

## 2023-01-09 ENCOUNTER — APPOINTMENT (OUTPATIENT)
Dept: MRI IMAGING | Age: 73
End: 2023-01-09
Payer: MEDICARE

## 2023-01-09 ENCOUNTER — APPOINTMENT (OUTPATIENT)
Dept: ULTRASOUND IMAGING | Age: 73
End: 2023-01-09
Payer: MEDICARE

## 2023-01-09 ENCOUNTER — APPOINTMENT (OUTPATIENT)
Dept: GENERAL RADIOLOGY | Age: 73
End: 2023-01-09
Payer: MEDICARE

## 2023-01-09 ENCOUNTER — APPOINTMENT (OUTPATIENT)
Dept: CT IMAGING | Age: 73
End: 2023-01-09
Payer: MEDICARE

## 2023-01-09 ENCOUNTER — HOSPITAL ENCOUNTER (INPATIENT)
Age: 73
LOS: 4 days | Discharge: NURSING, REHAB OR LONG-TERM CARE | End: 2023-01-13
Attending: EMERGENCY MEDICINE | Admitting: FAMILY MEDICINE
Payer: MEDICARE

## 2023-01-09 DIAGNOSIS — N18.32 STAGE 3B CHRONIC KIDNEY DISEASE (HCC): ICD-10-CM

## 2023-01-09 DIAGNOSIS — E55.9 VITAMIN D DEFICIENCY: ICD-10-CM

## 2023-01-09 DIAGNOSIS — F06.1 CATATONIA: ICD-10-CM

## 2023-01-09 DIAGNOSIS — R47.01 APHASIA: ICD-10-CM

## 2023-01-09 DIAGNOSIS — I63.9 CEREBROVASCULAR ACCIDENT (CVA), UNSPECIFIED MECHANISM (HCC): Primary | ICD-10-CM

## 2023-01-09 LAB
ALBUMIN SERPL-MCNC: 3.8 G/DL (ref 3.5–5.2)
ALP BLD-CCNC: 92 U/L (ref 35–104)
ALT SERPL-CCNC: 11 U/L (ref 0–32)
ANION GAP SERPL CALCULATED.3IONS-SCNC: 18 MMOL/L (ref 7–16)
AST SERPL-CCNC: 30 U/L (ref 0–31)
BASOPHILS ABSOLUTE: 0.04 E9/L (ref 0–0.2)
BASOPHILS RELATIVE PERCENT: 0.5 % (ref 0–2)
BILIRUB SERPL-MCNC: 0.9 MG/DL (ref 0–1.2)
BUN BLDV-MCNC: 59 MG/DL (ref 6–23)
CALCIUM SERPL-MCNC: 10.1 MG/DL (ref 8.6–10.2)
CHLORIDE BLD-SCNC: 103 MMOL/L (ref 98–107)
CHOLESTEROL, TOTAL: 110 MG/DL (ref 0–199)
CHP ED QC CHECK: NORMAL
CO2: 22 MMOL/L (ref 22–29)
CREAT SERPL-MCNC: 3.7 MG/DL (ref 0.5–1)
EKG ATRIAL RATE: 300 BPM
EKG Q-T INTERVAL: 418 MS
EKG QRS DURATION: 60 MS
EKG QTC CALCULATION (BAZETT): 420 MS
EKG R AXIS: -9 DEGREES
EKG T AXIS: 23 DEGREES
EKG VENTRICULAR RATE: 61 BPM
EOSINOPHILS ABSOLUTE: 0.19 E9/L (ref 0.05–0.5)
EOSINOPHILS RELATIVE PERCENT: 2.4 % (ref 0–6)
GFR SERPL CREATININE-BSD FRML MDRD: 12 ML/MIN/1.73
GLUCOSE BLD-MCNC: 88 MG/DL
GLUCOSE BLD-MCNC: 97 MG/DL (ref 74–99)
HCT VFR BLD CALC: 37.2 % (ref 34–48)
HDLC SERPL-MCNC: 46 MG/DL
HEMOGLOBIN: 11.7 G/DL (ref 11.5–15.5)
IMMATURE GRANULOCYTES #: 0.03 E9/L
IMMATURE GRANULOCYTES %: 0.4 % (ref 0–5)
LDL CHOLESTEROL CALCULATED: 44 MG/DL (ref 0–99)
LYMPHOCYTES ABSOLUTE: 0.89 E9/L (ref 1.5–4)
LYMPHOCYTES RELATIVE PERCENT: 11.4 % (ref 20–42)
MCH RBC QN AUTO: 27.5 PG (ref 26–35)
MCHC RBC AUTO-ENTMCNC: 31.5 % (ref 32–34.5)
MCV RBC AUTO: 87.5 FL (ref 80–99.9)
METER GLUCOSE: 81 MG/DL (ref 74–99)
METER GLUCOSE: 88 MG/DL (ref 74–99)
MONOCYTES ABSOLUTE: 0.6 E9/L (ref 0.1–0.95)
MONOCYTES RELATIVE PERCENT: 7.7 % (ref 2–12)
NEUTROPHILS ABSOLUTE: 6.09 E9/L (ref 1.8–7.3)
NEUTROPHILS RELATIVE PERCENT: 77.6 % (ref 43–80)
PDW BLD-RTO: 16.7 FL (ref 11.5–15)
PLATELET # BLD: 317 E9/L (ref 130–450)
PMV BLD AUTO: 11.1 FL (ref 7–12)
POTASSIUM REFLEX MAGNESIUM: 4.3 MMOL/L (ref 3.5–5)
RBC # BLD: 4.25 E12/L (ref 3.5–5.5)
SODIUM BLD-SCNC: 143 MMOL/L (ref 132–146)
TOTAL PROTEIN: 7.9 G/DL (ref 6.4–8.3)
TRIGL SERPL-MCNC: 101 MG/DL (ref 0–149)
TROPONIN, HIGH SENSITIVITY: 59 NG/L (ref 0–9)
TROPONIN, HIGH SENSITIVITY: 64 NG/L (ref 0–9)
VLDLC SERPL CALC-MCNC: 20 MG/DL
WBC # BLD: 7.8 E9/L (ref 4.5–11.5)

## 2023-01-09 PROCEDURE — 6360000002 HC RX W HCPCS

## 2023-01-09 PROCEDURE — 1200000000 HC SEMI PRIVATE

## 2023-01-09 PROCEDURE — 82962 GLUCOSE BLOOD TEST: CPT

## 2023-01-09 PROCEDURE — 70551 MRI BRAIN STEM W/O DYE: CPT

## 2023-01-09 PROCEDURE — 2580000003 HC RX 258

## 2023-01-09 PROCEDURE — 85025 COMPLETE CBC W/AUTO DIFF WBC: CPT

## 2023-01-09 PROCEDURE — 84484 ASSAY OF TROPONIN QUANT: CPT

## 2023-01-09 PROCEDURE — 71045 X-RAY EXAM CHEST 1 VIEW: CPT

## 2023-01-09 PROCEDURE — 99285 EMERGENCY DEPT VISIT HI MDM: CPT

## 2023-01-09 PROCEDURE — 76770 US EXAM ABDO BACK WALL COMP: CPT

## 2023-01-09 PROCEDURE — 80053 COMPREHEN METABOLIC PANEL: CPT

## 2023-01-09 PROCEDURE — 2580000003 HC RX 258: Performed by: STUDENT IN AN ORGANIZED HEALTH CARE EDUCATION/TRAINING PROGRAM

## 2023-01-09 PROCEDURE — 70547 MR ANGIOGRAPHY NECK W/O DYE: CPT

## 2023-01-09 PROCEDURE — 93005 ELECTROCARDIOGRAM TRACING: CPT | Performed by: EMERGENCY MEDICINE

## 2023-01-09 PROCEDURE — 70544 MR ANGIOGRAPHY HEAD W/O DYE: CPT

## 2023-01-09 PROCEDURE — 93010 ELECTROCARDIOGRAM REPORT: CPT | Performed by: INTERNAL MEDICINE

## 2023-01-09 PROCEDURE — 80061 LIPID PANEL: CPT

## 2023-01-09 RX ORDER — INSULIN LISPRO 100 [IU]/ML
0-4 INJECTION, SOLUTION INTRAVENOUS; SUBCUTANEOUS NIGHTLY
Status: DISCONTINUED | OUTPATIENT
Start: 2023-01-09 | End: 2023-01-13 | Stop reason: HOSPADM

## 2023-01-09 RX ORDER — HYDROXYZINE HYDROCHLORIDE 25 MG/1
TABLET, FILM COATED ORAL
Status: ON HOLD | COMMUNITY
Start: 2023-01-06 | End: 2023-01-13 | Stop reason: HOSPADM

## 2023-01-09 RX ORDER — POLYETHYLENE GLYCOL 3350 17 G/17G
17 POWDER, FOR SOLUTION ORAL DAILY PRN
Status: DISCONTINUED | OUTPATIENT
Start: 2023-01-09 | End: 2023-01-13 | Stop reason: HOSPADM

## 2023-01-09 RX ORDER — SODIUM CHLORIDE 9 MG/ML
INJECTION, SOLUTION INTRAVENOUS CONTINUOUS
Status: DISCONTINUED | OUTPATIENT
Start: 2023-01-09 | End: 2023-01-10

## 2023-01-09 RX ORDER — CLOPIDOGREL BISULFATE 75 MG/1
75 TABLET ORAL DAILY
Status: DISCONTINUED | OUTPATIENT
Start: 2023-01-10 | End: 2023-01-13 | Stop reason: HOSPADM

## 2023-01-09 RX ORDER — ACETAMINOPHEN 325 MG/1
650 TABLET ORAL EVERY 6 HOURS PRN
Status: DISCONTINUED | OUTPATIENT
Start: 2023-01-09 | End: 2023-01-13 | Stop reason: HOSPADM

## 2023-01-09 RX ORDER — ENOXAPARIN SODIUM 100 MG/ML
30 INJECTION SUBCUTANEOUS DAILY
Status: DISCONTINUED | OUTPATIENT
Start: 2023-01-09 | End: 2023-01-09

## 2023-01-09 RX ORDER — PROMETHAZINE HYDROCHLORIDE 25 MG/1
TABLET ORAL
Status: ON HOLD | COMMUNITY
Start: 2023-01-04 | End: 2023-01-13 | Stop reason: HOSPADM

## 2023-01-09 RX ORDER — ASPIRIN 81 MG/1
81 TABLET, CHEWABLE ORAL DAILY
Status: DISCONTINUED | OUTPATIENT
Start: 2023-01-09 | End: 2023-01-13 | Stop reason: HOSPADM

## 2023-01-09 RX ORDER — 0.9 % SODIUM CHLORIDE 0.9 %
1000 INTRAVENOUS SOLUTION INTRAVENOUS ONCE
Status: COMPLETED | OUTPATIENT
Start: 2023-01-09 | End: 2023-01-09

## 2023-01-09 RX ORDER — ASPIRIN 81 MG/1
81 TABLET, CHEWABLE ORAL DAILY
Status: DISCONTINUED | OUTPATIENT
Start: 2023-01-10 | End: 2023-01-09

## 2023-01-09 RX ORDER — INSULIN LISPRO 100 [IU]/ML
0-4 INJECTION, SOLUTION INTRAVENOUS; SUBCUTANEOUS
Status: DISCONTINUED | OUTPATIENT
Start: 2023-01-09 | End: 2023-01-13 | Stop reason: HOSPADM

## 2023-01-09 RX ORDER — ONDANSETRON 4 MG/1
4 TABLET, ORALLY DISINTEGRATING ORAL EVERY 8 HOURS PRN
Status: DISCONTINUED | OUTPATIENT
Start: 2023-01-09 | End: 2023-01-13 | Stop reason: HOSPADM

## 2023-01-09 RX ORDER — SODIUM CHLORIDE 9 MG/ML
25 INJECTION, SOLUTION INTRAVENOUS PRN
Status: DISCONTINUED | OUTPATIENT
Start: 2023-01-09 | End: 2023-01-13 | Stop reason: HOSPADM

## 2023-01-09 RX ORDER — ACETAMINOPHEN 650 MG/1
650 SUPPOSITORY RECTAL EVERY 6 HOURS PRN
Status: DISCONTINUED | OUTPATIENT
Start: 2023-01-09 | End: 2023-01-13 | Stop reason: HOSPADM

## 2023-01-09 RX ORDER — ONDANSETRON 2 MG/ML
4 INJECTION INTRAMUSCULAR; INTRAVENOUS EVERY 6 HOURS PRN
Status: DISCONTINUED | OUTPATIENT
Start: 2023-01-09 | End: 2023-01-13 | Stop reason: HOSPADM

## 2023-01-09 RX ORDER — HEPARIN SODIUM 10000 [USP'U]/ML
5000 INJECTION, SOLUTION INTRAVENOUS; SUBCUTANEOUS EVERY 8 HOURS
Status: DISCONTINUED | OUTPATIENT
Start: 2023-01-09 | End: 2023-01-13 | Stop reason: HOSPADM

## 2023-01-09 RX ORDER — ATORVASTATIN CALCIUM 40 MG/1
40 TABLET, FILM COATED ORAL NIGHTLY
Status: DISCONTINUED | OUTPATIENT
Start: 2023-01-09 | End: 2023-01-13 | Stop reason: HOSPADM

## 2023-01-09 RX ORDER — AMLODIPINE BESYLATE 10 MG/1
10 TABLET ORAL EVERY MORNING
Status: DISCONTINUED | OUTPATIENT
Start: 2023-01-10 | End: 2023-01-13 | Stop reason: HOSPADM

## 2023-01-09 RX ORDER — SODIUM CHLORIDE 0.9 % (FLUSH) 0.9 %
5-40 SYRINGE (ML) INJECTION PRN
Status: DISCONTINUED | OUTPATIENT
Start: 2023-01-09 | End: 2023-01-13 | Stop reason: HOSPADM

## 2023-01-09 RX ORDER — DEXTROSE MONOHYDRATE 100 MG/ML
INJECTION, SOLUTION INTRAVENOUS CONTINUOUS PRN
Status: DISCONTINUED | OUTPATIENT
Start: 2023-01-09 | End: 2023-01-13 | Stop reason: HOSPADM

## 2023-01-09 RX ORDER — SODIUM CHLORIDE 0.9 % (FLUSH) 0.9 %
5-40 SYRINGE (ML) INJECTION EVERY 12 HOURS SCHEDULED
Status: DISCONTINUED | OUTPATIENT
Start: 2023-01-09 | End: 2023-01-13 | Stop reason: HOSPADM

## 2023-01-09 RX ORDER — ATENOLOL 25 MG/1
25 TABLET ORAL EVERY MORNING
Status: DISCONTINUED | OUTPATIENT
Start: 2023-01-10 | End: 2023-01-12

## 2023-01-09 RX ORDER — HYDROXYZINE HYDROCHLORIDE 50 MG/ML
25 INJECTION, SOLUTION INTRAMUSCULAR EVERY 6 HOURS PRN
Status: DISCONTINUED | OUTPATIENT
Start: 2023-01-09 | End: 2023-01-12

## 2023-01-09 RX ADMIN — SODIUM CHLORIDE 1000 ML: 9 INJECTION, SOLUTION INTRAVENOUS at 16:00

## 2023-01-09 RX ADMIN — SODIUM CHLORIDE, PRESERVATIVE FREE 10 ML: 5 INJECTION INTRAVENOUS at 20:40

## 2023-01-09 RX ADMIN — SODIUM CHLORIDE: 9 INJECTION, SOLUTION INTRAVENOUS at 20:58

## 2023-01-09 RX ADMIN — HYDROXYZINE HYDROCHLORIDE 25 MG: 50 INJECTION, SOLUTION INTRAMUSCULAR at 20:40

## 2023-01-09 ASSESSMENT — PAIN SCALES - GENERAL: PAINLEVEL_OUTOF10: 0

## 2023-01-09 ASSESSMENT — PAIN - FUNCTIONAL ASSESSMENT: PAIN_FUNCTIONAL_ASSESSMENT: NONE - DENIES PAIN

## 2023-01-09 NOTE — CARE COORDINATION
Social Work/Transition of Care:     Pt recently admitted to [facility] and discharged to [place] for THIERNO and was discharged to home from facility due to making limited goals. Pt family attempted to care for her but feel they are unable and pt has returned to ED upon medical clearance pt will need placement. Pts family called Ron and are agreeable for pt to be discharged to [facility], SW spoke with liaison Peyton Amaya., pt has been accepted facility will require a COVID Test prior to discharge. Plan is for pt to be medically admitted assigned SW/CM to follow.       Electronically signed by Montserrat Urena on 6/4/1139 at 5:24 PM

## 2023-01-09 NOTE — ED PROVIDER NOTES
HPI:  1/9/23,   Time: 12:14 PM SARAH Schroeder is a 67 y.o. female presenting to the ED for valuation of aphasia since Thursday of last week. Family states that the patient was talking to her  at the nursing home where she was getting rehabilitation and she only said a few words and then stopped talking. She has not spoken since then. Family then brought her home from the nursing home and states that they have had a hard time getting her to eat. She has not spoken at all. A CT scan was done as an outpatient which showed no acute abnormality. It was thought that she may be having anxiety attacks and this was causing her to not speak. Patient nods yes or no appropriately to questions and states she is having a hard time saying her words and it is frustrating for her. He also has a very remote history of colostomy and she has chronic indwelling López catheters with bilateral ureter stents that are changed monthly. Recent hospital admission occurred because of suspected sepsis due to infection of the ureter stents. ROS:   Pertinent positives and negatives are stated within HPI, all other systems reviewed and are negative.  --------------------------------------------- PAST HISTORY ---------------------------------------------  Past Medical History:  has a past medical history of Arthritis, Cancer ([address] Closed fracture of radius, Elevated lipids, Headache, History of anal cancer, Hyperlipidemia, Hypertension, Hypertension, Obesity, Proteinuria, Type II or unspecified type diabetes mellitus without mention of complication, not stated as uncontrolled, Vaginal cancer ([address] and Vitamin D deficiency. Past Surgical History:  has a past surgical history that includes colostomy; Rectal surgery; Breast biopsy; Wrist fracture surgery (Left, 11/07/2016); Bladder surgery (N/A, 9/24/2021); Cystoscopy (Bilateral, 7/21/2022); and Bladder surgery (Bilateral, 12/22/2022).     Social History:  reports that she has never smoked. She has never used smokeless tobacco. She reports that she does not drink alcohol and does not use drugs. Family History: family history includes Cancer in her father; Diabetes in her sister; High Blood Pressure in her mother; Kidney Disease in her mother; Other in her brother. The patients home medications have been reviewed.     Allergies: Betadine [povidone iodine], Lisinopril, Penicillins, and Tylenol [acetaminophen]    -------------------------------------------------- RESULTS -------------------------------------------------  All laboratory and radiology results have been personally reviewed by myself   LABS:  Results for orders placed or performed during the hospital encounter of 01/09/23   CBC with Auto Differential   Result Value Ref Range    WBC 7.8 4.5 - 11.5 E9/L    RBC 4.25 3.50 - 5.50 E12/L    Hemoglobin 11.7 11.5 - 15.5 g/dL    Hematocrit 37.2 34.0 - 48.0 %    MCV 87.5 80.0 - 99.9 fL    MCH 27.5 26.0 - 35.0 pg    MCHC 31.5 (L) 32.0 - 34.5 %    RDW 16.7 (H) 11.5 - 15.0 fL    Platelets 606 814 - 019 E9/L    MPV 11.1 7.0 - 12.0 fL    Neutrophils % 77.6 43.0 - 80.0 %    Immature Granulocytes % 0.4 0.0 - 5.0 %    Lymphocytes % 11.4 (L) 20.0 - 42.0 %    Monocytes % 7.7 2.0 - 12.0 %    Eosinophils % 2.4 0.0 - 6.0 %    Basophils % 0.5 0.0 - 2.0 %    Neutrophils Absolute 6.09 1.80 - 7.30 E9/L    Immature Granulocytes # 0.03 E9/L    Lymphocytes Absolute 0.89 (L) 1.50 - 4.00 E9/L    Monocytes Absolute 0.60 0.10 - 0.95 E9/L    Eosinophils Absolute 0.19 0.05 - 0.50 E9/L    Basophils Absolute 0.04 0.00 - 0.20 E9/L   Comprehensive Metabolic Panel w/ Reflex to MG   Result Value Ref Range    Sodium 143 132 - 146 mmol/L    Potassium reflex Magnesium 4.3 3.5 - 5.0 mmol/L    Chloride 103 98 - 107 mmol/L    CO2 22 22 - 29 mmol/L    Anion Gap 18 (H) 7 - 16 mmol/L    Glucose 97 74 - 99 mg/dL    BUN 59 (H) 6 - 23 mg/dL    Creatinine 3.7 (H) 0.5 - 1.0 mg/dL    Est, Glom Filt Rate 12 >=60 mL/min/1.73    Calcium 10.1 8.6 - 10.2 mg/dL    Total Protein 7.9 6.4 - 8.3 g/dL    Albumin 3.8 3.5 - 5.2 g/dL    Total Bilirubin 0.9 0.0 - 1.2 mg/dL    Alkaline Phosphatase 92 35 - 104 U/L    ALT 11 0 - 32 U/L    AST 30 0 - 31 U/L   Troponin   Result Value Ref Range    Troponin, High Sensitivity 64 (H) 0 - 9 ng/L   POCT Glucose   Result Value Ref Range    Glucose 88 mg/dL    QC OK? y    POCT Glucose   Result Value Ref Range    Meter Glucose 88 74 - 99 mg/dL   EKG 12 Lead   Result Value Ref Range    Ventricular Rate 61 BPM    Atrial Rate 300 BPM    QRS Duration 60 ms    Q-T Interval 418 ms    QTc Calculation (Bazett) 420 ms    R Axis -9 degrees    T Axis 23 degrees       RADIOLOGY:  Interpreted by Radiologist.  MRI Brain WO Contrast   Final Result   Small acute infarct involving the posterior aspect of the left cerebellar   hemisphere. Mild chronic microvascular disease within the periventricular white matter. RECOMMENDATIONS:   Unavailable         XR CHEST PORTABLE   Final Result   No acute process. ------------------------- NURSING NOTES AND VITALS REVIEWED ---------------------------   The nursing notes within the ED encounter and vital signs as below have been reviewed. /78   Pulse 69   Temp 97.6 °F (36.4 °C) (Tympanic)   Resp 16   Ht 5' 7\" (1.702 m)   Wt 166 lb (75.3 kg)   SpO2 100%   BMI 26.00 kg/m²   Oxygen Saturation Interpretation: Normal      ---------------------------------------------------PHYSICAL EXAM--------------------------------------      Constitutional/General: Alert and oriented, well appearing, non toxic in NAD  Head: NC/AT  Eyes: PERRL, EOMI  Nose:  Nares patent. No congestion or discharge noted. Ears:  TM's intact without erythema or perforation. External canal without swelling  Mouth: Oropharynx clear, handling secretions, no trismus  Neck: Supple, full ROM, no meningeal signs  Pulmonary: Lungs Clear to auscultation bilaterally.  No rales or rhonchi or wheezes noted. No retractions. Cardiovascular:  Regular rate and rhythm, no murmurs, gallops, or rubs. 2+ symmetric distal pulses   Chest:  No tenderness, deformity or crepitus  Abdomen: Soft, non tender, non distended, normal bowel sounds. Ostomy present with stool present in the bag. Back:  No tenderness to palpation on the cervical or thoracic or lumbar spine  Extremities: Moves all extremities x 4. Warm and well perfused  Skin: warm and dry without rash  Neurologic: Patient alert and following all commands. Appears to be aware of her surroundings. Aphasic and mute. Nods yes or no appropriately to questions. Moves all 4 extremities. Has good sensation in all 4 extremities and on her face. Psych: Normal Affect    NIH Stroke Scale/Score at time of initial evaluation:  1A: Level of Consciousness 0 - alert; keenly responsive   1B: Ask Month and Age 0 - answers both questions correctly, unable to speak   1C: Tell Patient To Open and Close Eyes, then Hand  Squeeze 0 - performs both tasks correctly   2: Test Horizontal Extraocular Movements 0 - normal   3: Test Visual Fields 0 - no visual loss   4: Test Facial Palsy 0 - normal symmetric movement   5A: Test Left Arm Motor Drift 0 - no drift, limb holds 90 (or 45) degrees for full 10 seconds   5B: Test Right Arm Motor Drift 0 - no drift, limb holds 90 (or 45) degrees for full 10 seconds   6A: Test Left Leg Motor Drift 0 - no drift; leg holds 30 degree position for full 5 seconds   6B: Test Right Leg Motor Drift 0 - no drift; leg holds 30 degree position for full 5 seconds   7: Test Limb Ataxia   (FNF/Heel-Shin) 0 - absent   8: Test Sensation 0 - normal; no sensory loss   9: Test Language/Aphasia 2 - severe aphasia; all communication is through fragmentary expression; great need for inference, questioning, and guessing by the listener. Range of information that can be exchanged is limited; listener carries burden of communication.   Examiner cannot identify materials provided from patient response. 10: Test Dysarthria 2 - severe; patient speech is so slurred as to be unintelligible in the absence of or our of proportion to any dysphagia, or is mute/anarthric    11: Test Extinction/Inattention 0 - no abnormality   Total 4       Stroke outcome at three months in the placebo group of the NINDS trial 2   Baseline NIH Stroke Scale Percent with favorable outcome (NIHSS=0 or 1)   Age <60y   0-9 42   10-14 18   5-20 27   >20 12   Age 62-73y   0-9 37    10-14 25    5-20 25   >20 0   Age 75-65y   0-9 47   10-14 27   5-20 0   >20 0   Age >75y   0-9 36   10-14 15   5-20 6   >20 0     tPA Criteria*   Inclusion criteria:   - Ischemic stroke onset within 3 hours of drug administration   - Age 25 or older   - No hemorrhage or non-stroke cause of deficit on CT   - Measurable deficit on NIH Stroke Scale     Exclusion criteria: If the patient. ...   - has minor or improving symptoms   - had seizure at onset of stroke   - has had another stroke or serious head trauma within the last 3 months   - has had major surgery within the last 14 days   - has known history of intracranial hemorrhage   - has sustained systolic blood pressure >636 mmHg   - has sustained diastolic blood pressure >585 mmHg   - requires aggressive treatment is necessary to lower their blood pressure   - has symptoms suggestive of subarachnoid hemorrhage   - has had GI or urinary tract hemorrhage within the last 21 days   - has had an arterial puncture at a non-compressible site within the last 7 days   - received heparin within the last 48 hours and has an elevated PTT   - has a prothrombin time (PT) >15 seconds   - has a platelet count <362,892 uL   - serum blood glucose is <50 mg/dL or >400 mg/dL     Relative Contraindications:   - NIH Stroke score >22   - Patient's CT shows evidence of large MCA territory infarction (>1/3 the MCA territory)     EKG:  Normal sinus rhythm with ventricular rate of 61.   AZ interval, QRS duration and QT interval within normal range. Normal axis. No ST segment abnormalities to suggest acute ischemia.      ------------------------------ ED COURSE/MEDICAL DECISION MAKING----------------------  Medications   0.9 % sodium chloride bolus (has no administration in time range)            Medical Decision Making:    Patient's MRI suggest an acute CVA was consistent with the patient's physical exam findings. This is 11days old most likely causing an expressive aphasia. I will consult neurology. Discussed the case with general medicine who stated if neurology confirmed there was no treatment necessary and the patient will just need to be placed which is what the family wishes, they would admit the patient here. If not, patient will need to be transferred. --------------------------------- IMPRESSION AND DISPOSITION ---------------------------------    IMPRESSION  1.  Cerebrovascular accident (CVA), unspecified mechanism ([facility]        DISPOSITION  Disposition: Admit to telemetry  Patient condition is fair        Zara Marshall DO  01/09/23 [id number]

## 2023-01-09 NOTE — PROGRESS NOTES
Database initiated pharmacy and medications verified with the patients niece. Patient comes in from home alone but daughter and niece have been caring for her since Saturday. Patient ambulates with a walker and is RA at baseline. Niece states the output in her ostomy has changed to a liquid consistency.

## 2023-01-09 NOTE — PROGRESS NOTES
Case was discussed with Dr. Toni Dimas, neurology. Due to her being 4 days out since stroke, there will be no acute intervention. His recommendations included CTA head and neck as well as echocardiogram with bubble study.

## 2023-01-09 NOTE — LETTER
[place] [employer]  CERTIFICATION OF NECESSITY  FOR NON-EMERGENCY TRANSPORTATION   BY GROUND [facility]      Individual Information   1. Name: Perla Walker 2. [place] [employer] Billing Number:    3. Address: [address] [place] [id number]      Transportation Provider Information   4. Provider Name:    5. [place] [employer] Provider Number:  National Provider Identifier (NPI):      Certification  7. Criteria:  During transport, this individual requires:  [x] Medical treatment or continuous     supervision by an EMT. [] The administration or regulation of oxygen by another person. [] Supervised protective restraint. 8. Period Beginning Date:    5. Length  [x] Not more than 1 day(s)  [] One Year     Additional Information Relevant to Certification   10. Comments or Explanations, If Necessary or Appropriate   Cerebellar infarct [facility]), Cerebrovascular accident (CVA), unspecified mechanism ([facility] and DX codes: I63.9, I63.9, Altered Mental Status     Certifying Practitioner Information   11. Name of Practitioner: Dr. Humaira Vegas MD   12. [place] Medicaid Provider Number, If Applicable:  [address] Provider Identifier (NPI):      Signature Information   14. Date of Signature: 1/10/2023   15. Name of Person Signing: Electronically signed by Sherine Santa RN on 1/10/2023 at 1:49 PM     16.  Signature and Professional Designation: Electronically signed by Sherine Santa RN on 1/10/2023 at 1:50 PM  Discharge Planner     RUPERT [id number]  Rev. 7/2015

## 2023-01-09 NOTE — H&P
[place] [address]  Resident History and Physical      CHIEF COMPLAINT:    Chief Complaint   Patient presents with    Aphasia     Pt has not been able to speak x 8 days-only a few words once in a while. Fatigue        History of Present Illness:   Oni Wolf  is a 67 y.o. female patient of Santiago Zamora MD  with a pertinent PMHx of HTN, HL, DMII, CKD stage IIIb, hx colon cancer with ostomy bag, bilateral hydronephrosis with bilateral stent placement 08/2022 and chronic knutson presented with expressive aphasia x 5 days. History was obtained from patient's niece. Patient has been at [facility] since discharge from previous hospital admission. She was initially participating in therapy, eating her meals without any issues. In the past week, her niece states that they were notified that she did not want to talk to anyone, eat her meals or participate in therapy. She was seen by a psychiatrist in the nursing home due to concerns of frustration and anxiety. They were advised that she might do better at home so she was taken home on Saturday from the nursing home. Since discharge, patient has continued to worse with lack of participation and speech, notably on 1/04/2023. Patient has had little PO intake, however, able to take medication. Case was discussed with Dr. Priscilla Tan, neurology. Due to her being 4 days out since stroke, there will be no acute intervention. His recommendations due to JEANNINE included MRA head and neck without contrast and echocardiogram with bubble study. MRA without contrast of the head and neck. ED course: Vitals were unremarkable. Labs were remarkable for creatinine 3.7, BUN 59, anion gap of 18, troponin 64. EKG:Junctional rhythm, rate 61MRI brain WO contrast: Small acute infarct involving the posterior aspect of the left cerebellar hemisphere. Mild chronic microvascular disease within the periventricular white matter.     Family Medicine was consulted to admit the patient for CVA & JEANNINE    ROS:     Review of Systems   Unable to perform ROS: Patient nonverbal       Past Medical History:   Diagnosis Date    Arthritis     osteoporsis    Cancer ([facility] [address]     colon and uterine    Closed fracture of radius 12/27/2016    Elevated lipids 1/15/2014    Headache     History of anal cancer 2/20/2017    Dr. Dionisio Sunshine    Hyperlipidemia     Hypertension     Hypertension     Obesity     Proteinuria 6/25/2014    Type II or unspecified type diabetes mellitus without mention of complication, not stated as uncontrolled     Vaginal cancer ([facility] [address] 5/22/2017    Vitamin D deficiency 11/6/2014         Past Surgical History:   Procedure Laterality Date    BLADDER SURGERY N/A 9/24/2021    CYSTOSCOPY BILATERAL RETROGRADE PYELOGRAM, BILATERAL STENT INSERTION performed by Vasile Dawson MD at [address] Bilateral 12/22/2022    CYSTOSCOPY, RETROGRADE PYELOGRAM, BILATERAL URETERAL STENT EXCHANGE, URENA CATHETER EXCHANGE performed by Shaquille Iraheta MD at [facility] Bilateral 7/21/2022    CYSTOSCOPY RETROGRADE PYELOGRAM BILATERIAL STENT EXCHANGE performed by Vasile Dawson MD at [address] [facility] Left 11/07/2016       Medications Prior to Admission:    Prior to Admission medications    Medication Sig Start Date End Date Taking?  Authorizing Provider   hydrOXYzine HCl (ATARAX) 25 MG tablet  1/6/23   Historical Provider, MD   promethazine (PHENERGAN) 25 MG tablet  1/4/23   Historical Provider, MD   atenolol (TENORMIN) 50 MG tablet Take 0.5 tablets by mouth every morning 12/28/22   Ryan Pascal MD   amLODIPine (NORVASC) 10 MG tablet Take 10 mg by mouth every morning    Historical Provider, MD   aspirin 81 MG EC tablet Take 81 mg by mouth every morning    Historical Provider, MD   atorvastatin (LIPITOR) 20 MG tablet Take 20 mg by mouth every morning    Historical Provider, MD   denosumab ([place]) 60 MG/ML SOSY SC injection Inject 60 mg into the skin every 6 months NEXT INJ DUE @ SEX INF: 02/2023    Historical Provider, MD   glipiZIDE (GLUCOTROL XL) 5 MG extended release tablet Take 5 mg by mouth Daily with lunch    Historical Provider, MD   magnesium oxide (MAG-OX) 400 MG tablet Take 400 mg by mouth 2 times daily    Historical Provider, MD   pantoprazole (PROTONIX) 40 MG tablet Take 40 mg by mouth every morning    Historical Provider, MD   sodium bicarbonate 650 MG tablet Take 650 mg by mouth 2 times daily    Historical Provider, MD   hydrALAZINE (APRESOLINE) 25 MG tablet Take 1 tablet by mouth every 12 hours 12/5/22   Kelsy Caal MD        Allergies:   Betadine [povidone iodine], Lisinopril, Penicillins, and Tylenol [acetaminophen]    Social History:    reports that she has never smoked. She has never used smokeless tobacco. She reports that she does not drink alcohol and does not use drugs. Family History:   family history includes Cancer in her father; Diabetes in her sister; High Blood Pressure in her mother; Kidney Disease in her mother; Other in her brother. PHYSICAL EXAM:    Vitals:  /79   Pulse 71   Temp 98.4 °F (36.9 °C) (Axillary)   Resp 16   Ht 5' 7\" (1.702 m)   Wt 166 lb (75.3 kg)   SpO2 100%   BMI 26.00 kg/m²     Physical Exam  Vitals and nursing note reviewed. HENT:      Head: Normocephalic and atraumatic. Right Ear: External ear normal.      Left Ear: External ear normal.      Nose: Nose normal. No rhinorrhea. Mouth/Throat:      Mouth: Mucous membranes are dry. Eyes:      General: No scleral icterus. Comments: Left oval pupil - sluggish  Right pupil sluggish   Upward gaze   Occasional left beating nystagmus   Cardiovascular:      Rate and Rhythm: Normal rate and regular rhythm. Pulses: Normal pulses. Pulmonary:      Effort: No respiratory distress. Breath sounds: No wheezing or rhonchi. Abdominal:      General: Abdomen is flat. Bowel sounds are normal. There is no distension. Palpations: Abdomen is soft. Tenderness: There is no abdominal tenderness. Comments: Ostomy bag in place. No erythema around ostomy site noted    Genitourinary:     Comments: López in place. Urine is yellow minimal reddish tissue vs. clot. Musculoskeletal:      Right lower leg: No edema. Left lower leg: No edema. Skin:     Capillary Refill: Capillary refill takes less than 2 seconds. Neurological:      Motor: Weakness (3/5 in lower extremities, 4/5 in upper extremities. ) present. Comments: Patient alert and following all commands. Expressive aphasia. Nods yes or no appropriately to questions. Able to move all 4 extremities.   Intact sensation in all 4 extremities and face  No pronator drift    Normal biceps and radial brachialis BL  Unable to perform knee jerk as patient was extending knees         LABS:  Recent Results (from the past 24 hour(s))   CBC with Auto Differential    Collection Time: 01/09/23 12:18 PM   Result Value Ref Range    WBC 7.8 4.5 - 11.5 E9/L    RBC 4.25 3.50 - 5.50 E12/L    Hemoglobin 11.7 11.5 - 15.5 g/dL    Hematocrit 37.2 34.0 - 48.0 %    MCV 87.5 80.0 - 99.9 fL    MCH 27.5 26.0 - 35.0 pg    MCHC 31.5 (L) 32.0 - 34.5 %    RDW 16.7 (H) 11.5 - 15.0 fL    Platelets 473 003 - 101 E9/L    MPV 11.1 7.0 - 12.0 fL    Neutrophils % 77.6 43.0 - 80.0 %    Immature Granulocytes % 0.4 0.0 - 5.0 %    Lymphocytes % 11.4 (L) 20.0 - 42.0 %    Monocytes % 7.7 2.0 - 12.0 %    Eosinophils % 2.4 0.0 - 6.0 %    Basophils % 0.5 0.0 - 2.0 %    Neutrophils Absolute 6.09 1.80 - 7.30 E9/L    Immature Granulocytes # 0.03 E9/L    Lymphocytes Absolute 0.89 (L) 1.50 - 4.00 E9/L    Monocytes Absolute 0.60 0.10 - 0.95 E9/L    Eosinophils Absolute 0.19 0.05 - 0.50 E9/L    Basophils Absolute 0.04 0.00 - 0.20 E9/L   Comprehensive Metabolic Panel w/ Reflex to MG    Collection Time: 01/09/23 12:18 PM   Result Value Ref Range    Sodium 143 132 - 146 mmol/L    Potassium reflex Magnesium 4.3 3.5 - 5.0 mmol/L    Chloride 103 98 - 107 mmol/L    CO2 22 22 - 29 mmol/L    Anion Gap 18 (H) 7 - 16 mmol/L    Glucose 97 74 - 99 mg/dL    BUN 59 (H) 6 - 23 mg/dL    Creatinine 3.7 (H) 0.5 - 1.0 mg/dL    Est, Glom Filt Rate 12 >=60 mL/min/1.73    Calcium 10.1 8.6 - 10.2 mg/dL    Total Protein 7.9 6.4 - 8.3 g/dL    Albumin 3.8 3.5 - 5.2 g/dL    Total Bilirubin 0.9 0.0 - 1.2 mg/dL    Alkaline Phosphatase 92 35 - 104 U/L    ALT 11 0 - 32 U/L    AST 30 0 - 31 U/L   Troponin    Collection Time: 01/09/23 12:18 PM   Result Value Ref Range    Troponin, High Sensitivity 64 (H) 0 - 9 ng/L   EKG 12 Lead    Collection Time: 01/09/23 12:35 PM   Result Value Ref Range    Ventricular Rate 61 BPM    Atrial Rate 300 BPM    QRS Duration 60 ms    Q-T Interval 418 ms    QTc Calculation (Bazett) 420 ms    R Axis -9 degrees    T Axis 23 degrees   POCT Glucose    Collection Time: 01/09/23 12:50 PM   Result Value Ref Range    Meter Glucose 88 74 - 99 mg/dL   POCT Glucose    Collection Time: 01/09/23 12:52 PM   Result Value Ref Range    Glucose 88 mg/dL    QC OK? y    Lipid Panel    Collection Time: 01/09/23  5:11 PM   Result Value Ref Range    Cholesterol, Total 110 0 - 199 mg/dL    Triglycerides 101 0 - 149 mg/dL    HDL 46 >40 mg/dL    LDL Calculated 44 0 - 99 mg/dL    VLDL Cholesterol Calculated 20 mg/dL   Troponin    Collection Time: 01/09/23  5:11 PM   Result Value Ref Range    Troponin, High Sensitivity 59 (H) 0 - 9 ng/L       US RETROPERITONEAL COMPLETE   Final Result   1. Mild to moderate right and mild left hydronephrosis. Bilateral ureteric   stents noted in the renal pelvis. 2.  Bilateral renal cysts. Mildly echogenic renal parenchyma. Mildly   thinned bilateral cortical thickness. 3.  A López catheter is decompressing the bladder. MRI Brain WO Contrast   Final Result   Small acute infarct involving the posterior aspect of the left cerebellar   hemisphere. Mild chronic microvascular disease within the periventricular white matter. RECOMMENDATIONS:   Unavailable         XR CHEST PORTABLE   Final Result   No acute process. MRA NECK WO CONTRAST    (Results Pending)   MRA HEAD WO CONTRAST    (Results Pending)       ASSESSMENT/PLAN:      Active Hospital Problems    Diagnosis Date Noted    Cerebellar infarct ([facility] [address] [I63.9] 01/09/2023     Priority: Medium    Aphasia [R47.01] 01/09/2023     Priority: Medium    Chronic indwelling López catheter [Z97.8] 12/22/2022     Priority: Medium    Acute renal failure ([address] [N17.9] 12/21/2022     Priority: Medium    Chronic renal disease, stage III ([address] [307888] [N18.30] 06/06/2022     Priority: Medium    Elevated lipids [E78.5] 01/15/2014    Hypertension [I10]      Cerebrovascular accident (CVA)  MRI brain WO contrast: Small acute infarct involving the posterior aspect of the left cerebellar hemisphere. Mild chronic microvascular disease within the periventricular white matter. Neurology no acute intervention.   -Bedside swallow - if passed patient NPO, sips with meds  -Swallow study  -MRA head and neck without contrast ordered  -Echo with bubble study ordered  -Speech therapy  - Asa 81 mg daily + Plavix 75 mg daily x 21 days  -Social work Placement    JEANNINE on CKD  Baseline creatinine 2. On discharge on 12/22 creatinine 2.8. Possibly Pre-renal from dehydration or Post-renal from obstruction. - mL an hour  -Urine electrolytes ordered  -Urine US ordered  -Hold nephrotoxic medications  -Nephrology consulted, appreciate input     Elevated troponin   Likely 2/2 JEANNINE. EKG: nonischemic  -Initial troponin 64  -Repeat troponin pending     Diabetes II  Last HbA1C 12.1 on 1/3/2023. Patients home medications include glipizide 5 mg.   -NPO sips with meds  -LDSSI   -Hypoglycemia protocol  -POCT glucose checks     Anxiety  Hydroxyzine 25 mg q 6 PRN    Hypertension  -Home meds: Amlodipine 10 mg daily, atenolol 25 mg daily held due to NPO     Hyperlipidemia  Increased Atorvastatin to 40 mg daily, hold due to NPO     GERD  Hold Protonix 40 mg daily         Pain Control:  Tylenol 650 mg Q6HR PRN for mild pain  DVT ppx: Heparin 5000 TID  GI ppx: none  Code Status: DNR-CCA    Case discussed with attending on call Dr. Eric Hobson MD   Family Medicine Resident Physician PGY-2  1/9/2023

## 2023-01-10 PROBLEM — E11.65 UNCONTROLLED TYPE 2 DIABETES MELLITUS WITH HYPERGLYCEMIA (HCC): Status: ACTIVE | Noted: 2023-01-10

## 2023-01-10 PROBLEM — I63.9 CEREBROVASCULAR ACCIDENT (CVA) (HCC): Status: ACTIVE | Noted: 2023-01-10

## 2023-01-10 LAB
ANION GAP SERPL CALCULATED.3IONS-SCNC: 17 MMOL/L (ref 7–16)
BACTERIA: ABNORMAL /HPF
BASOPHILS ABSOLUTE: 0.02 E9/L (ref 0–0.2)
BASOPHILS RELATIVE PERCENT: 0.3 % (ref 0–2)
BILIRUBIN URINE: NEGATIVE
BLOOD, URINE: ABNORMAL
BUN BLDV-MCNC: 50 MG/DL (ref 6–23)
CALCIUM SERPL-MCNC: 9.2 MG/DL (ref 8.6–10.2)
CHLORIDE BLD-SCNC: 110 MMOL/L (ref 98–107)
CLARITY: ABNORMAL
CO2: 18 MMOL/L (ref 22–29)
COLOR: YELLOW
CREAT SERPL-MCNC: 3.1 MG/DL (ref 0.5–1)
CREATININE URINE: 58 MG/DL (ref 29–226)
CREATININE URINE: 79 MG/DL (ref 29–226)
CREATININE URINE: 79 MG/DL (ref 29–226)
EOSINOPHILS ABSOLUTE: 0.15 E9/L (ref 0.05–0.5)
EOSINOPHILS RELATIVE PERCENT: 2.4 % (ref 0–6)
EPITHELIAL CELLS, UA: ABNORMAL /HPF
FOLATE: 6.5 NG/ML (ref 4.8–24.2)
GFR SERPL CREATININE-BSD FRML MDRD: 15 ML/MIN/1.73
GLUCOSE BLD-MCNC: 73 MG/DL (ref 74–99)
GLUCOSE URINE: NEGATIVE MG/DL
HCT VFR BLD CALC: 33.1 % (ref 34–48)
HEMOGLOBIN: 10.2 G/DL (ref 11.5–15.5)
IMMATURE GRANULOCYTES #: 0.04 E9/L
IMMATURE GRANULOCYTES %: 0.6 % (ref 0–5)
KETONES, URINE: 15 MG/DL
LACTIC ACID: 0.8 MMOL/L (ref 0.5–2.2)
LEUKOCYTE ESTERASE, URINE: ABNORMAL
LV EF: 63 %
LVEF MODALITY: NORMAL
LYMPHOCYTES ABSOLUTE: 0.74 E9/L (ref 1.5–4)
LYMPHOCYTES RELATIVE PERCENT: 11.6 % (ref 20–42)
MCH RBC QN AUTO: 27.5 PG (ref 26–35)
MCHC RBC AUTO-ENTMCNC: 30.8 % (ref 32–34.5)
MCV RBC AUTO: 89.2 FL (ref 80–99.9)
METER GLUCOSE: 110 MG/DL (ref 74–99)
METER GLUCOSE: 76 MG/DL (ref 74–99)
METER GLUCOSE: 84 MG/DL (ref 74–99)
METER GLUCOSE: 86 MG/DL (ref 74–99)
MICROALBUMIN UR-MCNC: 477.7 MG/L
MICROALBUMIN/CREAT UR-RTO: 604.7 (ref 0–30)
MONOCYTES ABSOLUTE: 0.45 E9/L (ref 0.1–0.95)
MONOCYTES RELATIVE PERCENT: 7.1 % (ref 2–12)
NEUTROPHILS ABSOLUTE: 4.98 E9/L (ref 1.8–7.3)
NEUTROPHILS RELATIVE PERCENT: 78 % (ref 43–80)
NITRITE, URINE: POSITIVE
PDW BLD-RTO: 16.7 FL (ref 11.5–15)
PH UA: 7 (ref 5–9)
PLATELET # BLD: 272 E9/L (ref 130–450)
PMV BLD AUTO: 12 FL (ref 7–12)
POTASSIUM REFLEX MAGNESIUM: 3.8 MMOL/L (ref 3.5–5)
PROTEIN UA: 100 MG/DL
RBC # BLD: 3.71 E12/L (ref 3.5–5.5)
RBC UA: ABNORMAL /HPF (ref 0–2)
SODIUM BLD-SCNC: 145 MMOL/L (ref 132–146)
SPECIFIC GRAVITY UA: 1.01 (ref 1–1.03)
UROBILINOGEN, URINE: 0.2 E.U./DL
WBC # BLD: 6.4 E9/L (ref 4.5–11.5)
WBC UA: >20 /HPF (ref 0–5)

## 2023-01-10 PROCEDURE — 2580000003 HC RX 258: Performed by: INTERNAL MEDICINE

## 2023-01-10 PROCEDURE — 97166 OT EVAL MOD COMPLEX 45 MIN: CPT

## 2023-01-10 PROCEDURE — 92526 ORAL FUNCTION THERAPY: CPT | Performed by: SPEECH-LANGUAGE PATHOLOGIST

## 2023-01-10 PROCEDURE — 93306 TTE W/DOPPLER COMPLETE: CPT

## 2023-01-10 PROCEDURE — 92610 EVALUATE SWALLOWING FUNCTION: CPT | Performed by: SPEECH-LANGUAGE PATHOLOGIST

## 2023-01-10 PROCEDURE — 82570 ASSAY OF URINE CREATININE: CPT

## 2023-01-10 PROCEDURE — 83605 ASSAY OF LACTIC ACID: CPT

## 2023-01-10 PROCEDURE — 99222 1ST HOSP IP/OBS MODERATE 55: CPT | Performed by: FAMILY MEDICINE

## 2023-01-10 PROCEDURE — 1200000000 HC SEMI PRIVATE

## 2023-01-10 PROCEDURE — 81001 URINALYSIS AUTO W/SCOPE: CPT

## 2023-01-10 PROCEDURE — 82962 GLUCOSE BLOOD TEST: CPT

## 2023-01-10 PROCEDURE — 92507 TX SP LANG VOICE COMM INDIV: CPT | Performed by: SPEECH-LANGUAGE PATHOLOGIST

## 2023-01-10 PROCEDURE — 82044 UR ALBUMIN SEMIQUANTITATIVE: CPT

## 2023-01-10 PROCEDURE — 2500000003 HC RX 250 WO HCPCS: Performed by: INTERNAL MEDICINE

## 2023-01-10 PROCEDURE — 2580000003 HC RX 258

## 2023-01-10 PROCEDURE — 92523 SPEECH SOUND LANG COMPREHEN: CPT | Performed by: SPEECH-LANGUAGE PATHOLOGIST

## 2023-01-10 PROCEDURE — 80048 BASIC METABOLIC PNL TOTAL CA: CPT

## 2023-01-10 PROCEDURE — 6370000000 HC RX 637 (ALT 250 FOR IP)

## 2023-01-10 PROCEDURE — 82746 ASSAY OF FOLIC ACID SERUM: CPT

## 2023-01-10 PROCEDURE — 85025 COMPLETE CBC W/AUTO DIFF WBC: CPT

## 2023-01-10 PROCEDURE — 6360000002 HC RX W HCPCS

## 2023-01-10 PROCEDURE — 36415 COLL VENOUS BLD VENIPUNCTURE: CPT

## 2023-01-10 PROCEDURE — 97161 PT EVAL LOW COMPLEX 20 MIN: CPT

## 2023-01-10 PROCEDURE — 6370000000 HC RX 637 (ALT 250 FOR IP): Performed by: FAMILY MEDICINE

## 2023-01-10 PROCEDURE — 6370000000 HC RX 637 (ALT 250 FOR IP): Performed by: STUDENT IN AN ORGANIZED HEALTH CARE EDUCATION/TRAINING PROGRAM

## 2023-01-10 PROCEDURE — 97535 SELF CARE MNGMENT TRAINING: CPT

## 2023-01-10 PROCEDURE — 6370000000 HC RX 637 (ALT 250 FOR IP): Performed by: NURSE PRACTITIONER

## 2023-01-10 RX ORDER — DIVALPROEX SODIUM 125 MG/1
125 CAPSULE, COATED PELLETS ORAL EVERY 8 HOURS SCHEDULED
Status: DISCONTINUED | OUTPATIENT
Start: 2023-01-10 | End: 2023-01-13

## 2023-01-10 RX ORDER — SODIUM BICARBONATE 650 MG/1
1300 TABLET ORAL 2 TIMES DAILY
Status: DISCONTINUED | OUTPATIENT
Start: 2023-01-10 | End: 2023-01-10

## 2023-01-10 RX ORDER — PANTOPRAZOLE SODIUM 40 MG/1
40 TABLET, DELAYED RELEASE ORAL
Status: DISCONTINUED | OUTPATIENT
Start: 2023-01-11 | End: 2023-01-13 | Stop reason: HOSPADM

## 2023-01-10 RX ORDER — SODIUM BICARBONATE 650 MG/1
650 TABLET ORAL 2 TIMES DAILY
Status: DISCONTINUED | OUTPATIENT
Start: 2023-01-10 | End: 2023-01-13 | Stop reason: HOSPADM

## 2023-01-10 RX ORDER — LANOLIN ALCOHOL/MO/W.PET/CERES
1000 CREAM (GRAM) TOPICAL DAILY
Status: DISCONTINUED | OUTPATIENT
Start: 2023-01-10 | End: 2023-01-13 | Stop reason: HOSPADM

## 2023-01-10 RX ADMIN — ATENOLOL 25 MG: 25 TABLET ORAL at 17:03

## 2023-01-10 RX ADMIN — ATORVASTATIN CALCIUM 40 MG: 40 TABLET, FILM COATED ORAL at 17:05

## 2023-01-10 RX ADMIN — DIVALPROEX SODIUM 125 MG: 125 CAPSULE, COATED PELLETS ORAL at 21:51

## 2023-01-10 RX ADMIN — PETROLATUM: 420 OINTMENT TOPICAL at 09:00

## 2023-01-10 RX ADMIN — CYANOCOBALAMIN TAB 1000 MCG 1000 MCG: 1000 TAB at 21:51

## 2023-01-10 RX ADMIN — HEPARIN SODIUM 5000 UNITS: 10000 INJECTION INTRAVENOUS; SUBCUTANEOUS at 17:07

## 2023-01-10 RX ADMIN — SODIUM BICARBONATE 650 MG: 650 TABLET ORAL at 17:03

## 2023-01-10 RX ADMIN — CLOPIDOGREL BISULFATE 75 MG: 75 TABLET ORAL at 17:02

## 2023-01-10 RX ADMIN — HEPARIN SODIUM 5000 UNITS: 10000 INJECTION INTRAVENOUS; SUBCUTANEOUS at 03:20

## 2023-01-10 RX ADMIN — SODIUM BICARBONATE: 84 INJECTION, SOLUTION INTRAVENOUS at 17:57

## 2023-01-10 RX ADMIN — SODIUM BICARBONATE 650 MG: 650 TABLET ORAL at 21:51

## 2023-01-10 RX ADMIN — ASPIRIN 81 MG: 81 TABLET, CHEWABLE ORAL at 17:03

## 2023-01-10 RX ADMIN — AMLODIPINE BESYLATE 10 MG: 10 TABLET ORAL at 17:04

## 2023-01-10 RX ADMIN — PETROLATUM: 420 OINTMENT TOPICAL at 21:51

## 2023-01-10 RX ADMIN — SODIUM CHLORIDE, PRESERVATIVE FREE 10 ML: 5 INJECTION INTRAVENOUS at 21:51

## 2023-01-10 RX ADMIN — HYDROXYZINE HYDROCHLORIDE 25 MG: 50 INJECTION, SOLUTION INTRAMUSCULAR at 05:53

## 2023-01-10 ASSESSMENT — ENCOUNTER SYMPTOMS
SHORTNESS OF BREATH: 0
ABDOMINAL PAIN: 0

## 2023-01-10 NOTE — PROGRESS NOTES
Initial Inpatient Wound Care    Admit Date: 1/9/2023 11:49 AM    Reason for consult:  coccyx wound    Significant history:  unable to speak for 8 days, fatigue  Hx colon cancer/anal cancer with ostomy bag. CM  Wound history:  unknown, not documented    Findings:  awake, just smiles, laughs  Heels pink, boggy  Sacrum left with small superficial area of skin loss 0.6x1. 4x0.1 pink open. Discoloration bilateral buttocks, likely chronic. Variation of pink to dark brown                Plan:lo air loss in room  Aquaphor  Needs SOS  Monitor skin  [place] Sers.  LETI Guallpa, Lu, MOHAMUD 1/10/2023 2:39 PM

## 2023-01-10 NOTE — PROGRESS NOTES
SPEECH/LANGUAGE PATHOLOGY  CLINICAL ASSESSMENT OF SWALLOWING FUNCTION   and PLAN OF CARE    PATIENT NAME:  Oni Wolf  (female)     MRN:  [id number]    :  1950  (67 y.o.)  STATUS:  Inpatient: Room [facility]    TODAY'S DATE:  1/10/2023  REFERRING PROVIDER:  01/10/23 [place]    SLP swallowing-dysphagia evaluation and treatment  Start:  01/10/23 1100,   End:  01/10/23 1100,   ONE TIME,   Standing Count:  1 Occurrences,   R         Alan Ortiz MD    REASON FOR REFERRAL: acute CVA   EVALUATING THERAPIST: JOSE Thompson                 RESULTS:    DYSPHAGIA DIAGNOSIS:   Clinical indicators of functional swallow for age/premorbid functioning    Shallow breathing observed prior to evaluation. SPO2 assessed to be 98-99%. SLP informed pt that it was not safe to attempt to eat/drink with current breathing pattern. Pt was able to return to normal respirations immediately and throughout oral trials. Per chart pt was not eating at home. Pt stated this was due to \"not hungry\". Clinical indicators of aspiration were not observed, however silent aspiration can not be ruled out bedside. If suspected, recommend a video swallow eval to further assess.        DIET RECOMMENDATIONS:  Soft and bite size consistency solids (IDDSI level 6) with  thin liquids (IDDSI level 0) as tolerated     MEDICATION ADMINISTRATION: Per patient choice/nursing judgement     FEEDING RECOMMENDATIONS:     Assistance level:  Stand by assistance is needed during all oral intake, pt able to self feed, however unsure if she will      Compensatory strategies recommended: Small bites/sips and Alternate solids and liquids      Discussed recommendations with nursing and/or faxed report to referring provider: Yes    SPEECH THERAPY  PLAN OF CARE   The dysphagia POC is established based on physician order, dysphagia diagnosis and results of clinical assessment     Meal time assessment for 1-2 sessions to provide diet modification and compensatory strategy implementation to safely advance diet as functional ability improves    Conditions Requiring Skilled Therapeutic Intervention for dysphagia:    Patient is performing below functional baseline d/t  current acute condition, Multiple diagnoses, multiple medications, and increased dependency upon caregivers. Specific dysphagia interventions to include:     Meal time assessment for 1-2 sessions to provide diet modification and compensatory strategy implementation to safely advance diet as functional ability improves    Specific instructions for next treatment:  initiate instruction of compensatory strategies  Patient Treatment Goals:    Short Term Goals:  Pt will participate in meal time assessment for 1-2 sessions to provide diet modification and compensatory strategy implementation to safely advance diet as functional ability improves    Long Term Goals:   Pt will maintain adequate nutrition/hydration via PO intake of the least restrictive oral diet with implementation of safe swallow/ compensatory strategies and decrease signs/symptoms of aspiration to less than 1 x/day. Patient/family Goal:    Did not state. Will further assess during treatment.     Plan of care discussed with Patient   The Patient did not demonstrate complete understanding of the diagnosis, prognosis and plan of care     Rehabilitation Potential/Prognosis: fair                    ADMITTING DIAGNOSIS: Cerebellar infarct ([facility] [address] [I63.9]  Cerebrovascular accident (CVA), unspecified mechanism ([address] [I63.9]    VISIT DIAGNOSIS:   Visit Diagnoses         Codes    Cerebrovascular accident (CVA), unspecified mechanism ([address]    -  Primary I63.9             PATIENT REPORT/COMPLAINT: denies difficulty swallowing, said not eating due not \"not hungry\"  RN cleared patient for participation in assessment     yes     PRIOR LEVEL OF SWALLOW FUNCTION:    PAST HISTORY OF DYSPHAGIA?: none reported    Home diet: Diet information not available Current Diet Order:  Diet NPO Exceptions are: Sips of Water with Meds    PROCEDURE:  Consistencies Administered During the Evaluation   Liquids: thin liquid   Solids:  pureed foods and solid foods      Method of Intake:   cup, straw, spoon  Self fed, Fed by clinician      Position:   Seated, upright    CLINICAL ASSESSMENT:  Oral Stage:       Decreased mastication due to:  poor/missing dentition        Pharyngeal Stage:    No signs of aspiration were noted during this evaluation however, silent aspiration cannot be ruled out at bedside. If silent aspiration is suspected, a Videofluoroscopic Study of Swallowing (MBS) is recommended and requires a physician order. Cognition:   Follows 1 - step directions appropriate for this assessment    Oral Peripheral Examination   Generalized oral weakness      Parameters of Speech Production  Respiration:  Inadequate for speech production-shallow breathing  Quality:   Within functional limits  Intensity: Quiet    Volitional Swallow: not able to elicit     Volitional Cough:   not able to elicit     Pain: No pain reported. EDUCATION:   The Speech Language Pathologist (SLP) completed education regarding results of evaluation and that intervention is warranted at this time. Learner: Patient  Education: Reviewed results and recommendations of this evaluation  Evaluation of Education:  Needs further instruction and Family not present    This plan may be re-evaluated and revised as warranted. Evaluation Time includes thorough review of current medical information, gathering information on past medical history/social history and prior level of function, completion of standardized testing/informal observation of tasks, assessment of data and education on plan of care and goals. INTERVENTION    Pt/family educated on above results and plan of care.  Pt/family trained on compensatory strategies for safe swallow and signs and symptoms of aspiration (throat clearing, coughing/choking, wet vocal quality. , etc.) and encouraged to notify staff if observed. Handouts provided as warranted. Pt/family encouraged to engage in question/answer session. All questions answered and pt/family verbalized understanding of above. [x]The admitting diagnosis and active problem list, have been reviewed prior to initiation of this evaluation. ACTIVE PROBLEM LIST:   Patient Active Problem List   Diagnosis    Hypertension    Elevated lipids    Proteinuria    Vitamin D deficiency    Closed fracture of radius    History of anal cancer    Colostomy present ([facility] [address]    Vaginal cancer ([facility] [address]    Diabetes mellitus type 2 in obese ([facility] [address]    Stuttering    Age-related osteoporosis with current pathological fracture with routine healing    Herpes zoster vaccination declined    Chronic renal disease, stage III ([address] [414961]    Hydronephrosis due to obstruction of bladder    Acute renal failure ([facility] [address]    Chronic indwelling López catheter    Bilateral hydronephrosis    Bacteriuria    Cerebellar infarct [facility])    Aphasia         CPT code:  73312  bedside swallow eval, 02973 dysphagia therapy    Romaine PELAYO CCC/SLP [id number]  Speech-Language Pathologist

## 2023-01-10 NOTE — PROGRESS NOTES
[address], [place]       TONN:                                                  Patient Name: Shannan Dawkins  MRN: [id number]  : 1950  Room: [address] [place] [address]    Evaluating OT: NELIDA Anderson, OTR/L [id number]  Referring Vidhi Sandhu MD  Specific Provider Orders: OT eval and treat   Recommended Adaptive Equipment: 24 hr assist     Diagnosis: CVA (cerebellum)   Surgery: none   Pertinent Medical History: CA, HTN, DM   Precautions:  Fall Risk, aphasia?     Assessment of current deficits   [x] Functional mobility  [x]ADLs  [x] Strength               [x]Cognition   [x] Functional transfers   [x] IADLs         [x] Safety Awareness   [x]Endurance   [] Fine Coordination              [x] Balance      [] Vision/perception   [x]Sensation    []Gross Motor Coordination  [] ROM  [] Delirium                   [] Motor Control     OT PLAN OF CARE   OT POC based on physician orders, patient diagnosis and results of clinical assessment    Frequency/Duration:  1-3 days/wk for 2 weeks PRN   Specific OT Treatment to include:   * Instruction/training on adapted ADL techniques and AE recommendations to increase functional independence within precautions       * Training on energy conservation strategies, correct breathing pattern and techniques to improve independence/tolerance for self-care routine  * Functional transfer/mobility training/DME recommendations for increased independence, safety, and fall prevention  * Patient/Family education to increase follow through with safety techniques and functional independence  * Recommendation of environmental modifications for increased safety with functional transfers/mobility and ADLs  * Cognitive retraining/development of therapeutic activities to improve problem solving, judgement, memory, and attention for increased safety/participation in ADL/IADL tasks  * Therapeutic exercise to improve motor endurance, ROM, and functional strength for ADLs/functional transfers  * Therapeutic activities to facilitate/challenge dynamic balance, stand tolerance for increased safety and independence with ADLs  * Therapeutic activities to facilitate gross/fine motor skills for increased independence with ADLs  * Neuro-muscular re-education: facilitation of righting/equilibrium reactions, midline orientation, scapular stability/mobility, normalization of muscle tone, and facilitation of volitional active controled movement    Modified Ariana Scale (MRS)  Score     Description  0             No symptoms  1             No significant disability despite symptoms  2             Slight disability; able to look after own affairs  3             Moderate disability; able to ambulate without assist/ requires assist with ADLs  4             Moderate/Severe disability;requires assist to ambulate/assist with ADLs  5             Severe disability;bedridden/incontinent   6               Score:5    Home Living: Pt unable to provide prior history. Pain Level: 0/10  Cognition: A&O: 1/4 (self); Follows 1 step directions, with repetition and increased time   Memory:  poor   Sequencing:  fair    Problem solving:  fair    Judgement/safety:  fair     Functional Assessment:  AM-PAC Daily Activity Raw Score:    Initial Eval Status  Date: 1/10/23 Treatment Status  Date: STGs=LTGs  Time Frame: 10-14 days   Feeding SBA   Set-up   Grooming SBA (seated EOB)   Set-up   UB Dressing Mod A   Min A   LB Dressing Max A (assist with socks)  Mod A    Bathing Max A (simulated)  Mod A    Toileting Max A  Mod A   Bed Mobility  Log roll: Mod A  Supine to sit: Mod A   Sit to supine: Mod A   Log roll Min A  Supine to sit: Min A   Sit to supine: Min A   Functional Transfers Sit to stand: Max A   Stand to sit: Max A  Stand pivot: NT  Commode: NT  Min A   Functional Mobility Mod A (using ww, a few side steps) Min A   Balance Sitting: Min A  Standing: Mod A     Activity Tolerance Fair-     Visual/  Perceptual Glasses: ?                UE ROM: BUE: elbow flex WFL, shoulder flex grossly 90'  Strength: RUE: grossly 3/5 LUE: grossly 3/5   Strength: fair  Fine Motor Coordination:  fair    Hearing: WFL  Sensation:  No c/o numbness/tingling   Tone:  WFL  Edema: none noted                            Comments:Cleared by RN to see pt. Upon arrival, patient supine in bed and agreeable to OT session. At end of session, patient supine in bed with call light and phone within reach, all lines and tubes intact. Pt would benefit from continued OT to increase functional independence and quality of life. Treatment: Required re-orientation to year/month/place. Pt required vc's and physical assist for proper technique/safety with hand placement/body mechanics/posture for bed mobility/ADLs/functional tranfers/mobility/ww management. Pt required vc's for sequencing/initiation of ADLs/functional transfers. Pt able to  sit EOB ~10 mins to increase core strength/balance/activity tolerance for ease with ADLs. Pt required rest breaks during session. Pt instructed on use of call light for assistance and fall prevention. Pt demo'ing fair understanding of education provided. Continue to educate. Eval Complexity: moderate  History: Expanded chart review of medical records and additional review of physical, cognitive, or psychosocial history related to current functional performance  Exam: 3+ performance deficits  Assistance/Modification: mod/max assistance or modifications required to perform tasks. May have comorbidities that affect occupational performance. Rehab Potential: Good for established goals, pt. assisted in establishment of goals. LTG: maximize independence with ADLs to return to PLOF    Patient  instructed on diagnosis, prognosis/goals and plan of care. Demonstrated fair understanding.     [] Malnutrition indicators have been identified and nursing has been notified to ensure a dietitian consult is ordered. Evaluation time includes thorough review of current medical information, gathering information on past medical & social history & PLOF, completion of standardized testing, informal observation of tasks, consultation with other medical professions/disciplines, assessment of data & development of POC/goals.      Time In: 1035       Time Out: 1100     Total treatment time: 15       Treatment Charges: Mins Units   OT Eval Low 05162     OT Eval Medium 78537 X    OT Eval High 77321     OT Re-Eval [id number]     Ther Ex  77019       Manual Therapy 29204       Thera Activities 38655       ADL/Home Mgt 15809 15 1   Neuro Re-ed 82552       Group Therapy        Orthotic manage/training  50397       Non-Billable Time           Ra James, [address] [facility], OTR/L [id number]

## 2023-01-10 NOTE — DISCHARGE INSTR - COC
Continuity of Care Form    Patient Name: Bridgett Obregon   :  1950  MRN:  [id number]    Admit date:  2023  Discharge date:  2023    Code Status Order: DNR-CCA   Advance Directives:     Admitting Physician:  Jyotsna Keita MD  PCP: David Wright MD    Discharging Nurse:   [address] [place] Unit/Room#: 1707/1824-D  Discharging Unit Phone Number:[phone]    Emergency Contact:   Extended Emergency Contact Information  Primary Emergency Contact: Stephanie Camp  Address: [address] Phone: [phone]  Mobile Phone: [phone]  Relation: Brother/Sister  Secondary Emergency Contact: [address]  Mobile Phone: [phone]  Relation: Niece/Nephew    Past Surgical History:  Past Surgical History:   Procedure Laterality Date    BLADDER SURGERY N/A 2021    CYSTOSCOPY BILATERAL RETROGRADE PYELOGRAM, BILATERAL STENT INSERTION performed by Dalila Brooks MD at [address] Bilateral 2022    CYSTOSCOPY, RETROGRADE PYELOGRAM, BILATERAL URETERAL STENT EXCHANGE, URENA CATHETER EXCHANGE performed by Osman Dickson MD at [address] [place] [address] Bilateral 2022    CYSTOSCOPY RETROGRADE PYELOGRAM BILATERIAL STENT EXCHANGE performed by Dalila Brooks MD at [address] [place] [address] Road Left 2016       Immunization History:   Immunization History   Administered Date(s) Administered    COVID-19, MODERNA BLUE border, Primary or Immunocompromised, (age 12y+), IM, 100 mcg/0.5mL 2021, 2021    COVID-19, MODERNA Booster BLUE border, (age 18y+), IM, 50mcg/0.25mL 2022    Influenza, FLUAD, (age 72 y+), Adjuvanted, 0.5mL 2022    Pneumococcal Conjugate 13-valent ([id number]) 2016    Pneumococcal Polysaccharide ([id number]) 2017       Active Problems:  Patient Active Problem List   Diagnosis Code Hypertension I10    Elevated lipids E78.5    Proteinuria R80.9    Vitamin D deficiency E55.9    Closed fracture of radius S52.90XA    History of anal cancer Z85.048    [facility] ([facility] Z93.3    Vaginal cancer ([facility] C52    Diabetes mellitus type 2 in obese (HCC) E11.69, E66.9    Stuttering F80.81    Age-related osteoporosis with current pathological fracture with routine healing M80.00XD    Herpes zoster vaccination declined Z28.21    Chronic renal disease, stage III [facility]) [[id number]] N18.30    Hydronephrosis due to obstruction of bladder N13.30, N32.0    Acute renal failure (HCC) N17.9    Chronic indwelling López catheter Z97.8    Bilateral hydronephrosis N13.30    Bacteriuria R82.71    Cerebellar infarct ([facility]) I63.9    Aphasia R47.01    Cerebrovascular accident (CVA) ([facility] I63.9    Uncontrolled type 2 diabetes mellitus with hyperglycemia ([facility] E11.65       Isolation/Infection:   Isolation            No Isolation          Patient Infection Status       Infection Onset Added Last Indicated Last Indicated By Review Planned Expiration Resolved Resolved By    MRSA 12/22/22 12/24/22 12/22/22 Culture, Urine        Urine, 12/22/2022    Resolved    COVID-19 (Rule Out) 12/21/22 12/21/22 12/21/22 COVID-19, Rapid (Ordered)   12/21/22 Rule-Out Test Resulted            Nurse Assessment:  Last Vital Signs: BP (!) 141/86   Pulse 66   Temp 97.9 °F (36.6 °C) (Oral)   Resp 16   Ht 5' 7\" (1.702 m)   Wt 164 lb 9.6 oz (74.7 kg)   SpO2 99%   BMI 25.78 kg/m²     Last documented pain score (0-10 scale): Pain Level: 0  Last Weight:   Wt Readings from Last 1 Encounters:   01/10/23 164 lb 9.6 oz (74.7 kg)     Mental Status:  oriented, alert, coherent, logical, thought processes intact, and able to concentrate and follow conversation    IV Access:  - None    Nursing Mobility/ADLs:  Walking   Assisted  Transfer  Assisted  Bathing  Assisted  Dressing  Assisted  Toileting  Independent  Feeding  Assisted  Med Admin  Assisted  Med Delivery   whole and with applesauce    Wound Care Documentation and Therapy:        Elimination:  Continence: Bowel:   colostomy  Bladder:     Urinary Catheter: ***   Colostomy/Ileostomy/Ileal Conduit: Yes  Colostomy LUQ-Stomal Appliance: 1 piece  Colostomy LUQ-Stoma  Assessment: Unable to assess (bag intact)  Colostomy LUQ-Peristomal Assessment: Clean, dry & intact    Date of Last BM: 01/13/2023    Intake/Output Summary (Last 24 hours) at 1/10/2023 1357  Last data filed at 1/10/2023 0519  Gross per 24 hour   Intake --   Output 1200 ml   Net -1200 ml     I/O last 3 completed shifts:  In: -   Out: 1200 [Urine:1200]    Safety Concerns: At Risk for Falls and Aspiration Risk    Impairments/Disabilities:      Speech and Vision    Nutrition Therapy:  Current Nutrition Therapy:   - Oral Diet:  Dysphagia 1 pureed and no gravy    Routes of Feeding: Oral  Liquids: Thin Liquids  Daily Fluid Restriction: no  Last Modified Barium Swallow with Video (Video Swallowing Test): not done    Treatments at the Time of Hospital Discharge:   Respiratory Treatments: none  Oxygen Therapy:  is not on home oxygen therapy. Ventilator:    - No ventilator support    Rehab Therapies: Physical Therapy, Occupational Therapy, and Speech/Language Therapy  Weight Bearing Status/Restrictions: No weight bearing restrictions  Other Medical Equipment (for information only, NOT a DME order):  stands with assistance  Other Treatments: none    Patient's personal belongings (please select all that are sent with patient):  Yohannes MALLORY SIGNATURE:  {Esignature:[id number]}  Wander Lopez RN  CASE MANAGEMENT/SOCIAL WORK SECTION    Inpatient Status Date: 01/09/2023    Readmission Risk Assessment Score:  Readmission Risk              Risk of Unplanned Readmission:  20           Discharging to Facility/ Agency   Name: [address]   Phone:[phone]  Fax:[phone]    Dialysis Facility (if applicable) Name:  Address:  Dialysis Schedule:  Phone:  Fax:    / signature: Electronically signed by Kimber Hanson RN on 1/10/2023 at 1:59 PM      PHYSICIAN SECTION    Prognosis: {Prognosis:2662330626}    Condition at Discharge: 50Nicole Gorman Patient Condition:[id number]}    Rehab Potential (if transferring to Rehab): {Prognosis:2326945257}    Recommended Labs or Other Treatments After Discharge: ***    Physician Certification: I certify the above information and transfer of Solomon Diego  is necessary for the continuing treatment of the diagnosis listed and that she requires {Admit to Appropriate Level of Care:75188} for {GREATER/LESS:504201257} 30 days.      Update Admission H&P: {CHP DME Changes in BRBOU:834788095}    PHYSICIAN SIGNATURE:  {Esignature:[id number]}

## 2023-01-10 NOTE — CONSULTS
Nephrology Consult  [facility]    CC:   Management of chronic kidney disease    HPI:   Full consult is deferred as patient was just seen last in this facility in December 2022. Harvey Frey is a 66-year-old female patient we were asked to see regarding her chronic kidney disease. She was admitted last in December 2022 during which time she had a cystoscopy with retrograde pyelogram and stent exchange with López placement on 12/22/2022 for her chronic obstructive uropathy. She does follow with urology for this. Her baseline creatinine since her last admission had been 2.8 to 3.1 mg/dL, remotely her baseline was 1.6 to 1.9 mg/dL. She had an acute kidney injury in the setting of obstructive uropathy during her December admission with a peak creatinine of 6.5 mg/dL which trended to 2.8 mg/dL at her discharge. I saw this morning she was alert but not able to answer any questions. She was speaking but unintelligible words. She is unable to provide any insight into her past medical history or what prompted her coming to the hospital.  From previous note she was brought to the hospital by the family who were concerned with her not being able to speak. She was not eating or drinking well for the past week. At her discharge in December she was sent to [address] [place] [address] for rehabilitation, however family took her home as they were advised perhaps this would help with her eating and drinking improving.   Patient was from 1/7 to 1/9/2023 and she continued to deteriorate prompting her return to the hospital.    PMH:    Past Medical History:   Diagnosis Date    Arthritis     osteoporsis    Cancer ([facility]     colon and uterine    Closed fracture of radius 12/27/2016    Elevated lipids 1/15/2014    Headache     History of anal cancer 2/20/2017    Dr. Maciej Phan    Hyperlipidemia     Hypertension     Hypertension     Obesity     Proteinuria 6/25/2014    Type II or unspecified type diabetes mellitus without mention of complication, not stated as uncontrolled     Vaginal cancer ([facility] 5/22/2017    Vitamin D deficiency 11/6/2014       Patient Active Problem List   Diagnosis    Hypertension    Elevated lipids    Proteinuria    Vitamin D deficiency    Closed fracture of radius    History of anal cancer    Colostomy present ([facility]    Vaginal cancer ([facility]    Diabetes mellitus type 2 in obese ([facility]    Stuttering    Age-related osteoporosis with current pathological fracture with routine healing    Herpes zoster vaccination declined    Chronic renal disease, stage III ([facility] [[id number]]    Hydronephrosis due to obstruction of bladder    Acute renal failure (HCC)    Chronic indwelling López catheter    Bilateral hydronephrosis    Bacteriuria    Cerebellar infarct (HCC)    Aphasia       Meds:     white petrolatum   Topical BID    sodium bicarbonate  650 mg Oral BID    sodium chloride flush  5-40 mL IntraVENous 2 times per day    insulin lispro  0-4 Units SubCUTAneous TID WC    insulin lispro  0-4 Units SubCUTAneous Nightly    clopidogrel  75 mg Oral Daily    atorvastatin  40 mg Oral Nightly    atenolol  25 mg Oral QAM    amLODIPine  10 mg Oral QAM    aspirin  81 mg Oral Daily    heparin (porcine)  5,000 Units SubCUTAneous Q8H        sodium chloride      sodium chloride 125 mL/hr at 01/09/23 2058    dextrose         Meds prn:     sodium chloride flush, sodium chloride, ondansetron **OR** ondansetron, polyethylene glycol, acetaminophen **OR** acetaminophen, glucose, dextrose bolus **OR** dextrose bolus, glucagon (rDNA), dextrose, perflutren lipid microspheres, hydrOXYzine    Meds prior to admission:     No current facility-administered medications on file prior to encounter.      Current Outpatient Medications on File Prior to Encounter   Medication Sig Dispense Refill    hydrOXYzine HCl (ATARAX) 25 MG tablet       promethazine (PHENERGAN) 25 MG tablet       atenolol (TENORMIN) 50 MG tablet Take 0.5 tablets by mouth every morning 30 tablet 3 amLODIPine (NORVASC) 10 MG tablet Take 10 mg by mouth every morning      aspirin 81 MG EC tablet Take 81 mg by mouth every morning      atorvastatin (LIPITOR) 20 MG tablet Take 20 mg by mouth every morning      denosumab (PROLIA) 60 MG/ML SOSY SC injection Inject 60 mg into the skin every 6 months NEXT INJ DUE @ SEX INF: 02/2023      glipiZIDE (GLUCOTROL XL) 5 MG extended release tablet Take 5 mg by mouth Daily with lunch      magnesium oxide (MAG-OX) 400 MG tablet Take 400 mg by mouth 2 times daily      pantoprazole (PROTONIX) 40 MG tablet Take 40 mg by mouth every morning      sodium bicarbonate 650 MG tablet Take 650 mg by mouth 2 times daily      hydrALAZINE (APRESOLINE) 25 MG tablet Take 1 tablet by mouth every 12 hours 90 tablet 3       Allergies:    Betadine [povidone iodine], Lisinopril, Penicillins, and Tylenol [acetaminophen]    Social History:     reports that she has never smoked. She has never used smokeless tobacco. She reports that she does not drink alcohol and does not use drugs.     Family History:         Problem Relation Age of Onset    Kidney Disease Mother     High Blood Pressure Mother     Cancer Father     Diabetes Sister     Other Brother        ROS:     He is unable to participate in review of systems is nonverbal at the current time    Physical Exam:      Patient Vitals for the past 24 hrs:   BP Temp Temp src Pulse Resp SpO2 Weight   01/10/23 0719 (!) 141/86 97.9 °F (36.6 °C) Oral 66 16 99 % --   01/10/23 0009 -- -- -- -- -- -- 164 lb 9.6 oz (74.7 kg)   01/09/23 2334 129/78 98.2 °F (36.8 °C) -- 64 18 100 % --   01/09/23 1857 139/79 98.4 °F (36.9 °C) Axillary 71 16 100 % --   01/09/23 1811 115/82 98.2 °F (36.8 °C) Oral 60 14 95 % --   01/09/23 1600 (!) 131/93 98.2 °F (36.8 °C) Oral 70 -- 99 % --         Intake/Output Summary (Last 24 hours) at 1/10/2023 1207  Last data filed at 1/10/2023 1173  Gross per 24 hour   Intake --   Output 1200 ml   Net -1200 ml       Constitutional: Awake unable to communicate  Head: normocephalic, atraumatic   Neck: supple, no jvd  Cardiovascular: S1-S2 no S3 or rub  Respiratory: Clear upper diminished bases  Gastrointestinal: soft, nontender, nondistended  Ext: Trace edema  Neuro: She is alert and noncommunicative  Skin: dry, no rash   : Chronic López followed by urology      Data:    Recent Labs     01/09/23  1218 01/10/23  0357   WBC 7.8 6.4   HGB 11.7 10.2*   HCT 37.2 33.1*   MCV 87.5 89.2    272       Recent Labs     01/09/23  1218 01/09/23  1252 01/10/23  0357     --  145   K 4.3  --  3.8     --  110*   CO2 22  --  18*   CREATININE 3.7*  --  3.1*   BUN 59*  --  50*   LABGLOM 12  --  15   GLUCOSE 97 88 73*   CALCIUM 10.1  --  9.2       Vit D, 25-Hydroxy   Date Value Ref Range Status   12/23/2022 26 (L) 30 - 100 ng/mL Final     Comment:     <20 ng/mL. ........... Annelle Castles Deficient  20-30 ng/mL. ......... Annelle Castles Insufficient   ng/mL. ........ Annelle Castles Sufficient  >100 ng/mL. .......... Annelle Castles Toxic         PTH   Date Value Ref Range Status   12/23/2022 151 (H) 15 - 65 pg/mL Final       Recent Labs     01/09/23  1218   ALT 11   AST 30   ALKPHOS 92   BILITOT 0.9       Recent Labs     01/09/23  1218   LABALBU 3.8       Ferritin   Date Value Ref Range Status   12/22/2022 342 ng/mL Final     Comment:     FERRITIN Reference Ranges:  Adult Males   20 - 60 years:    30 - 400 ng/mL  Adult females 16 - 61 years:    15 - 150 ng/mL  Adults greater than 60 years:   no established reference range  Pediatrics:                     no established reference range       Iron   Date Value Ref Range Status   12/22/2022 98 37 - 145 mcg/dL Final     TIBC   Date Value Ref Range Status   12/22/2022 241 (L) 250 - 450 mcg/dL Final       Vitamin B-12   Date Value Ref Range Status   12/22/2022 403 211 - 946 pg/mL Final       Folate   Date Value Ref Range Status   12/22/2022 6.4 4.8 - 24.2 ng/mL Final         Lab Results   Component Value Date/Time    COLORU Yellow 01/10/2023 01:46 AM    NITRU POSITIVE 01/10/2023 01:46 AM    GLUCOSEU Negative 01/10/2023 01:46 AM    KETUA 15 01/10/2023 01:46 AM    UROBILINOGEN 0.2 01/10/2023 01:46 AM    BILIRUBINUR Negative 01/10/2023 01:46 AM    BILIRUBINUR neg 10/07/2013 11:12 AM       Lab Results   Component Value Date/Time    OSMOU 248 (L) 12/21/2022 12:12 PM       No components found for: URIC    No results found for: LIPIDPAN      Assessment and Plan:    Acute kidney injury on chronic kidney disease stage 3b approaching stage IV-  JEANNINE in the setting of poor oral intake. CKD in the setting of chronic obstructive uropathy. Urine output 1200 mL past 24 hours, 1 shift recorded  Has been started on IV fluids. Checking urine studies  Remotely her creatinine had been 1.6 to 1.9 mg/dL  More recently her creatinine has been 2.8 to 3.1 mg/dL  She has a history of obstructive uropathy follows with urology. Peak serum creatinine during her December admission of 6.5 mg/dL; creatinine at presentation 3.7 mg/dL  S/P cystoscopy with retrograde pyelogram and stent exchange and López replacement 12/22/22  Creatinine improving from 3.7 to 3.1 mg/dL with IV fluids     2. Hypertension with chronic kidney disease stage I-4-  Blood pressure goal less than 130/80  Blood pressure is near goal     3. Metabolic acidosis-  In the setting of chronic kidney disease  CO2 18  She had previously been on oral bicarbonate will restart and follow     4. Anemia of chronic kidney disease-  Hemoglobin at goal greater than 10.0  Iron saturation 41% in December 2022     5. Urinary tract infection-  Patient with chronic indwelling López catheter, which was changed during stent exchange 12/22/22  Completed course of Cipro IV during her December admission    MARTIR So - CNP  Patient seen and examined. I had a face to face encounter with the patient.  She was somnolent she opened eyes to verbal stimuli but mumbled unintelligibly   Agree with exam.    Agree with  formulation, assessment and plan as outlined above and directed by me. Addition and corrections were done as deemed appropriate. My exam and plan include:     A/P:  JEANNINE on CKD G3B with Chronic Obstructive Uropathy-cr trending down    2.  Anion Gap Met Acidosis in the setting of the JEANNINE on the CKD-will check lactic acid level-will change the IVF to an HCO3 based fluid-follow labs    Continue current treatment as outlined above    ALAN Hansen MD

## 2023-01-10 NOTE — PROGRESS NOTES
[address]  Progress Note    Chief complaint :  Chief Complaint   Patient presents with    Aphasia     Pt has not been able to speak x 8 days-only a few words once in a while. Fatigue       Subjective: This morning patient able to say good morning and answer several yes/no questions. She denies chest pain or shortness of breath. She has had a López in place for several months that is changed once per month. Past medical, surgical, family and social history were reviewed, non-contributory, and unchanged unless otherwise stated. Review of Systems   Constitutional:  Negative for chills and fever. Respiratory:  Negative for shortness of breath. Cardiovascular:  Negative for chest pain. Gastrointestinal:  Negative for abdominal pain. Neurological:  Positive for speech difficulty. Objective:  /78   Pulse 64   Temp 98.2 °F (36.8 °C)   Resp 18   Ht 5' 7\" (1.702 m)   Wt 164 lb 9.6 oz (74.7 kg)   SpO2 100%   BMI 25.78 kg/m²     Physical Exam  Constitutional:       General: She is not in acute distress. Appearance: She is not toxic-appearing. HENT:      Head: Normocephalic and atraumatic. Cardiovascular:      Rate and Rhythm: Normal rate and regular rhythm. Heart sounds: No murmur heard. No friction rub. No gallop. Pulmonary:      Effort: No respiratory distress. Breath sounds: No wheezing, rhonchi or rales. Musculoskeletal:      Right lower leg: No edema. Left lower leg: No edema. Neurological:      Cranial Nerves: Cranial nerve deficit present. Sensory: Sensory deficit present. Motor: Weakness present.      Labs:  Recent Results (from the past 24 hour(s))   CBC with Auto Differential    Collection Time: 01/09/23 12:18 PM   Result Value Ref Range    WBC 7.8 4.5 - 11.5 E9/L    RBC 4.25 3.50 - 5.50 E12/L    Hemoglobin 11.7 11.5 - 15.5 g/dL    Hematocrit 37.2 34.0 - 48.0 %    MCV 87.5 80.0 - 99.9 fL    MCH 27.5 26.0 - 35.0 pg    MCHC 31.5 (L) 32.0 - 34.5 %    RDW 16.7 (H) 11.5 - 15.0 fL    Platelets 520 689 - 258 E9/L    MPV 11.1 7.0 - 12.0 fL    Neutrophils % 77.6 43.0 - 80.0 %    Immature Granulocytes % 0.4 0.0 - 5.0 %    Lymphocytes % 11.4 (L) 20.0 - 42.0 %    Monocytes % 7.7 2.0 - 12.0 %    Eosinophils % 2.4 0.0 - 6.0 %    Basophils % 0.5 0.0 - 2.0 %    Neutrophils Absolute 6.09 1.80 - 7.30 E9/L    Immature Granulocytes # 0.03 E9/L    Lymphocytes Absolute 0.89 (L) 1.50 - 4.00 E9/L    Monocytes Absolute 0.60 0.10 - 0.95 E9/L    Eosinophils Absolute 0.19 0.05 - 0.50 E9/L    Basophils Absolute 0.04 0.00 - 0.20 E9/L   Comprehensive Metabolic Panel w/ Reflex to MG    Collection Time: 01/09/23 12:18 PM   Result Value Ref Range    Sodium 143 132 - 146 mmol/L    Potassium reflex Magnesium 4.3 3.5 - 5.0 mmol/L    Chloride 103 98 - 107 mmol/L    CO2 22 22 - 29 mmol/L    Anion Gap 18 (H) 7 - 16 mmol/L    Glucose 97 74 - 99 mg/dL    BUN 59 (H) 6 - 23 mg/dL    Creatinine 3.7 (H) 0.5 - 1.0 mg/dL    Est, Glom Filt Rate 12 >=60 mL/min/1.73    Calcium 10.1 8.6 - 10.2 mg/dL    Total Protein 7.9 6.4 - 8.3 g/dL    Albumin 3.8 3.5 - 5.2 g/dL    Total Bilirubin 0.9 0.0 - 1.2 mg/dL    Alkaline Phosphatase 92 35 - 104 U/L    ALT 11 0 - 32 U/L    AST 30 0 - 31 U/L   Troponin    Collection Time: 01/09/23 12:18 PM   Result Value Ref Range    Troponin, High Sensitivity 64 (H) 0 - 9 ng/L   EKG 12 Lead    Collection Time: 01/09/23 12:35 PM   Result Value Ref Range    Ventricular Rate 61 BPM    Atrial Rate 300 BPM    QRS Duration 60 ms    Q-T Interval 418 ms    QTc Calculation (Bazett) 420 ms    R Axis -9 degrees    T Axis 23 degrees   POCT Glucose    Collection Time: 01/09/23 12:50 PM   Result Value Ref Range    Meter Glucose 88 74 - 99 mg/dL   POCT Glucose    Collection Time: 01/09/23 12:52 PM   Result Value Ref Range    Glucose 88 mg/dL    QC OK? y    Lipid Panel    Collection Time: 01/09/23  5:11 PM   Result Value Ref Range    Cholesterol, Total 110 0 - 199 mg/dL    Triglycerides 101 0 - 149 mg/dL    HDL 46 >40 mg/dL    LDL Calculated 44 0 - 99 mg/dL    VLDL Cholesterol Calculated 20 mg/dL   Troponin    Collection Time: 01/09/23  5:11 PM   Result Value Ref Range    Troponin, High Sensitivity 59 (H) 0 - 9 ng/L   POCT Glucose    Collection Time: 01/09/23  7:47 PM   Result Value Ref Range    Meter Glucose 81 74 - 99 mg/dL   CREATININE, RANDOM URINE    Collection Time: 01/10/23  1:46 AM   Result Value Ref Range    Creatinine, Ur 79 29 - 226 mg/dL   Urinalysis with Microscopic    Collection Time: 01/10/23  1:46 AM   Result Value Ref Range    Color, UA Yellow Straw/Yellow    Clarity, UA CLOUDY (A) Clear    Glucose, Ur Negative Negative mg/dL    Bilirubin Urine Negative Negative    Ketones, Urine 15 (A) Negative mg/dL    Specific Gravity, UA 1.010 1.005 - 1.030    Blood, Urine LARGE (A) Negative    pH, UA 7.0 5.0 - 9.0    Protein,  (A) Negative mg/dL    Urobilinogen, Urine 0.2 <2.0 E.U./dL    Nitrite, Urine POSITIVE (A) Negative    Leukocyte Esterase, Urine LARGE (A) Negative    WBC, UA >20 (A) 0 - 5 /HPF    RBC, UA 10-20 (A) 0 - 2 /HPF    Epithelial Cells, UA RARE /HPF    Bacteria, UA MODERATE (A) None Seen /HPF   CBC with Auto Differential    Collection Time: 01/10/23  3:57 AM   Result Value Ref Range    WBC 6.4 4.5 - 11.5 E9/L    RBC 3.71 3.50 - 5.50 E12/L    Hemoglobin 10.2 (L) 11.5 - 15.5 g/dL    Hematocrit 33.1 (L) 34.0 - 48.0 %    MCV 89.2 80.0 - 99.9 fL    MCH 27.5 26.0 - 35.0 pg    MCHC 30.8 (L) 32.0 - 34.5 %    RDW 16.7 (H) 11.5 - 15.0 fL    Platelets 418 679 - 376 E9/L    MPV 12.0 7.0 - 12.0 fL    Neutrophils % 78.0 43.0 - 80.0 %    Immature Granulocytes % 0.6 0.0 - 5.0 %    Lymphocytes % 11.6 (L) 20.0 - 42.0 %    Monocytes % 7.1 2.0 - 12.0 %    Eosinophils % 2.4 0.0 - 6.0 %    Basophils % 0.3 0.0 - 2.0 %    Neutrophils Absolute 4.98 1.80 - 7.30 E9/L    Immature Granulocytes # 0.04 E9/L    Lymphocytes Absolute 0.74 (L) 1.50 - 4.00 E9/L Monocytes Absolute 0.45 0.10 - 0.95 E9/L    Eosinophils Absolute 0.15 0.05 - 0.50 E9/L    Basophils Absolute 0.02 0.00 - 0.20 O4/N   Basic Metabolic Panel w/ Reflex to MG    Collection Time: 01/10/23  3:57 AM   Result Value Ref Range    Sodium 145 132 - 146 mmol/L    Potassium reflex Magnesium 3.8 3.5 - 5.0 mmol/L    Chloride 110 (H) 98 - 107 mmol/L    CO2 18 (L) 22 - 29 mmol/L    Anion Gap 17 (H) 7 - 16 mmol/L    Glucose 73 (L) 74 - 99 mg/dL    BUN 50 (H) 6 - 23 mg/dL    Creatinine 3.1 (H) 0.5 - 1.0 mg/dL    Est, Glom Filt Rate 15 >=60 mL/min/1.73    Calcium 9.2 8.6 - 10.2 mg/dL   POCT Glucose    Collection Time: 01/10/23  6:40 AM   Result Value Ref Range    Meter Glucose 76 74 - 99 mg/dL       Radiology and other tests reviewed:  MRA NECK WO CONTRAST   Final Result   Normal examination. MRA HEAD WO CONTRAST   Final Result   1. No significant stenosis or occlusion. 2. Small aneurysm versus infundibulum of the right LEAH/anterior communicating   artery junction. RECOMMENDATIONS:   Unavailable         US RETROPERITONEAL COMPLETE   Final Result   1. Mild to moderate right and mild left hydronephrosis. Bilateral ureteric   stents noted in the renal pelvis. 2.  Bilateral renal cysts. Mildly echogenic renal parenchyma. Mildly   thinned bilateral cortical thickness. 3.  A López catheter is decompressing the bladder. MRI Brain WO Contrast   Final Result   Small acute infarct involving the posterior aspect of the left cerebellar   hemisphere. Mild chronic microvascular disease within the periventricular white matter. RECOMMENDATIONS:   Unavailable         XR CHEST PORTABLE   Final Result   No acute process.              Assessment:  Active Hospital Problems    Diagnosis Date Noted    Cerebellar infarct [facility]) [I63.9] 01/09/2023     Priority: Medium    Aphasia [R47.01] 01/09/2023     Priority: Medium    Chronic indwelling López catheter [Z97.8] 12/22/2022     Priority: Medium Acute renal failure ([facility] [address] [N17.9] 12/21/2022     Priority: Medium    Chronic renal disease, stage III [facility]) [300158] [N18.30] 06/06/2022     Priority: Medium    Elevated lipids [E78.5] 01/15/2014    Hypertension [I10]        Plan:    Cerebrovascular accident (CVA)  MRI brain WO contrast: Small acute infarct involving the posterior aspect of the left cerebellar hemisphere. Mild chronic microvascular disease within the periventricular white matter. Neurology no acute intervention.   -Bedside swallow - if passed patient NPO, sips with meds, if fail swallow study  -MRA head and neck without contrast ordered  -Echo with bubble study ordered  -Speech therapy  - Asa 81 mg daily + Plavix 75 mg daily x 21 days  -Social work Placement     JEANNINE on CKD  Baseline creatinine 2. On discharge on 12/22 creatinine 2.8. Possibly Pre-renal from dehydration or Post-renal from obstruction. US retroperitoneal complete: Mild to moderate right and mild left hydronephrosis. Bilateral ureteric stents noted in the renal pelvis. Bilateral renal cysts. Mildly echogenic renal parenchyma. Mildly  thinned bilateral cortical thickness. - mL an hour  -Hold nephrotoxic medications  -Nephrology consulted, appreciate input    Bacteruria  Pseudomonas and MRSA grew last cx 12/2022. Now grew nitrites.  -Likely colonized, not causing infection per ID note 12/26/22  -Treated with Levaquin last admission    Anemia of Chronic Disease  12/22/22: ferritin 342, iron 98, sat 41, low TIBC, folate & B12 wnl.  -Monitor    Elevated troponin   Likely 2/2 JEANNINE. EKG: nonischemic  -Initial troponin 64>>59    Diabetes II  Last HbA1C 12.1 on 1/3/2023. Patients home medications include glipizide 5 mg.   -NPO sips with meds  -LDSSI w/ hypoglycemia protocol and x4 POCT glucose checks     Anxiety  -Hydroxyzine 25 mg q 6 PRN     Hypertension  -Home meds: Amlodipine 10 mg daily, atenolol 25 mg daily held due to NPO     Hyperlipidemia  -Increased Atorvastatin to 40 mg daily, hold due to NPO     GERD  -Hold Protonix 40 mg daily         Pain Control: Tylenol 650 mg Q6HR PRN for mild pain  DVT ppx: Heparin 5000 TID  GI ppx: none  Code Status: DNR-CCA      Discharge to [facility], accepted, requires covid test prior to dc pending clinical course.     Katerine Dee DO   Family Medicine Resident PGY-1  01/10/23   7:14 AM

## 2023-01-10 NOTE — CARE COORDINATION
Transition of Care-Recent discharge form hospital -12/28, went to [facility], went home and her needs were beyond what family could manage, suspected possible stroke. Family called [facility] a bed for her at [facility] once medically stable for discharge. Therapy evaluations are in chart, saw today. Patient will not need a pre cert, just a Covid test prior to transfer. IVORY, Hens and transportation envelope in soft chart.     Elizabeth NIXN, RN  [facility]

## 2023-01-10 NOTE — PROGRESS NOTES
SPEECH/LANGUAGE PATHOLOGY  SPEECH/LANGUAGE/COGNITIVE EVALUATION   and PLAN OF CARE      PATIENT NAME:  Kevin Donnelly  (female)     MRN:  [id number]    :  1950  (67 y.o.)  STATUS:  Inpatient: Room -A    TODAY'S DATE:  1/10/2023  REFERRING PROVIDER:   01/10/23 [place]    SLP eval and treat  Start:  01/10/23 1100,   End:  01/10/23 1100,   ONE TIME,   Standing Count:  1 Occurrences,   R         Alyse Saldivar MD   REASON FOR REFERRAL:  acute CVA  EVALUATING THERAPIST: JOSE Altamirano    ADMITTING DIAGNOSIS: Cerebellar infarct ([address] [I63.9]  Cerebrovascular accident (CVA), unspecified mechanism ([address] [I63.9]    VISIT DIAGNOSIS:   Visit Diagnoses         Codes    Cerebrovascular accident (CVA), unspecified mechanism ([address]    -  Primary I63.9               SPEECH THERAPY  PLAN OF CARE   The speech therapy  POC is established based on physician order, speech pathology diagnosis and results of clinical assessment     SPEECH PATHOLOGY DIAGNOSIS:    Pt demonstrated dysarthria which was inconsistent throughout evaluation, however that 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 Pt was able to produce full complete sentences at times, however demonstrated significant difficulty producing one intelligible word at other times. SLP to continue to assess     Inconsistent dysfluencies were also observed, however discussed with physician who reports pt demonstrated a fluency disorder prior to this admit. Cognition not able to be fully assessed due to severity of dysarthria during this evaluation, however pt was oriented X3 and was able to follow 3 step verbal commands. Speech Pathology intervention is recommended 3-6 times per week for LOS or when goals are met with emphasis on the following:      Conditions Requiring Skilled Therapeutic Intervention for speech, language and/or cognition    Stuttering  Dysarthria     Specific Speech Therapy Interventions to Include:    Therapeutic exercises for dysarthria    Specific instructions for next treatment: To initiate speech production tasks    SHORT/LONG TERM GOALS  Pt will improve speech intelligibility and increased articulatory precision via compensatory strategies and oral motor exercises   Pt will improve motor programming to reduce dysfluent speech via compensatory strategies with 70% accuracy    Patient goals: Patient/family involved in developing goals and treatment plan:   Treatment goals discussed with Patient    The Patient did not demonstrate complete understanding of the diagnosis, prognosis and plan of care   The patient/family Did not state,     This plan may be re-evaluated and revised as warranted. Rehabilitation Potential/Prognosis: fair                CLINICAL ASSESSMENT:  MOTOR SPEECH       Oral Peripheral Examination   Generalized oral weakness, decreased oral opening    Parameters of Speech Production  Respiration:  Inadequate for speech production  Articulation:  Distortion at times  Resonance:  Within functional limits  Quality:   Within functional limits  Pitch: Within functional limits  Intensity: Quiet  Fluency:  Dysfluent  Prosody Irregular fluctuation    RECEPTIVE LANGUAGE    Comprehension of Yes/No Questions:   Incomplete    Process  Simple Verbal Commands:   Within functional limits  Process Intermediate Verbal Commands:   Within functional limits  Process Complex Verbal Commands:     Within functional limits    Comprehension of Conversation:      Incomplete and Inconsistent      EXPRESSIVE LANGUAGE     Serials: Functional however slow    Imitation:  Words   Functional   Sentences Impaired    Naming:  (Modality used:  Verbal)  Confrontation Naming  Functional  Functional Description  To be assessed  Response Naming:  To be assessed    Conversation:      dysfluent    COGNITION     Attention/Orientation  Attention: Sustained attention   Orientation:  Oriented to Person, Place, Date      CLINICAL OBSERVATIONS NOTED DURING THE EVALUATION  Inconsistent responses                  EDUCATION:   The Speech Language Pathologist (SLP) completed education regarding results of evaluation and that intervention is warranted at this time. Learner: Patient  Education: Reviewed results and recommendations of this evaluation  Evaluation of Education:  Needs further instruction and Family not present    Evaluation Time includes thorough review of current medical information, gathering information on past medical history/social history and prior level of function, completion of standardized testing/informal observation of tasks, assessment of data and education on plan of care and goals. INTERVENTION    Pt/family educated on above results and plan of care. Pt/family trained on compensatory strategies for improved intelligibility. Handouts provided as warranted. Pt/family encouraged to engage in question/answer session. All questions answered and pt/family verbalized understanding of above. CPT code:    33721  eval speech sound lang comprehension, 77407 speech therapy      The admitting diagnosis and active problem list, as listed below have been reviewed prior to initiation of this evaluation. ACTIVE PROBLEM LIST:   Patient Active Problem List   Diagnosis    Hypertension    Elevated lipids    Proteinuria    Vitamin D deficiency    Closed fracture of radius    History of anal cancer    Colostomy present ([facility] [address]    Vaginal cancer ([address]    Diabetes mellitus type 2 in obese ([address]    Stuttering    Age-related osteoporosis with current pathological fracture with routine healing    Herpes zoster vaccination declined    Chronic renal disease, stage III ([address] [327058]    Hydronephrosis due to obstruction of bladder    Acute renal failure ([address]    Chronic indwelling López catheter    Bilateral hydronephrosis    Bacteriuria    Cerebellar infarct [facility])    Aphasia       Alena TAVAREZ. JAYLEEN/SLP [id number]  Speech-Language Pathologist

## 2023-01-10 NOTE — PROGRESS NOTES
Physical Therapy  Facility/Department: [facility]  Physical Therapy Initial Assessment    Name: Riccardo Reeys  : 1950  MRN: [id number]  Date of Service: 1/10/2023    Patient Diagnosis(es): The encounter diagnosis was Cerebrovascular accident (CVA), unspecified mechanism ([facility] Past Medical History:  has a past medical history of Arthritis, Cancer ([facility] Closed fracture of radius, Elevated lipids, Headache, History of anal cancer, Hyperlipidemia, Hypertension, Hypertension, Obesity, Proteinuria, Type II or unspecified type diabetes mellitus without mention of complication, not stated as uncontrolled, Vaginal cancer ([facility] and Vitamin D deficiency. Past Surgical History:  has a past surgical history that includes colostomy; Rectal surgery; Breast biopsy; Wrist fracture surgery (Left, 2016); Bladder surgery (N/A, 2021); Cystoscopy (Bilateral, 2022); and Bladder surgery (Bilateral, 2022). Referring provider:  Erica Davis MD    PT Order:  PT eval and treat     Evaluating PT:  Bernadette De Leon PT, DPT PT [id number]    Room #:  0821/0568-V  Diagnosis:  Cerebellar infarct [facility]) [I63.9]  Cerebrovascular accident (CVA), unspecified mechanism ([facility] [I63.9]  Precautions:  fall risk  Equipment Needs:  TBD    SUBJECTIVE:    Pt unable to provide social history. Per chart, pt admitted from home. Unsure of pt's PLOF. OBJECTIVE:   Initial Evaluation  Date: 1/10 Treatment Short Term/ Long Term   Goals   Was pt agreeable to Eval/treatment? yes     Does pt have pain? Pt unable to verbalize if she had pain. Bed Mobility  Rolling: NT  Supine to sit: Min A  Sit to supine: Min A  Scooting: Min A to sitting EOB  SBA   Transfers Sit to stand:  Max A  Stand to sit: Mod A  Stand pivot: NT  SBA   Ambulation    4 side steps toward HOB With w/w Min A.   50 feet with w/w SBA    Stair negotiation: ascended and descended  NT      ROM BLE:  WFL     Strength BLE:  at least 3/5 from observation  Grossly 4/5 Balance Sitting EOB:  SBA  Dynamic Standing:  Min A with w/w  Sitting EOB:  supervision  Dynamic Standing:  SBA with w/w   AM-PAC 6 Clicks 33/59       Pt is A & O x 3. Pt with very mumbled speech and took increased time to answer orientation questions. When asked pt social history, pt did not respond and demonstrated increased work of breathing and feeling uncomfortable. During LE assessment while in bed, pt starting the movement with her leg and then when going to be assisted more, pt resistive to movement. Pt appeared to be uncomfortable during all mobility. Once standing, pt able to verbalize she wanted water clearly. Once pt back to bed, pt unable to verbalize her needs again. Sensation:  Pt denies numbness and tingling to extremities      Vitals:  SPO2 remained 99% on room air and heart rate was 70-80s. Patient education  Pt educated on PT objectives during eval and while in the hospital, hand placement during transfers, deep b breathing    Patient response to education:   Pt verbalized understanding Pt demonstrated skill Pt requires further education in this area   yes With cueing yes     ASSESSMENT:    Conditions Requiring Skilled Therapeutic Intervention:    [x]Decreased strength     []Decreased ROM  [x]Decreased functional mobility  [x]Decreased balance   [x]Decreased endurance   [x]Decreased posture  []Decreased sensation  []Decreased coordination   []Decreased vision  []Decreased safety awareness   []Increased pain       Comments:  Pt found in bed. Pt with difficulty communication throughout session. Pt able to answer orientation questions but took increased time to get the words out and speech was mumbled. Pt appeared very uncomfortable during all eval with increased work of breathing. Vitals were stable. Took pt multiple attempts to get pt to standing position. Pt required assist with maneuvering of the walker to take side steps.       At end of eval, pt left in bed with call light in reach and bed alarm on.      Pt's/ family goals   1. None stated    Prognosis is good for reaching above PT goals. Patient and or family understand(s) diagnosis, prognosis, and plan of care. unsure    PHYSICAL THERAPY PLAN OF CARE:    PT POC is established based on physician order and patient diagnosis     Diagnosis:  Cerebellar infarct ([facility] [address] [I63.9]  Cerebrovascular accident (CVA), unspecified mechanism ([facility] [address] [I63.9]    Current Treatment Recommendations:     [x] Strengthening to improve independence with functional mobility   [] ROM to improve independence with functional mobility   [x] Balance Training to improve static/dynamic balance and to reduce fall risk  [x] Endurance Training to improve activity tolerance during functional mobility   [x] Transfer Training to improve safety and independence with all functional transfers   [x] Gait Training to improve gait mechanics, endurance and assess need for appropriate assistive device  [] Stair Training in preparation for safe discharge home and/or into the community   [] Positioning to prevent skin breakdown and contractures  [x] Safety and Education Training   [x] Patient/Caregiver Education   [] HEP  [] Other     PT long term treatment goals are located in above grid    Frequency of treatments: 2-5x/week x 1-2 weeks. Time in  1015  Time out  1035    Evaluation Time includes thorough review of current medical information, gathering information on past medical history/social history and prior level of function, completion of standardized testing/informal observation of tasks, assessment of data and education on plan of care and goals.     CPT codes:  [x] Low Complexity PT evaluation 90708  [] Moderate Complexity PT evaluation 45146  [] High Complexity PT evaluation 76272  [] PT Re-evaluation 62398  [] Therapeutic activities 52254 __minutes  [] Therapeutic exercises 22158 __ minutes      Lc Mcnair, PT, DPT  PT [id number]

## 2023-01-11 ENCOUNTER — APPOINTMENT (OUTPATIENT)
Dept: NEUROLOGY | Age: 73
End: 2023-01-11
Payer: MEDICARE

## 2023-01-11 LAB
ANION GAP SERPL CALCULATED.3IONS-SCNC: 14 MMOL/L (ref 7–16)
BASOPHILS ABSOLUTE: 0.02 E9/L (ref 0–0.2)
BASOPHILS RELATIVE PERCENT: 0.3 % (ref 0–2)
BUN BLDV-MCNC: 41 MG/DL (ref 6–23)
CALCIUM SERPL-MCNC: 9.4 MG/DL (ref 8.6–10.2)
CHLORIDE BLD-SCNC: 110 MMOL/L (ref 98–107)
CO2: 20 MMOL/L (ref 22–29)
CREAT SERPL-MCNC: 2.7 MG/DL (ref 0.5–1)
EOSINOPHILS ABSOLUTE: 0.16 E9/L (ref 0.05–0.5)
EOSINOPHILS RELATIVE PERCENT: 2.4 % (ref 0–6)
GFR SERPL CREATININE-BSD FRML MDRD: 18 ML/MIN/1.73
GLUCOSE BLD-MCNC: 97 MG/DL (ref 74–99)
HCT VFR BLD CALC: 32.2 % (ref 34–48)
HEMOGLOBIN: 10.1 G/DL (ref 11.5–15.5)
IMMATURE GRANULOCYTES #: 0.04 E9/L
IMMATURE GRANULOCYTES %: 0.6 % (ref 0–5)
LYMPHOCYTES ABSOLUTE: 0.88 E9/L (ref 1.5–4)
LYMPHOCYTES RELATIVE PERCENT: 13.5 % (ref 20–42)
MAGNESIUM: 2.2 MG/DL (ref 1.6–2.6)
MCH RBC QN AUTO: 27.5 PG (ref 26–35)
MCHC RBC AUTO-ENTMCNC: 31.4 % (ref 32–34.5)
MCV RBC AUTO: 87.7 FL (ref 80–99.9)
METER GLUCOSE: 100 MG/DL (ref 74–99)
METER GLUCOSE: 109 MG/DL (ref 74–99)
METER GLUCOSE: 110 MG/DL (ref 74–99)
METER GLUCOSE: 116 MG/DL (ref 74–99)
MONOCYTES ABSOLUTE: 0.47 E9/L (ref 0.1–0.95)
MONOCYTES RELATIVE PERCENT: 7.2 % (ref 2–12)
NEUTROPHILS ABSOLUTE: 4.97 E9/L (ref 1.8–7.3)
NEUTROPHILS RELATIVE PERCENT: 76 % (ref 43–80)
PDW BLD-RTO: 16.7 FL (ref 11.5–15)
PHOSPHORUS: 3.2 MG/DL (ref 2.5–4.5)
PLATELET # BLD: 272 E9/L (ref 130–450)
PMV BLD AUTO: 11.4 FL (ref 7–12)
POTASSIUM REFLEX MAGNESIUM: 3.3 MMOL/L (ref 3.5–5)
RBC # BLD: 3.67 E12/L (ref 3.5–5.5)
SARS-COV-2, NAAT: NOT DETECTED
SODIUM BLD-SCNC: 144 MMOL/L (ref 132–146)
TOTAL CK: 290 U/L (ref 20–180)
WBC # BLD: 6.5 E9/L (ref 4.5–11.5)

## 2023-01-11 PROCEDURE — 6360000002 HC RX W HCPCS: Performed by: NURSE PRACTITIONER

## 2023-01-11 PROCEDURE — 95816 EEG AWAKE AND DROWSY: CPT

## 2023-01-11 PROCEDURE — 82550 ASSAY OF CK (CPK): CPT

## 2023-01-11 PROCEDURE — 95816 EEG AWAKE AND DROWSY: CPT | Performed by: PSYCHIATRY & NEUROLOGY

## 2023-01-11 PROCEDURE — 6360000002 HC RX W HCPCS

## 2023-01-11 PROCEDURE — 6370000000 HC RX 637 (ALT 250 FOR IP): Performed by: NURSE PRACTITIONER

## 2023-01-11 PROCEDURE — 80048 BASIC METABOLIC PNL TOTAL CA: CPT

## 2023-01-11 PROCEDURE — 85025 COMPLETE CBC W/AUTO DIFF WBC: CPT

## 2023-01-11 PROCEDURE — 6370000000 HC RX 637 (ALT 250 FOR IP)

## 2023-01-11 PROCEDURE — 2580000003 HC RX 258: Performed by: INTERNAL MEDICINE

## 2023-01-11 PROCEDURE — 82962 GLUCOSE BLOOD TEST: CPT

## 2023-01-11 PROCEDURE — 99232 SBSQ HOSP IP/OBS MODERATE 35: CPT | Performed by: FAMILY MEDICINE

## 2023-01-11 PROCEDURE — 2580000003 HC RX 258

## 2023-01-11 PROCEDURE — 6370000000 HC RX 637 (ALT 250 FOR IP): Performed by: STUDENT IN AN ORGANIZED HEALTH CARE EDUCATION/TRAINING PROGRAM

## 2023-01-11 PROCEDURE — 1200000000 HC SEMI PRIVATE

## 2023-01-11 PROCEDURE — 87635 SARS-COV-2 COVID-19 AMP PRB: CPT

## 2023-01-11 PROCEDURE — 36415 COLL VENOUS BLD VENIPUNCTURE: CPT

## 2023-01-11 PROCEDURE — 2500000003 HC RX 250 WO HCPCS: Performed by: INTERNAL MEDICINE

## 2023-01-11 PROCEDURE — 6370000000 HC RX 637 (ALT 250 FOR IP): Performed by: FAMILY MEDICINE

## 2023-01-11 PROCEDURE — 83735 ASSAY OF MAGNESIUM: CPT

## 2023-01-11 PROCEDURE — 84100 ASSAY OF PHOSPHORUS: CPT

## 2023-01-11 RX ORDER — POTASSIUM CHLORIDE 20 MEQ/1
40 TABLET, EXTENDED RELEASE ORAL ONCE
Status: DISCONTINUED | OUTPATIENT
Start: 2023-01-11 | End: 2023-01-11

## 2023-01-11 RX ORDER — POTASSIUM CHLORIDE 7.45 MG/ML
10 INJECTION INTRAVENOUS ONCE
Status: COMPLETED | OUTPATIENT
Start: 2023-01-11 | End: 2023-01-11

## 2023-01-11 RX ADMIN — CLOPIDOGREL BISULFATE 75 MG: 75 TABLET ORAL at 12:29

## 2023-01-11 RX ADMIN — POTASSIUM CHLORIDE 10 MEQ: 7.46 INJECTION, SOLUTION INTRAVENOUS at 12:48

## 2023-01-11 RX ADMIN — HEPARIN SODIUM 5000 UNITS: 10000 INJECTION INTRAVENOUS; SUBCUTANEOUS at 10:36

## 2023-01-11 RX ADMIN — HEPARIN SODIUM 5000 UNITS: 10000 INJECTION INTRAVENOUS; SUBCUTANEOUS at 16:53

## 2023-01-11 RX ADMIN — DIVALPROEX SODIUM 125 MG: 125 CAPSULE, COATED PELLETS ORAL at 16:53

## 2023-01-11 RX ADMIN — PETROLATUM: 420 OINTMENT TOPICAL at 10:37

## 2023-01-11 RX ADMIN — SODIUM BICARBONATE: 84 INJECTION, SOLUTION INTRAVENOUS at 01:45

## 2023-01-11 RX ADMIN — ASPIRIN 81 MG: 81 TABLET, CHEWABLE ORAL at 12:28

## 2023-01-11 RX ADMIN — CYANOCOBALAMIN TAB 1000 MCG 1000 MCG: 1000 TAB at 12:28

## 2023-01-11 RX ADMIN — AMLODIPINE BESYLATE 10 MG: 10 TABLET ORAL at 12:28

## 2023-01-11 RX ADMIN — PANTOPRAZOLE SODIUM 40 MG: 40 TABLET, DELAYED RELEASE ORAL at 12:28

## 2023-01-11 RX ADMIN — SODIUM CHLORIDE, PRESERVATIVE FREE 10 ML: 5 INJECTION INTRAVENOUS at 10:37

## 2023-01-11 RX ADMIN — DIVALPROEX SODIUM 125 MG: 125 CAPSULE, COATED PELLETS ORAL at 22:09

## 2023-01-11 RX ADMIN — DIVALPROEX SODIUM 125 MG: 125 CAPSULE, COATED PELLETS ORAL at 12:29

## 2023-01-11 RX ADMIN — SODIUM BICARBONATE 650 MG: 650 TABLET ORAL at 22:08

## 2023-01-11 RX ADMIN — POTASSIUM BICARBONATE 40 MEQ: 782 TABLET, EFFERVESCENT ORAL at 10:45

## 2023-01-11 RX ADMIN — PETROLATUM: 420 OINTMENT TOPICAL at 22:09

## 2023-01-11 RX ADMIN — HEPARIN SODIUM 5000 UNITS: 10000 INJECTION INTRAVENOUS; SUBCUTANEOUS at 01:46

## 2023-01-11 RX ADMIN — SODIUM BICARBONATE 650 MG: 650 TABLET ORAL at 12:28

## 2023-01-11 RX ADMIN — ATORVASTATIN CALCIUM 40 MG: 40 TABLET, FILM COATED ORAL at 22:08

## 2023-01-11 RX ADMIN — ATENOLOL 25 MG: 25 TABLET ORAL at 12:28

## 2023-01-11 RX ADMIN — SODIUM CHLORIDE, PRESERVATIVE FREE 10 ML: 5 INJECTION INTRAVENOUS at 22:09

## 2023-01-11 RX ADMIN — SODIUM BICARBONATE: 84 INJECTION, SOLUTION INTRAVENOUS at 12:43

## 2023-01-11 NOTE — PROGRESS NOTES
[address] [address]  Progress Note    Chief complaint :  Chief Complaint   Patient presents with    Aphasia     Pt has not been able to speak x 8 days-only a few words once in a while. Fatigue       Subjective:    No acute events overnight. Patient waking up at time of encounter. Patient more fatigued today, not answering yes or no questions as promptly. Patient is moaning and groaning, not localizing to questioning. Following simple commands. Past medical, surgical, family and social history were reviewed, non-contributory, and unchanged unless otherwise stated. Review of Systems   Unable to perform ROS: Mental status change     Objective:  BP (!) 149/82   Pulse 78   Temp 97.7 °F (36.5 °C) (Oral)   Resp 18   Ht 5' 7\" (1.702 m)   Wt 169 lb 1.6 oz (76.7 kg)   SpO2 100%   BMI 26.48 kg/m²     Physical Exam  HENT:      Head: Normocephalic and atraumatic. Right Ear: External ear normal.      Left Ear: External ear normal.      Mouth/Throat:      Mouth: Mucous membranes are dry. Eyes:      General:         Right eye: No discharge. Left eye: No discharge. Conjunctiva/sclera: Conjunctivae normal.      Pupils: Pupils are equal, round, and reactive to light. Comments: Upward gaze - able to redirect   Left oval pupil   Cardiovascular:      Rate and Rhythm: Normal rate and regular rhythm. Heart sounds: No murmur heard. No friction rub. No gallop. Pulmonary:      Effort: No respiratory distress. Breath sounds: No wheezing, rhonchi or rales. Abdominal:      General: Abdomen is flat. There is no distension. Palpations: Abdomen is soft. Tenderness: There is no guarding. Comments: Ostomy bag in place. No erythema around ostomy site noted     Genitourinary:     Comments: López in place. Urine is yellow minimal reddish  Musculoskeletal:      Right lower leg: No edema. Left lower leg: No edema.    Skin:     General: Skin is warm and dry. Capillary Refill: Capillary refill takes less than 2 seconds. Neurological:      Cranial Nerves: No cranial nerve deficit. Sensory: No sensory deficit. Motor: Weakness (4/5 upper and lower extremity weakness) present. No tremor, seizure activity or pronator drift.       Comments: Aware of surrounding - able to answer few questions     Labs:  Recent Results (from the past 24 hour(s))   POCT Glucose    Collection Time: 01/10/23 12:09 PM   Result Value Ref Range    Meter Glucose 84 74 - 99 mg/dL   Folate    Collection Time: 01/10/23 12:25 PM   Result Value Ref Range    Folate 6.5 4.8 - 24.2 ng/mL   Creatinine, Random Urine    Collection Time: 01/10/23 12:30 PM   Result Value Ref Range    Creatinine, Ur 58 29 - 226 mg/dL   Lactic Acid    Collection Time: 01/10/23  4:50 PM   Result Value Ref Range    Lactic Acid 0.8 0.5 - 2.2 mmol/L   POCT Glucose    Collection Time: 01/10/23  5:17 PM   Result Value Ref Range    Meter Glucose 86 74 - 99 mg/dL   POCT Glucose    Collection Time: 01/10/23  8:23 PM   Result Value Ref Range    Meter Glucose 110 (H) 74 - 99 mg/dL   CBC with Auto Differential    Collection Time: 01/11/23  3:05 AM   Result Value Ref Range    WBC 6.5 4.5 - 11.5 E9/L    RBC 3.67 3.50 - 5.50 E12/L    Hemoglobin 10.1 (L) 11.5 - 15.5 g/dL    Hematocrit 32.2 (L) 34.0 - 48.0 %    MCV 87.7 80.0 - 99.9 fL    MCH 27.5 26.0 - 35.0 pg    MCHC 31.4 (L) 32.0 - 34.5 %    RDW 16.7 (H) 11.5 - 15.0 fL    Platelets 398 860 - 148 E9/L    MPV 11.4 7.0 - 12.0 fL    Neutrophils % 76.0 43.0 - 80.0 %    Immature Granulocytes % 0.6 0.0 - 5.0 %    Lymphocytes % 13.5 (L) 20.0 - 42.0 %    Monocytes % 7.2 2.0 - 12.0 %    Eosinophils % 2.4 0.0 - 6.0 %    Basophils % 0.3 0.0 - 2.0 %    Neutrophils Absolute 4.97 1.80 - 7.30 E9/L    Immature Granulocytes # 0.04 E9/L    Lymphocytes Absolute 0.88 (L) 1.50 - 4.00 E9/L    Monocytes Absolute 0.47 0.10 - 0.95 E9/L    Eosinophils Absolute 0.16 0.05 - 0.50 E9/L    Basophils Absolute 0.02 0.00 - 0.20 Y5/Q   Basic Metabolic Panel w/ Reflex to MG    Collection Time: 01/11/23  3:05 AM   Result Value Ref Range    Sodium 144 132 - 146 mmol/L    Potassium reflex Magnesium 3.3 (L) 3.5 - 5.0 mmol/L    Chloride 110 (H) 98 - 107 mmol/L    CO2 20 (L) 22 - 29 mmol/L    Anion Gap 14 7 - 16 mmol/L    Glucose 97 74 - 99 mg/dL    BUN 41 (H) 6 - 23 mg/dL    Creatinine 2.7 (H) 0.5 - 1.0 mg/dL    Est, Glom Filt Rate 18 >=60 mL/min/1.73    Calcium 9.4 8.6 - 10.2 mg/dL   Magnesium    Collection Time: 01/11/23  3:05 AM   Result Value Ref Range    Magnesium 2.2 1.6 - 2.6 mg/dL   Phosphorus    Collection Time: 01/11/23  3:05 AM   Result Value Ref Range    Phosphorus 3.2 2.5 - 4.5 mg/dL   POCT Glucose    Collection Time: 01/11/23  6:34 AM   Result Value Ref Range    Meter Glucose 109 (H) 74 - 99 mg/dL       Radiology and other tests reviewed:  MRA NECK WO CONTRAST   Final Result   Normal examination. MRA HEAD WO CONTRAST   Final Result   1. No significant stenosis or occlusion. 2. Small aneurysm versus infundibulum of the right LEAH/anterior communicating   artery junction. RECOMMENDATIONS:   Unavailable         US RETROPERITONEAL COMPLETE   Final Result   1. Mild to moderate right and mild left hydronephrosis. Bilateral ureteric   stents noted in the renal pelvis. 2.  Bilateral renal cysts. Mildly echogenic renal parenchyma. Mildly   thinned bilateral cortical thickness. 3.  A López catheter is decompressing the bladder. MRI Brain WO Contrast   Final Result   Small acute infarct involving the posterior aspect of the left cerebellar   hemisphere. Mild chronic microvascular disease within the periventricular white matter. RECOMMENDATIONS:   Unavailable         XR CHEST PORTABLE   Final Result   No acute process.              Assessment:  Active Hospital Problems    Diagnosis Date Noted    Cerebrovascular accident (CVA) ([facility] [I63.9] 01/10/2023     Priority: Medium Uncontrolled type 2 diabetes mellitus [facility] hyperglycemia [facility] [E11.65] 01/10/2023     Priority: Medium    Cerebellar infarct ([facility] [I63.9] 01/09/2023     Priority: Medium    Aphasia [R47.01] 01/09/2023     Priority: Medium    Chronic indwelling López catheter [Z97.8] 12/22/2022     Priority: Medium    Acute renal failure ([facility] [N17.9] 12/21/2022     Priority: Medium    Chronic renal disease, stage III ([facility] [417029] [N18.30] 06/06/2022     Priority: Medium    Elevated lipids [E78.5] 01/15/2014    Hypertension [I10]        Plan:    Subacute Cerebrovascular accident (CVA)  MRI brain WO contrast: Small acute infarct involving the posterior aspect of the left cerebellar hemisphere. Mild chronic microvascular disease within the periventricular white matter. Neurology no acute intervention.   -Bedside swallow - if passed patient NPO, sips with meds, if fail swallow study  -MRA head and neck without contrast: MRA neck normal. No significant stenosis or occlusion. Small aneurysm versus infundibulum of the right LEAH/anterior communicating artery junction  -Echo with bubble study: no PFO. LVEF 60 to 65%. Moderate asymmetric septal hypertroph. stage I diastolic dysfunction  -Continue Speech therapy  - Asa 81 mg daily + Plavix 75 mg daily x 21 days  -Social work: [employer] once medically stable for discharge. No precert needed. Persistent aphasia  Ddx: seizure, progressive vascular dementia, metabolic encephalopathy, delirium, akinetic mutism unipolar depression with catatonia. MRI head findings with subacte cerebellar stroke not consistent  with presentation with aphasia and weakness. Patient exhibiting signs of catatonia. - Discussed case with neurology  - Ordered EEG  - Ordered CK  - Start Depakote 125 mg TID  - Start vitamin B12 daily  - Consider trial dose of Ativan to check for response  - Continue speech therapy, PT/OT     JEANNINE on CKD, improving  In the setting of decreased vascular perfusion.  Baseline creatinine 2. On discharge on 12/22 creatinine 2.8. US retroperitoneal complete: Mild to moderate right and mild left hydronephrosis. Bilateral ureteric stents noted in the renal pelvis. -Creatinine 2.7 today  -IVF 75 mL an hour  -Hold nephrotoxic medications  -Nephrology following, appreciate input    Bacteruria  Pseudomonas and MRSA grew last cx 12/2022. Treated with Levaquin last admission. Now grew nitrites.  -Likely colonized, not causing infection per ID note 12/26/22  -Urine culture no growth    Elevated troponin   Likely 2/2 JEANNINE. EKG: nonischemic  -Initial troponin 64>>59    Diabetes II  Last HbA1C 12.1 on 1/3/2023. Patients home medications include glipizide 5 mg.  -Dysphagia diet  -LDSSI w/ hypoglycemia protocol and x4 POCT glucose checks     Anxiety  -Hydroxyzine 25 mg IV q 6 PRN     Hypertension  -Home meds: Amlodipine 10 mg daily, atenolol 25 mg daily      Hyperlipidemia  -Continue Atorvastatin 40 mg daily     GERD  -Protonix 40 mg daily     Sacral Decubitus Ulcer  Stage 1  - Wound care    Anemia of Chronic Disease  12/22/22: Iron studies indicative of AOCD  -Monitor hgb    Dispo: [facility] pending clinical course        Pain Control: Tylenol 650 mg Q6HR PRN for mild pain  DVT ppx: Heparin 5000 TID  GI ppx: Protonix 40 daily  Code Status: DNR-CCA  [facility]: PT/OT: 14/24      Discharge to [facility], accepted, requires covid test prior to dc pending clinical course.     Mart Schilder, MD   Family Medicine Resident PGY-1  01/11/23   8:37 AM

## 2023-01-11 NOTE — CARE COORDINATION
Social work /  Discharge Planning:           Patient accepted by [facility]. No precert needed. [place], [address] and transport form completed. Will need negative covid result on day of discharge.     Electronically signed by CORNELIUS Galarza on 1/11/2023 at 2:39 PM

## 2023-01-11 NOTE — PROCEDURES
[address] Report    MRN: [id number]   PATIENT NAME: Shahla Silverman   DATE OF REPORT: 2023    DATE OF SERVICE: 2023    PHYSICIAN NAME: Jeri Valadez DO  Referring Physician: Dr Yasir Oreilly      Patient's : 1950   Patient's Age: 67 y.o. Gender: female     PROCEDURE: Routine EEG with video      Clinical Interpretation: This abnormal study showed evidence of:    A mild nonspecific encephalopathy which waxed and waned over the course of this study    No seizures or epileptiform discharges were noted during this study. ____________________________  Electronically signed by: Jeri Valadez DO, 2023 11:51 AM      Patient Clinical Information   Reason for Study: Patient undergoing evaluation for altered mental status  Patient State: Somnolent  Primary neurological diagnosis: Altered mental status   Primary indication for monitoring: Diagnosis of nonconvulsive seizures    Pertinent Medications and Treatments    divalproex     hydroxyzine     Sedatives administered: No  Intubated: No  Pharmacological paralytic: No    Reporting Period  Start of Study: 1046, 2023   End of Study:  1111, 2023       EEG Description  Digital video and scalp EEG monitoring was performed using the standard protocol for this laboratory. Scalp electrodes were applied in the international 10/20 system. Multiple digital montage arrangements were utilized for evaluation. EKG and video were recorded. Background:      Occipital rhythm (posterior dominant rhythm or PDR): Present   Frequency: 8.5 Hz  Voltage: Medium   Organization: fair   Reactivity to eye opening/closure: fair    Drowsiness: Absent  Sleep: Absent    Comments: Occasionally in the waking state the PDR slows to 7-8 Hz    Technical and Activation Procedures:  Hyperventilation: Not done        Photic stimulation: Not done        Reactivity to stimulation: Yes    Abnormalities:    I. Seizures?         No    II. Rhythmic or Periodic Patterns? No    III. Other Abnormalities?         No

## 2023-01-11 NOTE — PROGRESS NOTES
Nephrology Progress Note  [facility]    From consult 1/10/23-  HPI:   Full consult is deferred as patient was just seen last in this facility in December 2022. Vanessa Cadena is a 77-year-old female patient we were asked to see regarding her chronic kidney disease. She was admitted last in December 2022 during which time she had a cystoscopy with retrograde pyelogram and stent exchange with López placement on 12/22/2022 for her chronic obstructive uropathy. She does follow with urology for this. Her baseline creatinine since her last admission had been 2.8 to 3.1 mg/dL, remotely her baseline was 1.6 to 1.9 mg/dL. She had an acute kidney injury in the setting of obstructive uropathy during her December admission with a peak creatinine of 6.5 mg/dL which trended to 2.8 mg/dL at her discharge. I saw this morning she was alert but not able to answer any questions. She was speaking but unintelligible words. She is unable to provide any insight into her past medical history or what prompted her coming to the hospital.  From previous note she was brought to the hospital by the family who were concerned with her not being able to speak. She was not eating or drinking well for the past week. At her discharge in December she was sent to [address] [place] [address] for rehabilitation, however family took her home as they were advised perhaps this would help with her eating and drinking improving. Patient was from 1/7 to 1/9/2023 and she continued to deteriorate prompting her return to the hospital.    1/11/23-patient lethargic today. Sister at bedside notes that patient was alert and talkative last evening but has not been talking since she has been here today.   Had an EEG this morning    PMH:    Past Medical History:   Diagnosis Date    Arthritis     osteoporsis    Cancer ([facility]     colon and uterine    Closed fracture of radius 12/27/2016    Elevated lipids 1/15/2014    Headache     History of anal cancer 2/20/2017 Dr. Iesha Stauffer    Hyperlipidemia     Hypertension     Hypertension     Obesity     Proteinuria 6/25/2014    Type II or unspecified type diabetes mellitus without mention of complication, not stated as uncontrolled     Vaginal cancer ([facility] 5/22/2017    Vitamin D deficiency 11/6/2014       Patient Active Problem List   Diagnosis    Hypertension    Elevated lipids    Proteinuria    Vitamin D deficiency    Closed fracture of radius    History of anal cancer    Colostomy present ([facility]    Vaginal cancer ([facility]    Diabetes mellitus type 2 in obese ([facility]    Stuttering    Age-related osteoporosis with current pathological fracture with routine healing    Herpes zoster vaccination declined    Chronic renal disease, stage III ([facility] [274877]    Hydronephrosis due to obstruction of bladder    Acute renal failure (HCC)    Chronic indwelling López catheter    Bilateral hydronephrosis    Bacteriuria    Cerebellar infarct (HCC)    Aphasia    Cerebrovascular accident (CVA) ([facility]    Uncontrolled type 2 diabetes mellitus with hyperglycemia (HCC)       Meds:     potassium bicarb-citric acid  40 mEq Oral Once    white petrolatum   Topical BID    sodium bicarbonate  650 mg Oral BID    pantoprazole  40 mg Oral QAM AC    divalproex  125 mg Oral 3 times per day    vitamin B-12  1,000 mcg Oral Daily    sodium chloride flush  5-40 mL IntraVENous 2 times per day    insulin lispro  0-4 Units SubCUTAneous TID WC    insulin lispro  0-4 Units SubCUTAneous Nightly    clopidogrel  75 mg Oral Daily    atorvastatin  40 mg Oral Nightly    atenolol  25 mg Oral QAM    amLODIPine  10 mg Oral QAM    aspirin  81 mg Oral Daily    heparin (porcine)  5,000 Units SubCUTAneous Q8H        sodium bicarbonate infusion 125 mL/hr at 01/11/23 0145    sodium chloride      dextrose         Meds prn:     sodium chloride flush, sodium chloride, ondansetron **OR** ondansetron, polyethylene glycol, acetaminophen **OR** acetaminophen, glucose, dextrose bolus **OR** dextrose bolus, glucagon (rDNA), dextrose, perflutren lipid microspheres, hydrOXYzine    Meds prior to admission:     No current facility-administered medications on file prior to encounter. Current Outpatient Medications on File Prior to Encounter   Medication Sig Dispense Refill    hydrOXYzine HCl (ATARAX) 25 MG tablet       promethazine (PHENERGAN) 25 MG tablet       atenolol (TENORMIN) 50 MG tablet Take 0.5 tablets by mouth every morning 30 tablet 3    amLODIPine (NORVASC) 10 MG tablet Take 10 mg by mouth every morning      aspirin 81 MG EC tablet Take 81 mg by mouth every morning      atorvastatin (LIPITOR) 20 MG tablet Take 20 mg by mouth every morning      denosumab (PROLIA) 60 MG/ML SOSY SC injection Inject 60 mg into the skin every 6 months NEXT INJ DUE @ SEX INF: 02/2023      glipiZIDE (GLUCOTROL XL) 5 MG extended release tablet Take 5 mg by mouth Daily with lunch      magnesium oxide (MAG-OX) 400 MG tablet Take 400 mg by mouth 2 times daily      pantoprazole (PROTONIX) 40 MG tablet Take 40 mg by mouth every morning      sodium bicarbonate 650 MG tablet Take 650 mg by mouth 2 times daily      hydrALAZINE (APRESOLINE) 25 MG tablet Take 1 tablet by mouth every 12 hours 90 tablet 3       Allergies:    Betadine [povidone iodine], Lisinopril, Penicillins, and Tylenol [acetaminophen]    Social History:     reports that she has never smoked. She has never used smokeless tobacco. She reports that she does not drink alcohol and does not use drugs.     Family History:         Problem Relation Age of Onset    Kidney Disease Mother     High Blood Pressure Mother     Cancer Father     Diabetes Sister     Other Brother        ROS:     He is unable to participate in review of systems is nonverbal at the current time    Physical Exam:      Patient Vitals for the past 24 hrs:   BP Temp Temp src Pulse Resp SpO2 Weight   01/11/23 0642 (!) 149/82 97.7 °F (36.5 °C) Oral 78 18 100 % --   01/11/23 0310 -- -- -- -- -- -- 169 lb 1.6 oz (76.7 kg)   01/10/23 2012 (!) 164/85 98.6 °F (37 °C) Oral 79 16 100 % --   01/10/23 1554 (!) 146/85 98.2 °F (36.8 °C) Oral 63 16 100 % --         Intake/Output Summary (Last 24 hours) at 1/11/2023 1043  Last data filed at 1/11/2023 0845  Gross per 24 hour   Intake --   Output 2850 ml   Net -2850 ml       Constitutional: Awake unable to communicate  Head: normocephalic, atraumatic   Neck: supple, no jvd  Cardiovascular: S1-S2 no S3 or rub  Respiratory: Clear upper diminished bases  Gastrointestinal: soft, nontender, nondistended  Ext: Trace edema  Neuro: She is lethargic today and noncommunicative  Skin: dry, no rash   : Chronic López followed by urology      Data:    Recent Labs     01/09/23  1218 01/10/23  0357 01/11/23  0305   WBC 7.8 6.4 6.5   HGB 11.7 10.2* 10.1*   HCT 37.2 33.1* 32.2*   MCV 87.5 89.2 87.7    272 272       Recent Labs     01/09/23  1218 01/09/23  1252 01/10/23  0357 01/11/23  0305     --  145 144   K 4.3  --  3.8 3.3*     --  110* 110*   CO2 22  --  18* 20*   CREATININE 3.7*  --  3.1* 2.7*   BUN 59*  --  50* 41*   LABGLOM 12  --  15 18   GLUCOSE 97 88 73* 97   CALCIUM 10.1  --  9.2 9.4   PHOS  --   --   --  3.2   MG  --   --   --  2.2       Vit D, 25-Hydroxy   Date Value Ref Range Status   12/23/2022 26 (L) 30 - 100 ng/mL Final     Comment:     <20 ng/mL. ........... Rosamaria Riley Deficient  20-30 ng/mL. ......... Rosamaria Riley Insufficient   ng/mL. ........ Rosamaria Riley Sufficient  >100 ng/mL. .......... Rosamaria Riley Toxic         PTH   Date Value Ref Range Status   12/23/2022 151 (H) 15 - 65 pg/mL Final       Recent Labs     01/09/23  1218   ALT 11   AST 30   ALKPHOS 92   BILITOT 0.9       Recent Labs     01/09/23  1218   LABALBU 3.8       Ferritin   Date Value Ref Range Status   12/22/2022 342 ng/mL Final     Comment:     FERRITIN Reference Ranges:  Adult Males   20 - 60 years:    30 - 400 ng/mL  Adult females 16 - 61 years:    15 - 150 ng/mL  Adults greater than 60 years:   no established reference range  Pediatrics: no established reference range       Iron   Date Value Ref Range Status   12/22/2022 98 37 - 145 mcg/dL Final     TIBC   Date Value Ref Range Status   12/22/2022 241 (L) 250 - 450 mcg/dL Final       Vitamin B-12   Date Value Ref Range Status   12/22/2022 403 211 - 946 pg/mL Final       Folate   Date Value Ref Range Status   01/10/2023 6.5 4.8 - 24.2 ng/mL Final         Lab Results   Component Value Date/Time    COLORU Yellow 01/10/2023 01:46 AM    NITRU POSITIVE 01/10/2023 01:46 AM    GLUCOSEU Negative 01/10/2023 01:46 AM    KETUA 15 01/10/2023 01:46 AM    UROBILINOGEN 0.2 01/10/2023 01:46 AM    BILIRUBINUR Negative 01/10/2023 01:46 AM    BILIRUBINUR neg 10/07/2013 11:12 AM       Lab Results   Component Value Date/Time    OSMOU 248 (L) 12/21/2022 12:12 PM       No components found for: URIC    No results found for: LIPIDPAN    2D ECHO 1/10/23  Summary    Ejection fraction is visually estimated at 60 to 65%. Moderate asymmetric septal hypertrophy. There is doppler evidence of stage I diastolic dysfunction. Normal right ventricular size and function. Normal right atrium size. Agitated saline injected for shunt evaluation shows no evidence of shunt. Mild tricuspid regurgitation. RVSP is 29 mmHg. Assessment and Plan:    Acute kidney injury on chronic kidney disease stage 3b approaching stage IV-  JEANNINE in the setting of poor oral intake. CKD in the setting of chronic obstructive uropathy. She has chronic stents placed  Urine output 1550 mL past 24 hours and 1000 cc so far today  Continue IV fluids. Remotely her creatinine had been 1.6 to 1.9 mg/dL  More recently her creatinine has been 2.8 to 3.1 mg/dL  She has a history of obstructive uropathy follows with urology.   Peak serum creatinine during her December admission of 6.5 mg/dL; creatinine at presentation 3.7 mg/dL  S/P cystoscopy with retrograde pyelogram and stent exchange and López replacement 12/22/22  Creatinine improving from 3.7 to 2.7 mg/dL with IV fluids     2. Hypertension with chronic kidney disease stage I-4-  Blood pressure goal less than 130/80  Blood pressure is near goal     3. AG Metabolic acidosis-  In the setting of chronic kidney disease  Lactic acid 0.8  CO2 20  Follow on IV as well as p.o. bicarb     4. Anemia of chronic kidney disease-  Hemoglobin at goal greater than 10.0  Iron saturation 41% in December 2022     5. Hypokalemia-  K 3.3  Per nursing was unable to tolerate oral replacement  Will order IV replacement and follow    MARTIR Britton - CNP  Patient seen and examined. I had a face to face encounter with the patient. She remains somnolent opens eyes to name, but did not verbally interact  Agree with exam.    Agree with  formulation, assessment and plan as outlined above and directed by me. Addition and corrections were done as deemed appropriate.    My exam and plan include:   Continue current treatment as outlined above    ALAN Hansen MD

## 2023-01-12 LAB
ANION GAP SERPL CALCULATED.3IONS-SCNC: 14 MMOL/L (ref 7–16)
BASOPHILS ABSOLUTE: 0.02 E9/L (ref 0–0.2)
BASOPHILS RELATIVE PERCENT: 0.3 % (ref 0–2)
BUN BLDV-MCNC: 30 MG/DL (ref 6–23)
CALCIUM SERPL-MCNC: 9.4 MG/DL (ref 8.6–10.2)
CHLORIDE BLD-SCNC: 106 MMOL/L (ref 98–107)
CO2: 23 MMOL/L (ref 22–29)
CREAT SERPL-MCNC: 2.5 MG/DL (ref 0.5–1)
EOSINOPHILS ABSOLUTE: 0.17 E9/L (ref 0.05–0.5)
EOSINOPHILS RELATIVE PERCENT: 2.5 % (ref 0–6)
GFR SERPL CREATININE-BSD FRML MDRD: 20 ML/MIN/1.73
GLUCOSE BLD-MCNC: 100 MG/DL (ref 74–99)
HCT VFR BLD CALC: 33.4 % (ref 34–48)
HEMOGLOBIN: 10.4 G/DL (ref 11.5–15.5)
IMMATURE GRANULOCYTES #: 0.04 E9/L
IMMATURE GRANULOCYTES %: 0.6 % (ref 0–5)
LYMPHOCYTES ABSOLUTE: 1.19 E9/L (ref 1.5–4)
LYMPHOCYTES RELATIVE PERCENT: 17.7 % (ref 20–42)
MAGNESIUM: 1.9 MG/DL (ref 1.6–2.6)
MCH RBC QN AUTO: 27.4 PG (ref 26–35)
MCHC RBC AUTO-ENTMCNC: 31.1 % (ref 32–34.5)
MCV RBC AUTO: 87.9 FL (ref 80–99.9)
METER GLUCOSE: 117 MG/DL (ref 74–99)
METER GLUCOSE: 121 MG/DL (ref 74–99)
METER GLUCOSE: 99 MG/DL (ref 74–99)
MONOCYTES ABSOLUTE: 0.5 E9/L (ref 0.1–0.95)
MONOCYTES RELATIVE PERCENT: 7.4 % (ref 2–12)
NEUTROPHILS ABSOLUTE: 4.81 E9/L (ref 1.8–7.3)
NEUTROPHILS RELATIVE PERCENT: 71.5 % (ref 43–80)
PDW BLD-RTO: 16.8 FL (ref 11.5–15)
PHOSPHORUS: 2.4 MG/DL (ref 2.5–4.5)
PLATELET # BLD: 298 E9/L (ref 130–450)
PMV BLD AUTO: 11.5 FL (ref 7–12)
POTASSIUM REFLEX MAGNESIUM: 3.5 MMOL/L (ref 3.5–5)
RBC # BLD: 3.8 E12/L (ref 3.5–5.5)
SODIUM BLD-SCNC: 143 MMOL/L (ref 132–146)
WBC # BLD: 6.7 E9/L (ref 4.5–11.5)

## 2023-01-12 PROCEDURE — 80048 BASIC METABOLIC PNL TOTAL CA: CPT

## 2023-01-12 PROCEDURE — 84100 ASSAY OF PHOSPHORUS: CPT

## 2023-01-12 PROCEDURE — 2500000003 HC RX 250 WO HCPCS: Performed by: INTERNAL MEDICINE

## 2023-01-12 PROCEDURE — 97530 THERAPEUTIC ACTIVITIES: CPT

## 2023-01-12 PROCEDURE — 6370000000 HC RX 637 (ALT 250 FOR IP): Performed by: NURSE PRACTITIONER

## 2023-01-12 PROCEDURE — 6370000000 HC RX 637 (ALT 250 FOR IP)

## 2023-01-12 PROCEDURE — 1200000000 HC SEMI PRIVATE

## 2023-01-12 PROCEDURE — 83735 ASSAY OF MAGNESIUM: CPT

## 2023-01-12 PROCEDURE — 92507 TX SP LANG VOICE COMM INDIV: CPT | Performed by: SPEECH-LANGUAGE PATHOLOGIST

## 2023-01-12 PROCEDURE — 92610 EVALUATE SWALLOWING FUNCTION: CPT | Performed by: SPEECH-LANGUAGE PATHOLOGIST

## 2023-01-12 PROCEDURE — 2580000003 HC RX 258

## 2023-01-12 PROCEDURE — 92526 ORAL FUNCTION THERAPY: CPT | Performed by: SPEECH-LANGUAGE PATHOLOGIST

## 2023-01-12 PROCEDURE — 36415 COLL VENOUS BLD VENIPUNCTURE: CPT

## 2023-01-12 PROCEDURE — 2580000003 HC RX 258: Performed by: INTERNAL MEDICINE

## 2023-01-12 PROCEDURE — 85025 COMPLETE CBC W/AUTO DIFF WBC: CPT

## 2023-01-12 PROCEDURE — 6370000000 HC RX 637 (ALT 250 FOR IP): Performed by: STUDENT IN AN ORGANIZED HEALTH CARE EDUCATION/TRAINING PROGRAM

## 2023-01-12 PROCEDURE — 6370000000 HC RX 637 (ALT 250 FOR IP): Performed by: FAMILY MEDICINE

## 2023-01-12 PROCEDURE — 99232 SBSQ HOSP IP/OBS MODERATE 35: CPT | Performed by: FAMILY MEDICINE

## 2023-01-12 PROCEDURE — 82962 GLUCOSE BLOOD TEST: CPT

## 2023-01-12 PROCEDURE — 6360000002 HC RX W HCPCS

## 2023-01-12 RX ORDER — ESCITALOPRAM OXALATE 10 MG/1
5 TABLET ORAL DAILY
Status: DISCONTINUED | OUTPATIENT
Start: 2023-01-12 | End: 2023-01-13 | Stop reason: HOSPADM

## 2023-01-12 RX ORDER — SODIUM CHLORIDE, SODIUM LACTATE, POTASSIUM CHLORIDE, CALCIUM CHLORIDE 600; 310; 30; 20 MG/100ML; MG/100ML; MG/100ML; MG/100ML
INJECTION, SOLUTION INTRAVENOUS CONTINUOUS
Status: DISCONTINUED | OUTPATIENT
Start: 2023-01-12 | End: 2023-01-13 | Stop reason: HOSPADM

## 2023-01-12 RX ORDER — CARVEDILOL 6.25 MG/1
6.25 TABLET ORAL 2 TIMES DAILY WITH MEALS
Status: DISCONTINUED | OUTPATIENT
Start: 2023-01-12 | End: 2023-01-13 | Stop reason: HOSPADM

## 2023-01-12 RX ORDER — POTASSIUM CHLORIDE 20 MEQ/1
40 TABLET, EXTENDED RELEASE ORAL ONCE
Status: COMPLETED | OUTPATIENT
Start: 2023-01-12 | End: 2023-01-12

## 2023-01-12 RX ORDER — LORAZEPAM 1 MG/1
1 TABLET ORAL 3 TIMES DAILY
Status: DISCONTINUED | OUTPATIENT
Start: 2023-01-12 | End: 2023-01-13 | Stop reason: HOSPADM

## 2023-01-12 RX ORDER — LORAZEPAM 2 MG/ML
1 INJECTION INTRAMUSCULAR ONCE
Status: COMPLETED | OUTPATIENT
Start: 2023-01-12 | End: 2023-01-12

## 2023-01-12 RX ADMIN — PETROLATUM: 420 OINTMENT TOPICAL at 21:40

## 2023-01-12 RX ADMIN — PANTOPRAZOLE SODIUM 40 MG: 40 TABLET, DELAYED RELEASE ORAL at 06:31

## 2023-01-12 RX ADMIN — LORAZEPAM 1 MG: 1 TABLET ORAL at 21:40

## 2023-01-12 RX ADMIN — CARVEDILOL 6.25 MG: 6.25 TABLET, FILM COATED ORAL at 16:57

## 2023-01-12 RX ADMIN — SODIUM BICARBONATE 650 MG: 650 TABLET ORAL at 08:52

## 2023-01-12 RX ADMIN — SODIUM PHOSPHATE, MONOBASIC, MONOHYDRATE AND SODIUM PHOSPHATE, DIBASIC, ANHYDROUS 15 MMOL: 276; 142 INJECTION, SOLUTION INTRAVENOUS at 17:21

## 2023-01-12 RX ADMIN — ATENOLOL 25 MG: 25 TABLET ORAL at 08:52

## 2023-01-12 RX ADMIN — SODIUM CHLORIDE, POTASSIUM CHLORIDE, SODIUM LACTATE AND CALCIUM CHLORIDE: 600; 310; 30; 20 INJECTION, SOLUTION INTRAVENOUS at 21:35

## 2023-01-12 RX ADMIN — SODIUM CHLORIDE 25 ML: 9 INJECTION, SOLUTION INTRAVENOUS at 17:20

## 2023-01-12 RX ADMIN — DIVALPROEX SODIUM 125 MG: 125 CAPSULE, COATED PELLETS ORAL at 17:06

## 2023-01-12 RX ADMIN — SODIUM CHLORIDE, PRESERVATIVE FREE 10 ML: 5 INJECTION INTRAVENOUS at 21:40

## 2023-01-12 RX ADMIN — LORAZEPAM 1 MG: 2 INJECTION INTRAMUSCULAR; INTRAVENOUS at 13:29

## 2023-01-12 RX ADMIN — SODIUM BICARBONATE 650 MG: 650 TABLET ORAL at 21:40

## 2023-01-12 RX ADMIN — ESCITALOPRAM OXALATE 5 MG: 10 TABLET ORAL at 17:02

## 2023-01-12 RX ADMIN — HEPARIN SODIUM 5000 UNITS: 10000 INJECTION INTRAVENOUS; SUBCUTANEOUS at 16:57

## 2023-01-12 RX ADMIN — CYANOCOBALAMIN TAB 1000 MCG 1000 MCG: 1000 TAB at 08:52

## 2023-01-12 RX ADMIN — PETROLATUM: 420 OINTMENT TOPICAL at 08:53

## 2023-01-12 RX ADMIN — AMLODIPINE BESYLATE 10 MG: 10 TABLET ORAL at 08:52

## 2023-01-12 RX ADMIN — SODIUM CHLORIDE, PRESERVATIVE FREE 10 ML: 5 INJECTION INTRAVENOUS at 08:53

## 2023-01-12 RX ADMIN — HEPARIN SODIUM 5000 UNITS: 10000 INJECTION INTRAVENOUS; SUBCUTANEOUS at 02:53

## 2023-01-12 RX ADMIN — DIVALPROEX SODIUM 125 MG: 125 CAPSULE, COATED PELLETS ORAL at 06:31

## 2023-01-12 RX ADMIN — HEPARIN SODIUM 5000 UNITS: 10000 INJECTION INTRAVENOUS; SUBCUTANEOUS at 08:54

## 2023-01-12 RX ADMIN — SODIUM BICARBONATE: 84 INJECTION, SOLUTION INTRAVENOUS at 02:51

## 2023-01-12 RX ADMIN — POTASSIUM CHLORIDE 40 MEQ: 1500 TABLET, EXTENDED RELEASE ORAL at 17:02

## 2023-01-12 RX ADMIN — DIVALPROEX SODIUM 125 MG: 125 CAPSULE, COATED PELLETS ORAL at 21:40

## 2023-01-12 RX ADMIN — ASPIRIN 81 MG: 81 TABLET, CHEWABLE ORAL at 08:52

## 2023-01-12 RX ADMIN — ATORVASTATIN CALCIUM 40 MG: 40 TABLET, FILM COATED ORAL at 21:40

## 2023-01-12 RX ADMIN — CLOPIDOGREL BISULFATE 75 MG: 75 TABLET ORAL at 08:52

## 2023-01-12 NOTE — CARE COORDINATION
Social work / Discharge Planning:          Plan is for [facility]. No precert needed. Will need negative covid result on day of discharge. [place], [address] and transport form completed.    Electronically signed by CORNELIUS Arceo on 1/12/2023 at 9:58 AM

## 2023-01-12 NOTE — PROGRESS NOTES
SPEECH LANGUAGE PATHOLOGY  DAILY PROGRESS NOTE        PATIENT NAME:  Shannan Dawkins      :  1950          TODAY'S DATE:  2023 ROOM:  [address] [place] [address]-A        SWALLOWING    Repeat clinical swallow eval completed-see separate report         SPEECH/LANGUAGE    SPEECH PATHOLOGY DIAGNOSIS:    Pt demonstrated dysarthria during evaluation which was inconsistent throughout evaluation, however that 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 Pt was able to produce full complete sentences at times, however demonstrated significant difficulty producing one intelligible word at other times. SLP to continue to assess      Inconsistent dysfluencies were also observed during evalu, however discussed with physician who reports pt demonstrated a fluency disorder prior to this admit. Cognition not able to be fully assessed due to severity of dysarthria during this evaluation, however pt was oriented X3 and was able to follow 3 step verbal commands. Pt produced less verbal speech today compared to evaluation. Quick/shallow breaths continue with an audible cry/whine throughout decreasing intelligibility when pt does attempt to speak. SLP trained pt on comp strats to attempt to improve intelligibility with poor outcome. Pt able to follow simple commands today inconsistently. Multiple step commands not attempted. Pt at times can produce multiple word sentence on one breath and at other times produces one word per breath. Dysfluent repetitions not observed today. Education- Pt educated on results and POC. Pt trained on compensatory strategies for safe swallow with good outcome. Questions answered. Will continue SP intervention as per previously established POC. Annel PELAYO CCC/SLP [id number]  Speech Pathologist              CPT code(s) 31739  speech/language tx  Total minutes :  15 minutes

## 2023-01-12 NOTE — PROGRESS NOTES
[facility] [address]  Progress Note    Chief complaint :  Chief Complaint   Patient presents with    Aphasia     Pt has not been able to speak x 8 days-only a few words once in a while. Fatigue       Subjective:    No acute events overnight. Patient waking up at time of encounter. Patient is following commands and softly mouthing words. Per nurse, patient received hydroxyzine x1 for anxiety overnight. Past medical, surgical, family and social history were reviewed, non-contributory, and unchanged unless otherwise stated. Review of Systems   Unable to perform ROS: Mental status change     Objective:  /82   Pulse 68   Temp 98.7 °F (37.1 °C) (Oral)   Resp 16   Ht 5' 7\" (1.702 m)   Wt 169 lb 1.6 oz (76.7 kg)   SpO2 100%   BMI 26.48 kg/m²     Physical Exam  Constitutional:       Appearance: She is overweight. HENT:      Head: Normocephalic and atraumatic. Right Ear: External ear normal.      Left Ear: External ear normal.      Nose: No congestion. Mouth/Throat:      Mouth: Mucous membranes are dry. Eyes:      General:         Right eye: No discharge. Left eye: No discharge. Conjunctiva/sclera: Conjunctivae normal.      Pupils: Pupils are equal, round, and reactive to light. Comments: Upward gaze - able to redirect   Left oval pupil   Cardiovascular:      Rate and Rhythm: Normal rate and regular rhythm. Heart sounds: No murmur heard. No friction rub. No gallop. Pulmonary:      Effort: No respiratory distress. Breath sounds: No wheezing, rhonchi or rales. Abdominal:      General: Abdomen is flat. There is no distension. Palpations: Abdomen is soft. Tenderness: There is no guarding. Comments: Ostomy bag in place. No erythema around ostomy site noted     Genitourinary:     Comments: López in place. Urine is yellow minimal reddish  Musculoskeletal:      Right lower leg: No edema.       Left lower leg: No edema.   Skin:     General: Skin is warm and dry. Capillary Refill: Capillary refill takes less than 2 seconds. Neurological:      Mental Status: She is easily aroused. Cranial Nerves: No cranial nerve deficit. Sensory: No sensory deficit. Motor: Weakness (4/5 upper and lower extremity weakness) present. No tremor, seizure activity or pronator drift.       Comments: Aware of surrounding      Labs:  Recent Results (from the past 24 hour(s))   CBC with Auto Differential    Collection Time: 01/11/23  3:05 AM   Result Value Ref Range    WBC 6.5 4.5 - 11.5 E9/L    RBC 3.67 3.50 - 5.50 E12/L    Hemoglobin 10.1 (L) 11.5 - 15.5 g/dL    Hematocrit 32.2 (L) 34.0 - 48.0 %    MCV 87.7 80.0 - 99.9 fL    MCH 27.5 26.0 - 35.0 pg    MCHC 31.4 (L) 32.0 - 34.5 %    RDW 16.7 (H) 11.5 - 15.0 fL    Platelets 961 153 - 516 E9/L    MPV 11.4 7.0 - 12.0 fL    Neutrophils % 76.0 43.0 - 80.0 %    Immature Granulocytes % 0.6 0.0 - 5.0 %    Lymphocytes % 13.5 (L) 20.0 - 42.0 %    Monocytes % 7.2 2.0 - 12.0 %    Eosinophils % 2.4 0.0 - 6.0 %    Basophils % 0.3 0.0 - 2.0 %    Neutrophils Absolute 4.97 1.80 - 7.30 E9/L    Immature Granulocytes # 0.04 E9/L    Lymphocytes Absolute 0.88 (L) 1.50 - 4.00 E9/L    Monocytes Absolute 0.47 0.10 - 0.95 E9/L    Eosinophils Absolute 0.16 0.05 - 0.50 E9/L    Basophils Absolute 0.02 0.00 - 0.20 B3/T   Basic Metabolic Panel w/ Reflex to MG    Collection Time: 01/11/23  3:05 AM   Result Value Ref Range    Sodium 144 132 - 146 mmol/L    Potassium reflex Magnesium 3.3 (L) 3.5 - 5.0 mmol/L    Chloride 110 (H) 98 - 107 mmol/L    CO2 20 (L) 22 - 29 mmol/L    Anion Gap 14 7 - 16 mmol/L    Glucose 97 74 - 99 mg/dL    BUN 41 (H) 6 - 23 mg/dL    Creatinine 2.7 (H) 0.5 - 1.0 mg/dL    Est, Glom Filt Rate 18 >=60 mL/min/1.73    Calcium 9.4 8.6 - 10.2 mg/dL   Magnesium    Collection Time: 01/11/23  3:05 AM   Result Value Ref Range    Magnesium 2.2 1.6 - 2.6 mg/dL   Phosphorus    Collection Time: 01/11/23 3:05 AM   Result Value Ref Range    Phosphorus 3.2 2.5 - 4.5 mg/dL   CK    Collection Time: 01/11/23  3:05 AM   Result Value Ref Range    Total  (H) 20 - 180 U/L   POCT Glucose    Collection Time: 01/11/23  6:34 AM   Result Value Ref Range    Meter Glucose 109 (H) 74 - 99 mg/dL   COVID-19, Rapid    Collection Time: 01/11/23 10:05 AM    Specimen: Nasopharyngeal Swab   Result Value Ref Range    SARS-CoV-2, NAAT Not Detected Not Detected   POCT Glucose    Collection Time: 01/11/23 10:38 AM   Result Value Ref Range    Meter Glucose 110 (H) 74 - 99 mg/dL   POCT Glucose    Collection Time: 01/11/23  4:00 PM   Result Value Ref Range    Meter Glucose 100 (H) 74 - 99 mg/dL   POCT Glucose    Collection Time: 01/11/23  9:03 PM   Result Value Ref Range    Meter Glucose 116 (H) 74 - 99 mg/dL       Radiology and other tests reviewed:  MRA NECK WO CONTRAST   Final Result   Normal examination. MRA HEAD WO CONTRAST   Final Result   1. No significant stenosis or occlusion. 2. Small aneurysm versus infundibulum of the right LEAH/anterior communicating   artery junction. RECOMMENDATIONS:   Unavailable         US RETROPERITONEAL COMPLETE   Final Result   1. Mild to moderate right and mild left hydronephrosis. Bilateral ureteric   stents noted in the renal pelvis. 2.  Bilateral renal cysts. Mildly echogenic renal parenchyma. Mildly   thinned bilateral cortical thickness. 3.  A López catheter is decompressing the bladder. MRI Brain WO Contrast   Final Result   Small acute infarct involving the posterior aspect of the left cerebellar   hemisphere. Mild chronic microvascular disease within the periventricular white matter. RECOMMENDATIONS:   Unavailable         XR CHEST PORTABLE   Final Result   No acute process.              Assessment:  Active Hospital Problems    Diagnosis Date Noted    Cerebrovascular accident (CVA) ([facility] [I63.9] 01/10/2023     Priority: Medium    Uncontrolled type 2 diabetes mellitus with hyperglycemia ([facility] [address] [E11.65] 01/10/2023     Priority: Medium    Cerebellar infarct ([facility] [address] [I63.9] 01/09/2023     Priority: Medium    Aphasia [R47.01] 01/09/2023     Priority: Medium    Chronic indwelling López catheter [Z97.8] 12/22/2022     Priority: Medium    Acute renal failure ([facility] [address] [N17.9] 12/21/2022     Priority: Medium    Chronic renal disease, stage III [facility]) [758901] [N18.30] 06/06/2022     Priority: Medium    Elevated lipids [E78.5] 01/15/2014    Hypertension [I10]        Plan:    Persistent aphasia  Ddx: seizure, progressive vascular dementia, metabolic encephalopathy, delirium, akinetic mutism unipolar depression with catatonia. MRI head findings with subacte cerebellar stroke not consistent  with presentation with aphasia and weakness. Patient exhibiting signs of catatonia. - EEG: A mild nonspecific encephalopathy which waxed and waned over the course of this studyNo seizures or epileptiform discharges were noted during this study. - CK elevated which may represent persistent rigidity from a catatonic state  - Continue Depakote 125 mg TID  - Continue vitamin B12 daily  - Consider psych consul and/or trial dose of Ativan to check for response  - Continue daily speech therapy, PT/OT    Subacute Cerebrovascular accident (CVA)  MRI brain WO contrast: Small acute infarct involving the posterior aspect of the left cerebellar hemisphere. Mild chronic microvascular disease within the periventricular white matter. Neurology no acute intervention. -MRA head and neck without contrast: MRA neck normal. No significant stenosis or occlusion. Small aneurysm versus infundibulum of the right LEAH/anterior communicating artery junction  -Echo with bubble study: no PFO. LVEF 60 to 65%.  Moderate asymmetric septal hypertroph. stage I diastolic dysfunction  -Continue daily speech therapy, PT/OT  - Asa 81 mg daily + Plavix 75 mg daily x 21 days  - Dysphagia diet     JEANNINE on CKD, improving  In the setting of decreased vascular perfusion. Previously, Baseline creatinine 2. On discharge on 12/22 creatinine 2.8. US retroperitoneal complete: Mild to moderate right and mild left hydronephrosis. Bilateral ureteric stents noted in the renal pelvis. -Creatinine 2. today   -IVF 75 mL an hour  -Hold nephrotoxic medications  -Nephrology following, appreciate input    Bacteruria  Pseudomonas and MRSA grew last cx 12/2022. Treated with Levaquin last admission. Now grew nitrites.  -Likely colonized, not causing infection per ID note 12/26/22  -Urine culture no growth    Elevated troponin   Likely 2/2 JEANNINE. EKG: nonischemic  -Initial troponin 64>>59    Diabetes II  Last HbA1C 12.1 on 1/3/2023. Patients home medications include glipizide 5 mg.  -Dysphagia diet  -LDSSI w/ hypoglycemia protocol and x4 POCT glucose checks     Anxiety  -Hydroxyzine 25 mg IV q 6 PRN     Hypertension  -Home meds: Amlodipine 10 mg daily, atenolol 25 mg daily      Hyperlipidemia  -Continue Atorvastatin 40 mg daily     GERD  -Protonix 40 mg daily     Stage 1 Sacral Decubitus Ulcer  - Daily wound care  - Q2 repositioning    Anemia of Chronic Disease  12/22/22: Iron studies indicative of AOCD  -Monitor hgb        Pain Control: Tylenol 650 mg Q6HR PRN for mild pain  DVT ppx: Heparin 5000 TID  GI ppx: Protonix 40 daily  Diet: Dysphagia diet  Code Status: DNR-CCA  [facility]: PT/OT: 14/24      Discharge to [facility], accepted, requires covid test prior to dc pending clinical course.     Jv Brian MD   Family Medicine Resident PGY-2  01/11/23   10:18 PM

## 2023-01-12 NOTE — PROGRESS NOTES
[address]  Progress Note    Chief complaint :  Chief Complaint   Patient presents with    Aphasia     Pt has not been able to speak x 8 days-only a few words once in a while. Fatigue       Subjective:    No overnight problems. History/ROS was limited considering patients current aphasia. Denies any pain currently and is able to follow simple commands. Patient attention waning during duration of exam but was responsive following repeat verbal prompting. Past medical, surgical, family and social history were reviewed, non-contributory, and unchanged unless otherwise stated. Review of Systems  ROS limited by mental status    Objective:  /82   Pulse 68   Temp 98.7 °F (37.1 °C) (Oral)   Resp 16   Ht 5' 7\" (1.702 m)   Wt 167 lb 14.4 oz (76.2 kg)   SpO2 100%   BMI 26.30 kg/m²     Physical Exam  Constitutional:       General: She is not in acute distress. Appearance: She is not toxic-appearing. HENT:      Head: Normocephalic and atraumatic. Mouth/Throat:      Mouth: Mucous membranes are moist.      Pharynx: Oropharynx is clear. Cardiovascular:      Rate and Rhythm: Normal rate and regular rhythm. Pulmonary:      Breath sounds: Normal breath sounds. Abdominal:      General: Abdomen is flat. Palpations: Abdomen is soft. Tenderness: There is no guarding. Comments: Ostomy present with gas in bag. No tenderness, erythema or excoriation surrounding the appliance    Musculoskeletal:         General: No swelling. Cervical back: No rigidity. Right lower leg: No edema. Left lower leg: No edema. Skin:     General: Skin is warm and dry. Neurological:      Mental Status: She is disoriented. Motor: Weakness present.      **  Labs:  Recent Results (from the past 24 hour(s))   COVID-19, Rapid    Collection Time: 01/11/23 10:05 AM    Specimen: Nasopharyngeal Swab   Result Value Ref Range    SARS-CoV-2, NAAT Not Detected Not Detected   POCT Glucose    Collection Time: 01/11/23 10:38 AM   Result Value Ref Range    Meter Glucose 110 (H) 74 - 99 mg/dL   POCT Glucose    Collection Time: 01/11/23  4:00 PM   Result Value Ref Range    Meter Glucose 100 (H) 74 - 99 mg/dL   POCT Glucose    Collection Time: 01/11/23  9:03 PM   Result Value Ref Range    Meter Glucose 116 (H) 74 - 99 mg/dL   CBC with Auto Differential    Collection Time: 01/12/23  2:54 AM   Result Value Ref Range    WBC 6.7 4.5 - 11.5 E9/L    RBC 3.80 3.50 - 5.50 E12/L    Hemoglobin 10.4 (L) 11.5 - 15.5 g/dL    Hematocrit 33.4 (L) 34.0 - 48.0 %    MCV 87.9 80.0 - 99.9 fL    MCH 27.4 26.0 - 35.0 pg    MCHC 31.1 (L) 32.0 - 34.5 %    RDW 16.8 (H) 11.5 - 15.0 fL    Platelets 188 514 - 349 E9/L    MPV 11.5 7.0 - 12.0 fL    Neutrophils % 71.5 43.0 - 80.0 %    Immature Granulocytes % 0.6 0.0 - 5.0 %    Lymphocytes % 17.7 (L) 20.0 - 42.0 %    Monocytes % 7.4 2.0 - 12.0 %    Eosinophils % 2.5 0.0 - 6.0 %    Basophils % 0.3 0.0 - 2.0 %    Neutrophils Absolute 4.81 1.80 - 7.30 E9/L    Immature Granulocytes # 0.04 E9/L    Lymphocytes Absolute 1.19 (L) 1.50 - 4.00 E9/L    Monocytes Absolute 0.50 0.10 - 0.95 E9/L    Eosinophils Absolute 0.17 0.05 - 0.50 E9/L    Basophils Absolute 0.02 0.00 - 0.20 Y9/F   Basic Metabolic Panel w/ Reflex to MG    Collection Time: 01/12/23  2:54 AM   Result Value Ref Range    Sodium 143 132 - 146 mmol/L    Potassium reflex Magnesium 3.5 3.5 - 5.0 mmol/L    Chloride 106 98 - 107 mmol/L    CO2 23 22 - 29 mmol/L    Anion Gap 14 7 - 16 mmol/L    Glucose 100 (H) 74 - 99 mg/dL    BUN 30 (H) 6 - 23 mg/dL    Creatinine 2.5 (H) 0.5 - 1.0 mg/dL    Est, Glom Filt Rate 20 >=60 mL/min/1.73    Calcium 9.4 8.6 - 10.2 mg/dL   Magnesium    Collection Time: 01/12/23  2:54 AM   Result Value Ref Range    Magnesium 1.9 1.6 - 2.6 mg/dL   Phosphorus    Collection Time: 01/12/23  2:54 AM   Result Value Ref Range    Phosphorus 2.4 (L) 2.5 - 4.5 mg/dL   POCT Glucose    Collection Time: 01/12/23  6:32 AM   Result Value Ref Range    Meter Glucose 99 74 - 99 mg/dL       Radiology and other tests reviewed:  MRA NECK WO CONTRAST   Final Result   Normal examination. MRA HEAD WO CONTRAST   Final Result   1. No significant stenosis or occlusion. 2. Small aneurysm versus infundibulum of the right LEAH/anterior communicating   artery junction. RECOMMENDATIONS:   Unavailable         US RETROPERITONEAL COMPLETE   Final Result   1. Mild to moderate right and mild left hydronephrosis. Bilateral ureteric   stents noted in the renal pelvis. 2.  Bilateral renal cysts. Mildly echogenic renal parenchyma. Mildly   thinned bilateral cortical thickness. 3.  A López catheter is decompressing the bladder. MRI Brain WO Contrast   Final Result   Small acute infarct involving the posterior aspect of the left cerebellar   hemisphere. Mild chronic microvascular disease within the periventricular white matter. RECOMMENDATIONS:   Unavailable         XR CHEST PORTABLE   Final Result   No acute process. Assessment:  Active Hospital Problems    Diagnosis Date Noted    Cerebrovascular accident (CVA) ([address] [I63.9] 01/10/2023     Priority: Medium    Uncontrolled type 2 diabetes mellitus with hyperglycemia ([address] [E11.65] 01/10/2023     Priority: Medium    Cerebellar infarct ([address] [I63.9] 01/09/2023     Priority: Medium    Aphasia [R47.01] 01/09/2023     Priority: Medium    Chronic indwelling López catheter [Z97.8] 12/22/2022     Priority: Medium    Acute renal failure ([address] [N17.9] 12/21/2022     Priority: Medium    Chronic renal disease, stage III ([address] [813921] [N18.30] 06/06/2022     Priority: Medium    Elevated lipids [E78.5] 01/15/2014    Hypertension [I10]        Plan:  #Subacute Aphasia:  Likely secondary to seizure vs progressive vascular dementia vs depression with mutism and catatonia.  MRI head showing subacute L cerebellar infarct, does not explain current neurological findings of weakness, aphasia and catatonia. -EEG: showing signs of encephalopathy without seizure activity   -Continue depakote 125 mg TID for mood stabilization   - Continue B12 daily  - Consider trial dose of ativan which may improve symptoms in setting of ongoing depression  - Continual speech, Pt OT  - Appreciate further neurology recommendations     #Subacute L cerebellar infarct   MRI brain without contrast obtained in setting of JEANNINE showing acute infarct involving L posterior cerebellar hemisphere. Chronic microvascular disease also noted. No acute intervention per neurology considering timing of symptoms. -MRA head/neck without contrast normal with exception of small aneurysmal dilation vs infundibulum of right LEAH. -Echo with bubble study: no PFO, LVEF 60-65%, moderate asymmetric spetal hypertrophy. Mild diastolic dysfunction  -Continue ASA 81mg, plavix 75mg for 21 days for management     #JEANNINE on CKD, resolving   Previously baseline creatinine 2, discharge 2.8 in December. Mild to moderate bilateral hydronephrosis consistent with history s/p stenting.   -Cr 2.5 today  -Continue IVF 75 mL hour  -Continue appreciated nephrology recommendations     #Bacteruria  Likely colonized per ID, no growth to date of urine culture. Elevated troponin   Likely 2/2 JEANNINE. EKG: nonischemic  -Initial troponin 64>>59     Diabetes II  Last HbA1C 12.1 on 1/3/2023.  Patients home medications include glipizide 5 mg.  -Dysphagia diet  -LDSSI w/ hypoglycemia protocol and x4 POCT glucose checks     Anxiety  -Hydroxyzine 25 mg IV q 6 PRN  -Consider addition of ativan and psych consult      Hypertension  -Home meds: Amlodipine 10 mg daily, atenolol 25 mg daily      Hyperlipidemia  -Continue Atorvastatin 40 mg daily     GERD  -Protonix 40 mg daily      Stage 1 Sacral Decubitus Ulcer  - Daily wound care  - Q2 repositioning     Anemia of Chronic Disease  12/22/22: Iron studies indicative of AOCD  -Monitor hgb      Pain Control:  Tylenol 650 mg Q6HR PRN for mild pain  DVT ppx: Warfarin mg daily  GI ppx: IV protonix 40 daily  Code Status: DNR-CCA  Diet: Dysphagia diet, soft/bitesized   PT/OT: 14/24      Disposition: Discharge to [address] [place] [address] without pre-cert, pending clinical course.       Jermaine Milton   MS3  01/12/23   7:20 AM

## 2023-01-12 NOTE — PROGRESS NOTES
Occupational Therapy  OT BEDSIDE TREATMENT NOTE      Date:2023  Patient Name: Yaquelin Salazar  MRN: [id number]  : 1950  Room: [address] [place] [address]     Evaluating OT: NELIDA Lambert, OTR/L [id number]  Referring Savi Martinez MD  Specific Provider Orders: OT eval and treat   Recommended Adaptive Equipment: 24 hr assist      Diagnosis: CVA (cerebellum)   Surgery: none   Pertinent Medical History: CA, HTN, DM   Precautions:  Fall Risk, aphasia?      Assessment of current deficits   [x] Functional mobility           [x]ADLs           [x] Strength                  [x]Cognition   [x] Functional transfers         [x] IADLs         [x] Safety Awareness   [x]Endurance   [] Fine Coordination                         [x] Balance      [] Vision/perception   [x]Sensation     []Gross Motor Coordination             [] ROM           [] Delirium                   [] Motor Control      OT PLAN OF CARE   OT POC based on physician orders, patient diagnosis and results of clinical assessment     Frequency/Duration:  1-3 days/wk for 2 weeks PRN   Specific OT Treatment to include:   * Instruction/training on adapted ADL techniques and AE recommendations to increase functional independence within precautions       * Training on energy conservation strategies, correct breathing pattern and techniques to improve independence/tolerance for self-care routine  * Functional transfer/mobility training/DME recommendations for increased independence, safety, and fall prevention  * Patient/Family education to increase follow through with safety techniques and functional independence  * Recommendation of environmental modifications for increased safety with functional transfers/mobility and ADLs  * Cognitive retraining/development of therapeutic activities to improve problem solving, judgement, memory, and attention for increased safety/participation in ADL/IADL tasks  * Therapeutic exercise to improve motor endurance, ROM, and functional strength for ADLs/functional transfers  * Therapeutic activities to facilitate/challenge dynamic balance, stand tolerance for increased safety and independence with ADLs  * Therapeutic activities to facilitate gross/fine motor skills for increased independence with ADLs  * Neuro-muscular re-education: facilitation of righting/equilibrium reactions, midline orientation, scapular stability/mobility, normalization of muscle tone, and facilitation of volitional active controled movement     Modified [place] Scale (MRS)  Score     Description  0             No symptoms  1             No significant disability despite symptoms  2             Slight disability; able to look after own affairs  3             Moderate disability; able to ambulate without assist/ requires assist with ADLs  4             Moderate/Severe disability;requires assist to ambulate/assist with ADLs  5             Severe disability;bedridden/incontinent   6               Score:5     Home Living: Pt unable to provide prior history. Pain Level: 0/10  Cognition: A&O: 1/4 (self); Follows 1 step directions inconsistently, with repetition and increased time             Memory:  poor             Sequencing:  poor              Problem solving:  poor              Judgement/safety:  poor     Functional Assessment:  AM-PAC Daily Activity Raw Score:     Initial Eval Status  Date: 1/10/23 Treatment Status  Date: 23 STGs=LTGs  Time Frame: 10-14 days   Feeding SBA    Set-up   Grooming SBA (seated EOB)    Set-up   UB Dressing Mod A   max A Min A   LB Dressing Max A (assist with socks) Max A  Mod A    Bathing Max A (simulated)   Mod A    Toileting Max A knutson  Mod A   Bed Mobility  Log roll: Mod A  Supine to sit: Mod A   Sit to supine: Mod A  Supine to sit mod A  Sit to supine max A  Log roll Min A  Supine to sit: Min A   Sit to supine: Min A   Functional Transfers Sit to stand: Max A   Stand to sit: Max A  Stand pivot: NT  Commode: NT NT, patient appeared anxious with SOB with bed mobility. Patient returned to bed  Min A   Functional Mobility Mod A (using ww, a few side steps)   Min A   Balance Sitting: Min A  Standing: Mod A Sitting balance min/mod A      Activity Tolerance Fair-  poor. Patient became fearful and appeared anxious with minimal exertion. Comments:  patient cleared with nursing and agreeable to session. Patient initially able to minimally respond verbally with increased time, however once session initiated with bed mobility, patient became very fearful and appeared anxious with any movement. Patient then not able to respond verbally and inconsistently able to follow simple one step direction. Patient also demonstrated difficulty opening eyes when asked. Patient was returned to bed. O2 sat 98, HR 80s. Nursing was notified of change in functional engagement with therapist during session. Nursing assessed patient and stated that patient gets nervous/anxious with transitions/new people. Patient remained in bed with call light in reach and alarm on. Patient appeared to be calmer after several minutes of laying without engagement with therapist.    Education/treatment: ADL and functional transfer/activity performed to increase safety and independence during self care tasks. Education provided on safety awareness, adl reeducation, functional bed mobility, orientation    Pt has made progress towards set goals.      Time In: 10:36  Time Out: 11:03     Min Units   Therapeutic Ex [id number]     Therapeutic Activities [id number]   ADL/Self Care 98230     Orthotic Management 94060     Neuro Re-Ed [id number]     Non-Billable Time     TOTAL TIMED TREATMENT 27 [id number] Kristen SCHWAB/JOSEFINA [id number]

## 2023-01-12 NOTE — PROGRESS NOTES
Nephrology Progress Note  [facility]    From consult 1/10/23-  HPI:   Full consult is deferred as patient was just seen last in this facility in December 2022. Santhosh Nunes is a 68-year-old female patient we were asked to see regarding her chronic kidney disease. She was admitted last in December 2022 during which time she had a cystoscopy with retrograde pyelogram and stent exchange with López placement on 12/22/2022 for her chronic obstructive uropathy. She does follow with urology for this. Her baseline creatinine since her last admission had been 2.8 to 3.1 mg/dL, remotely her baseline was 1.6 to 1.9 mg/dL. She had an acute kidney injury in the setting of obstructive uropathy during her December admission with a peak creatinine of 6.5 mg/dL which trended to 2.8 mg/dL at her discharge. I saw this morning she was alert but not able to answer any questions. She was speaking but unintelligible words. She is unable to provide any insight into her past medical history or what prompted her coming to the hospital.  From previous note she was brought to the hospital by the family who were concerned with her not being able to speak. She was not eating or drinking well for the past week. At her discharge in December she was sent to [address] [place] [address] for rehabilitation, however family took her home as they were advised perhaps this would help with her eating and drinking improving. Patient was from 1/7 to 1/9/2023 and she continued to deteriorate prompting her return to the hospital.    1/12/23-patient somnolent this morning. No family at the bedside in no acute distress did not verbalize with me.       PMH:    Past Medical History:   Diagnosis Date    Arthritis     osteoporsis    Cancer ([facility]     colon and uterine    Closed fracture of radius 12/27/2016    Elevated lipids 1/15/2014    Headache     History of anal cancer 2/20/2017    Dr. Rashard Mendoza    Hyperlipidemia     Hypertension     Hypertension     Obesity Proteinuria 6/25/2014    Type II or unspecified type diabetes mellitus without mention of complication, not stated as uncontrolled     Vaginal cancer ([facility] 5/22/2017    Vitamin D deficiency 11/6/2014       Patient Active Problem List   Diagnosis    Hypertension    Elevated lipids    Proteinuria    Vitamin D deficiency    Closed fracture of radius    History of anal cancer    Colostomy present ([facility]    Vaginal cancer ([facility]    Diabetes mellitus type 2 in obese ([facility]    Stuttering    Age-related osteoporosis with current pathological fracture with routine healing    Herpes zoster vaccination declined    Chronic renal disease, stage III ([facility] [[id number]]    Hydronephrosis due to obstruction of bladder    Acute renal failure (HCC)    Chronic indwelling López catheter    Bilateral hydronephrosis    Bacteriuria    Cerebellar infarct (HCC)    Aphasia    Cerebrovascular accident (CVA) ([facility]    Uncontrolled type 2 diabetes mellitus with hyperglycemia ([facility])       Meds:     carvedilol  6.25 mg Oral BID WC    white petrolatum   Topical BID    sodium bicarbonate  650 mg Oral BID    pantoprazole  40 mg Oral QAM AC    divalproex  125 mg Oral 3 times per day    vitamin B-12  1,000 mcg Oral Daily    sodium chloride flush  5-40 mL IntraVENous 2 times per day    insulin lispro  0-4 Units SubCUTAneous TID WC    insulin lispro  0-4 Units SubCUTAneous Nightly    clopidogrel  75 mg Oral Daily    atorvastatin  40 mg Oral Nightly    atenolol  25 mg Oral QAM    amLODIPine  10 mg Oral QAM    aspirin  81 mg Oral Daily    heparin (porcine)  5,000 Units SubCUTAneous Q8H        sodium bicarbonate infusion 125 mL/hr at 01/12/23 0251    sodium chloride      dextrose         Meds prn:     sodium chloride flush, sodium chloride, ondansetron **OR** ondansetron, polyethylene glycol, acetaminophen **OR** acetaminophen, glucose, dextrose bolus **OR** dextrose bolus, glucagon (rDNA), dextrose, perflutren lipid microspheres, hydrOXYzine    Meds prior to admission:     No current facility-administered medications on file prior to encounter. Current Outpatient Medications on File Prior to Encounter   Medication Sig Dispense Refill    hydrOXYzine HCl (ATARAX) 25 MG tablet       promethazine (PHENERGAN) 25 MG tablet       atenolol (TENORMIN) 50 MG tablet Take 0.5 tablets by mouth every morning 30 tablet 3    amLODIPine (NORVASC) 10 MG tablet Take 10 mg by mouth every morning      aspirin 81 MG EC tablet Take 81 mg by mouth every morning      atorvastatin (LIPITOR) 20 MG tablet Take 20 mg by mouth every morning      denosumab (PROLIA) 60 MG/ML SOSY SC injection Inject 60 mg into the skin every 6 months NEXT INJ DUE @ [facility] INF: 02/2023      glipiZIDE (GLUCOTROL XL) 5 MG extended release tablet Take 5 mg by mouth Daily with lunch      magnesium oxide (MAG-OX) 400 MG tablet Take 400 mg by mouth 2 times daily      pantoprazole (PROTONIX) 40 MG tablet Take 40 mg by mouth every morning      sodium bicarbonate 650 MG tablet Take 650 mg by mouth 2 times daily      hydrALAZINE (APRESOLINE) 25 MG tablet Take 1 tablet by mouth every 12 hours 90 tablet 3       Allergies:    Betadine [povidone iodine], Lisinopril, Penicillins, and Tylenol [acetaminophen]    Social History:     reports that she has never smoked. She has never used smokeless tobacco. She reports that she does not drink alcohol and does not use drugs.     Family History:         Problem Relation Age of Onset    Kidney Disease Mother     High Blood Pressure Mother     Cancer Father     Diabetes Sister     Other Brother        ROS:     He is unable to participate in review of systems is nonverbal at the current time    Physical Exam:      Patient Vitals for the past 24 hrs:   BP Temp Temp src Pulse Resp SpO2 Weight   01/12/23 0852 (!) 141/72 -- -- 77 -- -- --   01/12/23 0752 (!) 141/72 97.8 °F (36.6 °C) Oral 74 16 100 % --   01/12/23 0055 -- -- -- -- -- -- 167 lb 14.4 oz (76.2 kg)   01/11/23 1928 132/82 98.7 °F (37.1 °C) Oral 68 16 100 % --   01/11/23 1602 124/72 98.6 °F (37 °C) Oral 57 18 99 % --         Intake/Output Summary (Last 24 hours) at 1/12/2023 1046  Last data filed at 1/12/2023 0835  Gross per 24 hour   Intake 2500.2 ml   Output 3250 ml   Net -749.8 ml       Constitutional: Awake unable to communicate  Head: normocephalic, atraumatic   Neck: supple, no jvd  Cardiovascular: S1-S2 no S3 or rub  Respiratory: Clear upper diminished bases  Gastrointestinal: soft, nontender, nondistended  Ext: Trace edema  Neuro: She is lethargic today and noncommunicative  Skin: dry, no rash   : Chronic López followed by urology      Data:    Recent Labs     01/10/23  0357 01/11/23  0305 01/12/23  0254   WBC 6.4 6.5 6.7   HGB 10.2* 10.1* 10.4*   HCT 33.1* 32.2* 33.4*   MCV 89.2 87.7 87.9    272 298       Recent Labs     01/10/23  0357 01/11/23  0305 01/12/23  0254    144 143   K 3.8 3.3* 3.5   * 110* 106   CO2 18* 20* 23   CREATININE 3.1* 2.7* 2.5*   BUN 50* 41* 30*   LABGLOM 15 18 20   GLUCOSE 73* 97 100*   CALCIUM 9.2 9.4 9.4   PHOS  --  3.2 2.4*   MG  --  2.2 1.9       Vit D, 25-Hydroxy   Date Value Ref Range Status   12/23/2022 26 (L) 30 - 100 ng/mL Final     Comment:     <20 ng/mL. ........... [place] Colon Deficient  20-30 ng/mL. ......... [place] Colon Insufficient   ng/mL. ........ [place] Colon Sufficient  >100 ng/mL. .......... [place] Colon Toxic         PTH   Date Value Ref Range Status   12/23/2022 151 (H) 15 - 65 pg/mL Final       Recent Labs     01/09/23  1218   ALT 11   AST 30   ALKPHOS 92   BILITOT 0.9       Recent Labs     01/09/23  1218   LABALBU 3.8       Ferritin   Date Value Ref Range Status   12/22/2022 342 ng/mL Final     Comment:     FERRITIN Reference Ranges:  Adult Males   20 - 60 years:    30 - 400 ng/mL  Adult females 16 - 61 years:    15 - 150 ng/mL  Adults greater than 60 years:   no established reference range  Pediatrics:                     no established reference range       Iron   Date Value Ref Range Status   12/22/2022 98 37 - 145 mcg/dL Final     TIBC   Date Value Ref Range Status   12/22/2022 241 (L) 250 - 450 mcg/dL Final       Vitamin B-12   Date Value Ref Range Status   12/22/2022 403 211 - 946 pg/mL Final       Folate   Date Value Ref Range Status   01/10/2023 6.5 4.8 - 24.2 ng/mL Final         Lab Results   Component Value Date/Time    COLORU Yellow 01/10/2023 01:46 AM    NITRU POSITIVE 01/10/2023 01:46 AM    GLUCOSEU Negative 01/10/2023 01:46 AM    KETUA 15 01/10/2023 01:46 AM    UROBILINOGEN 0.2 01/10/2023 01:46 AM    BILIRUBINUR Negative 01/10/2023 01:46 AM    BILIRUBINUR neg 10/07/2013 11:12 AM       Lab Results   Component Value Date/Time    OSMOU 248 (L) 12/21/2022 12:12 PM       No components found for: URIC    No results found for: LIPIDPAN    2D ECHO 1/10/23  Summary    Ejection fraction is visually estimated at 60 to 65%. Moderate asymmetric septal hypertrophy. There is doppler evidence of stage I diastolic dysfunction. Normal right ventricular size and function. Normal right atrium size. Agitated saline injected for shunt evaluation shows no evidence of shunt. Mild tricuspid regurgitation. RVSP is 29 mmHg. Assessment and Plan:    Acute kidney injury on chronic kidney disease stage 3b approaching stage IV-  JEANNINE in the setting of poor oral intake. CKD in the setting of chronic obstructive uropathy. She has chronic stents placed  Urine output 3450 mL past 24 hours and 550 cc so far today  Continue IV fluids. Remotely her creatinine had been 1.6 to 1.9 mg/dL  More recently her creatinine has been 2.8 to 3.1 mg/dL  She has a history of obstructive uropathy follows with urology. Peak serum creatinine during her December admission of 6.5 mg/dL; creatinine at presentation 3.7 mg/dL  S/P cystoscopy with retrograde pyelogram and stent exchange and López replacement 12/22/22  Creatinine improving from 3.7 to 2.5 mg/dL with IV fluids     2.   Hypertension with chronic kidney disease stage I-4-  Blood pressure goal less than 130/80  Blood pressure is near goal     3. AG Metabolic acidosis-  In the setting of chronic kidney disease  Lactic acid 0.8  CO2 23  On IV and p.o. HCO3  Patient has had poor output we will continue IV fluids for the current time     4. Anemia of chronic kidney disease-  Hemoglobin at goal greater than 10.0  Iron saturation 41% in December 2022     5. Hypokalemia-  K 3.5  Supplement as needed    Dayan Dickerson, APRN - CNP  Patient seen and examined. I had a face to face encounter with the patient. Pt this PM awake alert and appropriate  Agree with exam.    Agree with  formulation, assessment and plan as outlined above and directed by me. Addition and corrections were done as deemed appropriate. My exam and plan include:    A/P:  JEANNINE-cr continues to slowly trend down    2. AGMA-HCO3 up to 23-will change the IV HCO3 to LR    3.  Hypophosphatemia-PO4 2.4-will supplement with IV PO4    Continue current treatment as outlined above    ALAN Hansen MD

## 2023-01-12 NOTE — PROGRESS NOTES
SPEECH/LANGUAGE PATHOLOGY  CLINICAL ASSESSMENT OF SWALLOWING FUNCTION   and PLAN OF CARE    PATIENT NAME:  Beckie Koroma  (female)     MRN:  [id number]    :  1950  (67 y.o.)  STATUS:  Inpatient: Room -A    TODAY'S DATE:  2023  REFERRING PROVIDER:  23 0600    SLP swallowing-dysphagia (IP)  Start:  23 0600,   DAILY,   Until Specified,   Manda Khan MD    REASON FOR REFERRAL: reassess swallow  EVALUATING THERAPIST: JOSE Cook                 RESULTS:    DYSPHAGIA DIAGNOSIS:   Clinical indicators of oral phase dysphagia     SLP asked to reassess due to oral holding of crushed pills in puree. During evaluation pt demonstrated inconsistent holding of presentations, however completes adequate labial closure and a-p transfer once oral swallow initiated. No overt s/s of aspiration observed, however silent aspiration can not be ruled out bedside. If suspected, recommend a video swallow eval to further assess.      Pt currently on room air      DIET RECOMMENDATIONS:  Pureed consistency solids (IDDSI level 4) with thin liquids (IDDSI level 0) as tolerated    MEDICATION ADMINISTRATION:  Administer medication crushed, as able, with pudding/applesauce     FEEDING RECOMMENDATIONS:     Assistance level:  Full assistance is needed during all oral intake, pt able to self feed, however does not always complete      Compensatory strategies recommended: Small bites/sips and Alternate solids and liquids, verbal cues to initiate swallow as needed      Discussed recommendations with nursing and/or faxed report to referring provider: Yes    SPEECH THERAPY  PLAN OF CARE   The dysphagia POC is established based on physician order, dysphagia diagnosis and results of clinical assessment     Skilled SLP intervention for dysphagia management on acute care 3-5 x per week until goals met, pt plateaus in function and/or discharged from hospital    Conditions Requiring Skilled Therapeutic Intervention for dysphagia:    Reduced oral control of bolus   Impaired mastication    Specific dysphagia interventions to include:     Compensatory strategy training   Therapeutic exercises    Specific instructions for next treatment:  initiate instruction of therapeutic exercises  and initiate instruction of compensatory strategies  Patient Treatment Goals:    Short Term Goals:  Pt will implement identified compensatory swallowing strategies on 90% of opportunities or greater to improve airway protection and swallow function. Pt will participate in ongoing mealtime assessment to provide diet modification and compensatory strategy implementation to minimize risk of aspiration associated with PO intake  Pt will complete PO trials of upgraded diet textures with SLP only to determine the least restrictive PO diet to maintain adequate nutrition/hydration with no more than 1 overt s/s of pen/asp. Long Term Goals:   Pt will maintain adequate nutrition/hydration via PO intake of the least restrictive oral diet with implementation of safe swallow/ compensatory strategies and decrease signs/symptoms of aspiration to less than 1 x/day. Pt will improve oropharyngeal swallow function to ensure airway protection during PO intake to maintain adequate nutrition/hydration and decrease signs/symptoms of aspiration to less than 1 x/day.       Patient/family Goal:    Patient not able to accurately state due to impaired cognition and/or communication at time of eval     Plan of care discussed with Patient   The Patient did not demonstrate complete understanding of the diagnosis, prognosis and plan of care     Rehabilitation Potential/Prognosis: fair                    ADMITTING DIAGNOSIS: Cerebellar infarct ([address] [I63.9]  Cerebrovascular accident (CVA), unspecified mechanism ([address] [I63.9]    VISIT DIAGNOSIS:   Visit Diagnoses         Codes    Cerebrovascular accident (CVA), unspecified mechanism ([address]    - Primary I63.9             PATIENT REPORT/COMPLAINT: patient not able to accurately report  RN cleared patient for participation in assessment     yes     PRIOR LEVEL OF SWALLOW FUNCTION:    PAST HISTORY OF DYSPHAGIA?: none reported    Home diet: Regular consistency solids (IDDSI level 7) with  thin liquids (IDDSI level 0), however was not eating well prior to admit  Current Diet Order:  ADULT DIET; Dysphagia - Pureed; Patient prefers NO GRAVY    PROCEDURE:  Consistencies Administered During the Evaluation   Liquids: thin liquid   Solids:  pureed foods, chopped solids     Method of Intake:   cup, straw, spoon  Self fed, Fed by clinician      Position:   Seated, upright    CLINICAL ASSESSMENT:  Oral Stage:       Reduced oral acceptance from cup, spoon  Impaired oral initiation      Pharyngeal Stage:    No signs of aspiration were noted during this evaluation however, silent aspiration cannot be ruled out at bedside. If silent aspiration is suspected, a Videofluoroscopic Study of Swallowing (MBS) is recommended and requires a physician order. Cognition:   Follows 1 - step directions appropriate for this assessment, inconsistent verbalizations    Oral Peripheral Examination   Could not test    Current Respiratory Status    room air     Parameters of Speech Production  Respiration:  Inadequate for speech production  Quality:   Within functional limits  Intensity: Quiet    Volitional Swallow: not able to elicit     Volitional Cough:   not able to elicit     Pain: No pain reported. EDUCATION:   The Speech Language Pathologist (SLP) completed education regarding results of evaluation and that intervention is warranted at this time. Learner: Patient  Education: Reviewed results and recommendations of this evaluation  Evaluation of Education:  Needs further instruction and Family not present    This plan may be re-evaluated and revised as warranted.       Evaluation Time includes thorough review of current medical information, gathering information on past medical history/social history and prior level of function, completion of standardized testing/informal observation of tasks, assessment of data and education on plan of care and goals. INTERVENTION    Pt/family educated on above results and plan of care. Pt/family trained on compensatory strategies for safe swallow and signs and symptoms of aspiration (throat clearing, coughing/choking, wet vocal quality. , etc.) and encouraged to notify staff if observed. Handouts provided as warranted. Pt/family encouraged to engage in question/answer session. All questions answered and pt/family verbalized understanding of above. [x]The admitting diagnosis and active problem list, have been reviewed prior to initiation of this evaluation. ACTIVE PROBLEM LIST:   Patient Active Problem List   Diagnosis    Hypertension    Elevated lipids    Proteinuria    Vitamin D deficiency    Closed fracture of radius    History of anal cancer    Colostomy present ([facility]    Vaginal cancer ([facility]    Diabetes mellitus type 2 in obese ([facility]    Stuttering    Age-related osteoporosis with current pathological fracture with routine healing    Herpes zoster vaccination declined    Chronic renal disease, stage III ([address] [976028]    Hydronephrosis due to obstruction of bladder    Acute renal failure (HCC)    Chronic indwelling López catheter    Bilateral hydronephrosis    Bacteriuria    Cerebellar infarct [facility])    Aphasia    Cerebrovascular accident (CVA) ([facility]    Uncontrolled type 2 diabetes mellitus with hyperglycemia ([facility]         CPT code:  80828  bedside swallow eval, 20162 dysphagia therapy    Ferny PELAYO CCC/SLP [id number]  Speech-Language Pathologist

## 2023-01-12 NOTE — PROGRESS NOTES
Patient was examined at bedside. Initially, patient seen experiencing difficulty initiating speech. Unable to answer yes or no questions. Limitations and following simple commands. Some rigidity with initial passive ROM. Nurse administered Ativan 1 mg at 1332. Around 1335 patient had improvement in motor and verbal response. Patient has known underlying dysarthria, however able to formulate sentences. Patient states that she feels better after medication. Denies depressive symptoms. Patient states that she wishes to go back to prior failed after discharge. Patient was able to name her blood pressure medications. Patient had some rigidity on passive ROM but improved. Patient was able to sign her name shown below.       Will start Ativan 1 mg po TID  Spoke to patients sister and niece    Erin Suresh MD PGY-2

## 2023-01-13 VITALS
TEMPERATURE: 97.7 F | WEIGHT: 167.9 LBS | HEART RATE: 68 BPM | OXYGEN SATURATION: 100 % | HEIGHT: 67 IN | RESPIRATION RATE: 16 BRPM | SYSTOLIC BLOOD PRESSURE: 137 MMHG | DIASTOLIC BLOOD PRESSURE: 79 MMHG | BODY MASS INDEX: 26.35 KG/M2

## 2023-01-13 PROBLEM — N17.9 ACUTE RENAL FAILURE (HCC): Status: RESOLVED | Noted: 2022-12-21 | Resolved: 2023-01-13

## 2023-01-13 PROBLEM — F06.1 CATATONIA: Status: ACTIVE | Noted: 2023-01-13

## 2023-01-13 LAB
ANION GAP SERPL CALCULATED.3IONS-SCNC: 11 MMOL/L (ref 7–16)
BASOPHILS ABSOLUTE: 0.02 E9/L (ref 0–0.2)
BASOPHILS RELATIVE PERCENT: 0.3 % (ref 0–2)
BUN BLDV-MCNC: 27 MG/DL (ref 6–23)
CALCIUM SERPL-MCNC: 9.2 MG/DL (ref 8.6–10.2)
CHLORIDE BLD-SCNC: 102 MMOL/L (ref 98–107)
CO2: 22 MMOL/L (ref 22–29)
CREAT SERPL-MCNC: 2.4 MG/DL (ref 0.5–1)
EOSINOPHILS ABSOLUTE: 0.08 E9/L (ref 0.05–0.5)
EOSINOPHILS RELATIVE PERCENT: 1.3 % (ref 0–6)
GFR SERPL CREATININE-BSD FRML MDRD: 21 ML/MIN/1.73
GLUCOSE BLD-MCNC: 117 MG/DL (ref 74–99)
HCT VFR BLD CALC: 32.5 % (ref 34–48)
HEMOGLOBIN: 10.2 G/DL (ref 11.5–15.5)
IMMATURE GRANULOCYTES #: 0.05 E9/L
IMMATURE GRANULOCYTES %: 0.8 % (ref 0–5)
LYMPHOCYTES ABSOLUTE: 0.78 E9/L (ref 1.5–4)
LYMPHOCYTES RELATIVE PERCENT: 12.9 % (ref 20–42)
MAGNESIUM: 1.6 MG/DL (ref 1.6–2.6)
MCH RBC QN AUTO: 27.3 PG (ref 26–35)
MCHC RBC AUTO-ENTMCNC: 31.4 % (ref 32–34.5)
MCV RBC AUTO: 86.9 FL (ref 80–99.9)
METER GLUCOSE: 126 MG/DL (ref 74–99)
METER GLUCOSE: 87 MG/DL (ref 74–99)
MONOCYTES ABSOLUTE: 0.36 E9/L (ref 0.1–0.95)
MONOCYTES RELATIVE PERCENT: 6 % (ref 2–12)
NEUTROPHILS ABSOLUTE: 4.74 E9/L (ref 1.8–7.3)
NEUTROPHILS RELATIVE PERCENT: 78.7 % (ref 43–80)
PDW BLD-RTO: 16.7 FL (ref 11.5–15)
PHOSPHORUS: 3.6 MG/DL (ref 2.5–4.5)
PLATELET # BLD: 267 E9/L (ref 130–450)
PMV BLD AUTO: 11.2 FL (ref 7–12)
POTASSIUM REFLEX MAGNESIUM: 4.2 MMOL/L (ref 3.5–5)
RBC # BLD: 3.74 E12/L (ref 3.5–5.5)
SARS-COV-2, NAAT: NOT DETECTED
SODIUM BLD-SCNC: 135 MMOL/L (ref 132–146)
WBC # BLD: 6 E9/L (ref 4.5–11.5)

## 2023-01-13 PROCEDURE — 2580000003 HC RX 258

## 2023-01-13 PROCEDURE — 85025 COMPLETE CBC W/AUTO DIFF WBC: CPT

## 2023-01-13 PROCEDURE — 6360000002 HC RX W HCPCS: Performed by: NURSE PRACTITIONER

## 2023-01-13 PROCEDURE — 6370000000 HC RX 637 (ALT 250 FOR IP)

## 2023-01-13 PROCEDURE — 6370000000 HC RX 637 (ALT 250 FOR IP): Performed by: STUDENT IN AN ORGANIZED HEALTH CARE EDUCATION/TRAINING PROGRAM

## 2023-01-13 PROCEDURE — 6360000002 HC RX W HCPCS

## 2023-01-13 PROCEDURE — 92526 ORAL FUNCTION THERAPY: CPT | Performed by: SPEECH-LANGUAGE PATHOLOGIST

## 2023-01-13 PROCEDURE — 83735 ASSAY OF MAGNESIUM: CPT

## 2023-01-13 PROCEDURE — 99238 HOSP IP/OBS DSCHRG MGMT 30/<: CPT | Performed by: FAMILY MEDICINE

## 2023-01-13 PROCEDURE — 87635 SARS-COV-2 COVID-19 AMP PRB: CPT

## 2023-01-13 PROCEDURE — 82962 GLUCOSE BLOOD TEST: CPT

## 2023-01-13 PROCEDURE — 6370000000 HC RX 637 (ALT 250 FOR IP): Performed by: FAMILY MEDICINE

## 2023-01-13 PROCEDURE — 84100 ASSAY OF PHOSPHORUS: CPT

## 2023-01-13 PROCEDURE — 80048 BASIC METABOLIC PNL TOTAL CA: CPT

## 2023-01-13 PROCEDURE — 92507 TX SP LANG VOICE COMM INDIV: CPT | Performed by: SPEECH-LANGUAGE PATHOLOGIST

## 2023-01-13 PROCEDURE — 6370000000 HC RX 637 (ALT 250 FOR IP): Performed by: NURSE PRACTITIONER

## 2023-01-13 PROCEDURE — 36415 COLL VENOUS BLD VENIPUNCTURE: CPT

## 2023-01-13 RX ORDER — LORAZEPAM 1 MG/1
1 TABLET ORAL 3 TIMES DAILY
Qty: 90 TABLET | Refills: 0 | Status: SHIPPED | DISCHARGE
Start: 2023-01-13 | End: 2023-01-20 | Stop reason: SDUPTHER

## 2023-01-13 RX ORDER — ESCITALOPRAM OXALATE 5 MG/1
5 TABLET ORAL DAILY
Qty: 30 TABLET | Refills: 0 | DISCHARGE
Start: 2023-01-14

## 2023-01-13 RX ORDER — MAGNESIUM SULFATE IN WATER 40 MG/ML
2000 INJECTION, SOLUTION INTRAVENOUS ONCE
Status: COMPLETED | OUTPATIENT
Start: 2023-01-13 | End: 2023-01-13

## 2023-01-13 RX ORDER — ATORVASTATIN CALCIUM 40 MG/1
40 TABLET, FILM COATED ORAL NIGHTLY
Qty: 30 TABLET | Refills: 0 | Status: SHIPPED | OUTPATIENT
Start: 2023-01-13

## 2023-01-13 RX ORDER — CLOPIDOGREL BISULFATE 75 MG/1
75 TABLET ORAL DAILY
Qty: 17 TABLET | Refills: 0 | DISCHARGE
Start: 2023-01-14 | End: 2023-01-31

## 2023-01-13 RX ORDER — CARVEDILOL 6.25 MG/1
6.25 TABLET ORAL 2 TIMES DAILY WITH MEALS
Qty: 60 TABLET | Refills: 0 | DISCHARGE
Start: 2023-01-13

## 2023-01-13 RX ADMIN — CARVEDILOL 6.25 MG: 6.25 TABLET, FILM COATED ORAL at 09:13

## 2023-01-13 RX ADMIN — LORAZEPAM 1 MG: 1 TABLET ORAL at 09:13

## 2023-01-13 RX ADMIN — AMLODIPINE BESYLATE 10 MG: 10 TABLET ORAL at 09:13

## 2023-01-13 RX ADMIN — ASPIRIN 81 MG: 81 TABLET, CHEWABLE ORAL at 09:12

## 2023-01-13 RX ADMIN — MAGNESIUM SULFATE HEPTAHYDRATE 2000 MG: 40 INJECTION, SOLUTION INTRAVENOUS at 10:44

## 2023-01-13 RX ADMIN — CLOPIDOGREL BISULFATE 75 MG: 75 TABLET ORAL at 09:13

## 2023-01-13 RX ADMIN — CYANOCOBALAMIN TAB 1000 MCG 1000 MCG: 1000 TAB at 09:12

## 2023-01-13 RX ADMIN — PETROLATUM: 420 OINTMENT TOPICAL at 10:35

## 2023-01-13 RX ADMIN — HEPARIN SODIUM 5000 UNITS: 10000 INJECTION INTRAVENOUS; SUBCUTANEOUS at 09:14

## 2023-01-13 RX ADMIN — SODIUM CHLORIDE, PRESERVATIVE FREE 10 ML: 5 INJECTION INTRAVENOUS at 10:36

## 2023-01-13 RX ADMIN — DIVALPROEX SODIUM 125 MG: 125 CAPSULE, COATED PELLETS ORAL at 09:13

## 2023-01-13 RX ADMIN — SODIUM BICARBONATE 650 MG: 650 TABLET ORAL at 09:13

## 2023-01-13 RX ADMIN — PANTOPRAZOLE SODIUM 40 MG: 40 TABLET, DELAYED RELEASE ORAL at 06:10

## 2023-01-13 RX ADMIN — HEPARIN SODIUM 5000 UNITS: 10000 INJECTION INTRAVENOUS; SUBCUTANEOUS at 01:46

## 2023-01-13 RX ADMIN — ESCITALOPRAM OXALATE 5 MG: 10 TABLET ORAL at 09:12

## 2023-01-13 ASSESSMENT — ENCOUNTER SYMPTOMS
COUGH: 0
ABDOMINAL PAIN: 0
VOMITING: 0
CHEST TIGHTNESS: 0
DIARRHEA: 0
ABDOMINAL DISTENTION: 0
NAUSEA: 0
BACK PAIN: 0
SHORTNESS OF BREATH: 0
CONSTIPATION: 0

## 2023-01-13 NOTE — PROGRESS NOTES
Perfect Serve message sent to Dr. Kindra Arzate prompting for discharge. Addendum-Ok to discharge per Nephrology-message sent to [facility] to prompt for discharge.     Electronically signed by Richard Frausto RN on 1/13/2023 at 9:41 AM

## 2023-01-13 NOTE — DISCHARGE SUMMARY
Physician Discharge Summary  [facility]     Patient ID:  Perla Walker  [id number]  11 y.o.  1950    Admit date: 1/9/2023    Discharge date: 1/13/2023    Admission Diagnoses:   Cerebellar infarct [facility]) [I63.9]  Cerebrovascular accident (CVA), unspecified mechanism ([facility] [I63.9]    Discharge Diagnoses:  Principal Problem:    Catatonia  Active Problems:    Chronic renal disease, stage III ([address] [714097]    Chronic indwelling Knutson catheter    Cerebellar infarct ([facility]    Aphasia    Cerebrovascular accident (CVA) ([facility]    Uncontrolled type 2 diabetes mellitus with hyperglycemia ([facility]    Hypertension    Elevated lipids  Resolved Problems:    Acute renal failure ([facility]      Consults: nephrology and neurology  Procedures: None    Hospital Course: Perla Walker is a 67 y.o. female patient of Terry Patterson MD  with a pertinent PMHx of HTN, HL, DMII, CKD stage IIIb, hx colon cancer with ostomy bag, bilateral hydronephrosis with bilateral stent placement 08/2022 and chronic knutson presented with expressive aphasia for 5 days and was admitted for subacute CVA and JEANNINE. An MRI of the brain was performed without contrast which showed a small acute infarct involving the posterior aspect of the left cerebellar hemisphere. Mild chronic microvascular disease within the periventricular white matter was also noted. There was a high clinical suspicion that the patient's symptoms of mutism stupor and staring were not contributed to her findings on imaging. An EEG performed demonstrated mild nonspecific encephalopathy which waxed and waned over the course of the study with no seizures or epileptiform form discharges noted. There is a high clinical suspicion for catatonia. A lorazepam 1 mg challenge was performed on 1/12/2023. The patient had reversal of her symptoms upon receiving the 1 mg of lorazepam, and was back to her baseline functioning per discussions with family.   The patient was able to converse in full sentences and she was neurologically intact. She was able to remember seeing family from the day prior. Due to her response, the diagnosis of catatonia was made. She should continue taking lorazepam 1 mg 3 times daily for 3 to 4 months at discharge. She should also continue taking Lexapro 5 mg every day. She was treated with Depakote 125 mg 3 times daily while inpatient, but we discontinued this treatment and she should not take it outpatient. We recommend close follow-up with PCP at discharge for psychiatric concerns, with consideration for psychiatry referral.  She should continue doing physical therapy and occupational therapy at facility. Speech and language pathology saw the patient for persistent aphasia. They recommended return to soft and bite sized diet. The patient should continue taking aspirin 81 mg daily and Plavix 75 mg daily for 17 more days. The patient's electrolytes were repleted, and she was hydrated due to elevated creatinine. The patient should continue to take amlodipine 10 mg daily and her home atenolol 25 daily was switched to carvedilol 6.25 mg twice daily for hypertension. Patient has a stage I sacral decubitus ulcer which was managed with wound care daily. The patient was discharged in a stable and improved condition. Significant Diagnostic Studies:   MRA NECK WO CONTRAST   Final Result   Normal examination. MRA HEAD WO CONTRAST   Final Result   1. No significant stenosis or occlusion. 2. Small aneurysm versus infundibulum of the right LEAH/anterior communicating   artery junction. RECOMMENDATIONS:   Unavailable         US RETROPERITONEAL COMPLETE   Final Result   1. Mild to moderate right and mild left hydronephrosis. Bilateral ureteric   stents noted in the renal pelvis. 2.  Bilateral renal cysts. Mildly echogenic renal parenchyma. Mildly   thinned bilateral cortical thickness.       3.  A López catheter is decompressing the bladder. MRI Brain WO Contrast   Final Result   Small acute infarct involving the posterior aspect of the left cerebellar   hemisphere. Mild chronic microvascular disease within the periventricular white matter. RECOMMENDATIONS:   Unavailable         XR CHEST PORTABLE   Final Result   No acute process. Post Discharge Follow up:  See below. Encourage close follow up with PCP for psychiatric concerns, consider psych referral.    Medication changes:    Discharge Exam: See progress note from today    Disposition: SNF  Patient condition: stable    Patient Instructions: Activity: activity as tolerated  Diet: soft and bite-size diet    Discharge Medication List:    Current Discharge Medication List        CONTINUE these medications which have NOT CHANGED    Details   amLODIPine (NORVASC) 10 MG tablet Take 10 mg by mouth every morning      aspirin 81 MG EC tablet Take 81 mg by mouth every morning      denosumab (PROLIA) 60 MG/ML SOSY SC injection Inject 60 mg into the skin every 6 months NEXT INJ DUE @ [facility]: 02/2023      glipiZIDE (GLUCOTROL XL) 5 MG extended release tablet Take 5 mg by mouth Daily with lunch      magnesium oxide (MAG-OX) 400 MG tablet Take 400 mg by mouth 2 times daily      pantoprazole (PROTONIX) 40 MG tablet Take 40 mg by mouth every morning            Current Discharge Medication List        START taking these medications    Details   carvedilol (COREG) 6.25 MG tablet Take 1 tablet by mouth 2 times daily (with meals)  Qty: 60 tablet, Refills: 0      escitalopram (LEXAPRO) 5 MG tablet Take 1 tablet by mouth daily  Qty: 30 tablet, Refills: 0      LORazepam (ATIVAN) 1 MG tablet Take 1 tablet by mouth 3 times daily for 30 days. Max Daily Amount: 3 mg  Qty: 90 tablet, Refills: 0    Comments: Treatment of catatonia. Continue treatment for 3-4 months. Associated Diagnoses: Catatonia;  Aphasia      clopidogrel (PLAVIX) 75 MG tablet Take 1 tablet by mouth daily for 17 doses  Qty: 17 tablet, Refills: 0      vitamin B-12 1000 MCG tablet Take 1 tablet by mouth daily  Qty: 30 tablet, Refills: 0            Current Discharge Medication List        STOP taking these medications       hydrOXYzine HCl (ATARAX) 25 MG tablet Comments:   Reason for Stopping:         promethazine (PHENERGAN) 25 MG tablet Comments:   Reason for Stopping:         atenolol (TENORMIN) 50 MG tablet Comments:   Reason for Stopping:         sodium bicarbonate 650 MG tablet Comments:   Reason for Stopping:         hydrALAZINE (APRESOLINE) 25 MG tablet Comments:   Reason for Stopping:             Current Discharge Medication List        CONTINUE these medications which have CHANGED    Details   atorvastatin (LIPITOR) 40 MG tablet Take 1 tablet by mouth nightly  Qty: 30 tablet, Refills: 0               Follow up:  [address]     Go to  Appointment on Thursday, 1/19/2023 at 10 AM with Dr. Carol Edward for Dr. Pankaj Reynolds.     Fela Mcneal MD  [phone], [address]   [address] [facility]  [phone]    Schedule an appointment as soon as possible for a visit        Mary Walton DO  Family Medicine Resident PGY-1  01/13/23   10:54 AM

## 2023-01-13 NOTE — PROGRESS NOTES
[address]  Progress Note    Chief complaint :  Chief Complaint   Patient presents with    Aphasia     Pt has not been able to speak x 8 days-only a few words once in a while. Fatigue       Subjective:    Patient reports feeling much better today and describes that her words are flowing much easier. She does have ongoing sleepiness/fatigue. She reports that her appetite has returned after several days of decreased PO intake. She does not endorse chest pain, SOB, abdominal pain, headache or anxiety. She is much more alert, well-appearing and engaged this morning. Past medical, surgical, family and social history were reviewed, non-contributory, and unchanged unless otherwise stated. Review of Systems   Constitutional:  Positive for fatigue. Negative for chills and fever. Respiratory:  Negative for cough, chest tightness and shortness of breath. Cardiovascular:  Negative for chest pain, palpitations and leg swelling. Gastrointestinal:  Negative for abdominal distention, abdominal pain and diarrhea. Genitourinary:  Negative for dysuria. Musculoskeletal:  Negative for back pain and neck pain. Neurological:  Positive for speech difficulty. Negative for dizziness, tremors, facial asymmetry, light-headedness, numbness and headaches. **    Objective:  /80   Pulse 70   Temp 97.9 °F (36.6 °C) (Axillary)   Resp 16   Ht 5' 7\" (1.702 m)   Wt 167 lb 14.4 oz (76.2 kg)   SpO2 100%   BMI 26.30 kg/m²     Physical Exam  Constitutional:       General: She is not in acute distress. Appearance: She is not toxic-appearing. Cardiovascular:      Rate and Rhythm: Normal rate and regular rhythm. Heart sounds: Normal heart sounds. No murmur heard. Pulmonary:      Effort: Pulmonary effort is normal.      Comments: Left basilar crackles    Musculoskeletal:         General: No swelling. Cervical back: No rigidity. Skin:     General: Skin is warm and dry. Findings: No bruising. Neurological:      General: No focal deficit present. Sensory: No sensory deficit. Motor: No weakness.       Comments: Mild aphasia present, weakness and coordination full     **  Labs:  Recent Results (from the past 24 hour(s))   POCT Glucose    Collection Time: 01/12/23  4:55 PM   Result Value Ref Range    Meter Glucose 117 (H) 74 - 99 mg/dL   POCT Glucose    Collection Time: 01/12/23  9:37 PM   Result Value Ref Range    Meter Glucose 121 (H) 74 - 99 mg/dL   CBC with Auto Differential    Collection Time: 01/13/23  2:08 AM   Result Value Ref Range    WBC 6.0 4.5 - 11.5 E9/L    RBC 3.74 3.50 - 5.50 E12/L    Hemoglobin 10.2 (L) 11.5 - 15.5 g/dL    Hematocrit 32.5 (L) 34.0 - 48.0 %    MCV 86.9 80.0 - 99.9 fL    MCH 27.3 26.0 - 35.0 pg    MCHC 31.4 (L) 32.0 - 34.5 %    RDW 16.7 (H) 11.5 - 15.0 fL    Platelets 879 843 - 115 E9/L    MPV 11.2 7.0 - 12.0 fL    Neutrophils % 78.7 43.0 - 80.0 %    Immature Granulocytes % 0.8 0.0 - 5.0 %    Lymphocytes % 12.9 (L) 20.0 - 42.0 %    Monocytes % 6.0 2.0 - 12.0 %    Eosinophils % 1.3 0.0 - 6.0 %    Basophils % 0.3 0.0 - 2.0 %    Neutrophils Absolute 4.74 1.80 - 7.30 E9/L    Immature Granulocytes # 0.05 E9/L    Lymphocytes Absolute 0.78 (L) 1.50 - 4.00 E9/L    Monocytes Absolute 0.36 0.10 - 0.95 E9/L    Eosinophils Absolute 0.08 0.05 - 0.50 E9/L    Basophils Absolute 0.02 0.00 - 0.20 O8/A   Basic Metabolic Panel w/ Reflex to MG    Collection Time: 01/13/23  2:08 AM   Result Value Ref Range    Sodium 135 132 - 146 mmol/L    Potassium reflex Magnesium 4.2 3.5 - 5.0 mmol/L    Chloride 102 98 - 107 mmol/L    CO2 22 22 - 29 mmol/L    Anion Gap 11 7 - 16 mmol/L    Glucose 117 (H) 74 - 99 mg/dL    BUN 27 (H) 6 - 23 mg/dL    Creatinine 2.4 (H) 0.5 - 1.0 mg/dL    Est, Glom Filt Rate 21 >=60 mL/min/1.73    Calcium 9.2 8.6 - 10.2 mg/dL   Magnesium    Collection Time: 01/13/23  2:08 AM   Result Value Ref Range    Magnesium 1.6 1.6 - 2.6 mg/dL   Phosphorus Collection Time: 01/13/23  2:08 AM   Result Value Ref Range    Phosphorus 3.6 2.5 - 4.5 mg/dL   POCT Glucose    Collection Time: 01/13/23  6:08 AM   Result Value Ref Range    Meter Glucose 87 74 - 99 mg/dL       Radiology and other tests reviewed:  MRA NECK WO CONTRAST   Final Result   Normal examination. MRA HEAD WO CONTRAST   Final Result   1. No significant stenosis or occlusion. 2. Small aneurysm versus infundibulum of the right LEAH/anterior communicating   artery junction. RECOMMENDATIONS:   Unavailable         US RETROPERITONEAL COMPLETE   Final Result   1. Mild to moderate right and mild left hydronephrosis. Bilateral ureteric   stents noted in the renal pelvis. 2.  Bilateral renal cysts. Mildly echogenic renal parenchyma. Mildly   thinned bilateral cortical thickness. 3.  A López catheter is decompressing the bladder. MRI Brain WO Contrast   Final Result   Small acute infarct involving the posterior aspect of the left cerebellar   hemisphere. Mild chronic microvascular disease within the periventricular white matter. RECOMMENDATIONS:   Unavailable         XR CHEST PORTABLE   Final Result   No acute process. Assessment:  Active Hospital Problems    Diagnosis Date Noted    Cerebrovascular accident (CVA) ([address] [I63.9] 01/10/2023     Priority: Medium    Uncontrolled type 2 diabetes mellitus with hyperglycemia ([address] [E11.65] 01/10/2023     Priority: Medium    Cerebellar infarct ([address] [I63.9] 01/09/2023     Priority: Medium    Aphasia [R47.01] 01/09/2023     Priority: Medium    Chronic indwelling López catheter [Z97.8] 12/22/2022     Priority: Medium    Acute renal failure ([address] [N17.9] 12/21/2022     Priority: Medium    Chronic renal disease, stage III ([address] [440037] [N18.30] 06/06/2022     Priority: Medium    Elevated lipids [E78.5] 01/15/2014    Hypertension [I10]          Plan:    #Catatonia likely 2/2 MDD vs bipolar disorder vs delirium.    Symptoms resolved following 1mg lorazepam challenge.  -Continue scheduled 1mg PO lorazepam TID  -Continue depakote 125 mg Q8H for delirium per neurology   -continue lexapro 5mg daily  -Ck elevated consistent with persistent rigidity     #Subacute Aphasia:  Likely secondary to seizure vs progressive vascular dementia vs depression with mutism and catatonia. MRI head showing subacute L cerebellar infarct, does not explain current neurological findings of weakness, aphasia and catatonia. Symptoms much improved following administration of lorazepam challenge. -EEG: showing signs of encephalopathy without seizure activity   - Continue B12 daily  - Continual speech, Pt OT     #Subacute L cerebellar infarct   MRI brain without contrast obtained in setting of JEANNINE showing acute infarct involving L posterior cerebellar hemisphere. Chronic microvascular disease also noted. No acute intervention per neurology considering timing of symptoms. Negative embolic workup. -MRA head/neck without contrast normal with exception of small aneurysmal dilation vs infundibulum of right LEAH. -Echo with bubble study: no PFO, LVEF 60-65%, moderate asymmetric spetal hypertrophy. Mild diastolic dysfunction  -Continue ASA 81mg, plavix 75mg for 21 days for management   - Dysphagia diet, small bites supervised      #JEANNINE on CKD, improving  Previously baseline creatinine 2, discharge 2.8 in December. Mild to moderate bilateral hydronephrosis consistent with history s/p stenting.   -Cr 2.4 today  -Continue IVF 75 mL hour  -Continue appreciated nephrology recommendations     Hypertension  -Home meds: Amlodipine 10 mg daily, carvedilol 6.25 BID     #Bacteruria  Likely colonized per ID, no growth to date of urine culture. Elevated troponin   Likely 2/2 JEANNINE. EKG: nonischemic  -Initial troponin 64>>59     Diabetes II  Last HbA1C 12.1 on 1/3/2023.  Patients home medications include glipizide 5 mg.  -Dysphagia diet  -LDSSI w/ hypoglycemia protocol and x4 POCT glucose checks     Anxiety  -ativan 1mg TID     Hyperlipidemia  -Continue Atorvastatin 40 mg daily     GERD  -Protonix 40 mg daily      Stage 1 Sacral Decubitus Ulcer  - Daily wound care  - Q2 repositioning     Anemia of Chronic Disease  12/22/22: Iron studies indicative of AOCD  -Monitor hgb        Pain Control:  Tylenol 650 mg Q6HR PRN for mild pain  DVT ppx: Warfarin mg daily  GI ppx: IV protonix 40 daily  Code Status: DNR-CCA  Diet: Dysphagia diet, soft/bitesized   PT/OT: 14/24        Disposition: Discharge to [address] [place] [address] without pre-cert, pending clinical course.            Jayson Mesa   ms3  01/13/23   7:35 AM

## 2023-01-13 NOTE — PROGRESS NOTES
[address]  Progress Note    Chief complaint :  Chief Complaint   Patient presents with    Aphasia     Pt has not been able to speak x 8 days-only a few words once in a while. Fatigue       Subjective:    Reports she is feeling better today than yesterday. She remembers her family visiting her yesterday and is able to communicate with us in full sentences. Past medical, surgical, family and social history were reviewed, non-contributory, and unchanged unless otherwise stated. Review of Systems   Constitutional:  Negative for chills and fever. Respiratory:  Negative for shortness of breath. Cardiovascular:  Negative for chest pain. Gastrointestinal:  Negative for abdominal pain, constipation, diarrhea, nausea and vomiting. Neurological:  Negative for dizziness and light-headedness. Psychiatric/Behavioral:  Negative for confusion. Objective:  /80   Pulse 70   Temp 97.9 °F (36.6 °C) (Axillary)   Resp 16   Ht 5' 7\" (1.702 m)   Wt 167 lb 14.4 oz (76.2 kg)   SpO2 100%   BMI 26.30 kg/m²     Physical Exam  Constitutional:       General: She is not in acute distress. HENT:      Head: Normocephalic and atraumatic. Eyes:      Extraocular Movements: Extraocular movements intact. Cardiovascular:      Rate and Rhythm: Normal rate and regular rhythm. Heart sounds: No murmur heard. No friction rub. No gallop. Pulmonary:      Breath sounds: No wheezing, rhonchi or rales. Abdominal:      General: There is no distension. Tenderness: There is no abdominal tenderness. There is no guarding. Musculoskeletal:      Right lower leg: No edema. Left lower leg: No edema.      Labs:  Recent Results (from the past 24 hour(s))   POCT Glucose    Collection Time: 01/12/23  4:55 PM   Result Value Ref Range    Meter Glucose 117 (H) 74 - 99 mg/dL   POCT Glucose    Collection Time: 01/12/23  9:37 PM   Result Value Ref Range    Meter Glucose 121 (H) 74 - 99 mg/dL   CBC with Auto Differential    Collection Time: 01/13/23  2:08 AM   Result Value Ref Range    WBC 6.0 4.5 - 11.5 E9/L    RBC 3.74 3.50 - 5.50 E12/L    Hemoglobin 10.2 (L) 11.5 - 15.5 g/dL    Hematocrit 32.5 (L) 34.0 - 48.0 %    MCV 86.9 80.0 - 99.9 fL    MCH 27.3 26.0 - 35.0 pg    MCHC 31.4 (L) 32.0 - 34.5 %    RDW 16.7 (H) 11.5 - 15.0 fL    Platelets 955 103 - 694 E9/L    MPV 11.2 7.0 - 12.0 fL    Neutrophils % 78.7 43.0 - 80.0 %    Immature Granulocytes % 0.8 0.0 - 5.0 %    Lymphocytes % 12.9 (L) 20.0 - 42.0 %    Monocytes % 6.0 2.0 - 12.0 %    Eosinophils % 1.3 0.0 - 6.0 %    Basophils % 0.3 0.0 - 2.0 %    Neutrophils Absolute 4.74 1.80 - 7.30 E9/L    Immature Granulocytes # 0.05 E9/L    Lymphocytes Absolute 0.78 (L) 1.50 - 4.00 E9/L    Monocytes Absolute 0.36 0.10 - 0.95 E9/L    Eosinophils Absolute 0.08 0.05 - 0.50 E9/L    Basophils Absolute 0.02 0.00 - 0.20 J8/W   Basic Metabolic Panel w/ Reflex to MG    Collection Time: 01/13/23  2:08 AM   Result Value Ref Range    Sodium 135 132 - 146 mmol/L    Potassium reflex Magnesium 4.2 3.5 - 5.0 mmol/L    Chloride 102 98 - 107 mmol/L    CO2 22 22 - 29 mmol/L    Anion Gap 11 7 - 16 mmol/L    Glucose 117 (H) 74 - 99 mg/dL    BUN 27 (H) 6 - 23 mg/dL    Creatinine 2.4 (H) 0.5 - 1.0 mg/dL    Est, Glom Filt Rate 21 >=60 mL/min/1.73    Calcium 9.2 8.6 - 10.2 mg/dL   Magnesium    Collection Time: 01/13/23  2:08 AM   Result Value Ref Range    Magnesium 1.6 1.6 - 2.6 mg/dL   Phosphorus    Collection Time: 01/13/23  2:08 AM   Result Value Ref Range    Phosphorus 3.6 2.5 - 4.5 mg/dL   POCT Glucose    Collection Time: 01/13/23  6:08 AM   Result Value Ref Range    Meter Glucose 87 74 - 99 mg/dL       Radiology and other tests reviewed:  MRA NECK WO CONTRAST   Final Result   Normal examination. MRA HEAD WO CONTRAST   Final Result   1. No significant stenosis or occlusion.    2. Small aneurysm versus infundibulum of the right LEAH/anterior communicating   artery junction. RECOMMENDATIONS:   Unavailable         US RETROPERITONEAL COMPLETE   Final Result   1. Mild to moderate right and mild left hydronephrosis. Bilateral ureteric   stents noted in the renal pelvis. 2.  Bilateral renal cysts. Mildly echogenic renal parenchyma. Mildly   thinned bilateral cortical thickness. 3.  A López catheter is decompressing the bladder. MRI Brain WO Contrast   Final Result   Small acute infarct involving the posterior aspect of the left cerebellar   hemisphere. Mild chronic microvascular disease within the periventricular white matter. RECOMMENDATIONS:   Unavailable         XR CHEST PORTABLE   Final Result   No acute process. Assessment:  Active Hospital Problems    Diagnosis Date Noted    Cerebrovascular accident (CVA) ([address] [I63.9] 01/10/2023     Priority: Medium    Uncontrolled type 2 diabetes mellitus with hyperglycemia ([address] [E11.65] 01/10/2023     Priority: Medium    Cerebellar infarct ([address] [I63.9] 01/09/2023     Priority: Medium    Aphasia [R47.01] 01/09/2023     Priority: Medium    Chronic indwelling López catheter [Z97.8] 12/22/2022     Priority: Medium    Acute renal failure ([address] [N17.9] 12/21/2022     Priority: Medium    Chronic renal disease, stage III ([address] [555457] [N18.30] 06/06/2022     Priority: Medium    Elevated lipids [E78.5] 01/15/2014    Hypertension [I10]        Plan:    Catatonia  Patient exhibited waxy flexibility, mutism, stupor, and staring. Ddx bipolar, major depression, delirium. Symptom resolution after 1 mg lorazepam challenge.   -Lorazepam 1 mg TID  -Depakote 125 mg Q8H  -Lexapro 5 mg QD    Persistent aphasia  Ddx: seizure, progressive vascular dementia, metabolic encephalopathy, delirium, akinetic mutism unipolar depression with catatonia. MRI head findings with subacte cerebellar stroke not consistent  with presentation with aphasia and weakness. Patient exhibiting signs of catatonia.   - EEG: A mild nonspecific encephalopathy which waxed and waned over the course of this studyNo seizures or epileptiform discharges were noted during this study. - CK elevated which may represent persistent rigidity from a catatonic state  - Continue Depakote 125 mg TID  - Continue vitamin B12 daily  - Consider psych consul and/or trial dose of Ativan to check for response  - Continue daily speech therapy, PT/OT     Subacute Cerebrovascular accident (CVA)  MRI brain WO contrast: Small acute infarct involving the posterior aspect of the left cerebellar hemisphere. Mild chronic microvascular disease within the periventricular white matter. Neurology no acute intervention. -MRA head and neck without contrast: MRA neck normal. No significant stenosis or occlusion. Small aneurysm versus infundibulum of the right LEAH/anterior communicating artery junction  -Echo with bubble study: no PFO. LVEF 60 to 65%. Moderate asymmetric septal hypertroph. stage I diastolic dysfunction  -Continue daily speech therapy, PT/OT  - Asa 81 mg daily + Plavix 75 mg daily x 21 days  - Dysphagia diet     JEANNINE on CKD, improving  In the setting of decreased vascular perfusion. Previously, Baseline creatinine 2. On discharge on 12/22 creatinine 2.8. US retroperitoneal complete: Mild to moderate right and mild left hydronephrosis. Bilateral ureteric stents noted in the renal pelvis. S/p cystoscopy with retrograde pyelogram and stent exchange and López replacement 12/22/2022.  -Hold nephrotoxic medications  -Nephrology following, appreciate input  -Supplement potassium     AG Metabolic Acidosis  Lactic 0.8, CO2 23.  -IV  cc/hr and PO HCO3 BID    Hypophosphatemia  -Supplement IV PO4    Bacteruria  Pseudomonas and MRSA grew last cx 12/2022. Treated with Levaquin last admission. Now grew nitrites.  -Likely colonized, not causing infection per ID note 12/26/22  -Urine culture no growth    Diabetes II  Last HbA1C 12.1 on 1/3/2023.  Patients home medications include glipizide 5 mg.  -Dysphagia diet  -LDSSI w/ hypoglycemia protocol and x4 POCT glucose checks    Elevated troponin   Likely 2/2 JEANNINE. EKG: nonischemic  -Initial troponin 64>>59     Anxiety  -Hydroxyzine 25 mg IV q 6 PRN     Hypertension  Goal <130/80.  -Home meds: Amlodipine 10 mg daily, atenolol 25 mg daily      Hyperlipidemia  -Continue Atorvastatin 40 mg daily     GERD  -Protonix 40 mg daily      Stage 1 Sacral Decubitus Ulcer  - Daily wound care  - Q2 repositioning     Anemia of Chronic Disease  12/22/22: Iron studies indicative of AOCD  -Monitor hgb        Pain Control: Tylenol 650 mg Q6HR PRN for mild pain  DVT ppx: Heparin 5000 TID  GI ppx: Protonix 40 daily  Diet: Dysphagia diet  Code Status: DNR-CCA  [facility]: PT/OT: 14/24      Disposition: Discharge to [facility], accepted, requires covid test prior to dc pending clinical course.     Zaina Broderick DO   Family Medicine Resident PGY-1  01/13/23   7:27 AM

## 2023-01-13 NOTE — PROGRESS NOTES
SPEECH LANGUAGE PATHOLOGY  DAILY PROGRESS NOTE        PATIENT NAME:  Ant Paez      :  1950          TODAY'S DATE:  2023 ROOM:  [address] [place] [address]-A        SWALLOWING    Pt seen at bedside for follow up dysphagia management. Significant improvement observed. Per chart pt was administered 1mg lorazepam challenge resulting in improvement. Oral trials completed resulting in decreased oral holding and improved a-p transfer. No clinical indicators of aspiration were observed, however silent aspiration can not be ruled out bedside. Recommend return to soft and bite size diet-RN notified. Pt trained on comp strats for safe swallow. SPEECH/LANGUAGE    Significant improvement observed in ability to communicate since last session. Pt communicating in complete sentences with occasional pauses and repetitions, which appears to be her baseline. Pt oriented X3 and able to follow 3 step commands. Pt answered all Y/N questions accurately with increased time needed inconsistently. Fair problem solving and safety noted during structured task. Education- Pt educated on results and POC. Pt trained on compensatory strategies for safe swallow and for dysfluency disorder with good outcome. Questions answered. Will continue SP intervention as per previously established POC. Murali PELAYO CCC/SLP [id number]  Speech Pathologist              CPT code(s) 23139  speech/language tx, [id number] dysphagia tx   Total minutes :  30 minutes

## 2023-01-13 NOTE — CARE COORDINATION
Social work / Discharge Planning:         AWAITING [address] Patient will discharge to [facility] at 12:30pm via [facility]. Social work updated RN, facility liaison and patient's sister Merced Delacruz.     Electronically signed by CORNELIUS Matthew on 1/13/2023 at 10:22 AM

## 2023-01-13 NOTE — PROGRESS NOTES
Nephrology Progress Note  [facility]    From consult 1/10/23-  HPI:   Full consult is deferred as patient was just seen last in this facility in December 2022. Syed Lowe is a 27-year-old female patient we were asked to see regarding her chronic kidney disease. She was admitted last in December 2022 during which time she had a cystoscopy with retrograde pyelogram and stent exchange with López placement on 12/22/2022 for her chronic obstructive uropathy. She does follow with urology for this. Her baseline creatinine since her last admission had been 2.8 to 3.1 mg/dL, remotely her baseline was 1.6 to 1.9 mg/dL. She had an acute kidney injury in the setting of obstructive uropathy during her December admission with a peak creatinine of 6.5 mg/dL which trended to 2.8 mg/dL at her discharge. I saw this morning she was alert but not able to answer any questions. She was speaking but unintelligible words. She is unable to provide any insight into her past medical history or what prompted her coming to the hospital.  From previous note she was brought to the hospital by the family who were concerned with her not being able to speak. She was not eating or drinking well for the past week. At her discharge in December she was sent to [address] [place] [address] for rehabilitation, however family took her home as they were advised perhaps this would help with her eating and drinking improving. Patient was from 1/7 to 1/9/2023 and she continued to deteriorate prompting her return to the hospital.      1/13/23-seen earlier today.   She is for discharge to back to rehab    PMH:    Past Medical History:   Diagnosis Date    Arthritis     osteoporsis    Cancer ([address]     colon and uterine    Closed fracture of radius 12/27/2016    Elevated lipids 1/15/2014    Headache     History of anal cancer 2/20/2017    Dr. Felipa Peraza    Hyperlipidemia     Hypertension     Hypertension     Obesity     Proteinuria 6/25/2014    Type II or unspecified type diabetes mellitus without mention of complication, not stated as uncontrolled     Vaginal cancer ([facility] 5/22/2017    Vitamin D deficiency 11/6/2014       Patient Active Problem List   Diagnosis    Hypertension    Elevated lipids    Proteinuria    Vitamin D deficiency    Closed fracture of radius    History of anal cancer    Colostomy present ([facility]    Vaginal cancer ([facility]    Diabetes mellitus type 2 in obese ([facility]    Stuttering    Age-related osteoporosis with current pathological fracture with routine healing    Herpes zoster vaccination declined    Chronic renal disease, stage III ([facility] [[id number]]    Hydronephrosis due to obstruction of bladder    Acute renal failure (HCC)    Chronic indwelling López catheter    Bilateral hydronephrosis    Bacteriuria    Cerebellar infarct (HCC)    Aphasia    Cerebrovascular accident (CVA) ([facility]    Uncontrolled type 2 diabetes mellitus with hyperglycemia ([facility])       Meds:     carvedilol  6.25 mg Oral BID WC    LORazepam  1 mg Oral TID    escitalopram  5 mg Oral Daily    white petrolatum   Topical BID    sodium bicarbonate  650 mg Oral BID    pantoprazole  40 mg Oral QAM AC    vitamin B-12  1,000 mcg Oral Daily    sodium chloride flush  5-40 mL IntraVENous 2 times per day    insulin lispro  0-4 Units SubCUTAneous TID WC    insulin lispro  0-4 Units SubCUTAneous Nightly    clopidogrel  75 mg Oral Daily    atorvastatin  40 mg Oral Nightly    amLODIPine  10 mg Oral QAM    aspirin  81 mg Oral Daily    heparin (porcine)  5,000 Units SubCUTAneous Q8H        lactated ringers 100 mL/hr at 01/12/23 2135    sodium chloride 25 mL (01/12/23 1720)    dextrose         Meds prn:     sodium chloride flush, sodium chloride, ondansetron **OR** ondansetron, polyethylene glycol, acetaminophen **OR** acetaminophen, glucose, dextrose bolus **OR** dextrose bolus, glucagon (rDNA), dextrose, perflutren lipid microspheres    Meds prior to admission:     No current facility-administered medications on file prior to encounter. Current Outpatient Medications on File Prior to Encounter   Medication Sig Dispense Refill    hydrOXYzine HCl (ATARAX) 25 MG tablet       promethazine (PHENERGAN) 25 MG tablet       atenolol (TENORMIN) 50 MG tablet Take 0.5 tablets by mouth every morning 30 tablet 3    amLODIPine (NORVASC) 10 MG tablet Take 10 mg by mouth every morning      aspirin 81 MG EC tablet Take 81 mg by mouth every morning      atorvastatin (LIPITOR) 20 MG tablet Take 20 mg by mouth every morning      denosumab (PROLIA) 60 MG/ML SOSY SC injection Inject 60 mg into the skin every 6 months NEXT INJ DUE @ [facility]: 02/2023      glipiZIDE (GLUCOTROL XL) 5 MG extended release tablet Take 5 mg by mouth Daily with lunch      magnesium oxide (MAG-OX) 400 MG tablet Take 400 mg by mouth 2 times daily      pantoprazole (PROTONIX) 40 MG tablet Take 40 mg by mouth every morning      sodium bicarbonate 650 MG tablet Take 650 mg by mouth 2 times daily      hydrALAZINE (APRESOLINE) 25 MG tablet Take 1 tablet by mouth every 12 hours 90 tablet 3       Allergies:    Betadine [povidone iodine], Lisinopril, Penicillins, and Tylenol [acetaminophen]    Social History:     reports that she has never smoked. She has never used smokeless tobacco. She reports that she does not drink alcohol and does not use drugs.     Family History:         Problem Relation Age of Onset    Kidney Disease Mother     High Blood Pressure Mother     Cancer Father     Diabetes Sister     Other Brother        ROS:     He is unable to participate in review of systems is nonverbal at the current time    Physical Exam:      Patient Vitals for the past 24 hrs:   BP Temp Temp src Pulse Resp SpO2   01/13/23 0809 137/79 97.7 °F (36.5 °C) Oral 68 16 100 %   01/13/23 0145 138/80 97.9 °F (36.6 °C) Axillary 70 16 100 %   01/12/23 1928 128/82 98 °F (36.7 °C) Oral 73 18 99 %   01/12/23 1547 139/84 98.6 °F (37 °C) Oral 77 18 100 %         Intake/Output Summary (Last 24 hours) at 1/13/2023 1000  Last data filed at 1/13/2023 0616  Gross per 24 hour   Intake 1217.32 ml   Output 1750 ml   Net -532.68 ml       Constitutional: Awake unable to communicate  Head: normocephalic, atraumatic   Neck: supple, no jvd  Cardiovascular: S1-S2 no S3 or rub  Respiratory: Clear upper diminished bases  Gastrointestinal: soft, nontender, nondistended  Ext: Trace edema  Neuro: She is lethargic today and noncommunicative  Skin: dry, no rash   : Chronic López followed by urology      Data:    Recent Labs     01/11/23 0305 01/12/23 0254 01/13/23  0208   WBC 6.5 6.7 6.0   HGB 10.1* 10.4* 10.2*   HCT 32.2* 33.4* 32.5*   MCV 87.7 87.9 86.9    298 267       Recent Labs     01/11/23 0305 01/12/23 0254 01/13/23  0208    143 135   K 3.3* 3.5 4.2   * 106 102   CO2 20* 23 22   CREATININE 2.7* 2.5* 2.4*   BUN 41* 30* 27*   LABGLOM 18 20 21   GLUCOSE 97 100* 117*   CALCIUM 9.4 9.4 9.2   PHOS 3.2 2.4* 3.6   MG 2.2 1.9 1.6       Vit D, 25-Hydroxy   Date Value Ref Range Status   12/23/2022 26 (L) 30 - 100 ng/mL Final     Comment:     <20 ng/mL. ........... Lizy Ruffing Deficient  20-30 ng/mL. ......... Lizy Ruffing Insufficient   ng/mL. ........ Lizy Ruffing Sufficient  >100 ng/mL. .......... Lizy Ruffing Toxic         PTH   Date Value Ref Range Status   12/23/2022 151 (H) 15 - 65 pg/mL Final       No results for input(s): ALT, AST, ALKPHOS, BILITOT, BILIDIR in the last 72 hours. No results for input(s): LABALBU in the last 72 hours.       Ferritin   Date Value Ref Range Status   12/22/2022 342 ng/mL Final     Comment:     FERRITIN Reference Ranges:  Adult Males   20 - 60 years:    27 - 400 ng/mL  Adult females 16 - 61 years:    15 - 150 ng/mL  Adults greater than 60 years:   no established reference range  Pediatrics:                     no established reference range       Iron   Date Value Ref Range Status   12/22/2022 98 37 - 145 mcg/dL Final     TIBC   Date Value Ref Range Status   12/22/2022 241 (L) 250 - 450 mcg/dL Final Vitamin B-12   Date Value Ref Range Status   12/22/2022 403 211 - 946 pg/mL Final       Folate   Date Value Ref Range Status   01/10/2023 6.5 4.8 - 24.2 ng/mL Final         Lab Results   Component Value Date/Time    COLORU Yellow 01/10/2023 01:46 AM    NITRU POSITIVE 01/10/2023 01:46 AM    GLUCOSEU Negative 01/10/2023 01:46 AM    KETUA 15 01/10/2023 01:46 AM    UROBILINOGEN 0.2 01/10/2023 01:46 AM    BILIRUBINUR Negative 01/10/2023 01:46 AM    BILIRUBINUR neg 10/07/2013 11:12 AM       Lab Results   Component Value Date/Time    OSMOU 248 (L) 12/21/2022 12:12 PM       No components found for: URIC    No results found for: LIPIDPAN    2D ECHO 1/10/23  Summary    Ejection fraction is visually estimated at 60 to 65%. Moderate asymmetric septal hypertrophy. There is doppler evidence of stage I diastolic dysfunction. Normal right ventricular size and function. Normal right atrium size. Agitated saline injected for shunt evaluation shows no evidence of shunt. Mild tricuspid regurgitation. RVSP is 29 mmHg. Assessment and Plan:    Acute kidney injury on chronic kidney disease stage 3b approaching stage IV-  JEANNINE in the setting of poor oral intake. CKD in the setting of chronic obstructive uropathy. She has chronic stents placed  Urine output 3450 mL past 24 hours and 550 cc so far today  Continue IV fluids. Remotely her creatinine had been 1.6 to 1.9 mg/dL  More recently her creatinine has been 2.8 to 3.1 mg/dL  She has a history of obstructive uropathy follows with urology. Peak serum creatinine during her December admission of 6.5 mg/dL; creatinine at presentation 3.7 mg/dL  S/P cystoscopy with retrograde pyelogram and stent exchange and López replacement 12/22/22  Creatinine improving from 3.7 to 2.4 mg/dL with IV fluids     2. Hypertension with chronic kidney disease stage I-4-  Blood pressure goal less than 130/80  Blood pressure is near goal     3.   AG Metabolic acidosis-  In the setting of chronic kidney disease  Lactic acid 0.8  CO2 22     4. Anemia of chronic kidney disease-  Hemoglobin at goal greater than 10.0  Iron saturation 41% in December 2022     5. Hypokalemia-  K 4.2  Supplement as needed    6.   Hypophosphatemia-  Phosphorus improved post supplement    Aissatou Wiley, APRN - CNP 9

## 2023-01-13 NOTE — PROGRESS NOTES
Ok to discharge per family medicine-will submit discharge. SW to set up transport to facility.     Osmel Lala BSN, RN  [facility]

## 2023-01-20 ENCOUNTER — TELEPHONE (OUTPATIENT)
Dept: FAMILY MEDICINE CLINIC | Age: 73
End: 2023-01-20

## 2023-01-20 DIAGNOSIS — M80.00XD AGE-RELATED OSTEOPOROSIS WITH CURRENT PATHOLOGICAL FRACTURE WITH ROUTINE HEALING: Primary | ICD-10-CM

## 2023-01-20 DIAGNOSIS — F06.1 CATATONIA: ICD-10-CM

## 2023-01-20 DIAGNOSIS — R47.01 APHASIA: ICD-10-CM

## 2023-01-20 RX ORDER — LORAZEPAM 0.5 MG/1
0.5 TABLET ORAL 3 TIMES DAILY
Qty: 90 TABLET | Refills: 2 | Status: SHIPPED | OUTPATIENT
Start: 2023-01-20 | End: 2023-04-20

## 2023-01-20 NOTE — TELEPHONE ENCOUNTER
Notified patient's sister Gal Ceballos to have them decrease Ativan to 0.5 mg TID. If they need anything from us, to let us know.

## 2023-01-20 NOTE — TELEPHONE ENCOUNTER
Patient's sister called stating that patient was started on Ativan while in the hospital.  She is currently in [place] for [address]. Sister states patient is too tired throughout the day due to taking Ativan 1 mg TID. Spoke to doctor at facility and he stated to call PCP for decrease. Can they decrease?

## 2023-01-24 ENCOUNTER — APPOINTMENT (OUTPATIENT)
Dept: GENERAL RADIOLOGY | Age: 73
DRG: 699 | End: 2023-01-24
Payer: MEDICARE

## 2023-01-24 ENCOUNTER — HOSPITAL ENCOUNTER (INPATIENT)
Age: 73
LOS: 1 days | Discharge: HOME | DRG: 699 | End: 2023-01-26
Attending: EMERGENCY MEDICINE | Admitting: FAMILY MEDICINE
Payer: MEDICARE

## 2023-01-24 ENCOUNTER — APPOINTMENT (OUTPATIENT)
Dept: CT IMAGING | Age: 73
DRG: 699 | End: 2023-01-24
Payer: MEDICARE

## 2023-01-24 DIAGNOSIS — R41.82 ALTERED MENTAL STATUS, UNSPECIFIED ALTERED MENTAL STATUS TYPE: Primary | ICD-10-CM

## 2023-01-24 DIAGNOSIS — N18.32 STAGE 3B CHRONIC KIDNEY DISEASE (HCC): ICD-10-CM

## 2023-01-24 DIAGNOSIS — Z97.8 CHRONIC INDWELLING FOLEY CATHETER: ICD-10-CM

## 2023-01-24 DIAGNOSIS — N39.0 COMPLICATED UTI (URINARY TRACT INFECTION): ICD-10-CM

## 2023-01-24 LAB
ALBUMIN SERPL-MCNC: 3.5 G/DL (ref 3.5–5.2)
ALP BLD-CCNC: 112 U/L (ref 35–104)
ALT SERPL-CCNC: 11 U/L (ref 0–32)
AMMONIA: <10 UMOL/L (ref 11–51)
ANION GAP SERPL CALCULATED.3IONS-SCNC: 14 MMOL/L (ref 7–16)
AST SERPL-CCNC: 11 U/L (ref 0–31)
BACTERIA: ABNORMAL /HPF
BASOPHILS ABSOLUTE: 0.02 E9/L (ref 0–0.2)
BASOPHILS RELATIVE PERCENT: 0.3 % (ref 0–2)
BILIRUB SERPL-MCNC: 0.7 MG/DL (ref 0–1.2)
BILIRUBIN DIRECT: <0.2 MG/DL (ref 0–0.3)
BILIRUBIN URINE: NEGATIVE
BILIRUBIN, INDIRECT: ABNORMAL MG/DL (ref 0–1)
BLOOD, URINE: ABNORMAL
BUN BLDV-MCNC: 32 MG/DL (ref 6–23)
CALCIUM SERPL-MCNC: 9.9 MG/DL (ref 8.6–10.2)
CHLORIDE BLD-SCNC: 97 MMOL/L (ref 98–107)
CLARITY: ABNORMAL
CO2: 21 MMOL/L (ref 22–29)
COLOR: YELLOW
CREAT SERPL-MCNC: 3 MG/DL (ref 0.5–1)
EOSINOPHILS ABSOLUTE: 0.18 E9/L (ref 0.05–0.5)
EOSINOPHILS RELATIVE PERCENT: 2.7 % (ref 0–6)
GFR SERPL CREATININE-BSD FRML MDRD: 16 ML/MIN/1.73
GLUCOSE BLD-MCNC: 171 MG/DL
GLUCOSE BLD-MCNC: 171 MG/DL (ref 74–99)
GLUCOSE URINE: NEGATIVE MG/DL
HCT VFR BLD CALC: 34 % (ref 34–48)
HEMOGLOBIN: 11 G/DL (ref 11.5–15.5)
IMMATURE GRANULOCYTES #: 0.02 E9/L
IMMATURE GRANULOCYTES %: 0.3 % (ref 0–5)
INFLUENZA A BY PCR: NOT DETECTED
INFLUENZA B BY PCR: NOT DETECTED
KETONES, URINE: NEGATIVE MG/DL
LACTIC ACID: 1.6 MMOL/L (ref 0.5–2.2)
LEUKOCYTE ESTERASE, URINE: ABNORMAL
LYMPHOCYTES ABSOLUTE: 1.1 E9/L (ref 1.5–4)
LYMPHOCYTES RELATIVE PERCENT: 16.5 % (ref 20–42)
MAGNESIUM: 2.1 MG/DL (ref 1.6–2.6)
MCH RBC QN AUTO: 27.6 PG (ref 26–35)
MCHC RBC AUTO-ENTMCNC: 32.4 % (ref 32–34.5)
MCV RBC AUTO: 85.4 FL (ref 80–99.9)
MONOCYTES ABSOLUTE: 0.5 E9/L (ref 0.1–0.95)
MONOCYTES RELATIVE PERCENT: 7.5 % (ref 2–12)
NEUTROPHILS ABSOLUTE: 4.85 E9/L (ref 1.8–7.3)
NEUTROPHILS RELATIVE PERCENT: 72.7 % (ref 43–80)
NITRITE, URINE: POSITIVE
PDW BLD-RTO: 16.4 FL (ref 11.5–15)
PH UA: 8.5 (ref 5–9)
PLATELET # BLD: 313 E9/L (ref 130–450)
PMV BLD AUTO: 10.5 FL (ref 7–12)
POTASSIUM SERPL-SCNC: 4.4 MMOL/L (ref 3.5–5)
PROTEIN UA: 30 MG/DL
RBC # BLD: 3.98 E12/L (ref 3.5–5.5)
RBC UA: ABNORMAL /HPF (ref 0–2)
SARS-COV-2, NAAT: NOT DETECTED
SODIUM BLD-SCNC: 132 MMOL/L (ref 132–146)
SPECIFIC GRAVITY UA: 1.01 (ref 1–1.03)
TOTAL PROTEIN: 7.5 G/DL (ref 6.4–8.3)
TROPONIN, HIGH SENSITIVITY: 47 NG/L (ref 0–9)
TSH SERPL DL<=0.05 MIU/L-ACNC: 2.44 UIU/ML (ref 0.27–4.2)
UROBILINOGEN, URINE: 0.2 E.U./DL
WBC # BLD: 6.7 E9/L (ref 4.5–11.5)
WBC UA: >20 /HPF (ref 0–5)

## 2023-01-24 PROCEDURE — 6360000002 HC RX W HCPCS

## 2023-01-24 PROCEDURE — 83735 ASSAY OF MAGNESIUM: CPT

## 2023-01-24 PROCEDURE — 85025 COMPLETE CBC W/AUTO DIFF WBC: CPT

## 2023-01-24 PROCEDURE — 70498 CT ANGIOGRAPHY NECK: CPT

## 2023-01-24 PROCEDURE — 36415 COLL VENOUS BLD VENIPUNCTURE: CPT

## 2023-01-24 PROCEDURE — 96374 THER/PROPH/DIAG INJ IV PUSH: CPT

## 2023-01-24 PROCEDURE — 71045 X-RAY EXAM CHEST 1 VIEW: CPT | Performed by: RADIOLOGY

## 2023-01-24 PROCEDURE — 87088 URINE BACTERIA CULTURE: CPT

## 2023-01-24 PROCEDURE — 93005 ELECTROCARDIOGRAM TRACING: CPT | Performed by: EMERGENCY MEDICINE

## 2023-01-24 PROCEDURE — 2580000003 HC RX 258

## 2023-01-24 PROCEDURE — 87040 BLOOD CULTURE FOR BACTERIA: CPT

## 2023-01-24 PROCEDURE — 99285 EMERGENCY DEPT VISIT HI MDM: CPT

## 2023-01-24 PROCEDURE — 87635 SARS-COV-2 COVID-19 AMP PRB: CPT

## 2023-01-24 PROCEDURE — 70496 CT ANGIOGRAPHY HEAD: CPT

## 2023-01-24 PROCEDURE — 80048 BASIC METABOLIC PNL TOTAL CA: CPT

## 2023-01-24 PROCEDURE — 82140 ASSAY OF AMMONIA: CPT

## 2023-01-24 PROCEDURE — 83605 ASSAY OF LACTIC ACID: CPT

## 2023-01-24 PROCEDURE — 6360000004 HC RX CONTRAST MEDICATION: Performed by: RADIOLOGY

## 2023-01-24 PROCEDURE — 84484 ASSAY OF TROPONIN QUANT: CPT

## 2023-01-24 PROCEDURE — 71045 X-RAY EXAM CHEST 1 VIEW: CPT

## 2023-01-24 PROCEDURE — 84443 ASSAY THYROID STIM HORMONE: CPT

## 2023-01-24 PROCEDURE — 80076 HEPATIC FUNCTION PANEL: CPT

## 2023-01-24 PROCEDURE — 87502 INFLUENZA DNA AMP PROBE: CPT

## 2023-01-24 PROCEDURE — 81001 URINALYSIS AUTO W/SCOPE: CPT

## 2023-01-24 PROCEDURE — 93005 ELECTROCARDIOGRAM TRACING: CPT

## 2023-01-24 RX ORDER — 0.9 % SODIUM CHLORIDE 0.9 %
1000 INTRAVENOUS SOLUTION INTRAVENOUS ONCE
Status: COMPLETED | OUTPATIENT
Start: 2023-01-24 | End: 2023-01-24

## 2023-01-24 RX ADMIN — IOPAMIDOL 80 ML: 755 INJECTION, SOLUTION INTRAVENOUS at 20:00

## 2023-01-24 RX ADMIN — WATER 1000 MG: 1 INJECTION INTRAMUSCULAR; INTRAVENOUS; SUBCUTANEOUS at 21:11

## 2023-01-24 RX ADMIN — SODIUM CHLORIDE 1000 ML: 9 INJECTION, SOLUTION INTRAVENOUS at 20:29

## 2023-01-24 ASSESSMENT — LIFESTYLE VARIABLES
HOW OFTEN DO YOU HAVE A DRINK CONTAINING ALCOHOL: NEVER
HOW MANY STANDARD DRINKS CONTAINING ALCOHOL DO YOU HAVE ON A TYPICAL DAY: PATIENT DOES NOT DRINK

## 2023-01-24 ASSESSMENT — PAIN - FUNCTIONAL ASSESSMENT
PAIN_FUNCTIONAL_ASSESSMENT: 0-10
PAIN_FUNCTIONAL_ASSESSMENT: 0-10

## 2023-01-24 ASSESSMENT — PAIN SCALES - GENERAL
PAINLEVEL_OUTOF10: 4
PAINLEVEL_OUTOF10: 3
PAINLEVEL_OUTOF10: 4

## 2023-01-24 ASSESSMENT — PAIN DESCRIPTION - LOCATION
LOCATION: CHEST

## 2023-01-24 ASSESSMENT — PAIN DESCRIPTION - DESCRIPTORS: DESCRIPTORS: ACHING

## 2023-01-25 PROBLEM — N39.0 COMPLICATED UTI (URINARY TRACT INFECTION): Status: ACTIVE | Noted: 2023-01-25

## 2023-01-25 PROBLEM — R41.82 ALTERED MENTAL STATUS: Status: ACTIVE | Noted: 2023-01-25

## 2023-01-25 LAB
ANION GAP SERPL CALCULATED.3IONS-SCNC: 11 MMOL/L (ref 7–16)
BASOPHILS ABSOLUTE: 0.03 E9/L (ref 0–0.2)
BASOPHILS RELATIVE PERCENT: 0.4 % (ref 0–2)
BUN BLDV-MCNC: 28 MG/DL (ref 6–23)
CALCIUM SERPL-MCNC: 9.6 MG/DL (ref 8.6–10.2)
CHLORIDE BLD-SCNC: 102 MMOL/L (ref 98–107)
CO2: 20 MMOL/L (ref 22–29)
CREAT SERPL-MCNC: 2.8 MG/DL (ref 0.5–1)
EOSINOPHILS ABSOLUTE: 0.2 E9/L (ref 0.05–0.5)
EOSINOPHILS RELATIVE PERCENT: 3 % (ref 0–6)
GFR SERPL CREATININE-BSD FRML MDRD: 17 ML/MIN/1.73
GLUCOSE BLD-MCNC: 176 MG/DL (ref 74–99)
HCT VFR BLD CALC: 33.9 % (ref 34–48)
HEMOGLOBIN: 10.4 G/DL (ref 11.5–15.5)
IMMATURE GRANULOCYTES #: 0.03 E9/L
IMMATURE GRANULOCYTES %: 0.4 % (ref 0–5)
LYMPHOCYTES ABSOLUTE: 0.91 E9/L (ref 1.5–4)
LYMPHOCYTES RELATIVE PERCENT: 13.6 % (ref 20–42)
MCH RBC QN AUTO: 27.4 PG (ref 26–35)
MCHC RBC AUTO-ENTMCNC: 30.7 % (ref 32–34.5)
MCV RBC AUTO: 89.2 FL (ref 80–99.9)
METER GLUCOSE: 127 MG/DL (ref 74–99)
METER GLUCOSE: 130 MG/DL (ref 74–99)
METER GLUCOSE: 165 MG/DL (ref 74–99)
METER GLUCOSE: 178 MG/DL (ref 74–99)
METER GLUCOSE: 199 MG/DL (ref 74–99)
MONOCYTES ABSOLUTE: 0.45 E9/L (ref 0.1–0.95)
MONOCYTES RELATIVE PERCENT: 6.7 % (ref 2–12)
NEUTROPHILS ABSOLUTE: 5.09 E9/L (ref 1.8–7.3)
NEUTROPHILS RELATIVE PERCENT: 75.9 % (ref 43–80)
PDW BLD-RTO: 16.5 FL (ref 11.5–15)
PLATELET # BLD: 295 E9/L (ref 130–450)
PMV BLD AUTO: 10.6 FL (ref 7–12)
POTASSIUM REFLEX MAGNESIUM: 4.3 MMOL/L (ref 3.5–5)
RBC # BLD: 3.8 E12/L (ref 3.5–5.5)
SODIUM BLD-SCNC: 133 MMOL/L (ref 132–146)
TROPONIN, HIGH SENSITIVITY: 53 NG/L (ref 0–9)
TROPONIN, HIGH SENSITIVITY: 54 NG/L (ref 0–9)
WBC # BLD: 6.7 E9/L (ref 4.5–11.5)

## 2023-01-25 PROCEDURE — 82962 GLUCOSE BLOOD TEST: CPT

## 2023-01-25 PROCEDURE — 97165 OT EVAL LOW COMPLEX 30 MIN: CPT

## 2023-01-25 PROCEDURE — 6370000000 HC RX 637 (ALT 250 FOR IP): Performed by: STUDENT IN AN ORGANIZED HEALTH CARE EDUCATION/TRAINING PROGRAM

## 2023-01-25 PROCEDURE — 92523 SPEECH SOUND LANG COMPREHEN: CPT | Performed by: SPEECH-LANGUAGE PATHOLOGIST

## 2023-01-25 PROCEDURE — 2580000003 HC RX 258: Performed by: STUDENT IN AN ORGANIZED HEALTH CARE EDUCATION/TRAINING PROGRAM

## 2023-01-25 PROCEDURE — 6370000000 HC RX 637 (ALT 250 FOR IP)

## 2023-01-25 PROCEDURE — 6360000002 HC RX W HCPCS: Performed by: STUDENT IN AN ORGANIZED HEALTH CARE EDUCATION/TRAINING PROGRAM

## 2023-01-25 PROCEDURE — 2060000000 HC ICU INTERMEDIATE R&B

## 2023-01-25 PROCEDURE — 92526 ORAL FUNCTION THERAPY: CPT | Performed by: SPEECH-LANGUAGE PATHOLOGIST

## 2023-01-25 PROCEDURE — 92610 EVALUATE SWALLOWING FUNCTION: CPT | Performed by: SPEECH-LANGUAGE PATHOLOGIST

## 2023-01-25 PROCEDURE — 80048 BASIC METABOLIC PNL TOTAL CA: CPT

## 2023-01-25 PROCEDURE — 99222 1ST HOSP IP/OBS MODERATE 55: CPT | Performed by: FAMILY MEDICINE

## 2023-01-25 PROCEDURE — 92507 TX SP LANG VOICE COMM INDIV: CPT | Performed by: SPEECH-LANGUAGE PATHOLOGIST

## 2023-01-25 PROCEDURE — 36415 COLL VENOUS BLD VENIPUNCTURE: CPT

## 2023-01-25 PROCEDURE — 97161 PT EVAL LOW COMPLEX 20 MIN: CPT

## 2023-01-25 PROCEDURE — 85025 COMPLETE CBC W/AUTO DIFF WBC: CPT

## 2023-01-25 PROCEDURE — 84484 ASSAY OF TROPONIN QUANT: CPT

## 2023-01-25 RX ORDER — CARVEDILOL 6.25 MG/1
6.25 TABLET ORAL 2 TIMES DAILY WITH MEALS
Status: DISCONTINUED | OUTPATIENT
Start: 2023-01-25 | End: 2023-01-26 | Stop reason: HOSPADM

## 2023-01-25 RX ORDER — INSULIN LISPRO 100 [IU]/ML
0-4 INJECTION, SOLUTION INTRAVENOUS; SUBCUTANEOUS
Status: DISCONTINUED | OUTPATIENT
Start: 2023-01-25 | End: 2023-01-26 | Stop reason: HOSPADM

## 2023-01-25 RX ORDER — AMLODIPINE BESYLATE 10 MG/1
10 TABLET ORAL EVERY MORNING
Status: DISCONTINUED | OUTPATIENT
Start: 2023-01-25 | End: 2023-01-26 | Stop reason: HOSPADM

## 2023-01-25 RX ORDER — ONDANSETRON 2 MG/ML
4 INJECTION INTRAMUSCULAR; INTRAVENOUS EVERY 6 HOURS PRN
Status: DISCONTINUED | OUTPATIENT
Start: 2023-01-25 | End: 2023-01-26 | Stop reason: HOSPADM

## 2023-01-25 RX ORDER — LANOLIN ALCOHOL/MO/W.PET/CERES
400 CREAM (GRAM) TOPICAL 2 TIMES DAILY
Status: DISCONTINUED | OUTPATIENT
Start: 2023-01-25 | End: 2023-01-26 | Stop reason: HOSPADM

## 2023-01-25 RX ORDER — SODIUM CHLORIDE 9 MG/ML
25 INJECTION, SOLUTION INTRAVENOUS PRN
Status: DISCONTINUED | OUTPATIENT
Start: 2023-01-25 | End: 2023-01-26 | Stop reason: HOSPADM

## 2023-01-25 RX ORDER — ONDANSETRON 4 MG/1
4 TABLET, ORALLY DISINTEGRATING ORAL EVERY 8 HOURS PRN
Status: DISCONTINUED | OUTPATIENT
Start: 2023-01-25 | End: 2023-01-26 | Stop reason: HOSPADM

## 2023-01-25 RX ORDER — LANOLIN ALCOHOL/MO/W.PET/CERES
1000 CREAM (GRAM) TOPICAL DAILY
Status: DISCONTINUED | OUTPATIENT
Start: 2023-01-25 | End: 2023-01-26 | Stop reason: HOSPADM

## 2023-01-25 RX ORDER — ENOXAPARIN SODIUM 100 MG/ML
30 INJECTION SUBCUTANEOUS DAILY
Status: DISCONTINUED | OUTPATIENT
Start: 2023-01-25 | End: 2023-01-26 | Stop reason: HOSPADM

## 2023-01-25 RX ORDER — SODIUM CHLORIDE 0.9 % (FLUSH) 0.9 %
5-40 SYRINGE (ML) INJECTION EVERY 12 HOURS SCHEDULED
Status: DISCONTINUED | OUTPATIENT
Start: 2023-01-25 | End: 2023-01-26 | Stop reason: HOSPADM

## 2023-01-25 RX ORDER — POLYETHYLENE GLYCOL 3350 17 G/17G
17 POWDER, FOR SOLUTION ORAL DAILY PRN
Status: DISCONTINUED | OUTPATIENT
Start: 2023-01-25 | End: 2023-01-26 | Stop reason: HOSPADM

## 2023-01-25 RX ORDER — ATORVASTATIN CALCIUM 40 MG/1
40 TABLET, FILM COATED ORAL NIGHTLY
Status: DISCONTINUED | OUTPATIENT
Start: 2023-01-25 | End: 2023-01-26 | Stop reason: HOSPADM

## 2023-01-25 RX ORDER — ACETAMINOPHEN 650 MG/1
650 SUPPOSITORY RECTAL EVERY 6 HOURS PRN
Status: DISCONTINUED | OUTPATIENT
Start: 2023-01-25 | End: 2023-01-26 | Stop reason: HOSPADM

## 2023-01-25 RX ORDER — INSULIN LISPRO 100 [IU]/ML
0-4 INJECTION, SOLUTION INTRAVENOUS; SUBCUTANEOUS NIGHTLY
Status: DISCONTINUED | OUTPATIENT
Start: 2023-01-25 | End: 2023-01-26 | Stop reason: HOSPADM

## 2023-01-25 RX ORDER — ACETAMINOPHEN 325 MG/1
650 TABLET ORAL EVERY 6 HOURS PRN
Status: DISCONTINUED | OUTPATIENT
Start: 2023-01-25 | End: 2023-01-26 | Stop reason: HOSPADM

## 2023-01-25 RX ORDER — SODIUM CHLORIDE 0.9 % (FLUSH) 0.9 %
5-40 SYRINGE (ML) INJECTION PRN
Status: DISCONTINUED | OUTPATIENT
Start: 2023-01-25 | End: 2023-01-26 | Stop reason: HOSPADM

## 2023-01-25 RX ORDER — ASPIRIN 81 MG/1
81 TABLET ORAL EVERY MORNING
Status: DISCONTINUED | OUTPATIENT
Start: 2023-01-25 | End: 2023-01-26 | Stop reason: HOSPADM

## 2023-01-25 RX ORDER — DEXTROSE MONOHYDRATE 100 MG/ML
INJECTION, SOLUTION INTRAVENOUS CONTINUOUS PRN
Status: DISCONTINUED | OUTPATIENT
Start: 2023-01-25 | End: 2023-01-26 | Stop reason: HOSPADM

## 2023-01-25 RX ORDER — ESCITALOPRAM OXALATE 10 MG/1
5 TABLET ORAL DAILY
Status: DISCONTINUED | OUTPATIENT
Start: 2023-01-25 | End: 2023-01-26 | Stop reason: HOSPADM

## 2023-01-25 RX ORDER — PANTOPRAZOLE SODIUM 40 MG/1
40 TABLET, DELAYED RELEASE ORAL EVERY MORNING
Status: DISCONTINUED | OUTPATIENT
Start: 2023-01-25 | End: 2023-01-26 | Stop reason: HOSPADM

## 2023-01-25 RX ORDER — LORAZEPAM 0.5 MG/1
0.5 TABLET ORAL 3 TIMES DAILY
Status: DISCONTINUED | OUTPATIENT
Start: 2023-01-25 | End: 2023-01-26 | Stop reason: HOSPADM

## 2023-01-25 RX ORDER — CLOPIDOGREL BISULFATE 75 MG/1
75 TABLET ORAL DAILY
Status: DISCONTINUED | OUTPATIENT
Start: 2023-01-25 | End: 2023-01-26 | Stop reason: HOSPADM

## 2023-01-25 RX ADMIN — CYANOCOBALAMIN TAB 1000 MCG 1000 MCG: 1000 TAB at 08:51

## 2023-01-25 RX ADMIN — LORAZEPAM 0.5 MG: 0.5 TABLET ORAL at 13:57

## 2023-01-25 RX ADMIN — Medication 400 MG: at 20:03

## 2023-01-25 RX ADMIN — LEVOFLOXACIN 750 MG: 500 TABLET, FILM COATED ORAL at 09:10

## 2023-01-25 RX ADMIN — CLOPIDOGREL BISULFATE 75 MG: 75 TABLET ORAL at 08:51

## 2023-01-25 RX ADMIN — CARVEDILOL 6.25 MG: 6.25 TABLET, FILM COATED ORAL at 08:51

## 2023-01-25 RX ADMIN — LORAZEPAM 0.5 MG: 0.5 TABLET ORAL at 20:03

## 2023-01-25 RX ADMIN — PANTOPRAZOLE SODIUM 40 MG: 40 TABLET, DELAYED RELEASE ORAL at 08:51

## 2023-01-25 RX ADMIN — AMLODIPINE BESYLATE 10 MG: 10 TABLET ORAL at 08:51

## 2023-01-25 RX ADMIN — ATORVASTATIN CALCIUM 40 MG: 40 TABLET, FILM COATED ORAL at 20:03

## 2023-01-25 RX ADMIN — ENOXAPARIN SODIUM 30 MG: 100 INJECTION SUBCUTANEOUS at 08:52

## 2023-01-25 RX ADMIN — LORAZEPAM 0.5 MG: 0.5 TABLET ORAL at 08:51

## 2023-01-25 RX ADMIN — SODIUM CHLORIDE, PRESERVATIVE FREE 10 ML: 5 INJECTION INTRAVENOUS at 20:04

## 2023-01-25 RX ADMIN — ESCITALOPRAM OXALATE 5 MG: 10 TABLET ORAL at 08:51

## 2023-01-25 RX ADMIN — Medication 400 MG: at 08:51

## 2023-01-25 RX ADMIN — ASPIRIN 81 MG: 81 TABLET, COATED ORAL at 08:51

## 2023-01-25 RX ADMIN — CARVEDILOL 6.25 MG: 6.25 TABLET, FILM COATED ORAL at 16:12

## 2023-01-25 RX ADMIN — SODIUM CHLORIDE, PRESERVATIVE FREE 10 ML: 5 INJECTION INTRAVENOUS at 08:52

## 2023-01-25 ASSESSMENT — PAIN SCALES - GENERAL
PAINLEVEL_OUTOF10: 0

## 2023-01-25 ASSESSMENT — ENCOUNTER SYMPTOMS
ABDOMINAL PAIN: 0
SHORTNESS OF BREATH: 0
SINUS PAIN: 0
NAUSEA: 0
SORE THROAT: 0
EYE PAIN: 0
VOMITING: 0
CHEST TIGHTNESS: 0
RHINORRHEA: 0
DIARRHEA: 0
CONSTIPATION: 0
COUGH: 0
NAUSEA: 1
WHEEZING: 0

## 2023-01-25 ASSESSMENT — PAIN - FUNCTIONAL ASSESSMENT: PAIN_FUNCTIONAL_ASSESSMENT: NONE - DENIES PAIN

## 2023-01-25 NOTE — PROGRESS NOTES
SPEECH/LANGUAGE PATHOLOGY  CLINICAL ASSESSMENT OF SWALLOWING FUNCTION   and PLAN OF CARE    PATIENT NAME:  Jamaal Gong  (female)     MRN:  [id number]    :  1950  (67 y.o.)  STATUS:  Inpatient: Room 0428/0428-A    TODAY'S DATE:  2023  REFERRING PROVIDER:   Irene Dawson DO       SPECIFIC PROVIDER ORDER: SLP eval and treat Date of order:  23  REASON FOR REFERRAL: Assess swallow fx   EVALUATING THERAPIST: JOSE Keane                 RESULTS:    DYSPHAGIA DIAGNOSIS:   Clinical indicators of moderate oral phase dysphagia       DIET RECOMMENDATIONS:  Minced and moist consistency solids (IDDSI level 5) with  thin liquids (IDDSI level 0)     MEDICATION ADMINISTRATION, Administer medication crushed, as able, with pudding/applesauce, OR  Administer medication whole, ONE AT A TIME, in pudding/applesauce     FEEDING RECOMMENDATIONS:     Assistance level:  Stand by assistance is needed during all oral intake, Encourage self-feeding      Compensatory strategies recommended: Small bites/sips and Alternate solids and liquids      Discussed recommendations with nursing and/or faxed report to referring provider: Yes    SPEECH THERAPY  PLAN OF CARE   The dysphagia POC is established based on physician order, dysphagia diagnosis and results of clinical assessment     Meal time assessment for 1-2 sessions to provide diet modification and compensatory strategy implementation to safely advance diet as functional ability improves    Conditions Requiring Skilled Therapeutic Intervention for dysphagia:    Patient is performing below functional baseline d/t  current acute condition, Multiple diagnoses, multiple medications, and increased dependency upon caregivers.     Specific dysphagia interventions to include:     Meal time assessment for 1-2 sessions to provide diet modification and compensatory strategy implementation to safely advance diet as functional ability improves    Specific instructions for next treatment:  initiate instruction of compensatory strategies  Patient Treatment Goals:    Short Term Goals:  Pt will participate in meal time assessment for 1-2 sessions to provide diet modification and compensatory strategy implementation to safely advance diet as functional ability improves    Long Term Goals:   Pt will maintain adequate nutrition/hydration via PO intake of the least restrictive oral diet with implementation of safe swallow/ compensatory strategies and decrease signs/symptoms of aspiration to less than 1 x/day. Patient/family Goal:    Did not state. Will further assess during treatment. Plan of care discussed with Patient   The Patient understand(s) the diagnosis, prognosis and plan of care     Rehabilitation Potential/Prognosis: good                    ADMITTING DIAGNOSIS: Complicated UTI (urinary tract infection) [N39.0]    VISIT DIAGNOSIS:   Visit Diagnoses         Codes    Altered mental status, unspecified altered mental status type    -  Primary R41.82             PATIENT REPORT/COMPLAINT: denies difficulty swallowing  RN cleared patient for participation in assessment     yes     PRIOR LEVEL OF SWALLOW FUNCTION:    PAST HISTORY OF DYSPHAGIA?: yes    Home diet: Pureed consistency solids (IDDSI level 4) with  thin liquids (IDDSI level 0)  Current Diet Order:  ADULT DIET; Dysphagia - Pureed    PROCEDURE:  Consistencies Administered During the Evaluation   Liquids: thin liquid   Solids:  pureed foods and soft solid foods      Method of Intake:   cup, straw, spoon  Self fed      Position:   Seated, upright    CLINICAL ASSESSMENT:  Oral Stage:       Decreased mastication due to:  poor/missing dentition    - pt reported     Pharyngeal Stage:    No signs of aspiration were noted during this evaluation however, silent aspiration cannot be ruled out at bedside.   If silent aspiration is suspected, a Videofluoroscopic Study of Swallowing (MBS) is recommended and requires a physician order.    Cognition:   Within functional limits for this exam    Oral Peripheral Examination   Generalized oral weakness    Current Respiratory Status    room air     Parameters of Speech Production  Respiration:  Adequate for speech production  Quality:   Within functional limits  Intensity: Quiet    Volitional Swallow: DNT     Volitional Cough:   DNT     Pain: No pain reported. EDUCATION:   The Speech Language Pathologist (SLP) completed education regarding results of evaluation and that intervention is warranted at this time. Learner: Patient  Education: Reviewed results and recommendations of this evaluation  Evaluation of Education:  Tha Jik understanding    This plan may be re-evaluated and revised as warranted. Evaluation Time includes thorough review of current medical information, gathering information on past medical history/social history and prior level of function, completion of standardized testing/informal observation of tasks, assessment of data and education on plan of care and goals. INTERVENTION     Speech Pathologist (SLP) completed education with the patient regarding type of swallowing impairment. Reviewed current solid/liquid consistency diet recommendations and discussed compensatory strategies to ensure safe PO intake. Reviewed aspiration precautions. White coating observed on patient's surface of tongue and discussed oral care. Encouraged patient to engage SLP in unstructured Q&A session relative to identified deficit areas; indicated understanding of all information provided via satisfactory verbal response. [x]The admitting diagnosis and active problem list, have been reviewed prior to initiation of this evaluation.         ACTIVE PROBLEM LIST:   Patient Active Problem List   Diagnosis    Hypertension    Elevated lipids    Proteinuria    Vitamin D deficiency    Closed fracture of radius    History of anal cancer    Colostomy present ([address]    Vaginal cancer ([address] Diabetes mellitus type 2 in obese [facility])    Stuttering    Age-related osteoporosis with current pathological fracture with routine healing    Herpes zoster vaccination declined    Chronic renal disease, stage III ([facility] [[id number]]    Hydronephrosis due to obstruction of bladder    Chronic indwelling López catheter    Bilateral hydronephrosis    Bacteriuria    Cerebellar infarct ([facility]    Aphasia    Cerebrovascular accident (CVA) ([facility]    Uncontrolled type 2 diabetes mellitus with hyperglycemia ([facility]    Catatonia    Complicated UTI (urinary tract infection)         CPT code:  39945  bedside swallow eval, 39529 dysphagia therapy    Michelle Bocanegra.  Kirsten GARCIA, Merged with [facility]. [id number]

## 2023-01-25 NOTE — PROGRESS NOTES
[address] [address]  Progress Note    Chief complaint :  Chief Complaint   Patient presents with    Chest Pain     Patient from [address] [place] [address], sent in for chest pain,       Subjective:    No overnight problems. Patient describes feeling better. Reports mild nausea, no vomiting. Patient denies chest pain, SOB,  bloody stools, fever, chills. Past medical, surgical, family and social history were reviewed, non-contributory, and unchanged unless otherwise stated. Review of Systems   Constitutional:  Negative for chills, fatigue and fever. HENT:  Negative for congestion, hearing loss, rhinorrhea and sore throat. Eyes:  Negative for pain and visual disturbance. Respiratory:  Negative for cough, chest tightness, shortness of breath and wheezing. Cardiovascular:  Negative for chest pain, palpitations and leg swelling. Gastrointestinal:  Negative for abdominal pain, constipation, diarrhea, nausea and vomiting. Genitourinary:  Negative for difficulty urinating, dysuria and hematuria. Musculoskeletal:  Negative for arthralgias and myalgias. Skin:  Negative for rash and wound. Neurological:  Negative for dizziness, weakness, light-headedness, numbness and headaches. Psychiatric/Behavioral:  Negative for dysphoric mood. The patient is not nervous/anxious. Objective:  /71   Pulse 78   Temp 98 °F (36.7 °C) (Oral)   Resp 18   Ht 5' 7\" (1.702 m)   Wt 184 lb 8 oz (83.7 kg)   SpO2 95%   BMI 28.90 kg/m²     Physical Exam  Vitals reviewed. Constitutional:       General: She is not in acute distress. Appearance: Normal appearance. She is not ill-appearing. HENT:      Head: Normocephalic and atraumatic. Right Ear: External ear normal.      Left Ear: External ear normal.      Mouth/Throat:      Mouth: Mucous membranes are moist.      Pharynx: Oropharynx is clear. Eyes:      General: No scleral icterus. Right eye: No discharge. Left eye: No discharge. Extraocular Movements: Extraocular movements intact. Conjunctiva/sclera: Conjunctivae normal.   Cardiovascular:      Rate and Rhythm: Normal rate and regular rhythm. Pulses: Normal pulses. Heart sounds: Normal heart sounds. No murmur heard. Pulmonary:      Effort: Pulmonary effort is normal. No respiratory distress. Breath sounds: Normal breath sounds. No stridor. No wheezing, rhonchi or rales. Abdominal:      General: Abdomen is flat. Bowel sounds are normal. There is no distension. Palpations: Abdomen is soft. Tenderness: There is abdominal tenderness (suprapubic). Comments: Ostomy bag in place. No erythema around ostomy site noted      Musculoskeletal:         General: Normal range of motion. Cervical back: Normal range of motion and neck supple. Right lower leg: No edema. Left lower leg: No edema. Skin:     General: Skin is warm and dry. Capillary Refill: Capillary refill takes less than 2 seconds. Neurological:      Mental Status: She is alert and oriented to person, place, and time. Sensory: No sensory deficit. Motor: Weakness (4/5 lower extremity strenght BL) present.    Psychiatric:         Mood and Affect: Mood normal.         Behavior: Behavior normal.       Labs:  Recent Results (from the past 24 hour(s))   EKG 12 Lead    Collection Time: 01/24/23  7:40 PM   Result Value Ref Range    Ventricular Rate 76 BPM    Atrial Rate 78 BPM    QRS Duration 58 ms    Q-T Interval 384 ms    QTc Calculation (Bazett) 432 ms    R Axis -13 degrees    T Axis 19 degrees   EKG 12 Lead    Collection Time: 01/24/23  7:41 PM   Result Value Ref Range    Ventricular Rate 77 BPM    Atrial Rate 77 BPM    P-R Interval 168 ms    QRS Duration 52 ms    Q-T Interval 382 ms    QTc Calculation (Bazett) 432 ms    P Axis 40 degrees    R Axis -13 degrees    T Axis -13 degrees   BMP    Collection Time: 01/24/23  7:54 PM   Result Value Ref Range    Sodium 132 132 - 146 mmol/L    Potassium 4.4 3.5 - 5.0 mmol/L    Chloride 97 (L) 98 - 107 mmol/L    CO2 21 (L) 22 - 29 mmol/L    Anion Gap 14 7 - 16 mmol/L    Glucose 171 (H) 74 - 99 mg/dL    BUN 32 (H) 6 - 23 mg/dL    Creatinine 3.0 (H) 0.5 - 1.0 mg/dL    Est, Glom Filt Rate 16 >=60 mL/min/1.73    Calcium 9.9 8.6 - 10.2 mg/dL   CBC with Auto Differential    Collection Time: 01/24/23  7:54 PM   Result Value Ref Range    WBC 6.7 4.5 - 11.5 E9/L    RBC 3.98 3.50 - 5.50 E12/L    Hemoglobin 11.0 (L) 11.5 - 15.5 g/dL    Hematocrit 34.0 34.0 - 48.0 %    MCV 85.4 80.0 - 99.9 fL    MCH 27.6 26.0 - 35.0 pg    MCHC 32.4 32.0 - 34.5 %    RDW 16.4 (H) 11.5 - 15.0 fL    Platelets 570 905 - 994 E9/L    MPV 10.5 7.0 - 12.0 fL    Neutrophils % 72.7 43.0 - 80.0 %    Immature Granulocytes % 0.3 0.0 - 5.0 %    Lymphocytes % 16.5 (L) 20.0 - 42.0 %    Monocytes % 7.5 2.0 - 12.0 %    Eosinophils % 2.7 0.0 - 6.0 %    Basophils % 0.3 0.0 - 2.0 %    Neutrophils Absolute 4.85 1.80 - 7.30 E9/L    Immature Granulocytes # 0.02 E9/L    Lymphocytes Absolute 1.10 (L) 1.50 - 4.00 E9/L    Monocytes Absolute 0.50 0.10 - 0.95 E9/L    Eosinophils Absolute 0.18 0.05 - 0.50 E9/L    Basophils Absolute 0.02 0.00 - 0.20 E9/L   TSH    Collection Time: 01/24/23  7:54 PM   Result Value Ref Range    TSH 2.440 0.270 - 4.200 uIU/mL   Troponin    Collection Time: 01/24/23  7:54 PM   Result Value Ref Range    Troponin, High Sensitivity 47 (H) 0 - 9 ng/L   Magnesium    Collection Time: 01/24/23  7:54 PM   Result Value Ref Range    Magnesium 2.1 1.6 - 2.6 mg/dL   Lactic Acid    Collection Time: 01/24/23  7:54 PM   Result Value Ref Range    Lactic Acid 1.6 0.5 - 2.2 mmol/L   Hepatic Function Panel    Collection Time: 01/24/23  7:54 PM   Result Value Ref Range    Total Protein 7.5 6.4 - 8.3 g/dL    Albumin 3.5 3.5 - 5.2 g/dL    Alkaline Phosphatase 112 (H) 35 - 104 U/L    ALT 11 0 - 32 U/L    AST 11 0 - 31 U/L    Total Bilirubin 0.7 0.0 - 1.2 mg/dL Bilirubin, Direct <0.2 0.0 - 0.3 mg/dL    Bilirubin, Indirect see below 0.0 - 1.0 mg/dL   COVID-19, Rapid    Collection Time: 01/24/23  8:01 PM    Specimen: Nasopharyngeal Swab   Result Value Ref Range    SARS-CoV-2, NAAT Not Detected Not Detected   RAPID INFLUENZA A/B ANTIGENS    Collection Time: 01/24/23  8:01 PM    Specimen: Nasopharyngeal   Result Value Ref Range    Influenza A by PCR Not Detected Not Detected    Influenza B by PCR Not Detected Not Detected   Ammonia    Collection Time: 01/24/23  8:17 PM   Result Value Ref Range    Ammonia <10.0 (L) 11.0 - 51.0 umol/L   Urinalysis    Collection Time: 01/24/23  8:31 PM   Result Value Ref Range    Color, UA Yellow Straw/Yellow    Clarity, UA CLOUDY (A) Clear    Glucose, Ur Negative Negative mg/dL    Bilirubin Urine Negative Negative    Ketones, Urine Negative Negative mg/dL    Specific Gravity, UA 1.015 1.005 - 1.030    Blood, Urine LARGE (A) Negative    pH, UA 8.5 5.0 - 9.0    Protein, UA 30 (A) Negative mg/dL    Urobilinogen, Urine 0.2 <2.0 E.U./dL    Nitrite, Urine POSITIVE (A) Negative    Leukocyte Esterase, Urine LARGE (A) Negative   Microscopic Urinalysis    Collection Time: 01/24/23  8:31 PM   Result Value Ref Range    WBC, UA >20 (A) 0 - 5 /HPF    RBC, UA 10-20 (A) 0 - 2 /HPF    Bacteria, UA MODERATE (A) None Seen /HPF   POCT Glucose    Collection Time: 01/24/23  9:19 PM   Result Value Ref Range    Glucose 171 mg/dL   Basic Metabolic Panel w/ Reflex to MG    Collection Time: 01/25/23  3:13 AM   Result Value Ref Range    Sodium 133 132 - 146 mmol/L    Potassium reflex Magnesium 4.3 3.5 - 5.0 mmol/L    Chloride 102 98 - 107 mmol/L    CO2 20 (L) 22 - 29 mmol/L    Anion Gap 11 7 - 16 mmol/L    Glucose 176 (H) 74 - 99 mg/dL    BUN 28 (H) 6 - 23 mg/dL    Creatinine 2.8 (H) 0.5 - 1.0 mg/dL    Est, Glom Filt Rate 17 >=60 mL/min/1.73    Calcium 9.6 8.6 - 10.2 mg/dL   CBC with Auto Differential    Collection Time: 01/25/23  3:13 AM   Result Value Ref Range    WBC 6.7 4.5 - 11.5 E9/L    RBC 3.80 3.50 - 5.50 E12/L    Hemoglobin 10.4 (L) 11.5 - 15.5 g/dL    Hematocrit 33.9 (L) 34.0 - 48.0 %    MCV 89.2 80.0 - 99.9 fL    MCH 27.4 26.0 - 35.0 pg    MCHC 30.7 (L) 32.0 - 34.5 %    RDW 16.5 (H) 11.5 - 15.0 fL    Platelets 074 888 - 489 E9/L    MPV 10.6 7.0 - 12.0 fL    Neutrophils % 75.9 43.0 - 80.0 %    Immature Granulocytes % 0.4 0.0 - 5.0 %    Lymphocytes % 13.6 (L) 20.0 - 42.0 %    Monocytes % 6.7 2.0 - 12.0 %    Eosinophils % 3.0 0.0 - 6.0 %    Basophils % 0.4 0.0 - 2.0 %    Neutrophils Absolute 5.09 1.80 - 7.30 E9/L    Immature Granulocytes # 0.03 E9/L    Lymphocytes Absolute 0.91 (L) 1.50 - 4.00 E9/L    Monocytes Absolute 0.45 0.10 - 0.95 E9/L    Eosinophils Absolute 0.20 0.05 - 0.50 E9/L    Basophils Absolute 0.03 0.00 - 0.20 E9/L   POCT Glucose    Collection Time: 01/25/23  3:37 AM   Result Value Ref Range    Meter Glucose 165 (H) 74 - 99 mg/dL   POCT Glucose    Collection Time: 01/25/23  6:14 AM   Result Value Ref Range    Meter Glucose 199 (H) 74 - 99 mg/dL       Radiology and other tests reviewed:  XR CHEST PORTABLE   Final Result   No acute process. Stable exam.         CTA HEAD W CONTRAST   Final Result   1. No acute intracranial hemorrhage or edema. 2. No intracranial arterial stenosis. 3. No stenosis involving internal carotid arteries or vertebral arteries. 4. 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 CTA NECK W CONTRAST   Final Result   1. No acute intracranial hemorrhage or edema. 2. No intracranial arterial stenosis. 3. No stenosis involving internal carotid arteries or vertebral arteries. 4. 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              Assessment:  Active Hospital Problems    Diagnosis Date Noted    Complicated UTI (urinary tract infection) [N39.0] 01/25/2023     Priority: Medium    Uncontrolled type 2 diabetes mellitus with hyperglycemia ([facility] [address] [E11.65] 01/10/2023     Priority: Medium    Chronic renal disease, stage III [facility]) [286463] [N18.30] 06/06/2022     Priority: Medium    Stuttering [F80.81] 07/12/2021    Colostomy present ([facility] [address] [Z93.3] 05/22/2017    Hypertension [I10]        Plan:  Complicated UTI  Likely the cause of the recent feelings of weakness and nausea. Previous cx grew Staphylococcus and pseudomonas putida 12/21/22 - pansensitive. Received rocephin in ER. Urine and blood cultures pending  CBC and BMP daily  Levaquin 750 mg PO once (day 1/10)     Chest pain, resolved  Demand ischemia vs GI. Reports substernal chest pain lasting for hours after eating. EKG with no ST changes. Stable from prior EKG. Troponin 47 in ED  Repeat troponin pending  Protonix 40 mg po qd    Persistent aphasia  Patient speaking well on evaluation in ED  Ativan 0.5 mg TID  Daily speech therapy, PT/OT     Subacute Cerebrovascular accident (CVA)  Admission at the beginning of this month for  PT/OT  Asa 81 mg daily + Plavix 75 mg daily x 21 days  Pureed diet     CKD Stage III  In the setting of decreased vascular perfusion. Baseline creatinine 2-3. Continue to monitor     Diabetes II  Last HbA1C 12.1 on 1/3/2023. Patients home medications include glipizide 5 mg. Dysphagia diet  LDSSI   Hypoglycemia protocol  POCT glucose checks QID     Hypertension  Goal <130/80.   Amlodipine 10 mg daily  atenolol 25 mg daily      Hyperlipidemia  Atorvastatin 40 mg daily     GERD  Protonix 40 mg daily      Stage 1 Sacral Decubitus Ulcer  Daily wound care  Q2 repositioning     Anemia of Chronic Disease  Baseline hemoglobin 10-11. 12/22/22: Iron studies indicative of AOCD  Continue to monitor     Code Status: Full  Diet: Puréed  DVT ppx: Lovenox 30 mg daily  GI ppx: Protonix 40 daily     Consults: PT/OT, social work, speech        Corinna Hernandez MD   Family Medicine Resident PGY-2  01/25/23   7:58 AM

## 2023-01-25 NOTE — PROGRESS NOTES
SPEECH/LANGUAGE PATHOLOGY  SPEECH/LANGUAGE/COGNITIVE EVALUATION   and PLAN OF CARE      PATIENT NAME:  Margaret Humphreys  (female)     MRN:  [id number]    :  1950  (67 y.o.)  STATUS:  Inpatient: Room 0428/0428-A    TODAY'S DATE:  2023  REFERRING PROVIDER:   Severo Reasoner, DO   SPECIFIC PROVIDER ORDER: SLP eval and treat  Date of order:  23  REASON FOR REFERRAL:  Assess speech, language and cognition/memory   EVALUATING THERAPIST: JOSE Cox    ADMITTING DIAGNOSIS: Complicated UTI (urinary tract infection) [N39.0]    VISIT DIAGNOSIS:   Visit Diagnoses         Codes    Altered mental status, unspecified altered mental status type    -  Primary R41.82               SPEECH THERAPY  PLAN OF CARE   The speech therapy  POC is established based on physician order, speech pathology diagnosis and results of clinical assessment     SPEECH PATHOLOGY DIAGNOSIS:    Mild dysarthria and mild-moderate cognitive/memory deficits    Patient demonstrated dysarthria which was inconsistent throughout evaluation, however that 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 Also, patient demonstrated mild dysfluency at times with slowed speech. Speech Pathology intervention is recommended 3-6 times per week for LOS or when goals are met with emphasis on the following:      Conditions Requiring Skilled Therapeutic Intervention for speech, language and/or cognition    Dysarthria   Cognitive linguistic impairment  Decreased safety awareness  Decreased short term memory  Decreased problem solving skills   Decreased thought organization    Specific Speech Therapy Interventions to Include: Therapeutic tasks for Cognition  Therapeutic exercises for dysarthria    Specific instructions for next treatment: To initiate POC    SHORT/LONG TERM GOALS  Pt will improve orientation to spatial and temporal surroundings with use of external memory aides.   Pt will improve immediate, short term, recent memory during structured and unstructured tasks with 80% accuracy   Pt will improve problem solving/thought organization during structured and unstructured tasks with 80% accuracy   Pt will improve speech intelligibility and increased articulatory precision via compensatory strategies and oral motor exercises     Patient goals: Patient/family involved in developing goals and treatment plan:   Treatment goals discussed with Patient    The Patient understand(s) the diagnosis, prognosis and plan of care   The patient/family Agreed with above,     This plan may be re-evaluated and revised as warranted. Rehabilitation Potential/Prognosis: good                CLINICAL ASSESSMENT:  MOTOR SPEECH       Oral Peripheral Examination   Generalized oral weakness, decreased oral opening    Parameters of Speech Production  Respiration:  Adequate for speech production  Articulation:  Distortion at times  Resonance:  Within functional limits  Quality:   Within functional limits  Pitch: Within functional limits  Intensity: Quiet  Fluency:  Mildly dysfluency at times  Prosody Irregular fluctuation    RECEPTIVE LANGUAGE    Comprehension of Yes/No Questions:    Within functional limits    Process  Simple Verbal Commands:   Within functional limits  Process Intermediate Verbal Commands:   Within functional limits  Process Complex Verbal Commands:     Incomplete    Comprehension of Conversation:      Within functional limits      EXPRESSIVE LANGUAGE     Serials: Functional; however, slow speech    Imitation:  Words   Functional   Sentences Functional    Naming:  (Modality used:  Verbal)  Confrontation Naming  Functional  Functional Description  Functional  Response Naming: Functional    Conversation:      Conversation was within functional limits    COGNITION     Attention/Orientation  Attention: Sustained attention   Orientation:  Oriented to Person, Place, Date (month and year only)    Memory   Immediate Recall: Repeated 3/3    Delayed Recall:   Recalled 2/3    Long Term Recall:   Recalled Birthdate and Age    Organization/Problem Solving/Reasoning   Verbal Sequencing: To be assessed        Verbal Problem solving: To be assessed          CLINICAL OBSERVATIONS NOTED DURING THE EVALUATION  Latent responses and Inconsistent responses                  EDUCATION:   The Speech Language Pathologist (SLP) completed education regarding results of evaluation and that intervention is warranted at this time. Learner: Patient  Education: Reviewed results and recommendations of this evaluation  Evaluation of Education:  Verbalizes understanding    Evaluation Time includes thorough review of current medical information, gathering information on past medical history/social history and prior level of function, completion of standardized testing/informal observation of tasks, assessment of data and education on plan of care and goals. INTERVENTION  Speech Pathologist (SLP) completed education with the patient regarding type of speech impairment. Discussed compensatory strategies to promote increased speech intelligibility, including slow rate of delivery and exaggerated articulation. Reviewed oral motor exercises as/if appropriate. Encouraged patient to engage SLP in unstructured Q&A session relative to identified deficit areas; indicated understanding of all information provided via satisfactory verbal response. CPT code:    11027  eval speech sound lang comprehension, 92020 speech and language therapy       The admitting diagnosis and active problem list, as listed below have been reviewed prior to initiation of this evaluation.         ACTIVE PROBLEM LIST:   Patient Active Problem List   Diagnosis    Hypertension    Elevated lipids    Proteinuria    Vitamin D deficiency    Closed fracture of radius    History of anal cancer    Colostomy present ([address]    Vaginal cancer ([address]    Diabetes mellitus type 2 in obese ([address]    Stuttering    Age-related osteoporosis with current pathological fracture with routine healing    Herpes zoster vaccination declined    Chronic renal disease, stage III ([facility] [address] [072246]    Hydronephrosis due to obstruction of bladder    Chronic indwelling López catheter    Bilateral hydronephrosis    Bacteriuria    Cerebellar infarct [facility])    Aphasia    Cerebrovascular accident (CVA) ([facility]    Uncontrolled type 2 diabetes mellitus with hyperglycemia ([facility]    Catatonia    Complicated UTI (urinary tract infection)       Iris Madera.  Kirsten GARCIA, [place] [address]. [id number]

## 2023-01-25 NOTE — PROGRESS NOTES
Occupational Therapy  OCCUPATIONAL THERAPY INITIAL EVALUATION  [facility] [address] [place]    Date: 2023     Patient Name: Brigida Evans  MRN: [id number]  : 1950  Room: [facility]9271-S    Evaluating OT: Jose Stacy - [phone]    Referring Provider: Aggie Conn DO  Specific Provider Orders/Date: \"OT eval and treat\" - 2023    Diagnosis: Complicated UTI (urinary tract infection) [N39.0]      Pertinent Medical History: CVA with aphasia, obesity, HTN, osteoporosis, cancer, HTN, DM     Precautions: fall risk, contact isolation, bed/chair alarms, skin integrity    Assessment of Current Deficits:    [x] Functional mobility   [x]ADLs  [x] Strength               [x]Cognition   [x] Functional transfers   [x] IADLs         [x] Safety Awareness   [x]Endurance   [] Fine Coordination              [x] Balance      [] Vision/perception   [x]Sensation    []Gross Motor Coordination  [] ROM  [] Delirium                   [] Motor Control     OT PLAN OF CARE   OT POC is based on physician orders, patient diagnosis, and results of clinical assessment.   Frequency/Duration 2-5 days/week for 2 weeks PRN   Specific OT Treatment Interventions to Include:   * Instruction/training on adapted ADL techniques and AE recommendations to increase functional independence within precautions       * Training on energy conservation strategies, correct breathing pattern and techniques to improve independence/tolerance for self-care routine  * Functional transfer/mobility training/DME recommendations for increased independence, safety, and fall prevention  * Patient/Family education to increase follow through with safety techniques and functional independence  * Recommendation of environmental modifications for increased safety with functional transfers/mobility and ADLs  * Therapeutic exercise to improve motor endurance, ROM, and functional strength for ADLs/functional transfers  * Therapeutic activities to facilitate/challenge dynamic balance, stand tolerance for increased safety and independence with ADLs  * Neuro-muscular re-education: facilitation of righting/equilibrium reactions, midline orientation, scapular stability/mobility, normalization of muscle tone, and facilitation of volitional active controled movement  * Positioning to improve skin integrity, interaction with environment and functional independence    Recommended Adaptive Equipment: TBD     Home Living: Patient admitted from [facility]. Prior, patient lived alone in a one-floor home. Prior Level of Function (PLOF): Patient was needing assistance with ADLs and functional transfers recently. Pain Level: No complaints of pain. Cognition: Patient alert and oriented grossly. Fair command follow demonstrated. Word-finding difficulty noted throughout this session. Encouragement needed to maximize patient's willingness to participate in EOB/OOB activities. Memory: Fair  Sequencing: Fair  Problem Solving: Fair  Judgement/Safety: Fair    Functional Assessment:  AM-PAC Daily Activity Raw Score: 13/24   Initial Eval Status  Date: 1/25/2023 Treatment Status  Date:  Short Term Goals = Long Term Goals   Feeding Min A  Setup   Grooming Mod A  SBA (seated)   UB Dressing Mod A  SBA   LB Dressing Max A  Min A - with use of AE, as needed/appropriate   Bathing Max A  Min A - with use of AE/DME, as needed/appropriate   Toileting Max A  Min A   Bed Mobility  Supine-to-Sit: Min A   Mod I / Independent in order to maximize patient's independence with ADLs, re-positioning, and other functional tasks. Functional Transfers Sit-to-Stand: CGA   from EOB  Independent   Functional Mobility Min A (with walker) for few steps from EOB to bedside chair. Supervision with functional mobility (with device, as needed/appropriate) in order to maximize independence with ADLs/IADLs and other functional tasks. Balance Sitting: Good- (at EOB)  Standing: Fair- (with walker)  Fair+ dynamic standing balance during completion of ADLs/IADLs and other functional tasks. Activity Tolerance Limited  Patient will demonstrate Good understanding and consistent implementation of energy conservation techniques and work simplification techniques into ADL routines. Visual/  Perceptual WFL grossly     N/A   B UE Strength B Shoulders: 3-/5  Distal B UEs: 3+/5  Patient will demonstrate 4/5 distal B UE strength in order to maximize independence with ADLs/IADLs and functional transfers. Additional Long-Term Goal: Patient will increase functional independence to PLOF in order to allow patient to live in least restrictive environment. ROM: Additional Information:    R UE  0 - 75 degrees of active-assisted shoulder flexion; distal AROM WFL grossly    L UE 0 - 75 degrees of active-assisted shoulder flexion; distal AROM WFL grossly      Hearing: Fair  Sensation: No complaints of numbness/tingling in B UEs. Tone: WFL  Edema: No    Comments: RN approved patient's participation in [address] [place] [address] activities. Upon arrival, patient supine in bed. At end of session, patient seated in bedside chair with call light and phone within reach, chair alarm activated, and all lines and tubes intact. Patient would benefit from continued skilled OT to increase safety and independence with completion of ADL/IADL tasks for functional independence and quality of life. Patient education provided regardin) importance of OOB activites during hospitalization, 2) importance of having staff assistance with ADLs and other OOB activities during hospitalization to prevent falls/injury. Patient indicated [address] understanding. Further skilled OT treatment indicated to increase patient's safety and independence with completion of ADL/IADL tasks in order to maximize patient's functional independence and quality of life.     Rehab Potential: Good for established goals. Patient / Family Goal: No goal stated. Patient and/or family were instructed on functional diagnosis, prognosis/goals, and OT plan of care. Demonstrated Fair understanding. Eval Complexity: Low    Time In: 1050  Time Out: 1105  Total Treatment Time: 0 minutes      Minutes Units   OT Eval Low 09828 15 1   OT Eval Medium 50429     OT Eval High 95099     OT Re-Eval [id number]     Therapeutic Ex 97022     Therapeutic Activities 95100     ADL/Self Care 29998     Orthotic Management 39561     Neuro Re-Ed 88529     Non-Billable Time N/A ---     Evaluation time includes thorough review of current medical information, gathering information on past medical history/social history and prior level of function, completion of standardized testing/informal observation of tasks, assessment of data, and education on plan of care and goals. Keyanna Gonzáles, OTR/L  License Number: [id number]

## 2023-01-25 NOTE — FLOWSHEET NOTE
Patient asleep in bed respirations non labored skin warm dry intact, vitals stable, will continue to monitor

## 2023-01-25 NOTE — H&P
[facility]  Resident History and Physical      CHIEF COMPLAINT:    Chief Complaint   Patient presents with    Chest Pain     Patient from [address] [place] [address], sent in for chest pain,        History of Present Illness:   Arabella Sierra  is a 67 y.o. female patient of Dr. Amie York with a pertinent PMHx of recent admission for CVA and aphasia, HTN, HLD, DMII, CKD stage IIIb, hx colon cancer with ostomy bag, bilateral hydronephrosis with bilateral stent placement and chronic knutson who presented to the ER from [facility] alone with chief complaint of chest pain. Patient states that chest pain has been since today. However she stated that in the ED she did have some crackers (of which she is not supposed to have since she is on a puréed diet since her stroke) and this resolved her chest pain. Patient's family told ER attending that they were concerned that patient was not able to speak as well and was weaker than she was before which is why they wanted her brought in for further evaluation. Overall since her discharge patient has been feeling excessively weak and fatigued. She states that initially with rehab she felt that she was doing better but then started declining. She thought it was due to the puréed diet that caused her to become nauseous. She did notice redness in her urine but states that that happens occasionally. She does have a history significant for chronic indwelling Knutson. Patient denies any fever, shortness of breath, chills. She is able to speak and communicate at this time the ED. Patient states that at this time she would like to be a full code. In the ED: Patient's vitals were stable and she was afebrile at this time. Patient's labs did demonstrate elevation of glucose at 171 and creatinine of 3 (at patient's baseline). Urinalysis did demonstrate infection in the urine. Anemia was present but at baseline.   Patient negative for COVID and influenza. Blood culture and urine culture are pending. EKG was stable when compared with last.  Troponin at 47. Patient given 1 L normal saline bolus and one-time dose of Rocephin 1 g. Family medicine consulted to admit patient for complicated UTI.    ROS:   Review of Systems   Constitutional:  Positive for activity change, appetite change and fatigue. Negative for chills and fever. HENT:  Negative for congestion, sinus pain and sore throat. Eyes:  Negative for pain and visual disturbance. Respiratory:  Negative for cough, shortness of breath and wheezing. Cardiovascular:  Negative for chest pain, palpitations and leg swelling. Gastrointestinal:  Positive for nausea. Negative for abdominal pain, diarrhea and vomiting. Genitourinary:  Positive for hematuria. Negative for frequency and urgency. Musculoskeletal:  Negative for arthralgias and joint swelling. Skin:  Negative for rash and wound. Neurological:  Positive for speech difficulty and weakness. Negative for dizziness, syncope, light-headedness and numbness.        Past Medical History:   Diagnosis Date    Arthritis     osteoporsis    Cancer ([facility] [address]     colon and uterine    Closed fracture of radius 12/27/2016    Elevated lipids 1/15/2014    Headache     History of anal cancer 2/20/2017    Dr. Camron Ovalles    Hyperlipidemia     Hypertension     Hypertension     Obesity     Proteinuria 6/25/2014    Type II or unspecified type diabetes mellitus without mention of complication, not stated as uncontrolled     Vaginal cancer ([facility] [address] 5/22/2017    Vitamin D deficiency 11/6/2014         Past Surgical History:   Procedure Laterality Date    BLADDER SURGERY N/A 9/24/2021    CYSTOSCOPY BILATERAL RETROGRADE PYELOGRAM, BILATERAL STENT INSERTION performed by Michelle Lambert MD at [address] [place] [address] Bilateral 12/22/2022    CYSTOSCOPY, RETROGRADE PYELOGRAM, BILATERAL URETERAL STENT EXCHANGE, URENA CATHETER EXCHANGE performed by Attila Berg MD [place] at [address] Bilateral 7/21/2022    CYSTOSCOPY RETROGRADE PYELOGRAM BILATERIAL STENT EXCHANGE performed by Michelle aLmbert MD at [address] [address] [facility] Left 11/07/2016       Medications Prior to Admission:    Prior to Admission medications    Medication Sig Start Date End Date Taking? Authorizing Provider   LORazepam (ATIVAN) 0.5 MG tablet Take 1 tablet by mouth 3 times daily for 90 days.  Max Daily Amount: 1.5 mg 1/20/23 4/20/23 Yes Aggie Gross MD   atorvastatin (LIPITOR) 40 MG tablet Take 1 tablet by mouth nightly 1/13/23  Yes Thalia Huetras DO   carvedilol (COREG) 6.25 MG tablet Take 1 tablet by mouth 2 times daily (with meals) 1/13/23  Yes Carlos Urias DO   escitalopram (LEXAPRO) 5 MG tablet Take 1 tablet by mouth daily 1/14/23  Yes Carlos Urias DO   clopidogrel (PLAVIX) 75 MG tablet Take 1 tablet by mouth daily for 17 doses 1/14/23 1/31/23 Yes Thalia Huertas DO   vitamin B-12 1000 MCG tablet Take 1 tablet by mouth daily 1/14/23  Yes Thalia Huertas DO   amLODIPine (NORVASC) 10 MG tablet Take 10 mg by mouth every morning   Yes Historical Provider, MD   aspirin 81 MG EC tablet Take 81 mg by mouth every morning   Yes Historical Provider, MD   glipiZIDE (GLUCOTROL XL) 5 MG extended release tablet Take 5 mg by mouth Daily with lunch   Yes Historical Provider, MD   magnesium oxide (MAG-OX) 400 MG tablet Take 400 mg by mouth 2 times daily   Yes Historical Provider, MD   pantoprazole (PROTONIX) 40 MG tablet Take 40 mg by mouth every morning   Yes Historical Provider, MD   denosumab (PROLIA) 60 MG/ML SOSY SC injection Inject 1 mL into the skin once for 1 dose 1/20/23 1/20/23  Aggie Gross MD   denosumab (PROLIA) 60 MG/ML SOSY SC injection Inject 60 mg into the skin every 6 months NEXT INJ DUE @ [facility] INF: 02/2023    Historical Provider, MD        Allergies:   Betadine [povidone iodine], Lisinopril, Penicillins, and Tylenol [acetaminophen]    Social History:    reports that she has never smoked. She has never used smokeless tobacco. She reports that she does not drink alcohol and does not use drugs. Family History:   family history includes Cancer in her father; Diabetes in her sister; High Blood Pressure in her mother; Kidney Disease in her mother; Other in her brother. PHYSICAL EXAM:    Vitals:  /74   Pulse 81   Temp 97.7 °F (36.5 °C) (Oral)   Resp 16   Ht 5' 7\" (1.702 m)   Wt 158 lb (71.7 kg)   SpO2 97%   BMI 24.75 kg/m²     Physical Exam  Constitutional:       General: She is not in acute distress. HENT:      Head: Normocephalic and atraumatic. Right Ear: External ear normal.      Left Ear: External ear normal.      Nose: Nose normal. No congestion. Mouth/Throat:      Mouth: Mucous membranes are moist.      Pharynx: No posterior oropharyngeal erythema. Eyes:      Extraocular Movements: Extraocular movements intact. Conjunctiva/sclera: Conjunctivae normal.   Cardiovascular:      Rate and Rhythm: Normal rate and regular rhythm. Heart sounds: No murmur heard. No friction rub. No gallop. Pulmonary:      Breath sounds: No wheezing, rhonchi or rales. Abdominal:      General: There is no distension. Tenderness: There is no abdominal tenderness. There is no guarding. Comments: Left sided ostomy with brown stool in bag   Genitourinary:     Comments: López catheter draining red urine  Musculoskeletal:      Right lower leg: No edema. Left lower leg: No edema. Skin:     General: Skin is warm. Findings: No rash. Neurological:      General: No focal deficit present. Mental Status: She is alert and oriented to person, place, and time.        LABS:  Recent Results (from the past 24 hour(s))   EKG 12 Lead    Collection Time: 01/24/23  7:40 PM   Result Value Ref Range    Ventricular Rate 76 BPM    Atrial Rate 78 BPM    QRS Duration 58 ms Q-T Interval 384 ms    QTc Calculation (Bazett) 432 ms    R Axis -13 degrees    T Axis 19 degrees   BMP    Collection Time: 01/24/23  7:54 PM   Result Value Ref Range    Sodium 132 132 - 146 mmol/L    Potassium 4.4 3.5 - 5.0 mmol/L    Chloride 97 (L) 98 - 107 mmol/L    CO2 21 (L) 22 - 29 mmol/L    Anion Gap 14 7 - 16 mmol/L    Glucose 171 (H) 74 - 99 mg/dL    BUN 32 (H) 6 - 23 mg/dL    Creatinine 3.0 (H) 0.5 - 1.0 mg/dL    Est, Glom Filt Rate 16 >=60 mL/min/1.73    Calcium 9.9 8.6 - 10.2 mg/dL   CBC with Auto Differential    Collection Time: 01/24/23  7:54 PM   Result Value Ref Range    WBC 6.7 4.5 - 11.5 E9/L    RBC 3.98 3.50 - 5.50 E12/L    Hemoglobin 11.0 (L) 11.5 - 15.5 g/dL    Hematocrit 34.0 34.0 - 48.0 %    MCV 85.4 80.0 - 99.9 fL    MCH 27.6 26.0 - 35.0 pg    MCHC 32.4 32.0 - 34.5 %    RDW 16.4 (H) 11.5 - 15.0 fL    Platelets 696 239 - 106 E9/L    MPV 10.5 7.0 - 12.0 fL    Neutrophils % 72.7 43.0 - 80.0 %    Immature Granulocytes % 0.3 0.0 - 5.0 %    Lymphocytes % 16.5 (L) 20.0 - 42.0 %    Monocytes % 7.5 2.0 - 12.0 %    Eosinophils % 2.7 0.0 - 6.0 %    Basophils % 0.3 0.0 - 2.0 %    Neutrophils Absolute 4.85 1.80 - 7.30 E9/L    Immature Granulocytes # 0.02 E9/L    Lymphocytes Absolute 1.10 (L) 1.50 - 4.00 E9/L    Monocytes Absolute 0.50 0.10 - 0.95 E9/L    Eosinophils Absolute 0.18 0.05 - 0.50 E9/L    Basophils Absolute 0.02 0.00 - 0.20 E9/L   TSH    Collection Time: 01/24/23  7:54 PM   Result Value Ref Range    TSH 2.440 0.270 - 4.200 uIU/mL   Troponin    Collection Time: 01/24/23  7:54 PM   Result Value Ref Range    Troponin, High Sensitivity 47 (H) 0 - 9 ng/L   Magnesium    Collection Time: 01/24/23  7:54 PM   Result Value Ref Range    Magnesium 2.1 1.6 - 2.6 mg/dL   Lactic Acid    Collection Time: 01/24/23  7:54 PM   Result Value Ref Range    Lactic Acid 1.6 0.5 - 2.2 mmol/L   Hepatic Function Panel    Collection Time: 01/24/23  7:54 PM   Result Value Ref Range    Total Protein 7.5 6.4 - 8.3 g/dL Albumin 3.5 3.5 - 5.2 g/dL    Alkaline Phosphatase 112 (H) 35 - 104 U/L    ALT 11 0 - 32 U/L    AST 11 0 - 31 U/L    Total Bilirubin 0.7 0.0 - 1.2 mg/dL    Bilirubin, Direct <0.2 0.0 - 0.3 mg/dL    Bilirubin, Indirect see below 0.0 - 1.0 mg/dL   COVID-19, Rapid    Collection Time: 01/24/23  8:01 PM    Specimen: Nasopharyngeal Swab   Result Value Ref Range    SARS-CoV-2, NAAT Not Detected Not Detected   RAPID INFLUENZA A/B ANTIGENS    Collection Time: 01/24/23  8:01 PM    Specimen: Nasopharyngeal   Result Value Ref Range    Influenza A by PCR Not Detected Not Detected    Influenza B by PCR Not Detected Not Detected   Ammonia    Collection Time: 01/24/23  8:17 PM   Result Value Ref Range    Ammonia <10.0 (L) 11.0 - 51.0 umol/L   Urinalysis    Collection Time: 01/24/23  8:31 PM   Result Value Ref Range    Color, UA Yellow Straw/Yellow    Clarity, UA CLOUDY (A) Clear    Glucose, Ur Negative Negative mg/dL    Bilirubin Urine Negative Negative    Ketones, Urine Negative Negative mg/dL    Specific Gravity, UA 1.015 1.005 - 1.030    Blood, Urine LARGE (A) Negative    pH, UA 8.5 5.0 - 9.0    Protein, UA 30 (A) Negative mg/dL    Urobilinogen, Urine 0.2 <2.0 E.U./dL    Nitrite, Urine POSITIVE (A) Negative    Leukocyte Esterase, Urine LARGE (A) Negative   Microscopic Urinalysis    Collection Time: 01/24/23  8:31 PM   Result Value Ref Range    WBC, UA >20 (A) 0 - 5 /HPF    RBC, UA 10-20 (A) 0 - 2 /HPF    Bacteria, UA MODERATE (A) None Seen /HPF   POCT Glucose    Collection Time: 01/24/23  9:19 PM   Result Value Ref Range    Glucose 171 mg/dL       XR CHEST PORTABLE   Final Result   No acute process. Stable exam.         CTA HEAD W CONTRAST   Final Result   1. No acute intracranial hemorrhage or edema. 2. No intracranial arterial stenosis. 3. No stenosis involving internal carotid arteries or vertebral arteries. 4. 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          CTA NECK W CONTRAST   Final Result   1. No acute intracranial hemorrhage or edema. 2. No intracranial arterial stenosis. 3. No stenosis involving internal carotid arteries or vertebral arteries. 4. 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 ASSESSMENT/PLAN:      Active Hospital Problems    Diagnosis Date Noted    Complicated UTI (urinary tract infection) [N39.0] 01/25/2023     Priority: Medium    Uncontrolled type 2 diabetes mellitus with hyperglycemia ([facility] [address] [E11.65] 01/10/2023     Priority: Medium    Chronic renal disease, stage III [facility]) [450043] [N18.30] 06/06/2022     Priority: Medium    Stuttering [F80.81] 07/12/2021    Colostomy present ([facility] [address] [Z93.3] 05/22/2017    Hypertension [W28]      Complicated UTI  Likely the cause of the recent feelings of weakness and nausea  Urine culture pending  CBC and BMP daily  Rocephin 1 g every 24 hours (day 1)    Chest pain, resolved  Troponin 47 in ED  No longer experiencing chest pain usage she ate crackers  Admit to telemetry     Persistent aphasia  Patient speaking well on evaluation in ED  Ativan 0.5 mg TID  Daily speech therapy, PT/OT     Subacute Cerebrovascular accident (CVA)  Admission at the beginning of this month for  PT/OT  Asa 81 mg daily + Plavix 75 mg daily x 21 days  Pureed diet     CKD Stage III  In the setting of decreased vascular perfusion. Baseline creatinine 2-3. Continue to monitor     Diabetes II  Last HbA1C 12.1 on 1/3/2023. Patients home medications include glipizide 5 mg. Dysphagia diet  LDSSI   Hypoglycemia protocol  POCT glucose checks QID     Hypertension  Goal <130/80.   Amlodipine 10 mg daily  atenolol 25 mg daily      Hyperlipidemia  Atorvastatin 40 mg daily     GERD  Protonix 40 mg daily      Stage 1 Sacral Decubitus Ulcer  Daily wound care  Q2 repositioning     Anemia of Chronic Disease  Baseline hemoglobin 10-11. 12/22/22: Iron studies indicative of AOCD  Continue to monitor    Code Status: Full  Diet: Puréed  DVT ppx: Lovenox 30 mg daily  GI ppx: Protonix 40 daily    Consults: PT/OT, social work, speech          Case discussed with attending on call Dr. Marylin Au.     Herbert Dinh DO   Family Medicine Resident Physician   1/25/2023

## 2023-01-25 NOTE — PROGRESS NOTES
Physical Therapy  Facility/Department: [address] 1  Physical Therapy Initial Assessment    Name: Olivier Alanis  : 1950  MRN: [id number]  Date of Service: 2023      Patient Diagnosis(es): The encounter diagnosis was Altered mental status, unspecified altered mental status type. Past Medical History:  has a past medical history of Arthritis, Cancer ([facility] Closed fracture of radius, Elevated lipids, Headache, History of anal cancer, Hyperlipidemia, Hypertension, Hypertension, Obesity, Proteinuria, Type II or unspecified type diabetes mellitus without mention of complication, not stated as uncontrolled, Vaginal cancer ([facility] and Vitamin D deficiency. Past Surgical History:  has a past surgical history that includes colostomy; Rectal surgery; Breast biopsy; Wrist fracture surgery (Left, 2016); Bladder surgery (N/A, 2021); Cystoscopy (Bilateral, 2022); and Bladder surgery (Bilateral, 2022). Evaluating Therapist: Lenin Henry PT      Room #:  8585/5738-F  Diagnosis:  Complicated UTI (urinary tract infection) [N39.0]  PMHx/PSHx:  CVA with aphasia, DM  Precautions:  falls, contact isolation, alarm      Social:  Pt admitted from [place] [address]. Prior lived alone in a 1 floor plan and ambulated with cane. Initial Evaluation  Date: 23 Treatment      Short Term/ Long Term   Goals   Was pt agreeable to Eval/treatment? With encouragement     Does pt have pain?  No c/o pain     Bed Mobility  Rolling: min assist  Supine to sit: min assist  Sit to supine: NT  Scooting: min assist  SBA   Transfers Sit to stand: CGA  Stand to sit: CGA  Stand pivot: min assist  SBA   Ambulation    3 feet with ww with min assist  25 feet with ww with SBA   Stair Negotiation  Ascended and descended  NT   N/A   LE strength     3+/5    4/5   balance      fair     AM-PAC Raw score               15/24         Pt is alert, word finding difficulties  LE ROM: WFL  Edema: none  Endurance: fair  Chair alarm: yes ASSESSMENT:    Pt displays functional ability as noted in the objective portion of this evaluation. Patient education  Pt educated on importance of mobility    Comments:  Pt fatigues quickly with mobility. Increased time needed. Short of breath with minimal activity. Pt's/ family goals   1. Pt unable to state    Conditions Requiring Skilled Therapeutic Intervention:    [x]Decreased strength     []Decreased ROM  [x]Decreased functional mobility  [x]Decreased balance   [x]Decreased endurance   []Decreased posture  []Decreased sensation  []Decreased coordination   []Decreased vision  [x]Decreased safety awareness   []Increased pain       Patient and or family understand(s) diagnosis, prognosis, and plan of care. no  Prognosis is good for reaching above PT goals    PHYSICAL THERAPY PLAN OF CARE:    PT POC is established based on physician order and patient diagnosis     Referring provider/PT Order: Rajeev Jones, DO/ PT eval and treat      Current Treatment Recommendations:     [x] Strengthening to improve independence with functional mobility   [] ROM to improve independence with functional mobility   [x] Balance Training to improve static/dynamic balance and to reduce fall risk  [x] Endurance Training to improve activity tolerance during functional mobility   [x] Transfer Training to improve safety and independence with all functional transfers   [x] Gait Training to improve gait mechanics, endurance and assess need for appropriate assistive device  [] Stair Training in preparation for safe discharge home and/or into the community   [] Positioning to prevent skin breakdown and contractures  [x] Safety and Education Training   [x] Patient/Caregiver Education   [] HEP  [] Other     PT long term treatment goals are located in above grid    Frequency of treatments: 2-5x/week x 5 days.     Time in  1050  Time out  1104      Evaluation Time includes thorough review of current medical information, gathering information on past medical history/social history and prior level of function, completion of standardized testing/informal observation of tasks, assessment of data and education on plan of care and goals.       CPT codes:  [x] Low Complexity PT evaluation 53694  [] Moderate Complexity PT evaluation 04391  [] High Complexity PT evaluation 28091  [] PT Re-evaluation 04263  [] Gait training 16821 minutes  [] Manual therapy 29547 minutes  [] Therapeutic activities 24498 minutes  [] Therapeutic exercises 53774 minutes  [] Neuromuscular reeducation 71979 minutes     [facility] PSYCHIATRY PT [id number]

## 2023-01-25 NOTE — DISCHARGE INSTR - COC
Continuity of Care Form    Patient Name: Margaret Bonilla   :  1950  MRN:  [id number]    Admit date:  2023  Discharge date:  23    Code Status Order: Full Code   Advance Directives:     Admitting Physician:  Jalen Naidu MD  PCP: Delilah Castellanos MD    Discharging Nurse: [address] Unit/Room#: 9816/7618-O  Discharging Unit Phone Number: [phone]    Emergency Contact:   Extended Emergency Contact Information  Primary Emergency Contact: Stephanie Camp  Address: [address] Phone: [phone]  Mobile Phone: [phone]  Relation: Brother/Sister  Secondary Emergency Contact: [address]  Mobile Phone: [phone]  Relation: Niece/Nephew    Past Surgical History:  Past Surgical History:   Procedure Laterality Date    BLADDER SURGERY N/A 2021    CYSTOSCOPY BILATERAL RETROGRADE PYELOGRAM, BILATERAL STENT INSERTION performed by Aline Chirinos MD at [address] Bilateral 2022    CYSTOSCOPY, RETROGRADE PYELOGRAM, BILATERAL URETERAL STENT EXCHANGE, URENA CATHETER EXCHANGE performed by Marisa Austin MD at [address] [place] [address] Bilateral 2022    CYSTOSCOPY RETROGRADE PYELOGRAM BILATERIAL STENT EXCHANGE performed by Aline Chirinos MD at [address] Left 2016       Immunization History:   Immunization History   Administered Date(s) Administered    COVID-19, MODERNA BLUE border, Primary or Immunocompromised, (age 12y+), IM, 100 mcg/0.5mL 2021, 2021    COVID-19, MODERNA Booster BLUE border, (age 18y+), IM, 50mcg/0.25mL 2022    Influenza, FLUAD, (age 72 y+), Adjuvanted, 0.5mL 2022    Pneumococcal Conjugate 13-valent ([id number]) 2016    Pneumococcal Polysaccharide ([id number]) 2017       Active Problems:  Patient Active Problem List   Diagnosis Code Hypertension I10    Elevated lipids E78.5    Proteinuria R80.9    Vitamin D deficiency E55.9    Closed fracture of radius S52.90XA    History of anal cancer Z85.048    Colostomy present ([facility] Z93.3    Vaginal cancer ([facility] C52    Diabetes mellitus type 2 in obese (HCC) E11.69, E66.9    Stuttering F80.81    Age-related osteoporosis with current pathological fracture with routine healing M80.00XD    Herpes zoster vaccination declined Z28.21    Chronic renal disease, stage III [facility]) [334233] N18.30    Hydronephrosis due to obstruction of bladder N13.30, N32.0    Chronic indwelling López catheter Z97.8    Bilateral hydronephrosis N13.30    Bacteriuria R82.71    Cerebellar infarct ([facility]) I63.9    Aphasia R47.01    Cerebrovascular accident (CVA) ([facility] I63.9    Uncontrolled type 2 diabetes mellitus with hyperglycemia ([facility] E11.65    Catatonia D86.5    Complicated UTI (urinary tract infection) N39.0       Isolation/Infection:   Isolation            Contact          Patient Infection Status       Infection Onset Added Last Indicated Last Indicated By Review Planned Expiration Resolved Resolved By    MRSA 12/22/22 12/24/22 12/22/22 Culture, Urine        Urine, 12/22/2022    Resolved    COVID-19 (Rule Out) 01/24/23 01/24/23 01/24/23 COVID-19, Rapid (Ordered)   01/24/23 Rule-Out Test Resulted    COVID-19 (Rule Out) 12/21/22 12/21/22 12/21/22 COVID-19, Rapid (Ordered)   12/21/22 Rule-Out Test Resulted            Nurse Assessment:  Last Vital Signs: /71   Pulse 78   Temp 98 °F (36.7 °C) (Oral)   Resp 18   Ht 5' 7\" (1.702 m)   Wt 184 lb 8 oz (83.7 kg)   SpO2 95%   BMI 28.90 kg/m²     Last documented pain score (0-10 scale): Pain Level: 0  Last Weight:   Wt Readings from Last 1 Encounters:   01/25/23 184 lb 8 oz (83.7 kg)     Mental Status:  oriented and alert    IV Access:  - None    Nursing Mobility/ADLs:  Walking   Assisted  Transfer  Assisted  Bathing  Assisted  Dressing  Assisted  Toileting  Assisted  Feeding Independent  Med Admin  Assisted  Med Delivery   whole    Wound Care Documentation and Therapy:  Wound 01/09/23 Coccyx Left 0.6x1. 4x0.1 (Active)   Dressing Status Other (Comment) 01/25/23 0740   Post-Procedure Length (cm) 0.5 cm 01/25/23 0248   Post-Procedure Width (cm) 2 cm 01/25/23 0248   Post-Procedure Depth (cm) 0.1 cm 01/25/23 0248   Post-Procedure Surface Area (cm^2) 1 cm^2 01/25/23 0248   Post-Procedure Volume (cm^3) 0.1 cm^3 01/25/23 0248   Wound Assessment Pink/red 01/25/23 0740   Drainage Amount Scant 01/25/23 0740   Drainage Description Serosanguinous 01/25/23 0740   Odor None 01/25/23 0740   Nubia-wound Assessment Intact;Fragile 01/25/23 0740   Number of days: 15        Elimination:  Continence: Bowel: Colostomy  Bladder: López    Urinary Catheter:  present on arrival     Colostomy/Ileostomy/Ileal Conduit: Yes  Colostomy LUQ-Stomal Appliance: 1 piece  Colostomy LUQ-Stoma  Assessment: Red  Colostomy LUQ-Peristomal Assessment: Clean, dry & intact  Colostomy LUQ-Stool Appearance: Loose  Colostomy LUQ-Output (mL): 50 ml    Date of Last BM: colostomy, 1/26/23    Intake/Output Summary (Last 24 hours) at 1/25/2023 1021  Last data filed at 1/25/2023 0837  Gross per 24 hour   Intake 120 ml   Output 1050 ml   Net -930 ml     I/O last 3 completed shifts:  In: -   Out: 1050 [Urine:1000; Stool:50]    Safety Concerns: At Risk for Falls    Impairments/Disabilities:      Speech    Nutrition Therapy:  Current Nutrition Therapy:   - Oral Diet:  Dental Soft and minced and moist    Routes of Feeding: Oral  Liquids: Thin Liquids  Daily Fluid Restriction: no  Last Modified Barium Swallow with Video (Video Swallowing Test): not done    Treatments at the Time of Hospital Discharge:   Respiratory Treatments: none  Oxygen Therapy:  is not on home oxygen therapy.   Ventilator:    - No ventilator support    Rehab Therapies: Physical Therapy and Occupational Therapy  Weight Bearing Status/Restrictions: No weight bearing restrictions  Other Medical Equipment (for information only, NOT a DME order):  walker  Other Treatments: none    Patient's personal belongings (please select all that are sent with patient):  Clothing, colostomy supplies    RN SIGNATURE:  Electronically signed by Kip Bautista RN on 1/26/23 at 11:26 AM EST    CASE MANAGEMENT/SOCIAL WORK SECTION    Inpatient Status Date: 1/25/2023    Readmission Risk Assessment Score:  Readmission Risk              Risk of Unplanned Readmission:  23           Discharging to Facility/ Agency   Name: [facility]   Address: [address], [place] [address]  Phone: [phone]  Fax: [phone]    Dialysis Facility (if applicable)   Name:  Address:  Dialysis Schedule:  Phone:  Fax:    / signature: Electronically signed by CORNELIUS Gutierrez on 1/25/23 at 10:22 AM EST    PHYSICIAN SECTION    Prognosis: Good    Condition at Discharge: Stable    Rehab Potential (if transferring to Rehab): Good    Recommended Labs or Other Treatments After Discharge: Repeat BMP in 1 week    Physician Certification: I certify the above information and transfer of David Mejia  is necessary for the continuing treatment of the diagnosis listed and that she requires [facility] for less 30 days.      Update Admission H&P: No change in H&P    PHYSICIAN SIGNATURE:  Electronically signed by Marcia Griffin DO on 1/26/23 at 10:49 AM EST

## 2023-01-25 NOTE — FLOWSHEET NOTE
Patient resting in bed respirations non labored skin warm dry intact, vitals stable, will continue to monitor, po fluids given

## 2023-01-25 NOTE — PROGRESS NOTES
[address]  Progress Note    Chief complaint :  Chief Complaint   Patient presents with    Chest Pain     Patient from [address] [place] [address], sent in for chest pain,       Subjective:    Patient reports she feels stronger today. She is trying to eat. Breakfast in the room. She reports nausea. Denies chest pain. Past medical, surgical, family and social history were reviewed, non-contributory, and unchanged unless otherwise stated. Review of Systems   Constitutional:  Negative for chills, fatigue and fever. HENT:  Negative for congestion, hearing loss, rhinorrhea and sore throat. Eyes:  Negative for pain and visual disturbance. Respiratory:  Negative for cough, chest tightness, shortness of breath and wheezing. Cardiovascular:  Negative for chest pain, palpitations and leg swelling. Gastrointestinal:  Negative for abdominal pain, constipation, diarrhea, nausea and vomiting. Genitourinary:  Negative for difficulty urinating, dysuria and hematuria. Musculoskeletal:  Negative for arthralgias and myalgias. Skin:  Negative for rash and wound. Neurological:  Negative for dizziness, weakness, light-headedness, numbness and headaches. Psychiatric/Behavioral:  Negative for dysphoric mood. The patient is not nervous/anxious. Objective:  BP 94/66   Pulse 85   Temp 98.3 °F (36.8 °C) (Oral)   Resp 15   Ht 5' 7\" (1.702 m)   Wt 174 lb 14.4 oz (79.3 kg)   SpO2 99%   BMI 27.39 kg/m²     Physical Exam  Constitutional:       General: She is not in acute distress. HENT:      Head: Normocephalic and atraumatic. Eyes:      Extraocular Movements: Extraocular movements intact. Cardiovascular:      Rate and Rhythm: Normal rate and regular rhythm. Heart sounds: No murmur heard. No friction rub. No gallop. Pulmonary:      Effort: No respiratory distress. Breath sounds: No wheezing or rales.    Abdominal:      General: There is no distension.      Tenderness: There is no abdominal tenderness. There is no guarding.   Musculoskeletal:      Right lower leg: No edema.      Left lower leg: No edema.       Labs:  Recent Results (from the past 24 hour(s))   Troponin    Collection Time: 01/25/23  8:36 AM   Result Value Ref Range    Troponin, High Sensitivity 53 (H) 0 - 9 ng/L   POCT Glucose    Collection Time: 01/25/23 10:42 AM   Result Value Ref Range    Meter Glucose 178 (H) 74 - 99 mg/dL   Troponin    Collection Time: 01/25/23 11:12 AM   Result Value Ref Range    Troponin, High Sensitivity 54 (H) 0 - 9 ng/L   POCT Glucose    Collection Time: 01/25/23  3:42 PM   Result Value Ref Range    Meter Glucose 130 (H) 74 - 99 mg/dL   POCT Glucose    Collection Time: 01/25/23  8:02 PM   Result Value Ref Range    Meter Glucose 127 (H) 74 - 99 mg/dL   Basic Metabolic Panel w/ Reflex to MG    Collection Time: 01/26/23  1:14 AM   Result Value Ref Range    Sodium 133 132 - 146 mmol/L    Potassium reflex Magnesium 4.6 3.5 - 5.0 mmol/L    Chloride 100 98 - 107 mmol/L    CO2 18 (L) 22 - 29 mmol/L    Anion Gap 15 7 - 16 mmol/L    Glucose 144 (H) 74 - 99 mg/dL    BUN 32 (H) 6 - 23 mg/dL    Creatinine 3.2 (H) 0.5 - 1.0 mg/dL    Est, Glom Filt Rate 15 >=60 mL/min/1.73    Calcium 9.8 8.6 - 10.2 mg/dL   CBC with Auto Differential    Collection Time: 01/26/23  1:14 AM   Result Value Ref Range    WBC 6.1 4.5 - 11.5 E9/L    RBC 3.74 3.50 - 5.50 E12/L    Hemoglobin 10.4 (L) 11.5 - 15.5 g/dL    Hematocrit 32.2 (L) 34.0 - 48.0 %    MCV 86.1 80.0 - 99.9 fL    MCH 27.8 26.0 - 35.0 pg    MCHC 32.3 32.0 - 34.5 %    RDW 16.6 (H) 11.5 - 15.0 fL    Platelets 312 130 - 450 E9/L    MPV 10.5 7.0 - 12.0 fL    Neutrophils % 71.1 43.0 - 80.0 %    Immature Granulocytes % 0.5 0.0 - 5.0 %    Lymphocytes % 17.4 (L) 20.0 - 42.0 %    Monocytes % 7.6 2.0 - 12.0 %    Eosinophils % 3.1 0.0 - 6.0 %    Basophils % 0.3 0.0 - 2.0 %    Neutrophils Absolute 4.32 1.80 - 7.30 E9/L    Immature Granulocytes #  0.03 E9/L    Lymphocytes Absolute 1.06 (L) 1.50 - 4.00 E9/L    Monocytes Absolute 0.46 0.10 - 0.95 E9/L    Eosinophils Absolute 0.19 0.05 - 0.50 E9/L    Basophils Absolute 0.02 0.00 - 0.20 E9/L   POCT Glucose    Collection Time: 01/26/23  5:41 AM   Result Value Ref Range    Meter Glucose 167 (H) 74 - 99 mg/dL       Radiology and other tests reviewed:  XR CHEST PORTABLE   Final Result   No acute process. Stable exam.         CTA HEAD W CONTRAST   Final Result   1. No acute intracranial hemorrhage or edema. 2. No intracranial arterial stenosis. 3. No stenosis involving internal carotid arteries or vertebral arteries. 4. 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 CTA NECK W CONTRAST   Final Result   1. No acute intracranial hemorrhage or edema. 2. No intracranial arterial stenosis. 3. No stenosis involving internal carotid arteries or vertebral arteries. 4. 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 Assessment:  Active Hospital Problems    Diagnosis Date Noted    Complicated UTI (urinary tract infection) [N39.0] 01/25/2023     Priority: Medium    Altered mental status [R41.82] 01/25/2023     Priority: Medium    Uncontrolled type 2 diabetes mellitus with hyperglycemia ([facility] [E11.65] 01/10/2023     Priority: Medium    Chronic renal disease, stage III [facility]) [173082] [N18.30] 06/06/2022     Priority: Medium    Stuttering [F80.81] 07/12/2021    Colostomy present ([facility] [Z93.3] 05/22/2017    Hypertension [I10]        Plan:  Complicated UTI  Likely the cause of the recent feelings of weakness and nausea. Previous cx grew Staphylococcus and pseudomonas putida 12/21/22 - pansensitive. Received rocephin in ER.   -Urine and blood cultures pending  -Levaquin 750 mg PO once     Persistent aphasia  Patient speaking well on evaluation in ED  -Ativan 0.5 mg TID  -Daily speech therapy, PT/OT     Subacute Cerebrovascular accident (CVA)  Admission at the beginning of this month for  -PT 15/24, OT 13/24  -Asa 81 mg daily + Plavix 75 mg daily x 21 days  -Pureed diet per SLP     CKD Stage III, worsening  In the setting of decreased vascular perfusion. Baseline creatinine 2-3.  -Continue to monitor  -Consider gentle hydration     Diabetes II  Last HbA1C 12.1 on 1/3/2023. Patients home medications include glipizide 5 mg.  -Dysphagia diet  -LDSSI w/ Hypoglycemia protocol and POCT glucose checks QID     Hypertension  Goal <130/80.  -Amlodipine 10 mg daily  -atenolol 25 mg daily      Hyperlipidemia  -Atorvastatin 40 mg daily     GERD  -Protonix 40 mg daily      Stage 1 Sacral Decubitus Ulcer  -Daily wound care  -Q2 repositioning     Anemia of Chronic Disease  Baseline hemoglobin 10-11. 12/22/22: Iron studies indicative of AOCD  -Continue to monitor    Chest pain, resolved  Demand ischemia vs GI. Reports substernal chest pain lasting for hours after eating. EKG with no ST changes. Stable from prior EKG. Troponin 47>>53>>54.  -Protonix 40 mg po qd     Code Status: Full  Diet: Puréed  DVT ppx: Lovenox 30 mg daily  GI ppx: Protonix 40 daily     Consults: PT/OT, social work, speech    Dispo to [facility] pending course.         Tania Garcia DO   Family Medicine Resident PGY-1  01/26/23   7:18 AM

## 2023-01-25 NOTE — CARE COORDINATION
Social Work/Discharge Planning:  Social Work consult noted. Called patient sister Maru Alcazar (ph: [phone]) and completed initial assessment. Explained Social Work role and discussed transition of care/discharge planning. Patient admitted from [facility] and her sister plans for patient to return to facility at discharge. Met with patient and provided her with an update. Called liaison Andrea Fonseca with [facility] and confirmed patient can return at discharge. Patient will need a negative covid result on day of discharge. Electronic N-17 in Epic and transport form in soft chart. Will continue to follow and assist with discharge planning.   Electronically signed by CORNELIUS Gutierres on 1/25/2023 at 10:43 AM

## 2023-01-25 NOTE — ED NOTES
Radiology Procedure Waiver   Name: Brianna Tijerina  : 1950  MRN: [id number]    Date:  23    Time: 8:00 PM EST    Benefits of immediately proceeding with Radiology exam(s) without pre-testing outweigh the risks or are not indicated as specified below and therefore the following is/are being waived:    [] Pregnancy test   [] Patients LMP on-time and regular.   [] Patient had Tubal Ligation or has other Contraception Device. [] Patient  is Menopausal or Premenarcheal.    [] Patient had Full or Partial Hysterectomy. [] Protocol for Iodine allergy    [] MRI Questionnaire     [x] BUN/Creatinine   [x] Patient age w/no hx of renal dysfunction. [] Patient on Dialysis. [] Recent Normal Labs.   Electronically signed by Efren Rivera MD on 23 at 8:00 PM SARAH Rivera MD  Resident  23 Cimzia Pregnancy And Lactation Text: This medication crosses the placenta but can be considered safe in certain situations. Cimzia may be excreted in breast milk.

## 2023-01-25 NOTE — PROGRESS NOTES
Physician Progress Note      Homer Meade  CSN #:                  [id number]  :                       1950  ADMIT DATE:       2023 7:08 PM  DISCH DATE:  RESPONDING  PROVIDER #:        Kathryn Shepherd          QUERY TEXT:    Pt admitted with UTI. Pt noted to have chronic indwelling urinary catheter. If   possible, please document in the progress notes and discharge summary if you   are evaluating and/or treating any of the following: The medical record reflects the following:  Risk Factors: chronic knutson POA, recent CVA  Clinical Indicators: per family med \". Ida Peraza Complicated UTI. Likely the cause of   the recent feelings of weakness and nausea. Ida Peraza She did notice redness in her   urine but states that that happens occasionally. She does have a history   significant for chronic indwelling Knutson. Ida Peraza \"  Treatment: IV Rocephin and Levaquin    Thank you,  Lashell Sotelo RN, BSN, CDIS  Clinical Documentation Integrity  [email or web address]  Options provided:  -- UTI due to chronic indwelling urinary catheter  -- UTI not due to indwelling urinary catheter  -- Other - I will add my own diagnosis  -- Disagree - Not applicable / Not valid  -- Disagree - Clinically unable to determine / Unknown  -- Refer to Clinical Documentation Reviewer    PROVIDER RESPONSE TEXT:    UTI is due to the chronic indwelling urinary catheter.     Query created by: Chayo Moreno on 2023 10:58 AM      Electronically signed by:  Kathryn Shepherd 2023 11:36 AM

## 2023-01-25 NOTE — ED PROVIDER NOTES
[facility]  Department of Emergency Medicine     Written by: Reyes Kaiser MD  Patient Name: Margaret Humphreys  Attending Provider: Kacy Hernández MD  Admit Date: 2023  7:08 PM  MRN: [id number]                   : 1950        Chief Complaint   Patient presents with    Chest Pain     Patient from [address] [place] [address], sent in for chest pain,    - Chief complaint    72-year-old female with history of diabetes, hypertension, hyperlipidemia, hypercholesterolemia, colon and uterine cancer that presents to the ED due to nursing home and daughter's concerns for altered mental status. Patient is weak and mental status has changed, therefore history is taken from daughter at bedside. The patient is from [facility], apparently was sent to rehab at the end of December after being admitted for ureteral stent clogging. She was there for a week, however then was taken back home. After that, the patient was sent back to [facility], and then admitted again for agitation and mental status change. MRI was conducted at the time, which revealed that the patient had a stroke. She was discharged back to the facility third time, however today around 4:30 PM, she is noted to have a change in mental status, and was not speaking properly. The patient told her daughter on the phone that he has chest pain. Here in the ED, the patient said that she has back pain. No recent fevers, chills, diaphoresis noted. No history of dementia. No abdominal pain or urinary changes. Review of Systems   Unable to perform ROS: Mental status change      Physical Exam  Constitutional:       General: She is not in acute distress. Appearance: Normal appearance. She is not ill-appearing. HENT:      Head: Normocephalic and atraumatic. Nose: Nose normal. No congestion. Mouth/Throat:      Mouth: Mucous membranes are moist.      Pharynx: No posterior oropharyngeal erythema.    Eyes:      General: No scleral icterus. Extraocular Movements: Extraocular movements intact. Pupils: Pupils are equal, round, and reactive to light. Cardiovascular:      Rate and Rhythm: Normal rate and regular rhythm. Pulses: Normal pulses. Heart sounds: Normal heart sounds. Pulmonary:      Effort: Pulmonary effort is normal. No respiratory distress. Breath sounds: Normal breath sounds. No stridor. No wheezing or rhonchi. Chest:      Chest wall: No tenderness. Abdominal:      General: Bowel sounds are normal. There is no distension. Palpations: Abdomen is soft. There is no mass. Tenderness: There is no abdominal tenderness. Musculoskeletal:         General: No swelling, tenderness or deformity. Normal range of motion. Cervical back: Normal range of motion. No rigidity or tenderness. Skin:     Capillary Refill: Capillary refill takes less than 2 seconds. Coloration: Skin is not jaundiced or pale. Findings: No erythema. Neurological:      General: No focal deficit present. Mental Status: She is alert and oriented to person, place, and time. Mental status is at baseline. Cranial Nerves: No cranial nerve deficit. Sensory: No sensory deficit. Procedures       MDM  Number of Diagnoses or Management Options  Diagnosis management comments: This is a 70-year-old female with history of diabetes, hypertension, hyperlipidemia, hypercholesterolemia, colon and uterine cancer that presents to the ED due to nursing home and daughter's concerns for altered mental status. Patient is weak and mental status has changed, therefore history is taken from daughter at bedside. The patient is from [facility], apparently was sent to rehab at the end of December after being admitted for ureteral stent clogging. She was there for a week, however then was taken back home.   After that, the patient was sent back to [facility], and then admitted again for agitation and mental status change. MRI was conducted at the time, which revealed that the patient had a stroke. She was discharged back to the facility third time, however today around 4:30 PM, she is noted to have a change in mental status, and was not speaking properly. The patient told her daughter on the phone that he has chest pain. Here in the ED, the patient said that she has back pain. No recent fevers, chills, diaphoresis noted. No history of dementia. No abdominal pain or urinary changes. The patient here in the ED is hemodynamically stable, and in no acute distress. They are calm, alert, however disoriented at baseline. The patient has clear lungs auscultation, no abnormal heart murmurs are heard. The patient has no abdominal pain or tenderness. The patient has full cranial nerve function, no motor or sensory deficits. Patient is placed on cardiac monitor and pulse ox continuously for continuous monitoring. EKG ordered to evaluate for an acute cardiac illnesses such as ACS, and also may be used in decision making regarding any possible medications administered here in the ED today. Point-of-care glucose ordered to rule out hypoglycemia or hyperglycemia. CT head ordered for acute processes such as brain herniation, hemorrhages. CBC was ordered to evaluate for any signs of inflammation or infection with a white count. A metabolic panel and magnesium was ordered for electrolyte imbalances. Troponin, chest x-ray ordered to evaluate for any cardiopulmonary etiology. Blood cultures ordered in the event of bacteremia. Viral swabs ordered in case of symptoms being of viral etiology. Lipase ordered to evaluate possible elevations due to possible pancreatitis, and lactic acid ordered for evaluation of ischemia or low perfusion to organs. Patient also had liver function panel ammonia ordered to evaluate elevations which may indicate hepatic etiology of symptoms.   Urinalysis ordered to evaluate for possible UTI as the etiology of symptoms. Remainder of MDM is continued in the ED course below. ED Course as of 01/25/23 0908   Tue Jan 24, 2023 1952 NIH stroke scale is 0. []   2148 She had no record urinalysis is concerning for UTI. Vessels are negative. Normal hepatic function panel no elevation ammonia. Patient has normal electrolytes on BMP. No elevation white count CBC. Patient troponin is 47 which is normal.  CTA of head and neck were negative. The patient chest x-rays revealed no acute process. Due to these normal findings, patient deemed not to be in stroke. []   2158 I spoke to the patient admitting team, accepted patient for admission. []      ED Course User Index  [] Khadijah Saldaña MD       Chart review: Upon chart review, patient was admitted on January 1, 2023 for the cerebellar infarct. The patient presented to the ED at the time with expressive aphasia for 5 days. An MRI of the brain revealed small acute infarct of the cerebellar hemisphere. An EEG study was performed with no signs of seizures. 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 She was discharged with lorazepam 1 mg and is to take 3 times daily for about 3 to 4 months. Social determinants: The patient is at [facility], and apparently is doing well today.   The daughter has no concerns about her placement, and is comfortable her going back there postdischarge.    --------------------------------------------- PAST HISTORY ---------------------------------------------  Past Medical History:  has a past medical history of Arthritis, Cancer ([facility] [address] Closed fracture of radius, Elevated lipids, Headache, History of anal cancer, Hyperlipidemia, Hypertension, Hypertension, Obesity, Proteinuria, Type II or unspecified type diabetes mellitus without mention of complication, not stated as uncontrolled, Vaginal cancer ([facility] [address] and Vitamin D deficiency. Past Surgical History:  has a past surgical history that includes colostomy; Rectal surgery; Breast biopsy; Wrist fracture surgery (Left, 11/07/2016); Bladder surgery (N/A, 9/24/2021); Cystoscopy (Bilateral, 7/21/2022); and Bladder surgery (Bilateral, 12/22/2022). Social History:  reports that she has never smoked. She has never used smokeless tobacco. She reports that she does not drink alcohol and does not use drugs. Family History: family history includes Cancer in her father; Diabetes in her sister; High Blood Pressure in her mother; Kidney Disease in her mother; Other in her brother. The patients home medications have been reviewed.     Allergies: Betadine [povidone iodine], Lisinopril, Penicillins, and Tylenol [acetaminophen]    -------------------------------------------------- RESULTS -------------------------------------------------    LABS:  Results for orders placed or performed during the hospital encounter of 01/24/23   COVID-19, Rapid    Specimen: Nasopharyngeal Swab   Result Value Ref Range    SARS-CoV-2, NAAT Not Detected Not Detected   RAPID INFLUENZA A/B ANTIGENS    Specimen: Nasopharyngeal   Result Value Ref Range    Influenza A by PCR Not Detected Not Detected    Influenza B by PCR Not Detected Not Detected   BMP   Result Value Ref Range    Sodium 132 132 - 146 mmol/L    Potassium 4.4 3.5 - 5.0 mmol/L    Chloride 97 (L) 98 - 107 mmol/L    CO2 21 (L) 22 - 29 mmol/L    Anion Gap 14 7 - 16 mmol/L    Glucose 171 (H) 74 - 99 mg/dL    BUN 32 (H) 6 - 23 mg/dL    Creatinine 3.0 (H) 0.5 - 1.0 mg/dL    Est, Glom Filt Rate 16 >=60 mL/min/1.73    Calcium 9.9 8.6 - 10.2 mg/dL   CBC with Auto Differential   Result Value Ref Range    WBC 6.7 4.5 - 11.5 E9/L    RBC 3.98 3.50 - 5.50 E12/L    Hemoglobin 11.0 (L) 11.5 - 15.5 g/dL    Hematocrit 34.0 34.0 - 48.0 %    MCV 85.4 80.0 - 99.9 fL    MCH 27.6 26.0 - 35.0 pg    MCHC 32.4 32.0 - 34.5 %    RDW 16.4 (H) 11.5 - 15.0 fL    Platelets 987 130 - 450 E9/L    MPV 10.5 7.0 - 12.0 fL    Neutrophils % 72.7 43.0 - 80.0 %    Immature Granulocytes % 0.3 0.0 - 5.0 %    Lymphocytes % 16.5 (L) 20.0 - 42.0 %    Monocytes % 7.5 2.0 - 12.0 %    Eosinophils % 2.7 0.0 - 6.0 %    Basophils % 0.3 0.0 - 2.0 %    Neutrophils Absolute 4.85 1.80 - 7.30 E9/L    Immature Granulocytes # 0.02 E9/L    Lymphocytes Absolute 1.10 (L) 1.50 - 4.00 E9/L    Monocytes Absolute 0.50 0.10 - 0.95 E9/L    Eosinophils Absolute 0.18 0.05 - 0.50 E9/L    Basophils Absolute 0.02 0.00 - 0.20 E9/L   Urinalysis   Result Value Ref Range    Color, UA Yellow Straw/Yellow    Clarity, UA CLOUDY (A) Clear    Glucose, Ur Negative Negative mg/dL    Bilirubin Urine Negative Negative    Ketones, Urine Negative Negative mg/dL    Specific Gravity, UA 1.015 1.005 - 1.030    Blood, Urine LARGE (A) Negative    pH, UA 8.5 5.0 - 9.0    Protein, UA 30 (A) Negative mg/dL    Urobilinogen, Urine 0.2 <2.0 E.U./dL    Nitrite, Urine POSITIVE (A) Negative    Leukocyte Esterase, Urine LARGE (A) Negative   TSH   Result Value Ref Range    TSH 2.440 0.270 - 4.200 uIU/mL   Troponin   Result Value Ref Range    Troponin, High Sensitivity 47 (H) 0 - 9 ng/L   Magnesium   Result Value Ref Range    Magnesium 2.1 1.6 - 2.6 mg/dL   Lactic Acid   Result Value Ref Range    Lactic Acid 1.6 0.5 - 2.2 mmol/L   Hepatic Function Panel   Result Value Ref Range    Total Protein 7.5 6.4 - 8.3 g/dL    Albumin 3.5 3.5 - 5.2 g/dL    Alkaline Phosphatase 112 (H) 35 - 104 U/L    ALT 11 0 - 32 U/L    AST 11 0 - 31 U/L    Total Bilirubin 0.7 0.0 - 1.2 mg/dL    Bilirubin, Direct <0.2 0.0 - 0.3 mg/dL    Bilirubin, Indirect see below 0.0 - 1.0 mg/dL   Ammonia   Result Value Ref Range    Ammonia <10.0 (L) 11.0 - 51.0 umol/L   Microscopic Urinalysis   Result Value Ref Range    WBC, UA >20 (A) 0 - 5 /HPF    RBC, UA 10-20 (A) 0 - 2 /HPF    Bacteria, UA MODERATE (A) None Seen /HPF   Basic Metabolic Panel w/ Reflex to MG   Result Value Ref Range    Sodium 133 132 - 146 mmol/L    Potassium reflex Magnesium 4.3 3.5 - 5.0 mmol/L    Chloride 102 98 - 107 mmol/L    CO2 20 (L) 22 - 29 mmol/L    Anion Gap 11 7 - 16 mmol/L    Glucose 176 (H) 74 - 99 mg/dL    BUN 28 (H) 6 - 23 mg/dL    Creatinine 2.8 (H) 0.5 - 1.0 mg/dL    Est, Glom Filt Rate 17 >=60 mL/min/1.73    Calcium 9.6 8.6 - 10.2 mg/dL   CBC with Auto Differential   Result Value Ref Range    WBC 6.7 4.5 - 11.5 E9/L    RBC 3.80 3.50 - 5.50 E12/L    Hemoglobin 10.4 (L) 11.5 - 15.5 g/dL    Hematocrit 33.9 (L) 34.0 - 48.0 %    MCV 89.2 80.0 - 99.9 fL    MCH 27.4 26.0 - 35.0 pg    MCHC 30.7 (L) 32.0 - 34.5 %    RDW 16.5 (H) 11.5 - 15.0 fL    Platelets 485 627 - 694 E9/L    MPV 10.6 7.0 - 12.0 fL    Neutrophils % 75.9 43.0 - 80.0 %    Immature Granulocytes % 0.4 0.0 - 5.0 %    Lymphocytes % 13.6 (L) 20.0 - 42.0 %    Monocytes % 6.7 2.0 - 12.0 %    Eosinophils % 3.0 0.0 - 6.0 %    Basophils % 0.4 0.0 - 2.0 %    Neutrophils Absolute 5.09 1.80 - 7.30 E9/L    Immature Granulocytes # 0.03 E9/L    Lymphocytes Absolute 0.91 (L) 1.50 - 4.00 E9/L    Monocytes Absolute 0.45 0.10 - 0.95 E9/L    Eosinophils Absolute 0.20 0.05 - 0.50 E9/L    Basophils Absolute 0.03 0.00 - 0.20 E9/L   POCT Glucose   Result Value Ref Range    Glucose 171 mg/dL   POCT Glucose   Result Value Ref Range    Meter Glucose 165 (H) 74 - 99 mg/dL   POCT Glucose   Result Value Ref Range    Meter Glucose 199 (H) 74 - 99 mg/dL   EKG 12 Lead   Result Value Ref Range    Ventricular Rate 76 BPM    Atrial Rate 78 BPM    QRS Duration 58 ms    Q-T Interval 384 ms    QTc Calculation (Bazett) 432 ms    R Axis -13 degrees    T Axis 19 degrees   EKG 12 Lead   Result Value Ref Range    Ventricular Rate 77 BPM    Atrial Rate 77 BPM    P-R Interval 168 ms    QRS Duration 52 ms    Q-T Interval 382 ms    QTc Calculation (Bazett) 432 ms    P Axis 40 degrees    R Axis -13 degrees    T Axis -13 degrees       RADIOLOGY:  XR CHEST PORTABLE   Final Result   No acute process.       Stable exam.         CTA HEAD W CONTRAST   Final Result   1. No acute intracranial hemorrhage or edema. 2. No intracranial arterial stenosis. 3. No stenosis involving internal carotid arteries or vertebral arteries. 4. 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 CTA NECK W CONTRAST   Final Result   1. No acute intracranial hemorrhage or edema. 2. No intracranial arterial stenosis. 3. No stenosis involving internal carotid arteries or vertebral arteries. 4. 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              MEDICATIONS:  Medications   amLODIPine (NORVASC) tablet 10 mg (10 mg Oral Given 1/25/23 0851)   aspirin EC tablet 81 mg (81 mg Oral Given 1/25/23 0851)   atorvastatin (LIPITOR) tablet 40 mg (has no administration in time range)   carvedilol (COREG) tablet 6.25 mg (6.25 mg Oral Given 1/25/23 0851)   clopidogrel (PLAVIX) tablet 75 mg (75 mg Oral Given 1/25/23 0851)   escitalopram (LEXAPRO) tablet 5 mg (5 mg Oral Given 1/25/23 0851)   LORazepam (ATIVAN) tablet 0.5 mg (0.5 mg Oral Given 1/25/23 0851)   magnesium oxide (MAG-OX) tablet 400 mg (400 mg Oral Given 1/25/23 0851)   pantoprazole (PROTONIX) tablet 40 mg (40 mg Oral Given 1/25/23 0851)   vitamin B-12 (CYANOCOBALAMIN) tablet 1,000 mcg (1,000 mcg Oral Given 1/25/23 0851)   sodium chloride flush 0.9 % injection 5-40 mL (10 mLs IntraVENous Given 1/25/23 0852)   sodium chloride flush 0.9 % injection 5-40 mL (has no administration in time range)   0.9 % sodium chloride infusion (has no administration in time range)   enoxaparin Sodium (LOVENOX) injection 30 mg (30 mg SubCUTAneous Given 1/25/23 0852)   ondansetron (ZOFRAN-ODT) disintegrating tablet 4 mg (has no administration in time range)     Or   ondansetron (ZOFRAN) injection 4 mg (has no administration in time range)   polyethylene glycol (GLYCOLAX) packet 17 g (has no administration in time range)   acetaminophen (TYLENOL) tablet 650 mg (has no administration in time range)     Or   acetaminophen (TYLENOL) suppository 650 mg (has no administration in time range)   insulin lispro (HUMALOG) injection vial 0-4 Units (0 Units SubCUTAneous Not Given 1/25/23 0615)   insulin lispro (HUMALOG) injection vial 0-4 Units (has no administration in time range)   glucose chewable tablet 16 g (has no administration in time range)   dextrose bolus 10% 125 mL (has no administration in time range)     Or   dextrose bolus 10% 250 mL (has no administration in time range)   glucagon (rDNA) injection 1 mg (has no administration in time range)   dextrose 10 % infusion (has no administration in time range)   levoFLOXacin (LEVAQUIN) tablet 750 mg (has no administration in time range)   0.9 % sodium chloride bolus (0 mLs IntraVENous Stopped 1/24/23 2111)   iopamidol (ISOVUE-370) 76 % injection 80 mL (80 mLs IntraVENous Given 1/24/23 2000)   cefTRIAXone (ROCEPHIN) 1,000 mg in sterile water 10 mL IV syringe (1,000 mg IntraVENous Given 1/24/23 2111)       EKG: This EKG is signed and interpreted by me. Rate: 76  Rhythm: Sinus  Interpretation: no acute changes, left axis deviation, normal WI interval, normal QRS duration, no QT prolongation, no acute ST changes. Comparison: stable as compared to patient's most recent EKG      ------------------------- NURSING NOTES AND VITALS REVIEWED ---------------------------  Date / Time Roomed:  1/24/2023  7:08 PM  ED Bed Assignment:  [id number]    The nursing notes within the ED encounter and vital signs as below have been reviewed.      Patient Vitals for the past 24 hrs:   BP Temp Temp src Pulse Resp SpO2 Height Weight   01/25/23 0739 -- -- -- -- -- 95 % -- --   01/25/23 0730 114/71 98 °F (36.7 °C) Oral 78 18 100 % -- --   01/25/23 0711 137/77 97.7 °F (36.5 °C) Oral 84 18 100 % -- --   01/25/23 0330 -- -- -- -- -- -- 5' 7\" (1.702 m) 184 lb 8 oz (83.7 kg)   01/25/23 0248 (!) 133/92 97.7 °F (36.5 °C) Oral 87 17 100 % -- --   01/25/23 0217 117/67 -- -- 92 16 99 % -- --   01/25/23 0011 125/74 -- -- 81 16 97 % -- --   01/24/23 2301 130/61 -- -- 83 16 99 % -- --   01/24/23 2202 135/62 -- -- 90 17 98 % -- --   01/24/23 2030 (!) 160/75 -- -- 74 16 98 % -- --   01/24/23 1959 -- -- -- 85 -- -- -- --   01/24/23 1923 135/89 97.7 °F (36.5 °C) Oral 74 16 99 % 5' 7\" (1.702 m) 158 lb (71.7 kg)       ------------------------------------------ PROGRESS NOTES ------------------------------------------  Re-evaluation(s):  Time: Every 30 minutes. Patients symptoms show no change  Repeat physical examination is not changed    Counseling:  I have spoken with the patient and discussed todays results, in addition to providing specific details for the plan of care and counseling regarding the diagnosis and prognosis. Their questions are answered at this time and they are agreeable with the plan of admission.    --------------------------------- ADDITIONAL PROVIDER NOTES ---------------------------------  This patient's ED course included: a personal history and physicial examination, re-evaluation prior to disposition, multiple bedside re-evaluations, IV medications, and cardiac monitoring    This patient has remained hemodynamically stable during their ED course. Diagnosis:  1. Altered mental status, unspecified altered mental status type        Disposition:  Patient's disposition: Admit to telemetry  Patient's condition is stable. Patient was seen and evaluated by myself and my attending Xiomara Thorne MD. Assessment and Plan discussed with attending provider, please see attestation for final plan of care.      MD Joleen Rose MD  Resident  01/25/23 8440

## 2023-01-25 NOTE — PLAN OF CARE
Problem: Pain  Goal: Verbalizes/displays adequate comfort level or baseline comfort level  Outcome: Progressing  Flowsheets (Taken 1/25/2023 7041)  Verbalizes/displays adequate comfort level or baseline comfort level: Encourage patient to monitor pain and request assistance     Problem: Skin/Tissue Integrity  Goal: Absence of new skin breakdown  Description: 1. Monitor for areas of redness and/or skin breakdown  2. Assess vascular access sites hourly  3. Every 4-6 hours minimum:  Change oxygen saturation probe site  4. Every 4-6 hours:  If on nasal continuous positive airway pressure, respiratory therapy assess nares and determine need for appliance change or resting period.   Outcome: Progressing

## 2023-01-26 VITALS
TEMPERATURE: 98.1 F | WEIGHT: 174.9 LBS | OXYGEN SATURATION: 99 % | SYSTOLIC BLOOD PRESSURE: 112 MMHG | BODY MASS INDEX: 27.45 KG/M2 | HEART RATE: 92 BPM | DIASTOLIC BLOOD PRESSURE: 76 MMHG | RESPIRATION RATE: 18 BRPM | HEIGHT: 67 IN

## 2023-01-26 DIAGNOSIS — E78.5 HYPERLIPIDEMIA, UNSPECIFIED HYPERLIPIDEMIA TYPE: ICD-10-CM

## 2023-01-26 DIAGNOSIS — I10 ESSENTIAL HYPERTENSION: ICD-10-CM

## 2023-01-26 DIAGNOSIS — E11.9 DIABETES MELLITUS WITHOUT COMPLICATION (HCC): ICD-10-CM

## 2023-01-26 PROBLEM — R41.82 ALTERED MENTAL STATUS: Status: RESOLVED | Noted: 2023-01-25 | Resolved: 2023-01-26

## 2023-01-26 LAB
ANION GAP SERPL CALCULATED.3IONS-SCNC: 15 MMOL/L (ref 7–16)
BASOPHILS ABSOLUTE: 0.02 E9/L (ref 0–0.2)
BASOPHILS RELATIVE PERCENT: 0.3 % (ref 0–2)
BUN BLDV-MCNC: 32 MG/DL (ref 6–23)
CALCIUM SERPL-MCNC: 9.8 MG/DL (ref 8.6–10.2)
CHLORIDE BLD-SCNC: 100 MMOL/L (ref 98–107)
CO2: 18 MMOL/L (ref 22–29)
CREAT SERPL-MCNC: 3.2 MG/DL (ref 0.5–1)
EKG ATRIAL RATE: 77 BPM
EKG ATRIAL RATE: 78 BPM
EKG P AXIS: 40 DEGREES
EKG P-R INTERVAL: 168 MS
EKG Q-T INTERVAL: 382 MS
EKG Q-T INTERVAL: 384 MS
EKG QRS DURATION: 52 MS
EKG QRS DURATION: 58 MS
EKG QTC CALCULATION (BAZETT): 432 MS
EKG QTC CALCULATION (BAZETT): 432 MS
EKG R AXIS: -13 DEGREES
EKG R AXIS: -13 DEGREES
EKG T AXIS: -13 DEGREES
EKG T AXIS: 19 DEGREES
EKG VENTRICULAR RATE: 76 BPM
EKG VENTRICULAR RATE: 77 BPM
EOSINOPHILS ABSOLUTE: 0.19 E9/L (ref 0.05–0.5)
EOSINOPHILS RELATIVE PERCENT: 3.1 % (ref 0–6)
GFR SERPL CREATININE-BSD FRML MDRD: 15 ML/MIN/1.73
GLUCOSE BLD-MCNC: 144 MG/DL (ref 74–99)
HCT VFR BLD CALC: 32.2 % (ref 34–48)
HEMOGLOBIN: 10.4 G/DL (ref 11.5–15.5)
IMMATURE GRANULOCYTES #: 0.03 E9/L
IMMATURE GRANULOCYTES %: 0.5 % (ref 0–5)
LYMPHOCYTES ABSOLUTE: 1.06 E9/L (ref 1.5–4)
LYMPHOCYTES RELATIVE PERCENT: 17.4 % (ref 20–42)
MCH RBC QN AUTO: 27.8 PG (ref 26–35)
MCHC RBC AUTO-ENTMCNC: 32.3 % (ref 32–34.5)
MCV RBC AUTO: 86.1 FL (ref 80–99.9)
METER GLUCOSE: 144 MG/DL (ref 74–99)
METER GLUCOSE: 167 MG/DL (ref 74–99)
MONOCYTES ABSOLUTE: 0.46 E9/L (ref 0.1–0.95)
MONOCYTES RELATIVE PERCENT: 7.6 % (ref 2–12)
NEUTROPHILS ABSOLUTE: 4.32 E9/L (ref 1.8–7.3)
NEUTROPHILS RELATIVE PERCENT: 71.1 % (ref 43–80)
PDW BLD-RTO: 16.6 FL (ref 11.5–15)
PLATELET # BLD: 312 E9/L (ref 130–450)
PMV BLD AUTO: 10.5 FL (ref 7–12)
POTASSIUM REFLEX MAGNESIUM: 4.6 MMOL/L (ref 3.5–5)
RBC # BLD: 3.74 E12/L (ref 3.5–5.5)
SARS-COV-2, NAAT: NOT DETECTED
SODIUM BLD-SCNC: 133 MMOL/L (ref 132–146)
URINE CULTURE, ROUTINE: NORMAL
WBC # BLD: 6.1 E9/L (ref 4.5–11.5)

## 2023-01-26 PROCEDURE — 87635 SARS-COV-2 COVID-19 AMP PRB: CPT

## 2023-01-26 PROCEDURE — 92526 ORAL FUNCTION THERAPY: CPT | Performed by: SPEECH-LANGUAGE PATHOLOGIST

## 2023-01-26 PROCEDURE — 36415 COLL VENOUS BLD VENIPUNCTURE: CPT

## 2023-01-26 PROCEDURE — 82962 GLUCOSE BLOOD TEST: CPT

## 2023-01-26 PROCEDURE — 85025 COMPLETE CBC W/AUTO DIFF WBC: CPT

## 2023-01-26 PROCEDURE — 80048 BASIC METABOLIC PNL TOTAL CA: CPT

## 2023-01-26 PROCEDURE — 99238 HOSP IP/OBS DSCHRG MGMT 30/<: CPT | Performed by: FAMILY MEDICINE

## 2023-01-26 PROCEDURE — 6370000000 HC RX 637 (ALT 250 FOR IP): Performed by: STUDENT IN AN ORGANIZED HEALTH CARE EDUCATION/TRAINING PROGRAM

## 2023-01-26 PROCEDURE — 97535 SELF CARE MNGMENT TRAINING: CPT

## 2023-01-26 PROCEDURE — 93010 ELECTROCARDIOGRAM REPORT: CPT | Performed by: INTERNAL MEDICINE

## 2023-01-26 PROCEDURE — 2580000003 HC RX 258: Performed by: STUDENT IN AN ORGANIZED HEALTH CARE EDUCATION/TRAINING PROGRAM

## 2023-01-26 PROCEDURE — 6360000002 HC RX W HCPCS: Performed by: STUDENT IN AN ORGANIZED HEALTH CARE EDUCATION/TRAINING PROGRAM

## 2023-01-26 RX ORDER — ATENOLOL 50 MG/1
TABLET ORAL
Qty: 90 TABLET | Refills: 1 | OUTPATIENT
Start: 2023-01-26

## 2023-01-26 RX ORDER — ATORVASTATIN CALCIUM 20 MG/1
TABLET, FILM COATED ORAL
Qty: 90 TABLET | Refills: 1 | OUTPATIENT
Start: 2023-01-26

## 2023-01-26 RX ORDER — LEVOFLOXACIN 250 MG/1
250 TABLET ORAL DAILY
Qty: 3 TABLET | Refills: 0 | DISCHARGE
Start: 2023-01-27 | End: 2023-01-26 | Stop reason: HOSPADM

## 2023-01-26 RX ADMIN — CYANOCOBALAMIN TAB 1000 MCG 1000 MCG: 1000 TAB at 08:07

## 2023-01-26 RX ADMIN — CARVEDILOL 6.25 MG: 6.25 TABLET, FILM COATED ORAL at 08:08

## 2023-01-26 RX ADMIN — CLOPIDOGREL BISULFATE 75 MG: 75 TABLET ORAL at 08:07

## 2023-01-26 RX ADMIN — PANTOPRAZOLE SODIUM 40 MG: 40 TABLET, DELAYED RELEASE ORAL at 08:08

## 2023-01-26 RX ADMIN — ASPIRIN 81 MG: 81 TABLET, COATED ORAL at 08:09

## 2023-01-26 RX ADMIN — ESCITALOPRAM OXALATE 5 MG: 10 TABLET ORAL at 08:14

## 2023-01-26 RX ADMIN — SODIUM CHLORIDE, PRESERVATIVE FREE 10 ML: 5 INJECTION INTRAVENOUS at 08:11

## 2023-01-26 RX ADMIN — LORAZEPAM 0.5 MG: 0.5 TABLET ORAL at 08:08

## 2023-01-26 RX ADMIN — Medication 400 MG: at 08:08

## 2023-01-26 RX ADMIN — LORAZEPAM 0.5 MG: 0.5 TABLET ORAL at 13:49

## 2023-01-26 RX ADMIN — ENOXAPARIN SODIUM 30 MG: 100 INJECTION SUBCUTANEOUS at 08:09

## 2023-01-26 RX ADMIN — AMLODIPINE BESYLATE 10 MG: 10 TABLET ORAL at 08:07

## 2023-01-26 RX ADMIN — ONDANSETRON 4 MG: 4 TABLET, ORALLY DISINTEGRATING ORAL at 08:08

## 2023-01-26 ASSESSMENT — PAIN SCALES - GENERAL
PAINLEVEL_OUTOF10: 0

## 2023-01-26 NOTE — DISCHARGE SUMMARY
Physician Discharge Summary  [facility] Family Medicine Residency     Patient ID:  Humaira Guillaume  [id number]  38 y.o.  1950    Admit date: 1/24/2023    Discharge date: 1/26/2023    Admission Diagnoses:   Complicated UTI (urinary tract infection) [N39.0]    Discharge Diagnoses: Active Problems:    Chronic renal disease, stage III ([facility] [address] [505195]    Uncontrolled type 2 diabetes mellitus with hyperglycemia (HCC)    Complicated UTI (urinary tract infection)    Hypertension    Colostomy present (HCC)    Stuttering  Resolved Problems:    Altered mental status      Consults: None  Procedures: None    Hospital Course: Humaira Guillaume is a 67 y.o. female patient of Dr. Reji Nichols with a pertinent PMHx of recent admission for CVA and aphasia, HTN, HLD, DMII, CKD stage IIIb, hx colon cancer with ostomy bag, bilateral hydronephrosis with bilateral stent placement and chronic knutson who presented to the ER from [facility] with chief complaint of chest pain. However, the patient said her chest pain resolved in the ED. Her cycled troponins went from 47>53>54. The patient's urine was significant for large blood positive nitrates and large leukocyte esterase with moderate bacteria on microscopy so she was admitted for management. In December, her urine culture was significant for Pseudomonas and Staph aureus. The patient has a chronic indwelling Knutson catheter with bilateral hydronephrosis and stent placement. The patient was given Levaquin once per renal dosing. The patient will not be discharged with antibiotics due to a negative urine culture. The patient's home Ativan was continued. Her home aspirin was continued as well as her Plavix which she should continue to take until 1/30/2023. She should continue her soft and minced diet at facility for her oral phase dysphagia.   The patient has chronic kidney disease stage III, and she should have a repeat BMP drawn in 1 week from discharge to monitor a slight increase in creatinine. The patient's home medicines for hypertension, hyperlipidemia, and GERD were continued. Wound care helped with the patient's stage I sacral decubitus ulcer. She should continue wound care at facility. Ms. Anup Miranda was discharged in a stable and improved condition. Significant Diagnostic Studies:   XR CHEST PORTABLE   Final Result   No acute process. Stable exam.         CTA HEAD W CONTRAST   Final Result   1. No acute intracranial hemorrhage or edema. 2. No intracranial arterial stenosis. 3. No stenosis involving internal carotid arteries or vertebral arteries. 4. 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 CTA NECK W CONTRAST   Final Result   1. No acute intracranial hemorrhage or edema. 2. No intracranial arterial stenosis. 3. No stenosis involving internal carotid arteries or vertebral arteries. 4. 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 Post Discharge Follow up: See below    Medication changes: See below    Discharge Exam: See progress note from today    Disposition: SNF  Patient condition: stable    Patient Instructions: Activity: activity as tolerated  Diet: soft and minced foods    Discharge Medication List:    Current Discharge Medication List        CONTINUE these medications which have NOT CHANGED    Details   LORazepam (ATIVAN) 0.5 MG tablet Take 1 tablet by mouth 3 times daily for 90 days. Max Daily Amount: 1.5 mg  Qty: 90 tablet, Refills: 2    Comments: Treatment of catatonia. Continue treatment for 3-4 months. Associated Diagnoses: Catatonia;  Aphasia      atorvastatin (LIPITOR) 40 MG tablet Take 1 tablet by mouth nightly  Qty: 30 tablet, Refills: 0      carvedilol (COREG) 6.25 MG tablet Take 1 tablet by mouth 2 times daily (with meals)  Qty: 60 tablet, Refills: 0      escitalopram (LEXAPRO) 5 MG tablet Take 1 tablet by mouth daily  Qty: 30 tablet, Refills: 0      clopidogrel (PLAVIX) 75 MG tablet Take 1 tablet by mouth daily for 17 doses  Qty: 17 tablet, Refills: 0      vitamin B-12 1000 MCG tablet Take 1 tablet by mouth daily  Qty: 30 tablet, Refills: 0      amLODIPine (NORVASC) 10 MG tablet Take 10 mg by mouth every morning      aspirin 81 MG EC tablet Take 81 mg by mouth every morning      glipiZIDE (GLUCOTROL XL) 5 MG extended release tablet Take 5 mg by mouth Daily with lunch      magnesium oxide (MAG-OX) 400 MG tablet Take 400 mg by mouth 2 times daily      pantoprazole (PROTONIX) 40 MG tablet Take 40 mg by mouth every morning      denosumab (PROLIA) 60 MG/ML SOSY SC injection Inject 60 mg into the skin every 6 months NEXT INJ DUE @ SEX INF: 02/2023            Current Discharge Medication List         Current Discharge Medication List        Current Discharge Medication List            Follow up:  SRIRAM Briseno  [address] [place] [address] P[address] [facility]     Go to  your follow-up appointment with Dr. Ryan Ward for Dr. Jj Nelson on 1/31/2023 at 2:40 PM.        Benson Sandhoff, DO  Family Medicine Resident PGY-1  01/26/23   12:40 PM

## 2023-01-26 NOTE — PROGRESS NOTES
Occupational Therapy  OT BEDSIDE TREATMENT NOTE      Date:2023  Patient Name: Graham Maxwell  MRN: [id number]  : 1950  Room: [address] [place] [address]     Evaluating OT: Dayanna Momin Dy - [phone]     Referring Provider: Nia Parada DO  Specific Provider Orders/Date: \"OT eval and treat\" - 2023     Diagnosis: Complicated UTI (urinary tract infection) [N39.0]       Pertinent Medical History: CVA with aphasia, obesity, HTN, osteoporosis, cancer, HTN, DM      Precautions: fall risk, contact isolation     Assessment of Current Deficits:    [x] Functional mobility             [x]ADLs           [x] Strength                  [x]Cognition   [x] Functional transfers           [x] IADLs         [x] Safety Awareness   [x]Endurance   [] Fine Coordination              [x] Balance      [] Vision/perception   [x]Sensation     []Gross Motor Coordination  [] ROM           [] Delirium                   [] Motor Control      OT PLAN OF CARE   OT POC is based on physician orders, patient diagnosis, and results of clinical assessment.   Frequency/Duration 2-5 days/week for 2 weeks PRN   Specific OT Treatment Interventions to Include:   * Instruction/training on adapted ADL techniques and AE recommendations to increase functional independence within precautions       * Training on energy conservation strategies, correct breathing pattern and techniques to improve independence/tolerance for self-care routine  * Functional transfer/mobility training/DME recommendations for increased independence, safety, and fall prevention  * Patient/Family education to increase follow through with safety techniques and functional independence  * Recommendation of environmental modifications for increased safety with functional transfers/mobility and ADLs  * Therapeutic exercise to improve motor endurance, ROM, and functional strength for ADLs/functional transfers  * Therapeutic activities to facilitate/challenge dynamic balance, stand tolerance for increased safety and independence with ADLs  * Neuro-muscular re-education: facilitation of righting/equilibrium reactions, midline orientation, scapular stability/mobility, normalization of muscle tone, and facilitation of volitional active controled movement  * Positioning to improve skin integrity, interaction with environment and functional independence     Recommended Adaptive Equipment: TBD      Home Living: Patient admitted from [facility]. Prior, patient lived alone in a one-floor home. Prior Level of Function (PLOF): Patient was needing assistance with ADLs and functional transfers recently. Pain Level: No complaints of pain. Cognition: Pt sitting in the chair. Very talkative and difficult to redirect. Functional Assessment:                  AM-PAC Daily Activity Raw Score: 13/24    Initial Eval Status  Date: 1/25/2023 Treatment Status  Date: 1/26/23  Short Term Goals = Long Term Goals   Feeding Min A Setup  Pt eating puree diet. Setup   Grooming Mod A  setup to comb hair  SBA (seated)   UB Dressing Mod A Min A   SBA   LB Dressing Max A   Min A - with use of AE, as needed/appropriate   Bathing Max A   Min A - with use of AE/DME, as needed/appropriate   Toileting Max A   Min A   Bed Mobility  Supine-to-Sit: Min A    Mod I / Independent in order to maximize patient's independence with ADLs, re-positioning, and other functional tasks. Functional Transfers Sit-to-Stand: CGA   from EOB CGA from chair. Independent   Functional Mobility Min A (with walker) for few steps from EOB to bedside chair. Supervision with functional mobility (with device, as needed/appropriate) in order to maximize independence with ADLs/IADLs and other functional tasks. Balance Sitting: Good- (at EOB)  Standing: Fair- (with walker)   Fair+ dynamic standing balance during completion of ADLs/IADLs and other functional tasks.    Activity Tolerance Limited Fair   Patient will demonstrate Good understanding and consistent implementation of energy conservation techniques and work simplification techniques into ADL routines. B UE Strength B Shoulders: 3-/5  Distal B UEs: 3+/5   Patient will demonstrate 4/5 distal B UE strength in order to maximize independence with ADLs/IADLs and functional transfers. Comments:  pt sitting in the chair. Completed grooming while seated in the chair. Cues for safety during transfer. Lunch arrived during this session. Pt able to feed herself however states she is struggling with puree diet. Remained seated to finish eating lunch. Education/treatment:  ADL retraining with facilitation of movement to increase self care skills. Therapeutic activity to address balance and endurance for ADL and transfers. Pt education of daily orientation. Pt has made  progress towards set goals.        Time In: 10:55   Time Out: 11:10      Min Units   Therapeutic Ex 15825     Therapeutic Activities 61212 5    ADL/Self Care 41299 10 1   Orthotic Management 56082     Neuro Re-Ed 11921     Non-Billable Time     TOTAL TIMED TREATMENT 15 [facility] LINH/L [id number]

## 2023-01-26 NOTE — CARE COORDINATION
Social Work/Discharge Planning:  Chart reviewed. Patient remains on room air. She admitted from [facility] where she will return at discharge. No pre-cert needed, but will need a negative covid on day of discharge. Will continue to follow. Electronically signed by CORNELIUS Reyes on 1/26/2023 at 9:55 AM    Addendum:  Discharge order noted. Chaz Castaneda with [facility] arranged transport for 3:00pm with Nelsy Gillespie.  Notified patient sister Kesha Briggs (ph: [phone]) and RN of discharge time. RN will notify patient.   Electronically signed by CORNELIUS Reyes on 1/26/2023 at 11:08 AM

## 2023-01-26 NOTE — PROGRESS NOTES
Nurse to Nurse called to Leonidas at [facility].  Updated her on patient pickup time of 1500 with Lyn Spencer.

## 2023-01-26 NOTE — PLAN OF CARE
Problem: Discharge Planning  Goal: Discharge to home or other facility with appropriate resources  1/26/2023 1118 by Gretchen Cook RN  Outcome: Completed     Problem: Pain  Goal: Verbalizes/displays adequate comfort level or baseline comfort level  1/26/2023 1118 by Gretchen Cook RN  Outcome: Completed     Problem: Safety - Adult  Goal: Free from fall injury  1/26/2023 1118 by Gretchen Cook RN  Outcome: Completed     Problem: Pain  Goal: Verbalizes/displays adequate comfort level or baseline comfort level  1/26/2023 1118 by Gretchen Cook RN  Outcome: Completed     Problem: Safety - Adult  Goal: Free from fall injury  1/26/2023 1118 by Gretchen Cook RN  Outcome: Completed     Problem: Skin/Tissue Integrity  Goal: Absence of new skin breakdown  Description: 1. Monitor for areas of redness and/or skin breakdown  2. Assess vascular access sites hourly  3. Every 4-6 hours minimum:  Change oxygen saturation probe site  4. Every 4-6 hours:  If on nasal continuous positive airway pressure, respiratory therapy assess nares and determine need for appliance change or resting period.   1/26/2023 1118 by Gretchen Cook RN  Outcome: Completed     Problem: Chronic Conditions and Co-morbidities  Goal: Patient's chronic conditions and co-morbidity symptoms are monitored and maintained or improved  1/26/2023 1118 by Gretchen Cook RN  Outcome: Completed     Problem: ABCDS Injury Assessment  Goal: Absence of physical injury  1/26/2023 1118 by Gretchen Cook RN  Outcome: Completed

## 2023-01-26 NOTE — PROGRESS NOTES
Speech Language Pathology      NAME:  David Mejia  :  1950  DATE: 2023  ROOM:  [id number]/6653-[id number]      Pt seen at beside for follow up dysphagia management. Family present. Pt preparing for d/c to rehab. Daughter requesting diet upgrade. Solid trials complete with good toleration. Recommend upgrade diet to soft and bite sized at this time as tolerated. Daughter made aware that RN has already called report to Mirela Gorman so pt may need reassessed by facility SLP for upgrade. All questions answered. Complicated UTI (urinary tract infection) [N39.0]    06410  dysphagia tx, 15 mins    Joseph TAVAREZ. JAYLEEN/SLP [id number]  Speech-Language Pathologist

## 2023-01-29 LAB
BLOOD CULTURE, ROUTINE: NORMAL
BLOOD CULTURE, ROUTINE: NORMAL

## 2023-02-16 ENCOUNTER — TELEPHONE (OUTPATIENT)
Dept: FAMILY MEDICINE CLINIC | Age: 73
End: 2023-02-16

## 2023-02-16 ENCOUNTER — HOSPITAL ENCOUNTER (OUTPATIENT)
Dept: INFUSION THERAPY | Age: 73
Setting detail: INFUSION SERIES
Discharge: HOME | End: 2023-02-16
Payer: MEDICARE

## 2023-02-16 VITALS
TEMPERATURE: 97.4 F | OXYGEN SATURATION: 100 % | SYSTOLIC BLOOD PRESSURE: 129 MMHG | RESPIRATION RATE: 18 BRPM | BODY MASS INDEX: 27.31 KG/M2 | DIASTOLIC BLOOD PRESSURE: 90 MMHG | HEIGHT: 67 IN | WEIGHT: 174 LBS | HEART RATE: 97 BPM

## 2023-02-16 DIAGNOSIS — M80.00XD AGE-RELATED OSTEOPOROSIS WITH CURRENT PATHOLOGICAL FRACTURE WITH ROUTINE HEALING: Primary | ICD-10-CM

## 2023-02-16 PROCEDURE — 6360000002 HC RX W HCPCS: Performed by: FAMILY MEDICINE

## 2023-02-16 PROCEDURE — 96372 THER/PROPH/DIAG INJ SC/IM: CPT

## 2023-02-16 RX ADMIN — DENOSUMAB 60 MG: 60 INJECTION SUBCUTANEOUS at 10:42

## 2023-02-16 NOTE — TELEPHONE ENCOUNTER
Last Appointment   12/5/2022  Next Appointment  4/18/2023  Patient is in a nursing home and being followed by their dr, she has an appointment in April to see you, do you need to see her prior to this? Please let Terence Angel know.

## 2023-04-06 DIAGNOSIS — R47.01 APHASIA: ICD-10-CM

## 2023-04-06 DIAGNOSIS — F06.1 CATATONIA: ICD-10-CM

## 2023-04-06 RX ORDER — LORAZEPAM 0.5 MG/1
0.5 TABLET ORAL 3 TIMES DAILY
Qty: 90 TABLET | Refills: 2 | Status: SHIPPED | OUTPATIENT
Start: 2023-04-06 | End: 2023-07-05

## 2023-04-06 NOTE — TELEPHONE ENCOUNTER
Last Appointment   12/5/2022  Next Appointment  4/18/2023    Being discharged from [facility] today.  Will need script sent to [facility] [place]

## 2023-04-07 ENCOUNTER — TELEPHONE (OUTPATIENT)
Dept: FAMILY MEDICINE CLINIC | Age: 73
End: 2023-04-07

## 2023-04-07 NOTE — TELEPHONE ENCOUNTER
Baron Jose from [facility] called in regarding insulin injections, she was sent home from the facility with instructions to do a sliding scale, per Baron Jose, patient is refusing to do that and it would take Irina days to show her how to do that. Baron Jose also stated that her sugars have been consistently running under 150 and she might not even need insulin any longer.

## 2023-04-07 NOTE — TELEPHONE ENCOUNTER
Heaven Alvarez was wondering if we could d/c insulin and restart the Glipizide. Please advise. Thanks.

## 2023-04-07 NOTE — TELEPHONE ENCOUNTER
Please stop the insulin. Also do not start the glipizide. Make an appointment to see Dr Wilson Zuluaga so this can be discussed.   danielw

## 2023-04-07 NOTE — TELEPHONE ENCOUNTER
[facility] -  [facility] and spoke to Andrew. Advised to have patient stop insulin, we won't have her start Glipizide until discussing with Dr. Judy Truong at her 4/18/23 appt. Toni Mart to let patient know to keep that appt.

## 2023-04-18 ENCOUNTER — OFFICE VISIT (OUTPATIENT)
Dept: FAMILY MEDICINE CLINIC | Age: 73
End: 2023-04-18
Payer: MEDICARE

## 2023-04-18 VITALS
TEMPERATURE: 96.9 F | DIASTOLIC BLOOD PRESSURE: 69 MMHG | HEART RATE: 76 BPM | OXYGEN SATURATION: 99 % | BODY MASS INDEX: 26.06 KG/M2 | SYSTOLIC BLOOD PRESSURE: 121 MMHG | WEIGHT: 166 LBS | HEIGHT: 67 IN | RESPIRATION RATE: 16 BRPM

## 2023-04-18 DIAGNOSIS — I10 PRIMARY HYPERTENSION: ICD-10-CM

## 2023-04-18 DIAGNOSIS — E78.5 ELEVATED LIPIDS: ICD-10-CM

## 2023-04-18 DIAGNOSIS — E11.22 TYPE 2 DIABETES MELLITUS WITH CHRONIC KIDNEY DISEASE, WITHOUT LONG-TERM CURRENT USE OF INSULIN, UNSPECIFIED CKD STAGE (HCC): Primary | ICD-10-CM

## 2023-04-18 DIAGNOSIS — C52 VAGINAL CANCER (HCC): ICD-10-CM

## 2023-04-18 DIAGNOSIS — K21.9 GASTROESOPHAGEAL REFLUX DISEASE WITHOUT ESOPHAGITIS: ICD-10-CM

## 2023-04-18 DIAGNOSIS — F33.40 RECURRENT MAJOR DEPRESSIVE DISORDER, IN REMISSION (HCC): ICD-10-CM

## 2023-04-18 PROBLEM — F33.0 MAJOR DEPRESSIVE DISORDER, RECURRENT, MILD (HCC): Status: RESOLVED | Noted: 2023-04-18 | Resolved: 2023-04-18

## 2023-04-18 PROBLEM — R47.01 APHASIA: Status: RESOLVED | Noted: 2023-01-09 | Resolved: 2023-04-18

## 2023-04-18 PROBLEM — F33.1 MAJOR DEPRESSIVE DISORDER, RECURRENT, MODERATE (HCC): Status: ACTIVE | Noted: 2023-04-18

## 2023-04-18 PROBLEM — F33.0 MAJOR DEPRESSIVE DISORDER, RECURRENT, MILD (HCC): Status: ACTIVE | Noted: 2023-04-18

## 2023-04-18 PROBLEM — F33.9 MAJOR DEPRESSIVE DISORDER, RECURRENT, UNSPECIFIED (HCC): Status: ACTIVE | Noted: 2023-04-18

## 2023-04-18 PROBLEM — F33.9 MAJOR DEPRESSIVE DISORDER, RECURRENT, UNSPECIFIED (HCC): Status: RESOLVED | Noted: 2023-04-18 | Resolved: 2023-04-18

## 2023-04-18 LAB — HBA1C MFR BLD: 6.3 %

## 2023-04-18 PROCEDURE — 3017F COLORECTAL CA SCREEN DOC REV: CPT | Performed by: FAMILY MEDICINE

## 2023-04-18 PROCEDURE — 99214 OFFICE O/P EST MOD 30 MIN: CPT | Performed by: FAMILY MEDICINE

## 2023-04-18 PROCEDURE — G8417 CALC BMI ABV UP PARAM F/U: HCPCS | Performed by: FAMILY MEDICINE

## 2023-04-18 PROCEDURE — 1123F ACP DISCUSS/DSCN MKR DOCD: CPT | Performed by: FAMILY MEDICINE

## 2023-04-18 PROCEDURE — 2022F DILAT RTA XM EVC RTNOPTHY: CPT | Performed by: FAMILY MEDICINE

## 2023-04-18 PROCEDURE — G8427 DOCREV CUR MEDS BY ELIG CLIN: HCPCS | Performed by: FAMILY MEDICINE

## 2023-04-18 PROCEDURE — 1090F PRES/ABSN URINE INCON ASSESS: CPT | Performed by: FAMILY MEDICINE

## 2023-04-18 PROCEDURE — G8399 PT W/DXA RESULTS DOCUMENT: HCPCS | Performed by: FAMILY MEDICINE

## 2023-04-18 PROCEDURE — 3074F SYST BP LT 130 MM HG: CPT | Performed by: FAMILY MEDICINE

## 2023-04-18 PROCEDURE — 1036F TOBACCO NON-USER: CPT | Performed by: FAMILY MEDICINE

## 2023-04-18 PROCEDURE — 3044F HG A1C LEVEL LT 7.0%: CPT | Performed by: FAMILY MEDICINE

## 2023-04-18 PROCEDURE — 3078F DIAST BP <80 MM HG: CPT | Performed by: FAMILY MEDICINE

## 2023-04-18 PROCEDURE — 83036 HEMOGLOBIN GLYCOSYLATED A1C: CPT | Performed by: FAMILY MEDICINE

## 2023-04-18 RX ORDER — ATORVASTATIN CALCIUM 40 MG/1
40 TABLET, FILM COATED ORAL NIGHTLY
Qty: 30 TABLET | Refills: 5 | Status: SHIPPED | OUTPATIENT
Start: 2023-04-18

## 2023-04-18 RX ORDER — CARVEDILOL 6.25 MG/1
6.25 TABLET ORAL 2 TIMES DAILY WITH MEALS
Qty: 60 TABLET | Refills: 5 | Status: SHIPPED | OUTPATIENT
Start: 2023-04-18

## 2023-04-18 RX ORDER — ESCITALOPRAM OXALATE 5 MG/1
5 TABLET ORAL DAILY
Qty: 30 TABLET | Refills: 5 | Status: SHIPPED | OUTPATIENT
Start: 2023-04-18

## 2023-04-18 RX ORDER — ESCITALOPRAM OXALATE 5 MG/1
5 TABLET ORAL DAILY
Qty: 30 TABLET | Refills: 0 | Status: SHIPPED
Start: 2023-04-18 | End: 2023-04-18 | Stop reason: SDUPTHER

## 2023-04-18 RX ORDER — PANTOPRAZOLE SODIUM 40 MG/1
40 TABLET, DELAYED RELEASE ORAL EVERY MORNING
Qty: 30 TABLET | Refills: 5 | Status: SHIPPED | OUTPATIENT
Start: 2023-04-18

## 2023-04-18 RX ORDER — CHOLECALCIFEROL (VITAMIN D3) 125 MCG
2 CAPSULE ORAL NIGHTLY
Qty: 60 TABLET | Refills: 5 | Status: SHIPPED | OUTPATIENT
Start: 2023-04-18

## 2023-04-18 RX ORDER — GLIPIZIDE 2.5 MG/1
2.5 TABLET, EXTENDED RELEASE ORAL 2 TIMES DAILY
Qty: 30 TABLET | Refills: 5 | Status: SHIPPED
Start: 2023-04-18 | End: 2023-04-18 | Stop reason: SDUPTHER

## 2023-04-18 RX ORDER — CHOLECALCIFEROL (VITAMIN D3) 125 MCG
2 CAPSULE ORAL NIGHTLY
COMMUNITY
Start: 2023-03-24 | End: 2023-04-18 | Stop reason: SDUPTHER

## 2023-04-18 RX ORDER — AMLODIPINE BESYLATE 10 MG/1
10 TABLET ORAL EVERY MORNING
Qty: 30 TABLET | Refills: 5 | Status: SHIPPED | OUTPATIENT
Start: 2023-04-18

## 2023-04-18 RX ORDER — ASPIRIN 81 MG/1
81 TABLET ORAL EVERY MORNING
Qty: 30 TABLET | Refills: 5 | Status: SHIPPED | OUTPATIENT
Start: 2023-04-18

## 2023-04-18 RX ORDER — GLIPIZIDE 2.5 MG/1
2.5 TABLET, EXTENDED RELEASE ORAL 2 TIMES DAILY
Qty: 60 TABLET | Refills: 5 | Status: SHIPPED | OUTPATIENT
Start: 2023-04-18

## 2023-04-18 RX ORDER — FAMOTIDINE 20 MG/1
20 TABLET, FILM COATED ORAL 2 TIMES DAILY
COMMUNITY
End: 2023-04-18

## 2023-04-18 RX ORDER — INSULIN ASPART 100 [IU]/ML
INJECTION, SOLUTION INTRAVENOUS; SUBCUTANEOUS
COMMUNITY
Start: 2023-04-06 | End: 2023-04-18

## 2023-04-18 RX ORDER — GLIPIZIDE 2.5 MG/1
2.5 TABLET, EXTENDED RELEASE ORAL 2 TIMES DAILY
COMMUNITY
End: 2023-04-18 | Stop reason: SDUPTHER

## 2023-04-18 RX ORDER — FAMOTIDINE 20 MG/1
20 TABLET, FILM COATED ORAL 2 TIMES DAILY
COMMUNITY
Start: 2023-04-06 | End: 2023-04-18

## 2023-04-18 SDOH — ECONOMIC STABILITY: FOOD INSECURITY: WITHIN THE PAST 12 MONTHS, THE FOOD YOU BOUGHT JUST DIDN'T LAST AND YOU DIDN'T HAVE MONEY TO GET MORE.: NEVER TRUE

## 2023-04-18 SDOH — ECONOMIC STABILITY: INCOME INSECURITY: HOW HARD IS IT FOR YOU TO PAY FOR THE VERY BASICS LIKE FOOD, HOUSING, MEDICAL CARE, AND HEATING?: NOT HARD AT ALL

## 2023-04-18 SDOH — ECONOMIC STABILITY: FOOD INSECURITY: WITHIN THE PAST 12 MONTHS, YOU WORRIED THAT YOUR FOOD WOULD RUN OUT BEFORE YOU GOT MONEY TO BUY MORE.: NEVER TRUE

## 2023-04-18 SDOH — ECONOMIC STABILITY: HOUSING INSECURITY
IN THE LAST 12 MONTHS, WAS THERE A TIME WHEN YOU DID NOT HAVE A STEADY PLACE TO SLEEP OR SLEPT IN A SHELTER (INCLUDING NOW)?: NO

## 2023-04-18 ASSESSMENT — PATIENT HEALTH QUESTIONNAIRE - PHQ9
SUM OF ALL RESPONSES TO PHQ QUESTIONS 1-9: 0
SUM OF ALL RESPONSES TO PHQ QUESTIONS 1-9: 0
SUM OF ALL RESPONSES TO PHQ9 QUESTIONS 1 & 2: 0
2. FEELING DOWN, DEPRESSED OR HOPELESS: 0
SUM OF ALL RESPONSES TO PHQ QUESTIONS 1-9: 0
SUM OF ALL RESPONSES TO PHQ QUESTIONS 1-9: 0
1. LITTLE INTEREST OR PLEASURE IN DOING THINGS: 0

## 2023-04-18 NOTE — PROGRESS NOTES
orders  -     melatonin 5 MG TABS tablet;  Take 2 tablets by mouth nightly      Follow-up as instructed    Julieth Leyva MD

## 2023-05-31 RX ORDER — LANCETS 33 GAUGE
EACH MISCELLANEOUS
Qty: 100 EACH | Refills: 3 | Status: SHIPPED | OUTPATIENT
Start: 2023-05-31

## 2023-06-07 ENCOUNTER — PREP FOR PROCEDURE (OUTPATIENT)
Dept: UROLOGY | Age: 73
End: 2023-06-07

## 2023-06-07 RX ORDER — SODIUM CHLORIDE 9 MG/ML
INJECTION, SOLUTION INTRAVENOUS PRN
Status: CANCELLED | OUTPATIENT
Start: 2023-06-07

## 2023-06-07 RX ORDER — SODIUM CHLORIDE 9 MG/ML
INJECTION, SOLUTION INTRAVENOUS CONTINUOUS
Status: CANCELLED | OUTPATIENT
Start: 2023-06-08

## 2023-06-07 RX ORDER — SODIUM CHLORIDE 0.9 % (FLUSH) 0.9 %
5-40 SYRINGE (ML) INJECTION PRN
Status: CANCELLED | OUTPATIENT
Start: 2023-06-07

## 2023-06-07 RX ORDER — SODIUM CHLORIDE 0.9 % (FLUSH) 0.9 %
5-40 SYRINGE (ML) INJECTION EVERY 12 HOURS SCHEDULED
Status: CANCELLED | OUTPATIENT
Start: 2023-06-08

## 2023-06-08 ENCOUNTER — ANESTHESIA EVENT (OUTPATIENT)
Dept: OPERATING ROOM | Age: 73
End: 2023-06-08
Payer: MEDICARE

## 2023-06-08 ENCOUNTER — HOSPITAL ENCOUNTER (OUTPATIENT)
Age: 73
Setting detail: OUTPATIENT SURGERY
Discharge: HOME | End: 2023-06-08
Attending: UROLOGY | Admitting: UROLOGY
Payer: MEDICARE

## 2023-06-08 ENCOUNTER — APPOINTMENT (OUTPATIENT)
Dept: GENERAL RADIOLOGY | Age: 73
End: 2023-06-08
Attending: UROLOGY
Payer: MEDICARE

## 2023-06-08 ENCOUNTER — ANESTHESIA (OUTPATIENT)
Dept: OPERATING ROOM | Age: 73
End: 2023-06-08
Payer: MEDICARE

## 2023-06-08 VITALS
WEIGHT: 157 LBS | HEART RATE: 68 BPM | DIASTOLIC BLOOD PRESSURE: 78 MMHG | BODY MASS INDEX: 24.64 KG/M2 | OXYGEN SATURATION: 98 % | HEIGHT: 67 IN | SYSTOLIC BLOOD PRESSURE: 127 MMHG | TEMPERATURE: 97.3 F | RESPIRATION RATE: 16 BRPM

## 2023-06-08 DIAGNOSIS — R33.9 RETENTION OF URINE, UNSPECIFIED: ICD-10-CM

## 2023-06-08 LAB — METER GLUCOSE: 142 MG/DL (ref 74–99)

## 2023-06-08 PROCEDURE — 87088 URINE BACTERIA CULTURE: CPT

## 2023-06-08 PROCEDURE — 7100000010 HC PHASE II RECOVERY - FIRST 15 MIN: Performed by: UROLOGY

## 2023-06-08 PROCEDURE — 87077 CULTURE AEROBIC IDENTIFY: CPT

## 2023-06-08 PROCEDURE — 82962 GLUCOSE BLOOD TEST: CPT

## 2023-06-08 PROCEDURE — 6360000002 HC RX W HCPCS

## 2023-06-08 PROCEDURE — 6360000002 HC RX W HCPCS: Performed by: NURSE ANESTHETIST, CERTIFIED REGISTERED

## 2023-06-08 PROCEDURE — 3700000001 HC ADD 15 MINUTES (ANESTHESIA): Performed by: UROLOGY

## 2023-06-08 PROCEDURE — 6360000004 HC RX CONTRAST MEDICATION: Performed by: UROLOGY

## 2023-06-08 PROCEDURE — 3700000000 HC ANESTHESIA ATTENDED CARE: Performed by: UROLOGY

## 2023-06-08 PROCEDURE — C1758 CATHETER, URETERAL: HCPCS | Performed by: UROLOGY

## 2023-06-08 PROCEDURE — 87186 SC STD MICRODIL/AGAR DIL: CPT

## 2023-06-08 PROCEDURE — 3600000003 HC SURGERY LEVEL 3 BASE: Performed by: UROLOGY

## 2023-06-08 PROCEDURE — 74420 UROGRAPHY RTRGR +-KUB: CPT

## 2023-06-08 PROCEDURE — C1769 GUIDE WIRE: HCPCS | Performed by: UROLOGY

## 2023-06-08 PROCEDURE — 2709999900 HC NON-CHARGEABLE SUPPLY: Performed by: UROLOGY

## 2023-06-08 PROCEDURE — C2617 STENT, NON-COR, TEM W/O DEL: HCPCS | Performed by: UROLOGY

## 2023-06-08 PROCEDURE — 7100000011 HC PHASE II RECOVERY - ADDTL 15 MIN: Performed by: UROLOGY

## 2023-06-08 PROCEDURE — 3600000013 HC SURGERY LEVEL 3 ADDTL 15MIN: Performed by: UROLOGY

## 2023-06-08 PROCEDURE — 2580000003 HC RX 258: Performed by: NURSE ANESTHETIST, CERTIFIED REGISTERED

## 2023-06-08 DEVICE — URETERAL STENT
Type: IMPLANTABLE DEVICE | Site: URETER | Status: FUNCTIONAL
Brand: POLARIS™ ULTRA

## 2023-06-08 RX ORDER — FENTANYL CITRATE 50 UG/ML
INJECTION, SOLUTION INTRAMUSCULAR; INTRAVENOUS PRN
Status: DISCONTINUED | OUTPATIENT
Start: 2023-06-08 | End: 2023-06-08 | Stop reason: SDUPTHER

## 2023-06-08 RX ORDER — MIDAZOLAM HYDROCHLORIDE 1 MG/ML
INJECTION INTRAMUSCULAR; INTRAVENOUS PRN
Status: DISCONTINUED | OUTPATIENT
Start: 2023-06-08 | End: 2023-06-08 | Stop reason: SDUPTHER

## 2023-06-08 RX ORDER — SODIUM CHLORIDE 0.9 % (FLUSH) 0.9 %
5-40 SYRINGE (ML) INJECTION PRN
Status: CANCELLED | OUTPATIENT
Start: 2023-06-08

## 2023-06-08 RX ORDER — PHENAZOPYRIDINE HYDROCHLORIDE 200 MG/1
200 TABLET, FILM COATED ORAL 2 TIMES DAILY PRN
Qty: 10 TABLET | Refills: 0 | Status: SHIPPED | OUTPATIENT
Start: 2023-06-08

## 2023-06-08 RX ORDER — ONDANSETRON 2 MG/ML
4 INJECTION INTRAMUSCULAR; INTRAVENOUS EVERY 6 HOURS PRN
Status: DISCONTINUED | OUTPATIENT
Start: 2023-06-08 | End: 2023-06-08 | Stop reason: HOSPADM

## 2023-06-08 RX ORDER — TRAMADOL HYDROCHLORIDE 50 MG/1
50 TABLET ORAL PRN
Status: CANCELLED | OUTPATIENT
Start: 2023-06-08 | End: 2023-06-08

## 2023-06-08 RX ORDER — SODIUM CHLORIDE 9 MG/ML
INJECTION, SOLUTION INTRAVENOUS CONTINUOUS PRN
Status: DISCONTINUED | OUTPATIENT
Start: 2023-06-08 | End: 2023-06-08 | Stop reason: SDUPTHER

## 2023-06-08 RX ORDER — SODIUM CHLORIDE 0.9 % (FLUSH) 0.9 %
5-40 SYRINGE (ML) INJECTION EVERY 12 HOURS SCHEDULED
Status: DISCONTINUED | OUTPATIENT
Start: 2023-06-08 | End: 2023-06-08 | Stop reason: HOSPADM

## 2023-06-08 RX ORDER — SODIUM CHLORIDE 9 MG/ML
INJECTION, SOLUTION INTRAVENOUS PRN
Status: DISCONTINUED | OUTPATIENT
Start: 2023-06-08 | End: 2023-06-08 | Stop reason: HOSPADM

## 2023-06-08 RX ORDER — PROPOFOL 10 MG/ML
INJECTION, EMULSION INTRAVENOUS PRN
Status: DISCONTINUED | OUTPATIENT
Start: 2023-06-08 | End: 2023-06-08 | Stop reason: SDUPTHER

## 2023-06-08 RX ORDER — LEVOFLOXACIN 5 MG/ML
500 INJECTION, SOLUTION INTRAVENOUS
Status: COMPLETED | OUTPATIENT
Start: 2023-06-08 | End: 2023-06-08

## 2023-06-08 RX ORDER — SODIUM CHLORIDE 9 MG/ML
INJECTION, SOLUTION INTRAVENOUS CONTINUOUS
Status: DISCONTINUED | OUTPATIENT
Start: 2023-06-08 | End: 2023-06-08 | Stop reason: HOSPADM

## 2023-06-08 RX ORDER — SODIUM CHLORIDE 9 MG/ML
INJECTION, SOLUTION INTRAVENOUS PRN
Status: CANCELLED | OUTPATIENT
Start: 2023-06-08

## 2023-06-08 RX ORDER — SODIUM CHLORIDE 0.9 % (FLUSH) 0.9 %
5-40 SYRINGE (ML) INJECTION PRN
Status: DISCONTINUED | OUTPATIENT
Start: 2023-06-08 | End: 2023-06-08 | Stop reason: HOSPADM

## 2023-06-08 RX ORDER — TRAMADOL HYDROCHLORIDE 50 MG/1
100 TABLET ORAL PRN
Status: CANCELLED | OUTPATIENT
Start: 2023-06-08 | End: 2023-06-08

## 2023-06-08 RX ORDER — SODIUM CHLORIDE 0.9 % (FLUSH) 0.9 %
5-40 SYRINGE (ML) INJECTION EVERY 12 HOURS SCHEDULED
Status: CANCELLED | OUTPATIENT
Start: 2023-06-08

## 2023-06-08 RX ORDER — CIPROFLOXACIN 500 MG/1
250 TABLET, FILM COATED ORAL 2 TIMES DAILY
Qty: 5 TABLET | Refills: 0 | Status: SHIPPED | OUTPATIENT
Start: 2023-06-08 | End: 2023-06-13

## 2023-06-08 RX ADMIN — PROPOFOL 50 MCG/KG/MIN: 10 INJECTION, EMULSION INTRAVENOUS at 08:38

## 2023-06-08 RX ADMIN — PROPOFOL 25 MG: 10 INJECTION, EMULSION INTRAVENOUS at 08:55

## 2023-06-08 RX ADMIN — MIDAZOLAM 0.5 MG: 1 INJECTION INTRAMUSCULAR; INTRAVENOUS at 08:29

## 2023-06-08 RX ADMIN — LEVOFLOXACIN 500 MG: 5 INJECTION, SOLUTION INTRAVENOUS at 08:40

## 2023-06-08 RX ADMIN — PROPOFOL 50 MG: 10 INJECTION, EMULSION INTRAVENOUS at 08:37

## 2023-06-08 RX ADMIN — SODIUM CHLORIDE: 9 INJECTION, SOLUTION INTRAVENOUS at 08:22

## 2023-06-08 RX ADMIN — PROPOFOL 25 MG: 10 INJECTION, EMULSION INTRAVENOUS at 08:59

## 2023-06-08 RX ADMIN — FENTANYL CITRATE 25 MCG: 50 INJECTION, SOLUTION INTRAMUSCULAR; INTRAVENOUS at 08:37

## 2023-06-08 ASSESSMENT — ENCOUNTER SYMPTOMS: DYSPNEA ACTIVITY LEVEL: NO INTERVAL CHANGE

## 2023-06-08 ASSESSMENT — PAIN SCALES - GENERAL
PAINLEVEL_OUTOF10: 0
PAINLEVEL_OUTOF10: 0

## 2023-06-08 ASSESSMENT — PAIN - FUNCTIONAL ASSESSMENT: PAIN_FUNCTIONAL_ASSESSMENT: 0-10

## 2023-06-08 NOTE — ANESTHESIA PRE PROCEDURE
4.6 01/26/2023 01:14 AM     01/26/2023 01:14 AM    CO2 18 01/26/2023 01:14 AM    BUN 32 01/26/2023 01:14 AM    CREATININE 3.2 01/26/2023 01:14 AM    GFRAA 31 07/19/2022 11:31 AM    LABGLOM 15 01/26/2023 01:14 AM    GLUCOSE 144 01/26/2023 01:14 AM    GLUCOSE 123 03/05/2012 11:17 AM    PROT 7.5 01/24/2023 07:54 PM    CALCIUM 9.8 01/26/2023 01:14 AM    BILITOT 0.7 01/24/2023 07:54 PM    ALKPHOS 112 01/24/2023 07:54 PM    AST 11 01/24/2023 07:54 PM    ALT 11 01/24/2023 07:54 PM       POC Tests: No results for input(s): POCGLU, POCNA, POCK, POCCL, POCBUN, POCHEMO, POCHCT in the last 72 hours.     Coags: No results found for: PROTIME, INR, APTT    HCG (If Applicable): No results found for: PREGTESTUR, PREGSERUM, HCG, HCGQUANT     ABGs: No results found for: PHART, PO2ART, ZDU5PCA, QHR7DVX, BEART, S4ZNUYII     Type & Screen (If Applicable):  No results found for: LABABO, LABRH    Drug/Infectious Status (If Applicable):  No results found for: HIV, HEPCAB    COVID-19 Screening (If Applicable):   Lab Results   Component Value Date/Time    COVID19 Not Detected 01/26/2023 11:40 AM        EKG 9/19/20  Ventricular Rate BPM 70    Atrial Rate BPM 70    P-R Interval ms 128    QRS Duration ms 66    Q-T Interval ms 394    QTc Calculation (Bazett) ms 425    P Axis degrees 33    R Axis degrees -10    T Axis degrees 31    Resulting Agency  [facility]        CT:  Impression   1.  Bilateral moderate hydronephrosis and hydroureter despite the presence of   bilateral double-J ureteral catheters.  The proximal aspect of the catheters   are in the renal pelvis and proximal ureter on the right and left   respectively.       2.  No acute inflammatory changes in the abdomen or pelvis.       3.  40% superior endplate wedge compression deformity of L2, age   indeterminate.           Anesthesia Evaluation  Patient summary reviewed and Nursing notes reviewed no history of anesthetic complications:   Airway: Mallampati: II  TM distance: >3 FB

## 2023-06-08 NOTE — H&P
STENT EXCHANGE, URENA CATHETER EXCHANGE performed by Kelly Telles MD at [address] Bilateral 07/21/2022    CYSTOSCOPY RETROGRADE PYELOGRAM BILATERIAL STENT EXCHANGE performed by Geneva Mckeon MD at [address] Left 11/07/2016       Medications Prior to Admission:    Medications Prior to Admission: Lancets ([address]) [facility], use 1 LANCET to TEST BLOOD SUGAR once daily and if needed  atorvastatin (LIPITOR) 40 MG tablet, Take 1 tablet by mouth nightly  carvedilol (COREG) 6.25 MG tablet, Take 1 tablet by mouth 2 times daily (with meals)  amLODIPine (NORVASC) 10 MG tablet, Take 1 tablet by mouth every morning  aspirin 81 MG EC tablet, Take 1 tablet by mouth every morning  pantoprazole (PROTONIX) 40 MG tablet, Take 1 tablet by mouth every morning  melatonin 5 MG TABS tablet, Take 2 tablets by mouth nightly  escitalopram (LEXAPRO) 5 MG tablet, Take 1 tablet by mouth daily  glipiZIDE (GLUCOTROL XL) 2.5 MG extended release tablet, Take 1 tablet by mouth 2 times daily  LORazepam (ATIVAN) 0.5 MG tablet, Take 1 tablet by mouth 3 times daily for 90 days. Max Daily Amount: 1.5 mg  denosumab (PROLIA) 60 MG/ML SOSY SC injection, Inject 1 mL into the skin every 6 months NEXT INJ DUE @ SEX INF: 02/2023  magnesium oxide (MAG-OX) 400 MG tablet, Take 1 tablet by mouth 2 times daily    Allergies:    Betadine [povidone iodine], Lisinopril, Penicillins, and Tylenol [acetaminophen]    Social History:    reports that she has never smoked. She has never used smokeless tobacco. She reports that she does not drink alcohol and does not use drugs. Family History:   Non-contributory to this urological problem  family history includes Cancer in her father; Diabetes in her sister; High Blood Pressure in her mother; Kidney Disease in her mother; Other in her brother.     Review of Systems:  Respiratory: negative for cough and

## 2023-06-08 NOTE — BRIEF OP NOTE
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

## 2023-06-08 NOTE — FLOWSHEET NOTE
reviews are negative    Physical Exam:   Vitals: Ht 5' 7\" (1.702 m)   Wt 157 lb (71.2 kg)   BMI 24.59 kg/m²   General:  Awake, alert, oriented X 3. Well developed, well nourished, well groomed. No apparent distress. HEENT:  Normocephalic, atraumatic. Pupils equal, round. No scleral icterus. No conjunctival injection. Normal lips, teeth, and gums. No nasal discharge. Neck:  Supple, no masses. Heart:  RRR  Lungs:  No audible wheezing. Respirations symmetric and non-labored. Abdomen:  soft, nontender, no masses, no organomegaly, no peritoneal signs  Extremities:  No clubbing, cyanosis, or edema  Skin:  Warm and dry, no open lesions or rashes  Neuro: No motor or sensory deficits in the 4 quadrant extremities  Rectal: deferred  Genitalia:      Labs:   No results for input(s): WBC, RBC, HGB, HCT, MCV, MCH, MCHC, RDW, PLT, MPV in the last 72 hours. No results for input(s): CREATININE in the last 72 hours.     Images:  FL RETROGRADE PYELOGRAM W WO KUB    (Results Pending)       Assessment: Modesto Argue 68 y.o. female         Plan:        Electronically signed by Eyad Power MD on 6/8/2023 at 7:53 AM

## 2023-06-08 NOTE — ANESTHESIA POSTPROCEDURE EVALUATION
Department of Anesthesiology  Postprocedure Note    Patient: Ant Paez  MRN: [id number]  YOB: 1950  Date of evaluation: 6/8/2023      Procedure Summary     Date: 06/08/23 Room / Location: [facility] OR 06 / [facility]    Anesthesia Start: 8868 Anesthesia Stop: 9568    Procedure: [facility]      ++IODINE ALLERGY++ (Bilateral: Ureter) Diagnosis:       Retention of urine, unspecified      (Retention of urine, unspecified [R33.9])    Surgeons: Damián Gray MD Responsible Provider: Dustin Dodd MD    Anesthesia Type: MAC ASA Status: 4          Anesthesia Type: No value filed.     Lino Phase I: Lino Score: 10    Lino Phase II:        Anesthesia Post Evaluation    Patient location during evaluation: PACU  Patient participation: complete - patient participated  Level of consciousness: awake  Airway patency: patent  Nausea & Vomiting: no nausea and no vomiting  Complications: no  Cardiovascular status: hemodynamically stable  Respiratory status: acceptable  Hydration status: euvolemic

## 2023-06-08 NOTE — PROGRESS NOTES
Contacted patient's sister Junito Moffett at patient's request to review instructions.
Nourishment given tolerating without difficulty
the day of surgery. [x] You can expect a call the business day prior to procedure to notify you if your arrival time changes. [x] If you receive a survey after surgery we would greatly appreciate your comments. [] Parent/guardian of a minor must accompany their child and remain on the premises  the entire time they are under our care. [] Pediatric patients may bring favorite toy, blanket or comfort item. [] A caregiver or family member must remain with the patient during their stay if they are mentally handicapped, have dementia, are disoriented or unable to use a call light or would be a safety concern if left unattended. [x] Please notify surgeon if you develop any illness between now and time of surgery (cold, cough, sore throat, fever, nausea, vomiting) or any signs of infections  including skin, wounds, and dental.    [x]  The Outpatient Pharmacy is available to fill your prescription here on your day of surgery, ask your preop nurse for details.     [] Other instructions    EDUCATIONAL MATERIALS PROVIDED:    [] PAT Preoperative Education Packet/Booklet     [] Medication List    [] Transfusion bracelet applied with instructions    [] Shower with soap, lather and rinse well, and use CHG wipes provided the evening before surgery as instructed    [] Incentive spirometer with instructions
Jakob Juarez

## 2023-06-08 NOTE — DISCHARGE INSTRUCTIONS
You have a stent which will remain until next procedure  You may have back pain with urination  With increased activity you will have blood in urine  You may have frequency and urgency or also burning with urination you may drive and bathe tomorrow  You may do heavy lifting  You may go up and down stairs  Keep bowels soft with prune juice or  Colace(over the counter--once a day)  My office will call you to schedule a follow-up procedure  Call [phone] for follow up if you do not hear from us  Med -dental bureau number for emergencies [phone]

## 2023-06-09 NOTE — OP NOTE
were  inserted along the stent on the left. The stent was removed intact. A  culture from the left side demonstrated some bloody urine. Whether this  was iatrogenic or whether this represented infection, I did not know. I  sent a culture from the left renal pelvis. Retrograde pyelogram did not  show any serious hydronephrosis. The calyces actually looked sharp. They were not splayed or blunted. There were no intrinsic defects in  the renal pelvis. A 6 x 28 J-stent was inserted. The renal end was  positioned with fluoroscopy and the bladder end under direct  visualization. On the right side, it was difficult to navigate beyond  about 2 cm. There was obstruction distally in the ureter. Eventually,  we had a _____ wire through the stent to get up into the pelvis. I  could not get a ureteral catheter alongside the stent. Eventually, the  urine from the renal pelvis on the right was clear. Retrograde  pyelogram showed mild hydronephrosis. No intrinsic or extrinsic  abnormalities in the renal pelvis. A 6 x 28 J-stent was inserted. The  renal end was positioned with fluoroscopy. The bladder end of the _____  stent was under direct visualization. A #18 Nigerian López catheter was  inserted into the bladder. The vaginal opening was stenotic. I could  not do a good pelvic exam.  The patient tolerated the procedure well. Blood loss in this case was less than 5 mL. We will exchange these stents again in four to six months.         Javed Franks MD    D: 06/08/2023 9:17:18       T: 06/08/2023 9:20:42     [id number]  Job#: [id number]     Doc#: [id number]    CC:  Sathya Hooper MD

## 2023-06-11 LAB
BACTERIA UR CULT: ABNORMAL
ORGANISM: ABNORMAL

## 2023-07-13 NOTE — PROGRESS NOTES
Consult Notes on Referred Patient         Dr. Nathen Cruz MD  [address] [place] [address]       RE: Karina Melvin  : 1959  YOSELIN: 2023     Dear Dr. Nathen Cruz:    I had the pleasure of seeing your patient Karina Melvin here at the Gynecologic Cancer Clinic at the [facility] on 2023.  As you know she is a very pleasant 63 year old woman with a recent diagnosis of pelvic mass in the setting of pancreatic cancer.  Given these findings she was subsequently sent to the Gynecologic Cancer Clinic for new patient consultation.     HPI - She initially presented recently with abdominal pain, weight loss, and loss of appetite.  THis led ultimately to a CT scan which identified the followin/16/23 CT:  IMPRESSION:   1.  Mass in the pancreatic tail with numerous liver and left adrenal lesions. Findings are highly suspicious for metastatic pancreatic cancer.  2.  Incidental left ovarian cyst. This could be further evaluated with ultrasound, depending on the clinical picture.    23 US  Findings: The uterus measures 5.5 x 2.5 x 4.1cm. The endometrial  stripe measures 2 mm thick. There are a couple of small intramural  fibroids in the mid fundus measuring 8 mm and mid uterine body  measuring 5 mm.     The right ovary measures 3.0 x 1.6 x 1.7 cm. In the left ovary there  is a large cyst with thin septations measuring 8.1 x 5.5 x 3.4 cm.    23 IR biopsy (liver lesion):  Final Diagnosis   LIVER LESION, NEEDLE BIOPSY:   -Positive for malignancy  -Metastatic adenocarcinoma, see comment      Comment      The patient has an imaging impression of a pancreatic mass and suspected metastatic lesions in the liver.  The morphologic findings in this case are compatible with metastatic adenocarcinoma of pancreatic origin in the absence of any other primary lesion.  Clinical and radiologic correlation is recommended.      ~12,000    23 MRI  Occupational Therapy  OCCUPATIONAL THERAPY treatment note  [address] [facility] [address]  [address] [place] [address]    Date:2021                                                 Patient Name: Brianna Tijerina  MRN: [id number]  : 1950  Room: [address] [place] [address]    Per eval:  Evaluating OT: Josey Brooks OTR/L #[id number]  Referring Provider: Missy Mcgrath MD  Specific Provider Orders: OT eval and treat; 21    Diagnosis: Dizziness [R42]  Hypoglycemia [E16.2]  JEANNINE (acute kidney injury) ([facility] [address] [N17.9]    Surgery/Procedure: none   Pertinent Medical History: hx of colon and uterine cancer, HLD, HTN, obesity, type 2 DM, vitamin D deficiency       Precautions:  Fall Risk, cognitive deficit (word finding difficulty/stutter), bed alarm, colostomy    Assessment of current deficits   [x] Functional mobility  [x]ADLs  [x] Strength               [x]Cognition   [x] Functional transfers   [x] IADLs         [x] Safety Awareness   [x]Endurance   [] Fine Coordination              [x] Balance      [] Vision/perception   []Sensation    []Gross Motor Coordination  [] ROM  [] Delirium                   [] Motor Control     OT PLAN OF CARE   OT POC based on physician orders, patient diagnosis and results of clinical assessment    Frequency/Duration: 1-3 days/wk for 2 weeks PRN   Specific OT Treatment to include:   * Instruction/training on adapted ADL techniques and AE recommendations to increase functional independence within precautions       * Training on energy conservation strategies, correct breathing pattern and techniques to improve independence/tolerance for self-care routine  * Functional transfer/mobility training/DME recommendations for increased independence, safety, and fall prevention  * Patient/Family education to increase follow through with safety techniques and functional independence  * Recommendation of environmental modifications for increased safety with (Pelvis)  IMPRESSION:   1. Complex left adnexal cyst has imaging characteristics diagnostic  for ovarian cystadenofibroma measuring 6.2 cm.  2. Right sciatic hernia containing the majority of the right ovary.  3. Incidental periurethral 5 mm cystic focus favored to represent  urethral diverticula.    She has started chemo (Gemzar and Nab-paclitaxel) and reports that she tolerates the chemo in terms of nausea, but has been having worsening low anterior abdominal/perlvic pain.  She has also been having fatigue and has some petechial/urticarial lesions develop on her lower extremities.  Dr. Cruz was inclined to have this addressed.    6/27/23 X-lap and BSO - cytology +adeno CA    A. Right fallopian tube and ovary, laparoscopic salpingo-oophorectomy:  - Ovary with inclusion cysts  - Fallopian tube with no significant histologic abnormality  - Negative for atypia or malignancy     B. Left fallopian tube and ovary, laparoscopic salpingo-oophorectomy:  - Ovarian serous cystadenofibroma  - Fallopian tube with no significant histologic abnormality  - Negative for atypia or malignancy    Review of Systems:    Systemic           no weight changes; no fever; no chills; no night sweats; no appetite changes  Skin           no rashes, or lesions  Eye           no irritation; no changes in vision  [place]-Laryngeal           no dysphagia; no hoarseness   Pulmonary    no cough; no shortness of breath  Cardiovascular    no chest pain; no palpitations  Gastrointestinal    no diarrhea; no constipation; no abdominal pain; no changes in bowel  habits; no blood in stool  Genitourinary   no urinary frequency; no urinary urgency; no dysuria; no pain; no abnormal vaginal discharge; no abnormal vaginal bleeding  Breast   no breast discharge; no breast changes; no breast pain  Musculoskeletal    no myalgias; no arthralgias; no back pain  Psychiatric           no depressed mood; no anxiety    Hematologic           no tender lymph nodes; no  functional transfers/mobility and ADLs  * Cognitive retraining/development of therapeutic activities to improve problem solving, judgement, memory, and attention for increased safety/participation in ADL/IADL tasks  * Therapeutic exercise to improve motor endurance, ROM, and functional strength for ADLs/functional transfers  * Therapeutic activities to facilitate/challenge dynamic balance, stand tolerance for increased safety and independence with ADLs      Recommended Adaptive Equipment: none     Home Living: Pt lives alone in an apartment with no ISABELL; 5 steps down to apartment level (1 handrail); laundry in building with elevator access   Bathroom setup: tub/shower   Equipment owned: [facility]    Prior Level of Function: Indep with ADLs , Indep with IADLs; ambulated w/ SPC prn  Driving: No (family members assist)    Pain Level: Pt reports back pain this session, did not rate; educated pt on pain management  Cognition: A&O: 4/4; Follows 1-2 step directions w/ increased cueing for comprehension of task   Memory:  fair   Sequencing:  fair   Problem solving:  fair   Judgement/safety:  fair     Functional Assessment:  AM-PAC Daily Activity Raw Score: 19/24   Initial Eval Status  Date: 9/20/21 Treatment Status  Date: 9/22/21 STGs = LTGs  Time frame: 10-14 days   Feeding Supervision IND (pt able to open packages and self feed) Modified [place]    Grooming Min.  A (standing at sink to apply deodorant, oral care) SBA (stadning for hand hygiene) Modified [place]    UB Dressing Minimal Assist to don/doff gown Min A (due to limited ROM) Modified [place]    LB Dressing Mod Assist  Min A (increased time to doff/maria de jesus socks, educated on AE but pt declined) Modified [place]    Bathing Moderate Assist (thoroughness of completion of task)--seated/standing Min A (simulated, pt declined) Supervision    Toileting Minimal Assist (colostomy, knutson) Min A Modified [place]    Bed Mobility  Supine to sit: Minimal Assist Sit to supine: Minimal Assist  SBA Supine to sit: Independent   Sit to supine: Independent    Functional Transfers Stand by Assist  SBA Independent    Functional Mobility CGA with no AD (1 LOB noted, min. A to correct) SBA Modified [place]    Balance Sitting:     Dynamic: SBA  Standing: CGA w/ no AD SBA Sitting:     Dynamic: Indep  Standing: Mod. I   Activity Tolerance Fair (limited d/t pain) fair good   Visual/  Perceptual Glasses: readers    WFL                Hand Dominance R   AROM (PROM) Strength Additional Info:    RUE  WFL 4-/5 good  and wfl FMC/dexterity noted during ADL tasks       LUE WFL 4-/5 good  and wfl FMC/dexterity noted during ADL tasks       Comments: Pt supine in bed upon arrival to , agreeable to OT session. At end of session, patient lying in bed with call light and phone within reach, all lines and tubes intact. Treatment: Cleared by RN to see pt. Pt required vc's and physical assist for proper technique/safety with hand placement/body mechanics/posture for bed mobility/ADLs/functional tranfers/mobility. Pt required vc's for sequencing/initiation of ADLs/functional transfers. Pt able to sit EOB ~10 mins to increase core strength/balance/activity tolerance for ease with ADLs. Pt required rest breaks during session. Pt educated on shower bench for ease with transfer and bathing task at home. Pt appeared to have tolerated session well and appears motivated/cooperative/pleasant . Pt instructed on use of call light for assistance and fall prevention. Pt demo'ing fair understanding of education provided. Continue to educate.      Time In: 1105  Time Out: 1120  Total Treatment Time: 15    Min Units   OT Eval Low 16504     OT Eval Medium 16650     OT Eval High [id number]     OT Re-Eval [id number]     Therapeutic Ex [id number]     Therapeutic Activities 84155     ADL/Self Care 53502 15 1   Orthotic Management 97222       Neuro Re-Ed 45603       Non-Billable Time          Tali Christian, NELIDA, OTR/L noticeable swellings or lumps   Endocrine    no hot flashes; no heat/cold intolerance         Neurological   no tremor; no numbness and tingling; no headaches; no difficulty  sleeping      Past Medical History:    Past Medical History:   Diagnosis Date     Arthritis      Depressive disorder      Malignant neoplasm of pancreas metastatic to liver (H) 6/1/2023     Positive TB test          Past Surgical History:    Past Surgical History:   Procedure Laterality Date     COLONOSCOPY N/A 8/21/2019    Procedure: Colonoscopy, With Polypectomy And Biopsy;  Surgeon: Duane, William Charles, MD;  Location: [facility] OR     COLONOSCOPY WITH CO2 INSUFFLATION N/A 8/21/2019    Procedure: COLONOSCOPY, WITH CO2 INSUFFLATION;  Surgeon: Duane, William Charles, MD;  Location: [facility] OR     INSERT PORT VASCULAR ACCESS Right 6/8/2023    Procedure: Insert port vascular access;  Surgeon: Shen Grande MD;  Location: [facility] OR     IR CHEST PORT PLACEMENT > 5 YRS OF AGE  6/8/2023     IR LIVER BIOPSY PERCUTANEOUS  5/25/2023     LAPAROSCOPIC SALPINGO-OOPHORECTOMY Bilateral 6/27/2023    Procedure: Laparoscopy, Removal of both tubes and ovaries Possible open suregry;  Surgeon: Benjamin Camejo MD;  Location: [facility] OR         Health Maintenance:  Health Maintenance Due   Topic Date Due     Pneumococcal Vaccine: Pediatrics (0 to 5 Years) and At-Risk Patients (6 to 64 Years) (1 - PCV) Never done     YEARLY PREVENTIVE VISIT  05/16/2023       Last Pap Smear: 5/16/22 NILM HPV negative    Last Mammogram: 5/2022 BIRADS 1    Last Colonoscopy: 8/2019 (polyps)                      Current Medications:     has a current medication list which includes the following prescription(s): acetaminophen, azelastine, diclofenac, diphenhydramine, diphenhydramine, ibuprofen, lidocaine-prilocaine, meclizine, prochlorperazine, senna-docusate, tramadol, and cholecalciferol.       Allergies:     Patient has no known allergies.        Social History:     Social History      Tobacco Use     Smoking status: Never     Smokeless tobacco: Never   Substance Use Topics     Alcohol use: No       History   Drug Use No           Family History:     The patient's family history is notable for .    Family History   Problem Relation Age of Onset     Colon Cancer Mother      Lung Cancer Father      Thyroid Disease Sister      Ovarian Cancer Sister      Lung Cancer Brother          Physical Exam:     PS1  VS: There were no vitals taken for this visit.     General Appearance: Thin and somewhat frail appearing but alert, no distress     HEENT:  no thyromegaly, no palpable nodules or masses        Cardiovascular: regular rate and rhythm, no gallops, rubs or murmurs     Respiratory: lungs clear, no rales, rhonchi or wheezes, normal diaphragmatic excursion    Musculoskeletal: extremities non tender and without edema    Skin: no lesions or rashes     Neurological: normal gait, no gross defects     Psychiatric: appropriate mood and affect                               Hematological: normal cervical, supraclavicular and inguinal lymph nodes     Gastrointestinal:       abdomen soft, non-tender, non-distended, no organomegaly or masses    Genitourinary: External genitalia and urethral meatus appears normal.  Vagina is smooth without nodularity or masses.  Cervix appears normal and without lesions.  Bimanual exam reveal no masses, nodularity or fullness.  Recto-vaginal exam confirms these findings.      Assessment:    Karina Melvin is a 63 year old woman with a new diagnosis of metastatic adeno CA (favor pancreatic) with associated pelvic mass of undetermined signidicance.     A total of 60 minutes was spent with the patient, 40 minutes of which were spent in counseling the patient and/or treatment planning.      Plan:     1.)   P-op: we have discussed the clinical and pathologic findings.  I have recommended no additional follow-up at this time.  We have discussed wound care and reason to return for  [id number] additional follow-up.  I have answered her questions to the best of my ability and she appears to have a good understanding of her condition.  She restarted chemo yesterday, but expressed that she feels uncertain about whether she will be able to complete this.  She would like to see Dr. Rodriguez for a second opinion, and Estrella Linder fro coping skills.     2.) Genetic risk factors were assessed and the patient does not meet the qualifications for a referral.      3.) Labs and/or tests ordered include:  none     4.) Health maintenance issues addressed today include none.    5.) Pre-op teaching was completed today.  Risks of surgery were discussed to include: bleeding, transfusion, infection, unintentional injury to surrounding organs/structures.      Thank you for allowing us to participate in the care of your patient.         Sincerely,    Benjamin Camejo MD    CC  Patient Care Team:  Ernestina De Leon APRN CNP as PCP - General (Internal Medicine - Pediatrics)  Ernestina De Leon APRN CNP as Assigned PCP  Esme Bender MD as MD (Dermatology)  Becky Ramos GC as Genetic Counselor (Genetic Counselor, MS)  Ernestina De Leon APRN CNP as Assigned Pain Medication Provider  Benjamin Taylor MD as Assigned Surgical Provider  Radha Quintana, RN as Specialty Care Coordinator (Hematology & Oncology)  Fabiana Cruz MD as Assigned Cancer Care Provider  Thony Sweeney, RN as Specialty Care Coordinator (Hematology & Oncology)  Benjamin Camejo MD as MD (Gynecologic Oncology)  FABIANA CRUZ

## 2023-07-18 ENCOUNTER — OFFICE VISIT (OUTPATIENT)
Dept: FAMILY MEDICINE CLINIC | Age: 73
End: 2023-07-18

## 2023-07-18 VITALS
BODY MASS INDEX: 24.17 KG/M2 | OXYGEN SATURATION: 100 % | DIASTOLIC BLOOD PRESSURE: 84 MMHG | RESPIRATION RATE: 16 BRPM | HEIGHT: 67 IN | WEIGHT: 154 LBS | HEART RATE: 79 BPM | SYSTOLIC BLOOD PRESSURE: 138 MMHG | TEMPERATURE: 97.1 F

## 2023-07-18 DIAGNOSIS — E11.22 TYPE 2 DIABETES MELLITUS WITH CHRONIC KIDNEY DISEASE, WITHOUT LONG-TERM CURRENT USE OF INSULIN, UNSPECIFIED CKD STAGE (HCC): Primary | ICD-10-CM

## 2023-07-18 DIAGNOSIS — E78.5 ELEVATED LIPIDS: ICD-10-CM

## 2023-07-18 DIAGNOSIS — I10 PRIMARY HYPERTENSION: ICD-10-CM

## 2023-07-18 DIAGNOSIS — E11.22 TYPE 2 DIABETES MELLITUS WITH CHRONIC KIDNEY DISEASE, WITHOUT LONG-TERM CURRENT USE OF INSULIN, UNSPECIFIED CKD STAGE (HCC): ICD-10-CM

## 2023-07-18 LAB
ALBUMIN SERPL-MCNC: 4.3 G/DL (ref 3.5–5.2)
ALP BLD-CCNC: 93 U/L (ref 35–104)
ALT SERPL-CCNC: 9 U/L (ref 0–32)
ANION GAP SERPL CALCULATED.3IONS-SCNC: 17 MMOL/L (ref 7–16)
AST SERPL-CCNC: 14 U/L (ref 0–31)
BILIRUB SERPL-MCNC: 0.7 MG/DL (ref 0–1.2)
BUN BLDV-MCNC: 61 MG/DL (ref 6–23)
CALCIUM SERPL-MCNC: 9.9 MG/DL (ref 8.6–10.2)
CHLORIDE BLD-SCNC: 104 MMOL/L (ref 98–107)
CHOLESTEROL: 126 MG/DL
CO2: 17 MMOL/L (ref 22–29)
CREAT SERPL-MCNC: 2.6 MG/DL (ref 0.5–1)
CREATININE URINE: 72.5 MG/DL (ref 29–226)
GFR SERPL CREATININE-BSD FRML MDRD: 19 ML/MIN/1.73M2
GLUCOSE BLD-MCNC: 113 MG/DL (ref 74–99)
HBA1C MFR BLD: 6.5 % (ref 4–5.6)
HCT VFR BLD CALC: 42.1 % (ref 34–48)
HDLC SERPL-MCNC: 56 MG/DL
HEMOGLOBIN: 13.2 G/DL (ref 11.5–15.5)
LDL CHOLESTEROL: 59 MG/DL
MCH RBC QN AUTO: 27.2 PG (ref 26–35)
MCHC RBC AUTO-ENTMCNC: 31.4 G/DL (ref 32–34.5)
MCV RBC AUTO: 86.8 FL (ref 80–99.9)
MICROALBUMIN/CREAT 24H UR: 495 MG/L (ref 0–19)
MICROALBUMIN/CREAT UR-RTO: 682 MCG/MG CREAT (ref 0–30)
PDW BLD-RTO: 14.9 % (ref 11.5–15)
PLATELET # BLD: 253 K/UL (ref 130–450)
PMV BLD AUTO: 12.5 FL (ref 7–12)
POTASSIUM SERPL-SCNC: 5.4 MMOL/L (ref 3.5–5)
RBC # BLD: 4.85 M/UL (ref 3.5–5.5)
SODIUM BLD-SCNC: 138 MMOL/L (ref 132–146)
T4 FREE: 1.4 NG/DL (ref 0.9–1.7)
TOTAL PROTEIN: 8.3 G/DL (ref 6.4–8.3)
TRIGL SERPL-MCNC: 55 MG/DL
TSH SERPL DL<=0.05 MIU/L-ACNC: 2.28 UIU/ML (ref 0.27–4.2)
VLDLC SERPL CALC-MCNC: 11 MG/DL
WBC # BLD: 7.3 K/UL (ref 4.5–11.5)

## 2023-07-21 DIAGNOSIS — N18.32 STAGE 3B CHRONIC KIDNEY DISEASE (HCC): Primary | ICD-10-CM

## 2023-07-26 ENCOUNTER — TELEPHONE (OUTPATIENT)
Dept: FAMILY MEDICINE CLINIC | Age: 73
End: 2023-07-26

## 2023-07-27 LAB
ANION GAP SERPL CALCULATED.3IONS-SCNC: 15 MMOL/L (ref 7–16)
BUN BLDV-MCNC: 62 MG/DL (ref 6–23)
CALCIUM SERPL-MCNC: 9.8 MG/DL (ref 8.6–10.2)
CHLORIDE BLD-SCNC: 101 MMOL/L (ref 98–107)
CO2: 20 MMOL/L (ref 22–29)
CREAT SERPL-MCNC: 2.5 MG/DL (ref 0.5–1)
GFR SERPL CREATININE-BSD FRML MDRD: 20 ML/MIN/1.73M2
GLUCOSE BLD-MCNC: 163 MG/DL (ref 74–99)
POTASSIUM SERPL-SCNC: 5.3 MMOL/L (ref 3.5–5)
SODIUM BLD-SCNC: 136 MMOL/L (ref 132–146)

## 2023-08-09 ENCOUNTER — TELEPHONE (OUTPATIENT)
Dept: FAMILY MEDICINE CLINIC | Age: 73
End: 2023-08-09

## 2023-08-09 DIAGNOSIS — M80.00XD AGE-RELATED OSTEOPOROSIS WITH CURRENT PATHOLOGICAL FRACTURE WITH ROUTINE HEALING: Primary | ICD-10-CM

## 2023-08-09 NOTE — TELEPHONE ENCOUNTER
Last Appointment   7/18/2023  Next Appointment  12/12/2023  Infusion center called regarding the prolia infusion on the 16th, they need an order faxed to [phone]

## 2023-08-12 DIAGNOSIS — F06.1 CATATONIA: ICD-10-CM

## 2023-08-12 DIAGNOSIS — R47.01 APHASIA: ICD-10-CM

## 2023-08-14 RX ORDER — LORAZEPAM 0.5 MG/1
TABLET ORAL
Qty: 90 TABLET | Refills: 2 | Status: SHIPPED | OUTPATIENT
Start: 2023-08-14 | End: 2024-02-12

## 2023-08-16 ENCOUNTER — HOSPITAL ENCOUNTER (OUTPATIENT)
Dept: INFUSION THERAPY | Age: 73
Setting detail: INFUSION SERIES
Discharge: HOME | End: 2023-08-16
Payer: MEDICARE

## 2023-08-16 VITALS
SYSTOLIC BLOOD PRESSURE: 157 MMHG | DIASTOLIC BLOOD PRESSURE: 87 MMHG | TEMPERATURE: 97.6 F | RESPIRATION RATE: 16 BRPM | OXYGEN SATURATION: 97 %

## 2023-08-16 DIAGNOSIS — M80.00XD AGE-RELATED OSTEOPOROSIS WITH CURRENT PATHOLOGICAL FRACTURE WITH ROUTINE HEALING: Primary | ICD-10-CM

## 2023-08-16 PROCEDURE — 6360000002 HC RX W HCPCS: Performed by: FAMILY MEDICINE

## 2023-08-16 PROCEDURE — 96372 THER/PROPH/DIAG INJ SC/IM: CPT

## 2023-08-16 RX ADMIN — DENOSUMAB 60 MG: 60 INJECTION SUBCUTANEOUS at 10:19

## 2023-08-16 NOTE — PROGRESS NOTES
Before  prolia injection  patient declined any infections, open wounds, or change in medical condition. Tolerated infusion well. Reviewed therapy plan, offered education material and/or discharge material, reviewed medication information and signs and symptoms  and educated on possible side effects, verbalizes good knowledge of current plan patient verbalizes understanding, and has no signs or symptoms to report at this time. Patient discharged. Patient alert and oriented x3. No distress noted. Vital signs stable. Patient denies any new or worsening pain. Patient denies any needs. All questions answered. Next appointment scheduled.

## 2023-09-21 ENCOUNTER — TELEPHONE (OUTPATIENT)
Dept: FAMILY MEDICINE CLINIC | Age: 73
End: 2023-09-21

## 2023-09-21 NOTE — TELEPHONE ENCOUNTER
Last Appointment   7/18/2023  Next Appointment  12/12/2023  Patient called in regarding she was at her Nephrologist yesterday, she was told not to take Advil, aleve or ibuprofen, she is allergic to tylenol. Wanted to know if you could send something in for her to take for the pain?  [address] [facility]

## 2023-09-22 NOTE — TELEPHONE ENCOUNTER
Called and talked to the patient. Had rash in the past with Tylenol. Has pain intermittently while urinating and hence was inquiring about pain medication. No symptoms at this time. Advised to call if she develops symptoms again so that we can evaluate and check her urine to r/o infection. Patient agrees with the plan.

## 2023-10-06 DIAGNOSIS — I10 PRIMARY HYPERTENSION: ICD-10-CM

## 2023-10-06 RX ORDER — AMLODIPINE BESYLATE 10 MG/1
10 TABLET ORAL EVERY MORNING
Qty: 30 TABLET | Refills: 5 | Status: SHIPPED | OUTPATIENT
Start: 2023-10-06

## 2023-10-12 DIAGNOSIS — F33.40 RECURRENT MAJOR DEPRESSIVE DISORDER, IN REMISSION (HCC): ICD-10-CM

## 2023-10-12 DIAGNOSIS — K21.9 GASTROESOPHAGEAL REFLUX DISEASE WITHOUT ESOPHAGITIS: ICD-10-CM

## 2023-10-12 DIAGNOSIS — I10 PRIMARY HYPERTENSION: ICD-10-CM

## 2023-10-12 RX ORDER — CARVEDILOL 6.25 MG/1
TABLET ORAL
Qty: 180 TABLET | Refills: 3 | Status: SHIPPED | OUTPATIENT
Start: 2023-10-12

## 2023-10-12 RX ORDER — PANTOPRAZOLE SODIUM 40 MG/1
40 TABLET, DELAYED RELEASE ORAL EVERY MORNING
Qty: 30 TABLET | Refills: 5 | Status: SHIPPED | OUTPATIENT
Start: 2023-10-12

## 2023-10-12 RX ORDER — ESCITALOPRAM OXALATE 5 MG/1
5 TABLET ORAL DAILY
Qty: 90 TABLET | Refills: 3 | Status: SHIPPED | OUTPATIENT
Start: 2023-10-12

## 2023-10-31 DIAGNOSIS — E78.5 ELEVATED LIPIDS: ICD-10-CM

## 2023-10-31 RX ORDER — ATORVASTATIN CALCIUM 40 MG/1
40 TABLET, FILM COATED ORAL NIGHTLY
Qty: 180 TABLET | Refills: 3 | Status: SHIPPED | OUTPATIENT
Start: 2023-10-31

## 2023-11-02 RX ORDER — ONDANSETRON 4 MG/1
4 TABLET, ORALLY DISINTEGRATING ORAL EVERY 8 HOURS PRN
Qty: 20 TABLET | Refills: 0 | Status: SHIPPED | OUTPATIENT
Start: 2023-11-02

## 2023-11-02 NOTE — TELEPHONE ENCOUNTER
----- Message from Zaire Santana sent at 11/2/2023  3:21 PM EDT -----  Subject: Refill Request    QUESTIONS  Name of Medication? Other - ondansetron (ZOFRAN ODT) 4 MG disintegrating   tablet   Patient-reported dosage and instructions? 4MG, Take 1 tablet by mouth   every 8 hours as needed  How many days do you have left? 6  Preferred Pharmacy? [id number]  Pharmacy phone number (if available)? [phone]  ---------------------------------------------------------------------------  --------------  CALL BACK INFO  What is the best way for the office to contact you? Do not leave any   message, patient will call back for answer  Preferred Call Back Phone Number? [phone]  ---------------------------------------------------------------------------  --------------  SCRIPT ANSWERS  Relationship to Patient?  Self

## 2023-12-11 ENCOUNTER — TELEPHONE (OUTPATIENT)
Dept: FAMILY MEDICINE CLINIC | Age: 73
End: 2023-12-11

## 2023-12-11 DIAGNOSIS — R53.81 PHYSICAL DECONDITIONING: ICD-10-CM

## 2023-12-11 DIAGNOSIS — Z86.73 HISTORY OF CVA (CEREBROVASCULAR ACCIDENT): Primary | ICD-10-CM

## 2023-12-11 NOTE — TELEPHONE ENCOUNTER
Last Appointment   7/18/2023  Next Appointment  1/4/2024  Patient sister called in that she needs a new order for a mattress for her hospital bed, needs a firmer one,  [employer] is the name of the company.  Fax # [phone]

## 2023-12-26 DIAGNOSIS — F06.1 CATATONIA: ICD-10-CM

## 2023-12-26 DIAGNOSIS — R47.01 APHASIA: ICD-10-CM

## 2023-12-27 RX ORDER — LORAZEPAM 0.5 MG/1
0.5 TABLET ORAL 3 TIMES DAILY
Qty: 90 TABLET | Refills: 0 | Status: SHIPPED
Start: 2023-12-27 | End: 2024-02-12

## 2024-01-04 ENCOUNTER — OFFICE VISIT (OUTPATIENT)
Dept: FAMILY MEDICINE CLINIC | Age: 74
End: 2024-01-04

## 2024-01-04 VITALS
HEIGHT: 67 IN | BODY MASS INDEX: 30.13 KG/M2 | SYSTOLIC BLOOD PRESSURE: 139 MMHG | WEIGHT: 192 LBS | TEMPERATURE: 97.6 F | DIASTOLIC BLOOD PRESSURE: 83 MMHG | HEART RATE: 96 BPM | OXYGEN SATURATION: 98 % | RESPIRATION RATE: 16 BRPM

## 2024-01-04 DIAGNOSIS — Z11.59 NEED FOR HEPATITIS C SCREENING TEST: ICD-10-CM

## 2024-01-04 DIAGNOSIS — N18.32 STAGE 3B CHRONIC KIDNEY DISEASE (HCC): ICD-10-CM

## 2024-01-04 DIAGNOSIS — I10 PRIMARY HYPERTENSION: ICD-10-CM

## 2024-01-04 DIAGNOSIS — Z93.3 COLOSTOMY PRESENT (HCC): ICD-10-CM

## 2024-01-04 DIAGNOSIS — Z23 INFLUENZA VACCINATION ADMINISTERED AT CURRENT VISIT: ICD-10-CM

## 2024-01-04 DIAGNOSIS — Z12.31 SCREENING MAMMOGRAM FOR BREAST CANCER: ICD-10-CM

## 2024-01-04 DIAGNOSIS — E11.22 TYPE 2 DIABETES MELLITUS WITH CHRONIC KIDNEY DISEASE, WITHOUT LONG-TERM CURRENT USE OF INSULIN, UNSPECIFIED CKD STAGE (HCC): Primary | ICD-10-CM

## 2024-01-04 DIAGNOSIS — H26.9 CATARACT OF RIGHT EYE, UNSPECIFIED CATARACT TYPE: ICD-10-CM

## 2024-01-04 DIAGNOSIS — F33.40 RECURRENT MAJOR DEPRESSIVE DISORDER, IN REMISSION (HCC): ICD-10-CM

## 2024-01-04 PROBLEM — F33.1 MAJOR DEPRESSIVE DISORDER, RECURRENT, MODERATE (HCC): Status: RESOLVED | Noted: 2023-04-18 | Resolved: 2024-01-04

## 2024-01-04 PROBLEM — C52 VAGINAL CANCER (HCC): Status: RESOLVED | Noted: 2017-05-22 | Resolved: 2024-01-04

## 2024-01-04 PROBLEM — N18.30 CHRONIC RENAL DISEASE, STAGE III (HCC): Status: RESOLVED | Noted: 2022-06-06 | Resolved: 2024-01-04

## 2024-01-04 LAB — HBA1C MFR BLD: 8.6 %

## 2024-01-04 ASSESSMENT — PATIENT HEALTH QUESTIONNAIRE - PHQ9
4. FEELING TIRED OR HAVING LITTLE ENERGY: 1
SUM OF ALL RESPONSES TO PHQ QUESTIONS 1-9: 1
6. FEELING BAD ABOUT YOURSELF - OR THAT YOU ARE A FAILURE OR HAVE LET YOURSELF OR YOUR FAMILY DOWN: 0
5. POOR APPETITE OR OVEREATING: 0
2. FEELING DOWN, DEPRESSED OR HOPELESS: 0
10. IF YOU CHECKED OFF ANY PROBLEMS, HOW DIFFICULT HAVE THESE PROBLEMS MADE IT FOR YOU TO DO YOUR WORK, TAKE CARE OF THINGS AT HOME, OR GET ALONG WITH OTHER PEOPLE: 0
7. TROUBLE CONCENTRATING ON THINGS, SUCH AS READING THE NEWSPAPER OR WATCHING TELEVISION: 0
SUM OF ALL RESPONSES TO PHQ QUESTIONS 1-9: 1
9. THOUGHTS THAT YOU WOULD BE BETTER OFF DEAD, OR OF HURTING YOURSELF: 0
SUM OF ALL RESPONSES TO PHQ9 QUESTIONS 1 & 2: 0
8. MOVING OR SPEAKING SO SLOWLY THAT OTHER PEOPLE COULD HAVE NOTICED. OR THE OPPOSITE, BEING SO FIGETY OR RESTLESS THAT YOU HAVE BEEN MOVING AROUND A LOT MORE THAN USUAL: 0
1. LITTLE INTEREST OR PLEASURE IN DOING THINGS: 0
3. TROUBLE FALLING OR STAYING ASLEEP: 0

## 2024-01-05 NOTE — PROGRESS NOTES
SUBJECTIVE:        Patient ID: Isamar Lopez is a 73 y.o. female who presents for   Chief Complaint   Patient presents with    Diabetes     FBS 90     Diabetes Mellitus: A1c has worsened.  Did not watch diet over the holidays.  She is compliant with her medications.  Denies any chest pain, shortness of breath, fevers or chills  Hemoglobin A1C (%)   Date Value   01/04/2024 8.6     Hypertension BP is controlled.  Is compliant with medications    Hyperlipidemia:No weakness or myalgias.No chest pain or dyspnea.    Osteoporosis: Is on Prolia    History of urinary retension: Patient had stents placed.  Follows with Urology, Dr. Bethea    Chronic colostomy bag, managed by Dr. Cutler.     Depression/anxiety.  Patient states that her mood is stable.  Takes Lexapro and Ativan as needed.    Past Medical History:   Diagnosis Date    Altered bowel elimination due to intestinal ostomy (HCC)     Arthritis     osteoporsis    Cancer (HCC)     colon and uterine    Cerebral artery occlusion with cerebral infarction (HCC)     uses walker    Closed fracture of radius 12/27/2016    Elevated lipids 01/15/2014    Headache     History of anal cancer 02/20/2017    Dr. Cutler    Hyperlipidemia     Hypertension     Hypertension     Major depressive disorder, recurrent, mild 04/18/2023    Obesity     Osteoporosis     Poor historian     Proteinuria 06/25/2014    Type II or unspecified type diabetes mellitus without mention of complication, not stated as uncontrolled     Vaginal cancer (HCC) 05/22/2017    Vitamin D deficiency 11/06/2014       Patient Active Problem List   Diagnosis    Hypertension    Elevated lipids    Proteinuria    Vitamin D deficiency    Closed fracture of radius    History of anal cancer    Colostomy present (HCC)    Diabetes mellitus type 2 in obese (HCC)    Stuttering    Age-related osteoporosis with current pathological fracture with routine healing    Herpes zoster vaccination declined    Hydronephrosis due to

## 2024-01-09 RX ORDER — BLOOD SUGAR DIAGNOSTIC
STRIP MISCELLANEOUS
Qty: 100 STRIP | Refills: 3 | Status: SHIPPED | OUTPATIENT
Start: 2024-01-09

## 2024-01-10 DIAGNOSIS — E11.22 TYPE 2 DIABETES MELLITUS WITH CHRONIC KIDNEY DISEASE, WITHOUT LONG-TERM CURRENT USE OF INSULIN, UNSPECIFIED CKD STAGE (HCC): ICD-10-CM

## 2024-01-10 RX ORDER — GLIPIZIDE 2.5 MG/1
2.5 TABLET, EXTENDED RELEASE ORAL 2 TIMES DAILY
Qty: 60 TABLET | Refills: 2 | Status: SHIPPED | OUTPATIENT
Start: 2024-01-10

## 2024-01-11 RX ORDER — ONDANSETRON 4 MG/1
TABLET, ORALLY DISINTEGRATING ORAL
Qty: 20 TABLET | Refills: 0 | Status: SHIPPED | OUTPATIENT
Start: 2024-01-11

## 2024-01-15 ENCOUNTER — TELEPHONE (OUTPATIENT)
Dept: FAMILY MEDICINE CLINIC | Age: 74
End: 2024-01-15

## 2024-01-15 NOTE — TELEPHONE ENCOUNTER
Stephanie calling about [employer] ordering pt a new mattress and is requesting to talk to Phyllis to inform her of what pcp needs to fill out and send to them so that pt can get the new mattress/matress topper.    Please return ph call to Stephanie

## 2024-01-18 NOTE — TELEPHONE ENCOUNTER
Per patients sister needs new order for gel top mattress to be faxed to [facility].

## 2024-01-19 DIAGNOSIS — I63.9 CEREBELLAR INFARCT (HCC): ICD-10-CM

## 2024-01-19 DIAGNOSIS — I63.9 CEREBELLAR INFARCT (HCC): Primary | ICD-10-CM

## 2024-01-19 PROBLEM — N39.0 COMPLICATED UTI (URINARY TRACT INFECTION): Status: RESOLVED | Noted: 2023-01-25 | Resolved: 2024-01-19

## 2024-01-19 PROBLEM — R82.71 BACTERIURIA: Status: RESOLVED | Noted: 2022-12-27 | Resolved: 2024-01-19

## 2024-01-24 ENCOUNTER — HOSPITAL ENCOUNTER (OUTPATIENT)
Age: 74
Discharge: HOME | End: 2024-01-24
Payer: COMMERCIAL

## 2024-01-24 DIAGNOSIS — Z11.59 NEED FOR HEPATITIS C SCREENING TEST: ICD-10-CM

## 2024-01-24 DIAGNOSIS — E11.22 TYPE 2 DIABETES MELLITUS WITH CHRONIC KIDNEY DISEASE, WITHOUT LONG-TERM CURRENT USE OF INSULIN, UNSPECIFIED CKD STAGE (HCC): ICD-10-CM

## 2024-01-24 DIAGNOSIS — I10 PRIMARY HYPERTENSION: ICD-10-CM

## 2024-01-24 DIAGNOSIS — N18.32 STAGE 3B CHRONIC KIDNEY DISEASE (HCC): ICD-10-CM

## 2024-01-24 LAB
ALBUMIN SERPL-MCNC: 4 G/DL (ref 3.5–5.2)
ALP SERPL-CCNC: 127 U/L (ref 35–104)
ALT SERPL-CCNC: 10 U/L (ref 0–32)
ANION GAP SERPL CALCULATED.3IONS-SCNC: 16 MMOL/L (ref 7–16)
AST SERPL-CCNC: 19 U/L (ref 0–31)
BILIRUB SERPL-MCNC: 0.7 MG/DL (ref 0–1.2)
BUN SERPL-MCNC: 41 MG/DL (ref 6–23)
CALCIUM SERPL-MCNC: 9.4 MG/DL (ref 8.6–10.2)
CHLORIDE SERPL-SCNC: 98 MMOL/L (ref 98–107)
CHOLEST SERPL-MCNC: 108 MG/DL
CO2 SERPL-SCNC: 20 MMOL/L (ref 22–29)
CREAT SERPL-MCNC: 2.7 MG/DL (ref 0.5–1)
ERYTHROCYTE [DISTWIDTH] IN BLOOD BY AUTOMATED COUNT: 14.1 % (ref 11.5–15)
GFR SERPL CREATININE-BSD FRML MDRD: 18 ML/MIN/1.73M2
GLUCOSE SERPL-MCNC: 244 MG/DL (ref 74–99)
HCT VFR BLD AUTO: 39.2 % (ref 34–48)
HDLC SERPL-MCNC: 41 MG/DL
HGB BLD-MCNC: 12.3 G/DL (ref 11.5–15.5)
LDLC SERPL CALC-MCNC: 52 MG/DL
MCH RBC QN AUTO: 26.8 PG (ref 26–35)
MCHC RBC AUTO-ENTMCNC: 31.4 G/DL (ref 32–34.5)
MCV RBC AUTO: 85.4 FL (ref 80–99.9)
PLATELET # BLD AUTO: 325 K/UL (ref 130–450)
PMV BLD AUTO: 11.4 FL (ref 7–12)
POTASSIUM SERPL-SCNC: 4.5 MMOL/L (ref 3.5–5)
PROT SERPL-MCNC: 8.3 G/DL (ref 6.4–8.3)
RBC # BLD AUTO: 4.59 M/UL (ref 3.5–5.5)
SODIUM SERPL-SCNC: 134 MMOL/L (ref 132–146)
TRIGL SERPL-MCNC: 77 MG/DL
TSH SERPL DL<=0.05 MIU/L-ACNC: 1.63 UIU/ML (ref 0.27–4.2)
VLDLC SERPL CALC-MCNC: 15 MG/DL
WBC OTHER # BLD: 10.3 K/UL (ref 4.5–11.5)

## 2024-01-24 PROCEDURE — 84443 ASSAY THYROID STIM HORMONE: CPT

## 2024-01-24 PROCEDURE — 80061 LIPID PANEL: CPT

## 2024-01-24 PROCEDURE — 36415 COLL VENOUS BLD VENIPUNCTURE: CPT

## 2024-01-24 PROCEDURE — 85027 COMPLETE CBC AUTOMATED: CPT

## 2024-01-24 PROCEDURE — 80053 COMPREHEN METABOLIC PANEL: CPT

## 2024-01-24 PROCEDURE — 86803 HEPATITIS C AB TEST: CPT

## 2024-01-25 DIAGNOSIS — M81.0 OSTEOPOROSIS WITHOUT CURRENT PATHOLOGICAL FRACTURE, UNSPECIFIED OSTEOPOROSIS TYPE: Primary | ICD-10-CM

## 2024-01-25 LAB — HCV AB SERPL QL IA: NONREACTIVE

## 2024-02-09 DIAGNOSIS — R47.01 APHASIA: ICD-10-CM

## 2024-02-09 DIAGNOSIS — F06.1 CATATONIA: ICD-10-CM

## 2024-02-12 RX ORDER — LORAZEPAM 0.5 MG/1
0.5 TABLET ORAL 3 TIMES DAILY
Qty: 90 TABLET | Refills: 0 | Status: SHIPPED | OUTPATIENT
Start: 2024-02-12 | End: 2024-05-22

## 2024-02-16 ENCOUNTER — HOSPITAL ENCOUNTER (OUTPATIENT)
Dept: INFUSION THERAPY | Age: 74
Setting detail: INFUSION SERIES
Discharge: HOME | End: 2024-02-16
Payer: COMMERCIAL

## 2024-02-16 VITALS
WEIGHT: 189 LBS | BODY MASS INDEX: 29.6 KG/M2 | DIASTOLIC BLOOD PRESSURE: 80 MMHG | TEMPERATURE: 97.4 F | OXYGEN SATURATION: 99 % | RESPIRATION RATE: 16 BRPM | HEART RATE: 89 BPM | SYSTOLIC BLOOD PRESSURE: 139 MMHG

## 2024-02-16 DIAGNOSIS — M80.00XD AGE-RELATED OSTEOPOROSIS WITH CURRENT PATHOLOGICAL FRACTURE WITH ROUTINE HEALING: Primary | ICD-10-CM

## 2024-02-16 PROCEDURE — 96372 THER/PROPH/DIAG INJ SC/IM: CPT

## 2024-02-16 PROCEDURE — 6360000002 HC RX W HCPCS: Performed by: FAMILY MEDICINE

## 2024-02-16 RX ADMIN — DENOSUMAB 60 MG: 60 INJECTION SUBCUTANEOUS at 10:16

## 2024-02-16 ASSESSMENT — PAIN DESCRIPTION - FREQUENCY: FREQUENCY: INTERMITTENT

## 2024-02-16 ASSESSMENT — PAIN DESCRIPTION - ONSET: ONSET: ON-GOING

## 2024-02-16 NOTE — PROGRESS NOTES
Before infusion patient declined any infections, open wounds, or change in medical condition. Tolerated infusion well.  Reviewed therapy plan, offered education material and/or discharge material, reviewed medication information and signs and symptoms  and educated on possible side effects, verbalizes good knowledge of current plan patient verbalizes understanding, and has no signs or symptoms to report at this time. Patient discharged. Patient alert and oriented x3.   No distress noted.   Vital signs stable.   Patient denies any new or worsening pain.  Patient denies any needs.  All questions answered.  Next appointment scheduled. declines copy of AVS.

## 2024-02-20 LAB — DIABETIC RETINOPATHY: NEGATIVE

## 2024-03-22 DIAGNOSIS — F06.1 CATATONIA: ICD-10-CM

## 2024-03-22 DIAGNOSIS — R47.01 APHASIA: ICD-10-CM

## 2024-03-25 RX ORDER — LORAZEPAM 0.5 MG/1
0.5 TABLET ORAL EVERY 8 HOURS PRN
Qty: 90 TABLET | Refills: 0 | Status: SHIPPED | OUTPATIENT
Start: 2024-03-25 | End: 2024-06-23

## 2024-04-03 ENCOUNTER — HOSPITAL ENCOUNTER (OUTPATIENT)
Dept: GENERAL RADIOLOGY | Age: 74
Discharge: HOME | End: 2024-04-05
Attending: FAMILY MEDICINE
Payer: COMMERCIAL

## 2024-04-03 VITALS — BODY MASS INDEX: 26.21 KG/M2 | WEIGHT: 167 LBS | HEIGHT: 67 IN

## 2024-04-03 DIAGNOSIS — Z12.31 SCREENING MAMMOGRAM FOR BREAST CANCER: ICD-10-CM

## 2024-04-03 PROCEDURE — 77063 BREAST TOMOSYNTHESIS BI: CPT

## 2024-04-06 DIAGNOSIS — I10 PRIMARY HYPERTENSION: ICD-10-CM

## 2024-04-08 RX ORDER — AMLODIPINE BESYLATE 10 MG/1
10 TABLET ORAL EVERY MORNING
Qty: 180 TABLET | Refills: 3 | Status: SHIPPED | OUTPATIENT
Start: 2024-04-08

## 2024-04-10 DIAGNOSIS — K21.9 GASTROESOPHAGEAL REFLUX DISEASE WITHOUT ESOPHAGITIS: ICD-10-CM

## 2024-04-11 ENCOUNTER — OFFICE VISIT (OUTPATIENT)
Dept: FAMILY MEDICINE CLINIC | Age: 74
End: 2024-04-11
Payer: COMMERCIAL

## 2024-04-11 VITALS
SYSTOLIC BLOOD PRESSURE: 128 MMHG | TEMPERATURE: 97 F | BODY MASS INDEX: 29.66 KG/M2 | HEIGHT: 67 IN | HEART RATE: 88 BPM | OXYGEN SATURATION: 95 % | RESPIRATION RATE: 16 BRPM | WEIGHT: 189 LBS | DIASTOLIC BLOOD PRESSURE: 78 MMHG

## 2024-04-11 DIAGNOSIS — E11.22 TYPE 2 DIABETES MELLITUS WITH CHRONIC KIDNEY DISEASE, WITHOUT LONG-TERM CURRENT USE OF INSULIN, UNSPECIFIED CKD STAGE (HCC): Primary | ICD-10-CM

## 2024-04-11 DIAGNOSIS — H61.22 EXCESSIVE CERUMEN IN LEFT EAR CANAL: ICD-10-CM

## 2024-04-11 DIAGNOSIS — F33.1 MAJOR DEPRESSIVE DISORDER, RECURRENT, MODERATE (HCC): ICD-10-CM

## 2024-04-11 DIAGNOSIS — K21.9 GASTROESOPHAGEAL REFLUX DISEASE WITHOUT ESOPHAGITIS: ICD-10-CM

## 2024-04-11 PROBLEM — F33.0 MAJOR DEPRESSIVE DISORDER, RECURRENT, MILD (HCC): Status: ACTIVE | Noted: 2024-04-11

## 2024-04-11 PROBLEM — F33.9 MAJOR DEPRESSIVE DISORDER, RECURRENT, UNSPECIFIED (HCC): Status: ACTIVE | Noted: 2024-04-11

## 2024-04-11 LAB — HBA1C MFR BLD: 7.8 %

## 2024-04-11 PROCEDURE — G8399 PT W/DXA RESULTS DOCUMENT: HCPCS | Performed by: FAMILY MEDICINE

## 2024-04-11 PROCEDURE — 3078F DIAST BP <80 MM HG: CPT | Performed by: FAMILY MEDICINE

## 2024-04-11 PROCEDURE — 1090F PRES/ABSN URINE INCON ASSESS: CPT | Performed by: FAMILY MEDICINE

## 2024-04-11 PROCEDURE — 3051F HG A1C>EQUAL 7.0%<8.0%: CPT | Performed by: FAMILY MEDICINE

## 2024-04-11 PROCEDURE — 2022F DILAT RTA XM EVC RTNOPTHY: CPT | Performed by: FAMILY MEDICINE

## 2024-04-11 PROCEDURE — 1123F ACP DISCUSS/DSCN MKR DOCD: CPT | Performed by: FAMILY MEDICINE

## 2024-04-11 PROCEDURE — 3017F COLORECTAL CA SCREEN DOC REV: CPT | Performed by: FAMILY MEDICINE

## 2024-04-11 PROCEDURE — 3074F SYST BP LT 130 MM HG: CPT | Performed by: FAMILY MEDICINE

## 2024-04-11 PROCEDURE — 1036F TOBACCO NON-USER: CPT | Performed by: FAMILY MEDICINE

## 2024-04-11 PROCEDURE — G8427 DOCREV CUR MEDS BY ELIG CLIN: HCPCS | Performed by: FAMILY MEDICINE

## 2024-04-11 PROCEDURE — G8417 CALC BMI ABV UP PARAM F/U: HCPCS | Performed by: FAMILY MEDICINE

## 2024-04-11 PROCEDURE — 99214 OFFICE O/P EST MOD 30 MIN: CPT | Performed by: FAMILY MEDICINE

## 2024-04-11 PROCEDURE — 83036 HEMOGLOBIN GLYCOSYLATED A1C: CPT | Performed by: FAMILY MEDICINE

## 2024-04-11 RX ORDER — PANTOPRAZOLE SODIUM 40 MG/1
40 TABLET, DELAYED RELEASE ORAL EVERY MORNING
Qty: 30 TABLET | Refills: 5 | Status: SHIPPED | OUTPATIENT
Start: 2024-04-11

## 2024-04-11 RX ORDER — PANTOPRAZOLE SODIUM 40 MG/1
40 TABLET, DELAYED RELEASE ORAL EVERY MORNING
Qty: 30 TABLET | Refills: 5 | OUTPATIENT
Start: 2024-04-11

## 2024-04-11 NOTE — PROGRESS NOTES
Stability: Unknown (4/18/2023)    Housing Stability Vital Sign     Unstable Housing in the Last Year: No       Allergies   Allergen Reactions    Betadine [Povidone Iodine] Itching    Lisinopril Swelling     UNKNOWN AREA OF SWELLING    Penicillins Hives     BLISTERS      Tylenol [Acetaminophen] Rash       Current Outpatient Medications on File Prior to Visit   Medication Sig Dispense Refill    amLODIPine (NORVASC) 10 MG tablet take 1 tablet by mouth every morning 180 tablet 3    LORazepam (ATIVAN) 0.5 MG tablet Take 1 tablet by mouth every 8 hours as needed for Anxiety for up to 90 days. Max Daily Amount: 1.5 mg 90 tablet 0    Mis. Devices (MATTRESS PAD) MISC Gel top mattress 1 each 0    ondansetron (ZOFRAN-ODT) 4 MG disintegrating tablet take 1 tablet by mouth every 8 hours if needed for nausea or vomiting 20 tablet 0    glipiZIDE (GLUCOTROL XL) 2.5 MG extended release tablet take 1 tablet by mouth twice a day 60 tablet 2    ONETOUCH ULTRA strip use 1 TEST STRIP to TEST BLOOD SUGAR once daily 100 strip 3    atorvastatin (LIPITOR) 40 MG tablet take 1 tablet by mouth nightly 180 tablet 3    carvedilol (COREG) 6.25 MG tablet take 1 tablet by mouth twice a day WITH A MEAL 180 tablet 3    escitalopram (LEXAPRO) 5 MG tablet take 1 tablet by mouth once daily 90 tablet 3    Lancets (ONETOUCH DELICA PLUS [id number]) MISC use 1 LANCET to TEST BLOOD SUGAR once daily and if needed 100 each 3    aspirin 81 MG EC tablet Take 1 tablet by mouth every morning 30 tablet 5    melatonin 5 MG TABS tablet Take 2 tablets by mouth nightly 60 tablet 5    denosumab (PROLIA) 60 MG/ML SOSY SC injection Inject 1 mL into the skin every 6 months NEXT INJ DUE @ [facility]: 02/2023      magnesium oxide (MAG-OX) 400 MG tablet Take 1 tablet by mouth 2 times daily       No current facility-administered medications on file prior to visit.       Past medical, surgical, family and social history were reviewed and updated if stated    OBJECTIVE:     VS: BP

## 2024-04-20 DIAGNOSIS — E11.22 TYPE 2 DIABETES MELLITUS WITH CHRONIC KIDNEY DISEASE, WITHOUT LONG-TERM CURRENT USE OF INSULIN, UNSPECIFIED CKD STAGE (HCC): ICD-10-CM

## 2024-04-22 ENCOUNTER — HOSPITAL ENCOUNTER (OUTPATIENT)
Age: 74
Discharge: HOME | End: 2024-04-22
Payer: COMMERCIAL

## 2024-04-22 LAB
25(OH)D3 SERPL-MCNC: 23 NG/ML (ref 30–100)
ALBUMIN SERPL-MCNC: 4.2 G/DL (ref 3.5–5.2)
ALP SERPL-CCNC: 127 U/L (ref 35–104)
ALT SERPL-CCNC: 9 U/L (ref 0–32)
ANION GAP SERPL CALCULATED.3IONS-SCNC: 11 MMOL/L (ref 7–16)
AST SERPL-CCNC: 11 U/L (ref 0–31)
BASOPHILS # BLD: 0.03 K/UL (ref 0–0.2)
BASOPHILS NFR BLD: 0 % (ref 0–2)
BILIRUB SERPL-MCNC: 0.6 MG/DL (ref 0–1.2)
BUN SERPL-MCNC: 46 MG/DL (ref 6–23)
CALCIUM SERPL-MCNC: 8.8 MG/DL (ref 8.6–10.2)
CHLORIDE SERPL-SCNC: 102 MMOL/L (ref 98–107)
CHOLEST SERPL-MCNC: 107 MG/DL
CO2 SERPL-SCNC: 22 MMOL/L (ref 22–29)
CREAT SERPL-MCNC: 2.7 MG/DL (ref 0.5–1)
EOSINOPHIL # BLD: 0.15 K/UL (ref 0.05–0.5)
EOSINOPHILS RELATIVE PERCENT: 2 % (ref 0–6)
ERYTHROCYTE [DISTWIDTH] IN BLOOD BY AUTOMATED COUNT: 15.1 % (ref 11.5–15)
GFR SERPL CREATININE-BSD FRML MDRD: 18 ML/MIN/1.73M2
GLUCOSE SERPL-MCNC: 198 MG/DL (ref 74–99)
HCT VFR BLD AUTO: 38.9 % (ref 34–48)
HDLC SERPL-MCNC: 44 MG/DL
HGB BLD-MCNC: 11.7 G/DL (ref 11.5–15.5)
IMM GRANULOCYTES # BLD AUTO: 0.04 K/UL (ref 0–0.58)
IMM GRANULOCYTES NFR BLD: 1 % (ref 0–5)
LDLC SERPL CALC-MCNC: 49 MG/DL
LYMPHOCYTES NFR BLD: 0.78 K/UL (ref 1.5–4)
LYMPHOCYTES RELATIVE PERCENT: 9 % (ref 20–42)
MCH RBC QN AUTO: 26.1 PG (ref 26–35)
MCHC RBC AUTO-ENTMCNC: 30.1 G/DL (ref 32–34.5)
MCV RBC AUTO: 86.6 FL (ref 80–99.9)
MONOCYTES NFR BLD: 0.42 K/UL (ref 0.1–0.95)
MONOCYTES NFR BLD: 5 % (ref 2–12)
NEUTROPHILS NFR BLD: 84 % (ref 43–80)
NEUTS SEG NFR BLD: 7.45 K/UL (ref 1.8–7.3)
PHOSPHATE SERPL-MCNC: 2.8 MG/DL (ref 2.5–4.5)
PLATELET # BLD AUTO: 338 K/UL (ref 130–450)
PMV BLD AUTO: 11.3 FL (ref 7–12)
POTASSIUM SERPL-SCNC: 5.9 MMOL/L (ref 3.5–5)
PROT SERPL-MCNC: 8.7 G/DL (ref 6.4–8.3)
PTH-INTACT SERPL-MCNC: 617.6 PG/ML (ref 15–65)
RBC # BLD AUTO: 4.49 M/UL (ref 3.5–5.5)
SODIUM SERPL-SCNC: 135 MMOL/L (ref 132–146)
TRIGL SERPL-MCNC: 68 MG/DL
VLDLC SERPL CALC-MCNC: 14 MG/DL
WBC OTHER # BLD: 8.9 K/UL (ref 4.5–11.5)

## 2024-04-22 PROCEDURE — 36415 COLL VENOUS BLD VENIPUNCTURE: CPT

## 2024-04-22 PROCEDURE — 80061 LIPID PANEL: CPT

## 2024-04-22 PROCEDURE — 84100 ASSAY OF PHOSPHORUS: CPT

## 2024-04-22 PROCEDURE — 82306 VITAMIN D 25 HYDROXY: CPT

## 2024-04-22 PROCEDURE — 83970 ASSAY OF PARATHORMONE: CPT

## 2024-04-22 PROCEDURE — 80053 COMPREHEN METABOLIC PANEL: CPT

## 2024-04-22 PROCEDURE — 85025 COMPLETE CBC W/AUTO DIFF WBC: CPT

## 2024-04-22 RX ORDER — GLIPIZIDE 2.5 MG/1
2.5 TABLET, EXTENDED RELEASE ORAL 2 TIMES DAILY
Qty: 60 TABLET | Refills: 2 | Status: SHIPPED | OUTPATIENT
Start: 2024-04-22

## 2024-04-30 ENCOUNTER — OFFICE VISIT (OUTPATIENT)
Dept: FAMILY MEDICINE CLINIC | Age: 74
End: 2024-04-30

## 2024-04-30 ENCOUNTER — HOSPITAL ENCOUNTER (OUTPATIENT)
Age: 74
Discharge: HOME | End: 2024-04-30
Payer: COMMERCIAL

## 2024-04-30 VITALS
OXYGEN SATURATION: 98 % | TEMPERATURE: 97.7 F | HEART RATE: 90 BPM | HEIGHT: 67 IN | SYSTOLIC BLOOD PRESSURE: 139 MMHG | DIASTOLIC BLOOD PRESSURE: 79 MMHG | RESPIRATION RATE: 16 BRPM | BODY MASS INDEX: 29.19 KG/M2 | WEIGHT: 186 LBS

## 2024-04-30 DIAGNOSIS — Z01.818 PRE-OP EXAM: Primary | ICD-10-CM

## 2024-04-30 LAB
25(OH)D3 SERPL-MCNC: 18.9 NG/ML (ref 30–100)
ALBUMIN SERPL-MCNC: 4.2 G/DL (ref 3.5–5.2)
ALP SERPL-CCNC: 118 U/L (ref 35–104)
ALT SERPL-CCNC: 9 U/L (ref 0–32)
ANION GAP SERPL CALCULATED.3IONS-SCNC: 13 MMOL/L (ref 7–16)
AST SERPL-CCNC: 13 U/L (ref 0–31)
BASOPHILS # BLD: 0.02 K/UL (ref 0–0.2)
BASOPHILS NFR BLD: 0 % (ref 0–2)
BILIRUB SERPL-MCNC: 0.6 MG/DL (ref 0–1.2)
BUN SERPL-MCNC: 39 MG/DL (ref 6–23)
CALCIUM SERPL-MCNC: 9.5 MG/DL (ref 8.6–10.2)
CHLORIDE SERPL-SCNC: 102 MMOL/L (ref 98–107)
CHOLEST SERPL-MCNC: 108 MG/DL
CO2 SERPL-SCNC: 21 MMOL/L (ref 22–29)
CREAT SERPL-MCNC: 2.6 MG/DL (ref 0.5–1)
EOSINOPHIL # BLD: 0.12 K/UL (ref 0.05–0.5)
EOSINOPHILS RELATIVE PERCENT: 1 % (ref 0–6)
ERYTHROCYTE [DISTWIDTH] IN BLOOD BY AUTOMATED COUNT: 15.2 % (ref 11.5–15)
GFR SERPL CREATININE-BSD FRML MDRD: 19 ML/MIN/1.73M2
GLUCOSE SERPL-MCNC: 216 MG/DL (ref 74–99)
HCT VFR BLD AUTO: 38.7 % (ref 34–48)
HDLC SERPL-MCNC: 42 MG/DL
HGB BLD-MCNC: 11.8 G/DL (ref 11.5–15.5)
IMM GRANULOCYTES # BLD AUTO: <0.03 K/UL (ref 0–0.58)
IMM GRANULOCYTES NFR BLD: 0 % (ref 0–5)
LDLC SERPL CALC-MCNC: 52 MG/DL
LYMPHOCYTES NFR BLD: 0.61 K/UL (ref 1.5–4)
LYMPHOCYTES RELATIVE PERCENT: 7 % (ref 20–42)
MCH RBC QN AUTO: 26.6 PG (ref 26–35)
MCHC RBC AUTO-ENTMCNC: 30.5 G/DL (ref 32–34.5)
MCV RBC AUTO: 87.2 FL (ref 80–99.9)
MONOCYTES NFR BLD: 0.32 K/UL (ref 0.1–0.95)
MONOCYTES NFR BLD: 4 % (ref 2–12)
NEUTROPHILS NFR BLD: 88 % (ref 43–80)
NEUTS SEG NFR BLD: 7.84 K/UL (ref 1.8–7.3)
PHOSPHATE SERPL-MCNC: 3 MG/DL (ref 2.5–4.5)
PLATELET # BLD AUTO: 334 K/UL (ref 130–450)
PMV BLD AUTO: 10.6 FL (ref 7–12)
POTASSIUM SERPL-SCNC: 5 MMOL/L (ref 3.5–5)
PROT SERPL-MCNC: 8.6 G/DL (ref 6.4–8.3)
PTH-INTACT SERPL-MCNC: 393.8 PG/ML (ref 15–65)
RBC # BLD AUTO: 4.44 M/UL (ref 3.5–5.5)
SODIUM SERPL-SCNC: 136 MMOL/L (ref 132–146)
TRIGL SERPL-MCNC: 69 MG/DL
VLDLC SERPL CALC-MCNC: 14 MG/DL
WBC OTHER # BLD: 8.9 K/UL (ref 4.5–11.5)

## 2024-04-30 PROCEDURE — 80061 LIPID PANEL: CPT

## 2024-04-30 PROCEDURE — 83970 ASSAY OF PARATHORMONE: CPT

## 2024-04-30 PROCEDURE — 36415 COLL VENOUS BLD VENIPUNCTURE: CPT

## 2024-04-30 PROCEDURE — 82306 VITAMIN D 25 HYDROXY: CPT

## 2024-04-30 PROCEDURE — 85025 COMPLETE CBC W/AUTO DIFF WBC: CPT

## 2024-04-30 PROCEDURE — 80053 COMPREHEN METABOLIC PANEL: CPT

## 2024-04-30 PROCEDURE — 84100 ASSAY OF PHOSPHORUS: CPT

## 2024-04-30 SDOH — ECONOMIC STABILITY: FOOD INSECURITY: WITHIN THE PAST 12 MONTHS, THE FOOD YOU BOUGHT JUST DIDN'T LAST AND YOU DIDN'T HAVE MONEY TO GET MORE.: NEVER TRUE

## 2024-04-30 SDOH — ECONOMIC STABILITY: FOOD INSECURITY: WITHIN THE PAST 12 MONTHS, YOU WORRIED THAT YOUR FOOD WOULD RUN OUT BEFORE YOU GOT MONEY TO BUY MORE.: NEVER TRUE

## 2024-04-30 SDOH — ECONOMIC STABILITY: INCOME INSECURITY: HOW HARD IS IT FOR YOU TO PAY FOR THE VERY BASICS LIKE FOOD, HOUSING, MEDICAL CARE, AND HEATING?: NOT HARD AT ALL

## 2024-04-30 NOTE — PROGRESS NOTES
Subjective:      Isamar Lopez is a 73 y.o. female who presents to the office today for a preoperative consultation at the request of surgeon Dr. Bethea who plans on performing Ureteral stent on May 2.   Planned anesthesia is unknown.  The patient has the following known anesthesia issues:  none    Patient does not have objection to receiving blood products if needed.  Patient's medications, allergies, past medical, surgical, social and family histories were reviewed and updated as appropriate.  Review of Systems  Pertinent items are noted in HPI.      Objective:      O: Blood pressure 139/79, pulse 90, temperature 97.7 °F (36.5 °C), temperature source Temporal, resp. rate 16, height 1.702 m (5' 7\"), weight 84.4 kg (186 lb), SpO2 98 %.    Physical Examination:  General appearance - alert, well appearing, and in no distress  Chest - clear to auscultation, no wheezes, rales or rhonchi, symmetric air entry  Heart - normal rate, regular rhythm, normal S1, S2, no murmurs, rubs, clicks or gallops  Neurological - alert, oriented, normal speech, no focal findings or movement disorder noted  Extremities - no pedal edema  Skin- no rash  Mental status - Normal mood, judgement and thought process     Cardiographics  ECG:  unchanged from prior ones.    Lab Review   not applicable  CKD and hyperkalemia. Labs have been ordered by nephrology. Awaiting results      Assessment:        73 y.o. female with planned surgery as above.    Known risk factors for perioperative complications: Diabetes mellitus  Renal dysfunction      Cardiac Risk Estimation: per the Revised Cardiac Risk Index (Circ. 100:1043, 1999), the patient's risk factors for cardiac complications include history of cerebrovascular disease, serum Cr > 2.0 mg/dL, putting her in: RCI RISK CLASS III (2 risk factors, risk of major cardiac compl. appr. 3.6%)    Current medications which may produce withdrawal symptoms if withheld perioperatively: Aspirin      Plan:     Await

## 2024-04-30 NOTE — PROGRESS NOTES
[facility] PRE-ADMISSION TESTING INSTRUCTIONS    The Preadmission Testing patient is instructed accordingly using the following criteria (check applicable):    ARRIVAL INSTRUCTIONS:  [x] Parking the day of Surgery is located in the Main Entrance lot.  Upon entering the door, make an immediate right to the surgery reception desk    [x] Bring photo ID and insurance card    [] Bring in a copy of Living will or Durable Power of  papers.    [x] Please be sure to arrange for responsible adult to provide transportation to and from the hospital    [x] Please arrange for responsible adult to be with you for the 24 hour period post procedure due to having anesthesia    [x] If you awake am of surgery not feeling well or have temperature >100 please call [phone]    GENERAL INSTRUCTIONS:    [x] Nothing by mouth after midnight, including gum, candy, mints or water    [x] You may brush your teeth, but do not swallow any water    [x] Take medications as instructed with 1-2 oz of water    [x] Stop herbal supplements and vitamins 5 days prior to procedure    [x] Follow preop dosing of blood thinners per physician instructions    [] Take 1/2 dose of evening insulin, but no insulin after midnight    [x] No oral diabetic medications after midnight    [x] If diabetic and have low blood sugar or feel symptomatic, take 1-2oz apple juice only    [] Bring inhalers day of surgery    [] Bring C-PAP/ Bi-Pap day of surgery    [] Bring urine specimen day of surgery    [x] Shower or bath with soap, lather and rinse well, AM of Surgery, no lotion, powders or creams to surgical site    [] Follow bowel prep as instructed per surgeon    [x] No tobacco products within 24 hours of surgery     [x] No alcohol or illegal drug use within 24 hours of surgery.    [x] Jewelry, body piercing's, eyeglasses, contact lenses and dentures are not permitted into surgery (bring cases)      [x] Please do not wear any nail polish,

## 2024-04-30 NOTE — PROGRESS NOTES
Patient needs contact isolation for 5/2 procedure per lane in infection control. Scheduling notified.

## 2024-05-01 ENCOUNTER — PREP FOR PROCEDURE (OUTPATIENT)
Dept: UROLOGY | Age: 74
End: 2024-05-01

## 2024-05-01 RX ORDER — SODIUM CHLORIDE 9 MG/ML
INJECTION, SOLUTION INTRAVENOUS PRN
Status: CANCELLED | OUTPATIENT
Start: 2024-05-01

## 2024-05-01 RX ORDER — SODIUM CHLORIDE 0.9 % (FLUSH) 0.9 %
5-40 SYRINGE (ML) INJECTION PRN
Status: CANCELLED | OUTPATIENT
Start: 2024-05-01

## 2024-05-01 RX ORDER — SODIUM CHLORIDE 9 MG/ML
INJECTION, SOLUTION INTRAVENOUS CONTINUOUS
Status: CANCELLED | OUTPATIENT
Start: 2024-05-01

## 2024-05-01 RX ORDER — SODIUM CHLORIDE 0.9 % (FLUSH) 0.9 %
5-40 SYRINGE (ML) INJECTION EVERY 12 HOURS SCHEDULED
Status: CANCELLED | OUTPATIENT
Start: 2024-05-01

## 2024-05-01 NOTE — PROGRESS NOTES
Left voice message for Estefania at [facility] - regarding obtaining the medical clearance

## 2024-05-02 ENCOUNTER — APPOINTMENT (OUTPATIENT)
Dept: GENERAL RADIOLOGY | Age: 74
End: 2024-05-02
Attending: UROLOGY
Payer: COMMERCIAL

## 2024-05-02 ENCOUNTER — HOSPITAL ENCOUNTER (OUTPATIENT)
Age: 74
Setting detail: OUTPATIENT SURGERY
Discharge: HOME | End: 2024-05-02
Attending: UROLOGY | Admitting: UROLOGY
Payer: COMMERCIAL

## 2024-05-02 ENCOUNTER — ANESTHESIA EVENT (OUTPATIENT)
Dept: OPERATING ROOM | Age: 74
End: 2024-05-02
Payer: COMMERCIAL

## 2024-05-02 ENCOUNTER — ANESTHESIA (OUTPATIENT)
Dept: OPERATING ROOM | Age: 74
End: 2024-05-02
Payer: COMMERCIAL

## 2024-05-02 VITALS
HEART RATE: 63 BPM | TEMPERATURE: 96.8 F | WEIGHT: 186 LBS | OXYGEN SATURATION: 97 % | HEIGHT: 67 IN | DIASTOLIC BLOOD PRESSURE: 78 MMHG | RESPIRATION RATE: 18 BRPM | SYSTOLIC BLOOD PRESSURE: 126 MMHG | BODY MASS INDEX: 29.19 KG/M2

## 2024-05-02 DIAGNOSIS — R33.9 RETENTION OF URINE, UNSPECIFIED: ICD-10-CM

## 2024-05-02 LAB — GLUCOSE BLD-MCNC: 198 MG/DL (ref 74–99)

## 2024-05-02 PROCEDURE — 74420 UROGRAPHY RTRGR +-KUB: CPT

## 2024-05-02 PROCEDURE — 87070 CULTURE OTHR SPECIMN AEROBIC: CPT

## 2024-05-02 PROCEDURE — 87116 MYCOBACTERIA CULTURE: CPT

## 2024-05-02 PROCEDURE — 87206 SMEAR FLUORESCENT/ACID STAI: CPT

## 2024-05-02 PROCEDURE — 3600000013 HC SURGERY LEVEL 3 ADDTL 15MIN: Performed by: UROLOGY

## 2024-05-02 PROCEDURE — 82962 GLUCOSE BLOOD TEST: CPT

## 2024-05-02 PROCEDURE — 87015 SPECIMEN INFECT AGNT CONCNTJ: CPT

## 2024-05-02 PROCEDURE — C2617 STENT, NON-COR, TEM W/O DEL: HCPCS | Performed by: UROLOGY

## 2024-05-02 PROCEDURE — 87077 CULTURE AEROBIC IDENTIFY: CPT

## 2024-05-02 PROCEDURE — 2580000003 HC RX 258

## 2024-05-02 PROCEDURE — 6360000002 HC RX W HCPCS: Performed by: NURSE ANESTHETIST, CERTIFIED REGISTERED

## 2024-05-02 PROCEDURE — 7100000011 HC PHASE II RECOVERY - ADDTL 15 MIN: Performed by: UROLOGY

## 2024-05-02 PROCEDURE — 3700000000 HC ANESTHESIA ATTENDED CARE: Performed by: UROLOGY

## 2024-05-02 PROCEDURE — 3600000003 HC SURGERY LEVEL 3 BASE: Performed by: UROLOGY

## 2024-05-02 PROCEDURE — 6360000002 HC RX W HCPCS

## 2024-05-02 PROCEDURE — 6360000004 HC RX CONTRAST MEDICATION: Performed by: UROLOGY

## 2024-05-02 PROCEDURE — 3700000001 HC ADD 15 MINUTES (ANESTHESIA): Performed by: UROLOGY

## 2024-05-02 PROCEDURE — C1769 GUIDE WIRE: HCPCS | Performed by: UROLOGY

## 2024-05-02 PROCEDURE — C1758 CATHETER, URETERAL: HCPCS | Performed by: UROLOGY

## 2024-05-02 PROCEDURE — 2709999900 HC NON-CHARGEABLE SUPPLY: Performed by: UROLOGY

## 2024-05-02 PROCEDURE — 87102 FUNGUS ISOLATION CULTURE: CPT

## 2024-05-02 PROCEDURE — 87205 SMEAR GRAM STAIN: CPT

## 2024-05-02 PROCEDURE — 87176 TISSUE HOMOGENIZATION CULTR: CPT

## 2024-05-02 PROCEDURE — 7100000010 HC PHASE II RECOVERY - FIRST 15 MIN: Performed by: UROLOGY

## 2024-05-02 DEVICE — UNIVERSA FIRM URETERAL STENT AND POSITIONER WITH HYDROPHILIC COATING
Type: IMPLANTABLE DEVICE | Site: URETER | Status: FUNCTIONAL
Brand: UNIVERSA

## 2024-05-02 RX ORDER — FENTANYL CITRATE 50 UG/ML
25 INJECTION, SOLUTION INTRAMUSCULAR; INTRAVENOUS EVERY 5 MIN PRN
Status: CANCELLED | OUTPATIENT
Start: 2024-05-02

## 2024-05-02 RX ORDER — MEPERIDINE HYDROCHLORIDE 25 MG/ML
12.5 INJECTION INTRAMUSCULAR; INTRAVENOUS; SUBCUTANEOUS ONCE
Status: CANCELLED | OUTPATIENT
Start: 2024-05-02 | End: 2024-05-02

## 2024-05-02 RX ORDER — LEVOFLOXACIN 5 MG/ML
500 INJECTION, SOLUTION INTRAVENOUS
Status: COMPLETED | OUTPATIENT
Start: 2024-05-02 | End: 2024-05-02

## 2024-05-02 RX ORDER — SODIUM CHLORIDE 9 MG/ML
INJECTION, SOLUTION INTRAVENOUS PRN
Status: DISCONTINUED | OUTPATIENT
Start: 2024-05-02 | End: 2024-05-02 | Stop reason: HOSPADM

## 2024-05-02 RX ORDER — SODIUM CHLORIDE 0.9 % (FLUSH) 0.9 %
5-40 SYRINGE (ML) INJECTION EVERY 12 HOURS SCHEDULED
Status: CANCELLED | OUTPATIENT
Start: 2024-05-02

## 2024-05-02 RX ORDER — SODIUM CHLORIDE 0.9 % (FLUSH) 0.9 %
5-40 SYRINGE (ML) INJECTION PRN
Status: CANCELLED | OUTPATIENT
Start: 2024-05-02

## 2024-05-02 RX ORDER — PROCHLORPERAZINE EDISYLATE 5 MG/ML
5 INJECTION INTRAMUSCULAR; INTRAVENOUS
Status: CANCELLED | OUTPATIENT
Start: 2024-05-02 | End: 2024-05-03

## 2024-05-02 RX ORDER — FENTANYL CITRATE 50 UG/ML
INJECTION, SOLUTION INTRAMUSCULAR; INTRAVENOUS PRN
Status: DISCONTINUED | OUTPATIENT
Start: 2024-05-02 | End: 2024-05-02 | Stop reason: SDUPTHER

## 2024-05-02 RX ORDER — HYDRALAZINE HYDROCHLORIDE 20 MG/ML
5 INJECTION INTRAMUSCULAR; INTRAVENOUS
Status: CANCELLED | OUTPATIENT
Start: 2024-05-02

## 2024-05-02 RX ORDER — SODIUM CHLORIDE 0.9 % (FLUSH) 0.9 %
5-40 SYRINGE (ML) INJECTION EVERY 12 HOURS SCHEDULED
Status: DISCONTINUED | OUTPATIENT
Start: 2024-05-02 | End: 2024-05-02 | Stop reason: HOSPADM

## 2024-05-02 RX ORDER — SODIUM CHLORIDE 0.9 % (FLUSH) 0.9 %
5-40 SYRINGE (ML) INJECTION PRN
Status: DISCONTINUED | OUTPATIENT
Start: 2024-05-02 | End: 2024-05-02 | Stop reason: HOSPADM

## 2024-05-02 RX ORDER — PROPOFOL 10 MG/ML
INJECTION, EMULSION INTRAVENOUS PRN
Status: DISCONTINUED | OUTPATIENT
Start: 2024-05-02 | End: 2024-05-02 | Stop reason: SDUPTHER

## 2024-05-02 RX ORDER — DIPHENHYDRAMINE HYDROCHLORIDE 50 MG/ML
12.5 INJECTION INTRAMUSCULAR; INTRAVENOUS
Status: CANCELLED | OUTPATIENT
Start: 2024-05-02 | End: 2024-05-03

## 2024-05-02 RX ORDER — SODIUM CHLORIDE 9 MG/ML
INJECTION, SOLUTION INTRAVENOUS PRN
Status: CANCELLED | OUTPATIENT
Start: 2024-05-02

## 2024-05-02 RX ORDER — NALOXONE HYDROCHLORIDE 0.4 MG/ML
INJECTION, SOLUTION INTRAMUSCULAR; INTRAVENOUS; SUBCUTANEOUS PRN
Status: CANCELLED | OUTPATIENT
Start: 2024-05-02

## 2024-05-02 RX ORDER — SODIUM CHLORIDE 9 MG/ML
INJECTION, SOLUTION INTRAVENOUS CONTINUOUS
Status: DISCONTINUED | OUTPATIENT
Start: 2024-05-02 | End: 2024-05-02 | Stop reason: HOSPADM

## 2024-05-02 RX ORDER — LABETALOL HYDROCHLORIDE 5 MG/ML
5 INJECTION, SOLUTION INTRAVENOUS
Status: CANCELLED | OUTPATIENT
Start: 2024-05-02

## 2024-05-02 RX ORDER — CIPROFLOXACIN 500 MG/1
500 TABLET, FILM COATED ORAL 2 TIMES DAILY
Qty: 14 TABLET | Refills: 0 | Status: SHIPPED | OUTPATIENT
Start: 2024-05-02 | End: 2024-05-09

## 2024-05-02 RX ADMIN — SODIUM CHLORIDE: 9 INJECTION, SOLUTION INTRAVENOUS at 07:30

## 2024-05-02 RX ADMIN — PROPOFOL 120 MCG/KG/MIN: 10 INJECTION, EMULSION INTRAVENOUS at 09:37

## 2024-05-02 RX ADMIN — LEVOFLOXACIN 500 MG: 5 INJECTION, SOLUTION INTRAVENOUS at 09:28

## 2024-05-02 RX ADMIN — PROPOFOL 50 MG: 10 INJECTION, EMULSION INTRAVENOUS at 09:36

## 2024-05-02 RX ADMIN — FENTANYL CITRATE 50 MCG: 50 INJECTION, SOLUTION INTRAMUSCULAR; INTRAVENOUS at 09:36

## 2024-05-02 ASSESSMENT — ENCOUNTER SYMPTOMS: DYSPNEA ACTIVITY LEVEL: NO INTERVAL CHANGE

## 2024-05-02 ASSESSMENT — LIFESTYLE VARIABLES: SMOKING_STATUS: 0

## 2024-05-02 ASSESSMENT — PAIN - FUNCTIONAL ASSESSMENT: PAIN_FUNCTIONAL_ASSESSMENT: 0-10

## 2024-05-02 NOTE — INTERVAL H&P NOTE
Update History & Physical    The patient's History and Physical of , today was reviewed with the patient and I examined the patient. There was no change in the plan. The surgical site was confirmed by the patient and me.     Plan: The risks, benefits, expected outcome, and alternative to the recommended procedure have been discussed with the patient. Patient understands and wants to proceed with the procedure.     Electronically signed by Pineda Bethea MD on 5/2/2024 at 7:22 AM

## 2024-05-02 NOTE — BRIEF OP NOTE
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  Urinary retention    Post-Op Diagnosis: Bilateral hydronephrosis neurogenic bilateral pyonephrosis       Procedure: Cystoscopy bilateral retrograde pyelogram bilateral stent exchange insertion of López catheter exam under anesthesia    Surgeon(s):  Pineda Bethea MD    Assistant:  None    Anesthesia: Monitor Anesthesia Care    Estimated Blood Loss (mL): Less than 5 cc    Complications: None    Specimens:   Urine for  culture from each renal pelvis    Implants:  None      Drains:   Colostomy LUQ (Active)       Findings:  Infection Present At Time Of Surgery (PATOS) (choose all levels that have infection present):  - Organ Space infection (below fascia) present as evidenced by purulent fluid  Other Findings:     Electronically signed by Pineda Bethea MD on 5/2/2024 at 9:39 AM

## 2024-05-02 NOTE — DISCHARGE INSTRUCTIONS
You have a stent which will remain until next procedure  You may have back pain with urination  With increased activity you will have blood in urine  You may have frequency and urgency or also burning with urination you maybathe tomorrow  Keep bowels soft with prune juice or  Colace(over the counter--once a day)  My office will call you to schedule a follow-up procedure  Call  for follow up if you do not hear from us  Med -dental bureau number for emergencies [phone]

## 2024-05-02 NOTE — ANESTHESIA POSTPROCEDURE EVALUATION
Department of Anesthesiology  Postprocedure Note    Patient: Isamar Lopez  MRN: [id number]  YOB: 1950  Date of evaluation: 5/2/2024    Procedure Summary       Date: 05/02/24 Room / Location: [address]    Anesthesia Start: 0928 Anesthesia Stop:     Procedure: CYSTOSCOPY RETROGRADE PYELOGRAM BILATERAL STENT CHANGE (Bilateral: Ureter) Diagnosis:       Retention of urine, unspecified      (Retention of urine, unspecified [R33.9])    Surgeons: Pineda Bethea MD Responsible Provider: Pipe Caicedo DO    Anesthesia Type: MAC ASA Status: 3            Anesthesia Type: No value filed.    Lino Phase I: Lino Score: 10    Lino Phase II:      Anesthesia Post Evaluation    Patient location during evaluation: PACU  Patient participation: complete - patient participated  Level of consciousness: awake  Airway patency: patent  Nausea & Vomiting: no nausea and no vomiting  Cardiovascular status: hemodynamically stable  Respiratory status: acceptable  Hydration status: euvolemic  Pain management: adequate        No notable events documented.

## 2024-05-02 NOTE — H&P
Penicillins, and Tylenol [acetaminophen]    Social History:    reports that she has never smoked. She has never used smokeless tobacco. She reports that she does not drink alcohol and does not use drugs.    Family History:   Non-contributory to this urological problem  family history includes Cancer in her father; Diabetes in her sister; High Blood Pressure in her mother; Kidney Disease in her mother; Other in her brother.    Review of Systems:  Respiratory: negative for cough and hemoptysis  Cardiovascular: negative for chest pain and dyspnea  Gastrointestinal: negative for abdominal pain, diarrhea, nausea and vomiting.  She has a colostomy.  Previous rectal cancer surgery by Dr. Cleaning  Derm: negative for rash and skin lesion(s)  Neurological: negative for seizures and tremors previous history of cerebrovascular accident with cerebellar infarct she has had catatonia in the past.  She has had a major depressive disorder in the past.  Endocrine: Type 2 diabetes mellitus with obesity..  Vitamin D deficiency.  Hypertension hyperlipidemia   : As above in the HPI, otherwise negative  GYN.  She had a previous breast biopsy  Musculoskeletal.  Wrist fracture 11/7/2016.  Age-related osteoporosis  Physical Exam:   Vitals: /81   Pulse 74   Temp 96.8 °F (36 °C) (Temporal)   Resp 16   Ht 1.702 m (5' 7\")   Wt 84.4 kg (186 lb)   SpO2 98%   BMI 29.13 kg/m²   General:  Awake, alert, oriented X 3.  Well developed, well nourished, well groomed.  No apparent distress.  HEENT:  Normocephalic, atraumatic.  Pupils equal, round.  No scleral icterus.  No conjunctival injection.  Normal lips, teeth, and gums.  No nasal discharge.  Neck:  Supple, no masses.  Heart:  RRR  Lungs:  No audible wheezing.  Respirations symmetric and non-labored.  Abdomen:  soft, nontender, no masses, no organomegaly, no peritoneal signs obese abdomen the colostomy is intact extremities:  No clubbing, cyanosis, or edema  Skin:  Warm and dry, no open

## 2024-05-02 NOTE — PROGRESS NOTES
CLINICAL PHARMACY NOTE: MEDS TO BEDS    Total # of Prescriptions Filled: 1   The following medications were delivered to the patient:  Cipro 500 mg       Additional Documentation:

## 2024-05-02 NOTE — ANESTHESIA PRE PROCEDURE
Department of Anesthesiology  Preprocedure Note       Name:  Isamar Lopez   Age:  73 y.o.  :  1950                                          MRN:  [id number]         Date:  2024      Surgeon: Surgeon(s):  Pineda Bethea MD    Procedure: Procedure(s):  CYSTOSCOPY RETROGRADE PYELOGRAM BILATERAL STENT CHANGE   +++IODINE ALLERGY+++       ++CONTACT ISOLATION++    Medications prior to admission:   Prior to Admission medications    Medication Sig Start Date End Date Taking? Authorizing Provider   glipiZIDE (GLUCOTROL XL) 2.5 MG extended release tablet take 1 tablet by mouth twice a day 24   Juliano Page MD   pantoprazole (PROTONIX) 40 MG tablet Take 1 tablet by mouth every morning 24   Juliano Page MD   carbamide peroxide (DEBROX) 6.5 % otic solution Place 5 drops into the left ear 2 times daily 24  Juliano Page MD   amLODIPine (NORVASC) 10 MG tablet take 1 tablet by mouth every morning 24   Juliano Page MD   LORazepam (ATIVAN) 0.5 MG tablet Take 1 tablet by mouth every 8 hours as needed for Anxiety for up to 90 days. Max Daily Amount: 1.5 mg 3/25/24 6/23/24  Juliano Page MD   [facility] Devices (MATTRESS PAD) MISC Gel top mattress 24   Juliano Page MD   ondansetron (ZOFRAN-ODT) 4 MG disintegrating tablet take 1 tablet by mouth every 8 hours if needed for nausea or vomiting 24   Phyllis Wing MD   ONETOUCH ULTRA strip use 1 TEST STRIP to TEST BLOOD SUGAR once daily 24   Juliano Page MD   atorvastatin (LIPITOR) 40 MG tablet take 1 tablet by mouth nightly 10/31/23   Juliano Page MD   carvedilol (COREG) 6.25 MG tablet take 1 tablet by mouth twice a day WITH A MEAL 10/12/23   Juliano Page MD   escitalopram (LEXAPRO) 5 MG tablet take 1 tablet by mouth once daily 10/12/23   Juliano Page MD   Lancets (ONETOUCH DELICA PLUS [id number]) MISC use 1 LANCET to

## 2024-05-03 NOTE — OP NOTE
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  The patient has urinary retention due to hypotonic bladder.    OPERATIONS:  Cysto, bilateral retrograde pyelogram, bilateral stent exchange, insertion of López, exam under anesthesia.    BLOOD LOSS:  Less than 5 mL.    ANESTHESIA:  Monitored sedation.    DESCRIPTION OF PROCEDURE:  The time-out was read by me to Anesthesia and operating room staff, reviewed history and physical, allergy, and medication, all were in agreement. The patient was placed in lithotomy position with no undue tension on hips, knees, or buttocks. Peripheral compression devices functioning throughout.  López catheter removed. Placed a #22-Latvian cystoscope into the bladder.  The stents were not calcified.  There were no lesions of the bladder compatible with premalignant or obviously malignant disease.  Nothing needed to be biopsied.  There was some leakage around the scope.  It was difficult to measure the López catheter volume.  There was no cystitis cystica, enterovesical fistula, or extrinsic imprint.   film of the abdomen demonstrated both stents to be well positioned.  5 open-ended catheter and a Glidewire inserted along the right stent.  The stent was grasped and removed.  Urine obtained from the renal pelvis through the ureteral catheter, demonstrated obvious pyonephrosis.  Retrograde pyelogram demonstrated no extravasation

## 2024-05-05 LAB
MICROORGANISM SPEC CULT: ABNORMAL
MICROORGANISM SPEC CULT: NORMAL
MICROORGANISM/AGENT SPEC: ABNORMAL
MICROORGANISM/AGENT SPEC: NORMAL
SPECIMEN DESCRIPTION: ABNORMAL
SPECIMEN DESCRIPTION: ABNORMAL
SPECIMEN DESCRIPTION: NORMAL

## 2024-05-06 LAB
MICROORGANISM SPEC CULT: ABNORMAL
MICROORGANISM/AGENT SPEC: ABNORMAL
SPECIMEN DESCRIPTION: ABNORMAL
SPECIMEN DESCRIPTION: ABNORMAL

## 2024-05-11 DIAGNOSIS — F06.1 CATATONIA: ICD-10-CM

## 2024-05-11 DIAGNOSIS — R47.01 APHASIA: ICD-10-CM

## 2024-05-11 LAB
MICROORGANISM SPEC CULT: NORMAL
MICROORGANISM SPEC CULT: NORMAL
MICROORGANISM/AGENT SPEC: NORMAL
MICROORGANISM/AGENT SPEC: NORMAL
SPECIMEN DESCRIPTION: NORMAL
SPECIMEN DESCRIPTION: NORMAL

## 2024-05-13 ENCOUNTER — HOSPITAL ENCOUNTER (OUTPATIENT)
Age: 74
Discharge: HOME | End: 2024-05-13
Payer: COMMERCIAL

## 2024-05-13 LAB
ANION GAP SERPL CALCULATED.3IONS-SCNC: 13 MMOL/L (ref 7–16)
BUN SERPL-MCNC: 53 MG/DL (ref 6–23)
CALCIUM SERPL-MCNC: 10 MG/DL (ref 8.6–10.2)
CHLORIDE SERPL-SCNC: 99 MMOL/L (ref 98–107)
CO2 SERPL-SCNC: 24 MMOL/L (ref 22–29)
CREAT SERPL-MCNC: 3.2 MG/DL (ref 0.5–1)
GFR, ESTIMATED: 15 ML/MIN/1.73M2
GLUCOSE SERPL-MCNC: 174 MG/DL (ref 74–99)
POTASSIUM SERPL-SCNC: 5.4 MMOL/L (ref 3.5–5)
SODIUM SERPL-SCNC: 136 MMOL/L (ref 132–146)

## 2024-05-13 PROCEDURE — 36415 COLL VENOUS BLD VENIPUNCTURE: CPT

## 2024-05-13 PROCEDURE — 80048 BASIC METABOLIC PNL TOTAL CA: CPT

## 2024-05-13 RX ORDER — LORAZEPAM 0.5 MG/1
0.5 TABLET ORAL EVERY 8 HOURS PRN
Qty: 90 TABLET | Refills: 1 | Status: SHIPPED | OUTPATIENT
Start: 2024-05-13 | End: 2024-08-11

## 2024-05-29 RX ORDER — ONDANSETRON 4 MG/1
TABLET, FILM COATED ORAL
Qty: 42 TABLET | Refills: 0 | Status: SHIPPED | OUTPATIENT
Start: 2024-05-29

## 2024-06-01 LAB
MICROORGANISM SPEC CULT: NORMAL
MICROORGANISM/AGENT SPEC: NORMAL
SPECIMEN DESCRIPTION: NORMAL

## 2024-06-15 LAB
MICROORGANISM SPEC CULT: NORMAL
MICROORGANISM SPEC CULT: NORMAL
MICROORGANISM/AGENT SPEC: NORMAL
MICROORGANISM/AGENT SPEC: NORMAL
SPECIMEN DESCRIPTION: NORMAL

## 2024-07-24 ENCOUNTER — TELEPHONE (OUTPATIENT)
Dept: FAMILY MEDICINE CLINIC | Age: 74
End: 2024-07-24

## 2024-07-24 DIAGNOSIS — N18.9 VITAMIN D DEFICIENCY DUE TO CHRONIC KIDNEY DISEASE: ICD-10-CM

## 2024-07-24 DIAGNOSIS — N18.32 STAGE 3B CHRONIC KIDNEY DISEASE (HCC): Primary | ICD-10-CM

## 2024-07-24 DIAGNOSIS — E55.9 VITAMIN D DEFICIENCY DUE TO CHRONIC KIDNEY DISEASE: ICD-10-CM

## 2024-07-24 NOTE — TELEPHONE ENCOUNTER
Patient schedule for infustion 8/16/24    Infusion center requesting new order and labs if needed

## 2024-07-25 NOTE — TELEPHONE ENCOUNTER
Because of patient's kidney disease, I will let Dr. Page decide if she is comfortable signing this script.  We will need a new vitamin D and kidney function prior to the injection so if she can come get that done, that would be helpful for Dr. Page.  lnw

## 2024-07-30 DIAGNOSIS — M80.00XD AGE-RELATED OSTEOPOROSIS WITH CURRENT PATHOLOGICAL FRACTURE WITH ROUTINE HEALING: Primary | ICD-10-CM

## 2024-08-05 DIAGNOSIS — E11.22 TYPE 2 DIABETES MELLITUS WITH CHRONIC KIDNEY DISEASE, WITHOUT LONG-TERM CURRENT USE OF INSULIN, UNSPECIFIED CKD STAGE (HCC): ICD-10-CM

## 2024-08-05 RX ORDER — GLIPIZIDE 2.5 MG/1
2.5 TABLET, EXTENDED RELEASE ORAL 2 TIMES DAILY
Qty: 60 TABLET | Refills: 2 | Status: SHIPPED | OUTPATIENT
Start: 2024-08-05

## 2024-08-07 ENCOUNTER — HOSPITAL ENCOUNTER (OUTPATIENT)
Age: 74
Discharge: HOME | End: 2024-08-07
Payer: COMMERCIAL

## 2024-08-07 DIAGNOSIS — N18.32 STAGE 3B CHRONIC KIDNEY DISEASE (HCC): ICD-10-CM

## 2024-08-07 DIAGNOSIS — N18.9 VITAMIN D DEFICIENCY DUE TO CHRONIC KIDNEY DISEASE: ICD-10-CM

## 2024-08-07 DIAGNOSIS — E55.9 VITAMIN D DEFICIENCY DUE TO CHRONIC KIDNEY DISEASE: ICD-10-CM

## 2024-08-07 LAB
25(OH)D3 SERPL-MCNC: 31.8 NG/ML (ref 30–100)
ANION GAP SERPL CALCULATED.3IONS-SCNC: 11 MMOL/L (ref 7–16)
BUN SERPL-MCNC: 63 MG/DL (ref 6–23)
CALCIUM SERPL-MCNC: 9.9 MG/DL (ref 8.6–10.2)
CHLORIDE SERPL-SCNC: 101 MMOL/L (ref 98–107)
CO2 SERPL-SCNC: 24 MMOL/L (ref 22–29)
CREAT SERPL-MCNC: 3.1 MG/DL (ref 0.5–1)
GFR, ESTIMATED: 15 ML/MIN/1.73M2
GLUCOSE SERPL-MCNC: 174 MG/DL (ref 74–99)
POTASSIUM SERPL-SCNC: 5.6 MMOL/L (ref 3.5–5)
SODIUM SERPL-SCNC: 136 MMOL/L (ref 132–146)

## 2024-08-07 PROCEDURE — 82306 VITAMIN D 25 HYDROXY: CPT

## 2024-08-07 PROCEDURE — 80048 BASIC METABOLIC PNL TOTAL CA: CPT

## 2024-08-07 PROCEDURE — 36415 COLL VENOUS BLD VENIPUNCTURE: CPT

## 2024-08-08 DIAGNOSIS — E87.5 HYPERKALEMIA: Primary | ICD-10-CM

## 2024-08-16 ENCOUNTER — HOSPITAL ENCOUNTER (OUTPATIENT)
Dept: INFUSION THERAPY | Age: 74
Discharge: HOME | End: 2024-08-16

## 2024-08-19 ENCOUNTER — HOSPITAL ENCOUNTER (OUTPATIENT)
Dept: INFUSION THERAPY | Age: 74
Setting detail: INFUSION SERIES
Discharge: HOME | End: 2024-08-19
Payer: COMMERCIAL

## 2024-08-19 VITALS
DIASTOLIC BLOOD PRESSURE: 82 MMHG | RESPIRATION RATE: 16 BRPM | HEART RATE: 88 BPM | TEMPERATURE: 97.7 F | OXYGEN SATURATION: 98 % | SYSTOLIC BLOOD PRESSURE: 150 MMHG

## 2024-08-19 DIAGNOSIS — M80.00XD AGE-RELATED OSTEOPOROSIS WITH CURRENT PATHOLOGICAL FRACTURE WITH ROUTINE HEALING: Primary | ICD-10-CM

## 2024-08-19 PROCEDURE — 96372 THER/PROPH/DIAG INJ SC/IM: CPT

## 2024-08-19 PROCEDURE — 6360000002 HC RX W HCPCS: Performed by: FAMILY MEDICINE

## 2024-08-19 RX ADMIN — DENOSUMAB 60 MG: 60 INJECTION SUBCUTANEOUS at 12:45

## 2024-08-30 NOTE — PROGRESS NOTES
[facility] PRE-ADMISSION TESTING INSTRUCTIONS    The Preadmission Testing patient is instructed accordingly using the following criteria (check applicable):    ARRIVAL INSTRUCTIONS:  [x] Parking the day of Surgery is located in the Main Entrance lot.  Upon entering the door, make an immediate right to the surgery reception desk    [x] Bring photo ID and insurance card    [] Bring in a copy of Living will or Durable Power of  papers.    [x] Please be sure to arrange for responsible adult to provide transportation to and from the hospital    [x] Please arrange for responsible adult to be with you for the 24 hour period post procedure due to having anesthesia    [x] If you awake am of surgery not feeling well or have temperature >100 please call [phone]    GENERAL INSTRUCTIONS:    [x] May have clear liquids until 4 hours prior to surgery. Examples include water, fruit juices (no pulp), jello, popsicles, black coffee or tea, beef or chicken broth.               No gum, candy or mints.    [x] You may brush your teeth, but do not swallow any water    [x] Take medications as instructed with 1-2 oz of water    [x] Stop herbal supplements and vitamins 5 days prior to procedure    [x] Follow preop dosing of blood thinners per physician instructions    [] Take 1/2 dose of evening insulin, but no insulin after midnight    [] No oral diabetic medications after midnight    [x] If diabetic and have low blood sugar or feel symptomatic, take 1-2oz apple juice only    [] Bring inhalers day of surgery    [] Bring C-PAP/ Bi-Pap day of surgery    [] Bring urine specimen day of surgery    [x] Shower or bath with soap, lather and rinse well, AM of Surgery, no lotion, powders or creams to surgical site    [] Follow bowel prep as instructed per surgeon    [x] No tobacco products within 24 hours of surgery     [x] No alcohol or illegal drug use within 24 hours of surgery.    [x] Jewelry, body

## 2024-09-03 ENCOUNTER — OFFICE VISIT (OUTPATIENT)
Dept: FAMILY MEDICINE CLINIC | Age: 74
End: 2024-09-03

## 2024-09-03 VITALS
TEMPERATURE: 97.1 F | BODY MASS INDEX: 30.16 KG/M2 | RESPIRATION RATE: 16 BRPM | SYSTOLIC BLOOD PRESSURE: 142 MMHG | HEART RATE: 100 BPM | OXYGEN SATURATION: 98 % | WEIGHT: 181 LBS | HEIGHT: 65 IN | DIASTOLIC BLOOD PRESSURE: 80 MMHG

## 2024-09-03 DIAGNOSIS — N13.9 OBSTRUCTIVE UROPATHY: Primary | ICD-10-CM

## 2024-09-03 DIAGNOSIS — Z01.818 PRE-OP EXAM: ICD-10-CM

## 2024-09-03 DIAGNOSIS — E11.22 TYPE 2 DIABETES MELLITUS WITH CHRONIC KIDNEY DISEASE, WITHOUT LONG-TERM CURRENT USE OF INSULIN, UNSPECIFIED CKD STAGE (HCC): ICD-10-CM

## 2024-09-03 DIAGNOSIS — N18.4 CKD (CHRONIC KIDNEY DISEASE) STAGE 4, GFR 15-29 ML/MIN (HCC): ICD-10-CM

## 2024-09-03 LAB
ALBUMIN: 4.1 G/DL (ref 3.5–5.2)
ALP BLD-CCNC: 112 U/L (ref 35–104)
ALT SERPL-CCNC: 7 U/L (ref 0–32)
ANION GAP SERPL CALCULATED.3IONS-SCNC: 13 MMOL/L (ref 7–16)
AST SERPL-CCNC: 12 U/L (ref 0–31)
BASOPHILS ABSOLUTE: 0.02 K/UL (ref 0–0.2)
BASOPHILS RELATIVE PERCENT: 0 % (ref 0–2)
BILIRUB SERPL-MCNC: 0.8 MG/DL (ref 0–1.2)
BUN BLDV-MCNC: 36 MG/DL (ref 6–23)
CALCIUM SERPL-MCNC: 8.9 MG/DL (ref 8.6–10.2)
CHLORIDE BLD-SCNC: 103 MMOL/L (ref 98–107)
CO2: 19 MMOL/L (ref 22–29)
CREAT SERPL-MCNC: 2.4 MG/DL (ref 0.5–1)
EOSINOPHILS ABSOLUTE: 0.13 K/UL (ref 0.05–0.5)
EOSINOPHILS RELATIVE PERCENT: 2 % (ref 0–6)
GFR, ESTIMATED: 20 ML/MIN/1.73M2
GLUCOSE BLD-MCNC: 157 MG/DL (ref 74–99)
HCT VFR BLD CALC: 40.5 % (ref 34–48)
HEMOGLOBIN: 12.5 G/DL (ref 11.5–15.5)
IMMATURE GRANULOCYTES %: 0 % (ref 0–5)
IMMATURE GRANULOCYTES ABSOLUTE: 0.03 K/UL (ref 0–0.58)
LYMPHOCYTES ABSOLUTE: 0.96 K/UL (ref 1.5–4)
LYMPHOCYTES RELATIVE PERCENT: 12 % (ref 20–42)
MCH RBC QN AUTO: 26.9 PG (ref 26–35)
MCHC RBC AUTO-ENTMCNC: 30.9 G/DL (ref 32–34.5)
MCV RBC AUTO: 87.1 FL (ref 80–99.9)
MONOCYTES ABSOLUTE: 0.5 K/UL (ref 0.1–0.95)
MONOCYTES RELATIVE PERCENT: 6 % (ref 2–12)
NEUTROPHILS ABSOLUTE: 6.2 K/UL (ref 1.8–7.3)
NEUTROPHILS RELATIVE PERCENT: 79 % (ref 43–80)
PDW BLD-RTO: 15.9 % (ref 11.5–15)
PLATELET # BLD: 297 K/UL (ref 130–450)
PMV BLD AUTO: 11.9 FL (ref 7–12)
POTASSIUM SERPL-SCNC: 5.2 MMOL/L (ref 3.5–5)
RBC # BLD: 4.65 M/UL (ref 3.5–5.5)
SODIUM BLD-SCNC: 135 MMOL/L (ref 132–146)
TOTAL PROTEIN: 8.2 G/DL (ref 6.4–8.3)
WBC # BLD: 7.8 K/UL (ref 4.5–11.5)

## 2024-09-04 ENCOUNTER — PREP FOR PROCEDURE (OUTPATIENT)
Dept: UROLOGY | Age: 74
End: 2024-09-04

## 2024-09-04 ENCOUNTER — ANESTHESIA EVENT (OUTPATIENT)
Dept: OPERATING ROOM | Age: 74
End: 2024-09-04
Payer: COMMERCIAL

## 2024-09-04 RX ORDER — SODIUM CHLORIDE 0.9 % (FLUSH) 0.9 %
5-40 SYRINGE (ML) INJECTION EVERY 12 HOURS SCHEDULED
Status: CANCELLED | OUTPATIENT
Start: 2024-09-04

## 2024-09-04 RX ORDER — SODIUM CHLORIDE 9 MG/ML
INJECTION, SOLUTION INTRAVENOUS CONTINUOUS
Status: CANCELLED | OUTPATIENT
Start: 2024-09-04

## 2024-09-04 RX ORDER — GLIPIZIDE 2.5 MG/1
2.5 TABLET, EXTENDED RELEASE ORAL 2 TIMES DAILY
Qty: 180 TABLET | Refills: 2 | Status: SHIPPED | OUTPATIENT
Start: 2024-09-04

## 2024-09-04 RX ORDER — SODIUM CHLORIDE 0.9 % (FLUSH) 0.9 %
5-40 SYRINGE (ML) INJECTION PRN
Status: CANCELLED | OUTPATIENT
Start: 2024-09-04

## 2024-09-04 RX ORDER — SODIUM CHLORIDE 9 MG/ML
INJECTION, SOLUTION INTRAVENOUS PRN
Status: CANCELLED | OUTPATIENT
Start: 2024-09-04

## 2024-09-05 ENCOUNTER — ANESTHESIA (OUTPATIENT)
Dept: OPERATING ROOM | Age: 74
End: 2024-09-05
Payer: COMMERCIAL

## 2024-09-05 ENCOUNTER — HOSPITAL ENCOUNTER (OUTPATIENT)
Dept: GENERAL RADIOLOGY | Age: 74
Discharge: HOME | End: 2024-09-07
Attending: UROLOGY
Payer: COMMERCIAL

## 2024-09-05 ENCOUNTER — HOSPITAL ENCOUNTER (OUTPATIENT)
Age: 74
Setting detail: OUTPATIENT SURGERY
Discharge: HOME | End: 2024-09-05
Attending: UROLOGY | Admitting: UROLOGY
Payer: COMMERCIAL

## 2024-09-05 VITALS
WEIGHT: 186 LBS | RESPIRATION RATE: 18 BRPM | SYSTOLIC BLOOD PRESSURE: 124 MMHG | HEIGHT: 67 IN | BODY MASS INDEX: 29.19 KG/M2 | OXYGEN SATURATION: 98 % | TEMPERATURE: 97.8 F | HEART RATE: 60 BPM | DIASTOLIC BLOOD PRESSURE: 90 MMHG

## 2024-09-05 DIAGNOSIS — R33.9 URINARY RETENTION: ICD-10-CM

## 2024-09-05 DIAGNOSIS — R52 PAIN: ICD-10-CM

## 2024-09-05 LAB — GLUCOSE BLD-MCNC: 183 MG/DL (ref 74–99)

## 2024-09-05 PROCEDURE — 2709999900 HC NON-CHARGEABLE SUPPLY: Performed by: UROLOGY

## 2024-09-05 PROCEDURE — 6360000004 HC RX CONTRAST MEDICATION: Performed by: UROLOGY

## 2024-09-05 PROCEDURE — 87086 URINE CULTURE/COLONY COUNT: CPT

## 2024-09-05 PROCEDURE — 7100000011 HC PHASE II RECOVERY - ADDTL 15 MIN: Performed by: UROLOGY

## 2024-09-05 PROCEDURE — 7100000000 HC PACU RECOVERY - FIRST 15 MIN: Performed by: UROLOGY

## 2024-09-05 PROCEDURE — 3700000001 HC ADD 15 MINUTES (ANESTHESIA): Performed by: UROLOGY

## 2024-09-05 PROCEDURE — 74420 UROGRAPHY RTRGR +-KUB: CPT

## 2024-09-05 PROCEDURE — 2580000003 HC RX 258: Performed by: NURSE ANESTHETIST, CERTIFIED REGISTERED

## 2024-09-05 PROCEDURE — 6360000002 HC RX W HCPCS: Performed by: NURSE ANESTHETIST, CERTIFIED REGISTERED

## 2024-09-05 PROCEDURE — 3600000003 HC SURGERY LEVEL 3 BASE: Performed by: UROLOGY

## 2024-09-05 PROCEDURE — 3700000000 HC ANESTHESIA ATTENDED CARE: Performed by: UROLOGY

## 2024-09-05 PROCEDURE — 82962 GLUCOSE BLOOD TEST: CPT

## 2024-09-05 PROCEDURE — 2500000003 HC RX 250 WO HCPCS: Performed by: NURSE ANESTHETIST, CERTIFIED REGISTERED

## 2024-09-05 PROCEDURE — C1769 GUIDE WIRE: HCPCS | Performed by: UROLOGY

## 2024-09-05 PROCEDURE — 87077 CULTURE AEROBIC IDENTIFY: CPT

## 2024-09-05 PROCEDURE — 7100000001 HC PACU RECOVERY - ADDTL 15 MIN: Performed by: UROLOGY

## 2024-09-05 PROCEDURE — 6360000002 HC RX W HCPCS: Performed by: UROLOGY

## 2024-09-05 PROCEDURE — 7100000010 HC PHASE II RECOVERY - FIRST 15 MIN: Performed by: UROLOGY

## 2024-09-05 PROCEDURE — 3600000013 HC SURGERY LEVEL 3 ADDTL 15MIN: Performed by: UROLOGY

## 2024-09-05 PROCEDURE — C1758 CATHETER, URETERAL: HCPCS | Performed by: UROLOGY

## 2024-09-05 PROCEDURE — C2617 STENT, NON-COR, TEM W/O DEL: HCPCS | Performed by: UROLOGY

## 2024-09-05 DEVICE — UNIVERSA FIRM URETERAL STENT AND POSITIONER WITH HYDROPHILIC COATING
Type: IMPLANTABLE DEVICE | Site: URETER | Status: FUNCTIONAL
Brand: UNIVERSA

## 2024-09-05 RX ORDER — SODIUM CHLORIDE 9 MG/ML
INJECTION, SOLUTION INTRAVENOUS PRN
Status: DISCONTINUED | OUTPATIENT
Start: 2024-09-05 | End: 2024-09-08 | Stop reason: HOSPADM

## 2024-09-05 RX ORDER — LIDOCAINE HYDROCHLORIDE 20 MG/ML
INJECTION, SOLUTION EPIDURAL; INFILTRATION; INTRACAUDAL; PERINEURAL PRN
Status: DISCONTINUED | OUTPATIENT
Start: 2024-09-05 | End: 2024-09-05 | Stop reason: SDUPTHER

## 2024-09-05 RX ORDER — SODIUM CHLORIDE 9 MG/ML
INJECTION, SOLUTION INTRAVENOUS CONTINUOUS
Status: DISCONTINUED | OUTPATIENT
Start: 2024-09-05 | End: 2024-09-08 | Stop reason: HOSPADM

## 2024-09-05 RX ORDER — SODIUM CHLORIDE 0.9 % (FLUSH) 0.9 %
5-40 SYRINGE (ML) INJECTION EVERY 12 HOURS SCHEDULED
Status: DISCONTINUED | OUTPATIENT
Start: 2024-09-05 | End: 2024-09-08 | Stop reason: HOSPADM

## 2024-09-05 RX ORDER — LEVOFLOXACIN 5 MG/ML
500 INJECTION, SOLUTION INTRAVENOUS
Status: COMPLETED | OUTPATIENT
Start: 2024-09-05 | End: 2024-09-05

## 2024-09-05 RX ORDER — SODIUM CHLORIDE 0.9 % (FLUSH) 0.9 %
5-40 SYRINGE (ML) INJECTION PRN
Status: DISCONTINUED | OUTPATIENT
Start: 2024-09-05 | End: 2024-09-08 | Stop reason: HOSPADM

## 2024-09-05 RX ORDER — LEVOFLOXACIN 5 MG/ML
500 INJECTION, SOLUTION INTRAVENOUS
Status: DISCONTINUED | OUTPATIENT
Start: 2024-09-05 | End: 2024-09-05 | Stop reason: SDUPTHER

## 2024-09-05 RX ORDER — IOPAMIDOL 612 MG/ML
INJECTION, SOLUTION INTRAVASCULAR PRN
Status: DISCONTINUED | OUTPATIENT
Start: 2024-09-05 | End: 2024-09-05 | Stop reason: ALTCHOICE

## 2024-09-05 RX ORDER — PROPOFOL 10 MG/ML
INJECTION, EMULSION INTRAVENOUS PRN
Status: DISCONTINUED | OUTPATIENT
Start: 2024-09-05 | End: 2024-09-05 | Stop reason: SDUPTHER

## 2024-09-05 RX ORDER — SODIUM CHLORIDE 9 MG/ML
INJECTION, SOLUTION INTRAVENOUS CONTINUOUS PRN
Status: DISCONTINUED | OUTPATIENT
Start: 2024-09-05 | End: 2024-09-05 | Stop reason: SDUPTHER

## 2024-09-05 RX ORDER — CIPROFLOXACIN 500 MG/1
500 TABLET, FILM COATED ORAL DAILY
Qty: 10 TABLET | Refills: 0 | Status: SHIPPED | OUTPATIENT
Start: 2024-09-05 | End: 2024-09-15

## 2024-09-05 RX ADMIN — LIDOCAINE HYDROCHLORIDE 40 MG: 20 INJECTION, SOLUTION EPIDURAL; INFILTRATION; INTRACAUDAL; PERINEURAL at 11:10

## 2024-09-05 RX ADMIN — PROPOFOL 50 MG: 10 INJECTION, EMULSION INTRAVENOUS at 11:10

## 2024-09-05 RX ADMIN — LEVOFLOXACIN 500 MG: 500 INJECTION, SOLUTION INTRAVENOUS at 10:19

## 2024-09-05 RX ADMIN — SODIUM CHLORIDE: 9 INJECTION, SOLUTION INTRAVENOUS at 09:30

## 2024-09-05 RX ADMIN — PROPOFOL 120 MCG/KG/MIN: 10 INJECTION, EMULSION INTRAVENOUS at 11:11

## 2024-09-05 ASSESSMENT — LIFESTYLE VARIABLES: SMOKING_STATUS: 0

## 2024-09-05 ASSESSMENT — ENCOUNTER SYMPTOMS: DYSPNEA ACTIVITY LEVEL: NO INTERVAL CHANGE

## 2024-09-05 ASSESSMENT — PAIN - FUNCTIONAL ASSESSMENT: PAIN_FUNCTIONAL_ASSESSMENT: 0-10

## 2024-09-05 NOTE — H&P
9/5/2024 10:30 AM  Service: Urology  Group: [facility] ([place])    Isamar Lopez  [id number]     Chief Complaint:     History of Present Illness:  The patient is a 74 y.o. female patient who presents with need for ureteral catheter exchange..  This patient had obstructive uropathy.  She had vaginal cancer 2017 anal cancer 2015.  When she initially presented her creatinine was 6.5 and her creatinine has come down to around 2.  She is tolerated catheter exchanges and tolerated the López catheter when she developed some incomplete emptying..  Her initial catheter insertion was in 2021 they have been exchanged at about 6 to 9-month interval she is tolerated the López well not much leakage around the catheter no hematuria no flank pain fever chills to my knowledge not clinical infection  She is diabetic and she was on glipizide 2.5 mg daily this has been stopped 24 hours before the procedure  The patient has an indurated pelvis from previous radiation and I feel her obstructive uropathy is based on thickening of the bladder or radiation ureteritis accounting for her bilateral obstructive uropathy    Past Medical History:   Diagnosis Date    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 Cutler    Hyperlipidemia     Hypertension     Major depressive disorder, recurrent, mild 04/18/2023    Obesity     Osteoporosis     Poor historian     Proteinuria 06/25/2014    Type II or unspecified type diabetes mellitus without mention of complication, not stated as uncontrolled     Vaginal cancer (HCC) 05/22/2017    Vitamin D deficiency 11/06/2014       Past Surgical History:   Procedure  SC injection, Inject 1 mL into the skin once for 1 dose  Misc. Devices (MATTRESS PAD) MISC, Gel top mattress  ONETOUCH ULTRA strip, use 1 TEST STRIP to TEST BLOOD SUGAR once daily  Lancets (ONETOUCH DELICA PLUS [id number]) MISC, use 1 LANCET to TEST BLOOD SUGAR once daily and if needed    Allergies:    Betadine [povidone iodine], Lisinopril, Penicillins, and Tylenol [acetaminophen]    Social History:    reports that she has never smoked. She has never used smokeless tobacco. She reports that she does not drink alcohol and does not use drugs.    Family History:   Non-contributory to this urological problem  family history includes Cancer in her father; Diabetes in her sister; High Blood Pressure in her mother; Kidney Disease in her mother; Other in her brother.    Review of Systems: EENT.  Stuttering  Respiratory: negative for cough and hemoptysis  Cardiovascular: negative for chest pain and dyspnea  Gastrointestinal: negative for abdominal pain, diarrhea, nausea and vomiting history of anal cancer she has a colostomy  Derm: negative for rash and skin lesion(s)  Neurological: negative for seizures and tremors.  Major depressive disorder catatonia.  Cerebellar infarct  Endocrine: Type 2 diabetes vitamin D deficiency hypertension hyperlipidemia exogenous obesity : Known proteinuria  Musculoskeletal closed fracture of the radius in November 2006 age-related osteoporosis  GYN.  Previous breast biopsy benign  Physical Exam:   Vitals: /69   Pulse 68   Temp 97 °F (36.1 °C) (Temporal)   Resp 18   Ht 1.702 m (5' 7\")   Wt 84.4 kg (186 lb)   SpO2 99%   BMI 29.13 kg/m²   General:  Awake, alert, oriented X 3.  Well developed, well nourished, well groomed.  No apparent distress.  HEENT:  Normocephalic, atraumatic.  Pupils equal, round.  No scleral icterus.  No conjunctival injection.  Normal lips, teeth, and gums.  No nasal discharge.  Neck:  Supple, no masses.  Heart:  RRR  Lungs:  No audible wheezing.  Respirations

## 2024-09-05 NOTE — DISCHARGE INSTRUCTIONS
You have a stent which will remain until next procedure  You may have back pain with urination  With increased activity you will have blood in urine  You may have frequency and urgency or also burning with urination you may bathe tomorrow  You may do heavy lifting  You may go up and down stairs  Keep bowels soft with prune juice or  Colace(over the counter--once a day)  My office will call you to schedule a follow-up procedure  Call  for follow up if you do not hear from us or if you have after our questions

## 2024-09-05 NOTE — BRIEF OP NOTE
Brief Postoperative Note      Patient: Isamar Lopez  YOB: 1950  MRN: [id number]    Date of Procedure: 9/5/2024    Preop diagnosis bilateral obstructive uropathy from previous radiation causing radiation ureteritis or thickened bladder with ureteral obstruction    Post-Op Diagnosis: Bilateral pyonephrosis bilateral obstructive uropathy small capacity bladder from previous radiation       Procedure(s):  CYSTOSCOPY BILATERAL RETROGRADE PYELOGRAM BILATERAL STENT CHANGE URENA CATHETER CHANGE   ++CONTACT ISOLATION++    Surgeon(s):  Pineda Bethea MD    Assistant:  None    Anesthesia: Monitor Anesthesia Care    Estimated Blood Loss (mL): None    Complications: None    Specimens:   ID Type Source Tests Collected by Time Destination   1 : RIGHT RENAL PELVIS URINE CULTURE Urine Urine, Cystoscopic CULTURE, URINE Pineda Bethea MD 9/5/2024 1122    2 : LEFT RENAL PELVIS URINE CULTURE Urine Urine, Cystoscopic CULTURE, URINE Pineda Bethea MD 9/5/2024 1122        Implants:  Implant Name Type Inv. Item Serial No.  Lot No. LRB No. Used Action   SET URET L26CM DIA6FR STENT POS FIRM AQ PGTL  - [id number]  SET URET L26CM DIA6FR STENT POS FIRM AQ PGTL   [employer] [place] [employer] [facility] [id number] Left 1 Implanted   SET URET L26CM DIA6FR STENT POS FIRM AQ PGTL  - [id number]  SET URET L26CM DIA6FR STENT POS FIRM AQ PGTL   [facility] [id number] Right 1 Implanted         Drains:   Colostomy LUQ (Active)       Urinary Catheter 09/05/24 2 Way (Active)       Findings:  Infection Present At Time Of Surgery (PATOS) (choose all levels that have infection present):  No infection present  Other Findings: Short vagina without cervix from previous surgery and radiation    Electronically signed by Pineda Bethea MD on 9/5/2024 at 11:47 AM

## 2024-09-05 NOTE — OP NOTE
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    POSTOPERATIVE DIAGNOSES:  Bilateral pyonephrosis, bilateral obstructive uropathy, small-capacity bladder from previous radiation.    OPERATIONS:  Cystopanendoscopy, bilateral retrograde pyelogram, bilateral stent exchange, López catheter exchange.    ANESTHESIA:  Monitored sedation.    BLOOD LOSS:  None.    CONDITION:  Stable.    DESCRIPTION OF PROCEDURE:  The time-out was read by me to Anesthesia and the operating staff; reviewed history, physical, allergy, and medication.  The patient was given 500 mg of Cipro in the preop area.  She was placed in the lithotomy position, prepped and draped in usual fashion.  No undue tension to hips, knees, or buttocks.  The bladder has a smaller capacity upto 300 mL.  There were no lesions compatible with serious inflammatory premalignant or obviously malignant disease, nothing needed to be biopsied.  The bladder was not trabeculated.  The stents were intact.  There was no calcification.   film of the abdomen demonstrated no stones along the stents in the renal pelvis.  A 5 open-ended catheter and a Sensor wire were inserted into renal pelvis on the right.  The stent was removed.  I went next to the left side where I put a 5 open-ended catheter and a ZIPwire into the renal pelvis.  Then, I removed that stent intact.  I took a culture from the renal pelvis on the left and it was an  CORBY Bethea MD

## 2024-09-05 NOTE — ANESTHESIA POSTPROCEDURE EVALUATION
Department of Anesthesiology  Postprocedure Note    Patient: Isamar Lopez  MRN: [id number]  YOB: 1950  Date of evaluation: 9/5/2024    Procedure Summary       Date: 09/05/24 Room / Location: [address]    Anesthesia Start: 1102 Anesthesia Stop: 1155    Procedure: CYSTOSCOPY BILATERAL RETROGRADE PYELOGRAM BILATERAL STENT CHANGE URENA CATHETER CHANGE   ++CONTACT ISOLATION++ (Bilateral) Diagnosis:       Urinary retention      (Urinary retention [R33.9])    Surgeons: Pineda Bethea MD Responsible Provider: Evita Simpson DO    Anesthesia Type: MAC ASA Status: 4            Anesthesia Type: No value filed.    Lino Phase I: Lino Score: 9    Lino Phase II:      Anesthesia Post Evaluation    Patient location during evaluation: PACU  Patient participation: complete - patient participated  Level of consciousness: awake and alert  Nausea & Vomiting: no vomiting and no nausea  Cardiovascular status: hemodynamically stable  Respiratory status: acceptable and spontaneous ventilation  Hydration status: stable  Pain management: adequate    No notable events documented.

## 2024-09-05 NOTE — ANESTHESIA PRE PROCEDURE
Tests: No results for input(s): \"POCGLU\", \"POCNA\", \"POCK\", \"POCCL\", \"POCBUN\", \"POCHEMO\", \"POCHCT\" in the last 72 hours.    Coags: No results found for: \"PROTIME\", \"INR\", \"APTT\"    HCG (If Applicable): No results found for: \"PREGTESTUR\", \"PREGSERUM\", \"HCG\", \"HCGQUANT\"     ABGs: No results found for: \"PHART\", \"PO2ART\", \"ULO6PAG\", \"EYY3JUR\", \"BEART\", \"J0YBYHUD\"     Type & Screen (If Applicable):  No results found for: \"LABABO\"    Drug/Infectious Status (If Applicable):  Lab Results   Component Value Date/Time    HEPCAB NONREACTIVE 01/24/2024 01:28 PM       COVID-19 Screening (If Applicable):   Lab Results   Component Value Date/Time    COVID19 Not Detected 01/26/2023 11:40 AM           Anesthesia Evaluation  Patient summary reviewed and Nursing notes reviewed   no history of anesthetic complications:   Airway: Mallampati: III  TM distance: >3 FB   Neck ROM: full  Mouth opening: > = 3 FB   Dental:          Pulmonary:   (+)       decreased breath sounds: bilateral        (-) sleep apnea and not a current smoker                           Cardiovascular:  Exercise tolerance: poor (<4 METS)  (+) hypertension: mild and no interval change, PRATT: no interval change, hyperlipidemia    (-) past MI, CAD, CABG/stent, dysrhythmias and  angina    ECG reviewed  Rhythm: regular  Rate: normal  Echocardiogram reviewed         Beta Blocker:  Dose within 24 Hrs      ROS comment: EKG:  Normal sinus rhythm  Low voltage QRS  Septal infarct (cited on or before 24-JAN-2023)  Inferior infarct (cited on or before 24-JAN-2023)  Abnormal ECG         Neuro/Psych:   (+) CVA (Residual balance problems - uses a cane): residual symptoms, headaches: migraine headaches, psychiatric history:depression/anxiety    (-) seizures            ROS comment: Stuttering GI/Hepatic/Renal:   (+) GERD: no interval change, renal disease (stage 4): CRI     (-) hepatitis      ROS comment: Chronic indwelling López catheter  Complicated UTI  Ostomy.   Endo/Other:    (+)

## 2024-09-06 NOTE — PROGRESS NOTES
All discharge instructions reviewed with patient and sister at bedside. Patient hooked to leg bag for discharge home with knutson. Patient provided with an overnight bag and extra leg bags. Patient and sister educated on knutson care for home. Home

## 2024-09-08 LAB
MICROORGANISM SPEC CULT: ABNORMAL
SERVICE CMNT-IMP: ABNORMAL
SERVICE CMNT-IMP: ABNORMAL
SPECIMEN DESCRIPTION: ABNORMAL
SPECIMEN DESCRIPTION: ABNORMAL

## 2024-09-13 DIAGNOSIS — K21.9 GASTROESOPHAGEAL REFLUX DISEASE WITHOUT ESOPHAGITIS: ICD-10-CM

## 2024-09-13 RX ORDER — PANTOPRAZOLE SODIUM 40 MG/1
40 TABLET, DELAYED RELEASE ORAL EVERY MORNING
Qty: 90 TABLET | Refills: 1 | Status: SHIPPED | OUTPATIENT
Start: 2024-09-13

## 2024-10-11 DIAGNOSIS — K21.9 GASTROESOPHAGEAL REFLUX DISEASE WITHOUT ESOPHAGITIS: ICD-10-CM

## 2024-10-11 RX ORDER — PANTOPRAZOLE SODIUM 40 MG/1
40 TABLET, DELAYED RELEASE ORAL EVERY MORNING
Qty: 90 TABLET | Refills: 1 | OUTPATIENT
Start: 2024-10-11

## 2024-10-15 ENCOUNTER — OFFICE VISIT (OUTPATIENT)
Dept: FAMILY MEDICINE CLINIC | Age: 74
End: 2024-10-15
Payer: COMMERCIAL

## 2024-10-15 VITALS
HEIGHT: 64 IN | HEART RATE: 86 BPM | SYSTOLIC BLOOD PRESSURE: 132 MMHG | WEIGHT: 188 LBS | OXYGEN SATURATION: 98 % | DIASTOLIC BLOOD PRESSURE: 88 MMHG | BODY MASS INDEX: 32.1 KG/M2 | TEMPERATURE: 97.2 F | RESPIRATION RATE: 16 BRPM

## 2024-10-15 DIAGNOSIS — I10 PRIMARY HYPERTENSION: ICD-10-CM

## 2024-10-15 DIAGNOSIS — F33.40 RECURRENT MAJOR DEPRESSIVE DISORDER, IN REMISSION (HCC): ICD-10-CM

## 2024-10-15 DIAGNOSIS — E11.22 TYPE 2 DIABETES MELLITUS WITH CHRONIC KIDNEY DISEASE, WITHOUT LONG-TERM CURRENT USE OF INSULIN, UNSPECIFIED CKD STAGE (HCC): ICD-10-CM

## 2024-10-15 DIAGNOSIS — Z00.00 MEDICARE ANNUAL WELLNESS VISIT, SUBSEQUENT: Primary | ICD-10-CM

## 2024-10-15 DIAGNOSIS — E78.5 ELEVATED LIPIDS: ICD-10-CM

## 2024-10-15 PROCEDURE — G8484 FLU IMMUNIZE NO ADMIN: HCPCS | Performed by: FAMILY MEDICINE

## 2024-10-15 PROCEDURE — 3051F HG A1C>EQUAL 7.0%<8.0%: CPT | Performed by: FAMILY MEDICINE

## 2024-10-15 PROCEDURE — 3017F COLORECTAL CA SCREEN DOC REV: CPT | Performed by: FAMILY MEDICINE

## 2024-10-15 PROCEDURE — 1123F ACP DISCUSS/DSCN MKR DOCD: CPT | Performed by: FAMILY MEDICINE

## 2024-10-15 PROCEDURE — G0439 PPPS, SUBSEQ VISIT: HCPCS | Performed by: FAMILY MEDICINE

## 2024-10-15 PROCEDURE — 3079F DIAST BP 80-89 MM HG: CPT | Performed by: FAMILY MEDICINE

## 2024-10-15 PROCEDURE — 3075F SYST BP GE 130 - 139MM HG: CPT | Performed by: FAMILY MEDICINE

## 2024-10-15 RX ORDER — CARVEDILOL 6.25 MG/1
6.25 TABLET ORAL 2 TIMES DAILY
Qty: 180 TABLET | Refills: 3 | Status: SHIPPED | OUTPATIENT
Start: 2024-10-15

## 2024-10-15 RX ORDER — ATORVASTATIN CALCIUM 40 MG/1
40 TABLET, FILM COATED ORAL NIGHTLY
Qty: 180 TABLET | Refills: 3 | Status: SHIPPED | OUTPATIENT
Start: 2024-10-15

## 2024-10-15 RX ORDER — ONDANSETRON 4 MG/1
4 TABLET, FILM COATED ORAL EVERY 8 HOURS PRN
Qty: 42 TABLET | Refills: 0 | Status: SHIPPED | OUTPATIENT
Start: 2024-10-15

## 2024-10-15 RX ORDER — ESCITALOPRAM OXALATE 5 MG/1
5 TABLET ORAL DAILY
Qty: 90 TABLET | Refills: 3 | Status: SHIPPED | OUTPATIENT
Start: 2024-10-15

## 2024-10-15 ASSESSMENT — PATIENT HEALTH QUESTIONNAIRE - PHQ9
1. LITTLE INTEREST OR PLEASURE IN DOING THINGS: NOT AT ALL
3. TROUBLE FALLING OR STAYING ASLEEP: NOT AT ALL
7. TROUBLE CONCENTRATING ON THINGS, SUCH AS READING THE NEWSPAPER OR WATCHING TELEVISION: NOT AT ALL
SUM OF ALL RESPONSES TO PHQ QUESTIONS 1-9: 0
9. THOUGHTS THAT YOU WOULD BE BETTER OFF DEAD, OR OF HURTING YOURSELF: NOT AT ALL
6. FEELING BAD ABOUT YOURSELF - OR THAT YOU ARE A FAILURE OR HAVE LET YOURSELF OR YOUR FAMILY DOWN: NOT AT ALL
4. FEELING TIRED OR HAVING LITTLE ENERGY: NOT AT ALL
10. IF YOU CHECKED OFF ANY PROBLEMS, HOW DIFFICULT HAVE THESE PROBLEMS MADE IT FOR YOU TO DO YOUR WORK, TAKE CARE OF THINGS AT HOME, OR GET ALONG WITH OTHER PEOPLE: NOT DIFFICULT AT ALL
SUM OF ALL RESPONSES TO PHQ QUESTIONS 1-9: 0
SUM OF ALL RESPONSES TO PHQ QUESTIONS 1-9: 0
2. FEELING DOWN, DEPRESSED OR HOPELESS: NOT AT ALL
5. POOR APPETITE OR OVEREATING: NOT AT ALL
SUM OF ALL RESPONSES TO PHQ9 QUESTIONS 1 & 2: 0
8. MOVING OR SPEAKING SO SLOWLY THAT OTHER PEOPLE COULD HAVE NOTICED. OR THE OPPOSITE, BEING SO FIGETY OR RESTLESS THAT YOU HAVE BEEN MOVING AROUND A LOT MORE THAN USUAL: NOT AT ALL
SUM OF ALL RESPONSES TO PHQ QUESTIONS 1-9: 0

## 2024-10-15 ASSESSMENT — LIFESTYLE VARIABLES
HOW MANY STANDARD DRINKS CONTAINING ALCOHOL DO YOU HAVE ON A TYPICAL DAY: PATIENT DOES NOT DRINK
HOW OFTEN DO YOU HAVE A DRINK CONTAINING ALCOHOL: NEVER

## 2024-10-15 NOTE — PATIENT INSTRUCTIONS
for your use of this information.      Personalized Preventive Plan for Isamar Lopez - 10/15/2024  Medicare offers a range of preventive health benefits. Some of the tests and screenings are paid in full while other may be subject to a deductible, co-insurance, and/or copay.    Some of these benefits include a comprehensive review of your medical history including lifestyle, illnesses that may run in your family, and various assessments and screenings as appropriate.    After reviewing your medical record and screening and assessments performed today your provider may have ordered immunizations, labs, imaging, and/or referrals for you.  A list of these orders (if applicable) as well as your Preventive Care list are included within your After Visit Summary for your review.    Other Preventive Recommendations:    A preventive eye exam performed by an eye specialist is recommended every 1-2 years to screen for glaucoma; cataracts, macular degeneration, and other eye disorders.  A preventive dental visit is recommended every 6 months.  Try to get at least 150 minutes of exercise per week or 10,000 steps per day on a pedometer .  Order or download the FREE \"Exercise & Physical Activity: Your Everyday Guide\" from [employer] [place] [employer]. Call 1-173.646.5471 or search [employer] [place] [employer] online.  You need 7325-5561 mg of calcium and 9451-5741 IU of vitamin D per day. It is possible to meet your calcium requirement with diet alone, but a vitamin D supplement is usually necessary to meet this goal.  When exposed to the sun, use a sunscreen that protects against both UVA and UVB radiation with an SPF of 30 or greater. Reapply every 2 to 3 hours or after sweating, drying off with a towel, or swimming.  Always wear a seat belt when traveling in a car. Always wear a helmet when riding a bicycle or motorcycle.

## 2024-10-15 NOTE — PROGRESS NOTES
132/88   Pulse: 86    Resp: 16    Temp: 97.2 °F (36.2 °C)    TempSrc: Temporal    SpO2: 98%    Weight: 85.3 kg (188 lb)    Height: 1.626 m (5' 4\")       Body mass index is 32.27 kg/m².        Physical Examination:  General appearance - alert, well appearing, and in no distress  Chest - clear to auscultation, no wheezes, rales or rhonchi, symmetric air entry  Heart - normal rate, regular rhythm, normal S1, S2, no murmurs, rubs, clicks or gallops  Extremities - no pedal edema  Skin- no rash  Mental status - Normal mood, judgement and thought process               Allergies   Allergen Reactions    Betadine [Povidone Iodine] Itching    Lisinopril Swelling     UNKNOWN AREA OF SWELLING    Penicillins Hives     BLISTERS      Tylenol [Acetaminophen] Rash     Prior to Visit Medications    Medication Sig Taking? Authorizing Provider   pantoprazole (PROTONIX) 40 MG tablet TAKE 1 TABLET BY MOUTH EVERY MORNING Yes Juliano Page MD   glipiZIDE (GLUCOTROL XL) 2.5 MG extended release tablet TAKE 1 TABLET BY MOUTH TWICE DAILY Yes Juliano Page MD   vitamin D (CHOLECALCIFEROL) 25 MCG (1000 UT) TABS tablet Take 1 tablet by mouth daily Yes Provider, MD Berta   ondansetron (ZOFRAN) 4 MG tablet take 1 tablet by mouth every 8 hours if needed Yes Juliano Page MD   amLODIPine (NORVASC) 10 MG tablet take 1 tablet by mouth every morning Yes Juliano Page MD   [facility]. Devices (MATTRESS PAD) MISC Gel top mattress Yes Juliano Page MD   ONETOUCH ULTRA strip use 1 TEST STRIP to TEST BLOOD SUGAR once daily Yes Juliano Page MD   atorvastatin (LIPITOR) 40 MG tablet take 1 tablet by mouth nightly Yes Juliano Page MD   carvedilol (COREG) 6.25 MG tablet take 1 tablet by mouth twice a day WITH A MEAL Yes Juliano Page MD   escitalopram (LEXAPRO) 5 MG tablet take 1 tablet by mouth once daily Yes Juliano Page MD   Lancets (ONETOUCH DELICA

## 2024-10-17 ENCOUNTER — TELEPHONE (OUTPATIENT)
Dept: FAMILY MEDICINE CLINIC | Age: 74
End: 2024-10-17

## 2024-10-17 NOTE — TELEPHONE ENCOUNTER
Spoke with patient, she stated pharmacy would not refill Protonix. Contacted pharmacy, pharmacist stated there was a 30 day filled/authorized causing the 90 day script to be lost. Gave verbal authoriztion to fill 90 day/R1 script.

## 2024-10-24 ENCOUNTER — HOSPITAL ENCOUNTER (OUTPATIENT)
Age: 74
Discharge: HOME | End: 2024-10-24
Payer: MEDICARE

## 2024-10-24 LAB
25(OH)D3 SERPL-MCNC: 41.3 NG/ML (ref 30–100)
ALBUMIN SERPL-MCNC: 4.2 G/DL (ref 3.5–5.2)
ALP SERPL-CCNC: 112 U/L (ref 35–104)
ALT SERPL-CCNC: 10 U/L (ref 0–32)
ANION GAP SERPL CALCULATED.3IONS-SCNC: 13 MMOL/L (ref 7–16)
ANION GAP SERPL CALCULATED.3IONS-SCNC: 13 MMOL/L (ref 7–16)
AST SERPL-CCNC: 15 U/L (ref 0–31)
BILIRUB SERPL-MCNC: 0.8 MG/DL (ref 0–1.2)
BUN SERPL-MCNC: 42 MG/DL (ref 6–23)
BUN SERPL-MCNC: 42 MG/DL (ref 6–23)
CALCIUM SERPL-MCNC: 10 MG/DL (ref 8.6–10.2)
CALCIUM SERPL-MCNC: 10.1 MG/DL (ref 8.6–10.2)
CHLORIDE SERPL-SCNC: 101 MMOL/L (ref 98–107)
CHLORIDE SERPL-SCNC: 102 MMOL/L (ref 98–107)
CO2 SERPL-SCNC: 23 MMOL/L (ref 22–29)
CO2 SERPL-SCNC: 23 MMOL/L (ref 22–29)
CREAT SERPL-MCNC: 2.9 MG/DL (ref 0.5–1)
CREAT SERPL-MCNC: 2.9 MG/DL (ref 0.5–1)
GFR, ESTIMATED: 17 ML/MIN/1.73M2
GFR, ESTIMATED: 17 ML/MIN/1.73M2
GLUCOSE SERPL-MCNC: 160 MG/DL (ref 74–99)
GLUCOSE SERPL-MCNC: 161 MG/DL (ref 74–99)
PHOSPHATE SERPL-MCNC: 3.7 MG/DL (ref 2.5–4.5)
POTASSIUM SERPL-SCNC: 5.5 MMOL/L (ref 3.5–5)
POTASSIUM SERPL-SCNC: 5.6 MMOL/L (ref 3.5–5)
PROT SERPL-MCNC: 8.3 G/DL (ref 6.4–8.3)
PTH-INTACT SERPL-MCNC: 83.5 PG/ML (ref 15–65)
SODIUM SERPL-SCNC: 137 MMOL/L (ref 132–146)
SODIUM SERPL-SCNC: 138 MMOL/L (ref 132–146)

## 2024-10-24 PROCEDURE — 80048 BASIC METABOLIC PNL TOTAL CA: CPT

## 2024-10-24 PROCEDURE — 80053 COMPREHEN METABOLIC PANEL: CPT

## 2024-10-24 PROCEDURE — 36415 COLL VENOUS BLD VENIPUNCTURE: CPT

## 2024-10-24 PROCEDURE — 82306 VITAMIN D 25 HYDROXY: CPT

## 2024-10-24 PROCEDURE — 83970 ASSAY OF PARATHORMONE: CPT

## 2024-10-24 PROCEDURE — 84100 ASSAY OF PHOSPHORUS: CPT

## 2024-10-25 ENCOUNTER — TELEPHONE (OUTPATIENT)
Dept: FAMILY MEDICINE CLINIC | Age: 74
End: 2024-10-25

## 2024-10-25 NOTE — TELEPHONE ENCOUNTER
HH nurse requesting verbal order for additional visit for catheter change, patient declined appointment today.

## 2024-11-11 DIAGNOSIS — R47.01 APHASIA: ICD-10-CM

## 2024-11-11 DIAGNOSIS — F06.1 CATATONIA: ICD-10-CM

## 2024-11-11 RX ORDER — LORAZEPAM 0.5 MG/1
0.5 TABLET ORAL EVERY 8 HOURS PRN
Qty: 90 TABLET | Refills: 1 | Status: SHIPPED | OUTPATIENT
Start: 2024-11-11 | End: 2025-02-09

## 2024-11-11 NOTE — TELEPHONE ENCOUNTER
Name of Medication(s) Requested:  Requested Prescriptions     Pending Prescriptions Disp Refills    LORazepam (ATIVAN) 0.5 MG tablet 90 tablet 1     Sig: Take 1 tablet by mouth every 8 hours as needed for Anxiety for up to 90 days. Max Daily Amount: 1.5 mg       Medication is on current medication list Yes    Dosage and directions were verified? Yes    Quantity verified: 90 day supply     Pharmacy Verified?  Yes    Last Appointment:  10/15/2024    Future appts:  Future Appointments   Date Time Provider Department Center   2/19/2025 12:00 PM [facility] [place] HOD   4/15/2025 10:45 AM Juliano Page MD [place] DEP        (If no appt send self scheduling link. .REFILLAPPT)  Scheduling request sent?     [] Yes  [x] No    Does patient need updated?  [] Yes  [x] No

## 2024-11-13 ENCOUNTER — HOSPITAL ENCOUNTER (OUTPATIENT)
Age: 74
Discharge: HOME | End: 2024-11-13
Payer: MEDICARE

## 2024-11-13 LAB
ANION GAP SERPL CALCULATED.3IONS-SCNC: 13 MMOL/L (ref 7–16)
BUN SERPL-MCNC: 45 MG/DL (ref 6–23)
CALCIUM SERPL-MCNC: 9.7 MG/DL (ref 8.6–10.2)
CHLORIDE SERPL-SCNC: 100 MMOL/L (ref 98–107)
CO2 SERPL-SCNC: 22 MMOL/L (ref 22–29)
CREAT SERPL-MCNC: 2.7 MG/DL (ref 0.5–1)
GFR, ESTIMATED: 18 ML/MIN/1.73M2
GLUCOSE SERPL-MCNC: 165 MG/DL (ref 74–99)
POTASSIUM SERPL-SCNC: 4.9 MMOL/L (ref 3.5–5)
SODIUM SERPL-SCNC: 135 MMOL/L (ref 132–146)

## 2024-11-13 PROCEDURE — 36415 COLL VENOUS BLD VENIPUNCTURE: CPT

## 2024-11-13 PROCEDURE — 80048 BASIC METABOLIC PNL TOTAL CA: CPT

## 2025-01-06 RX ORDER — COVID-19 ANTIGEN TEST
220 KIT MISCELLANEOUS DAILY PRN
COMMUNITY

## 2025-01-08 ENCOUNTER — PREP FOR PROCEDURE (OUTPATIENT)
Dept: UROLOGY | Age: 75
End: 2025-01-08

## 2025-01-08 RX ORDER — SODIUM CHLORIDE 0.9 % (FLUSH) 0.9 %
5-40 SYRINGE (ML) INJECTION PRN
Status: CANCELLED | OUTPATIENT
Start: 2025-01-08

## 2025-01-08 RX ORDER — SODIUM CHLORIDE 0.9 % (FLUSH) 0.9 %
5-40 SYRINGE (ML) INJECTION EVERY 12 HOURS SCHEDULED
Status: CANCELLED | OUTPATIENT
Start: 2025-01-08

## 2025-01-08 RX ORDER — SODIUM CHLORIDE 9 MG/ML
INJECTION, SOLUTION INTRAVENOUS PRN
Status: CANCELLED | OUTPATIENT
Start: 2025-01-08

## 2025-01-08 RX ORDER — SODIUM CHLORIDE 9 MG/ML
INJECTION, SOLUTION INTRAVENOUS CONTINUOUS
Status: CANCELLED | OUTPATIENT
Start: 2025-01-08

## 2025-01-09 ENCOUNTER — ANESTHESIA EVENT (OUTPATIENT)
Dept: OPERATING ROOM | Age: 75
End: 2025-01-09
Payer: MEDICARE

## 2025-01-09 DIAGNOSIS — I10 PRIMARY HYPERTENSION: ICD-10-CM

## 2025-01-09 RX ORDER — CARVEDILOL 6.25 MG/1
TABLET ORAL
Qty: 180 TABLET | Refills: 3 | Status: SHIPPED | OUTPATIENT
Start: 2025-01-09

## 2025-01-09 NOTE — TELEPHONE ENCOUNTER
Name of Medication(s) Requested:  Requested Prescriptions     Pending Prescriptions Disp Refills    carvedilol (COREG) 6.25 MG tablet [Pharmacy Med Name: CARVEDILOL 6.25MG TABLETS] 180 tablet 3     Sig: TAKE 1 TABLET BY MOUTH TWICE DAILY WITH A MEAL       Medication is on current medication list Yes    Dosage and directions were verified? Yes    Quantity verified: 90 day supply     Pharmacy Verified?  Yes    Last Appointment:  10/15/2024    Future appts:  Future Appointments   Date Time Provider Department Center   2/19/2025 12:00 PM [facility] RM 8 [facility] [place] HOD   4/15/2025 10:45 AM Juliano Page MD [facility] DEP        (If no appt send self scheduling link. .REFILLAPPT)  Scheduling request sent?     [] Yes  [] No    Does patient need updated?  [] Yes  [] No

## 2025-01-10 ENCOUNTER — ANESTHESIA (OUTPATIENT)
Dept: OPERATING ROOM | Age: 75
End: 2025-01-10
Payer: MEDICARE

## 2025-01-10 ENCOUNTER — HOSPITAL ENCOUNTER (OUTPATIENT)
Age: 75
Setting detail: OUTPATIENT SURGERY
Discharge: HOME | End: 2025-01-10
Attending: UROLOGY | Admitting: UROLOGY
Payer: MEDICARE

## 2025-01-10 ENCOUNTER — APPOINTMENT (OUTPATIENT)
Dept: GENERAL RADIOLOGY | Age: 75
End: 2025-01-10
Attending: UROLOGY
Payer: MEDICARE

## 2025-01-10 VITALS
RESPIRATION RATE: 17 BRPM | BODY MASS INDEX: 25.11 KG/M2 | HEART RATE: 67 BPM | HEIGHT: 67 IN | TEMPERATURE: 98 F | OXYGEN SATURATION: 96 % | SYSTOLIC BLOOD PRESSURE: 143 MMHG | DIASTOLIC BLOOD PRESSURE: 84 MMHG | WEIGHT: 160 LBS

## 2025-01-10 DIAGNOSIS — R33.9 RETENTION OF URINE, UNSPECIFIED: ICD-10-CM

## 2025-01-10 DIAGNOSIS — Z01.812 PRE-OPERATIVE LABORATORY EXAMINATION: Primary | ICD-10-CM

## 2025-01-10 LAB — GLUCOSE BLD-MCNC: 194 MG/DL (ref 74–99)

## 2025-01-10 PROCEDURE — 3600000003 HC SURGERY LEVEL 3 BASE: Performed by: UROLOGY

## 2025-01-10 PROCEDURE — C1769 GUIDE WIRE: HCPCS | Performed by: UROLOGY

## 2025-01-10 PROCEDURE — 2709999900 HC NON-CHARGEABLE SUPPLY: Performed by: UROLOGY

## 2025-01-10 PROCEDURE — 6360000004 HC RX CONTRAST MEDICATION: Performed by: UROLOGY

## 2025-01-10 PROCEDURE — C1758 CATHETER, URETERAL: HCPCS | Performed by: UROLOGY

## 2025-01-10 PROCEDURE — 7100000010 HC PHASE II RECOVERY - FIRST 15 MIN: Performed by: UROLOGY

## 2025-01-10 PROCEDURE — 6360000002 HC RX W HCPCS

## 2025-01-10 PROCEDURE — C2617 STENT, NON-COR, TEM W/O DEL: HCPCS | Performed by: UROLOGY

## 2025-01-10 PROCEDURE — 74420 UROGRAPHY RTRGR +-KUB: CPT

## 2025-01-10 PROCEDURE — 2580000003 HC RX 258

## 2025-01-10 PROCEDURE — 3700000000 HC ANESTHESIA ATTENDED CARE: Performed by: UROLOGY

## 2025-01-10 PROCEDURE — 82962 GLUCOSE BLOOD TEST: CPT

## 2025-01-10 PROCEDURE — 3600000013 HC SURGERY LEVEL 3 ADDTL 15MIN: Performed by: UROLOGY

## 2025-01-10 PROCEDURE — 7100000011 HC PHASE II RECOVERY - ADDTL 15 MIN: Performed by: UROLOGY

## 2025-01-10 PROCEDURE — 87077 CULTURE AEROBIC IDENTIFY: CPT

## 2025-01-10 PROCEDURE — 87086 URINE CULTURE/COLONY COUNT: CPT

## 2025-01-10 PROCEDURE — 3700000001 HC ADD 15 MINUTES (ANESTHESIA): Performed by: UROLOGY

## 2025-01-10 DEVICE — UNIVERSA FIRM URETERAL STENT AND POSITIONER WITH HYDROPHILIC COATING
Type: IMPLANTABLE DEVICE | Site: URETER | Status: FUNCTIONAL
Brand: UNIVERSA

## 2025-01-10 RX ORDER — PROPOFOL 10 MG/ML
INJECTION, EMULSION INTRAVENOUS
Status: DISCONTINUED | OUTPATIENT
Start: 2025-01-10 | End: 2025-01-10 | Stop reason: SDUPTHER

## 2025-01-10 RX ORDER — SODIUM CHLORIDE 9 MG/ML
INJECTION, SOLUTION INTRAVENOUS
Status: DISCONTINUED | OUTPATIENT
Start: 2025-01-10 | End: 2025-01-10 | Stop reason: SDUPTHER

## 2025-01-10 RX ORDER — IOPAMIDOL 612 MG/ML
INJECTION, SOLUTION INTRAVASCULAR PRN
Status: DISCONTINUED | OUTPATIENT
Start: 2025-01-10 | End: 2025-01-10 | Stop reason: ALTCHOICE

## 2025-01-10 RX ORDER — PHENYLEPHRINE HCL IN 0.9% NACL 1 MG/10 ML
SYRINGE (ML) INTRAVENOUS
Status: DISCONTINUED | OUTPATIENT
Start: 2025-01-10 | End: 2025-01-10 | Stop reason: SDUPTHER

## 2025-01-10 RX ORDER — SODIUM CHLORIDE 0.9 % (FLUSH) 0.9 %
5-40 SYRINGE (ML) INJECTION PRN
Status: DISCONTINUED | OUTPATIENT
Start: 2025-01-10 | End: 2025-01-10 | Stop reason: HOSPADM

## 2025-01-10 RX ORDER — LEVOFLOXACIN 5 MG/ML
500 INJECTION, SOLUTION INTRAVENOUS
Status: COMPLETED | OUTPATIENT
Start: 2025-01-10 | End: 2025-01-10

## 2025-01-10 RX ORDER — SODIUM CHLORIDE 9 MG/ML
INJECTION, SOLUTION INTRAVENOUS CONTINUOUS
Status: DISCONTINUED | OUTPATIENT
Start: 2025-01-10 | End: 2025-01-10 | Stop reason: HOSPADM

## 2025-01-10 RX ORDER — SODIUM CHLORIDE 0.9 % (FLUSH) 0.9 %
5-40 SYRINGE (ML) INJECTION EVERY 12 HOURS SCHEDULED
Status: DISCONTINUED | OUTPATIENT
Start: 2025-01-10 | End: 2025-01-10 | Stop reason: HOSPADM

## 2025-01-10 RX ORDER — SODIUM CHLORIDE 9 MG/ML
INJECTION, SOLUTION INTRAVENOUS PRN
Status: DISCONTINUED | OUTPATIENT
Start: 2025-01-10 | End: 2025-01-10 | Stop reason: HOSPADM

## 2025-01-10 RX ORDER — FENTANYL CITRATE 50 UG/ML
INJECTION, SOLUTION INTRAMUSCULAR; INTRAVENOUS
Status: DISCONTINUED | OUTPATIENT
Start: 2025-01-10 | End: 2025-01-10 | Stop reason: SDUPTHER

## 2025-01-10 RX ADMIN — PROPOFOL 75 MCG/KG/MIN: 10 INJECTION, EMULSION INTRAVENOUS at 09:21

## 2025-01-10 RX ADMIN — LEVOFLOXACIN 500 MG: 5 INJECTION, SOLUTION INTRAVENOUS at 09:20

## 2025-01-10 RX ADMIN — SODIUM CHLORIDE: 9 INJECTION, SOLUTION INTRAVENOUS at 09:15

## 2025-01-10 RX ADMIN — FENTANYL CITRATE 50 MCG: 50 INJECTION, SOLUTION INTRAMUSCULAR; INTRAVENOUS at 09:21

## 2025-01-10 RX ADMIN — Medication 50 MCG: at 09:50

## 2025-01-10 ASSESSMENT — PAIN SCALES - GENERAL
PAINLEVEL_OUTOF10: 0

## 2025-01-10 ASSESSMENT — PAIN - FUNCTIONAL ASSESSMENT: PAIN_FUNCTIONAL_ASSESSMENT: NONE - DENIES PAIN

## 2025-01-10 ASSESSMENT — LIFESTYLE VARIABLES: SMOKING_STATUS: 0

## 2025-01-10 NOTE — ANESTHESIA POSTPROCEDURE EVALUATION
Department of Anesthesiology  Postprocedure Note    Patient: Isamar Lopez  MRN: [id number]  YOB: 1950  Date of evaluation: 1/10/2025    Procedure Summary       Date: 01/10/25 Room / Location: [address]    Anesthesia Start: 0915 Anesthesia Stop:     Procedure: CYSTOSCOPY RETROGRADE PYELOGRAM STENT BILATERAL STENT EXCHANGE, INSERTION OF URENA (Bilateral) Diagnosis:       Retention of urine, unspecified      (Retention of urine, unspecified [R33.9])    Surgeons: Pineda Bethea MD Responsible Provider: Stanton Valadez MD    Anesthesia Type: MAC ASA Status: 4            Anesthesia Type: MAC    Lino Phase I: Lino Score: 10    Lino Phase II: Lino Score: 10    Anesthesia Post Evaluation    Patient location during evaluation: PACU  Patient participation: complete - patient participated  Level of consciousness: awake  Pain score: 3  Airway patency: patent  Nausea & Vomiting: no nausea and no vomiting  Cardiovascular status: blood pressure returned to baseline  Respiratory status: acceptable  Hydration status: euvolemic      No notable events documented.

## 2025-01-10 NOTE — ANESTHESIA PRE PROCEDURE
Results   Component Value Date/Time    WBC 7.8 09/03/2024 02:06 PM    RBC 4.65 09/03/2024 02:06 PM    HGB 12.5 09/03/2024 02:06 PM    HCT 40.5 09/03/2024 02:06 PM    MCV 87.1 09/03/2024 02:06 PM    RDW 15.9 09/03/2024 02:06 PM     09/03/2024 02:06 PM       CMP:   Lab Results   Component Value Date/Time     11/13/2024 11:02 AM    K 4.9 11/13/2024 11:02 AM    K 4.6 01/26/2023 01:14 AM     11/13/2024 11:02 AM    CO2 22 11/13/2024 11:02 AM    BUN 45 11/13/2024 11:02 AM    CREATININE 2.7 11/13/2024 11:02 AM    GFRAA 31 07/19/2022 11:31 AM    LABGLOM 18 11/13/2024 11:02 AM    LABGLOM 19 04/30/2024 10:28 AM    GLUCOSE 165 11/13/2024 11:02 AM    GLUCOSE 123 03/05/2012 11:17 AM    CALCIUM 9.7 11/13/2024 11:02 AM    BILITOT 0.8 10/24/2024 11:53 AM    ALKPHOS 112 10/24/2024 11:53 AM    AST 15 10/24/2024 11:53 AM    ALT 10 10/24/2024 11:53 AM       POC Tests:   Recent Labs     01/10/25  0705   POCGLU 194*       Coags: No results found for: \"PROTIME\", \"INR\", \"APTT\"    HCG (If Applicable): No results found for: \"PREGTESTUR\", \"PREGSERUM\", \"HCG\", \"HCGQUANT\"     ABGs: No results found for: \"PHART\", \"PO2ART\", \"PWU9JGS\", \"YCU1EFY\", \"BEART\", \"H7AMTPIJ\"     Type & Screen (If Applicable):  No results found for: \"ABORH\", \"LABANTI\"    Drug/Infectious Status (If Applicable):  Lab Results   Component Value Date/Time    HEPCAB NONREACTIVE 01/24/2024 01:28 PM       COVID-19 Screening (If Applicable):   Lab Results   Component Value Date/Time    COVID19 Not Detected 01/26/2023 11:40 AM           Anesthesia Evaluation  Patient summary reviewed and Nursing notes reviewed   no history of anesthetic complications:   Airway: Mallampati: III  TM distance: >3 FB   Neck ROM: full  Mouth opening: > = 3 FB   Dental:    (+) upper dentures      Pulmonary:normal exam        (-) not a current smoker                           Cardiovascular:    (+) hypertension:        Rhythm: regular  Rate: normal                    Neuro/Psych:   (+) CVA

## 2025-01-10 NOTE — OP NOTE
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    POSTOPERATIVE DIAGNOSES:  Bilateral hydronephrosis; complete dislodgement of the right stent, it was in the bladder.  There was a left indwelling stent with possible pyonephrosis.    OPERATIONS:  Cystoscopy, bilateral retrograde pyelogram, reinsertion of the right stent, exchange of the left stent, exam under anesthesia, insertion of López catheter.    ANESTHESIA:  Monitored sedation.    BLOOD LOSS:  Less than 5 mL.    COMPLICATIONS:  None.    SPECIMENS:  Urine from each renal pelvis, sent separately.    DESCRIPTION OF PROCEDURE:  The time-out was read by me to Anesthesia and operating room staff.  Reviewed history, physical, allergy, and medication, all in agreement.  2 g of Ancef upon induction.  The patient was placed in lithotomy position.  No undue tension to hips, knees, or buttocks.  #21 panendoscope with an obturator inserted in the bladder.  The  film demonstrates the right stent had completely gravitated into the bladder without involvement of the ureter.  It was eventually removed.  5 open-ended catheter and a Glidewire inserted along the left stent.  The stent was removed intact.  Urine from the renal pelvis on the left that demonstrated slightly cloudy urine.  Retrograde pyelogram demonstrated no extravasation.  There was moderate hydronephrosis.

## 2025-01-10 NOTE — INTERVAL H&P NOTE
Update History & Physical    The patient's History and Physical of today was reviewed with the patient and I examined the patient. There was no change in the plan. The surgical site was confirmed by the patient and me.     Plan: The risks, benefits, expected outcome, and alternative to the recommended procedure have been discussed with the patient. Patient understands and wants to proceed with the procedure.     Electronically signed by Pineda Bethea MD on 1/10/2025 at 8:20 AM

## 2025-01-10 NOTE — DISCHARGE INSTRUCTIONS
Catheter can be exchanged once a month.  My office will call you to make an schedule the next arrangement of stent exchange.  I will call you if there is a need to change antibiotics.  If you have any problems call our office [phone].  You may see blood in the urine today.  Call if temperature is over 100.4  Resume diet  Ok to shower  Resume normal activity  Drink plenty of fluids

## 2025-01-10 NOTE — BRIEF OP NOTE
Brief Postoperative Note      Patient: Isamar Lopez  YOB: 1950  MRN: [id number]    Date of Procedure: 1/10/2025    Preop diagnosis.  Bilateral hydronephrosis neurogenic bladder previous history of anal carcinoma with colostomy and radiation therapy  Post-Op Diagnosis: Bilateral hydronephrosis complete dislodgment of the right stent it was in the bladder without involvement of the ureter.  Bilateral pyo nephritis  Procedure: Cystoscopy bilateral retrograde pyelograms reinsertion of the right stent and exchange of the left stent exam under anesthesia insertion of López  Surgeon(s):  Pineda Bethea MD    Assistant:  None    Anesthesia: Monitor Anesthesia Care    Estimated Blood Loss (mL): Less than 5 cc    Complications: None    Specimens:   Urine for culture from each renal pelvis and separately    Implants:  None      Drains:   Colostomy LUQ (Active)       Findings:  Infection Present At Time Of Surgery (PATOS) (choose all levels that have infection present):  - Superficial Infection (skin/subcutaneous) present as evidenced by pus  Other Findings: as above    Electronically signed by Pineda Bethea MD on 1/10/2025 at 8:20 AM

## 2025-01-10 NOTE — PROGRESS NOTES
Family at bedside.    
Patient to SRDA.  Appropriate monitors placed on patient.  Cart locked and in lowest position with side rails up.  Call light within reachPt verified using 2 Identifiers and ID band with OR staff prior to acceptance to Phase II care.   
Spoke with Estefania at Dr. CHINEDU Bethea's office discussed patient scheduled for surgery on 1/10 patient states took aspirin this morning instructed patient to stop taking now.  She states that is fine.   
Spoke with Natalee at Dr. FRANNIE Bethea's office discussed need up date H&P patient scheduled for surgery on 1/10 .  She states sent most recent 9/5/24  
Tolerating fluids well.    
last time you took the medicine, give this list to the nurse in Pre-Op.  [x] Take ONLY the following medications the morning of surgery:  amlodipine (Norvasc), carvedilol (Coreg), escitalopram (Lexapro), pantoprazole (Protonix)     Take lorazepam (Ativan) the morning of surgery if needed with a sip of water.   [x] Stop all herbal supplements and vitamins 5 days before surgery. Stop NSAIDS 7 days before surgery.  [x] DO NOT take any diabetic medicine the morning of surgery.  Follow instructions for insulin the day before surgery.  [x] If you are diabetic and your blood sugar is low or you feel symptomatic, you may drink 1-2 ounces of apple juice or take a glucose tablet.            -The morning of your procedure, you may call the pre-op area if you have concerns about your blood sugar [phone].    [x] Follow physician instructions regarding any blood thinners you may be taking.  Patient states last took 1/6 instructed to stop taking now.     WHAT TO EXPECT:    [x] The day of surgery you will be greeted and checked in by the [facility] . In addition, you will be registered in the [facility] by a Patient Access Representative. Please bring your photo ID and insurance card. A nurse will greet you in accordance to the time you are needed in the pre-op area to prepare you for surgery. Please do not be discouraged if you are not greeted in the order you arrive as there are many variables that are involved in patient preparation. Your patience is greatly appreciated as you wait for your nurse.   [x] Delays may occur with surgery and staff will make a sincere effort to keep you informed of delays. If any delays occur with your procedure, we apologize ahead of time for your inconvenience as we recognize the value of your time.

## 2025-01-11 LAB
MICROORGANISM SPEC CULT: ABNORMAL
MICROORGANISM SPEC CULT: ABNORMAL
SERVICE CMNT-IMP: ABNORMAL
SPECIMEN DESCRIPTION: ABNORMAL

## 2025-01-12 LAB
MICROORGANISM SPEC CULT: ABNORMAL
SERVICE CMNT-IMP: ABNORMAL
SPECIMEN DESCRIPTION: ABNORMAL

## 2025-01-13 LAB
MICROORGANISM SPEC CULT: ABNORMAL
SERVICE CMNT-IMP: ABNORMAL
SPECIMEN DESCRIPTION: ABNORMAL

## 2025-01-19 LAB
MICROORGANISM SPEC CULT: ABNORMAL
SERVICE CMNT-IMP: ABNORMAL
SPECIMEN DESCRIPTION: ABNORMAL

## 2025-02-12 NOTE — PROGRESS NOTES
Called to Dr. Page's office spoke with Lucie and patient is their patient requested new prolia script and recent calcium level faxed request also to the office. Called patient to remind her of the labs needed but no answer and unable to leave a message

## 2025-02-13 DIAGNOSIS — M80.00XD AGE-RELATED OSTEOPOROSIS WITH CURRENT PATHOLOGICAL FRACTURE WITH ROUTINE HEALING: Primary | ICD-10-CM

## 2025-02-13 DIAGNOSIS — I10 PRIMARY HYPERTENSION: ICD-10-CM

## 2025-02-18 NOTE — PROGRESS NOTES
Called patient and spoke with her family member, she will bring her tomorrow about an hour before appt and have labs drawn

## 2025-02-19 ENCOUNTER — HOSPITAL ENCOUNTER (OUTPATIENT)
Age: 75
Discharge: HOME | End: 2025-02-19
Payer: MEDICARE

## 2025-02-19 ENCOUNTER — HOSPITAL ENCOUNTER (OUTPATIENT)
Dept: INFUSION THERAPY | Age: 75
Setting detail: INFUSION SERIES
Discharge: HOME | End: 2025-02-19
Payer: MEDICARE

## 2025-02-19 VITALS
TEMPERATURE: 96.3 F | SYSTOLIC BLOOD PRESSURE: 152 MMHG | WEIGHT: 188 LBS | HEIGHT: 67 IN | OXYGEN SATURATION: 100 % | HEART RATE: 101 BPM | RESPIRATION RATE: 18 BRPM | BODY MASS INDEX: 29.51 KG/M2 | DIASTOLIC BLOOD PRESSURE: 84 MMHG

## 2025-02-19 DIAGNOSIS — M80.00XD AGE-RELATED OSTEOPOROSIS WITH CURRENT PATHOLOGICAL FRACTURE WITH ROUTINE HEALING: ICD-10-CM

## 2025-02-19 DIAGNOSIS — M80.00XD AGE-RELATED OSTEOPOROSIS WITH CURRENT PATHOLOGICAL FRACTURE WITH ROUTINE HEALING: Primary | ICD-10-CM

## 2025-02-19 LAB
ANION GAP SERPL CALCULATED.3IONS-SCNC: 12 MMOL/L (ref 7–16)
BUN SERPL-MCNC: 53 MG/DL (ref 6–23)
CALCIUM SERPL-MCNC: 10.3 MG/DL (ref 8.6–10.2)
CHLORIDE SERPL-SCNC: 101 MMOL/L (ref 98–107)
CO2 SERPL-SCNC: 23 MMOL/L (ref 22–29)
CREAT SERPL-MCNC: 3.2 MG/DL (ref 0.5–1)
GFR, ESTIMATED: 14 ML/MIN/1.73M2
GLUCOSE SERPL-MCNC: 177 MG/DL (ref 74–99)
POTASSIUM SERPL-SCNC: 5.1 MMOL/L (ref 3.5–5)
SODIUM SERPL-SCNC: 136 MMOL/L (ref 132–146)

## 2025-02-19 PROCEDURE — 80048 BASIC METABOLIC PNL TOTAL CA: CPT

## 2025-02-19 PROCEDURE — 36415 COLL VENOUS BLD VENIPUNCTURE: CPT

## 2025-02-19 PROCEDURE — 96372 THER/PROPH/DIAG INJ SC/IM: CPT

## 2025-02-19 PROCEDURE — 6360000002 HC RX W HCPCS: Performed by: FAMILY MEDICINE

## 2025-02-19 RX ADMIN — DENOSUMAB 60 MG: 60 INJECTION SUBCUTANEOUS at 13:11

## 2025-03-01 DIAGNOSIS — E11.22 TYPE 2 DIABETES MELLITUS WITH CHRONIC KIDNEY DISEASE, WITHOUT LONG-TERM CURRENT USE OF INSULIN, UNSPECIFIED CKD STAGE (HCC): ICD-10-CM

## 2025-03-03 RX ORDER — GLIPIZIDE 2.5 MG/1
2.5 TABLET, EXTENDED RELEASE ORAL 2 TIMES DAILY
Qty: 180 TABLET | Refills: 2 | OUTPATIENT
Start: 2025-03-03

## 2025-04-10 DIAGNOSIS — K21.9 GASTROESOPHAGEAL REFLUX DISEASE WITHOUT ESOPHAGITIS: ICD-10-CM

## 2025-04-10 RX ORDER — PANTOPRAZOLE SODIUM 40 MG/1
40 TABLET, DELAYED RELEASE ORAL EVERY MORNING
Qty: 90 TABLET | Refills: 1 | Status: SHIPPED | OUTPATIENT
Start: 2025-04-10

## 2025-04-10 NOTE — TELEPHONE ENCOUNTER
Name of Medication(s) Requested:  Requested Prescriptions     Pending Prescriptions Disp Refills    pantoprazole (PROTONIX) 40 MG tablet [Pharmacy Med Name: PANTOPRAZOLE 40MG TABLETS] 90 tablet 1     Sig: TAKE 1 TABLET BY MOUTH EVERY MORNING       Medication is on current medication list Yes    Dosage and directions were verified? Yes    Quantity verified: 90 day supply     Pharmacy Verified?  Yes    Last Appointment:  10/15/2024    Future appts:  Future Appointments   Date Time Provider Department Center   4/15/2025 10:45 AM Juliano Page MD [facility] DEP   8/19/2025 12:00 PM [facility]  8 [facility] HOD        (If no appt send self scheduling link. .REFILLAPPT)  Scheduling request sent?     [] Yes  [x] No    Does patient need updated?  [] Yes  [x] No

## 2025-04-15 ENCOUNTER — OFFICE VISIT (OUTPATIENT)
Dept: FAMILY MEDICINE CLINIC | Age: 75
End: 2025-04-15
Payer: MEDICARE

## 2025-04-15 VITALS
RESPIRATION RATE: 16 BRPM | OXYGEN SATURATION: 98 % | BODY MASS INDEX: 29.51 KG/M2 | SYSTOLIC BLOOD PRESSURE: 129 MMHG | HEIGHT: 67 IN | HEART RATE: 89 BPM | TEMPERATURE: 97.3 F | DIASTOLIC BLOOD PRESSURE: 73 MMHG | WEIGHT: 188 LBS

## 2025-04-15 DIAGNOSIS — I10 PRIMARY HYPERTENSION: ICD-10-CM

## 2025-04-15 DIAGNOSIS — F33.1 MAJOR DEPRESSIVE DISORDER, RECURRENT, MODERATE (HCC): ICD-10-CM

## 2025-04-15 DIAGNOSIS — E11.22 TYPE 2 DIABETES MELLITUS WITH CHRONIC KIDNEY DISEASE, WITHOUT LONG-TERM CURRENT USE OF INSULIN, UNSPECIFIED CKD STAGE (HCC): Primary | ICD-10-CM

## 2025-04-15 DIAGNOSIS — N18.4 CKD (CHRONIC KIDNEY DISEASE) STAGE 4, GFR 15-29 ML/MIN (HCC): ICD-10-CM

## 2025-04-15 DIAGNOSIS — E78.5 ELEVATED LIPIDS: ICD-10-CM

## 2025-04-15 DIAGNOSIS — Z93.3 COLOSTOMY PRESENT (HCC): ICD-10-CM

## 2025-04-15 LAB
ALBUMIN: 4.1 G/DL (ref 3.5–5.2)
ALP BLD-CCNC: 108 U/L (ref 35–104)
ALT SERPL-CCNC: 10 U/L (ref 0–35)
ANION GAP SERPL CALCULATED.3IONS-SCNC: 17 MMOL/L (ref 7–16)
AST SERPL-CCNC: 20 U/L (ref 0–35)
BILIRUB SERPL-MCNC: 0.4 MG/DL (ref 0–1.2)
BUN BLDV-MCNC: 55 MG/DL (ref 8–23)
CALCIUM SERPL-MCNC: 10.1 MG/DL (ref 8.8–10.2)
CHLORIDE BLD-SCNC: 100 MMOL/L (ref 98–107)
CHOLESTEROL, TOTAL: 112 MG/DL
CO2: 19 MMOL/L (ref 22–29)
CREAT SERPL-MCNC: 3.8 MG/DL (ref 0.5–1)
GFR, ESTIMATED: 12 ML/MIN/1.73M2
GLUCOSE BLD-MCNC: 174 MG/DL (ref 74–99)
HDLC SERPL-MCNC: 44 MG/DL
LDL CHOLESTEROL: 54 MG/DL
POTASSIUM SERPL-SCNC: 5.3 MMOL/L (ref 3.4–4.5)
SODIUM BLD-SCNC: 136 MMOL/L (ref 136–145)
TOTAL PROTEIN: 8.5 G/DL (ref 6.4–8.3)
TRIGL SERPL-MCNC: 67 MG/DL
TSH SERPL DL<=0.05 MIU/L-ACNC: 2.24 UIU/ML (ref 0.27–4.2)
VLDLC SERPL CALC-MCNC: 13 MG/DL

## 2025-04-15 PROCEDURE — 1036F TOBACCO NON-USER: CPT | Performed by: FAMILY MEDICINE

## 2025-04-15 PROCEDURE — 2022F DILAT RTA XM EVC RTNOPTHY: CPT | Performed by: FAMILY MEDICINE

## 2025-04-15 PROCEDURE — 99214 OFFICE O/P EST MOD 30 MIN: CPT | Performed by: FAMILY MEDICINE

## 2025-04-15 PROCEDURE — 3078F DIAST BP <80 MM HG: CPT | Performed by: FAMILY MEDICINE

## 2025-04-15 PROCEDURE — G8417 CALC BMI ABV UP PARAM F/U: HCPCS | Performed by: FAMILY MEDICINE

## 2025-04-15 PROCEDURE — G8399 PT W/DXA RESULTS DOCUMENT: HCPCS | Performed by: FAMILY MEDICINE

## 2025-04-15 PROCEDURE — 1090F PRES/ABSN URINE INCON ASSESS: CPT | Performed by: FAMILY MEDICINE

## 2025-04-15 PROCEDURE — 3044F HG A1C LEVEL LT 7.0%: CPT | Performed by: FAMILY MEDICINE

## 2025-04-15 PROCEDURE — 36415 COLL VENOUS BLD VENIPUNCTURE: CPT | Performed by: FAMILY MEDICINE

## 2025-04-15 PROCEDURE — 3074F SYST BP LT 130 MM HG: CPT | Performed by: FAMILY MEDICINE

## 2025-04-15 PROCEDURE — 3017F COLORECTAL CA SCREEN DOC REV: CPT | Performed by: FAMILY MEDICINE

## 2025-04-15 PROCEDURE — 1159F MED LIST DOCD IN RCRD: CPT | Performed by: FAMILY MEDICINE

## 2025-04-15 PROCEDURE — 1123F ACP DISCUSS/DSCN MKR DOCD: CPT | Performed by: FAMILY MEDICINE

## 2025-04-15 PROCEDURE — G8427 DOCREV CUR MEDS BY ELIG CLIN: HCPCS | Performed by: FAMILY MEDICINE

## 2025-04-15 RX ORDER — AMLODIPINE BESYLATE 10 MG/1
10 TABLET ORAL EVERY MORNING
Qty: 180 TABLET | Refills: 3 | Status: SHIPPED | OUTPATIENT
Start: 2025-04-15

## 2025-04-15 RX ORDER — GLIPIZIDE 2.5 MG/1
2.5 TABLET, EXTENDED RELEASE ORAL 2 TIMES DAILY
Qty: 180 TABLET | Refills: 2 | Status: SHIPPED | OUTPATIENT
Start: 2025-04-15

## 2025-04-15 RX ORDER — CALCITRIOL 0.25 UG/1
0.25 CAPSULE, LIQUID FILLED ORAL DAILY
COMMUNITY

## 2025-04-15 RX ORDER — LORAZEPAM 0.5 MG/1
0.5 TABLET ORAL DAILY
COMMUNITY

## 2025-04-15 SDOH — ECONOMIC STABILITY: FOOD INSECURITY: WITHIN THE PAST 12 MONTHS, YOU WORRIED THAT YOUR FOOD WOULD RUN OUT BEFORE YOU GOT MONEY TO BUY MORE.: NEVER TRUE

## 2025-04-15 SDOH — ECONOMIC STABILITY: FOOD INSECURITY: WITHIN THE PAST 12 MONTHS, THE FOOD YOU BOUGHT JUST DIDN'T LAST AND YOU DIDN'T HAVE MONEY TO GET MORE.: NEVER TRUE

## 2025-04-15 ASSESSMENT — PATIENT HEALTH QUESTIONNAIRE - PHQ9
SUM OF ALL RESPONSES TO PHQ QUESTIONS 1-9: 0
7. TROUBLE CONCENTRATING ON THINGS, SUCH AS READING THE NEWSPAPER OR WATCHING TELEVISION: NOT AT ALL
6. FEELING BAD ABOUT YOURSELF - OR THAT YOU ARE A FAILURE OR HAVE LET YOURSELF OR YOUR FAMILY DOWN: NOT AT ALL
1. LITTLE INTEREST OR PLEASURE IN DOING THINGS: NOT AT ALL
2. FEELING DOWN, DEPRESSED OR HOPELESS: NOT AT ALL
10. IF YOU CHECKED OFF ANY PROBLEMS, HOW DIFFICULT HAVE THESE PROBLEMS MADE IT FOR YOU TO DO YOUR WORK, TAKE CARE OF THINGS AT HOME, OR GET ALONG WITH OTHER PEOPLE: NOT DIFFICULT AT ALL
SUM OF ALL RESPONSES TO PHQ QUESTIONS 1-9: 0
5. POOR APPETITE OR OVEREATING: NOT AT ALL
8. MOVING OR SPEAKING SO SLOWLY THAT OTHER PEOPLE COULD HAVE NOTICED. OR THE OPPOSITE, BEING SO FIGETY OR RESTLESS THAT YOU HAVE BEEN MOVING AROUND A LOT MORE THAN USUAL: NOT AT ALL
3. TROUBLE FALLING OR STAYING ASLEEP: NOT AT ALL
4. FEELING TIRED OR HAVING LITTLE ENERGY: NOT AT ALL
9. THOUGHTS THAT YOU WOULD BE BETTER OFF DEAD, OR OF HURTING YOURSELF: NOT AT ALL
SUM OF ALL RESPONSES TO PHQ QUESTIONS 1-9: 0
SUM OF ALL RESPONSES TO PHQ QUESTIONS 1-9: 0

## 2025-04-15 NOTE — TELEPHONE ENCOUNTER
Name of Medication(s) Requested:  Requested Prescriptions     Pending Prescriptions Disp Refills    amLODIPine (NORVASC) 10 MG tablet [Pharmacy Med Name: AMLODIPINE BESYLATE 10MG TABLETS] 180 tablet 3     Sig: TAKE 1 TABLET BY MOUTH EVERY MORNING       Medication is on current medication list Yes    Dosage and directions were verified? Yes    Quantity verified: 90 day supply     Pharmacy Verified?  Yes    Last Appointment:  Visit date not found    Future appts:  Future Appointments   Date Time Provider Department Center   4/15/2025 10:45 AM Juliano Page MD [facility] DEP   8/19/2025 12:00 PM [facility] 3 [facility] HOD        (If no appt send self scheduling link. .REFILLAPPT)  Scheduling request sent?     [] Yes  [x] No    Does patient need updated?  [x] Yes  [] No

## 2025-04-15 NOTE — PROGRESS NOTES
SUBJECTIVE:        Patient ID: Isamar Lopez is a 74 y.o. female who presents for   Chief Complaint   Patient presents with    Diabetes    Health Maintenance     Foot doc scheduled next week, will obtain report     Follow-up of Diabetes Mellitus: A1c has improved.  She is watching her diet she is compliant with her medications.  Denies any chest pain, shortness of breath, fevers or chills  Hemoglobin A1C (%)   Date Value   04/15/2025 6.9 (H)     GERD.  Stable    Depression.  Stable.  Tolerating her medications on Lexapro and Ativan.  She has wean down Ativan to twice daily.  Discussed decreasing the dose at the next refill date.  Patient agrees with the plan    CKD: Following with renal  Past Medical History:   Diagnosis Date    Altered bowel elimination due to intestinal ostomy ([facility])     Arthritis     osteoporsis    Cancer ([facility])     colon and uterine    Cerebral artery occlusion with cerebral infarction ([facility])     uses walker    Closed fracture of radius 12/27/2016    left    Complicated UTI (urinary tract infection) 01/25/2023    Elevated lipids 01/15/2014    Headache     History of anal cancer 02/20/2017    Dr. Cutler    Hyperlipidemia     Hypertension     Major depressive disorder, recurrent, mild 04/18/2023    patient denies    Obesity     Osteoporosis     Poor historian     Proteinuria 06/25/2014    Type II or unspecified type diabetes mellitus without mention of complication, not stated as uncontrolled     Vaginal cancer ([facility]) 05/22/2017    Vitamin D deficiency 11/06/2014       Patient Active Problem List   Diagnosis    Hypertension    Elevated lipids    Proteinuria    Vitamin D deficiency    Closed fracture of radius    History of anal cancer    Colostomy present (HCC)    Type 2 diabetes mellitus with obesity ([facility])    Stuttering    CKD (chronic kidney disease) stage 4, GFR 15-29 ml/min ([facility])    Age-related osteoporosis with current pathological fracture with routine healing    Herpes zoster

## 2025-04-16 ENCOUNTER — TELEPHONE (OUTPATIENT)
Dept: FAMILY MEDICINE CLINIC | Age: 75
End: 2025-04-16

## 2025-04-16 ENCOUNTER — RESULTS FOLLOW-UP (OUTPATIENT)
Dept: FAMILY MEDICINE CLINIC | Age: 75
End: 2025-04-16

## 2025-04-16 DIAGNOSIS — I10 PRIMARY HYPERTENSION: Primary | ICD-10-CM

## 2025-04-16 LAB — HBA1C MFR BLD: 6.9 % (ref 4–5.6)

## 2025-04-16 NOTE — TELEPHONE ENCOUNTER
Received call from Kelby at [facility], he stated they did not get a lavender tube from [facility] to run an ordered CBC w/ Diff. I questioned how they could result an A1C if they did not get a lavender tube, Kelby stated he did not know but \"even if we found the tube it would be outside the window\".     Pended order for review and signature if re-draw desired.

## 2025-04-17 LAB
CREATININE URINE: 92.9 MG/DL (ref 29–226)
MICROALBUMIN/CREAT 24H UR: 651 MG/L (ref 0–20)
MICROALBUMIN/CREAT UR-RTO: 701 MCG/MG CREAT (ref 0–30)

## 2025-05-14 DIAGNOSIS — F06.1 CATATONIA: Primary | ICD-10-CM

## 2025-05-14 LAB
BACTERIA URNS QL MICRO: ABNORMAL
BILIRUB UR QL STRIP: NEGATIVE
CLARITY UR: CLEAR
COLOR UR: YELLOW
GLUCOSE UR STRIP-MCNC: NEGATIVE MG/DL
HGB UR QL STRIP.AUTO: ABNORMAL
KETONES UR STRIP-MCNC: NEGATIVE MG/DL
LEUKOCYTE ESTERASE UR QL STRIP: ABNORMAL
NITRITE UR QL STRIP: NEGATIVE
PH UR STRIP: 7 [PH] (ref 5–8)
PROT UR STRIP-MCNC: >=300 MG/DL
RBC #/AREA URNS HPF: ABNORMAL /HPF
SP GR UR STRIP: 1.01 (ref 1–1.03)
UROBILINOGEN UR STRIP-ACNC: 0.2 EU/DL (ref 0–1)
WBC #/AREA URNS HPF: ABNORMAL /HPF

## 2025-05-14 RX ORDER — LORAZEPAM 0.5 MG/1
0.5 TABLET ORAL DAILY
Qty: 30 TABLET | Refills: 0 | Status: ON HOLD | OUTPATIENT
Start: 2025-05-14 | End: 2025-06-13

## 2025-05-14 NOTE — TELEPHONE ENCOUNTER
Refill request    Lorazepam 0.5mg    [facility] [place]     Last Appointment   4/15/2025  Next Appointment  6/3/2025

## 2025-05-14 NOTE — TELEPHONE ENCOUNTER
Name of Medication(s) Requested:  Requested Prescriptions     Pending Prescriptions Disp Refills    LORazepam (ATIVAN) 0.5 MG tablet 30 tablet 0     Sig: Take 1 tablet by mouth daily for 30 days. Max Daily Amount: 0.5 mg       Medication is on current medication list Yes    Dosage and directions were verified? Yes    Quantity verified: 30 day supply     Pharmacy Verified?  Yes    Last Appointment:  4/15/2025    Future appts:  Future Appointments   Date Time Provider Department Center   6/3/2025  8:45 AM [facility]   8/19/2025 12:00 PM [facility]  3 [facility]   10/16/2025  2:45 PM [facility]   10/20/2025 11:15 AM [facility]        (If no appt send self scheduling link. .REFILLAPPT)  Scheduling request sent?     [] Yes  [x] No    Does patient need updated?  [] Yes  [x] No

## 2025-05-16 ENCOUNTER — APPOINTMENT (OUTPATIENT)
Dept: CT IMAGING | Age: 75
DRG: 660 | End: 2025-05-16
Attending: EMERGENCY MEDICINE
Payer: MEDICARE

## 2025-05-16 ENCOUNTER — HOSPITAL ENCOUNTER (INPATIENT)
Age: 75
LOS: 25 days | Discharge: HOME | DRG: 660 | End: 2025-06-10
Attending: EMERGENCY MEDICINE | Admitting: STUDENT IN AN ORGANIZED HEALTH CARE EDUCATION/TRAINING PROGRAM
Payer: MEDICARE

## 2025-05-16 ENCOUNTER — APPOINTMENT (OUTPATIENT)
Dept: GENERAL RADIOLOGY | Age: 75
DRG: 660 | End: 2025-05-16
Payer: MEDICARE

## 2025-05-16 DIAGNOSIS — E87.1 HYPONATREMIA: ICD-10-CM

## 2025-05-16 DIAGNOSIS — N17.9 AKI (ACUTE KIDNEY INJURY): ICD-10-CM

## 2025-05-16 DIAGNOSIS — R19.00 PELVIC MASS: ICD-10-CM

## 2025-05-16 DIAGNOSIS — Z16.12 INFECTION DUE TO ESBL-PRODUCING KLEBSIELLA PNEUMONIAE: ICD-10-CM

## 2025-05-16 DIAGNOSIS — E87.5 HYPERKALEMIA: ICD-10-CM

## 2025-05-16 DIAGNOSIS — N18.4 CKD (CHRONIC KIDNEY DISEASE) STAGE 4, GFR 15-29 ML/MIN (HCC): Primary | ICD-10-CM

## 2025-05-16 DIAGNOSIS — A49.8 INFECTION DUE TO ESBL-PRODUCING KLEBSIELLA PNEUMONIAE: ICD-10-CM

## 2025-05-16 PROBLEM — N13.30 HYDRONEPHROSIS: Status: ACTIVE | Noted: 2025-05-16

## 2025-05-16 PROBLEM — K94.19 INTESTINAL STOMA PROLAPSE (HCC): Status: ACTIVE | Noted: 2025-05-16

## 2025-05-16 PROBLEM — K92.2 GASTROINTESTINAL HEMORRHAGE: Status: ACTIVE | Noted: 2025-05-16

## 2025-05-16 PROBLEM — E87.8 ELECTROLYTE ABNORMALITY: Status: ACTIVE | Noted: 2025-05-16

## 2025-05-16 LAB
ALBUMIN SERPL-MCNC: 3.1 G/DL (ref 3.5–5.2)
ALP SERPL-CCNC: 110 U/L (ref 35–104)
ALT SERPL-CCNC: 17 U/L (ref 0–35)
ANION GAP SERPL CALCULATED.3IONS-SCNC: 12 MMOL/L (ref 7–16)
ANION GAP SERPL CALCULATED.3IONS-SCNC: 15 MMOL/L (ref 7–16)
AST SERPL-CCNC: 45 U/L (ref 0–35)
B-OH-BUTYR SERPL-MCNC: 1.12 MMOL/L (ref 0.02–0.27)
BASOPHILS # BLD: 0.01 K/UL (ref 0–0.2)
BASOPHILS NFR BLD: 0 % (ref 0–2)
BILIRUB SERPL-MCNC: 0.5 MG/DL (ref 0–1.2)
BUN SERPL-MCNC: 60 MG/DL (ref 8–23)
BUN SERPL-MCNC: 61 MG/DL (ref 8–23)
CALCIUM SERPL-MCNC: 8 MG/DL (ref 8.8–10.2)
CALCIUM SERPL-MCNC: 8.7 MG/DL (ref 8.8–10.2)
CANCER AG125 SERPL-ACNC: 149 U/ML (ref 0–38)
CEA SERPL-MCNC: 1.9 NG/ML (ref 0–5.2)
CHLORIDE SERPL-SCNC: 100 MMOL/L (ref 98–107)
CHLORIDE SERPL-SCNC: 98 MMOL/L (ref 98–107)
CO2 SERPL-SCNC: 16 MMOL/L (ref 22–29)
CO2 SERPL-SCNC: 18 MMOL/L (ref 22–29)
CREAT SERPL-MCNC: 5.1 MG/DL (ref 0.5–1)
CREAT SERPL-MCNC: 5.1 MG/DL (ref 0.5–1)
EKG ATRIAL RATE: 277 BPM
EKG Q-T INTERVAL: 414 MS
EKG QRS DURATION: 70 MS
EKG QTC CALCULATION (BAZETT): 440 MS
EKG R AXIS: 31 DEGREES
EKG T AXIS: 37 DEGREES
EKG VENTRICULAR RATE: 68 BPM
EOSINOPHIL # BLD: 0.04 K/UL (ref 0.05–0.5)
EOSINOPHILS RELATIVE PERCENT: 1 % (ref 0–6)
ERYTHROCYTE [DISTWIDTH] IN BLOOD BY AUTOMATED COUNT: 15 % (ref 11.5–15)
GFR, ESTIMATED: 8 ML/MIN/1.73M2
GFR, ESTIMATED: 8 ML/MIN/1.73M2
GLUCOSE BLD-MCNC: 155 MG/DL (ref 74–99)
GLUCOSE BLD-MCNC: 203 MG/DL (ref 74–99)
GLUCOSE BLD-MCNC: 47 MG/DL (ref 74–99)
GLUCOSE BLD-MCNC: 68 MG/DL (ref 74–99)
GLUCOSE BLD-MCNC: 75 MG/DL (ref 74–99)
GLUCOSE SERPL-MCNC: 104 MG/DL (ref 74–99)
GLUCOSE SERPL-MCNC: 44 MG/DL (ref 74–99)
HCT VFR BLD AUTO: 28.3 % (ref 34–48)
HGB BLD-MCNC: 8.8 G/DL (ref 11.5–15.5)
IMM GRANULOCYTES # BLD AUTO: 0.05 K/UL (ref 0–0.58)
IMM GRANULOCYTES NFR BLD: 1 % (ref 0–5)
INR PPP: 1.1
IRON SATN MFR SERPL: 12 % (ref 15–50)
IRON SERPL-MCNC: 34 UG/DL (ref 37–145)
LACTATE BLDV-SCNC: 1.4 MMOL/L (ref 0.5–2.2)
LYMPHOCYTES NFR BLD: 0.31 K/UL (ref 1.5–4)
LYMPHOCYTES RELATIVE PERCENT: 4 % (ref 20–42)
MCH RBC QN AUTO: 25.8 PG (ref 26–35)
MCHC RBC AUTO-ENTMCNC: 31.1 G/DL (ref 32–34.5)
MCV RBC AUTO: 83 FL (ref 80–99.9)
MONOCYTES NFR BLD: 0.4 K/UL (ref 0.1–0.95)
MONOCYTES NFR BLD: 5 % (ref 2–12)
NEUTROPHILS NFR BLD: 91 % (ref 43–80)
NEUTS SEG NFR BLD: 8.03 K/UL (ref 1.8–7.3)
OSMOLALITY SERPL: 302 MOSM/KG (ref 285–310)
PARTIAL THROMBOPLASTIN TIME: 31.9 SEC (ref 24.5–35.1)
PLATELET # BLD AUTO: 429 K/UL (ref 130–450)
PMV BLD AUTO: 10.1 FL (ref 7–12)
POTASSIUM SERPL-SCNC: 5.2 MMOL/L (ref 3.5–5.1)
POTASSIUM SERPL-SCNC: 6.1 MMOL/L (ref 3.5–5.1)
PROT SERPL-MCNC: 6.9 G/DL (ref 6.4–8.3)
PROTHROMBIN TIME: 11.9 SEC (ref 9.3–12.4)
RBC # BLD AUTO: 3.41 M/UL (ref 3.5–5.5)
RBC # BLD: ABNORMAL 10*6/UL
SODIUM SERPL-SCNC: 129 MMOL/L (ref 136–145)
SODIUM SERPL-SCNC: 131 MMOL/L (ref 136–145)
TIBC SERPL-MCNC: 274 UG/DL (ref 250–450)
TROPONIN I SERPL HS-MCNC: 62 NG/L (ref 0–14)
TROPONIN I SERPL HS-MCNC: 62 NG/L (ref 0–14)
WBC OTHER # BLD: 8.8 K/UL (ref 4.5–11.5)

## 2025-05-16 PROCEDURE — 93010 ELECTROCARDIOGRAM REPORT: CPT | Performed by: INTERNAL MEDICINE

## 2025-05-16 PROCEDURE — 71045 X-RAY EXAM CHEST 1 VIEW: CPT

## 2025-05-16 PROCEDURE — 74176 CT ABD & PELVIS W/O CONTRAST: CPT

## 2025-05-16 PROCEDURE — 85610 PROTHROMBIN TIME: CPT

## 2025-05-16 PROCEDURE — 2580000003 HC RX 258: Performed by: EMERGENCY MEDICINE

## 2025-05-16 PROCEDURE — 70450 CT HEAD/BRAIN W/O DYE: CPT

## 2025-05-16 PROCEDURE — 84484 ASSAY OF TROPONIN QUANT: CPT

## 2025-05-16 PROCEDURE — 80053 COMPREHEN METABOLIC PANEL: CPT

## 2025-05-16 PROCEDURE — 6370000000 HC RX 637 (ALT 250 FOR IP)

## 2025-05-16 PROCEDURE — 80048 BASIC METABOLIC PNL TOTAL CA: CPT

## 2025-05-16 PROCEDURE — 82378 CARCINOEMBRYONIC ANTIGEN: CPT

## 2025-05-16 PROCEDURE — 6370000000 HC RX 637 (ALT 250 FOR IP): Performed by: EMERGENCY MEDICINE

## 2025-05-16 PROCEDURE — 82010 KETONE BODYS QUAN: CPT

## 2025-05-16 PROCEDURE — 93005 ELECTROCARDIOGRAM TRACING: CPT | Performed by: EMERGENCY MEDICINE

## 2025-05-16 PROCEDURE — 6360000002 HC RX W HCPCS: Performed by: EMERGENCY MEDICINE

## 2025-05-16 PROCEDURE — 83930 ASSAY OF BLOOD OSMOLALITY: CPT

## 2025-05-16 PROCEDURE — 1200000000 HC SEMI PRIVATE

## 2025-05-16 PROCEDURE — 83540 ASSAY OF IRON: CPT

## 2025-05-16 PROCEDURE — 6370000000 HC RX 637 (ALT 250 FOR IP): Performed by: NURSE PRACTITIONER

## 2025-05-16 PROCEDURE — 99223 1ST HOSP IP/OBS HIGH 75: CPT | Performed by: SURGERY

## 2025-05-16 PROCEDURE — 36415 COLL VENOUS BLD VENIPUNCTURE: CPT

## 2025-05-16 PROCEDURE — 2500000003 HC RX 250 WO HCPCS: Performed by: NURSE PRACTITIONER

## 2025-05-16 PROCEDURE — 99222 1ST HOSP IP/OBS MODERATE 55: CPT | Performed by: NURSE PRACTITIONER

## 2025-05-16 PROCEDURE — 85730 THROMBOPLASTIN TIME PARTIAL: CPT

## 2025-05-16 PROCEDURE — 82962 GLUCOSE BLOOD TEST: CPT

## 2025-05-16 PROCEDURE — 86304 IMMUNOASSAY TUMOR CA 125: CPT

## 2025-05-16 PROCEDURE — 83550 IRON BINDING TEST: CPT

## 2025-05-16 PROCEDURE — 85025 COMPLETE CBC W/AUTO DIFF WBC: CPT

## 2025-05-16 PROCEDURE — 99285 EMERGENCY DEPT VISIT HI MDM: CPT

## 2025-05-16 PROCEDURE — 83605 ASSAY OF LACTIC ACID: CPT

## 2025-05-16 RX ORDER — ASPIRIN 81 MG/1
81 TABLET ORAL EVERY MORNING
Status: DISCONTINUED | OUTPATIENT
Start: 2025-05-17 | End: 2025-06-10 | Stop reason: HOSPADM

## 2025-05-16 RX ORDER — LORAZEPAM 0.5 MG/1
0.5 TABLET ORAL DAILY
Status: DISCONTINUED | OUTPATIENT
Start: 2025-05-16 | End: 2025-06-10 | Stop reason: HOSPADM

## 2025-05-16 RX ORDER — SODIUM BICARBONATE 650 MG/1
650 TABLET ORAL 2 TIMES DAILY
Status: DISCONTINUED | OUTPATIENT
Start: 2025-05-16 | End: 2025-05-16

## 2025-05-16 RX ORDER — CALCIUM GLUCONATE 20 MG/ML
1000 INJECTION, SOLUTION INTRAVENOUS ONCE
Status: COMPLETED | OUTPATIENT
Start: 2025-05-16 | End: 2025-05-16

## 2025-05-16 RX ORDER — MAGNESIUM SULFATE IN WATER 40 MG/ML
2000 INJECTION, SOLUTION INTRAVENOUS PRN
Status: DISCONTINUED | OUTPATIENT
Start: 2025-05-16 | End: 2025-05-16

## 2025-05-16 RX ORDER — AMLODIPINE BESYLATE 10 MG/1
10 TABLET ORAL EVERY MORNING
Status: DISCONTINUED | OUTPATIENT
Start: 2025-05-16 | End: 2025-06-10 | Stop reason: HOSPADM

## 2025-05-16 RX ORDER — ATORVASTATIN CALCIUM 40 MG/1
40 TABLET, FILM COATED ORAL NIGHTLY
Status: DISCONTINUED | OUTPATIENT
Start: 2025-05-16 | End: 2025-06-10 | Stop reason: HOSPADM

## 2025-05-16 RX ORDER — POLYETHYLENE GLYCOL 3350 17 G/17G
17 POWDER, FOR SOLUTION ORAL DAILY PRN
Status: DISCONTINUED | OUTPATIENT
Start: 2025-05-16 | End: 2025-06-10 | Stop reason: HOSPADM

## 2025-05-16 RX ORDER — VITAMIN B COMPLEX
1000 TABLET ORAL DAILY
Status: DISCONTINUED | OUTPATIENT
Start: 2025-05-16 | End: 2025-06-10 | Stop reason: HOSPADM

## 2025-05-16 RX ORDER — ENOXAPARIN SODIUM 100 MG/ML
40 INJECTION SUBCUTANEOUS DAILY
Status: DISCONTINUED | OUTPATIENT
Start: 2025-05-16 | End: 2025-05-16

## 2025-05-16 RX ORDER — CALCITRIOL 0.25 UG/1
0.25 CAPSULE, LIQUID FILLED ORAL DAILY
Status: DISCONTINUED | OUTPATIENT
Start: 2025-05-16 | End: 2025-06-10 | Stop reason: HOSPADM

## 2025-05-16 RX ORDER — GLUCAGON 1 MG/ML
1 KIT INJECTION PRN
Status: DISCONTINUED | OUTPATIENT
Start: 2025-05-16 | End: 2025-06-10 | Stop reason: HOSPADM

## 2025-05-16 RX ORDER — PANTOPRAZOLE SODIUM 40 MG/1
40 TABLET, DELAYED RELEASE ORAL EVERY MORNING
Status: DISCONTINUED | OUTPATIENT
Start: 2025-05-17 | End: 2025-05-16

## 2025-05-16 RX ORDER — SODIUM CHLORIDE 9 MG/ML
INJECTION, SOLUTION INTRAVENOUS PRN
Status: DISCONTINUED | OUTPATIENT
Start: 2025-05-16 | End: 2025-06-10 | Stop reason: HOSPADM

## 2025-05-16 RX ORDER — ONDANSETRON 2 MG/ML
4 INJECTION INTRAMUSCULAR; INTRAVENOUS EVERY 6 HOURS PRN
Status: DISCONTINUED | OUTPATIENT
Start: 2025-05-16 | End: 2025-06-10 | Stop reason: HOSPADM

## 2025-05-16 RX ORDER — SODIUM BICARBONATE 650 MG/1
650 TABLET ORAL 2 TIMES DAILY
Status: DISCONTINUED | OUTPATIENT
Start: 2025-05-16 | End: 2025-05-19

## 2025-05-16 RX ORDER — HEPARIN SODIUM 5000 [USP'U]/ML
5000 INJECTION, SOLUTION INTRAVENOUS; SUBCUTANEOUS EVERY 8 HOURS SCHEDULED
Status: DISCONTINUED | OUTPATIENT
Start: 2025-05-16 | End: 2025-05-16

## 2025-05-16 RX ORDER — POTASSIUM CHLORIDE 1500 MG/1
40 TABLET, EXTENDED RELEASE ORAL PRN
Status: DISCONTINUED | OUTPATIENT
Start: 2025-05-16 | End: 2025-05-16

## 2025-05-16 RX ORDER — POTASSIUM CHLORIDE 7.45 MG/ML
10 INJECTION INTRAVENOUS PRN
Status: DISCONTINUED | OUTPATIENT
Start: 2025-05-16 | End: 2025-05-16

## 2025-05-16 RX ORDER — SODIUM CHLORIDE 0.9 % (FLUSH) 0.9 %
5-40 SYRINGE (ML) INJECTION EVERY 12 HOURS SCHEDULED
Status: DISCONTINUED | OUTPATIENT
Start: 2025-05-16 | End: 2025-06-10 | Stop reason: HOSPADM

## 2025-05-16 RX ORDER — ONDANSETRON 4 MG/1
4 TABLET, ORALLY DISINTEGRATING ORAL EVERY 8 HOURS PRN
Status: DISCONTINUED | OUTPATIENT
Start: 2025-05-16 | End: 2025-06-10 | Stop reason: HOSPADM

## 2025-05-16 RX ORDER — ESCITALOPRAM OXALATE 10 MG/1
5 TABLET ORAL DAILY
Status: DISCONTINUED | OUTPATIENT
Start: 2025-05-16 | End: 2025-06-10 | Stop reason: HOSPADM

## 2025-05-16 RX ORDER — DEXTROSE MONOHYDRATE 100 MG/ML
INJECTION, SOLUTION INTRAVENOUS CONTINUOUS PRN
Status: DISCONTINUED | OUTPATIENT
Start: 2025-05-16 | End: 2025-06-10 | Stop reason: HOSPADM

## 2025-05-16 RX ORDER — MECOBALAMIN 5000 MCG
10 TABLET,DISINTEGRATING ORAL NIGHTLY
Status: DISCONTINUED | OUTPATIENT
Start: 2025-05-16 | End: 2025-06-10 | Stop reason: HOSPADM

## 2025-05-16 RX ORDER — CARVEDILOL 6.25 MG/1
6.25 TABLET ORAL 2 TIMES DAILY
Status: DISCONTINUED | OUTPATIENT
Start: 2025-05-16 | End: 2025-06-10 | Stop reason: HOSPADM

## 2025-05-16 RX ORDER — SODIUM CHLORIDE 9 MG/ML
INJECTION, SOLUTION INTRAVENOUS CONTINUOUS
Status: DISCONTINUED | OUTPATIENT
Start: 2025-05-16 | End: 2025-05-17

## 2025-05-16 RX ORDER — SODIUM CHLORIDE 0.9 % (FLUSH) 0.9 %
5-40 SYRINGE (ML) INJECTION PRN
Status: DISCONTINUED | OUTPATIENT
Start: 2025-05-16 | End: 2025-06-10 | Stop reason: HOSPADM

## 2025-05-16 RX ORDER — INSULIN LISPRO 100 [IU]/ML
0-8 INJECTION, SOLUTION INTRAVENOUS; SUBCUTANEOUS
Status: DISCONTINUED | OUTPATIENT
Start: 2025-05-16 | End: 2025-06-10 | Stop reason: HOSPADM

## 2025-05-16 RX ORDER — 0.9 % SODIUM CHLORIDE 0.9 %
1000 INTRAVENOUS SOLUTION INTRAVENOUS ONCE
Status: COMPLETED | OUTPATIENT
Start: 2025-05-16 | End: 2025-05-16

## 2025-05-16 RX ADMIN — SODIUM CHLORIDE 1000 ML: 0.9 INJECTION, SOLUTION INTRAVENOUS at 13:45

## 2025-05-16 RX ADMIN — Medication 10 MG: at 20:06

## 2025-05-16 RX ADMIN — CARVEDILOL 6.25 MG: 6.25 TABLET, FILM COATED ORAL at 20:06

## 2025-05-16 RX ADMIN — SODIUM CHLORIDE, PRESERVATIVE FREE 10 ML: 5 INJECTION INTRAVENOUS at 20:06

## 2025-05-16 RX ADMIN — SODIUM BICARBONATE 650 MG: 650 TABLET ORAL at 20:05

## 2025-05-16 RX ADMIN — CALCIUM GLUCONATE 1000 MG: 20 INJECTION, SOLUTION INTRAVENOUS at 13:32

## 2025-05-16 RX ADMIN — SODIUM CHLORIDE: 0.9 INJECTION, SOLUTION INTRAVENOUS at 13:46

## 2025-05-16 RX ADMIN — DEXTROSE MONOHYDRATE 250 ML: 10 INJECTION, SOLUTION INTRAVENOUS at 13:31

## 2025-05-16 RX ADMIN — ATORVASTATIN CALCIUM 40 MG: 40 TABLET, FILM COATED ORAL at 20:06

## 2025-05-16 RX ADMIN — INSULIN HUMAN 10 UNITS: 100 INJECTION, SOLUTION PARENTERAL at 13:35

## 2025-05-16 NOTE — H&P
abnormality.         CT ABDOMEN PELVIS WO CONTRAST Additional Contrast? None   Final Result   1. Bilateral hydroureteronephrosis, severe on the right and more mild degree   on the left with bilateral ureteral stents.  No obstructing kidney stones.   2. New bulky posterior right lower quadrant mass measuring 9.5 cm as well as   inferior greater omental mass.  There is poor visualization of the pelvic   viscera and question presence-history of pelvic malignancy with right lower   quadrant and greater omental changes likely reflecting metastatic disease.   3. Interval development of left lower quadrant small bowel containing   peristomal hernia.   4. Pelvic ascites.         XR CHEST PORTABLE   Final Result   No pneumonia or pleural effusion.                 ASSESSMENT:      Principal Problem:    Hydronephrosis  Active Problems:    Hydroureteronephrosis    Acute kidney injury superimposed on CKD    Pelvic mass    Electrolyte abnormality    Infection due to ESBL-producing Klebsiella pneumoniae  Resolved Problems:    * No resolved hospital problems. *      PLAN:    Bilateral hydroureteronephrosis: As seen on CT A/P.  S/p bilateral ureteral stents.  Keep patient n.p.o. for now for possible procedure.  Urology consulted  New right lower quadrant pelvic mass: Hx colon/uterine cancer-s/p colostomy creation and 1997. Consult general surgery.  Electrolyte abnormalities: Hyperkalemia and hyponatremia on labs.  Received potassium cocktail.  Monitor BMP.  Nephrology following.  JEANNINE on CKD: 2/2 hydroureteronephrosis.  Baseline creatinine 2.4-2.8.  Creatinine currently 5.1.  Continue IVF's.  Avoid nephrotoxic meds.  Nephrology consulted.  Anemia likely 2/2 GIB/CKD: C/o bright red/black stools.  Hgb currently 8.8.  Previous Hgb on 9/20/2024 was 12.5.  Hold anticoagulation.  Get stool for OB and iron panel ordered.  Start Protonix 40 mg IV twice daily.  Continue to monitor CBC.  Hgb.  Monitor for S/S of bleeding.  Transfuse for Hgb

## 2025-05-16 NOTE — ED NOTES
ED TO INPATIENT SBAR HANDOFF    Patient Name: Isamar Lopez   Preferred Name: Isamar  : 1950  74 y.o.   Code Status Order: Full Code  Telemetry Order: No  C-SSRS: Risk of Suicide: No Risk  Sitter no  NA  Restraints:       Situation  Chief Complaint   Patient presents with    Fall     Pt states sciatic like back pain and became dizzy and fell- recently diagnosed with UTI     Brief Description of Patient's Condition: Dizziness, abnormal labs, Hx of CA has ostomy & knutson chronic. Stents replaced Monday with urology.   Mental Status: oriented and alert    Background  Allergies:   Allergies   Allergen Reactions    Betadine [Povidone Iodine] Itching    Lisinopril Swelling     UNKNOWN AREA OF SWELLING    Penicillins Hives     BLISTERS      Tylenol [Acetaminophen] Rash       Assessment  Vitals/MEWS: MEWS Score: 1  Level of Consciousness: Alert (0)   Vitals:    25 1200 25 1230 25 1315 25 1400   BP: (!) 143/79   129/81   Pulse: 66   73   Resp: 11   18   Temp:   (!) 94.5 °F (34.7 °C) 97.4 °F (36.3 °C)   TempSrc:    Oral   SpO2: 98%   99%   Weight:  85.3 kg (188 lb)     Height:  1.702 m (5' 7\")       Cardiac Rhythm:    Deterioration Index (DI): Deterioration Index: 33.4  Deterioration Index (DI) Interventions Performed:    O2 Flow Rate:    O2 Device: O2 Device: None (Room air)    Active Central Lines:                          Active Wounds:    Active Knutson's:      Recommendation  Patient Belongings:    Additional Comments: Family at bedside   If any further questions, please call Sending RN at [phone]  Opportunity for questions and clarification were provided to (name of person notified and time): Saida        Electronically signed by: Electronically signed by Samina Canela RN on 2025 at 4:57 PM

## 2025-05-16 NOTE — ED PROVIDER NOTES
Department of Emergency Medicine   ED  Provider Note  Admit Date/RoomTime: 5/16/2025 10:04 AM  ED Room: 13/13          History of Present Illness:  5/16/25, Time: 12:20 PM EDT  Chief Complaint   Patient presents with    Fall     Pt states sciatic like back pain and became dizzy and fell- recently diagnosed with UTI                Isamar Lopez is a 74 y.o. female presenting to the ED for dizziness.  Patient states she has been dizzy since this morning.  She felt lightheaded, the room began to spin, and she did fall.  She not strike her head or lose conscious.  She sat back down.  She history is received diagnosed with UTI.  Does have a history of remote cancer, she has a chronic López and a colostomy.  No change of output from her ostomy.  No fevers or chills.  No chest pain or shortness of breath.  No weakness or numbness in the arms or legs.  She does complain of right sided back pain, aching in nature, however, she has had this for quite some time.  No other symptoms or complaints.    Review of Systems:   Pertinent positives and negatives are stated within HPI, all other systems reviewed and are negative.        --------------------------------------------- PAST HISTORY ---------------------------------------------  Past Medical History:  has a past medical history of Altered bowel elimination due to intestinal ostomy (HCC), Arthritis, Cancer (HCC), Cerebral artery occlusion with cerebral infarction (HCC), Closed fracture of radius, Complicated UTI (urinary tract infection), Elevated lipids, Headache, History of anal cancer, Hyperlipidemia, Hypertension, Major depressive disorder, recurrent, mild, Obesity, Osteoporosis, Poor historian, Proteinuria, Type II or unspecified type diabetes mellitus without mention of complication, not stated as uncontrolled, Vaginal cancer (HCC), and Vitamin D deficiency.    Past Surgical History:  has a past surgical history that includes colostomy; Rectal surgery; Breast biopsy;

## 2025-05-17 LAB
ALBUMIN SERPL-MCNC: 3 G/DL (ref 3.5–5.2)
ALP SERPL-CCNC: 80 U/L (ref 35–104)
ALT SERPL-CCNC: 18 U/L (ref 0–35)
ANION GAP SERPL CALCULATED.3IONS-SCNC: 12 MMOL/L (ref 7–16)
AST SERPL-CCNC: 43 U/L (ref 0–35)
BASOPHILS # BLD: 0 K/UL (ref 0–0.2)
BASOPHILS NFR BLD: 0 % (ref 0–2)
BILIRUB SERPL-MCNC: 0.5 MG/DL (ref 0–1.2)
BUN SERPL-MCNC: 61 MG/DL (ref 8–23)
CALCIUM SERPL-MCNC: 8.1 MG/DL (ref 8.8–10.2)
CHLORIDE SERPL-SCNC: 100 MMOL/L (ref 98–107)
CO2 SERPL-SCNC: 17 MMOL/L (ref 22–29)
CREAT SERPL-MCNC: 5.2 MG/DL (ref 0.5–1)
CREAT UR-MCNC: 88.4 MG/DL (ref 29–226)
CREAT UR-MCNC: 88.9 MG/DL (ref 29–226)
EOSINOPHIL # BLD: 0.07 K/UL (ref 0.05–0.5)
EOSINOPHILS RELATIVE PERCENT: 1 % (ref 0–6)
ERYTHROCYTE [DISTWIDTH] IN BLOOD BY AUTOMATED COUNT: 15.1 % (ref 11.5–15)
GFR, ESTIMATED: 8 ML/MIN/1.73M2
GLUCOSE BLD-MCNC: 135 MG/DL (ref 74–99)
GLUCOSE BLD-MCNC: 163 MG/DL (ref 74–99)
GLUCOSE BLD-MCNC: 175 MG/DL (ref 74–99)
GLUCOSE BLD-MCNC: 245 MG/DL (ref 74–99)
GLUCOSE SERPL-MCNC: 157 MG/DL (ref 74–99)
HCT VFR BLD AUTO: 27.6 % (ref 34–48)
HGB BLD-MCNC: 8.7 G/DL (ref 11.5–15.5)
LYMPHOCYTES NFR BLD: 0.3 K/UL (ref 1.5–4)
LYMPHOCYTES RELATIVE PERCENT: 4 % (ref 20–42)
MAGNESIUM SERPL-MCNC: 3.1 MG/DL (ref 1.6–2.4)
MCH RBC QN AUTO: 26.1 PG (ref 26–35)
MCHC RBC AUTO-ENTMCNC: 31.5 G/DL (ref 32–34.5)
MCV RBC AUTO: 82.9 FL (ref 80–99.9)
MICROORGANISM SPEC CULT: ABNORMAL
MONOCYTES NFR BLD: 0.37 K/UL (ref 0.1–0.95)
MONOCYTES NFR BLD: 4 % (ref 2–12)
NEUTROPHILS NFR BLD: 91 % (ref 43–80)
NEUTS SEG NFR BLD: 7.75 K/UL (ref 1.8–7.3)
PHOSPHATE SERPL-MCNC: 3.7 MG/DL (ref 2.5–4.5)
PLATELET # BLD AUTO: 396 K/UL (ref 130–450)
PMV BLD AUTO: 9.9 FL (ref 7–12)
POTASSIUM SERPL-SCNC: 5.8 MMOL/L (ref 3.5–5.1)
PROT SERPL-MCNC: 6.6 G/DL (ref 6.4–8.3)
RBC # BLD AUTO: 3.33 M/UL (ref 3.5–5.5)
RBC # BLD: ABNORMAL 10*6/UL
SODIUM SERPL-SCNC: 129 MMOL/L (ref 136–145)
SODIUM UR-SCNC: 44 MMOL/L
SPECIMEN DESCRIPTION: ABNORMAL
TOTAL PROTEIN, URINE: 262 MG/DL (ref 0–12)
URINE TOTAL PROTEIN CREATININE RATIO: 2.96 (ref 0–0.2)
UUN UR-MCNC: 346 MG/DL (ref 800–1666)
WBC OTHER # BLD: 8.5 K/UL (ref 4.5–11.5)

## 2025-05-17 PROCEDURE — 99231 SBSQ HOSP IP/OBS SF/LOW 25: CPT | Performed by: SURGERY

## 2025-05-17 PROCEDURE — 83735 ASSAY OF MAGNESIUM: CPT

## 2025-05-17 PROCEDURE — 80053 COMPREHEN METABOLIC PANEL: CPT

## 2025-05-17 PROCEDURE — 6370000000 HC RX 637 (ALT 250 FOR IP): Performed by: STUDENT IN AN ORGANIZED HEALTH CARE EDUCATION/TRAINING PROGRAM

## 2025-05-17 PROCEDURE — 82570 ASSAY OF URINE CREATININE: CPT

## 2025-05-17 PROCEDURE — 1200000000 HC SEMI PRIVATE

## 2025-05-17 PROCEDURE — 82962 GLUCOSE BLOOD TEST: CPT

## 2025-05-17 PROCEDURE — 2580000003 HC RX 258: Performed by: STUDENT IN AN ORGANIZED HEALTH CARE EDUCATION/TRAINING PROGRAM

## 2025-05-17 PROCEDURE — 6370000000 HC RX 637 (ALT 250 FOR IP): Performed by: NURSE PRACTITIONER

## 2025-05-17 PROCEDURE — 84540 ASSAY OF URINE/UREA-N: CPT

## 2025-05-17 PROCEDURE — 2580000003 HC RX 258: Performed by: NURSE PRACTITIONER

## 2025-05-17 PROCEDURE — 84100 ASSAY OF PHOSPHORUS: CPT

## 2025-05-17 PROCEDURE — 81001 URINALYSIS AUTO W/SCOPE: CPT

## 2025-05-17 PROCEDURE — 6370000000 HC RX 637 (ALT 250 FOR IP)

## 2025-05-17 PROCEDURE — 85025 COMPLETE CBC W/AUTO DIFF WBC: CPT

## 2025-05-17 PROCEDURE — 2580000003 HC RX 258: Performed by: INTERNAL MEDICINE

## 2025-05-17 PROCEDURE — 99231 SBSQ HOSP IP/OBS SF/LOW 25: CPT | Performed by: INTERNAL MEDICINE

## 2025-05-17 PROCEDURE — 6360000002 HC RX W HCPCS: Performed by: NURSE PRACTITIONER

## 2025-05-17 PROCEDURE — 83935 ASSAY OF URINE OSMOLALITY: CPT

## 2025-05-17 PROCEDURE — 6370000000 HC RX 637 (ALT 250 FOR IP): Performed by: SURGERY

## 2025-05-17 PROCEDURE — 2500000003 HC RX 250 WO HCPCS: Performed by: NURSE PRACTITIONER

## 2025-05-17 PROCEDURE — 36415 COLL VENOUS BLD VENIPUNCTURE: CPT

## 2025-05-17 PROCEDURE — 84156 ASSAY OF PROTEIN URINE: CPT

## 2025-05-17 PROCEDURE — 84300 ASSAY OF URINE SODIUM: CPT

## 2025-05-17 PROCEDURE — 2500000003 HC RX 250 WO HCPCS: Performed by: STUDENT IN AN ORGANIZED HEALTH CARE EDUCATION/TRAINING PROGRAM

## 2025-05-17 PROCEDURE — 6360000002 HC RX W HCPCS: Performed by: INTERNAL MEDICINE

## 2025-05-17 RX ORDER — POLYETHYLENE GLYCOL 3350 17 G/17G
17 POWDER, FOR SOLUTION ORAL DAILY
Status: DISCONTINUED | OUTPATIENT
Start: 2025-05-18 | End: 2025-06-10 | Stop reason: HOSPADM

## 2025-05-17 RX ORDER — SENNOSIDES 8.6 MG/1
1 TABLET ORAL NIGHTLY
Status: DISCONTINUED | OUTPATIENT
Start: 2025-05-17 | End: 2025-06-10 | Stop reason: HOSPADM

## 2025-05-17 RX ADMIN — SODIUM BICARBONATE 650 MG: 650 TABLET ORAL at 11:13

## 2025-05-17 RX ADMIN — CARVEDILOL 6.25 MG: 6.25 TABLET, FILM COATED ORAL at 22:50

## 2025-05-17 RX ADMIN — INSULIN LISPRO 2 UNITS: 100 INJECTION, SOLUTION INTRAVENOUS; SUBCUTANEOUS at 18:15

## 2025-05-17 RX ADMIN — ATORVASTATIN CALCIUM 40 MG: 40 TABLET, FILM COATED ORAL at 22:49

## 2025-05-17 RX ADMIN — CARVEDILOL 6.25 MG: 6.25 TABLET, FILM COATED ORAL at 11:13

## 2025-05-17 RX ADMIN — ESCITALOPRAM OXALATE 5 MG: 10 TABLET ORAL at 11:12

## 2025-05-17 RX ADMIN — SODIUM ZIRCONIUM CYCLOSILICATE 10 G: 10 POWDER, FOR SUSPENSION ORAL at 22:47

## 2025-05-17 RX ADMIN — AMLODIPINE BESYLATE 10 MG: 10 TABLET ORAL at 11:12

## 2025-05-17 RX ADMIN — CALCITRIOL CAPSULES 0.25 MCG 0.25 MCG: 0.25 CAPSULE ORAL at 11:16

## 2025-05-17 RX ADMIN — SODIUM BICARBONATE: 84 INJECTION, SOLUTION INTRAVENOUS at 13:48

## 2025-05-17 RX ADMIN — SODIUM ZIRCONIUM CYCLOSILICATE 10 G: 10 POWDER, FOR SUSPENSION ORAL at 13:55

## 2025-05-17 RX ADMIN — SODIUM CHLORIDE, PRESERVATIVE FREE 40 MG: 5 INJECTION INTRAVENOUS at 13:40

## 2025-05-17 RX ADMIN — LORAZEPAM 0.5 MG: 0.5 TABLET ORAL at 11:12

## 2025-05-17 RX ADMIN — SODIUM BICARBONATE 650 MG: 650 TABLET ORAL at 22:49

## 2025-05-17 RX ADMIN — POLYETHYLENE GLYCOL 3350 17 G: 17 POWDER, FOR SOLUTION ORAL at 22:52

## 2025-05-17 RX ADMIN — MEROPENEM 1000 MG: 1 INJECTION INTRAVENOUS at 17:25

## 2025-05-17 RX ADMIN — Medication 10 MG: at 22:49

## 2025-05-17 RX ADMIN — Medication 1000 UNITS: at 11:12

## 2025-05-17 RX ADMIN — SODIUM CHLORIDE, PRESERVATIVE FREE 15 ML: 5 INJECTION INTRAVENOUS at 11:16

## 2025-05-17 RX ADMIN — SENNOSIDES 8.6 MG: 8.6 TABLET, COATED ORAL at 22:50

## 2025-05-17 RX ADMIN — SODIUM CHLORIDE, PRESERVATIVE FREE 10 ML: 5 INJECTION INTRAVENOUS at 22:53

## 2025-05-17 RX ADMIN — ASPIRIN 81 MG: 81 TABLET, COATED ORAL at 11:11

## 2025-05-17 RX ADMIN — SODIUM CHLORIDE, PRESERVATIVE FREE 40 MG: 5 INJECTION INTRAVENOUS at 01:18

## 2025-05-18 LAB
ALBUMIN SERPL-MCNC: 2.7 G/DL (ref 3.5–5.2)
ALP SERPL-CCNC: 80 U/L (ref 35–104)
ALT SERPL-CCNC: 16 U/L (ref 0–35)
ANION GAP SERPL CALCULATED.3IONS-SCNC: 10 MMOL/L (ref 7–16)
AST SERPL-CCNC: 33 U/L (ref 0–35)
BASOPHILS # BLD: 0 K/UL (ref 0–0.2)
BASOPHILS NFR BLD: 0 % (ref 0–2)
BILIRUB SERPL-MCNC: 0.4 MG/DL (ref 0–1.2)
BUN SERPL-MCNC: 65 MG/DL (ref 8–23)
CALCIUM SERPL-MCNC: 7.7 MG/DL (ref 8.8–10.2)
CHLORIDE SERPL-SCNC: 97 MMOL/L (ref 98–107)
CO2 SERPL-SCNC: 22 MMOL/L (ref 22–29)
CREAT SERPL-MCNC: 5.3 MG/DL (ref 0.5–1)
EOSINOPHIL # BLD: 0.14 K/UL (ref 0.05–0.5)
EOSINOPHILS RELATIVE PERCENT: 2 % (ref 0–6)
ERYTHROCYTE [DISTWIDTH] IN BLOOD BY AUTOMATED COUNT: 15 % (ref 11.5–15)
GFR, ESTIMATED: 8 ML/MIN/1.73M2
GLUCOSE BLD-MCNC: 154 MG/DL (ref 74–99)
GLUCOSE BLD-MCNC: 216 MG/DL (ref 74–99)
GLUCOSE BLD-MCNC: 219 MG/DL (ref 74–99)
GLUCOSE BLD-MCNC: 227 MG/DL (ref 74–99)
GLUCOSE SERPL-MCNC: 175 MG/DL (ref 74–99)
HCT VFR BLD AUTO: 25.2 % (ref 34–48)
HGB BLD-MCNC: 8.1 G/DL (ref 11.5–15.5)
LYMPHOCYTES NFR BLD: 0.48 K/UL (ref 1.5–4)
LYMPHOCYTES RELATIVE PERCENT: 6 % (ref 20–42)
MAGNESIUM SERPL-MCNC: 2.9 MG/DL (ref 1.6–2.4)
MCH RBC QN AUTO: 26.1 PG (ref 26–35)
MCHC RBC AUTO-ENTMCNC: 32.1 G/DL (ref 32–34.5)
MCV RBC AUTO: 81.3 FL (ref 80–99.9)
MONOCYTES NFR BLD: 0.69 K/UL (ref 0.1–0.95)
MONOCYTES NFR BLD: 9 % (ref 2–12)
NEUTROPHILS NFR BLD: 84 % (ref 43–80)
NEUTS SEG NFR BLD: 6.59 K/UL (ref 1.8–7.3)
PHOSPHATE SERPL-MCNC: 2.8 MG/DL (ref 2.5–4.5)
PLATELET # BLD AUTO: 399 K/UL (ref 130–450)
PMV BLD AUTO: 10.4 FL (ref 7–12)
POTASSIUM SERPL-SCNC: 5.2 MMOL/L (ref 3.5–5.1)
PROT SERPL-MCNC: 6.3 G/DL (ref 6.4–8.3)
RBC # BLD AUTO: 3.1 M/UL (ref 3.5–5.5)
RBC # BLD: ABNORMAL 10*6/UL
SODIUM SERPL-SCNC: 129 MMOL/L (ref 136–145)
TSH SERPL DL<=0.05 MIU/L-ACNC: 1.74 UIU/ML (ref 0.27–4.2)
URATE SERPL-MCNC: 10 MG/DL (ref 2.4–5.7)
WBC OTHER # BLD: 7.9 K/UL (ref 4.5–11.5)

## 2025-05-18 PROCEDURE — 80053 COMPREHEN METABOLIC PANEL: CPT

## 2025-05-18 PROCEDURE — 82962 GLUCOSE BLOOD TEST: CPT

## 2025-05-18 PROCEDURE — 6360000002 HC RX W HCPCS: Performed by: NURSE PRACTITIONER

## 2025-05-18 PROCEDURE — 2580000003 HC RX 258: Performed by: NURSE PRACTITIONER

## 2025-05-18 PROCEDURE — 2580000003 HC RX 258: Performed by: STUDENT IN AN ORGANIZED HEALTH CARE EDUCATION/TRAINING PROGRAM

## 2025-05-18 PROCEDURE — 6370000000 HC RX 637 (ALT 250 FOR IP): Performed by: STUDENT IN AN ORGANIZED HEALTH CARE EDUCATION/TRAINING PROGRAM

## 2025-05-18 PROCEDURE — 99231 SBSQ HOSP IP/OBS SF/LOW 25: CPT | Performed by: INTERNAL MEDICINE

## 2025-05-18 PROCEDURE — 2500000003 HC RX 250 WO HCPCS: Performed by: STUDENT IN AN ORGANIZED HEALTH CARE EDUCATION/TRAINING PROGRAM

## 2025-05-18 PROCEDURE — 84443 ASSAY THYROID STIM HORMONE: CPT

## 2025-05-18 PROCEDURE — 6370000000 HC RX 637 (ALT 250 FOR IP): Performed by: SURGERY

## 2025-05-18 PROCEDURE — 36415 COLL VENOUS BLD VENIPUNCTURE: CPT

## 2025-05-18 PROCEDURE — 84550 ASSAY OF BLOOD/URIC ACID: CPT

## 2025-05-18 PROCEDURE — 2500000003 HC RX 250 WO HCPCS: Performed by: NURSE PRACTITIONER

## 2025-05-18 PROCEDURE — 6370000000 HC RX 637 (ALT 250 FOR IP): Performed by: NURSE PRACTITIONER

## 2025-05-18 PROCEDURE — 85025 COMPLETE CBC W/AUTO DIFF WBC: CPT

## 2025-05-18 PROCEDURE — 1200000000 HC SEMI PRIVATE

## 2025-05-18 PROCEDURE — 99231 SBSQ HOSP IP/OBS SF/LOW 25: CPT | Performed by: SURGERY

## 2025-05-18 PROCEDURE — 2580000003 HC RX 258: Performed by: INTERNAL MEDICINE

## 2025-05-18 PROCEDURE — 84100 ASSAY OF PHOSPHORUS: CPT

## 2025-05-18 PROCEDURE — 6360000002 HC RX W HCPCS: Performed by: INTERNAL MEDICINE

## 2025-05-18 PROCEDURE — 6370000000 HC RX 637 (ALT 250 FOR IP)

## 2025-05-18 PROCEDURE — 83735 ASSAY OF MAGNESIUM: CPT

## 2025-05-18 RX ADMIN — SODIUM ZIRCONIUM CYCLOSILICATE 10 G: 10 POWDER, FOR SUSPENSION ORAL at 09:37

## 2025-05-18 RX ADMIN — SODIUM CHLORIDE, PRESERVATIVE FREE 40 MG: 5 INJECTION INTRAVENOUS at 13:08

## 2025-05-18 RX ADMIN — SODIUM BICARBONATE 650 MG: 650 TABLET ORAL at 09:32

## 2025-05-18 RX ADMIN — CARVEDILOL 6.25 MG: 6.25 TABLET, FILM COATED ORAL at 21:44

## 2025-05-18 RX ADMIN — ESCITALOPRAM OXALATE 5 MG: 10 TABLET ORAL at 09:32

## 2025-05-18 RX ADMIN — Medication 10 MG: at 21:44

## 2025-05-18 RX ADMIN — LORAZEPAM 0.5 MG: 0.5 TABLET ORAL at 09:32

## 2025-05-18 RX ADMIN — MEROPENEM 1000 MG: 1 INJECTION INTRAVENOUS at 05:13

## 2025-05-18 RX ADMIN — Medication 1000 UNITS: at 09:32

## 2025-05-18 RX ADMIN — ASPIRIN 81 MG: 81 TABLET, COATED ORAL at 09:32

## 2025-05-18 RX ADMIN — SENNOSIDES 8.6 MG: 8.6 TABLET, COATED ORAL at 21:44

## 2025-05-18 RX ADMIN — SODIUM BICARBONATE: 84 INJECTION, SOLUTION INTRAVENOUS at 18:04

## 2025-05-18 RX ADMIN — POLYETHYLENE GLYCOL 3350 17 G: 17 POWDER, FOR SOLUTION ORAL at 09:35

## 2025-05-18 RX ADMIN — SODIUM BICARBONATE: 84 INJECTION, SOLUTION INTRAVENOUS at 06:03

## 2025-05-18 RX ADMIN — SODIUM CHLORIDE, PRESERVATIVE FREE 40 MG: 5 INJECTION INTRAVENOUS at 02:18

## 2025-05-18 RX ADMIN — MEROPENEM 1000 MG: 1 INJECTION INTRAVENOUS at 16:53

## 2025-05-18 RX ADMIN — SODIUM CHLORIDE, PRESERVATIVE FREE 10 ML: 5 INJECTION INTRAVENOUS at 21:46

## 2025-05-18 RX ADMIN — INSULIN LISPRO 2 UNITS: 100 INJECTION, SOLUTION INTRAVENOUS; SUBCUTANEOUS at 13:08

## 2025-05-18 RX ADMIN — INSULIN LISPRO 2 UNITS: 100 INJECTION, SOLUTION INTRAVENOUS; SUBCUTANEOUS at 09:34

## 2025-05-18 RX ADMIN — AMLODIPINE BESYLATE 10 MG: 10 TABLET ORAL at 09:32

## 2025-05-18 RX ADMIN — ATORVASTATIN CALCIUM 40 MG: 40 TABLET, FILM COATED ORAL at 21:44

## 2025-05-18 RX ADMIN — SODIUM BICARBONATE: 84 INJECTION, SOLUTION INTRAVENOUS at 02:18

## 2025-05-18 RX ADMIN — SODIUM BICARBONATE 650 MG: 650 TABLET ORAL at 21:44

## 2025-05-18 RX ADMIN — CALCITRIOL CAPSULES 0.25 MCG 0.25 MCG: 0.25 CAPSULE ORAL at 09:33

## 2025-05-18 RX ADMIN — CARVEDILOL 6.25 MG: 6.25 TABLET, FILM COATED ORAL at 09:32

## 2025-05-18 RX ADMIN — INSULIN LISPRO 2 UNITS: 100 INJECTION, SOLUTION INTRAVENOUS; SUBCUTANEOUS at 16:54

## 2025-05-19 ENCOUNTER — ANESTHESIA (OUTPATIENT)
Dept: OPERATING ROOM | Age: 75
End: 2025-05-19
Payer: MEDICARE

## 2025-05-19 ENCOUNTER — ANESTHESIA EVENT (OUTPATIENT)
Dept: OPERATING ROOM | Age: 75
End: 2025-05-19
Payer: MEDICARE

## 2025-05-19 ENCOUNTER — APPOINTMENT (OUTPATIENT)
Dept: GENERAL RADIOLOGY | Age: 75
DRG: 660 | End: 2025-05-19
Attending: EMERGENCY MEDICINE
Payer: MEDICARE

## 2025-05-19 ENCOUNTER — APPOINTMENT (OUTPATIENT)
Dept: CT IMAGING | Age: 75
DRG: 660 | End: 2025-05-19
Attending: EMERGENCY MEDICINE
Payer: MEDICARE

## 2025-05-19 LAB
ALBUMIN SERPL-MCNC: 2.9 G/DL (ref 3.5–5.2)
ALP SERPL-CCNC: 98 U/L (ref 35–104)
ALT SERPL-CCNC: 15 U/L (ref 0–35)
ANION GAP SERPL CALCULATED.3IONS-SCNC: 11 MMOL/L (ref 7–16)
AST SERPL-CCNC: 32 U/L (ref 0–35)
BACTERIA URNS QL MICRO: ABNORMAL
BASOPHILS # BLD: 0 K/UL (ref 0–0.2)
BASOPHILS NFR BLD: 0 % (ref 0–2)
BILIRUB SERPL-MCNC: 0.5 MG/DL (ref 0–1.2)
BILIRUB UR QL STRIP: NEGATIVE
BUN SERPL-MCNC: 65 MG/DL (ref 8–23)
CALCIUM SERPL-MCNC: 7.5 MG/DL (ref 8.8–10.2)
CHLORIDE SERPL-SCNC: 89 MMOL/L (ref 98–107)
CLARITY UR: ABNORMAL
CO2 SERPL-SCNC: 27 MMOL/L (ref 22–29)
COLOR UR: YELLOW
CREAT SERPL-MCNC: 5.4 MG/DL (ref 0.5–1)
EOSINOPHIL # BLD: 0.32 K/UL (ref 0.05–0.5)
EOSINOPHILS RELATIVE PERCENT: 4 % (ref 0–6)
ERYTHROCYTE [DISTWIDTH] IN BLOOD BY AUTOMATED COUNT: 14.9 % (ref 11.5–15)
GFR, ESTIMATED: 8 ML/MIN/1.73M2
GLUCOSE BLD-MCNC: 143 MG/DL (ref 74–99)
GLUCOSE BLD-MCNC: 144 MG/DL (ref 74–99)
GLUCOSE BLD-MCNC: 149 MG/DL (ref 74–99)
GLUCOSE BLD-MCNC: 166 MG/DL (ref 74–99)
GLUCOSE BLD-MCNC: 168 MG/DL (ref 74–99)
GLUCOSE SERPL-MCNC: 170 MG/DL (ref 74–99)
GLUCOSE UR STRIP-MCNC: NEGATIVE MG/DL
HCT VFR BLD AUTO: 26.1 % (ref 34–48)
HGB BLD-MCNC: 8.3 G/DL (ref 11.5–15.5)
HGB UR QL STRIP.AUTO: ABNORMAL
INR PPP: 1.2
KETONES UR STRIP-MCNC: NEGATIVE MG/DL
LEUKOCYTE ESTERASE UR QL STRIP: ABNORMAL
LYMPHOCYTES NFR BLD: 0.24 K/UL (ref 1.5–4)
LYMPHOCYTES RELATIVE PERCENT: 3 % (ref 20–42)
MAGNESIUM SERPL-MCNC: 2.7 MG/DL (ref 1.6–2.4)
MCH RBC QN AUTO: 25.9 PG (ref 26–35)
MCHC RBC AUTO-ENTMCNC: 31.8 G/DL (ref 32–34.5)
MCV RBC AUTO: 81.3 FL (ref 80–99.9)
MONOCYTES NFR BLD: 0.4 K/UL (ref 0.1–0.95)
MONOCYTES NFR BLD: 4 % (ref 2–12)
NEUTROPHILS NFR BLD: 90 % (ref 43–80)
NEUTS SEG NFR BLD: 8.33 K/UL (ref 1.8–7.3)
NITRITE UR QL STRIP: NEGATIVE
OSMOLALITY UR: 355 MOSM/KG (ref 300–900)
PH UR STRIP: 7.5 [PH] (ref 5–8)
PHOSPHATE SERPL-MCNC: 2.4 MG/DL (ref 2.5–4.5)
PLATELET # BLD AUTO: 379 K/UL (ref 130–450)
PMV BLD AUTO: 10 FL (ref 7–12)
POTASSIUM SERPL-SCNC: 4.5 MMOL/L (ref 3.5–5.1)
PROT SERPL-MCNC: 6.5 G/DL (ref 6.4–8.3)
PROT UR STRIP-MCNC: 100 MG/DL
PROTHROMBIN TIME: 13.3 SEC (ref 9.3–12.4)
RBC # BLD AUTO: 3.21 M/UL (ref 3.5–5.5)
RBC # BLD: ABNORMAL 10*6/UL
RBC #/AREA URNS HPF: ABNORMAL /HPF
SODIUM SERPL-SCNC: 126 MMOL/L (ref 136–145)
SP GR UR STRIP: 1.01 (ref 1–1.03)
UROBILINOGEN UR STRIP-ACNC: 0.2 EU/DL (ref 0–1)
WBC #/AREA URNS HPF: ABNORMAL /HPF
WBC OTHER # BLD: 9.3 K/UL (ref 4.5–11.5)

## 2025-05-19 PROCEDURE — 2580000003 HC RX 258

## 2025-05-19 PROCEDURE — 6360000002 HC RX W HCPCS: Performed by: INTERNAL MEDICINE

## 2025-05-19 PROCEDURE — 0DBU3ZX EXCISION OF OMENTUM, PERCUTANEOUS APPROACH, DIAGNOSTIC: ICD-10-PCS | Performed by: INTERNAL MEDICINE

## 2025-05-19 PROCEDURE — 7100000000 HC PACU RECOVERY - FIRST 15 MIN: Performed by: UROLOGY

## 2025-05-19 PROCEDURE — 0T788DZ DILATION OF BILATERAL URETERS WITH INTRALUMINAL DEVICE, VIA NATURAL OR ARTIFICIAL OPENING ENDOSCOPIC: ICD-10-PCS | Performed by: UROLOGY

## 2025-05-19 PROCEDURE — 2580000003 HC RX 258: Performed by: INTERNAL MEDICINE

## 2025-05-19 PROCEDURE — 2709999900 CT BIOPSY ABDOMEN RETROPERITONEUM

## 2025-05-19 PROCEDURE — 0TPD8DZ REMOVAL OF INTRALUMINAL DEVICE FROM URETHRA, VIA NATURAL OR ARTIFICIAL OPENING ENDOSCOPIC: ICD-10-PCS | Performed by: UROLOGY

## 2025-05-19 PROCEDURE — 83735 ASSAY OF MAGNESIUM: CPT

## 2025-05-19 PROCEDURE — 85610 PROTHROMBIN TIME: CPT

## 2025-05-19 PROCEDURE — 6360000002 HC RX W HCPCS: Performed by: NURSE ANESTHETIST, CERTIFIED REGISTERED

## 2025-05-19 PROCEDURE — C1769 GUIDE WIRE: HCPCS | Performed by: UROLOGY

## 2025-05-19 PROCEDURE — 82962 GLUCOSE BLOOD TEST: CPT

## 2025-05-19 PROCEDURE — 2709999900 HC NON-CHARGEABLE SUPPLY: Performed by: UROLOGY

## 2025-05-19 PROCEDURE — 2580000003 HC RX 258: Performed by: UROLOGY

## 2025-05-19 PROCEDURE — 99231 SBSQ HOSP IP/OBS SF/LOW 25: CPT | Performed by: INTERNAL MEDICINE

## 2025-05-19 PROCEDURE — 2500000003 HC RX 250 WO HCPCS

## 2025-05-19 PROCEDURE — 6360000002 HC RX W HCPCS: Performed by: NURSE PRACTITIONER

## 2025-05-19 PROCEDURE — 80053 COMPREHEN METABOLIC PANEL: CPT

## 2025-05-19 PROCEDURE — 85025 COMPLETE CBC W/AUTO DIFF WBC: CPT

## 2025-05-19 PROCEDURE — 6360000002 HC RX W HCPCS: Performed by: RADIOLOGY

## 2025-05-19 PROCEDURE — 3600000003 HC SURGERY LEVEL 3 BASE: Performed by: UROLOGY

## 2025-05-19 PROCEDURE — C1758 CATHETER, URETERAL: HCPCS | Performed by: UROLOGY

## 2025-05-19 PROCEDURE — 2580000003 HC RX 258: Performed by: NURSE ANESTHETIST, CERTIFIED REGISTERED

## 2025-05-19 PROCEDURE — 88305 TISSUE EXAM BY PATHOLOGIST: CPT

## 2025-05-19 PROCEDURE — 77012 CT SCAN FOR NEEDLE BIOPSY: CPT

## 2025-05-19 PROCEDURE — 88342 IMHCHEM/IMCYTCHM 1ST ANTB: CPT

## 2025-05-19 PROCEDURE — 2580000003 HC RX 258: Performed by: NURSE PRACTITIONER

## 2025-05-19 PROCEDURE — 36415 COLL VENOUS BLD VENIPUNCTURE: CPT

## 2025-05-19 PROCEDURE — 1200000000 HC SEMI PRIVATE

## 2025-05-19 PROCEDURE — 6370000000 HC RX 637 (ALT 250 FOR IP): Performed by: NURSE PRACTITIONER

## 2025-05-19 PROCEDURE — 88341 IMHCHEM/IMCYTCHM EA ADD ANTB: CPT

## 2025-05-19 PROCEDURE — 3700000001 HC ADD 15 MINUTES (ANESTHESIA): Performed by: UROLOGY

## 2025-05-19 PROCEDURE — BT141ZZ FLUOROSCOPY OF KIDNEYS, URETERS AND BLADDER USING LOW OSMOLAR CONTRAST: ICD-10-PCS | Performed by: UROLOGY

## 2025-05-19 PROCEDURE — 6370000000 HC RX 637 (ALT 250 FOR IP): Performed by: SURGERY

## 2025-05-19 PROCEDURE — 74420 UROGRAPHY RTRGR +-KUB: CPT

## 2025-05-19 PROCEDURE — C2617 STENT, NON-COR, TEM W/O DEL: HCPCS | Performed by: UROLOGY

## 2025-05-19 PROCEDURE — 84100 ASSAY OF PHOSPHORUS: CPT

## 2025-05-19 PROCEDURE — 6370000000 HC RX 637 (ALT 250 FOR IP)

## 2025-05-19 PROCEDURE — 3600000013 HC SURGERY LEVEL 3 ADDTL 15MIN: Performed by: UROLOGY

## 2025-05-19 PROCEDURE — 7100000001 HC PACU RECOVERY - ADDTL 15 MIN: Performed by: UROLOGY

## 2025-05-19 PROCEDURE — 3700000000 HC ANESTHESIA ATTENDED CARE: Performed by: UROLOGY

## 2025-05-19 DEVICE — URETERAL STENT
Type: IMPLANTABLE DEVICE | Site: URETER | Status: FUNCTIONAL
Brand: PERCUFLEX™

## 2025-05-19 RX ORDER — DIPHENHYDRAMINE HYDROCHLORIDE 50 MG/ML
INJECTION, SOLUTION INTRAMUSCULAR; INTRAVENOUS PRN
Status: COMPLETED | OUTPATIENT
Start: 2025-05-19 | End: 2025-05-19

## 2025-05-19 RX ORDER — PROPOFOL 10 MG/ML
INJECTION, EMULSION INTRAVENOUS
Status: DISCONTINUED | OUTPATIENT
Start: 2025-05-19 | End: 2025-05-19 | Stop reason: SDUPTHER

## 2025-05-19 RX ORDER — SODIUM CHLORIDE 9 MG/ML
INJECTION, SOLUTION INTRAVENOUS
Status: DISCONTINUED | OUTPATIENT
Start: 2025-05-19 | End: 2025-05-19 | Stop reason: SDUPTHER

## 2025-05-19 RX ORDER — SODIUM CHLORIDE, SODIUM LACTATE, POTASSIUM CHLORIDE, CALCIUM CHLORIDE 600; 310; 30; 20 MG/100ML; MG/100ML; MG/100ML; MG/100ML
INJECTION, SOLUTION INTRAVENOUS CONTINUOUS
Status: DISCONTINUED | OUTPATIENT
Start: 2025-05-19 | End: 2025-05-21

## 2025-05-19 RX ORDER — LIDOCAINE HYDROCHLORIDE 20 MG/ML
INJECTION, SOLUTION INFILTRATION; PERINEURAL PRN
Status: COMPLETED | OUTPATIENT
Start: 2025-05-19 | End: 2025-05-19

## 2025-05-19 RX ORDER — FENTANYL CITRATE 50 UG/ML
INJECTION, SOLUTION INTRAMUSCULAR; INTRAVENOUS PRN
Status: COMPLETED | OUTPATIENT
Start: 2025-05-19 | End: 2025-05-19

## 2025-05-19 RX ORDER — PHENYLEPHRINE HYDROCHLORIDE 10 MG/ML
INJECTION INTRAVENOUS
Status: DISCONTINUED | OUTPATIENT
Start: 2025-05-19 | End: 2025-05-19 | Stop reason: SDUPTHER

## 2025-05-19 RX ORDER — LACTULOSE 10 G/15ML
30 SOLUTION ORAL 3 TIMES DAILY
Status: DISCONTINUED | OUTPATIENT
Start: 2025-05-19 | End: 2025-06-10 | Stop reason: HOSPADM

## 2025-05-19 RX ORDER — MIDAZOLAM HYDROCHLORIDE 5 MG/ML
INJECTION, SOLUTION INTRAMUSCULAR; INTRAVENOUS PRN
Status: COMPLETED | OUTPATIENT
Start: 2025-05-19 | End: 2025-05-19

## 2025-05-19 RX ADMIN — FENTANYL CITRATE 25 MCG: 50 INJECTION, SOLUTION INTRAMUSCULAR; INTRAVENOUS at 09:45

## 2025-05-19 RX ADMIN — SODIUM PHOSPHATE, MONOBASIC, MONOHYDRATE AND SODIUM PHOSPHATE, DIBASIC, ANHYDROUS 10 MMOL: 142; 276 INJECTION, SOLUTION INTRAVENOUS at 12:23

## 2025-05-19 RX ADMIN — POLYETHYLENE GLYCOL 3350 17 G: 17 POWDER, FOR SOLUTION ORAL at 17:56

## 2025-05-19 RX ADMIN — PROPOFOL 100 MCG/KG/MIN: 10 INJECTION, EMULSION INTRAVENOUS at 13:48

## 2025-05-19 RX ADMIN — SODIUM CHLORIDE, SODIUM LACTATE, POTASSIUM CHLORIDE, AND CALCIUM CHLORIDE: .6; .31; .03; .02 INJECTION, SOLUTION INTRAVENOUS at 23:37

## 2025-05-19 RX ADMIN — PHENYLEPHRINE HYDROCHLORIDE 100 MCG: 10 INJECTION, SOLUTION INTRAVENOUS at 14:00

## 2025-05-19 RX ADMIN — MEROPENEM 1000 MG: 1 INJECTION INTRAVENOUS at 04:39

## 2025-05-19 RX ADMIN — SODIUM CHLORIDE, SODIUM LACTATE, POTASSIUM CHLORIDE, AND CALCIUM CHLORIDE 125 ML/HR: .6; .31; .03; .02 INJECTION, SOLUTION INTRAVENOUS at 15:33

## 2025-05-19 RX ADMIN — MIDAZOLAM HYDROCHLORIDE 0.5 MG: 5 INJECTION, SOLUTION INTRAMUSCULAR; INTRAVENOUS at 09:45

## 2025-05-19 RX ADMIN — MEROPENEM 1000 MG: 1 INJECTION INTRAVENOUS at 18:01

## 2025-05-19 RX ADMIN — ESCITALOPRAM OXALATE 5 MG: 10 TABLET ORAL at 17:56

## 2025-05-19 RX ADMIN — DIPHENHYDRAMINE HYDROCHLORIDE 25 MG: 50 INJECTION, SOLUTION INTRAMUSCULAR; INTRAVENOUS at 09:45

## 2025-05-19 RX ADMIN — SODIUM CHLORIDE: 9 INJECTION, SOLUTION INTRAVENOUS at 13:10

## 2025-05-19 RX ADMIN — Medication 10 MG: at 21:07

## 2025-05-19 RX ADMIN — SENNOSIDES 8.6 MG: 8.6 TABLET, COATED ORAL at 21:07

## 2025-05-19 RX ADMIN — LIDOCAINE HYDROCHLORIDE 8 ML: 20 INJECTION, SOLUTION INFILTRATION; PERINEURAL at 09:46

## 2025-05-19 RX ADMIN — ASPIRIN 81 MG: 81 TABLET, COATED ORAL at 17:56

## 2025-05-19 RX ADMIN — LACTULOSE 30 G: 10 SOLUTION ORAL at 21:07

## 2025-05-19 RX ADMIN — ATORVASTATIN CALCIUM 40 MG: 40 TABLET, FILM COATED ORAL at 21:07

## 2025-05-19 RX ADMIN — SODIUM CHLORIDE, PRESERVATIVE FREE 40 MG: 5 INJECTION INTRAVENOUS at 00:40

## 2025-05-19 RX ADMIN — SODIUM CHLORIDE, PRESERVATIVE FREE 40 MG: 5 INJECTION INTRAVENOUS at 17:56

## 2025-05-19 RX ADMIN — CALCITRIOL CAPSULES 0.25 MCG 0.25 MCG: 0.25 CAPSULE ORAL at 17:56

## 2025-05-19 RX ADMIN — LORAZEPAM 0.5 MG: 0.5 TABLET ORAL at 17:56

## 2025-05-19 RX ADMIN — PHENYLEPHRINE HYDROCHLORIDE 100 MCG: 10 INJECTION, SOLUTION INTRAVENOUS at 13:52

## 2025-05-19 RX ADMIN — SODIUM CHLORIDE, PRESERVATIVE FREE 40 MG: 5 INJECTION INTRAVENOUS at 23:41

## 2025-05-19 RX ADMIN — Medication 1000 UNITS: at 17:56

## 2025-05-19 RX ADMIN — CARVEDILOL 6.25 MG: 6.25 TABLET, FILM COATED ORAL at 21:07

## 2025-05-19 RX ADMIN — AMLODIPINE BESYLATE 10 MG: 10 TABLET ORAL at 17:56

## 2025-05-19 ASSESSMENT — LIFESTYLE VARIABLES: SMOKING_STATUS: 0

## 2025-05-19 NOTE — ANESTHESIA PRE PROCEDURE
AM    GFRAA 31 07/19/2022 11:31 AM    LABGLOM 8 05/19/2025 04:36 AM    LABGLOM 19 04/30/2024 10:28 AM    GLUCOSE 170 05/19/2025 04:36 AM    GLUCOSE 123 03/05/2012 11:17 AM    CALCIUM 7.5 05/19/2025 04:36 AM    BILITOT 0.5 05/19/2025 04:36 AM    ALKPHOS 98 05/19/2025 04:36 AM    AST 32 05/19/2025 04:36 AM    ALT 15 05/19/2025 04:36 AM       POC Tests:   Recent Labs     05/19/25  0518   POCGLU 168*       Coags:   Lab Results   Component Value Date/Time    PROTIME 13.3 05/19/2025 04:36 AM    INR 1.2 05/19/2025 04:36 AM    APTT 31.9 05/16/2025 11:05 AM       HCG (If Applicable): No results found for: \"PREGTESTUR\", \"PREGSERUM\", \"HCG\", \"HCGQUANT\"     ABGs: No results found for: \"PHART\", \"PO2ART\", \"POH6WUO\", \"PVV0BYY\", \"BEART\", \"W2AVJMZZ\"     Type & Screen (If Applicable):  No results found for: \"ABORH\", \"LABANTI\"    Drug/Infectious Status (If Applicable):  Lab Results   Component Value Date/Time    HEPCAB NONREACTIVE 01/24/2024 01:28 PM       COVID-19 Screening (If Applicable):   Lab Results   Component Value Date/Time    COVID19 Not Detected 01/26/2023 11:40 AM           Anesthesia Evaluation  Patient summary reviewed and Nursing notes reviewed   no history of anesthetic complications:   Airway: Mallampati: III  TM distance: >3 FB   Neck ROM: full  Mouth opening: > = 3 FB   Dental:    (+) upper dentures      Pulmonary:normal exam        (-) not a current smoker                           Cardiovascular:    (+) hypertension:        Rhythm: regular  Rate: normal                    Neuro/Psych:   (+) CVA (no defecits):, headaches:, psychiatric history:            GI/Hepatic/Renal:   (+) renal disease (stage 4): ESRD          Endo/Other:    (+) DiabetesType II DM, : arthritis:..                 Abdominal:       Abdomen: soft.      Vascular:          Other Findings:             Anesthesia Plan      MAC     ASA 4       Induction: intravenous.      Anesthetic plan and risks discussed with patient.    Use of blood products

## 2025-05-19 NOTE — OP NOTE
[facility]              [address] [place] [address]                            OPERATIVE REPORT      PATIENT NAME: BRIANA SMITH              : 1950  MED REC NO: [id number]                        ROOM: [facility]  ACCOUNT NO: [id number]                       ADMIT DATE: 2025  PROVIDER: Brenda Garcia MD      DATE OF PROCEDURE:  2025    SURGEON:  Brenda Garcia MD    PREOPERATIVE DIAGNOSIS:  Bilateral ureteral obstruction.    POSTOPERATIVE DIAGNOSIS:  Bilateral ureteral obstruction.    OPERATIONS:  Cystopanendoscopy, retrograde pyelogram, bilateral urinary stent exchange.    ANESTHESIA:  LMAC.    ESTIMATED BLOOD LOSS:  Less than 50.    DESCRIPTION OF PROCEDURE:  With the patient in the lithotomy position, under satisfactory sedation, the genitalia were prepped and draped in a sterile fashion.  A 21-Nigerien panendoscope passed into the bladder.  The posterior bladder wall was distorted with an extrinsic mass pushing on the bladder.  The trigone was difficult to see because of the mass.  The stents were located.  The right stent was grasped, was brought out through the urethra.  A wire was inserted up the stent and the stent was removed.  A retrograde pyelogram showed persistent hydronephrosis.  There was also pyonephrosis on this side.  A 28 cm 6-Nigerien double-J stent was placed.  The procedure was repeated on her left side.  A 26 cm 6-Nigerien double-J stent was placed on this side.  The bladder was drained with a 20-Nigerien López with a 30 mL balloon.  The patient tolerated the procedure well, was sent to the recovery room in satisfactory condition.          BRENDA GARCIA MD      D:  2025 14:22:10     T:  2025 15:19:01     KEVIN/MYRON  Job #:  [id number]     Doc#:  [id number]

## 2025-05-19 NOTE — ANESTHESIA POSTPROCEDURE EVALUATION
Department of Anesthesiology  Postprocedure Note    Patient: Isamar Lopez  MRN: [id number]  YOB: 1950  Date of evaluation: 5/20/2025    Procedure Summary       Date: 05/19/25 Room / Location: [address]    Anesthesia Start:  Anesthesia Stop:     Procedure: CYSTOSCOPY RETROGRADE PYELOGRAM BILATERAL STENT EXCHANGE (Bilateral) Diagnosis:       Hydronephrosis, unspecified hydronephrosis type      (Hydronephrosis, unspecified hydronephrosis type [N13.30])    Surgeons: Winston Wren MD Responsible Provider:     Anesthesia Type: MAC ASA Status: 4            Anesthesia Type: No value filed.    Lino Phase I:      Lino Phase II:      Anesthesia Post Evaluation    Patient location during evaluation: PACU  Patient participation: complete - patient participated  Level of consciousness: awake  Pain score: 3  Airway patency: patent  Nausea & Vomiting: no nausea and no vomiting  Cardiovascular status: blood pressure returned to baseline  Respiratory status: acceptable  Hydration status: euvolemic      No notable events documented.

## 2025-05-19 NOTE — PRE SEDATION
intravenous sedation    Medications Planned:   fentanyl intravenously    Patient is an appropriate candidate for plan of sedation: yes    Electronically signed by Emil Thomas MD on 5/19/2025 at 10:09 AM

## 2025-05-20 LAB
25(OH)D3 SERPL-MCNC: 24.8 NG/ML (ref 30–100)
ALBUMIN SERPL-MCNC: 2.8 G/DL (ref 3.5–5.2)
ALP SERPL-CCNC: 76 U/L (ref 35–104)
ALT SERPL-CCNC: 16 U/L (ref 0–35)
ANION GAP SERPL CALCULATED.3IONS-SCNC: 12 MMOL/L (ref 7–16)
AST SERPL-CCNC: 28 U/L (ref 0–35)
BILIRUB SERPL-MCNC: 0.5 MG/DL (ref 0–1.2)
BUN SERPL-MCNC: 65 MG/DL (ref 8–23)
CA-I BLD-SCNC: 1.04 MMOL/L (ref 1.15–1.33)
CALCIUM SERPL-MCNC: 7.5 MG/DL (ref 8.8–10.2)
CHLORIDE SERPL-SCNC: 93 MMOL/L (ref 98–107)
CO2 SERPL-SCNC: 25 MMOL/L (ref 22–29)
CREAT SERPL-MCNC: 5.1 MG/DL (ref 0.5–1)
FOLATE SERPL-MCNC: 3.9 NG/ML (ref 4.6–34.8)
GFR, ESTIMATED: 8 ML/MIN/1.73M2
GLUCOSE BLD-MCNC: 140 MG/DL (ref 74–99)
GLUCOSE BLD-MCNC: 152 MG/DL (ref 74–99)
GLUCOSE BLD-MCNC: 163 MG/DL (ref 74–99)
GLUCOSE BLD-MCNC: 171 MG/DL (ref 74–99)
GLUCOSE SERPL-MCNC: 133 MG/DL (ref 74–99)
MAGNESIUM SERPL-MCNC: 2.6 MG/DL (ref 1.6–2.4)
PHOSPHATE SERPL-MCNC: 3.7 MG/DL (ref 2.5–4.5)
POTASSIUM SERPL-SCNC: 4.7 MMOL/L (ref 3.5–5.1)
PROT SERPL-MCNC: 6.3 G/DL (ref 6.4–8.3)
SODIUM SERPL-SCNC: 130 MMOL/L (ref 136–145)
VIT B12 SERPL-MCNC: 365 PG/ML (ref 232–1245)

## 2025-05-20 PROCEDURE — 6360000002 HC RX W HCPCS

## 2025-05-20 PROCEDURE — 82962 GLUCOSE BLOOD TEST: CPT

## 2025-05-20 PROCEDURE — 36415 COLL VENOUS BLD VENIPUNCTURE: CPT

## 2025-05-20 PROCEDURE — 6360000002 HC RX W HCPCS: Performed by: INTERNAL MEDICINE

## 2025-05-20 PROCEDURE — 6370000000 HC RX 637 (ALT 250 FOR IP)

## 2025-05-20 PROCEDURE — 83735 ASSAY OF MAGNESIUM: CPT

## 2025-05-20 PROCEDURE — 6370000000 HC RX 637 (ALT 250 FOR IP): Performed by: STUDENT IN AN ORGANIZED HEALTH CARE EDUCATION/TRAINING PROGRAM

## 2025-05-20 PROCEDURE — 97161 PT EVAL LOW COMPLEX 20 MIN: CPT

## 2025-05-20 PROCEDURE — 6360000002 HC RX W HCPCS: Performed by: NURSE PRACTITIONER

## 2025-05-20 PROCEDURE — 2500000003 HC RX 250 WO HCPCS: Performed by: NURSE PRACTITIONER

## 2025-05-20 PROCEDURE — 2580000003 HC RX 258: Performed by: UROLOGY

## 2025-05-20 PROCEDURE — 2580000003 HC RX 258: Performed by: NURSE PRACTITIONER

## 2025-05-20 PROCEDURE — 82746 ASSAY OF FOLIC ACID SERUM: CPT

## 2025-05-20 PROCEDURE — 80053 COMPREHEN METABOLIC PANEL: CPT

## 2025-05-20 PROCEDURE — 82330 ASSAY OF CALCIUM: CPT

## 2025-05-20 PROCEDURE — 97530 THERAPEUTIC ACTIVITIES: CPT

## 2025-05-20 PROCEDURE — 2580000003 HC RX 258: Performed by: INTERNAL MEDICINE

## 2025-05-20 PROCEDURE — 82607 VITAMIN B-12: CPT

## 2025-05-20 PROCEDURE — 99232 SBSQ HOSP IP/OBS MODERATE 35: CPT | Performed by: STUDENT IN AN ORGANIZED HEALTH CARE EDUCATION/TRAINING PROGRAM

## 2025-05-20 PROCEDURE — 6370000000 HC RX 637 (ALT 250 FOR IP): Performed by: NURSE PRACTITIONER

## 2025-05-20 PROCEDURE — 97535 SELF CARE MNGMENT TRAINING: CPT

## 2025-05-20 PROCEDURE — 82306 VITAMIN D 25 HYDROXY: CPT

## 2025-05-20 PROCEDURE — 84100 ASSAY OF PHOSPHORUS: CPT

## 2025-05-20 PROCEDURE — 97165 OT EVAL LOW COMPLEX 30 MIN: CPT

## 2025-05-20 PROCEDURE — 99231 SBSQ HOSP IP/OBS SF/LOW 25: CPT | Performed by: SURGERY

## 2025-05-20 PROCEDURE — 6370000000 HC RX 637 (ALT 250 FOR IP): Performed by: SURGERY

## 2025-05-20 PROCEDURE — 1200000000 HC SEMI PRIVATE

## 2025-05-20 RX ORDER — FOLIC ACID 1 MG/1
1 TABLET ORAL DAILY
Status: DISCONTINUED | OUTPATIENT
Start: 2025-05-20 | End: 2025-06-10 | Stop reason: HOSPADM

## 2025-05-20 RX ORDER — CALCIUM GLUCONATE 20 MG/ML
1000 INJECTION, SOLUTION INTRAVENOUS ONCE
Status: COMPLETED | OUTPATIENT
Start: 2025-05-20 | End: 2025-05-20

## 2025-05-20 RX ORDER — ERGOCALCIFEROL 1.25 MG/1
50000 CAPSULE, LIQUID FILLED ORAL WEEKLY
Status: DISCONTINUED | OUTPATIENT
Start: 2025-05-20 | End: 2025-06-10 | Stop reason: HOSPADM

## 2025-05-20 RX ADMIN — POLYETHYLENE GLYCOL 3350 17 G: 17 POWDER, FOR SOLUTION ORAL at 08:14

## 2025-05-20 RX ADMIN — ASPIRIN 81 MG: 81 TABLET, COATED ORAL at 08:14

## 2025-05-20 RX ADMIN — ESCITALOPRAM OXALATE 5 MG: 10 TABLET ORAL at 08:14

## 2025-05-20 RX ADMIN — CARVEDILOL 6.25 MG: 6.25 TABLET, FILM COATED ORAL at 20:48

## 2025-05-20 RX ADMIN — Medication 10 MG: at 20:48

## 2025-05-20 RX ADMIN — MEROPENEM 1000 MG: 1 INJECTION INTRAVENOUS at 03:53

## 2025-05-20 RX ADMIN — LACTULOSE 30 G: 10 SOLUTION ORAL at 20:49

## 2025-05-20 RX ADMIN — AMLODIPINE BESYLATE 10 MG: 10 TABLET ORAL at 08:14

## 2025-05-20 RX ADMIN — SODIUM CHLORIDE, SODIUM LACTATE, POTASSIUM CHLORIDE, AND CALCIUM CHLORIDE: .6; .31; .03; .02 INJECTION, SOLUTION INTRAVENOUS at 18:21

## 2025-05-20 RX ADMIN — MEROPENEM 1000 MG: 1 INJECTION INTRAVENOUS at 18:22

## 2025-05-20 RX ADMIN — Medication 1000 UNITS: at 08:14

## 2025-05-20 RX ADMIN — ATORVASTATIN CALCIUM 40 MG: 40 TABLET, FILM COATED ORAL at 20:49

## 2025-05-20 RX ADMIN — SENNOSIDES 8.6 MG: 8.6 TABLET, COATED ORAL at 20:48

## 2025-05-20 RX ADMIN — LORAZEPAM 0.5 MG: 0.5 TABLET ORAL at 08:13

## 2025-05-20 RX ADMIN — CARVEDILOL 6.25 MG: 6.25 TABLET, FILM COATED ORAL at 08:14

## 2025-05-20 RX ADMIN — LACTULOSE 30 G: 10 SOLUTION ORAL at 08:14

## 2025-05-20 RX ADMIN — ERGOCALCIFEROL 50000 UNITS: 1.25 CAPSULE ORAL at 14:45

## 2025-05-20 RX ADMIN — CALCITRIOL CAPSULES 0.25 MCG 0.25 MCG: 0.25 CAPSULE ORAL at 08:14

## 2025-05-20 RX ADMIN — CALCIUM GLUCONATE 1000 MG: 20 INJECTION, SOLUTION INTRAVENOUS at 17:26

## 2025-05-20 RX ADMIN — SODIUM CHLORIDE, PRESERVATIVE FREE 40 MG: 5 INJECTION INTRAVENOUS at 17:23

## 2025-05-20 RX ADMIN — SODIUM CHLORIDE, SODIUM LACTATE, POTASSIUM CHLORIDE, AND CALCIUM CHLORIDE: .6; .31; .03; .02 INJECTION, SOLUTION INTRAVENOUS at 07:33

## 2025-05-20 RX ADMIN — FOLIC ACID 1 MG: 1 TABLET ORAL at 14:45

## 2025-05-20 RX ADMIN — SODIUM CHLORIDE, PRESERVATIVE FREE 10 ML: 5 INJECTION INTRAVENOUS at 08:15

## 2025-05-21 PROBLEM — Z51.5 PALLIATIVE CARE ENCOUNTER: Status: ACTIVE | Noted: 2025-05-21

## 2025-05-21 LAB
ALBUMIN SERPL-MCNC: 2.6 G/DL (ref 3.5–5.2)
ALP SERPL-CCNC: 73 U/L (ref 35–104)
ALT SERPL-CCNC: 14 U/L (ref 0–35)
ANION GAP SERPL CALCULATED.3IONS-SCNC: 11 MMOL/L (ref 7–16)
AST SERPL-CCNC: 26 U/L (ref 0–35)
BILIRUB SERPL-MCNC: 0.5 MG/DL (ref 0–1.2)
BUN SERPL-MCNC: 66 MG/DL (ref 8–23)
CA-I BLD-SCNC: 1.06 MMOL/L (ref 1.15–1.33)
CALCIUM SERPL-MCNC: 7.6 MG/DL (ref 8.8–10.2)
CHLORIDE SERPL-SCNC: 94 MMOL/L (ref 98–107)
CO2 SERPL-SCNC: 25 MMOL/L (ref 22–29)
CREAT SERPL-MCNC: 5 MG/DL (ref 0.5–1)
ERYTHROCYTE [DISTWIDTH] IN BLOOD BY AUTOMATED COUNT: 15.3 % (ref 11.5–15)
GFR, ESTIMATED: 9 ML/MIN/1.73M2
GLUCOSE BLD-MCNC: 125 MG/DL (ref 74–99)
GLUCOSE BLD-MCNC: 134 MG/DL (ref 74–99)
GLUCOSE BLD-MCNC: 166 MG/DL (ref 74–99)
GLUCOSE BLD-MCNC: 172 MG/DL (ref 74–99)
GLUCOSE SERPL-MCNC: 120 MG/DL (ref 74–99)
HCT VFR BLD AUTO: 23.3 % (ref 34–48)
HGB BLD-MCNC: 7.4 G/DL (ref 11.5–15.5)
MAGNESIUM SERPL-MCNC: 2.6 MG/DL (ref 1.6–2.4)
MCH RBC QN AUTO: 26.1 PG (ref 26–35)
MCHC RBC AUTO-ENTMCNC: 31.8 G/DL (ref 32–34.5)
MCV RBC AUTO: 82.3 FL (ref 80–99.9)
PHOSPHATE SERPL-MCNC: 4 MG/DL (ref 2.5–4.5)
PLATELET # BLD AUTO: 354 K/UL (ref 130–450)
PMV BLD AUTO: 9.9 FL (ref 7–12)
POTASSIUM SERPL-SCNC: 4.5 MMOL/L (ref 3.5–5.1)
PROT SERPL-MCNC: 6 G/DL (ref 6.4–8.3)
RBC # BLD AUTO: 2.83 M/UL (ref 3.5–5.5)
SODIUM SERPL-SCNC: 130 MMOL/L (ref 136–145)
WBC OTHER # BLD: 7.9 K/UL (ref 4.5–11.5)

## 2025-05-21 PROCEDURE — 6370000000 HC RX 637 (ALT 250 FOR IP): Performed by: SURGERY

## 2025-05-21 PROCEDURE — 6370000000 HC RX 637 (ALT 250 FOR IP): Performed by: STUDENT IN AN ORGANIZED HEALTH CARE EDUCATION/TRAINING PROGRAM

## 2025-05-21 PROCEDURE — 2580000003 HC RX 258: Performed by: INTERNAL MEDICINE

## 2025-05-21 PROCEDURE — 2500000003 HC RX 250 WO HCPCS: Performed by: NURSE PRACTITIONER

## 2025-05-21 PROCEDURE — 99497 ADVNCD CARE PLAN 30 MIN: CPT | Performed by: NURSE PRACTITIONER

## 2025-05-21 PROCEDURE — 6360000002 HC RX W HCPCS

## 2025-05-21 PROCEDURE — 80053 COMPREHEN METABOLIC PANEL: CPT

## 2025-05-21 PROCEDURE — 6370000000 HC RX 637 (ALT 250 FOR IP)

## 2025-05-21 PROCEDURE — 6370000000 HC RX 637 (ALT 250 FOR IP): Performed by: NURSE PRACTITIONER

## 2025-05-21 PROCEDURE — 82330 ASSAY OF CALCIUM: CPT

## 2025-05-21 PROCEDURE — 83735 ASSAY OF MAGNESIUM: CPT

## 2025-05-21 PROCEDURE — 6360000002 HC RX W HCPCS: Performed by: STUDENT IN AN ORGANIZED HEALTH CARE EDUCATION/TRAINING PROGRAM

## 2025-05-21 PROCEDURE — 6360000002 HC RX W HCPCS: Performed by: INTERNAL MEDICINE

## 2025-05-21 PROCEDURE — P9047 ALBUMIN (HUMAN), 25%, 50ML: HCPCS | Performed by: STUDENT IN AN ORGANIZED HEALTH CARE EDUCATION/TRAINING PROGRAM

## 2025-05-21 PROCEDURE — 36415 COLL VENOUS BLD VENIPUNCTURE: CPT

## 2025-05-21 PROCEDURE — 82962 GLUCOSE BLOOD TEST: CPT

## 2025-05-21 PROCEDURE — 2580000003 HC RX 258: Performed by: NURSE PRACTITIONER

## 2025-05-21 PROCEDURE — 2580000003 HC RX 258: Performed by: UROLOGY

## 2025-05-21 PROCEDURE — 99239 HOSP IP/OBS DSCHRG MGMT >30: CPT | Performed by: STUDENT IN AN ORGANIZED HEALTH CARE EDUCATION/TRAINING PROGRAM

## 2025-05-21 PROCEDURE — 1200000000 HC SEMI PRIVATE

## 2025-05-21 PROCEDURE — 2580000003 HC RX 258

## 2025-05-21 PROCEDURE — 99222 1ST HOSP IP/OBS MODERATE 55: CPT | Performed by: NURSE PRACTITIONER

## 2025-05-21 PROCEDURE — 85027 COMPLETE CBC AUTOMATED: CPT

## 2025-05-21 PROCEDURE — 6360000002 HC RX W HCPCS: Performed by: NURSE PRACTITIONER

## 2025-05-21 PROCEDURE — 84100 ASSAY OF PHOSPHORUS: CPT

## 2025-05-21 RX ORDER — LACTULOSE 10 G/15ML
30 SOLUTION ORAL 3 TIMES DAILY
Qty: 4050 ML | Refills: 1 | Status: SHIPPED | OUTPATIENT
Start: 2025-05-21 | End: 2025-07-20

## 2025-05-21 RX ORDER — FOLIC ACID 1 MG/1
1 TABLET ORAL DAILY
Qty: 30 TABLET | Refills: 3 | Status: SHIPPED | OUTPATIENT
Start: 2025-05-22

## 2025-05-21 RX ORDER — SENNOSIDES 8.6 MG/1
1 TABLET ORAL NIGHTLY PRN
Qty: 30 TABLET | Refills: 0 | Status: SHIPPED | OUTPATIENT
Start: 2025-05-21 | End: 2025-06-20

## 2025-05-21 RX ORDER — POLYETHYLENE GLYCOL 3350 17 G/17G
17 POWDER, FOR SOLUTION ORAL DAILY PRN
Qty: 527 G | Refills: 1 | Status: SHIPPED | OUTPATIENT
Start: 2025-05-21 | End: 2025-07-22

## 2025-05-21 RX ORDER — ALBUMIN (HUMAN) 12.5 G/50ML
25 SOLUTION INTRAVENOUS EVERY 8 HOURS
Status: COMPLETED | OUTPATIENT
Start: 2025-05-21 | End: 2025-05-21

## 2025-05-21 RX ORDER — PANTOPRAZOLE SODIUM 40 MG/1
40 TABLET, DELAYED RELEASE ORAL
Status: DISCONTINUED | OUTPATIENT
Start: 2025-05-21 | End: 2025-06-10 | Stop reason: HOSPADM

## 2025-05-21 RX ADMIN — MEROPENEM 1000 MG: 1 INJECTION INTRAVENOUS at 03:56

## 2025-05-21 RX ADMIN — SODIUM CHLORIDE, SODIUM LACTATE, POTASSIUM CHLORIDE, AND CALCIUM CHLORIDE: .6; .31; .03; .02 INJECTION, SOLUTION INTRAVENOUS at 02:36

## 2025-05-21 RX ADMIN — SODIUM CHLORIDE, PRESERVATIVE FREE 40 MG: 5 INJECTION INTRAVENOUS at 13:58

## 2025-05-21 RX ADMIN — MEROPENEM 1000 MG: 1 INJECTION INTRAVENOUS at 15:48

## 2025-05-21 RX ADMIN — SODIUM CHLORIDE, PRESERVATIVE FREE 40 MG: 5 INJECTION INTRAVENOUS at 00:33

## 2025-05-21 RX ADMIN — FOLIC ACID 1 MG: 1 TABLET ORAL at 07:59

## 2025-05-21 RX ADMIN — LORAZEPAM 0.5 MG: 0.5 TABLET ORAL at 07:58

## 2025-05-21 RX ADMIN — CARVEDILOL 6.25 MG: 6.25 TABLET, FILM COATED ORAL at 20:53

## 2025-05-21 RX ADMIN — ALBUMIN (HUMAN) 25 G: 0.25 INJECTION, SOLUTION INTRAVENOUS at 10:05

## 2025-05-21 RX ADMIN — POLYETHYLENE GLYCOL 3350 17 G: 17 POWDER, FOR SOLUTION ORAL at 08:03

## 2025-05-21 RX ADMIN — CARVEDILOL 6.25 MG: 6.25 TABLET, FILM COATED ORAL at 07:59

## 2025-05-21 RX ADMIN — ATORVASTATIN CALCIUM 40 MG: 40 TABLET, FILM COATED ORAL at 20:53

## 2025-05-21 RX ADMIN — Medication 10 MG: at 20:53

## 2025-05-21 RX ADMIN — AMLODIPINE BESYLATE 10 MG: 10 TABLET ORAL at 07:59

## 2025-05-21 RX ADMIN — SENNOSIDES 8.6 MG: 8.6 TABLET, COATED ORAL at 20:53

## 2025-05-21 RX ADMIN — SODIUM CHLORIDE, PRESERVATIVE FREE 10 ML: 5 INJECTION INTRAVENOUS at 20:57

## 2025-05-21 RX ADMIN — ESCITALOPRAM OXALATE 5 MG: 10 TABLET ORAL at 07:59

## 2025-05-21 RX ADMIN — LACTULOSE 30 G: 10 SOLUTION ORAL at 20:53

## 2025-05-21 RX ADMIN — PANTOPRAZOLE SODIUM 40 MG: 40 TABLET, DELAYED RELEASE ORAL at 15:46

## 2025-05-21 RX ADMIN — SODIUM CHLORIDE, PRESERVATIVE FREE 10 ML: 5 INJECTION INTRAVENOUS at 08:03

## 2025-05-21 RX ADMIN — CALCITRIOL CAPSULES 0.25 MCG 0.25 MCG: 0.25 CAPSULE ORAL at 07:59

## 2025-05-21 RX ADMIN — Medication 1000 UNITS: at 07:59

## 2025-05-21 RX ADMIN — ALBUMIN (HUMAN) 25 G: 0.25 INJECTION, SOLUTION INTRAVENOUS at 18:34

## 2025-05-21 RX ADMIN — ASPIRIN 81 MG: 81 TABLET, COATED ORAL at 07:59

## 2025-05-21 RX ADMIN — CALCIUM GLUCONATE 1000 MG: 98 INJECTION, SOLUTION INTRAVENOUS at 11:09

## 2025-05-21 NOTE — ACP (ADVANCE CARE PLANNING)
Advance Care Planning      Palliative Medicine Provider (MD/NP)  Advance Care Planning (ACP) Conversation      Date of Conversation: 25  The patient and/or authorized decision maker consented to a voluntary Advance Care Planning conversation.   Individuals present for the conversation:   Patient with decision making capacity and Sister Stephanie    Legal Healthcare Agent(s):    Primary Decision Maker: Stephanie Camp - Brother/Sister - [phone]    ACP documents available in EMR prior to discussion:  -None    Primary Palliative Diagnosis(es):  Omental mass  History of colon and uterine cancer  Hx of CVA    Conversation Summary:  Patient seen at the bedside, alert and oriented x 3.  Introduced self and the role of palliative medicine.  The patient states that she struggles with remembering information.  She prefers that I reach out to her sister, Stephanie, whom is her caregiver.  Call placed to Stephanie.  Introduced self.  Stephanie states that the patient is single, does not have children, parents , and she is the patient's only sister.  The patient does not have advanced directives in place.  Stephanie is the patient's legal next of kin.    Resuscitation Status:    Code Status: Full Code    Outcomes / Completed Documentation:  An explanation of advance directives and their importance was provided and the following forms completed:    -No new documents completed.    If new document completed, original was provided to patient and/or family member.    Copy was placed for scanning into the [facility] EMR.      I spent 30 minutes providing separately identifiable ACP services with the patient and/or surrogate decision maker in a voluntary, in-person conversation discussing the patient's wishes and goals as detailed in the above note.       Paulina Dugan, APRN - CNP

## 2025-05-22 LAB
ALBUMIN SERPL-MCNC: 3.4 G/DL (ref 3.5–5.2)
ALP SERPL-CCNC: 76 U/L (ref 35–104)
ALT SERPL-CCNC: 11 U/L (ref 0–35)
ANION GAP SERPL CALCULATED.3IONS-SCNC: 13 MMOL/L (ref 7–16)
ANION GAP SERPL CALCULATED.3IONS-SCNC: 15 MMOL/L (ref 7–16)
ANION GAP SERPL CALCULATED.3IONS-SCNC: 15 MMOL/L (ref 7–16)
AST SERPL-CCNC: 26 U/L (ref 0–35)
BILIRUB SERPL-MCNC: 0.7 MG/DL (ref 0–1.2)
BUN SERPL-MCNC: 62 MG/DL (ref 8–23)
BUN SERPL-MCNC: 64 MG/DL (ref 8–23)
BUN SERPL-MCNC: 64 MG/DL (ref 8–23)
CA-I BLD-SCNC: 1.07 MMOL/L (ref 1.15–1.33)
CALCIUM SERPL-MCNC: 8 MG/DL (ref 8.8–10.2)
CALCIUM SERPL-MCNC: 8.4 MG/DL (ref 8.8–10.2)
CALCIUM SERPL-MCNC: 8.4 MG/DL (ref 8.8–10.2)
CHLORIDE SERPL-SCNC: 86 MMOL/L (ref 98–107)
CHLORIDE SERPL-SCNC: 86 MMOL/L (ref 98–107)
CHLORIDE SERPL-SCNC: 88 MMOL/L (ref 98–107)
CO2 SERPL-SCNC: 21 MMOL/L (ref 22–29)
CO2 SERPL-SCNC: 21 MMOL/L (ref 22–29)
CO2 SERPL-SCNC: 22 MMOL/L (ref 22–29)
CREAT SERPL-MCNC: 4.6 MG/DL (ref 0.5–1)
CREAT UR-MCNC: 105 MG/DL (ref 29–226)
ERYTHROCYTE [DISTWIDTH] IN BLOOD BY AUTOMATED COUNT: 15 % (ref 11.5–15)
GFR, ESTIMATED: 10 ML/MIN/1.73M2
GFR, ESTIMATED: 10 ML/MIN/1.73M2
GFR, ESTIMATED: 9 ML/MIN/1.73M2
GLUCOSE BLD-MCNC: 135 MG/DL (ref 74–99)
GLUCOSE BLD-MCNC: 146 MG/DL (ref 74–99)
GLUCOSE BLD-MCNC: 151 MG/DL (ref 74–99)
GLUCOSE BLD-MCNC: 179 MG/DL (ref 74–99)
GLUCOSE SERPL-MCNC: 132 MG/DL (ref 74–99)
GLUCOSE SERPL-MCNC: 136 MG/DL (ref 74–99)
GLUCOSE SERPL-MCNC: 141 MG/DL (ref 74–99)
HCT VFR BLD AUTO: 24.2 % (ref 34–48)
HGB BLD-MCNC: 7.7 G/DL (ref 11.5–15.5)
MAGNESIUM SERPL-MCNC: 2.4 MG/DL (ref 1.6–2.4)
MCH RBC QN AUTO: 25.7 PG (ref 26–35)
MCHC RBC AUTO-ENTMCNC: 31.8 G/DL (ref 32–34.5)
MCV RBC AUTO: 80.7 FL (ref 80–99.9)
OSMOLALITY UR: 261 MOSM/KG (ref 300–900)
PHOSPHATE SERPL-MCNC: 3.9 MG/DL (ref 2.5–4.5)
PLATELET # BLD AUTO: 381 K/UL (ref 130–450)
PMV BLD AUTO: 10.2 FL (ref 7–12)
POTASSIUM SERPL-SCNC: 4.4 MMOL/L (ref 3.5–5.1)
POTASSIUM SERPL-SCNC: 4.4 MMOL/L (ref 3.5–5.1)
POTASSIUM SERPL-SCNC: 4.6 MMOL/L (ref 3.5–5.1)
PROT SERPL-MCNC: 6.7 G/DL (ref 6.4–8.3)
RBC # BLD AUTO: 3 M/UL (ref 3.5–5.5)
SODIUM SERPL-SCNC: 121 MMOL/L (ref 136–145)
SODIUM SERPL-SCNC: 123 MMOL/L (ref 136–145)
SODIUM SERPL-SCNC: 124 MMOL/L (ref 136–145)
SODIUM UR-SCNC: 43 MMOL/L
UUN UR-MCNC: 249 MG/DL (ref 800–1666)
WBC OTHER # BLD: 9.6 K/UL (ref 4.5–11.5)

## 2025-05-22 PROCEDURE — 85027 COMPLETE CBC AUTOMATED: CPT

## 2025-05-22 PROCEDURE — 6370000000 HC RX 637 (ALT 250 FOR IP): Performed by: STUDENT IN AN ORGANIZED HEALTH CARE EDUCATION/TRAINING PROGRAM

## 2025-05-22 PROCEDURE — 82330 ASSAY OF CALCIUM: CPT

## 2025-05-22 PROCEDURE — 6370000000 HC RX 637 (ALT 250 FOR IP)

## 2025-05-22 PROCEDURE — 99231 SBSQ HOSP IP/OBS SF/LOW 25: CPT | Performed by: NURSE PRACTITIONER

## 2025-05-22 PROCEDURE — 83935 ASSAY OF URINE OSMOLALITY: CPT

## 2025-05-22 PROCEDURE — 84100 ASSAY OF PHOSPHORUS: CPT

## 2025-05-22 PROCEDURE — 6360000002 HC RX W HCPCS: Performed by: STUDENT IN AN ORGANIZED HEALTH CARE EDUCATION/TRAINING PROGRAM

## 2025-05-22 PROCEDURE — 82962 GLUCOSE BLOOD TEST: CPT

## 2025-05-22 PROCEDURE — 2500000003 HC RX 250 WO HCPCS: Performed by: NURSE PRACTITIONER

## 2025-05-22 PROCEDURE — 84300 ASSAY OF URINE SODIUM: CPT

## 2025-05-22 PROCEDURE — 80048 BASIC METABOLIC PNL TOTAL CA: CPT

## 2025-05-22 PROCEDURE — 6360000002 HC RX W HCPCS: Performed by: NURSE PRACTITIONER

## 2025-05-22 PROCEDURE — 99232 SBSQ HOSP IP/OBS MODERATE 35: CPT | Performed by: STUDENT IN AN ORGANIZED HEALTH CARE EDUCATION/TRAINING PROGRAM

## 2025-05-22 PROCEDURE — 83735 ASSAY OF MAGNESIUM: CPT

## 2025-05-22 PROCEDURE — 1200000000 HC SEMI PRIVATE

## 2025-05-22 PROCEDURE — 6370000000 HC RX 637 (ALT 250 FOR IP): Performed by: NURSE PRACTITIONER

## 2025-05-22 PROCEDURE — 84540 ASSAY OF URINE/UREA-N: CPT

## 2025-05-22 PROCEDURE — 6370000000 HC RX 637 (ALT 250 FOR IP): Performed by: SURGERY

## 2025-05-22 PROCEDURE — 6370000000 HC RX 637 (ALT 250 FOR IP): Performed by: INTERNAL MEDICINE

## 2025-05-22 PROCEDURE — 36415 COLL VENOUS BLD VENIPUNCTURE: CPT

## 2025-05-22 PROCEDURE — 2580000003 HC RX 258: Performed by: INTERNAL MEDICINE

## 2025-05-22 PROCEDURE — 80053 COMPREHEN METABOLIC PANEL: CPT

## 2025-05-22 PROCEDURE — 82570 ASSAY OF URINE CREATININE: CPT

## 2025-05-22 RX ORDER — PROCHLORPERAZINE EDISYLATE 5 MG/ML
10 INJECTION INTRAMUSCULAR; INTRAVENOUS ONCE
Status: COMPLETED | OUTPATIENT
Start: 2025-05-22 | End: 2025-05-22

## 2025-05-22 RX ORDER — SODIUM CHLORIDE 1 G/1
1 TABLET ORAL
Status: DISCONTINUED | OUTPATIENT
Start: 2025-05-22 | End: 2025-06-10 | Stop reason: HOSPADM

## 2025-05-22 RX ORDER — CALCIUM GLUCONATE 20 MG/ML
2000 INJECTION, SOLUTION INTRAVENOUS ONCE
Status: COMPLETED | OUTPATIENT
Start: 2025-05-22 | End: 2025-05-22

## 2025-05-22 RX ORDER — 3% SODIUM CHLORIDE 3 G/100ML
25 INJECTION, SOLUTION INTRAVENOUS CONTINUOUS
Status: DISPENSED | OUTPATIENT
Start: 2025-05-22 | End: 2025-05-22

## 2025-05-22 RX ADMIN — FOLIC ACID 1 MG: 1 TABLET ORAL at 11:44

## 2025-05-22 RX ADMIN — PANTOPRAZOLE SODIUM 40 MG: 40 TABLET, DELAYED RELEASE ORAL at 17:25

## 2025-05-22 RX ADMIN — Medication 1000 UNITS: at 11:44

## 2025-05-22 RX ADMIN — AMLODIPINE BESYLATE 10 MG: 10 TABLET ORAL at 11:44

## 2025-05-22 RX ADMIN — ONDANSETRON HYDROCHLORIDE 4 MG: 2 SOLUTION INTRAMUSCULAR; INTRAVENOUS at 08:44

## 2025-05-22 RX ADMIN — LACTULOSE 30 G: 10 SOLUTION ORAL at 11:43

## 2025-05-22 RX ADMIN — ATORVASTATIN CALCIUM 40 MG: 40 TABLET, FILM COATED ORAL at 21:22

## 2025-05-22 RX ADMIN — SODIUM CHLORIDE 25 ML/HR: 3 INJECTION, SOLUTION INTRAVENOUS at 15:40

## 2025-05-22 RX ADMIN — SODIUM CHLORIDE 1 G: 1 TABLET ORAL at 17:25

## 2025-05-22 RX ADMIN — PROCHLORPERAZINE EDISYLATE 10 MG: 5 INJECTION INTRAMUSCULAR; INTRAVENOUS at 11:00

## 2025-05-22 RX ADMIN — POLYETHYLENE GLYCOL 3350 17 G: 17 POWDER, FOR SOLUTION ORAL at 11:44

## 2025-05-22 RX ADMIN — SENNOSIDES 8.6 MG: 8.6 TABLET, COATED ORAL at 21:22

## 2025-05-22 RX ADMIN — CALCITRIOL CAPSULES 0.25 MCG 0.25 MCG: 0.25 CAPSULE ORAL at 11:45

## 2025-05-22 RX ADMIN — Medication 10 MG: at 21:23

## 2025-05-22 RX ADMIN — LACTULOSE 30 G: 10 SOLUTION ORAL at 21:23

## 2025-05-22 RX ADMIN — LACTULOSE 30 G: 10 SOLUTION ORAL at 15:00

## 2025-05-22 RX ADMIN — CALCIUM GLUCONATE 2000 MG: 20 INJECTION, SOLUTION INTRAVENOUS at 08:51

## 2025-05-22 RX ADMIN — SODIUM CHLORIDE, PRESERVATIVE FREE 10 ML: 5 INJECTION INTRAVENOUS at 21:24

## 2025-05-22 RX ADMIN — ESCITALOPRAM OXALATE 5 MG: 10 TABLET ORAL at 11:44

## 2025-05-22 RX ADMIN — PANTOPRAZOLE SODIUM 40 MG: 40 TABLET, DELAYED RELEASE ORAL at 06:00

## 2025-05-22 RX ADMIN — LORAZEPAM 0.5 MG: 0.5 TABLET ORAL at 11:44

## 2025-05-22 RX ADMIN — CARVEDILOL 6.25 MG: 6.25 TABLET, FILM COATED ORAL at 11:44

## 2025-05-22 RX ADMIN — SODIUM CHLORIDE, PRESERVATIVE FREE 10 ML: 5 INJECTION INTRAVENOUS at 08:46

## 2025-05-22 RX ADMIN — CARVEDILOL 6.25 MG: 6.25 TABLET, FILM COATED ORAL at 21:22

## 2025-05-22 RX ADMIN — ASPIRIN 81 MG: 81 TABLET, COATED ORAL at 11:44

## 2025-05-22 NOTE — DISCHARGE INSTRUCTIONS
of this information.             Call upon discharge to schedule a follow-up visit with Suzan Bethea/Dean/Siena/Terell ([facility]) at [phone]

## 2025-05-23 LAB
ALBUMIN SERPL-MCNC: 3.3 G/DL (ref 3.5–5.2)
ALP SERPL-CCNC: 68 U/L (ref 35–104)
ALT SERPL-CCNC: 11 U/L (ref 0–35)
ANION GAP SERPL CALCULATED.3IONS-SCNC: 12 MMOL/L (ref 7–16)
ANION GAP SERPL CALCULATED.3IONS-SCNC: 15 MMOL/L (ref 7–16)
ANION GAP SERPL CALCULATED.3IONS-SCNC: 15 MMOL/L (ref 7–16)
ANION GAP SERPL CALCULATED.3IONS-SCNC: 18 MMOL/L (ref 7–16)
AST SERPL-CCNC: 28 U/L (ref 0–35)
BILIRUB SERPL-MCNC: 0.6 MG/DL (ref 0–1.2)
BUN SERPL-MCNC: 64 MG/DL (ref 8–23)
BUN SERPL-MCNC: 66 MG/DL (ref 8–23)
BUN SERPL-MCNC: 67 MG/DL (ref 8–23)
BUN SERPL-MCNC: 67 MG/DL (ref 8–23)
CALCIUM SERPL-MCNC: 8.3 MG/DL (ref 8.8–10.2)
CALCIUM SERPL-MCNC: 8.3 MG/DL (ref 8.8–10.2)
CALCIUM SERPL-MCNC: 8.4 MG/DL (ref 8.8–10.2)
CALCIUM SERPL-MCNC: 8.6 MG/DL (ref 8.8–10.2)
CHLORIDE SERPL-SCNC: 85 MMOL/L (ref 98–107)
CHLORIDE SERPL-SCNC: 87 MMOL/L (ref 98–107)
CHLORIDE SERPL-SCNC: 87 MMOL/L (ref 98–107)
CHLORIDE SERPL-SCNC: 88 MMOL/L (ref 98–107)
CO2 SERPL-SCNC: 19 MMOL/L (ref 22–29)
CO2 SERPL-SCNC: 21 MMOL/L (ref 22–29)
CO2 SERPL-SCNC: 22 MMOL/L (ref 22–29)
CO2 SERPL-SCNC: 23 MMOL/L (ref 22–29)
CREAT SERPL-MCNC: 5.2 MG/DL (ref 0.5–1)
CREAT SERPL-MCNC: 5.3 MG/DL (ref 0.5–1)
CREAT SERPL-MCNC: 5.4 MG/DL (ref 0.5–1)
CREAT SERPL-MCNC: 5.7 MG/DL (ref 0.5–1)
EKG ATRIAL RATE: 64 BPM
EKG P AXIS: 22 DEGREES
EKG P-R INTERVAL: 160 MS
EKG Q-T INTERVAL: 416 MS
EKG QRS DURATION: 64 MS
EKG QTC CALCULATION (BAZETT): 429 MS
EKG R AXIS: -7 DEGREES
EKG T AXIS: 37 DEGREES
EKG VENTRICULAR RATE: 64 BPM
ERYTHROCYTE [DISTWIDTH] IN BLOOD BY AUTOMATED COUNT: 14.8 % (ref 11.5–15)
GFR, ESTIMATED: 7 ML/MIN/1.73M2
GFR, ESTIMATED: 8 ML/MIN/1.73M2
GLUCOSE BLD-MCNC: 132 MG/DL (ref 74–99)
GLUCOSE BLD-MCNC: 156 MG/DL (ref 74–99)
GLUCOSE BLD-MCNC: 161 MG/DL (ref 74–99)
GLUCOSE BLD-MCNC: 191 MG/DL (ref 74–99)
GLUCOSE SERPL-MCNC: 108 MG/DL (ref 74–99)
GLUCOSE SERPL-MCNC: 137 MG/DL (ref 74–99)
GLUCOSE SERPL-MCNC: 140 MG/DL (ref 74–99)
GLUCOSE SERPL-MCNC: 160 MG/DL (ref 74–99)
HCT VFR BLD AUTO: 24.2 % (ref 34–48)
HGB BLD-MCNC: 7.8 G/DL (ref 11.5–15.5)
MAGNESIUM SERPL-MCNC: 2.4 MG/DL (ref 1.6–2.4)
MCH RBC QN AUTO: 26.2 PG (ref 26–35)
MCHC RBC AUTO-ENTMCNC: 32.2 G/DL (ref 32–34.5)
MCV RBC AUTO: 81.2 FL (ref 80–99.9)
PHOSPHATE SERPL-MCNC: 5.3 MG/DL (ref 2.5–4.5)
PLATELET # BLD AUTO: 369 K/UL (ref 130–450)
PMV BLD AUTO: 9.5 FL (ref 7–12)
POTASSIUM SERPL-SCNC: 4.2 MMOL/L (ref 3.5–5.1)
POTASSIUM SERPL-SCNC: 4.4 MMOL/L (ref 3.5–5.1)
POTASSIUM SERPL-SCNC: 4.4 MMOL/L (ref 3.5–5.1)
POTASSIUM SERPL-SCNC: 4.5 MMOL/L (ref 3.5–5.1)
PROT SERPL-MCNC: 6.7 G/DL (ref 6.4–8.3)
RBC # BLD AUTO: 2.98 M/UL (ref 3.5–5.5)
SODIUM SERPL-SCNC: 122 MMOL/L (ref 136–145)
SODIUM SERPL-SCNC: 123 MMOL/L (ref 136–145)
WBC OTHER # BLD: 9.1 K/UL (ref 4.5–11.5)

## 2025-05-23 PROCEDURE — 6370000000 HC RX 637 (ALT 250 FOR IP): Performed by: INTERNAL MEDICINE

## 2025-05-23 PROCEDURE — 2500000003 HC RX 250 WO HCPCS: Performed by: NURSE PRACTITIONER

## 2025-05-23 PROCEDURE — 6370000000 HC RX 637 (ALT 250 FOR IP): Performed by: NURSE PRACTITIONER

## 2025-05-23 PROCEDURE — 6360000002 HC RX W HCPCS: Performed by: INTERNAL MEDICINE

## 2025-05-23 PROCEDURE — 99232 SBSQ HOSP IP/OBS MODERATE 35: CPT | Performed by: STUDENT IN AN ORGANIZED HEALTH CARE EDUCATION/TRAINING PROGRAM

## 2025-05-23 PROCEDURE — 84100 ASSAY OF PHOSPHORUS: CPT

## 2025-05-23 PROCEDURE — 80053 COMPREHEN METABOLIC PANEL: CPT

## 2025-05-23 PROCEDURE — 1200000000 HC SEMI PRIVATE

## 2025-05-23 PROCEDURE — 80048 BASIC METABOLIC PNL TOTAL CA: CPT

## 2025-05-23 PROCEDURE — 82962 GLUCOSE BLOOD TEST: CPT

## 2025-05-23 PROCEDURE — 93010 ELECTROCARDIOGRAM REPORT: CPT | Performed by: INTERNAL MEDICINE

## 2025-05-23 PROCEDURE — 6370000000 HC RX 637 (ALT 250 FOR IP): Performed by: SURGERY

## 2025-05-23 PROCEDURE — 83735 ASSAY OF MAGNESIUM: CPT

## 2025-05-23 PROCEDURE — 36415 COLL VENOUS BLD VENIPUNCTURE: CPT

## 2025-05-23 PROCEDURE — 85027 COMPLETE CBC AUTOMATED: CPT

## 2025-05-23 PROCEDURE — 6370000000 HC RX 637 (ALT 250 FOR IP): Performed by: STUDENT IN AN ORGANIZED HEALTH CARE EDUCATION/TRAINING PROGRAM

## 2025-05-23 PROCEDURE — 6370000000 HC RX 637 (ALT 250 FOR IP)

## 2025-05-23 RX ORDER — BUMETANIDE 0.25 MG/ML
1 INJECTION, SOLUTION INTRAMUSCULAR; INTRAVENOUS ONCE
Status: COMPLETED | OUTPATIENT
Start: 2025-05-23 | End: 2025-05-23

## 2025-05-23 RX ADMIN — AMLODIPINE BESYLATE 10 MG: 10 TABLET ORAL at 08:40

## 2025-05-23 RX ADMIN — INSULIN LISPRO 2 UNITS: 100 INJECTION, SOLUTION INTRAVENOUS; SUBCUTANEOUS at 11:28

## 2025-05-23 RX ADMIN — ONDANSETRON 4 MG: 4 TABLET, ORALLY DISINTEGRATING ORAL at 21:16

## 2025-05-23 RX ADMIN — LACTULOSE 30 G: 10 SOLUTION ORAL at 08:40

## 2025-05-23 RX ADMIN — SODIUM CHLORIDE 1 G: 1 TABLET ORAL at 13:18

## 2025-05-23 RX ADMIN — Medication 10 MG: at 21:16

## 2025-05-23 RX ADMIN — Medication 1000 UNITS: at 08:41

## 2025-05-23 RX ADMIN — SENNOSIDES 8.6 MG: 8.6 TABLET, COATED ORAL at 21:16

## 2025-05-23 RX ADMIN — PANTOPRAZOLE SODIUM 40 MG: 40 TABLET, DELAYED RELEASE ORAL at 16:09

## 2025-05-23 RX ADMIN — SODIUM CHLORIDE 1 G: 1 TABLET ORAL at 16:09

## 2025-05-23 RX ADMIN — ASPIRIN 81 MG: 81 TABLET, COATED ORAL at 08:38

## 2025-05-23 RX ADMIN — BUMETANIDE 1 MG: 0.25 INJECTION INTRAMUSCULAR; INTRAVENOUS at 13:17

## 2025-05-23 RX ADMIN — CARVEDILOL 6.25 MG: 6.25 TABLET, FILM COATED ORAL at 21:16

## 2025-05-23 RX ADMIN — CARVEDILOL 6.25 MG: 6.25 TABLET, FILM COATED ORAL at 08:41

## 2025-05-23 RX ADMIN — ESCITALOPRAM OXALATE 5 MG: 10 TABLET ORAL at 08:39

## 2025-05-23 RX ADMIN — PANTOPRAZOLE SODIUM 40 MG: 40 TABLET, DELAYED RELEASE ORAL at 05:58

## 2025-05-23 RX ADMIN — FOLIC ACID 1 MG: 1 TABLET ORAL at 08:38

## 2025-05-23 RX ADMIN — SODIUM CHLORIDE, PRESERVATIVE FREE 10 ML: 5 INJECTION INTRAVENOUS at 21:17

## 2025-05-23 RX ADMIN — LORAZEPAM 0.5 MG: 0.5 TABLET ORAL at 08:38

## 2025-05-23 RX ADMIN — LACTULOSE 30 G: 10 SOLUTION ORAL at 21:16

## 2025-05-23 RX ADMIN — CALCITRIOL CAPSULES 0.25 MCG 0.25 MCG: 0.25 CAPSULE ORAL at 08:44

## 2025-05-23 RX ADMIN — LACTULOSE 30 G: 10 SOLUTION ORAL at 13:17

## 2025-05-23 RX ADMIN — ATORVASTATIN CALCIUM 40 MG: 40 TABLET, FILM COATED ORAL at 21:16

## 2025-05-23 RX ADMIN — SODIUM CHLORIDE, PRESERVATIVE FREE 10 ML: 5 INJECTION INTRAVENOUS at 08:42

## 2025-05-23 RX ADMIN — SODIUM CHLORIDE 1 G: 1 TABLET ORAL at 08:44

## 2025-05-24 LAB
ANION GAP SERPL CALCULATED.3IONS-SCNC: 15 MMOL/L (ref 7–16)
ANION GAP SERPL CALCULATED.3IONS-SCNC: 15 MMOL/L (ref 7–16)
ANION GAP SERPL CALCULATED.3IONS-SCNC: 17 MMOL/L (ref 7–16)
ANION GAP SERPL CALCULATED.3IONS-SCNC: 17 MMOL/L (ref 7–16)
BUN SERPL-MCNC: 69 MG/DL (ref 8–23)
BUN SERPL-MCNC: 69 MG/DL (ref 8–23)
BUN SERPL-MCNC: 73 MG/DL (ref 8–23)
BUN SERPL-MCNC: 73 MG/DL (ref 8–23)
CALCIUM SERPL-MCNC: 8.3 MG/DL (ref 8.8–10.2)
CALCIUM SERPL-MCNC: 8.4 MG/DL (ref 8.8–10.2)
CALCIUM SERPL-MCNC: 8.5 MG/DL (ref 8.8–10.2)
CALCIUM SERPL-MCNC: 8.5 MG/DL (ref 8.8–10.2)
CHLORIDE SERPL-SCNC: 86 MMOL/L (ref 98–107)
CHLORIDE SERPL-SCNC: 86 MMOL/L (ref 98–107)
CHLORIDE SERPL-SCNC: 87 MMOL/L (ref 98–107)
CHLORIDE SERPL-SCNC: 88 MMOL/L (ref 98–107)
CO2 SERPL-SCNC: 18 MMOL/L (ref 22–29)
CO2 SERPL-SCNC: 20 MMOL/L (ref 22–29)
CO2 SERPL-SCNC: 20 MMOL/L (ref 22–29)
CO2 SERPL-SCNC: 21 MMOL/L (ref 22–29)
CREAT SERPL-MCNC: 5.7 MG/DL (ref 0.5–1)
CREAT SERPL-MCNC: 5.9 MG/DL (ref 0.5–1)
CREAT SERPL-MCNC: 6.2 MG/DL (ref 0.5–1)
CREAT SERPL-MCNC: 6.5 MG/DL (ref 0.5–1)
ERYTHROCYTE [DISTWIDTH] IN BLOOD BY AUTOMATED COUNT: 14.6 % (ref 11.5–15)
GFR, ESTIMATED: 6 ML/MIN/1.73M2
GFR, ESTIMATED: 7 ML/MIN/1.73M2
GLUCOSE BLD-MCNC: 133 MG/DL (ref 74–99)
GLUCOSE BLD-MCNC: 137 MG/DL (ref 74–99)
GLUCOSE BLD-MCNC: 141 MG/DL (ref 74–99)
GLUCOSE BLD-MCNC: 145 MG/DL (ref 74–99)
GLUCOSE SERPL-MCNC: 129 MG/DL (ref 74–99)
GLUCOSE SERPL-MCNC: 133 MG/DL (ref 74–99)
GLUCOSE SERPL-MCNC: 137 MG/DL (ref 74–99)
GLUCOSE SERPL-MCNC: 141 MG/DL (ref 74–99)
HCT VFR BLD AUTO: 25.2 % (ref 34–48)
HGB BLD-MCNC: 8.1 G/DL (ref 11.5–15.5)
MAGNESIUM SERPL-MCNC: 2.6 MG/DL (ref 1.6–2.4)
MCH RBC QN AUTO: 25.9 PG (ref 26–35)
MCHC RBC AUTO-ENTMCNC: 32.1 G/DL (ref 32–34.5)
MCV RBC AUTO: 80.5 FL (ref 80–99.9)
PHOSPHATE SERPL-MCNC: 6.1 MG/DL (ref 2.5–4.5)
PLATELET # BLD AUTO: 413 K/UL (ref 130–450)
PMV BLD AUTO: 9.4 FL (ref 7–12)
POTASSIUM SERPL-SCNC: 4.4 MMOL/L (ref 3.5–5.1)
POTASSIUM SERPL-SCNC: 4.5 MMOL/L (ref 3.5–5.1)
RBC # BLD AUTO: 3.13 M/UL (ref 3.5–5.5)
SODIUM SERPL-SCNC: 121 MMOL/L (ref 136–145)
SODIUM SERPL-SCNC: 122 MMOL/L (ref 136–145)
SODIUM SERPL-SCNC: 123 MMOL/L (ref 136–145)
SODIUM SERPL-SCNC: 124 MMOL/L (ref 136–145)
WBC OTHER # BLD: 9.7 K/UL (ref 4.5–11.5)

## 2025-05-24 PROCEDURE — 1200000000 HC SEMI PRIVATE

## 2025-05-24 PROCEDURE — 36415 COLL VENOUS BLD VENIPUNCTURE: CPT

## 2025-05-24 PROCEDURE — 2500000003 HC RX 250 WO HCPCS: Performed by: NURSE PRACTITIONER

## 2025-05-24 PROCEDURE — 80048 BASIC METABOLIC PNL TOTAL CA: CPT

## 2025-05-24 PROCEDURE — 6360000002 HC RX W HCPCS: Performed by: NURSE PRACTITIONER

## 2025-05-24 PROCEDURE — 82962 GLUCOSE BLOOD TEST: CPT

## 2025-05-24 PROCEDURE — 99232 SBSQ HOSP IP/OBS MODERATE 35: CPT | Performed by: STUDENT IN AN ORGANIZED HEALTH CARE EDUCATION/TRAINING PROGRAM

## 2025-05-24 PROCEDURE — 6370000000 HC RX 637 (ALT 250 FOR IP): Performed by: INTERNAL MEDICINE

## 2025-05-24 PROCEDURE — 83735 ASSAY OF MAGNESIUM: CPT

## 2025-05-24 PROCEDURE — 6370000000 HC RX 637 (ALT 250 FOR IP): Performed by: SURGERY

## 2025-05-24 PROCEDURE — 51798 US URINE CAPACITY MEASURE: CPT

## 2025-05-24 PROCEDURE — 6370000000 HC RX 637 (ALT 250 FOR IP): Performed by: NURSE PRACTITIONER

## 2025-05-24 PROCEDURE — 85027 COMPLETE CBC AUTOMATED: CPT

## 2025-05-24 PROCEDURE — 6370000000 HC RX 637 (ALT 250 FOR IP)

## 2025-05-24 PROCEDURE — 6370000000 HC RX 637 (ALT 250 FOR IP): Performed by: STUDENT IN AN ORGANIZED HEALTH CARE EDUCATION/TRAINING PROGRAM

## 2025-05-24 PROCEDURE — 84100 ASSAY OF PHOSPHORUS: CPT

## 2025-05-24 RX ORDER — SODIUM BICARBONATE 650 MG/1
650 TABLET ORAL 2 TIMES DAILY
Status: DISCONTINUED | OUTPATIENT
Start: 2025-05-24 | End: 2025-05-28

## 2025-05-24 RX ADMIN — CARVEDILOL 6.25 MG: 6.25 TABLET, FILM COATED ORAL at 09:07

## 2025-05-24 RX ADMIN — ONDANSETRON HYDROCHLORIDE 4 MG: 2 SOLUTION INTRAMUSCULAR; INTRAVENOUS at 06:06

## 2025-05-24 RX ADMIN — CALCITRIOL CAPSULES 0.25 MCG 0.25 MCG: 0.25 CAPSULE ORAL at 09:15

## 2025-05-24 RX ADMIN — AMLODIPINE BESYLATE 10 MG: 10 TABLET ORAL at 09:07

## 2025-05-24 RX ADMIN — ATORVASTATIN CALCIUM 40 MG: 40 TABLET, FILM COATED ORAL at 21:21

## 2025-05-24 RX ADMIN — Medication 10 MG: at 21:21

## 2025-05-24 RX ADMIN — POLYETHYLENE GLYCOL 3350 17 G: 17 POWDER, FOR SOLUTION ORAL at 16:59

## 2025-05-24 RX ADMIN — ONDANSETRON HYDROCHLORIDE 4 MG: 2 SOLUTION INTRAMUSCULAR; INTRAVENOUS at 21:16

## 2025-05-24 RX ADMIN — FOLIC ACID 1 MG: 1 TABLET ORAL at 09:13

## 2025-05-24 RX ADMIN — SODIUM CHLORIDE 1 G: 1 TABLET ORAL at 09:14

## 2025-05-24 RX ADMIN — CARVEDILOL 6.25 MG: 6.25 TABLET, FILM COATED ORAL at 21:21

## 2025-05-24 RX ADMIN — LACTULOSE 30 G: 10 SOLUTION ORAL at 21:22

## 2025-05-24 RX ADMIN — PANTOPRAZOLE SODIUM 40 MG: 40 TABLET, DELAYED RELEASE ORAL at 16:59

## 2025-05-24 RX ADMIN — SODIUM BICARBONATE 650 MG: 650 TABLET ORAL at 12:23

## 2025-05-24 RX ADMIN — LACTULOSE 30 G: 10 SOLUTION ORAL at 12:22

## 2025-05-24 RX ADMIN — ESCITALOPRAM OXALATE 5 MG: 10 TABLET ORAL at 09:13

## 2025-05-24 RX ADMIN — LACTULOSE 30 G: 10 SOLUTION ORAL at 09:15

## 2025-05-24 RX ADMIN — SODIUM CHLORIDE 1 G: 1 TABLET ORAL at 16:59

## 2025-05-24 RX ADMIN — SODIUM BICARBONATE 650 MG: 650 TABLET ORAL at 21:21

## 2025-05-24 RX ADMIN — ASPIRIN 81 MG: 81 TABLET, COATED ORAL at 09:07

## 2025-05-24 RX ADMIN — SENNOSIDES 8.6 MG: 8.6 TABLET, COATED ORAL at 21:21

## 2025-05-24 RX ADMIN — SODIUM CHLORIDE 1 G: 1 TABLET ORAL at 12:22

## 2025-05-24 RX ADMIN — Medication 1000 UNITS: at 09:13

## 2025-05-24 RX ADMIN — ONDANSETRON HYDROCHLORIDE 4 MG: 2 SOLUTION INTRAMUSCULAR; INTRAVENOUS at 12:54

## 2025-05-24 RX ADMIN — LORAZEPAM 0.5 MG: 0.5 TABLET ORAL at 09:08

## 2025-05-24 RX ADMIN — SODIUM CHLORIDE, PRESERVATIVE FREE 10 ML: 5 INJECTION INTRAVENOUS at 21:16

## 2025-05-25 LAB
ANION GAP SERPL CALCULATED.3IONS-SCNC: 15 MMOL/L (ref 7–16)
BUN SERPL-MCNC: 74 MG/DL (ref 8–23)
BUN SERPL-MCNC: 77 MG/DL (ref 8–23)
BUN SERPL-MCNC: 78 MG/DL (ref 8–23)
CALCIUM SERPL-MCNC: 7.8 MG/DL (ref 8.8–10.2)
CALCIUM SERPL-MCNC: 8.2 MG/DL (ref 8.8–10.2)
CALCIUM SERPL-MCNC: 8.2 MG/DL (ref 8.8–10.2)
CHLORIDE SERPL-SCNC: 86 MMOL/L (ref 98–107)
CHLORIDE SERPL-SCNC: 86 MMOL/L (ref 98–107)
CHLORIDE SERPL-SCNC: 89 MMOL/L (ref 98–107)
CO2 SERPL-SCNC: 19 MMOL/L (ref 22–29)
CO2 SERPL-SCNC: 20 MMOL/L (ref 22–29)
CO2 SERPL-SCNC: 21 MMOL/L (ref 22–29)
CREAT SERPL-MCNC: 6.7 MG/DL (ref 0.5–1)
CREAT SERPL-MCNC: 6.9 MG/DL (ref 0.5–1)
CREAT SERPL-MCNC: 7.1 MG/DL (ref 0.5–1)
ERYTHROCYTE [DISTWIDTH] IN BLOOD BY AUTOMATED COUNT: 14.5 % (ref 11.5–15)
GFR, ESTIMATED: 6 ML/MIN/1.73M2
GLUCOSE BLD-MCNC: 125 MG/DL (ref 74–99)
GLUCOSE BLD-MCNC: 141 MG/DL (ref 74–99)
GLUCOSE BLD-MCNC: 155 MG/DL (ref 74–99)
GLUCOSE BLD-MCNC: 171 MG/DL (ref 74–99)
GLUCOSE SERPL-MCNC: 137 MG/DL (ref 74–99)
GLUCOSE SERPL-MCNC: 140 MG/DL (ref 74–99)
GLUCOSE SERPL-MCNC: 155 MG/DL (ref 74–99)
HCT VFR BLD AUTO: 24.4 % (ref 34–48)
HGB BLD-MCNC: 7.7 G/DL (ref 11.5–15.5)
MAGNESIUM SERPL-MCNC: 2.5 MG/DL (ref 1.6–2.4)
MCH RBC QN AUTO: 25.8 PG (ref 26–35)
MCHC RBC AUTO-ENTMCNC: 31.6 G/DL (ref 32–34.5)
MCV RBC AUTO: 81.6 FL (ref 80–99.9)
PHOSPHATE SERPL-MCNC: 6.9 MG/DL (ref 2.5–4.5)
PLATELET # BLD AUTO: 401 K/UL (ref 130–450)
PMV BLD AUTO: 10.1 FL (ref 7–12)
POTASSIUM SERPL-SCNC: 4.6 MMOL/L (ref 3.5–5.1)
RBC # BLD AUTO: 2.99 M/UL (ref 3.5–5.5)
SODIUM SERPL-SCNC: 121 MMOL/L (ref 136–145)
SODIUM SERPL-SCNC: 121 MMOL/L (ref 136–145)
SODIUM SERPL-SCNC: 124 MMOL/L (ref 136–145)
WBC OTHER # BLD: 11.8 K/UL (ref 4.5–11.5)

## 2025-05-25 PROCEDURE — 6370000000 HC RX 637 (ALT 250 FOR IP): Performed by: INTERNAL MEDICINE

## 2025-05-25 PROCEDURE — 6370000000 HC RX 637 (ALT 250 FOR IP)

## 2025-05-25 PROCEDURE — 36415 COLL VENOUS BLD VENIPUNCTURE: CPT

## 2025-05-25 PROCEDURE — 2580000003 HC RX 258: Performed by: INTERNAL MEDICINE

## 2025-05-25 PROCEDURE — 99232 SBSQ HOSP IP/OBS MODERATE 35: CPT | Performed by: STUDENT IN AN ORGANIZED HEALTH CARE EDUCATION/TRAINING PROGRAM

## 2025-05-25 PROCEDURE — 80048 BASIC METABOLIC PNL TOTAL CA: CPT

## 2025-05-25 PROCEDURE — 6370000000 HC RX 637 (ALT 250 FOR IP): Performed by: SURGERY

## 2025-05-25 PROCEDURE — 82962 GLUCOSE BLOOD TEST: CPT

## 2025-05-25 PROCEDURE — 6370000000 HC RX 637 (ALT 250 FOR IP): Performed by: STUDENT IN AN ORGANIZED HEALTH CARE EDUCATION/TRAINING PROGRAM

## 2025-05-25 PROCEDURE — 1200000000 HC SEMI PRIVATE

## 2025-05-25 PROCEDURE — 85027 COMPLETE CBC AUTOMATED: CPT

## 2025-05-25 PROCEDURE — 84100 ASSAY OF PHOSPHORUS: CPT

## 2025-05-25 PROCEDURE — 6370000000 HC RX 637 (ALT 250 FOR IP): Performed by: NURSE PRACTITIONER

## 2025-05-25 PROCEDURE — 2500000003 HC RX 250 WO HCPCS: Performed by: NURSE PRACTITIONER

## 2025-05-25 PROCEDURE — 94640 AIRWAY INHALATION TREATMENT: CPT

## 2025-05-25 PROCEDURE — 83735 ASSAY OF MAGNESIUM: CPT

## 2025-05-25 RX ORDER — IPRATROPIUM BROMIDE AND ALBUTEROL SULFATE 2.5; .5 MG/3ML; MG/3ML
1 SOLUTION RESPIRATORY (INHALATION)
Status: DISCONTINUED | OUTPATIENT
Start: 2025-05-25 | End: 2025-05-28

## 2025-05-25 RX ORDER — 3% SODIUM CHLORIDE 3 G/100ML
25 INJECTION, SOLUTION INTRAVENOUS CONTINUOUS
Status: DISPENSED | OUTPATIENT
Start: 2025-05-25 | End: 2025-05-25

## 2025-05-25 RX ADMIN — LACTULOSE 30 G: 10 SOLUTION ORAL at 08:41

## 2025-05-25 RX ADMIN — SODIUM CHLORIDE 1 G: 1 TABLET ORAL at 08:37

## 2025-05-25 RX ADMIN — SODIUM BICARBONATE 650 MG: 650 TABLET ORAL at 08:37

## 2025-05-25 RX ADMIN — CARVEDILOL 6.25 MG: 6.25 TABLET, FILM COATED ORAL at 21:02

## 2025-05-25 RX ADMIN — SODIUM BICARBONATE 650 MG: 650 TABLET ORAL at 21:02

## 2025-05-25 RX ADMIN — SODIUM CHLORIDE 25 ML/HR: 3 INJECTION, SOLUTION INTRAVENOUS at 21:08

## 2025-05-25 RX ADMIN — SODIUM CHLORIDE, PRESERVATIVE FREE 10 ML: 5 INJECTION INTRAVENOUS at 21:02

## 2025-05-25 RX ADMIN — LACTULOSE 30 G: 10 SOLUTION ORAL at 15:30

## 2025-05-25 RX ADMIN — IPRATROPIUM BROMIDE AND ALBUTEROL SULFATE 1 DOSE: 2.5; .5 SOLUTION RESPIRATORY (INHALATION) at 19:57

## 2025-05-25 RX ADMIN — PANTOPRAZOLE SODIUM 40 MG: 40 TABLET, DELAYED RELEASE ORAL at 16:56

## 2025-05-25 RX ADMIN — LACTULOSE 30 G: 10 SOLUTION ORAL at 21:02

## 2025-05-25 RX ADMIN — FOLIC ACID 1 MG: 1 TABLET ORAL at 08:37

## 2025-05-25 RX ADMIN — ATORVASTATIN CALCIUM 40 MG: 40 TABLET, FILM COATED ORAL at 21:02

## 2025-05-25 RX ADMIN — IPRATROPIUM BROMIDE AND ALBUTEROL SULFATE 1 DOSE: 2.5; .5 SOLUTION RESPIRATORY (INHALATION) at 17:00

## 2025-05-25 RX ADMIN — CARVEDILOL 6.25 MG: 6.25 TABLET, FILM COATED ORAL at 08:37

## 2025-05-25 RX ADMIN — Medication 1000 UNITS: at 08:37

## 2025-05-25 RX ADMIN — LORAZEPAM 0.5 MG: 0.5 TABLET ORAL at 08:37

## 2025-05-25 RX ADMIN — SODIUM CHLORIDE, PRESERVATIVE FREE 10 ML: 5 INJECTION INTRAVENOUS at 08:42

## 2025-05-25 RX ADMIN — Medication 10 MG: at 21:02

## 2025-05-25 RX ADMIN — CALCITRIOL CAPSULES 0.25 MCG 0.25 MCG: 0.25 CAPSULE ORAL at 08:36

## 2025-05-25 RX ADMIN — SODIUM CHLORIDE 1 G: 1 TABLET ORAL at 14:49

## 2025-05-25 RX ADMIN — SENNOSIDES 8.6 MG: 8.6 TABLET, COATED ORAL at 21:02

## 2025-05-25 RX ADMIN — ASPIRIN 81 MG: 81 TABLET, COATED ORAL at 08:37

## 2025-05-25 RX ADMIN — SODIUM CHLORIDE 1 G: 1 TABLET ORAL at 16:56

## 2025-05-25 RX ADMIN — ESCITALOPRAM OXALATE 5 MG: 10 TABLET ORAL at 08:37

## 2025-05-25 RX ADMIN — AMLODIPINE BESYLATE 10 MG: 10 TABLET ORAL at 08:37

## 2025-05-26 LAB
ANION GAP SERPL CALCULATED.3IONS-SCNC: 17 MMOL/L (ref 7–16)
BUN SERPL-MCNC: 80 MG/DL (ref 8–23)
CALCIUM SERPL-MCNC: 8.2 MG/DL (ref 8.8–10.2)
CHLORIDE SERPL-SCNC: 86 MMOL/L (ref 98–107)
CO2 SERPL-SCNC: 19 MMOL/L (ref 22–29)
CREAT SERPL-MCNC: 7.4 MG/DL (ref 0.5–1)
ERYTHROCYTE [DISTWIDTH] IN BLOOD BY AUTOMATED COUNT: 14.6 % (ref 11.5–15)
GFR, ESTIMATED: 5 ML/MIN/1.73M2
GLUCOSE BLD-MCNC: 123 MG/DL (ref 74–99)
GLUCOSE BLD-MCNC: 128 MG/DL (ref 74–99)
GLUCOSE BLD-MCNC: 142 MG/DL (ref 74–99)
GLUCOSE BLD-MCNC: 144 MG/DL (ref 74–99)
GLUCOSE SERPL-MCNC: 113 MG/DL (ref 74–99)
HCT VFR BLD AUTO: 23.2 % (ref 34–48)
HGB BLD-MCNC: 7.4 G/DL (ref 11.5–15.5)
MAGNESIUM SERPL-MCNC: 2.4 MG/DL (ref 1.6–2.4)
MCH RBC QN AUTO: 25.8 PG (ref 26–35)
MCHC RBC AUTO-ENTMCNC: 31.9 G/DL (ref 32–34.5)
MCV RBC AUTO: 80.8 FL (ref 80–99.9)
PHOSPHATE SERPL-MCNC: 7 MG/DL (ref 2.5–4.5)
PLATELET # BLD AUTO: 404 K/UL (ref 130–450)
PMV BLD AUTO: 10.2 FL (ref 7–12)
POTASSIUM SERPL-SCNC: 4.6 MMOL/L (ref 3.5–5.1)
RBC # BLD AUTO: 2.87 M/UL (ref 3.5–5.5)
SODIUM SERPL-SCNC: 122 MMOL/L (ref 136–145)
SURGICAL PATHOLOGY REPORT: NORMAL
WBC OTHER # BLD: 12.7 K/UL (ref 4.5–11.5)

## 2025-05-26 PROCEDURE — 6370000000 HC RX 637 (ALT 250 FOR IP)

## 2025-05-26 PROCEDURE — 82962 GLUCOSE BLOOD TEST: CPT

## 2025-05-26 PROCEDURE — 6370000000 HC RX 637 (ALT 250 FOR IP): Performed by: STUDENT IN AN ORGANIZED HEALTH CARE EDUCATION/TRAINING PROGRAM

## 2025-05-26 PROCEDURE — 6370000000 HC RX 637 (ALT 250 FOR IP): Performed by: INTERNAL MEDICINE

## 2025-05-26 PROCEDURE — 2500000003 HC RX 250 WO HCPCS: Performed by: NURSE PRACTITIONER

## 2025-05-26 PROCEDURE — 84100 ASSAY OF PHOSPHORUS: CPT

## 2025-05-26 PROCEDURE — 94640 AIRWAY INHALATION TREATMENT: CPT

## 2025-05-26 PROCEDURE — 1200000000 HC SEMI PRIVATE

## 2025-05-26 PROCEDURE — 6370000000 HC RX 637 (ALT 250 FOR IP): Performed by: SURGERY

## 2025-05-26 PROCEDURE — 80048 BASIC METABOLIC PNL TOTAL CA: CPT

## 2025-05-26 PROCEDURE — 36415 COLL VENOUS BLD VENIPUNCTURE: CPT

## 2025-05-26 PROCEDURE — 85027 COMPLETE CBC AUTOMATED: CPT

## 2025-05-26 PROCEDURE — 83735 ASSAY OF MAGNESIUM: CPT

## 2025-05-26 PROCEDURE — 6370000000 HC RX 637 (ALT 250 FOR IP): Performed by: NURSE PRACTITIONER

## 2025-05-26 PROCEDURE — 99232 SBSQ HOSP IP/OBS MODERATE 35: CPT | Performed by: STUDENT IN AN ORGANIZED HEALTH CARE EDUCATION/TRAINING PROGRAM

## 2025-05-26 RX ADMIN — PANTOPRAZOLE SODIUM 40 MG: 40 TABLET, DELAYED RELEASE ORAL at 17:17

## 2025-05-26 RX ADMIN — SODIUM CHLORIDE 1 G: 1 TABLET ORAL at 17:17

## 2025-05-26 RX ADMIN — SODIUM CHLORIDE, PRESERVATIVE FREE 10 ML: 5 INJECTION INTRAVENOUS at 21:28

## 2025-05-26 RX ADMIN — CALCITRIOL CAPSULES 0.25 MCG 0.25 MCG: 0.25 CAPSULE ORAL at 08:55

## 2025-05-26 RX ADMIN — Medication 1000 UNITS: at 08:54

## 2025-05-26 RX ADMIN — SODIUM BICARBONATE 650 MG: 650 TABLET ORAL at 21:28

## 2025-05-26 RX ADMIN — LACTULOSE 30 G: 10 SOLUTION ORAL at 14:40

## 2025-05-26 RX ADMIN — LORAZEPAM 0.5 MG: 0.5 TABLET ORAL at 08:54

## 2025-05-26 RX ADMIN — PANTOPRAZOLE SODIUM 40 MG: 40 TABLET, DELAYED RELEASE ORAL at 05:25

## 2025-05-26 RX ADMIN — ATORVASTATIN CALCIUM 40 MG: 40 TABLET, FILM COATED ORAL at 21:28

## 2025-05-26 RX ADMIN — ASPIRIN 81 MG: 81 TABLET, COATED ORAL at 08:54

## 2025-05-26 RX ADMIN — SENNOSIDES 8.6 MG: 8.6 TABLET, COATED ORAL at 21:28

## 2025-05-26 RX ADMIN — IPRATROPIUM BROMIDE AND ALBUTEROL SULFATE 1 DOSE: 2.5; .5 SOLUTION RESPIRATORY (INHALATION) at 16:18

## 2025-05-26 RX ADMIN — SODIUM BICARBONATE 650 MG: 650 TABLET ORAL at 08:54

## 2025-05-26 RX ADMIN — CARVEDILOL 6.25 MG: 6.25 TABLET, FILM COATED ORAL at 08:56

## 2025-05-26 RX ADMIN — CARVEDILOL 6.25 MG: 6.25 TABLET, FILM COATED ORAL at 21:28

## 2025-05-26 RX ADMIN — IPRATROPIUM BROMIDE AND ALBUTEROL SULFATE 1 DOSE: 2.5; .5 SOLUTION RESPIRATORY (INHALATION) at 10:21

## 2025-05-26 RX ADMIN — AMLODIPINE BESYLATE 10 MG: 10 TABLET ORAL at 08:54

## 2025-05-26 RX ADMIN — LACTULOSE 30 G: 10 SOLUTION ORAL at 21:28

## 2025-05-26 RX ADMIN — SODIUM CHLORIDE 1 G: 1 TABLET ORAL at 08:55

## 2025-05-26 RX ADMIN — POLYETHYLENE GLYCOL 3350 17 G: 17 POWDER, FOR SOLUTION ORAL at 08:56

## 2025-05-26 RX ADMIN — SODIUM CHLORIDE, PRESERVATIVE FREE 10 ML: 5 INJECTION INTRAVENOUS at 08:56

## 2025-05-26 RX ADMIN — LACTULOSE 30 G: 10 SOLUTION ORAL at 08:56

## 2025-05-26 RX ADMIN — SODIUM CHLORIDE 1 G: 1 TABLET ORAL at 12:51

## 2025-05-26 RX ADMIN — Medication 10 MG: at 21:28

## 2025-05-26 RX ADMIN — ESCITALOPRAM OXALATE 5 MG: 10 TABLET ORAL at 08:54

## 2025-05-26 RX ADMIN — IPRATROPIUM BROMIDE AND ALBUTEROL SULFATE 1 DOSE: 2.5; .5 SOLUTION RESPIRATORY (INHALATION) at 20:04

## 2025-05-26 RX ADMIN — FOLIC ACID 1 MG: 1 TABLET ORAL at 08:54

## 2025-05-27 ENCOUNTER — APPOINTMENT (OUTPATIENT)
Dept: GENERAL RADIOLOGY | Age: 75
DRG: 660 | End: 2025-05-27
Attending: EMERGENCY MEDICINE
Payer: MEDICARE

## 2025-05-27 ENCOUNTER — APPOINTMENT (OUTPATIENT)
Dept: INTERVENTIONAL RADIOLOGY/VASCULAR | Age: 75
DRG: 660 | End: 2025-05-27
Attending: EMERGENCY MEDICINE
Payer: MEDICARE

## 2025-05-27 LAB
ANION GAP SERPL CALCULATED.3IONS-SCNC: 18 MMOL/L (ref 7–16)
ANION GAP SERPL CALCULATED.3IONS-SCNC: 19 MMOL/L (ref 7–16)
ANION GAP SERPL CALCULATED.3IONS-SCNC: 20 MMOL/L (ref 7–16)
BASOPHILS # BLD: 0.13 K/UL (ref 0–0.2)
BASOPHILS NFR BLD: 1 % (ref 0–2)
BUN SERPL-MCNC: 88 MG/DL (ref 8–23)
BUN SERPL-MCNC: 90 MG/DL (ref 8–23)
BUN SERPL-MCNC: 90 MG/DL (ref 8–23)
CALCIUM SERPL-MCNC: 7.5 MG/DL (ref 8.8–10.2)
CALCIUM SERPL-MCNC: 7.8 MG/DL (ref 8.8–10.2)
CALCIUM SERPL-MCNC: 7.8 MG/DL (ref 8.8–10.2)
CHLORIDE SERPL-SCNC: 86 MMOL/L (ref 98–107)
CHLORIDE SERPL-SCNC: 88 MMOL/L (ref 98–107)
CHLORIDE SERPL-SCNC: 88 MMOL/L (ref 98–107)
CO2 SERPL-SCNC: 16 MMOL/L (ref 22–29)
CO2 SERPL-SCNC: 17 MMOL/L (ref 22–29)
CO2 SERPL-SCNC: 17 MMOL/L (ref 22–29)
CREAT SERPL-MCNC: 7.8 MG/DL (ref 0.5–1)
CREAT UR-MCNC: 144 MG/DL (ref 29–226)
EOSINOPHIL # BLD: 0 K/UL (ref 0.05–0.5)
EOSINOPHILS RELATIVE PERCENT: 0 % (ref 0–6)
ERYTHROCYTE [DISTWIDTH] IN BLOOD BY AUTOMATED COUNT: 14.6 % (ref 11.5–15)
ERYTHROCYTE [DISTWIDTH] IN BLOOD BY AUTOMATED COUNT: 14.7 % (ref 11.5–15)
GFR, ESTIMATED: 5 ML/MIN/1.73M2
GLUCOSE BLD-MCNC: 118 MG/DL (ref 74–99)
GLUCOSE BLD-MCNC: 122 MG/DL (ref 74–99)
GLUCOSE BLD-MCNC: 127 MG/DL (ref 74–99)
GLUCOSE SERPL-MCNC: 105 MG/DL (ref 74–99)
GLUCOSE SERPL-MCNC: 109 MG/DL (ref 74–99)
GLUCOSE SERPL-MCNC: 121 MG/DL (ref 74–99)
HCT VFR BLD AUTO: 21.1 % (ref 34–48)
HCT VFR BLD AUTO: 22.8 % (ref 34–48)
HCT VFR BLD AUTO: 25.4 % (ref 34–48)
HGB BLD-MCNC: 6.8 G/DL (ref 11.5–15.5)
HGB BLD-MCNC: 7.5 G/DL (ref 11.5–15.5)
HGB BLD-MCNC: 8.5 G/DL (ref 11.5–15.5)
INR PPP: 1.2
LYMPHOCYTES NFR BLD: 0.13 K/UL (ref 1.5–4)
LYMPHOCYTES RELATIVE PERCENT: 1 % (ref 20–42)
MAGNESIUM SERPL-MCNC: 2.4 MG/DL (ref 1.6–2.4)
MCH RBC QN AUTO: 25.9 PG (ref 26–35)
MCH RBC QN AUTO: 26.3 PG (ref 26–35)
MCHC RBC AUTO-ENTMCNC: 32.2 G/DL (ref 32–34.5)
MCHC RBC AUTO-ENTMCNC: 32.9 G/DL (ref 32–34.5)
MCV RBC AUTO: 80 FL (ref 80–99.9)
MCV RBC AUTO: 80.2 FL (ref 80–99.9)
MONOCYTES NFR BLD: 0.13 K/UL (ref 0.1–0.95)
MONOCYTES NFR BLD: 1 % (ref 2–12)
NEUTROPHILS NFR BLD: 97 % (ref 43–80)
NEUTS SEG NFR BLD: 14.02 K/UL (ref 1.8–7.3)
OSMOLALITY SERPL: 289 MOSM/KG (ref 285–310)
OSMOLALITY UR: 292 MOSM/KG (ref 300–900)
PHOSPHATE SERPL-MCNC: 6.9 MG/DL (ref 2.5–4.5)
PLATELET # BLD AUTO: 393 K/UL (ref 130–450)
PLATELET # BLD AUTO: 394 K/UL (ref 130–450)
PMV BLD AUTO: 9.7 FL (ref 7–12)
PMV BLD AUTO: 9.8 FL (ref 7–12)
POTASSIUM SERPL-SCNC: 4.4 MMOL/L (ref 3.5–5.1)
POTASSIUM SERPL-SCNC: 4.5 MMOL/L (ref 3.5–5.1)
POTASSIUM SERPL-SCNC: 4.7 MMOL/L (ref 3.5–5.1)
PROTHROMBIN TIME: 12.8 SEC (ref 9.3–12.4)
RBC # BLD AUTO: 2.63 M/UL (ref 3.5–5.5)
RBC # BLD AUTO: 2.85 M/UL (ref 3.5–5.5)
RBC # BLD: ABNORMAL 10*6/UL
SODIUM SERPL-SCNC: 121 MMOL/L (ref 136–145)
SODIUM SERPL-SCNC: 123 MMOL/L (ref 136–145)
SODIUM SERPL-SCNC: 123 MMOL/L (ref 136–145)
SODIUM UR-SCNC: 51 MMOL/L
TROPONIN I SERPL HS-MCNC: 115 NG/L (ref 0–14)
UUN UR-MCNC: 192 MG/DL (ref 800–1666)
WBC OTHER # BLD: 14.4 K/UL (ref 4.5–11.5)
WBC OTHER # BLD: 15.2 K/UL (ref 4.5–11.5)

## 2025-05-27 PROCEDURE — 50433 PLMT NEPHROURETERAL CATHETER: CPT

## 2025-05-27 PROCEDURE — 84300 ASSAY OF URINE SODIUM: CPT

## 2025-05-27 PROCEDURE — 85014 HEMATOCRIT: CPT

## 2025-05-27 PROCEDURE — 6370000000 HC RX 637 (ALT 250 FOR IP): Performed by: STUDENT IN AN ORGANIZED HEALTH CARE EDUCATION/TRAINING PROGRAM

## 2025-05-27 PROCEDURE — 82962 GLUCOSE BLOOD TEST: CPT

## 2025-05-27 PROCEDURE — 94640 AIRWAY INHALATION TREATMENT: CPT

## 2025-05-27 PROCEDURE — 83930 ASSAY OF BLOOD OSMOLALITY: CPT

## 2025-05-27 PROCEDURE — 2500000003 HC RX 250 WO HCPCS: Performed by: RADIOLOGY

## 2025-05-27 PROCEDURE — 6370000000 HC RX 637 (ALT 250 FOR IP): Performed by: SURGERY

## 2025-05-27 PROCEDURE — 1200000000 HC SEMI PRIVATE

## 2025-05-27 PROCEDURE — 6370000000 HC RX 637 (ALT 250 FOR IP): Performed by: NURSE PRACTITIONER

## 2025-05-27 PROCEDURE — 36430 TRANSFUSION BLD/BLD COMPNT: CPT

## 2025-05-27 PROCEDURE — 80048 BASIC METABOLIC PNL TOTAL CA: CPT

## 2025-05-27 PROCEDURE — 6370000000 HC RX 637 (ALT 250 FOR IP)

## 2025-05-27 PROCEDURE — 83735 ASSAY OF MAGNESIUM: CPT

## 2025-05-27 PROCEDURE — 83935 ASSAY OF URINE OSMOLALITY: CPT

## 2025-05-27 PROCEDURE — P9016 RBC LEUKOCYTES REDUCED: HCPCS

## 2025-05-27 PROCEDURE — 6360000004 HC RX CONTRAST MEDICATION: Performed by: RADIOLOGY

## 2025-05-27 PROCEDURE — 85025 COMPLETE CBC W/AUTO DIFF WBC: CPT

## 2025-05-27 PROCEDURE — 2500000003 HC RX 250 WO HCPCS: Performed by: NURSE PRACTITIONER

## 2025-05-27 PROCEDURE — 85018 HEMOGLOBIN: CPT

## 2025-05-27 PROCEDURE — 85610 PROTHROMBIN TIME: CPT

## 2025-05-27 PROCEDURE — 30233N1 TRANSFUSION OF NONAUTOLOGOUS RED BLOOD CELLS INTO PERIPHERAL VEIN, PERCUTANEOUS APPROACH: ICD-10-PCS | Performed by: INTERNAL MEDICINE

## 2025-05-27 PROCEDURE — 2580000003 HC RX 258: Performed by: INTERNAL MEDICINE

## 2025-05-27 PROCEDURE — 86901 BLOOD TYPING SEROLOGIC RH(D): CPT

## 2025-05-27 PROCEDURE — 99232 SBSQ HOSP IP/OBS MODERATE 35: CPT | Performed by: STUDENT IN AN ORGANIZED HEALTH CARE EDUCATION/TRAINING PROGRAM

## 2025-05-27 PROCEDURE — 6360000002 HC RX W HCPCS: Performed by: RADIOLOGY

## 2025-05-27 PROCEDURE — 84100 ASSAY OF PHOSPHORUS: CPT

## 2025-05-27 PROCEDURE — 36415 COLL VENOUS BLD VENIPUNCTURE: CPT

## 2025-05-27 PROCEDURE — 84540 ASSAY OF URINE/UREA-N: CPT

## 2025-05-27 PROCEDURE — 82570 ASSAY OF URINE CREATININE: CPT

## 2025-05-27 PROCEDURE — 6370000000 HC RX 637 (ALT 250 FOR IP): Performed by: INTERNAL MEDICINE

## 2025-05-27 PROCEDURE — 86920 COMPATIBILITY TEST SPIN: CPT

## 2025-05-27 PROCEDURE — 2709999900 IR GUIDED NEPHROSTOMY CATH PLACEMENT

## 2025-05-27 PROCEDURE — 71045 X-RAY EXAM CHEST 1 VIEW: CPT

## 2025-05-27 PROCEDURE — 84484 ASSAY OF TROPONIN QUANT: CPT

## 2025-05-27 PROCEDURE — 85027 COMPLETE CBC AUTOMATED: CPT

## 2025-05-27 PROCEDURE — 86900 BLOOD TYPING SEROLOGIC ABO: CPT

## 2025-05-27 PROCEDURE — 86850 RBC ANTIBODY SCREEN: CPT

## 2025-05-27 RX ORDER — MIDAZOLAM HYDROCHLORIDE 5 MG/ML
INJECTION, SOLUTION INTRAMUSCULAR; INTRAVENOUS PRN
Status: COMPLETED | OUTPATIENT
Start: 2025-05-27 | End: 2025-05-27

## 2025-05-27 RX ORDER — LIDOCAINE 4 G/G
1 PATCH TOPICAL DAILY
Status: DISCONTINUED | OUTPATIENT
Start: 2025-05-27 | End: 2025-06-10 | Stop reason: HOSPADM

## 2025-05-27 RX ORDER — SODIUM CHLORIDE 9 MG/ML
INJECTION, SOLUTION INTRAVENOUS PRN
Status: DISCONTINUED | OUTPATIENT
Start: 2025-05-27 | End: 2025-06-10 | Stop reason: HOSPADM

## 2025-05-27 RX ORDER — IOPAMIDOL 755 MG/ML
INJECTION, SOLUTION INTRAVASCULAR PRN
Status: COMPLETED | OUTPATIENT
Start: 2025-05-27 | End: 2025-05-27

## 2025-05-27 RX ORDER — FENTANYL CITRATE 50 UG/ML
INJECTION, SOLUTION INTRAMUSCULAR; INTRAVENOUS PRN
Status: COMPLETED | OUTPATIENT
Start: 2025-05-27 | End: 2025-05-27

## 2025-05-27 RX ORDER — LIDOCAINE HYDROCHLORIDE 20 MG/ML
INJECTION, SOLUTION INFILTRATION; PERINEURAL PRN
Status: COMPLETED | OUTPATIENT
Start: 2025-05-27 | End: 2025-05-27

## 2025-05-27 RX ORDER — IPRATROPIUM BROMIDE AND ALBUTEROL SULFATE 2.5; .5 MG/3ML; MG/3ML
1 SOLUTION RESPIRATORY (INHALATION) ONCE
Status: COMPLETED | OUTPATIENT
Start: 2025-05-27 | End: 2025-05-27

## 2025-05-27 RX ORDER — 3% SODIUM CHLORIDE 3 G/100ML
30 INJECTION, SOLUTION INTRAVENOUS CONTINUOUS
Status: DISPENSED | OUTPATIENT
Start: 2025-05-27 | End: 2025-05-27

## 2025-05-27 RX ADMIN — LIDOCAINE HYDROCHLORIDE 7 ML: 20 INJECTION, SOLUTION INFILTRATION; PERINEURAL at 16:38

## 2025-05-27 RX ADMIN — CARVEDILOL 6.25 MG: 6.25 TABLET, FILM COATED ORAL at 23:10

## 2025-05-27 RX ADMIN — Medication 1000 UNITS: at 10:36

## 2025-05-27 RX ADMIN — LACTULOSE 30 G: 10 SOLUTION ORAL at 13:27

## 2025-05-27 RX ADMIN — SODIUM CHLORIDE 1 G: 1 TABLET ORAL at 18:03

## 2025-05-27 RX ADMIN — CALCITRIOL CAPSULES 0.25 MCG 0.25 MCG: 0.25 CAPSULE ORAL at 10:39

## 2025-05-27 RX ADMIN — Medication 10 MG: at 23:10

## 2025-05-27 RX ADMIN — SODIUM CHLORIDE, PRESERVATIVE FREE 10 ML: 5 INJECTION INTRAVENOUS at 23:11

## 2025-05-27 RX ADMIN — IOPAMIDOL 20 ML: 755 INJECTION, SOLUTION INTRAVENOUS at 16:54

## 2025-05-27 RX ADMIN — FENTANYL CITRATE 50 MCG: 50 INJECTION, SOLUTION INTRAMUSCULAR; INTRAVENOUS at 14:38

## 2025-05-27 RX ADMIN — SENNOSIDES 8.6 MG: 8.6 TABLET, COATED ORAL at 23:09

## 2025-05-27 RX ADMIN — CARVEDILOL 6.25 MG: 6.25 TABLET, FILM COATED ORAL at 10:38

## 2025-05-27 RX ADMIN — SODIUM BICARBONATE 650 MG: 650 TABLET ORAL at 10:37

## 2025-05-27 RX ADMIN — IPRATROPIUM BROMIDE AND ALBUTEROL SULFATE 1 DOSE: 2.5; .5 SOLUTION RESPIRATORY (INHALATION) at 15:29

## 2025-05-27 RX ADMIN — CEFAZOLIN 2000 MG: 1 INJECTION, POWDER, FOR SOLUTION INTRAMUSCULAR; INTRAVENOUS at 16:23

## 2025-05-27 RX ADMIN — FENTANYL CITRATE 50 MCG: 50 INJECTION, SOLUTION INTRAMUSCULAR; INTRAVENOUS at 16:38

## 2025-05-27 RX ADMIN — LACTULOSE 30 G: 10 SOLUTION ORAL at 23:09

## 2025-05-27 RX ADMIN — SODIUM CHLORIDE 1 G: 1 TABLET ORAL at 13:46

## 2025-05-27 RX ADMIN — SODIUM CHLORIDE 1 G: 1 TABLET ORAL at 10:38

## 2025-05-27 RX ADMIN — ESCITALOPRAM OXALATE 5 MG: 10 TABLET ORAL at 10:38

## 2025-05-27 RX ADMIN — SODIUM BICARBONATE 650 MG: 650 TABLET ORAL at 23:10

## 2025-05-27 RX ADMIN — PANTOPRAZOLE SODIUM 40 MG: 40 TABLET, DELAYED RELEASE ORAL at 18:03

## 2025-05-27 RX ADMIN — FOLIC ACID 1 MG: 1 TABLET ORAL at 10:37

## 2025-05-27 RX ADMIN — IPRATROPIUM BROMIDE AND ALBUTEROL SULFATE 1 DOSE: 2.5; .5 SOLUTION RESPIRATORY (INHALATION) at 20:11

## 2025-05-27 RX ADMIN — POLYETHYLENE GLYCOL 3350 17 G: 17 POWDER, FOR SOLUTION ORAL at 10:39

## 2025-05-27 RX ADMIN — ERGOCALCIFEROL 50000 UNITS: 1.25 CAPSULE ORAL at 10:39

## 2025-05-27 RX ADMIN — SODIUM CHLORIDE 30 ML/HR: 3 INJECTION, SOLUTION INTRAVENOUS at 13:25

## 2025-05-27 RX ADMIN — LORAZEPAM 0.5 MG: 0.5 TABLET ORAL at 10:35

## 2025-05-27 RX ADMIN — AMLODIPINE BESYLATE 10 MG: 10 TABLET ORAL at 10:37

## 2025-05-27 RX ADMIN — ATORVASTATIN CALCIUM 40 MG: 40 TABLET, FILM COATED ORAL at 23:10

## 2025-05-27 RX ADMIN — IPRATROPIUM BROMIDE AND ALBUTEROL SULFATE 1 DOSE: 2.5; .5 SOLUTION RESPIRATORY (INHALATION) at 08:00

## 2025-05-27 NOTE — CONSENT
Informed Consent for Blood Component Transfusion Note    I have discussed with the patient the rationale for blood component transfusion; its benefits in treating or preventing fatigue, organ damage, or death; and its risk which includes mild transfusion reactions, rare risk of blood borne infection, or more serious but rare reactions. I have discussed the alternatives to transfusion, including the risk and consequences of not receiving transfusion. The patient had an opportunity to ask questions and had agreed to proceed with transfusion of blood components.    Electronically signed by Rodrigo Winston MD on 5/27/25 at 9:45 AM EDT

## 2025-05-27 NOTE — BRIEF OP NOTE
Brief Postoperative Note      Patient: Isamar Lopez  YOB: 1950  MRN: [id number]    Date of Procedure: 5/19/2025    Ovarian mass    Post-Op Diagnosis: Same       * No procedures listed *    * No surgeons listed *    Assistant:  * No surgical staff found *    Anesthesia: * No anesthesia type entered *    Estimated Blood Loss (mL): Minimal    Complications: None    Specimens:   * No specimens in log *    Implants:  * No implants in log *      Drains:   Colostomy LUQ (Active)   Stomal Appliance 1 piece 05/19/25 0453   Stoma  Assessment Prolapse;Red 05/19/25 0453   Treatment Pouch change 05/18/25 2131   Stool Appearance Watery 05/19/25 0453   Stool Amount Small 05/19/25 0453   Output (mL) 0 ml 05/19/25 0510       Urinary Catheter López (Active)   Catheter Indications Urinary retention (acute or chronic), continuous bladder irrigation or bladder outlet obstruction 05/19/25 0453   Urine Color Paulina 05/19/25 0453   Urine Appearance Mucous 05/19/25 0453   Urine Odor Malodorous 05/19/25 0453   Collection Container Standard 05/19/25 0453   Securement Method Leg strap 05/19/25 0453   Catheter Care  Soap and water 05/19/25 0510   Catheter Best Practices  Drainage tube clipped to bed 05/19/25 0453   Status Draining 05/19/25 0453   Output (mL) 250 mL 05/19/25 0510       Findings:  Infection Present At Time Of Surgery (PATOS) (choose all levels that have infection present):  No infection present  Other Findings: omental mass biopsied. Can biopsy ovarian mass if necessary, but the pelvic fluid sampling may be diagnostic.    Electronically signed by Emil Thomas MD on 5/19/2025 at 10:10 AM  
COVID-19 Outpatient Progress Note    Assessment/Plan:    Problem List Items Addressed This Visit     None      Visit Diagnoses     Exposure to COVID-19 virus    -  Primary    Relevant Orders    Novel Coronavirus (Covid-19),PCR [facility] - Collected at   [facility] or [facility]         Disposition:     I referred patient to one of our centralized sites for a COVID-19 swab  Advised self quarantine until results come back or 14 days after exposure and call the office if she develops any symptoms  Patient understands and agrees with the treatment plan  I have spent 10 minutes directly with the patient  Encounter provider Xavier García MD    Provider located at [address] [place] [address] [address] [facility]  [phone]    Recent Visits  No visits were found meeting these conditions  Showing recent visits within past 7 days and meeting all other requirements     Today's Visits  Date Type Provider Dept   04/02/21 Telemedicine Xavier García MD [address]   Showing today's visits and meeting all other requirements     Future Appointments  No visits were found meeting these conditions  Showing future appointments within next 150 days and meeting all other requirements      This virtual check-in was done via [employer] and patient was informed that this is not a secure, HIPAA-compliant platform  She agrees to proceed  Patient agrees to participate in a virtual check in via telephone or video visit instead of presenting to the office to address urgent/immediate medical needs  Patient is aware this is a billable service  After connecting through [employer], the patient was identified by name and date of birth  Maryjane Trejo was informed that this was a telemedicine visit and that the exam was being conducted confidentially over secure lines  My office door was closed   No one else was
Present At Time Of Surgery (PATOS) (choose all levels that have infection present):  No infection present  Other Findings:     Electronically signed by Winston Wren MD on 5/19/2025 at 2:14 PM  
bladder irrigation or bladder outlet obstruction 05/19/25 1248   Urine Color Paulina 05/19/25 1248   Urine Appearance Mucous 05/19/25 1248   Urine Odor Malodorous 05/19/25 1248   Collection Container Standard 05/19/25 1248   Securement Method Leg strap 05/19/25 1248   Catheter Care  Soap and water 05/19/25 0510   Catheter Best Practices  Drainage tube clipped to bed 05/19/25 1248   Status Draining 05/19/25 1248   Output (mL) 250 mL 05/19/25 0510       Findings:  Other Findings: Right PCN placement. Very mild left hydro - no left PCN placed at this time.    Electronically signed by Brendan Dougherty MD on 5/27/2025 at 5:10 PM  
in the room  Theopolis Hatchet acknowledged consent and understanding of privacy and security of the telemedicine visit  I informed the patient that I have reviewed her record in Epic and presented the opportunity for her to ask any questions regarding the visit today  The patient agreed to participate  Subjective:   Theopolis Hatchet is a 21 y o  female who is concerned about COVID-19  Patient is currently asymptomatic  Patient denies fever, chills, fatigue, malaise, congestion, rhinorrhea, sore throat, anosmia, loss of taste, cough, shortness of breath, chest tightness, abdominal pain, nausea, vomiting, diarrhea, myalgias and headaches  Date of exposure: 3/30/2021    Exposure:   Contact with a person who is under investigation (PUI) for or who is positive for COVID-19 within the last 14 days?: Yes    Hospitalized recently for fever and/or lower respiratory symptoms?: No      Lab Results   Component Value Date    SARSCOV2 Negative 03/10/2021    SARSCOV2 Negative 02/22/2021     Past Medical History:   Diagnosis Date    Anxiety     Depression      Past Surgical History:   Procedure Laterality Date    ADENOIDECTOMY      Last assessed 6/14/2014     BREAST SURGERY      WISDOM TOOTH EXTRACTION       Current Outpatient Medications   Medication Sig Dispense Refill    levonorgestrel-ethinyl estradiol (AVIANE,ALESSE,LESSINA) 0 1-20 MG-MCG per tablet Take 1 tablet by mouth daily 84 tablet 3    sertraline (ZOLOFT) 100 mg tablet Take 1 tablet (100 mg total) by mouth daily 90 tablet 1    sertraline (ZOLOFT) 25 mg tablet Take 1 tablet (25 mg total) by mouth daily in addition to 100 mg for a total of 125 mg daily 90 tablet 1     No current facility-administered medications for this visit  No Known Allergies    Review of Systems   Constitutional: Negative for appetite change, chills, fatigue and fever  HENT: Negative for congestion, rhinorrhea and sore throat      Respiratory: Negative for cough, chest
tightness and shortness of breath  Cardiovascular: Negative for chest pain  Gastrointestinal: Negative for abdominal pain, diarrhea, nausea and vomiting  Musculoskeletal: Negative for myalgias  Neurological: Negative for dizziness and headaches  Objective: There were no vitals filed for this visit  Physical Exam  Constitutional:       General: She is not in acute distress  Pulmonary:      Effort: Pulmonary effort is normal  No respiratory distress  Neurological:      Mental Status: She is alert and oriented to person, place, and time  Psychiatric:         Mood and Affect: Mood normal          Behavior: Behavior normal          Thought Content: Thought content normal        VIRTUAL VISIT DISCLAIMER    Billie Briceno acknowledges that she has consented to an online visit or consultation  She understands that the online visit is based solely on information provided by her, and that, in the absence of a face-to-face physical evaluation by the physician, the diagnosis she receives is both limited and provisional in terms of accuracy and completeness  This is not intended to replace a full medical face-to-face evaluation by the physician  Billie Briceno understands and accepts these terms

## 2025-05-28 PROBLEM — Z71.89 GOALS OF CARE, COUNSELING/DISCUSSION: Status: ACTIVE | Noted: 2025-05-28

## 2025-05-28 LAB
ABO/RH: NORMAL
ANION GAP SERPL CALCULATED.3IONS-SCNC: 20 MMOL/L (ref 7–16)
ANION GAP SERPL CALCULATED.3IONS-SCNC: 22 MMOL/L (ref 7–16)
ANTIBODY SCREEN: NEGATIVE
ARM BAND NUMBER: NORMAL
BLOOD BANK BLOOD PRODUCT EXPIRATION DATE: NORMAL
BLOOD BANK DISPENSE STATUS: NORMAL
BLOOD BANK ISBT PRODUCT BLOOD TYPE: 8400
BLOOD BANK PRODUCT CODE: NORMAL
BLOOD BANK SAMPLE EXPIRATION: NORMAL
BLOOD BANK UNIT TYPE AND RH: NORMAL
BPU ID: NORMAL
BUN SERPL-MCNC: 94 MG/DL (ref 8–23)
BUN SERPL-MCNC: 95 MG/DL (ref 6–23)
CA-I BLD-SCNC: 1.02 MMOL/L (ref 1.15–1.33)
CALCIUM SERPL-MCNC: 7.6 MG/DL (ref 8.6–10.2)
CALCIUM SERPL-MCNC: 7.9 MG/DL (ref 8.8–10.2)
CHLORIDE SERPL-SCNC: 87 MMOL/L (ref 98–107)
CHLORIDE SERPL-SCNC: 90 MMOL/L (ref 98–107)
CO2 SERPL-SCNC: 14 MMOL/L (ref 22–29)
CO2 SERPL-SCNC: 17 MMOL/L (ref 22–29)
COMPONENT: NORMAL
CREAT SERPL-MCNC: 7.8 MG/DL (ref 0.5–1)
CREAT SERPL-MCNC: 8 MG/DL (ref 0.5–1)
CROSSMATCH RESULT: NORMAL
ERYTHROCYTE [DISTWIDTH] IN BLOOD BY AUTOMATED COUNT: 15.1 % (ref 11.5–15)
GFR, ESTIMATED: 5 ML/MIN/1.73M2
GFR, ESTIMATED: 5 ML/MIN/1.73M2
GLUCOSE BLD-MCNC: 109 MG/DL (ref 74–99)
GLUCOSE BLD-MCNC: 113 MG/DL (ref 74–99)
GLUCOSE BLD-MCNC: 143 MG/DL (ref 74–99)
GLUCOSE BLD-MCNC: 151 MG/DL (ref 74–99)
GLUCOSE SERPL-MCNC: 101 MG/DL (ref 74–99)
GLUCOSE SERPL-MCNC: 113 MG/DL (ref 74–99)
HAV IGM SERPL QL IA: NONREACTIVE
HBV CORE IGM SERPL QL IA: NONREACTIVE
HBV SURFACE AB SERPL IA-ACNC: <3.5 MIU/ML (ref 0–9.99)
HBV SURFACE AG SERPL QL IA: NONREACTIVE
HCT VFR BLD AUTO: 25.8 % (ref 34–48)
HCV AB SERPL QL IA: NONREACTIVE
HGB BLD-MCNC: 8.5 G/DL (ref 11.5–15.5)
LACTATE BLDV-SCNC: 0.8 MMOL/L (ref 0.5–2.2)
MAGNESIUM SERPL-MCNC: 2.3 MG/DL (ref 1.6–2.6)
MCH RBC QN AUTO: 26.5 PG (ref 26–35)
MCHC RBC AUTO-ENTMCNC: 32.9 G/DL (ref 32–34.5)
MCV RBC AUTO: 80.4 FL (ref 80–99.9)
PHOSPHATE SERPL-MCNC: 7.5 MG/DL (ref 2.5–4.5)
PLATELET # BLD AUTO: 390 K/UL (ref 130–450)
PMV BLD AUTO: 10 FL (ref 7–12)
POTASSIUM SERPL-SCNC: 4.4 MMOL/L (ref 3.5–5.1)
POTASSIUM SERPL-SCNC: 4.5 MMOL/L (ref 3.5–5)
RBC # BLD AUTO: 3.21 M/UL (ref 3.5–5.5)
SODIUM SERPL-SCNC: 123 MMOL/L (ref 132–146)
SODIUM SERPL-SCNC: 127 MMOL/L (ref 136–145)
TRANSFUSION STATUS: NORMAL
UNIT DIVISION: 0
UNIT ISSUE DATE/TIME: NORMAL
WBC OTHER # BLD: 10.4 K/UL (ref 4.5–11.5)

## 2025-05-28 PROCEDURE — 6370000000 HC RX 637 (ALT 250 FOR IP): Performed by: INTERNAL MEDICINE

## 2025-05-28 PROCEDURE — 6370000000 HC RX 637 (ALT 250 FOR IP): Performed by: SURGERY

## 2025-05-28 PROCEDURE — 0T9030Z DRAINAGE OF RIGHT KIDNEY WITH DRAINAGE DEVICE, PERCUTANEOUS APPROACH: ICD-10-PCS | Performed by: INTERNAL MEDICINE

## 2025-05-28 PROCEDURE — 83605 ASSAY OF LACTIC ACID: CPT

## 2025-05-28 PROCEDURE — 6370000000 HC RX 637 (ALT 250 FOR IP)

## 2025-05-28 PROCEDURE — 99232 SBSQ HOSP IP/OBS MODERATE 35: CPT

## 2025-05-28 PROCEDURE — 84100 ASSAY OF PHOSPHORUS: CPT

## 2025-05-28 PROCEDURE — 82962 GLUCOSE BLOOD TEST: CPT

## 2025-05-28 PROCEDURE — 99232 SBSQ HOSP IP/OBS MODERATE 35: CPT | Performed by: STUDENT IN AN ORGANIZED HEALTH CARE EDUCATION/TRAINING PROGRAM

## 2025-05-28 PROCEDURE — 80048 BASIC METABOLIC PNL TOTAL CA: CPT

## 2025-05-28 PROCEDURE — 86317 IMMUNOASSAY INFECTIOUS AGENT: CPT

## 2025-05-28 PROCEDURE — 80074 ACUTE HEPATITIS PANEL: CPT

## 2025-05-28 PROCEDURE — 6360000002 HC RX W HCPCS

## 2025-05-28 PROCEDURE — 1200000000 HC SEMI PRIVATE

## 2025-05-28 PROCEDURE — 6370000000 HC RX 637 (ALT 250 FOR IP): Performed by: STUDENT IN AN ORGANIZED HEALTH CARE EDUCATION/TRAINING PROGRAM

## 2025-05-28 PROCEDURE — 6370000000 HC RX 637 (ALT 250 FOR IP): Performed by: NURSE PRACTITIONER

## 2025-05-28 PROCEDURE — 82330 ASSAY OF CALCIUM: CPT

## 2025-05-28 PROCEDURE — 2500000003 HC RX 250 WO HCPCS: Performed by: NURSE PRACTITIONER

## 2025-05-28 PROCEDURE — 85027 COMPLETE CBC AUTOMATED: CPT

## 2025-05-28 PROCEDURE — 36415 COLL VENOUS BLD VENIPUNCTURE: CPT

## 2025-05-28 PROCEDURE — 83735 ASSAY OF MAGNESIUM: CPT

## 2025-05-28 RX ORDER — IPRATROPIUM BROMIDE AND ALBUTEROL SULFATE 2.5; .5 MG/3ML; MG/3ML
1 SOLUTION RESPIRATORY (INHALATION) EVERY 4 HOURS PRN
Status: DISCONTINUED | OUTPATIENT
Start: 2025-05-28 | End: 2025-06-10 | Stop reason: HOSPADM

## 2025-05-28 RX ORDER — CALCIUM GLUCONATE 20 MG/ML
1000 INJECTION, SOLUTION INTRAVENOUS ONCE
Status: COMPLETED | OUTPATIENT
Start: 2025-05-28 | End: 2025-05-28

## 2025-05-28 RX ORDER — SODIUM BICARBONATE 650 MG/1
1300 TABLET ORAL 3 TIMES DAILY
Status: DISCONTINUED | OUTPATIENT
Start: 2025-05-28 | End: 2025-06-10 | Stop reason: HOSPADM

## 2025-05-28 RX ADMIN — CALCIUM GLUCONATE 1000 MG: 20 INJECTION, SOLUTION INTRAVENOUS at 09:11

## 2025-05-28 RX ADMIN — POLYETHYLENE GLYCOL 3350 17 G: 17 POWDER, FOR SOLUTION ORAL at 09:02

## 2025-05-28 RX ADMIN — PANTOPRAZOLE SODIUM 40 MG: 40 TABLET, DELAYED RELEASE ORAL at 05:38

## 2025-05-28 RX ADMIN — SODIUM CHLORIDE, PRESERVATIVE FREE 10 ML: 5 INJECTION INTRAVENOUS at 09:03

## 2025-05-28 RX ADMIN — SODIUM CHLORIDE 1 G: 1 TABLET ORAL at 09:01

## 2025-05-28 RX ADMIN — AMLODIPINE BESYLATE 10 MG: 10 TABLET ORAL at 09:01

## 2025-05-28 RX ADMIN — CALCITRIOL CAPSULES 0.25 MCG 0.25 MCG: 0.25 CAPSULE ORAL at 09:01

## 2025-05-28 RX ADMIN — LORAZEPAM 0.5 MG: 0.5 TABLET ORAL at 09:01

## 2025-05-28 RX ADMIN — SODIUM BICARBONATE 1300 MG: 650 TABLET ORAL at 12:41

## 2025-05-28 RX ADMIN — SODIUM CHLORIDE 1 G: 1 TABLET ORAL at 11:00

## 2025-05-28 RX ADMIN — FOLIC ACID 1 MG: 1 TABLET ORAL at 09:02

## 2025-05-28 RX ADMIN — SODIUM BICARBONATE 1300 MG: 650 TABLET ORAL at 09:01

## 2025-05-28 RX ADMIN — LACTULOSE 30 G: 10 SOLUTION ORAL at 09:02

## 2025-05-28 RX ADMIN — CARVEDILOL 6.25 MG: 6.25 TABLET, FILM COATED ORAL at 09:02

## 2025-05-28 RX ADMIN — SODIUM CHLORIDE, PRESERVATIVE FREE 10 ML: 5 INJECTION INTRAVENOUS at 21:40

## 2025-05-28 RX ADMIN — ESCITALOPRAM OXALATE 5 MG: 10 TABLET ORAL at 09:02

## 2025-05-28 RX ADMIN — Medication 1000 UNITS: at 09:12

## 2025-05-28 RX ADMIN — ASPIRIN 81 MG: 81 TABLET, COATED ORAL at 09:01

## 2025-05-28 NOTE — DISCHARGE INSTR - COC
READMISSION 2.0             19.2 Total Score        Discharging to Facility/ Agency   Name: [facility]  Address:  Phone:  Fax:    Dialysis Facility (if applicable)   Name:  Address:  Dialysis Schedule:  Phone:  Fax:    / signature: Lizbeth Camargo RN      PHYSICIAN SECTION    Prognosis: {Prognosis:1483985104}    Condition at Discharge: { Patient Condition:410661776}    Rehab Potential (if transferring to Rehab): {Prognosis:6293638817}    Recommended Labs or Other Treatments After Discharge: ***    Physician Certification: I certify the above information and transfer of Isamar Lopez  is necessary for the continuing treatment of the diagnosis listed and that she requires Home Care for less 30 days.     Update Admission H&P: {CHP DME Changes in HandP:548047555}    PHYSICIAN SIGNATURE:  {Esignature:[id number]}

## 2025-05-28 NOTE — RT PROTOCOL NOTE
wheezing or increased work of breathing using Per Protocol order mode.         7-10 - enter or revise RT Bronchodilator order(s) to two equivalent RT bronchodilator orders with one order with TID Frequency and one order with Frequency of every 4 hours PRN wheezing or increased work of breathing using Per Protocol order mode.       11-13 - enter or revise RT Bronchodilator order(s) to one equivalent RT bronchodilator order with QID Frequency and an Albuterol order with Frequency of every 4 hours PRN wheezing or increased work of breathing using Per Protocol order mode.      Greater than 13 - enter or revise RT Bronchodilator order(s) to one equivalent RT bronchodilator order with every 4 hours Frequency and an Albuterol order with Frequency of every 2 hours PRN wheezing or increased work of breathing using Per Protocol order mode.     RT to enter RT Home Evaluation for COPD & MDI Assessment order using Per Protocol order mode.    Electronically signed by Bree Oropeza RCP on 5/28/2025 at 8:46 AM

## 2025-05-29 ENCOUNTER — APPOINTMENT (OUTPATIENT)
Dept: ULTRASOUND IMAGING | Age: 75
DRG: 660 | End: 2025-05-29
Attending: EMERGENCY MEDICINE
Payer: MEDICARE

## 2025-05-29 ENCOUNTER — APPOINTMENT (OUTPATIENT)
Dept: GENERAL RADIOLOGY | Age: 75
DRG: 660 | End: 2025-05-29
Attending: EMERGENCY MEDICINE
Payer: MEDICARE

## 2025-05-29 ENCOUNTER — APPOINTMENT (OUTPATIENT)
Dept: CT IMAGING | Age: 75
DRG: 660 | End: 2025-05-29
Attending: EMERGENCY MEDICINE
Payer: MEDICARE

## 2025-05-29 PROBLEM — R47.1 DYSARTHRIA: Status: ACTIVE | Noted: 2025-05-29

## 2025-05-29 LAB
ANION GAP SERPL CALCULATED.3IONS-SCNC: 19 MMOL/L (ref 7–16)
ANION GAP SERPL CALCULATED.3IONS-SCNC: 21 MMOL/L (ref 7–16)
BUN SERPL-MCNC: 96 MG/DL (ref 8–23)
BUN SERPL-MCNC: 98 MG/DL (ref 8–23)
CA-I BLD-SCNC: 1.06 MMOL/L (ref 1.15–1.33)
CALCIUM SERPL-MCNC: 7.4 MG/DL (ref 8.8–10.2)
CALCIUM SERPL-MCNC: 7.9 MG/DL (ref 8.8–10.2)
CHLORIDE SERPL-SCNC: 89 MMOL/L (ref 98–107)
CHLORIDE SERPL-SCNC: 89 MMOL/L (ref 98–107)
CO2 SERPL-SCNC: 16 MMOL/L (ref 22–29)
CO2 SERPL-SCNC: 16 MMOL/L (ref 22–29)
CREAT SERPL-MCNC: 7.3 MG/DL (ref 0.5–1)
CREAT SERPL-MCNC: 7.4 MG/DL (ref 0.5–1)
ERYTHROCYTE [DISTWIDTH] IN BLOOD BY AUTOMATED COUNT: 15.2 % (ref 11.5–15)
GFR, ESTIMATED: 5 ML/MIN/1.73M2
GFR, ESTIMATED: 5 ML/MIN/1.73M2
GLUCOSE BLD-MCNC: 122 MG/DL (ref 74–99)
GLUCOSE BLD-MCNC: 90 MG/DL (ref 74–99)
GLUCOSE SERPL-MCNC: 129 MG/DL (ref 74–99)
GLUCOSE SERPL-MCNC: 85 MG/DL (ref 74–99)
HCT VFR BLD AUTO: 25 % (ref 34–48)
HGB BLD-MCNC: 8.2 G/DL (ref 11.5–15.5)
INR PPP: 1.2
MAGNESIUM SERPL-MCNC: 2.4 MG/DL (ref 1.6–2.4)
MCH RBC QN AUTO: 26.9 PG (ref 26–35)
MCHC RBC AUTO-ENTMCNC: 32.8 G/DL (ref 32–34.5)
MCV RBC AUTO: 82 FL (ref 80–99.9)
PHOSPHATE SERPL-MCNC: 7.2 MG/DL (ref 2.5–4.5)
PLATELET # BLD AUTO: 372 K/UL (ref 130–450)
PMV BLD AUTO: 10.1 FL (ref 7–12)
POTASSIUM SERPL-SCNC: 4.3 MMOL/L (ref 3.5–5.1)
POTASSIUM SERPL-SCNC: 4.4 MMOL/L (ref 3.5–5.1)
PROTHROMBIN TIME: 13.1 SEC (ref 9.3–12.4)
RBC # BLD AUTO: 3.05 M/UL (ref 3.5–5.5)
SODIUM SERPL-SCNC: 125 MMOL/L (ref 136–145)
SODIUM SERPL-SCNC: 125 MMOL/L (ref 136–145)
WBC OTHER # BLD: 10.7 K/UL (ref 4.5–11.5)

## 2025-05-29 PROCEDURE — 6370000000 HC RX 637 (ALT 250 FOR IP): Performed by: INTERNAL MEDICINE

## 2025-05-29 PROCEDURE — 85027 COMPLETE CBC AUTOMATED: CPT

## 2025-05-29 PROCEDURE — 83735 ASSAY OF MAGNESIUM: CPT

## 2025-05-29 PROCEDURE — 99232 SBSQ HOSP IP/OBS MODERATE 35: CPT | Performed by: STUDENT IN AN ORGANIZED HEALTH CARE EDUCATION/TRAINING PROGRAM

## 2025-05-29 PROCEDURE — 6370000000 HC RX 637 (ALT 250 FOR IP): Performed by: NURSE PRACTITIONER

## 2025-05-29 PROCEDURE — 92526 ORAL FUNCTION THERAPY: CPT

## 2025-05-29 PROCEDURE — 92611 MOTION FLUOROSCOPY/SWALLOW: CPT

## 2025-05-29 PROCEDURE — 82962 GLUCOSE BLOOD TEST: CPT

## 2025-05-29 PROCEDURE — 85610 PROTHROMBIN TIME: CPT

## 2025-05-29 PROCEDURE — 92610 EVALUATE SWALLOWING FUNCTION: CPT

## 2025-05-29 PROCEDURE — 6370000000 HC RX 637 (ALT 250 FOR IP): Performed by: SURGERY

## 2025-05-29 PROCEDURE — 82330 ASSAY OF CALCIUM: CPT

## 2025-05-29 PROCEDURE — 6370000000 HC RX 637 (ALT 250 FOR IP): Performed by: STUDENT IN AN ORGANIZED HEALTH CARE EDUCATION/TRAINING PROGRAM

## 2025-05-29 PROCEDURE — 6370000000 HC RX 637 (ALT 250 FOR IP)

## 2025-05-29 PROCEDURE — 1200000000 HC SEMI PRIVATE

## 2025-05-29 PROCEDURE — 80048 BASIC METABOLIC PNL TOTAL CA: CPT

## 2025-05-29 PROCEDURE — 84100 ASSAY OF PHOSPHORUS: CPT

## 2025-05-29 PROCEDURE — 97530 THERAPEUTIC ACTIVITIES: CPT

## 2025-05-29 PROCEDURE — 6360000002 HC RX W HCPCS

## 2025-05-29 PROCEDURE — 70450 CT HEAD/BRAIN W/O DYE: CPT

## 2025-05-29 PROCEDURE — 36415 COLL VENOUS BLD VENIPUNCTURE: CPT

## 2025-05-29 PROCEDURE — 99222 1ST HOSP IP/OBS MODERATE 55: CPT | Performed by: NURSE PRACTITIONER

## 2025-05-29 PROCEDURE — 74230 X-RAY XM SWLNG FUNCJ C+: CPT

## 2025-05-29 PROCEDURE — 76770 US EXAM ABDO BACK WALL COMP: CPT

## 2025-05-29 PROCEDURE — 92523 SPEECH SOUND LANG COMPREHEN: CPT

## 2025-05-29 PROCEDURE — 97535 SELF CARE MNGMENT TRAINING: CPT

## 2025-05-29 PROCEDURE — 2500000003 HC RX 250 WO HCPCS: Performed by: NURSE PRACTITIONER

## 2025-05-29 RX ORDER — CALCIUM GLUCONATE 20 MG/ML
1000 INJECTION, SOLUTION INTRAVENOUS ONCE
Status: COMPLETED | OUTPATIENT
Start: 2025-05-29 | End: 2025-05-29

## 2025-05-29 RX ORDER — BUMETANIDE 0.25 MG/ML
2 INJECTION, SOLUTION INTRAMUSCULAR; INTRAVENOUS ONCE
Status: DISCONTINUED | OUTPATIENT
Start: 2025-05-29 | End: 2025-05-29

## 2025-05-29 RX ORDER — BUMETANIDE 0.25 MG/ML
2 INJECTION, SOLUTION INTRAMUSCULAR; INTRAVENOUS ONCE
Status: COMPLETED | OUTPATIENT
Start: 2025-05-29 | End: 2025-05-29

## 2025-05-29 RX ADMIN — ASPIRIN 81 MG: 81 TABLET, COATED ORAL at 14:40

## 2025-05-29 RX ADMIN — CARVEDILOL 6.25 MG: 6.25 TABLET, FILM COATED ORAL at 20:19

## 2025-05-29 RX ADMIN — LACTULOSE 30 G: 10 SOLUTION ORAL at 20:20

## 2025-05-29 RX ADMIN — LORAZEPAM 0.5 MG: 0.5 TABLET ORAL at 14:40

## 2025-05-29 RX ADMIN — CALCIUM GLUCONATE 1000 MG: 20 INJECTION, SOLUTION INTRAVENOUS at 10:30

## 2025-05-29 RX ADMIN — ESCITALOPRAM OXALATE 5 MG: 10 TABLET ORAL at 14:40

## 2025-05-29 RX ADMIN — SENNOSIDES 8.6 MG: 8.6 TABLET, COATED ORAL at 20:19

## 2025-05-29 RX ADMIN — Medication 10 MG: at 20:19

## 2025-05-29 RX ADMIN — SODIUM BICARBONATE 1300 MG: 650 TABLET ORAL at 14:40

## 2025-05-29 RX ADMIN — FOLIC ACID 1 MG: 1 TABLET ORAL at 14:41

## 2025-05-29 RX ADMIN — PANTOPRAZOLE SODIUM 40 MG: 40 TABLET, DELAYED RELEASE ORAL at 14:41

## 2025-05-29 RX ADMIN — ATORVASTATIN CALCIUM 40 MG: 40 TABLET, FILM COATED ORAL at 20:19

## 2025-05-29 RX ADMIN — SODIUM BICARBONATE 1300 MG: 650 TABLET ORAL at 20:19

## 2025-05-29 RX ADMIN — SODIUM CHLORIDE 1 G: 1 TABLET ORAL at 18:13

## 2025-05-29 RX ADMIN — SODIUM CHLORIDE, PRESERVATIVE FREE 10 ML: 5 INJECTION INTRAVENOUS at 20:20

## 2025-05-29 RX ADMIN — LACTULOSE 30 G: 10 SOLUTION ORAL at 16:48

## 2025-05-29 RX ADMIN — SODIUM CHLORIDE, PRESERVATIVE FREE 10 ML: 5 INJECTION INTRAVENOUS at 14:41

## 2025-05-29 RX ADMIN — AMLODIPINE BESYLATE 10 MG: 10 TABLET ORAL at 14:40

## 2025-05-29 RX ADMIN — CALCITRIOL CAPSULES 0.25 MCG 0.25 MCG: 0.25 CAPSULE ORAL at 14:41

## 2025-05-29 RX ADMIN — BUMETANIDE 2 MG: 0.25 INJECTION INTRAMUSCULAR; INTRAVENOUS at 14:41

## 2025-05-30 LAB
ANION GAP SERPL CALCULATED.3IONS-SCNC: 19 MMOL/L (ref 7–16)
ANION GAP SERPL CALCULATED.3IONS-SCNC: 22 MMOL/L (ref 7–16)
BUN SERPL-MCNC: 101 MG/DL (ref 6–23)
BUN SERPL-MCNC: 99 MG/DL (ref 8–23)
CA-I BLD-SCNC: 1.09 MMOL/L (ref 1.15–1.33)
CALCIUM SERPL-MCNC: 7.9 MG/DL (ref 8.6–10.2)
CALCIUM SERPL-MCNC: 8 MG/DL (ref 8.8–10.2)
CHLORIDE SERPL-SCNC: 89 MMOL/L (ref 98–107)
CHLORIDE SERPL-SCNC: 91 MMOL/L (ref 98–107)
CO2 SERPL-SCNC: 15 MMOL/L (ref 22–29)
CO2 SERPL-SCNC: 18 MMOL/L (ref 22–29)
CREAT SERPL-MCNC: 7 MG/DL (ref 0.5–1)
CREAT SERPL-MCNC: 7.1 MG/DL (ref 0.5–1)
ERYTHROCYTE [DISTWIDTH] IN BLOOD BY AUTOMATED COUNT: 15.3 % (ref 11.5–15)
GFR, ESTIMATED: 6 ML/MIN/1.73M2
GFR, ESTIMATED: 6 ML/MIN/1.73M2
GLUCOSE BLD-MCNC: 107 MG/DL (ref 74–99)
GLUCOSE BLD-MCNC: 115 MG/DL (ref 74–99)
GLUCOSE BLD-MCNC: 142 MG/DL (ref 74–99)
GLUCOSE BLD-MCNC: 148 MG/DL (ref 74–99)
GLUCOSE SERPL-MCNC: 104 MG/DL (ref 74–99)
GLUCOSE SERPL-MCNC: 104 MG/DL (ref 74–99)
HCT VFR BLD AUTO: 25.1 % (ref 34–48)
HGB BLD-MCNC: 8.2 G/DL (ref 11.5–15.5)
MAGNESIUM SERPL-MCNC: 2.3 MG/DL (ref 1.6–2.6)
MCH RBC QN AUTO: 26.6 PG (ref 26–35)
MCHC RBC AUTO-ENTMCNC: 32.7 G/DL (ref 32–34.5)
MCV RBC AUTO: 81.5 FL (ref 80–99.9)
PHOSPHATE SERPL-MCNC: 7.5 MG/DL (ref 2.5–4.5)
PLATELET # BLD AUTO: 397 K/UL (ref 130–450)
PMV BLD AUTO: 10.2 FL (ref 7–12)
POTASSIUM SERPL-SCNC: 4 MMOL/L (ref 3.5–5.1)
POTASSIUM SERPL-SCNC: 4.3 MMOL/L (ref 3.5–5)
RBC # BLD AUTO: 3.08 M/UL (ref 3.5–5.5)
SODIUM SERPL-SCNC: 126 MMOL/L (ref 132–146)
SODIUM SERPL-SCNC: 129 MMOL/L (ref 136–145)
WBC OTHER # BLD: 11.7 K/UL (ref 4.5–11.5)

## 2025-05-30 PROCEDURE — 6360000002 HC RX W HCPCS

## 2025-05-30 PROCEDURE — 6370000000 HC RX 637 (ALT 250 FOR IP): Performed by: NURSE PRACTITIONER

## 2025-05-30 PROCEDURE — 82962 GLUCOSE BLOOD TEST: CPT

## 2025-05-30 PROCEDURE — 6370000000 HC RX 637 (ALT 250 FOR IP): Performed by: SURGERY

## 2025-05-30 PROCEDURE — 6370000000 HC RX 637 (ALT 250 FOR IP): Performed by: STUDENT IN AN ORGANIZED HEALTH CARE EDUCATION/TRAINING PROGRAM

## 2025-05-30 PROCEDURE — 2500000003 HC RX 250 WO HCPCS: Performed by: NURSE PRACTITIONER

## 2025-05-30 PROCEDURE — 85027 COMPLETE CBC AUTOMATED: CPT

## 2025-05-30 PROCEDURE — 6370000000 HC RX 637 (ALT 250 FOR IP)

## 2025-05-30 PROCEDURE — 36415 COLL VENOUS BLD VENIPUNCTURE: CPT

## 2025-05-30 PROCEDURE — 6370000000 HC RX 637 (ALT 250 FOR IP): Performed by: INTERNAL MEDICINE

## 2025-05-30 PROCEDURE — 92526 ORAL FUNCTION THERAPY: CPT

## 2025-05-30 PROCEDURE — 99232 SBSQ HOSP IP/OBS MODERATE 35: CPT | Performed by: STUDENT IN AN ORGANIZED HEALTH CARE EDUCATION/TRAINING PROGRAM

## 2025-05-30 PROCEDURE — 84100 ASSAY OF PHOSPHORUS: CPT

## 2025-05-30 PROCEDURE — 1200000000 HC SEMI PRIVATE

## 2025-05-30 PROCEDURE — 99232 SBSQ HOSP IP/OBS MODERATE 35: CPT | Performed by: NURSE PRACTITIONER

## 2025-05-30 PROCEDURE — 82330 ASSAY OF CALCIUM: CPT

## 2025-05-30 PROCEDURE — 80048 BASIC METABOLIC PNL TOTAL CA: CPT

## 2025-05-30 PROCEDURE — 83735 ASSAY OF MAGNESIUM: CPT

## 2025-05-30 RX ORDER — CALCIUM GLUCONATE 20 MG/ML
1000 INJECTION, SOLUTION INTRAVENOUS ONCE
Status: COMPLETED | OUTPATIENT
Start: 2025-05-30 | End: 2025-05-30

## 2025-05-30 RX ADMIN — SODIUM BICARBONATE 1300 MG: 650 TABLET ORAL at 08:32

## 2025-05-30 RX ADMIN — SODIUM BICARBONATE 1300 MG: 650 TABLET ORAL at 20:56

## 2025-05-30 RX ADMIN — SODIUM CHLORIDE 1 G: 1 TABLET ORAL at 12:15

## 2025-05-30 RX ADMIN — AMLODIPINE BESYLATE 10 MG: 10 TABLET ORAL at 08:32

## 2025-05-30 RX ADMIN — CALCIUM GLUCONATE 1000 MG: 20 INJECTION, SOLUTION INTRAVENOUS at 10:40

## 2025-05-30 RX ADMIN — SODIUM BICARBONATE 1300 MG: 650 TABLET ORAL at 14:32

## 2025-05-30 RX ADMIN — LACTULOSE 30 G: 10 SOLUTION ORAL at 20:56

## 2025-05-30 RX ADMIN — Medication 10 MG: at 20:56

## 2025-05-30 RX ADMIN — LACTULOSE 30 G: 10 SOLUTION ORAL at 08:32

## 2025-05-30 RX ADMIN — CARVEDILOL 6.25 MG: 6.25 TABLET, FILM COATED ORAL at 08:35

## 2025-05-30 RX ADMIN — SODIUM CHLORIDE, PRESERVATIVE FREE 10 ML: 5 INJECTION INTRAVENOUS at 08:37

## 2025-05-30 RX ADMIN — FOLIC ACID 1 MG: 1 TABLET ORAL at 08:33

## 2025-05-30 RX ADMIN — CARVEDILOL 6.25 MG: 6.25 TABLET, FILM COATED ORAL at 20:56

## 2025-05-30 RX ADMIN — LACTULOSE 30 G: 10 SOLUTION ORAL at 18:10

## 2025-05-30 RX ADMIN — POLYETHYLENE GLYCOL 3350 17 G: 17 POWDER, FOR SOLUTION ORAL at 08:32

## 2025-05-30 RX ADMIN — SENNOSIDES 8.6 MG: 8.6 TABLET, COATED ORAL at 20:56

## 2025-05-30 RX ADMIN — SODIUM CHLORIDE, PRESERVATIVE FREE 10 ML: 5 INJECTION INTRAVENOUS at 20:56

## 2025-05-30 RX ADMIN — ATORVASTATIN CALCIUM 40 MG: 40 TABLET, FILM COATED ORAL at 20:56

## 2025-05-30 RX ADMIN — ASPIRIN 81 MG: 81 TABLET, COATED ORAL at 08:32

## 2025-05-30 RX ADMIN — PANTOPRAZOLE SODIUM 40 MG: 40 TABLET, DELAYED RELEASE ORAL at 05:42

## 2025-05-30 RX ADMIN — Medication 1000 UNITS: at 08:33

## 2025-05-30 RX ADMIN — LORAZEPAM 0.5 MG: 0.5 TABLET ORAL at 08:32

## 2025-05-30 RX ADMIN — CALCITRIOL CAPSULES 0.25 MCG 0.25 MCG: 0.25 CAPSULE ORAL at 08:36

## 2025-05-30 RX ADMIN — SODIUM CHLORIDE 1 G: 1 TABLET ORAL at 08:36

## 2025-05-30 RX ADMIN — SODIUM CHLORIDE 1 G: 1 TABLET ORAL at 18:03

## 2025-05-30 RX ADMIN — ESCITALOPRAM OXALATE 5 MG: 10 TABLET ORAL at 08:33

## 2025-05-30 RX ADMIN — PANTOPRAZOLE SODIUM 40 MG: 40 TABLET, DELAYED RELEASE ORAL at 18:03

## 2025-05-31 LAB
ANION GAP SERPL CALCULATED.3IONS-SCNC: 18 MMOL/L (ref 7–16)
ANION GAP SERPL CALCULATED.3IONS-SCNC: 20 MMOL/L (ref 7–16)
BUN SERPL-MCNC: 101 MG/DL (ref 8–23)
BUN SERPL-MCNC: 101 MG/DL (ref 8–23)
CA-I BLD-SCNC: 1.11 MMOL/L (ref 1.15–1.33)
CALCIUM SERPL-MCNC: 8 MG/DL (ref 8.8–10.2)
CALCIUM SERPL-MCNC: 8.2 MG/DL (ref 8.8–10.2)
CHLORIDE SERPL-SCNC: 93 MMOL/L (ref 98–107)
CHLORIDE SERPL-SCNC: 94 MMOL/L (ref 98–107)
CO2 SERPL-SCNC: 17 MMOL/L (ref 22–29)
CO2 SERPL-SCNC: 19 MMOL/L (ref 22–29)
CREAT SERPL-MCNC: 6.5 MG/DL (ref 0.5–1)
CREAT SERPL-MCNC: 7 MG/DL (ref 0.5–1)
ERYTHROCYTE [DISTWIDTH] IN BLOOD BY AUTOMATED COUNT: 15.6 % (ref 11.5–15)
GFR, ESTIMATED: 6 ML/MIN/1.73M2
GFR, ESTIMATED: 6 ML/MIN/1.73M2
GLUCOSE BLD-MCNC: 120 MG/DL (ref 74–99)
GLUCOSE BLD-MCNC: 122 MG/DL (ref 74–99)
GLUCOSE BLD-MCNC: 128 MG/DL (ref 74–99)
GLUCOSE BLD-MCNC: 128 MG/DL (ref 74–99)
GLUCOSE SERPL-MCNC: 112 MG/DL (ref 74–99)
GLUCOSE SERPL-MCNC: 120 MG/DL (ref 74–99)
HCT VFR BLD AUTO: 24.5 % (ref 34–48)
HGB BLD-MCNC: 8 G/DL (ref 11.5–15.5)
MAGNESIUM SERPL-MCNC: 2.4 MG/DL (ref 1.6–2.4)
MCH RBC QN AUTO: 26.5 PG (ref 26–35)
MCHC RBC AUTO-ENTMCNC: 32.7 G/DL (ref 32–34.5)
MCV RBC AUTO: 81.1 FL (ref 80–99.9)
PHOSPHATE SERPL-MCNC: 7.1 MG/DL (ref 2.5–4.5)
PLATELET # BLD AUTO: 398 K/UL (ref 130–450)
PMV BLD AUTO: 9.7 FL (ref 7–12)
POTASSIUM SERPL-SCNC: 3.8 MMOL/L (ref 3.5–5.1)
POTASSIUM SERPL-SCNC: 4 MMOL/L (ref 3.5–5.1)
RBC # BLD AUTO: 3.02 M/UL (ref 3.5–5.5)
SODIUM SERPL-SCNC: 130 MMOL/L (ref 136–145)
SODIUM SERPL-SCNC: 131 MMOL/L (ref 136–145)
WBC OTHER # BLD: 12.5 K/UL (ref 4.5–11.5)

## 2025-05-31 PROCEDURE — 82330 ASSAY OF CALCIUM: CPT

## 2025-05-31 PROCEDURE — 2500000003 HC RX 250 WO HCPCS: Performed by: NURSE PRACTITIONER

## 2025-05-31 PROCEDURE — 6370000000 HC RX 637 (ALT 250 FOR IP)

## 2025-05-31 PROCEDURE — 83735 ASSAY OF MAGNESIUM: CPT

## 2025-05-31 PROCEDURE — 1200000000 HC SEMI PRIVATE

## 2025-05-31 PROCEDURE — 6370000000 HC RX 637 (ALT 250 FOR IP): Performed by: SURGERY

## 2025-05-31 PROCEDURE — 6370000000 HC RX 637 (ALT 250 FOR IP): Performed by: INTERNAL MEDICINE

## 2025-05-31 PROCEDURE — 80048 BASIC METABOLIC PNL TOTAL CA: CPT

## 2025-05-31 PROCEDURE — 6360000002 HC RX W HCPCS

## 2025-05-31 PROCEDURE — 85027 COMPLETE CBC AUTOMATED: CPT

## 2025-05-31 PROCEDURE — 99232 SBSQ HOSP IP/OBS MODERATE 35: CPT | Performed by: INTERNAL MEDICINE

## 2025-05-31 PROCEDURE — 84100 ASSAY OF PHOSPHORUS: CPT

## 2025-05-31 PROCEDURE — 82962 GLUCOSE BLOOD TEST: CPT

## 2025-05-31 PROCEDURE — 6370000000 HC RX 637 (ALT 250 FOR IP): Performed by: STUDENT IN AN ORGANIZED HEALTH CARE EDUCATION/TRAINING PROGRAM

## 2025-05-31 PROCEDURE — 36415 COLL VENOUS BLD VENIPUNCTURE: CPT

## 2025-05-31 PROCEDURE — 6370000000 HC RX 637 (ALT 250 FOR IP): Performed by: NURSE PRACTITIONER

## 2025-05-31 RX ORDER — CALCIUM GLUCONATE 20 MG/ML
1000 INJECTION, SOLUTION INTRAVENOUS ONCE
Status: COMPLETED | OUTPATIENT
Start: 2025-05-31 | End: 2025-05-31

## 2025-05-31 RX ADMIN — CARVEDILOL 6.25 MG: 6.25 TABLET, FILM COATED ORAL at 21:48

## 2025-05-31 RX ADMIN — LACTULOSE 30 G: 10 SOLUTION ORAL at 15:06

## 2025-05-31 RX ADMIN — SODIUM CHLORIDE 1 G: 1 TABLET ORAL at 08:23

## 2025-05-31 RX ADMIN — CALCIUM GLUCONATE 1000 MG: 20 INJECTION, SOLUTION INTRAVENOUS at 15:07

## 2025-05-31 RX ADMIN — ESCITALOPRAM OXALATE 5 MG: 10 TABLET ORAL at 08:23

## 2025-05-31 RX ADMIN — ASPIRIN 81 MG: 81 TABLET, COATED ORAL at 08:23

## 2025-05-31 RX ADMIN — LORAZEPAM 0.5 MG: 0.5 TABLET ORAL at 08:23

## 2025-05-31 RX ADMIN — ATORVASTATIN CALCIUM 40 MG: 40 TABLET, FILM COATED ORAL at 21:48

## 2025-05-31 RX ADMIN — AMLODIPINE BESYLATE 10 MG: 10 TABLET ORAL at 08:22

## 2025-05-31 RX ADMIN — CARVEDILOL 6.25 MG: 6.25 TABLET, FILM COATED ORAL at 08:23

## 2025-05-31 RX ADMIN — FOLIC ACID 1 MG: 1 TABLET ORAL at 08:22

## 2025-05-31 RX ADMIN — Medication 1000 UNITS: at 08:23

## 2025-05-31 RX ADMIN — SODIUM BICARBONATE 1300 MG: 650 TABLET ORAL at 21:48

## 2025-05-31 RX ADMIN — SODIUM CHLORIDE 1 G: 1 TABLET ORAL at 11:30

## 2025-05-31 RX ADMIN — SODIUM CHLORIDE, PRESERVATIVE FREE 10 ML: 5 INJECTION INTRAVENOUS at 21:48

## 2025-05-31 RX ADMIN — Medication 10 MG: at 21:48

## 2025-05-31 RX ADMIN — POLYETHYLENE GLYCOL 3350 17 G: 17 POWDER, FOR SOLUTION ORAL at 08:24

## 2025-05-31 RX ADMIN — LACTULOSE 30 G: 10 SOLUTION ORAL at 08:24

## 2025-05-31 RX ADMIN — SODIUM CHLORIDE, PRESERVATIVE FREE 10 ML: 5 INJECTION INTRAVENOUS at 08:24

## 2025-05-31 RX ADMIN — SODIUM CHLORIDE 1 G: 1 TABLET ORAL at 17:13

## 2025-05-31 RX ADMIN — SODIUM BICARBONATE 1300 MG: 650 TABLET ORAL at 15:06

## 2025-05-31 RX ADMIN — SODIUM BICARBONATE 1300 MG: 650 TABLET ORAL at 08:23

## 2025-06-01 LAB
ANION GAP SERPL CALCULATED.3IONS-SCNC: 18 MMOL/L (ref 7–16)
ANION GAP SERPL CALCULATED.3IONS-SCNC: 19 MMOL/L (ref 7–16)
BUN SERPL-MCNC: 102 MG/DL (ref 8–23)
BUN SERPL-MCNC: 105 MG/DL (ref 8–23)
CA-I BLD-SCNC: 1.11 MMOL/L (ref 1.15–1.33)
CALCIUM SERPL-MCNC: 7.9 MG/DL (ref 8.8–10.2)
CALCIUM SERPL-MCNC: 8.3 MG/DL (ref 8.8–10.2)
CHLORIDE SERPL-SCNC: 95 MMOL/L (ref 98–107)
CHLORIDE SERPL-SCNC: 96 MMOL/L (ref 98–107)
CO2 SERPL-SCNC: 18 MMOL/L (ref 22–29)
CO2 SERPL-SCNC: 19 MMOL/L (ref 22–29)
CREAT SERPL-MCNC: 6.3 MG/DL (ref 0.5–1)
CREAT SERPL-MCNC: 6.4 MG/DL (ref 0.5–1)
ERYTHROCYTE [DISTWIDTH] IN BLOOD BY AUTOMATED COUNT: 15.9 % (ref 11.5–15)
GFR, ESTIMATED: 6 ML/MIN/1.73M2
GFR, ESTIMATED: 7 ML/MIN/1.73M2
GLUCOSE BLD-MCNC: 114 MG/DL (ref 74–99)
GLUCOSE BLD-MCNC: 130 MG/DL (ref 74–99)
GLUCOSE BLD-MCNC: 137 MG/DL (ref 74–99)
GLUCOSE BLD-MCNC: 158 MG/DL (ref 74–99)
GLUCOSE SERPL-MCNC: 111 MG/DL (ref 74–99)
GLUCOSE SERPL-MCNC: 140 MG/DL (ref 74–99)
HCT VFR BLD AUTO: 24.9 % (ref 34–48)
HCT VFR BLD AUTO: 26.6 % (ref 34–48)
HGB BLD-MCNC: 8.1 G/DL (ref 11.5–15.5)
HGB BLD-MCNC: 8.4 G/DL (ref 11.5–15.5)
MAGNESIUM SERPL-MCNC: 2.3 MG/DL (ref 1.6–2.4)
MCH RBC QN AUTO: 26.8 PG (ref 26–35)
MCHC RBC AUTO-ENTMCNC: 32.5 G/DL (ref 32–34.5)
MCV RBC AUTO: 82.5 FL (ref 80–99.9)
PHOSPHATE SERPL-MCNC: 6.9 MG/DL (ref 2.5–4.5)
PLATELET # BLD AUTO: 408 K/UL (ref 130–450)
PMV BLD AUTO: 10.6 FL (ref 7–12)
POTASSIUM SERPL-SCNC: 3.9 MMOL/L (ref 3.5–5.1)
POTASSIUM SERPL-SCNC: 3.9 MMOL/L (ref 3.5–5.1)
RBC # BLD AUTO: 3.02 M/UL (ref 3.5–5.5)
SODIUM SERPL-SCNC: 131 MMOL/L (ref 136–145)
SODIUM SERPL-SCNC: 135 MMOL/L (ref 136–145)
WBC OTHER # BLD: 11.2 K/UL (ref 4.5–11.5)

## 2025-06-01 PROCEDURE — 1200000000 HC SEMI PRIVATE

## 2025-06-01 PROCEDURE — 82962 GLUCOSE BLOOD TEST: CPT

## 2025-06-01 PROCEDURE — 83735 ASSAY OF MAGNESIUM: CPT

## 2025-06-01 PROCEDURE — 2500000003 HC RX 250 WO HCPCS: Performed by: NURSE PRACTITIONER

## 2025-06-01 PROCEDURE — 6370000000 HC RX 637 (ALT 250 FOR IP): Performed by: NURSE PRACTITIONER

## 2025-06-01 PROCEDURE — 94640 AIRWAY INHALATION TREATMENT: CPT

## 2025-06-01 PROCEDURE — 6370000000 HC RX 637 (ALT 250 FOR IP): Performed by: STUDENT IN AN ORGANIZED HEALTH CARE EDUCATION/TRAINING PROGRAM

## 2025-06-01 PROCEDURE — 6360000002 HC RX W HCPCS

## 2025-06-01 PROCEDURE — 99232 SBSQ HOSP IP/OBS MODERATE 35: CPT | Performed by: INTERNAL MEDICINE

## 2025-06-01 PROCEDURE — 85014 HEMATOCRIT: CPT

## 2025-06-01 PROCEDURE — 85018 HEMOGLOBIN: CPT

## 2025-06-01 PROCEDURE — 6370000000 HC RX 637 (ALT 250 FOR IP)

## 2025-06-01 PROCEDURE — 80048 BASIC METABOLIC PNL TOTAL CA: CPT

## 2025-06-01 PROCEDURE — 84100 ASSAY OF PHOSPHORUS: CPT

## 2025-06-01 PROCEDURE — 6370000000 HC RX 637 (ALT 250 FOR IP): Performed by: SURGERY

## 2025-06-01 PROCEDURE — 82330 ASSAY OF CALCIUM: CPT

## 2025-06-01 PROCEDURE — 97530 THERAPEUTIC ACTIVITIES: CPT

## 2025-06-01 PROCEDURE — 85027 COMPLETE CBC AUTOMATED: CPT

## 2025-06-01 PROCEDURE — 97535 SELF CARE MNGMENT TRAINING: CPT

## 2025-06-01 PROCEDURE — 36415 COLL VENOUS BLD VENIPUNCTURE: CPT

## 2025-06-01 PROCEDURE — 6370000000 HC RX 637 (ALT 250 FOR IP): Performed by: INTERNAL MEDICINE

## 2025-06-01 RX ORDER — CALCIUM GLUCONATE 20 MG/ML
1000 INJECTION, SOLUTION INTRAVENOUS ONCE
Status: COMPLETED | OUTPATIENT
Start: 2025-06-01 | End: 2025-06-01

## 2025-06-01 RX ADMIN — SODIUM CHLORIDE, PRESERVATIVE FREE 10 ML: 5 INJECTION INTRAVENOUS at 22:09

## 2025-06-01 RX ADMIN — FOLIC ACID 1 MG: 1 TABLET ORAL at 10:03

## 2025-06-01 RX ADMIN — ATORVASTATIN CALCIUM 40 MG: 40 TABLET, FILM COATED ORAL at 22:07

## 2025-06-01 RX ADMIN — CALCIUM GLUCONATE 1000 MG: 20 INJECTION, SOLUTION INTRAVENOUS at 11:52

## 2025-06-01 RX ADMIN — LACTULOSE 30 G: 10 SOLUTION ORAL at 15:12

## 2025-06-01 RX ADMIN — LORAZEPAM 0.5 MG: 0.5 TABLET ORAL at 10:03

## 2025-06-01 RX ADMIN — POLYETHYLENE GLYCOL 3350 17 G: 17 POWDER, FOR SOLUTION ORAL at 10:02

## 2025-06-01 RX ADMIN — Medication 1000 UNITS: at 10:03

## 2025-06-01 RX ADMIN — IPRATROPIUM BROMIDE AND ALBUTEROL SULFATE 1 DOSE: 2.5; .5 SOLUTION RESPIRATORY (INHALATION) at 10:17

## 2025-06-01 RX ADMIN — SODIUM BICARBONATE 1300 MG: 650 TABLET ORAL at 22:07

## 2025-06-01 RX ADMIN — SENNOSIDES 8.6 MG: 8.6 TABLET, COATED ORAL at 22:08

## 2025-06-01 RX ADMIN — Medication 10 MG: at 22:07

## 2025-06-01 RX ADMIN — ASPIRIN 81 MG: 81 TABLET, COATED ORAL at 10:03

## 2025-06-01 RX ADMIN — SODIUM CHLORIDE 1 G: 1 TABLET ORAL at 10:03

## 2025-06-01 RX ADMIN — SODIUM BICARBONATE 1300 MG: 650 TABLET ORAL at 10:03

## 2025-06-01 RX ADMIN — SODIUM CHLORIDE 1 G: 1 TABLET ORAL at 12:48

## 2025-06-01 RX ADMIN — ESCITALOPRAM OXALATE 5 MG: 10 TABLET ORAL at 10:03

## 2025-06-01 RX ADMIN — SODIUM CHLORIDE 1 G: 1 TABLET ORAL at 16:46

## 2025-06-01 RX ADMIN — SODIUM BICARBONATE 1300 MG: 650 TABLET ORAL at 15:12

## 2025-06-01 RX ADMIN — SODIUM CHLORIDE, PRESERVATIVE FREE 10 ML: 5 INJECTION INTRAVENOUS at 10:04

## 2025-06-02 LAB
ANION GAP SERPL CALCULATED.3IONS-SCNC: 19 MMOL/L (ref 7–16)
ANION GAP SERPL CALCULATED.3IONS-SCNC: 19 MMOL/L (ref 7–16)
BUN SERPL-MCNC: 106 MG/DL (ref 8–23)
BUN SERPL-MCNC: 109 MG/DL (ref 6–23)
CA-I BLD-SCNC: 1.11 MMOL/L (ref 1.15–1.33)
CALCIUM SERPL-MCNC: 8 MG/DL (ref 8.6–10.2)
CALCIUM SERPL-MCNC: 8.1 MG/DL (ref 8.8–10.2)
CHLORIDE SERPL-SCNC: 96 MMOL/L (ref 98–107)
CHLORIDE SERPL-SCNC: 97 MMOL/L (ref 98–107)
CO2 SERPL-SCNC: 18 MMOL/L (ref 22–29)
CO2 SERPL-SCNC: 19 MMOL/L (ref 22–29)
CREAT SERPL-MCNC: 6.1 MG/DL (ref 0.5–1)
CREAT SERPL-MCNC: 6.2 MG/DL (ref 0.5–1)
ERYTHROCYTE [DISTWIDTH] IN BLOOD BY AUTOMATED COUNT: 16.1 % (ref 11.5–15)
GFR, ESTIMATED: 7 ML/MIN/1.73M2
GFR, ESTIMATED: 7 ML/MIN/1.73M2
GLUCOSE BLD-MCNC: 110 MG/DL (ref 74–99)
GLUCOSE BLD-MCNC: 129 MG/DL (ref 74–99)
GLUCOSE BLD-MCNC: 133 MG/DL (ref 74–99)
GLUCOSE BLD-MCNC: 145 MG/DL (ref 74–99)
GLUCOSE SERPL-MCNC: 110 MG/DL (ref 74–99)
GLUCOSE SERPL-MCNC: 132 MG/DL (ref 74–99)
HCT VFR BLD AUTO: 22.9 % (ref 34–48)
HGB BLD-MCNC: 7.5 G/DL (ref 11.5–15.5)
MAGNESIUM SERPL-MCNC: 2.2 MG/DL (ref 1.6–2.6)
MCH RBC QN AUTO: 27 PG (ref 26–35)
MCHC RBC AUTO-ENTMCNC: 32.8 G/DL (ref 32–34.5)
MCV RBC AUTO: 82.4 FL (ref 80–99.9)
PHOSPHATE SERPL-MCNC: 6.7 MG/DL (ref 2.5–4.5)
PLATELET # BLD AUTO: 387 K/UL (ref 130–450)
PMV BLD AUTO: 10.3 FL (ref 7–12)
POTASSIUM SERPL-SCNC: 3.8 MMOL/L (ref 3.5–5.1)
POTASSIUM SERPL-SCNC: 3.9 MMOL/L (ref 3.5–5)
RBC # BLD AUTO: 2.78 M/UL (ref 3.5–5.5)
SODIUM SERPL-SCNC: 134 MMOL/L (ref 132–146)
SODIUM SERPL-SCNC: 134 MMOL/L (ref 136–145)
WBC OTHER # BLD: 11 K/UL (ref 4.5–11.5)

## 2025-06-02 PROCEDURE — 6360000002 HC RX W HCPCS

## 2025-06-02 PROCEDURE — 85027 COMPLETE CBC AUTOMATED: CPT

## 2025-06-02 PROCEDURE — 83735 ASSAY OF MAGNESIUM: CPT

## 2025-06-02 PROCEDURE — 6370000000 HC RX 637 (ALT 250 FOR IP)

## 2025-06-02 PROCEDURE — 1200000000 HC SEMI PRIVATE

## 2025-06-02 PROCEDURE — 82330 ASSAY OF CALCIUM: CPT

## 2025-06-02 PROCEDURE — 6370000000 HC RX 637 (ALT 250 FOR IP): Performed by: NURSE PRACTITIONER

## 2025-06-02 PROCEDURE — 6370000000 HC RX 637 (ALT 250 FOR IP): Performed by: INTERNAL MEDICINE

## 2025-06-02 PROCEDURE — 36415 COLL VENOUS BLD VENIPUNCTURE: CPT

## 2025-06-02 PROCEDURE — 6370000000 HC RX 637 (ALT 250 FOR IP): Performed by: STUDENT IN AN ORGANIZED HEALTH CARE EDUCATION/TRAINING PROGRAM

## 2025-06-02 PROCEDURE — 82962 GLUCOSE BLOOD TEST: CPT

## 2025-06-02 PROCEDURE — 6370000000 HC RX 637 (ALT 250 FOR IP): Performed by: SURGERY

## 2025-06-02 PROCEDURE — 94640 AIRWAY INHALATION TREATMENT: CPT

## 2025-06-02 PROCEDURE — 2500000003 HC RX 250 WO HCPCS: Performed by: NURSE PRACTITIONER

## 2025-06-02 PROCEDURE — 84100 ASSAY OF PHOSPHORUS: CPT

## 2025-06-02 PROCEDURE — 80048 BASIC METABOLIC PNL TOTAL CA: CPT

## 2025-06-02 RX ORDER — CALCIUM GLUCONATE 20 MG/ML
1000 INJECTION, SOLUTION INTRAVENOUS ONCE
Status: COMPLETED | OUTPATIENT
Start: 2025-06-02 | End: 2025-06-02

## 2025-06-02 RX ADMIN — SODIUM BICARBONATE 1300 MG: 650 TABLET ORAL at 14:22

## 2025-06-02 RX ADMIN — CALCIUM GLUCONATE 1000 MG: 20 INJECTION, SOLUTION INTRAVENOUS at 10:52

## 2025-06-02 RX ADMIN — LACTULOSE 30 G: 10 SOLUTION ORAL at 14:21

## 2025-06-02 RX ADMIN — SENNOSIDES 8.6 MG: 8.6 TABLET, COATED ORAL at 20:05

## 2025-06-02 RX ADMIN — Medication 10 MG: at 20:05

## 2025-06-02 RX ADMIN — ESCITALOPRAM OXALATE 5 MG: 10 TABLET ORAL at 10:13

## 2025-06-02 RX ADMIN — ASPIRIN 81 MG: 81 TABLET, COATED ORAL at 10:13

## 2025-06-02 RX ADMIN — POLYETHYLENE GLYCOL 3350 17 G: 17 POWDER, FOR SOLUTION ORAL at 10:14

## 2025-06-02 RX ADMIN — AMLODIPINE BESYLATE 10 MG: 10 TABLET ORAL at 10:12

## 2025-06-02 RX ADMIN — CARVEDILOL 6.25 MG: 6.25 TABLET, FILM COATED ORAL at 10:13

## 2025-06-02 RX ADMIN — CALCITRIOL CAPSULES 0.25 MCG 0.25 MCG: 0.25 CAPSULE ORAL at 10:14

## 2025-06-02 RX ADMIN — IPRATROPIUM BROMIDE AND ALBUTEROL SULFATE 1 DOSE: 2.5; .5 SOLUTION RESPIRATORY (INHALATION) at 17:28

## 2025-06-02 RX ADMIN — SODIUM CHLORIDE, PRESERVATIVE FREE 10 ML: 5 INJECTION INTRAVENOUS at 10:15

## 2025-06-02 RX ADMIN — CARVEDILOL 6.25 MG: 6.25 TABLET, FILM COATED ORAL at 20:05

## 2025-06-02 RX ADMIN — LACTULOSE 30 G: 10 SOLUTION ORAL at 20:05

## 2025-06-02 RX ADMIN — Medication 1000 UNITS: at 10:13

## 2025-06-02 RX ADMIN — SODIUM CHLORIDE 1 G: 1 TABLET ORAL at 10:14

## 2025-06-02 RX ADMIN — SODIUM BICARBONATE 1300 MG: 650 TABLET ORAL at 10:13

## 2025-06-02 RX ADMIN — PANTOPRAZOLE SODIUM 40 MG: 40 TABLET, DELAYED RELEASE ORAL at 15:07

## 2025-06-02 RX ADMIN — LACTULOSE 30 G: 10 SOLUTION ORAL at 10:13

## 2025-06-02 RX ADMIN — SODIUM CHLORIDE, PRESERVATIVE FREE 10 ML: 5 INJECTION INTRAVENOUS at 20:06

## 2025-06-02 RX ADMIN — SODIUM BICARBONATE 1300 MG: 650 TABLET ORAL at 20:05

## 2025-06-02 RX ADMIN — ATORVASTATIN CALCIUM 40 MG: 40 TABLET, FILM COATED ORAL at 20:05

## 2025-06-02 RX ADMIN — FOLIC ACID 1 MG: 1 TABLET ORAL at 10:12

## 2025-06-02 RX ADMIN — LORAZEPAM 0.5 MG: 0.5 TABLET ORAL at 10:13

## 2025-06-02 RX ADMIN — SODIUM CHLORIDE 1 G: 1 TABLET ORAL at 16:52

## 2025-06-02 ASSESSMENT — PAIN SCALES - GENERAL
PAINLEVEL_OUTOF10: 1
PAINLEVEL_OUTOF10: 0
PAINLEVEL_OUTOF10: 0

## 2025-06-03 LAB
ANION GAP SERPL CALCULATED.3IONS-SCNC: 19 MMOL/L (ref 7–16)
BUN SERPL-MCNC: 109 MG/DL (ref 8–23)
CA-I BLD-SCNC: 1.12 MMOL/L (ref 1.15–1.33)
CALCIUM SERPL-MCNC: 8 MG/DL (ref 8.8–10.2)
CHLORIDE SERPL-SCNC: 96 MMOL/L (ref 98–107)
CO2 SERPL-SCNC: 19 MMOL/L (ref 22–29)
CREAT SERPL-MCNC: 6.2 MG/DL (ref 0.5–1)
ERYTHROCYTE [DISTWIDTH] IN BLOOD BY AUTOMATED COUNT: 16.4 % (ref 11.5–15)
GFR, ESTIMATED: 7 ML/MIN/1.73M2
GLUCOSE BLD-MCNC: 121 MG/DL (ref 74–99)
GLUCOSE BLD-MCNC: 124 MG/DL (ref 74–99)
GLUCOSE BLD-MCNC: 130 MG/DL (ref 74–99)
GLUCOSE BLD-MCNC: 130 MG/DL (ref 74–99)
GLUCOSE SERPL-MCNC: 111 MG/DL (ref 74–99)
HCT VFR BLD AUTO: 24.8 % (ref 34–48)
HGB BLD-MCNC: 7.9 G/DL (ref 11.5–15.5)
MAGNESIUM SERPL-MCNC: 2.3 MG/DL (ref 1.6–2.4)
MCH RBC QN AUTO: 26.3 PG (ref 26–35)
MCHC RBC AUTO-ENTMCNC: 31.9 G/DL (ref 32–34.5)
MCV RBC AUTO: 82.7 FL (ref 80–99.9)
PHOSPHATE SERPL-MCNC: 6.3 MG/DL (ref 2.5–4.5)
PLATELET # BLD AUTO: 420 K/UL (ref 130–450)
PMV BLD AUTO: 10.7 FL (ref 7–12)
POTASSIUM SERPL-SCNC: 3.9 MMOL/L (ref 3.5–5.1)
RBC # BLD AUTO: 3 M/UL (ref 3.5–5.5)
SODIUM SERPL-SCNC: 134 MMOL/L (ref 136–145)
WBC OTHER # BLD: 10.3 K/UL (ref 4.5–11.5)

## 2025-06-03 PROCEDURE — 6370000000 HC RX 637 (ALT 250 FOR IP): Performed by: INTERNAL MEDICINE

## 2025-06-03 PROCEDURE — 80048 BASIC METABOLIC PNL TOTAL CA: CPT

## 2025-06-03 PROCEDURE — 82962 GLUCOSE BLOOD TEST: CPT

## 2025-06-03 PROCEDURE — 6370000000 HC RX 637 (ALT 250 FOR IP)

## 2025-06-03 PROCEDURE — 92526 ORAL FUNCTION THERAPY: CPT

## 2025-06-03 PROCEDURE — 83735 ASSAY OF MAGNESIUM: CPT

## 2025-06-03 PROCEDURE — 82330 ASSAY OF CALCIUM: CPT

## 2025-06-03 PROCEDURE — 1200000000 HC SEMI PRIVATE

## 2025-06-03 PROCEDURE — 6370000000 HC RX 637 (ALT 250 FOR IP): Performed by: STUDENT IN AN ORGANIZED HEALTH CARE EDUCATION/TRAINING PROGRAM

## 2025-06-03 PROCEDURE — 6360000002 HC RX W HCPCS

## 2025-06-03 PROCEDURE — 84100 ASSAY OF PHOSPHORUS: CPT

## 2025-06-03 PROCEDURE — 6370000000 HC RX 637 (ALT 250 FOR IP): Performed by: SURGERY

## 2025-06-03 PROCEDURE — 2500000003 HC RX 250 WO HCPCS: Performed by: NURSE PRACTITIONER

## 2025-06-03 PROCEDURE — 36415 COLL VENOUS BLD VENIPUNCTURE: CPT

## 2025-06-03 PROCEDURE — 6370000000 HC RX 637 (ALT 250 FOR IP): Performed by: NURSE PRACTITIONER

## 2025-06-03 PROCEDURE — 85027 COMPLETE CBC AUTOMATED: CPT

## 2025-06-03 RX ORDER — CALCIUM GLUCONATE 20 MG/ML
1000 INJECTION, SOLUTION INTRAVENOUS ONCE
Status: COMPLETED | OUTPATIENT
Start: 2025-06-03 | End: 2025-06-03

## 2025-06-03 RX ORDER — BUMETANIDE 0.25 MG/ML
2 INJECTION, SOLUTION INTRAMUSCULAR; INTRAVENOUS ONCE
Status: COMPLETED | OUTPATIENT
Start: 2025-06-03 | End: 2025-06-03

## 2025-06-03 RX ADMIN — ASPIRIN 81 MG: 81 TABLET, COATED ORAL at 09:22

## 2025-06-03 RX ADMIN — SODIUM BICARBONATE 1300 MG: 650 TABLET ORAL at 12:01

## 2025-06-03 RX ADMIN — SODIUM CHLORIDE 1 G: 1 TABLET ORAL at 11:56

## 2025-06-03 RX ADMIN — LACTULOSE 30 G: 10 SOLUTION ORAL at 20:47

## 2025-06-03 RX ADMIN — ATORVASTATIN CALCIUM 40 MG: 40 TABLET, FILM COATED ORAL at 20:44

## 2025-06-03 RX ADMIN — LACTULOSE 30 G: 10 SOLUTION ORAL at 09:21

## 2025-06-03 RX ADMIN — SODIUM CHLORIDE, PRESERVATIVE FREE 10 ML: 5 INJECTION INTRAVENOUS at 20:47

## 2025-06-03 RX ADMIN — CALCIUM GLUCONATE 1000 MG: 20 INJECTION, SOLUTION INTRAVENOUS at 12:08

## 2025-06-03 RX ADMIN — SENNOSIDES 8.6 MG: 8.6 TABLET, COATED ORAL at 20:44

## 2025-06-03 RX ADMIN — SODIUM BICARBONATE 1300 MG: 650 TABLET ORAL at 09:22

## 2025-06-03 RX ADMIN — Medication 1000 UNITS: at 09:21

## 2025-06-03 RX ADMIN — LORAZEPAM 0.5 MG: 0.5 TABLET ORAL at 09:22

## 2025-06-03 RX ADMIN — PANTOPRAZOLE SODIUM 40 MG: 40 TABLET, DELAYED RELEASE ORAL at 05:53

## 2025-06-03 RX ADMIN — ERGOCALCIFEROL 50000 UNITS: 1.25 CAPSULE ORAL at 09:27

## 2025-06-03 RX ADMIN — CARVEDILOL 6.25 MG: 6.25 TABLET, FILM COATED ORAL at 20:44

## 2025-06-03 RX ADMIN — AMLODIPINE BESYLATE 10 MG: 10 TABLET ORAL at 09:22

## 2025-06-03 RX ADMIN — ESCITALOPRAM OXALATE 5 MG: 10 TABLET ORAL at 09:22

## 2025-06-03 RX ADMIN — SODIUM CHLORIDE 1 G: 1 TABLET ORAL at 16:53

## 2025-06-03 RX ADMIN — FOLIC ACID 1 MG: 1 TABLET ORAL at 09:21

## 2025-06-03 RX ADMIN — Medication 10 MG: at 20:44

## 2025-06-03 RX ADMIN — SODIUM CHLORIDE 1 G: 1 TABLET ORAL at 09:27

## 2025-06-03 RX ADMIN — CARVEDILOL 6.25 MG: 6.25 TABLET, FILM COATED ORAL at 09:21

## 2025-06-03 RX ADMIN — ESCITALOPRAM OXALATE 5 MG: 10 TABLET ORAL at 09:23

## 2025-06-03 RX ADMIN — BUMETANIDE 2 MG: 0.25 INJECTION INTRAMUSCULAR; INTRAVENOUS at 14:48

## 2025-06-03 RX ADMIN — CALCITRIOL CAPSULES 0.25 MCG 0.25 MCG: 0.25 CAPSULE ORAL at 09:27

## 2025-06-03 RX ADMIN — SODIUM CHLORIDE, PRESERVATIVE FREE 10 ML: 5 INJECTION INTRAVENOUS at 09:23

## 2025-06-03 RX ADMIN — LACTULOSE 30 G: 10 SOLUTION ORAL at 14:49

## 2025-06-03 RX ADMIN — POLYETHYLENE GLYCOL 3350 17 G: 17 POWDER, FOR SOLUTION ORAL at 09:22

## 2025-06-03 RX ADMIN — SODIUM BICARBONATE 1300 MG: 650 TABLET ORAL at 20:44

## 2025-06-03 RX ADMIN — PANTOPRAZOLE SODIUM 40 MG: 40 TABLET, DELAYED RELEASE ORAL at 14:49

## 2025-06-04 LAB
ANION GAP SERPL CALCULATED.3IONS-SCNC: 20 MMOL/L (ref 7–16)
ANION GAP SERPL CALCULATED.3IONS-SCNC: 20 MMOL/L (ref 7–16)
ANION GAP SERPL CALCULATED.3IONS-SCNC: 21 MMOL/L (ref 7–16)
BUN SERPL-MCNC: 110 MG/DL (ref 8–23)
BUN SERPL-MCNC: 115 MG/DL (ref 6–23)
BUN SERPL-MCNC: 116 MG/DL (ref 8–23)
CALCIUM SERPL-MCNC: 7.7 MG/DL (ref 8.8–10.2)
CALCIUM SERPL-MCNC: 8.1 MG/DL (ref 8.6–10.2)
CALCIUM SERPL-MCNC: 8.1 MG/DL (ref 8.8–10.2)
CHLORIDE SERPL-SCNC: 98 MMOL/L (ref 98–107)
CHLORIDE SERPL-SCNC: 99 MMOL/L (ref 98–107)
CHLORIDE SERPL-SCNC: 99 MMOL/L (ref 98–107)
CO2 SERPL-SCNC: 17 MMOL/L (ref 22–29)
CO2 SERPL-SCNC: 18 MMOL/L (ref 22–29)
CO2 SERPL-SCNC: 18 MMOL/L (ref 22–29)
CREAT SERPL-MCNC: 6.2 MG/DL (ref 0.5–1)
CREAT SERPL-MCNC: 6.5 MG/DL (ref 0.5–1)
CREAT SERPL-MCNC: 6.7 MG/DL (ref 0.5–1)
ERYTHROCYTE [DISTWIDTH] IN BLOOD BY AUTOMATED COUNT: 16.4 % (ref 11.5–15)
GFR, ESTIMATED: 6 ML/MIN/1.73M2
GFR, ESTIMATED: 6 ML/MIN/1.73M2
GFR, ESTIMATED: 7 ML/MIN/1.73M2
GLUCOSE BLD-MCNC: 123 MG/DL (ref 74–99)
GLUCOSE BLD-MCNC: 127 MG/DL (ref 74–99)
GLUCOSE BLD-MCNC: 127 MG/DL (ref 74–99)
GLUCOSE BLD-MCNC: 139 MG/DL (ref 74–99)
GLUCOSE SERPL-MCNC: 111 MG/DL (ref 74–99)
GLUCOSE SERPL-MCNC: 118 MG/DL (ref 74–99)
GLUCOSE SERPL-MCNC: 129 MG/DL (ref 74–99)
HCT VFR BLD AUTO: 22.6 % (ref 34–48)
HGB BLD-MCNC: 7.4 G/DL (ref 11.5–15.5)
MAGNESIUM SERPL-MCNC: 2.2 MG/DL (ref 1.6–2.4)
MCH RBC QN AUTO: 27.3 PG (ref 26–35)
MCHC RBC AUTO-ENTMCNC: 32.7 G/DL (ref 32–34.5)
MCV RBC AUTO: 83.4 FL (ref 80–99.9)
PHOSPHATE SERPL-MCNC: 6.5 MG/DL (ref 2.5–4.5)
PLATELET # BLD AUTO: 380 K/UL (ref 130–450)
PMV BLD AUTO: 10.5 FL (ref 7–12)
POTASSIUM SERPL-SCNC: 3.5 MMOL/L (ref 3.5–5.1)
POTASSIUM SERPL-SCNC: 3.7 MMOL/L (ref 3.5–5)
POTASSIUM SERPL-SCNC: 3.9 MMOL/L (ref 3.5–5.1)
RBC # BLD AUTO: 2.71 M/UL (ref 3.5–5.5)
SODIUM SERPL-SCNC: 137 MMOL/L (ref 132–146)
SODIUM SERPL-SCNC: 137 MMOL/L (ref 136–145)
SODIUM SERPL-SCNC: 137 MMOL/L (ref 136–145)
WBC OTHER # BLD: 10.1 K/UL (ref 4.5–11.5)

## 2025-06-04 PROCEDURE — 94640 AIRWAY INHALATION TREATMENT: CPT

## 2025-06-04 PROCEDURE — 6370000000 HC RX 637 (ALT 250 FOR IP): Performed by: NURSE PRACTITIONER

## 2025-06-04 PROCEDURE — 84100 ASSAY OF PHOSPHORUS: CPT

## 2025-06-04 PROCEDURE — 6360000002 HC RX W HCPCS: Performed by: NURSE PRACTITIONER

## 2025-06-04 PROCEDURE — 2500000003 HC RX 250 WO HCPCS: Performed by: NURSE PRACTITIONER

## 2025-06-04 PROCEDURE — 2500000003 HC RX 250 WO HCPCS

## 2025-06-04 PROCEDURE — 6370000000 HC RX 637 (ALT 250 FOR IP): Performed by: STUDENT IN AN ORGANIZED HEALTH CARE EDUCATION/TRAINING PROGRAM

## 2025-06-04 PROCEDURE — 80048 BASIC METABOLIC PNL TOTAL CA: CPT

## 2025-06-04 PROCEDURE — 82248 BILIRUBIN DIRECT: CPT

## 2025-06-04 PROCEDURE — 6360000002 HC RX W HCPCS

## 2025-06-04 PROCEDURE — 1200000000 HC SEMI PRIVATE

## 2025-06-04 PROCEDURE — 36415 COLL VENOUS BLD VENIPUNCTURE: CPT

## 2025-06-04 PROCEDURE — 82962 GLUCOSE BLOOD TEST: CPT

## 2025-06-04 PROCEDURE — 83735 ASSAY OF MAGNESIUM: CPT

## 2025-06-04 PROCEDURE — 6370000000 HC RX 637 (ALT 250 FOR IP): Performed by: SURGERY

## 2025-06-04 PROCEDURE — 6370000000 HC RX 637 (ALT 250 FOR IP)

## 2025-06-04 PROCEDURE — 6370000000 HC RX 637 (ALT 250 FOR IP): Performed by: INTERNAL MEDICINE

## 2025-06-04 PROCEDURE — 85027 COMPLETE CBC AUTOMATED: CPT

## 2025-06-04 PROCEDURE — 80053 COMPREHEN METABOLIC PANEL: CPT

## 2025-06-04 RX ORDER — BUMETANIDE 0.25 MG/ML
4 INJECTION, SOLUTION INTRAMUSCULAR; INTRAVENOUS ONCE
Status: COMPLETED | OUTPATIENT
Start: 2025-06-04 | End: 2025-06-04

## 2025-06-04 RX ORDER — CALCIUM ACETATE 667 MG/1
1 CAPSULE ORAL
Status: DISCONTINUED | OUTPATIENT
Start: 2025-06-04 | End: 2025-06-10 | Stop reason: HOSPADM

## 2025-06-04 RX ADMIN — LACTULOSE 30 G: 10 SOLUTION ORAL at 20:05

## 2025-06-04 RX ADMIN — SODIUM CHLORIDE 1 G: 1 TABLET ORAL at 08:28

## 2025-06-04 RX ADMIN — CALCITRIOL CAPSULES 0.25 MCG 0.25 MCG: 0.25 CAPSULE ORAL at 08:29

## 2025-06-04 RX ADMIN — PANTOPRAZOLE SODIUM 40 MG: 40 TABLET, DELAYED RELEASE ORAL at 05:15

## 2025-06-04 RX ADMIN — AMLODIPINE BESYLATE 10 MG: 10 TABLET ORAL at 08:28

## 2025-06-04 RX ADMIN — Medication 1000 UNITS: at 08:29

## 2025-06-04 RX ADMIN — IPRATROPIUM BROMIDE AND ALBUTEROL SULFATE 1 DOSE: 2.5; .5 SOLUTION RESPIRATORY (INHALATION) at 14:15

## 2025-06-04 RX ADMIN — LORAZEPAM 0.5 MG: 0.5 TABLET ORAL at 08:29

## 2025-06-04 RX ADMIN — ATORVASTATIN CALCIUM 40 MG: 40 TABLET, FILM COATED ORAL at 20:05

## 2025-06-04 RX ADMIN — ONDANSETRON HYDROCHLORIDE 4 MG: 2 SOLUTION INTRAMUSCULAR; INTRAVENOUS at 20:10

## 2025-06-04 RX ADMIN — SODIUM BICARBONATE 1300 MG: 650 TABLET ORAL at 08:28

## 2025-06-04 RX ADMIN — SODIUM CHLORIDE, PRESERVATIVE FREE 10 ML: 5 INJECTION INTRAVENOUS at 08:38

## 2025-06-04 RX ADMIN — CARVEDILOL 6.25 MG: 6.25 TABLET, FILM COATED ORAL at 08:29

## 2025-06-04 RX ADMIN — LACTULOSE 30 G: 10 SOLUTION ORAL at 12:45

## 2025-06-04 RX ADMIN — SODIUM CHLORIDE 1 G: 1 TABLET ORAL at 12:38

## 2025-06-04 RX ADMIN — CALCIUM ACETATE 667 MG: 667 CAPSULE ORAL at 17:57

## 2025-06-04 RX ADMIN — SODIUM CHLORIDE, PRESERVATIVE FREE 10 ML: 5 INJECTION INTRAVENOUS at 20:05

## 2025-06-04 RX ADMIN — ASPIRIN 81 MG: 81 TABLET, COATED ORAL at 08:29

## 2025-06-04 RX ADMIN — POLYETHYLENE GLYCOL 3350 17 G: 17 POWDER, FOR SOLUTION ORAL at 08:29

## 2025-06-04 RX ADMIN — ESCITALOPRAM OXALATE 5 MG: 10 TABLET ORAL at 08:28

## 2025-06-04 RX ADMIN — CALCIUM ACETATE 667 MG: 667 CAPSULE ORAL at 12:38

## 2025-06-04 RX ADMIN — SENNOSIDES 8.6 MG: 8.6 TABLET, COATED ORAL at 20:05

## 2025-06-04 RX ADMIN — SODIUM BICARBONATE 1300 MG: 650 TABLET ORAL at 20:05

## 2025-06-04 RX ADMIN — ONDANSETRON HYDROCHLORIDE 4 MG: 2 SOLUTION INTRAMUSCULAR; INTRAVENOUS at 12:35

## 2025-06-04 RX ADMIN — SODIUM CHLORIDE 1 G: 1 TABLET ORAL at 17:59

## 2025-06-04 RX ADMIN — SODIUM BICARBONATE 1300 MG: 650 TABLET ORAL at 12:37

## 2025-06-04 RX ADMIN — BUMETANIDE 4 MG: 0.25 INJECTION INTRAMUSCULAR; INTRAVENOUS at 15:28

## 2025-06-04 RX ADMIN — CARVEDILOL 6.25 MG: 6.25 TABLET, FILM COATED ORAL at 20:05

## 2025-06-04 RX ADMIN — FOLIC ACID 1 MG: 1 TABLET ORAL at 08:28

## 2025-06-04 RX ADMIN — LACTULOSE 30 G: 10 SOLUTION ORAL at 08:35

## 2025-06-04 RX ADMIN — Medication 10 MG: at 20:05

## 2025-06-05 ENCOUNTER — APPOINTMENT (OUTPATIENT)
Dept: CT IMAGING | Age: 75
DRG: 660 | End: 2025-06-05
Attending: INTERNAL MEDICINE
Payer: MEDICARE

## 2025-06-05 LAB
ANION GAP SERPL CALCULATED.3IONS-SCNC: 19 MMOL/L (ref 7–16)
ANION GAP SERPL CALCULATED.3IONS-SCNC: 21 MMOL/L (ref 7–16)
BUN SERPL-MCNC: 119 MG/DL (ref 8–23)
BUN SERPL-MCNC: 81 MG/DL (ref 6–23)
CA-I BLD-SCNC: 1.1 MMOL/L (ref 1.15–1.33)
CALCIUM SERPL-MCNC: 7.9 MG/DL (ref 8.8–10.2)
CALCIUM SERPL-MCNC: 8.4 MG/DL (ref 8.6–10.2)
CHLORIDE SERPL-SCNC: 100 MMOL/L (ref 98–107)
CHLORIDE SERPL-SCNC: 96 MMOL/L (ref 98–107)
CO2 SERPL-SCNC: 18 MMOL/L (ref 22–29)
CO2 SERPL-SCNC: 18 MMOL/L (ref 22–29)
CREAT SERPL-MCNC: 5 MG/DL (ref 0.5–1)
CREAT SERPL-MCNC: 7.1 MG/DL (ref 0.5–1)
GFR, ESTIMATED: 6 ML/MIN/1.73M2
GFR, ESTIMATED: 9 ML/MIN/1.73M2
GLUCOSE BLD-MCNC: 122 MG/DL (ref 74–99)
GLUCOSE BLD-MCNC: 122 MG/DL (ref 74–99)
GLUCOSE SERPL-MCNC: 103 MG/DL (ref 74–99)
GLUCOSE SERPL-MCNC: 108 MG/DL (ref 74–99)
MAGNESIUM SERPL-MCNC: 2.2 MG/DL (ref 1.6–2.4)
PHOSPHATE SERPL-MCNC: 6.9 MG/DL (ref 2.5–4.5)
POTASSIUM SERPL-SCNC: 3.7 MMOL/L (ref 3.5–5)
POTASSIUM SERPL-SCNC: 3.7 MMOL/L (ref 3.5–5.1)
SODIUM SERPL-SCNC: 135 MMOL/L (ref 132–146)
SODIUM SERPL-SCNC: 137 MMOL/L (ref 136–145)

## 2025-06-05 PROCEDURE — 6370000000 HC RX 637 (ALT 250 FOR IP): Performed by: INTERNAL MEDICINE

## 2025-06-05 PROCEDURE — 6370000000 HC RX 637 (ALT 250 FOR IP)

## 2025-06-05 PROCEDURE — 80048 BASIC METABOLIC PNL TOTAL CA: CPT

## 2025-06-05 PROCEDURE — 6360000002 HC RX W HCPCS

## 2025-06-05 PROCEDURE — 97530 THERAPEUTIC ACTIVITIES: CPT

## 2025-06-05 PROCEDURE — 6370000000 HC RX 637 (ALT 250 FOR IP): Performed by: STUDENT IN AN ORGANIZED HEALTH CARE EDUCATION/TRAINING PROGRAM

## 2025-06-05 PROCEDURE — 1200000000 HC SEMI PRIVATE

## 2025-06-05 PROCEDURE — 36415 COLL VENOUS BLD VENIPUNCTURE: CPT

## 2025-06-05 PROCEDURE — 82330 ASSAY OF CALCIUM: CPT

## 2025-06-05 PROCEDURE — 6370000000 HC RX 637 (ALT 250 FOR IP): Performed by: NURSE PRACTITIONER

## 2025-06-05 PROCEDURE — 82962 GLUCOSE BLOOD TEST: CPT

## 2025-06-05 PROCEDURE — 71250 CT THORAX DX C-: CPT

## 2025-06-05 PROCEDURE — 83735 ASSAY OF MAGNESIUM: CPT

## 2025-06-05 PROCEDURE — 5A1D70Z PERFORMANCE OF URINARY FILTRATION, INTERMITTENT, LESS THAN 6 HOURS PER DAY: ICD-10-PCS | Performed by: INTERNAL MEDICINE

## 2025-06-05 PROCEDURE — 51702 INSERT TEMP BLADDER CATH: CPT

## 2025-06-05 PROCEDURE — 06HY33Z INSERTION OF INFUSION DEVICE INTO LOWER VEIN, PERCUTANEOUS APPROACH: ICD-10-PCS | Performed by: STUDENT IN AN ORGANIZED HEALTH CARE EDUCATION/TRAINING PROGRAM

## 2025-06-05 PROCEDURE — 2500000003 HC RX 250 WO HCPCS: Performed by: NURSE PRACTITIONER

## 2025-06-05 PROCEDURE — 90935 HEMODIALYSIS ONE EVALUATION: CPT

## 2025-06-05 PROCEDURE — 97535 SELF CARE MNGMENT TRAINING: CPT

## 2025-06-05 PROCEDURE — 2500000003 HC RX 250 WO HCPCS

## 2025-06-05 PROCEDURE — 84100 ASSAY OF PHOSPHORUS: CPT

## 2025-06-05 PROCEDURE — 6370000000 HC RX 637 (ALT 250 FOR IP): Performed by: SURGERY

## 2025-06-05 RX ORDER — CALCIUM GLUCONATE 20 MG/ML
1000 INJECTION, SOLUTION INTRAVENOUS ONCE
Status: COMPLETED | OUTPATIENT
Start: 2025-06-05 | End: 2025-06-05

## 2025-06-05 RX ADMIN — CALCIUM ACETATE 667 MG: 667 CAPSULE ORAL at 12:03

## 2025-06-05 RX ADMIN — LACTULOSE 30 G: 10 SOLUTION ORAL at 22:02

## 2025-06-05 RX ADMIN — SENNOSIDES 8.6 MG: 8.6 TABLET, COATED ORAL at 22:01

## 2025-06-05 RX ADMIN — SODIUM CHLORIDE, PRESERVATIVE FREE 10 ML: 5 INJECTION INTRAVENOUS at 10:13

## 2025-06-05 RX ADMIN — POLYETHYLENE GLYCOL 3350 17 G: 17 POWDER, FOR SOLUTION ORAL at 10:11

## 2025-06-05 RX ADMIN — ATORVASTATIN CALCIUM 40 MG: 40 TABLET, FILM COATED ORAL at 22:01

## 2025-06-05 RX ADMIN — CALCIUM GLUCONATE 1000 MG: 20 INJECTION, SOLUTION INTRAVENOUS at 12:08

## 2025-06-05 RX ADMIN — SODIUM BICARBONATE 1300 MG: 650 TABLET ORAL at 22:01

## 2025-06-05 RX ADMIN — FOLIC ACID 1 MG: 1 TABLET ORAL at 10:12

## 2025-06-05 RX ADMIN — CARVEDILOL 6.25 MG: 6.25 TABLET, FILM COATED ORAL at 10:12

## 2025-06-05 RX ADMIN — ASPIRIN 81 MG: 81 TABLET, COATED ORAL at 10:12

## 2025-06-05 RX ADMIN — CALCITRIOL CAPSULES 0.25 MCG 0.25 MCG: 0.25 CAPSULE ORAL at 10:11

## 2025-06-05 RX ADMIN — CALCIUM ACETATE 667 MG: 667 CAPSULE ORAL at 10:11

## 2025-06-05 RX ADMIN — SODIUM CHLORIDE, PRESERVATIVE FREE 10 ML: 5 INJECTION INTRAVENOUS at 22:02

## 2025-06-05 RX ADMIN — Medication 10 MG: at 22:01

## 2025-06-05 RX ADMIN — ESCITALOPRAM OXALATE 5 MG: 10 TABLET ORAL at 10:12

## 2025-06-05 RX ADMIN — Medication 1000 UNITS: at 10:12

## 2025-06-05 RX ADMIN — SODIUM BICARBONATE 1300 MG: 650 TABLET ORAL at 10:11

## 2025-06-05 RX ADMIN — AMLODIPINE BESYLATE 10 MG: 10 TABLET ORAL at 10:12

## 2025-06-05 RX ADMIN — SODIUM CHLORIDE 1 G: 1 TABLET ORAL at 12:03

## 2025-06-05 RX ADMIN — LACTULOSE 30 G: 10 SOLUTION ORAL at 10:11

## 2025-06-05 RX ADMIN — PANTOPRAZOLE SODIUM 40 MG: 40 TABLET, DELAYED RELEASE ORAL at 06:21

## 2025-06-05 RX ADMIN — LORAZEPAM 0.5 MG: 0.5 TABLET ORAL at 10:12

## 2025-06-05 RX ADMIN — SODIUM CHLORIDE 1 G: 1 TABLET ORAL at 10:12

## 2025-06-05 RX ADMIN — CARVEDILOL 6.25 MG: 6.25 TABLET, FILM COATED ORAL at 22:01

## 2025-06-05 ASSESSMENT — PAIN SCALES - GENERAL: PAINLEVEL_OUTOF10: 0

## 2025-06-05 NOTE — CONSULTS
5/16/2025 3:06 PM  Isamar Lopez  [id number]     Chief Complaint:    Hydronephrosis, acute kidney injury    History of Present Illness:      The patient is a 74 y.o. female patient who has a history of uterine colon cancer and presented to the hospital with complaints of back pain, dizziness, and a fall.  She is known to our practice and is a patient of Dr. Pineda Bethea.  She has a history of bilateral hydronephrosis with obstruction that is managed with bilateral ureteral stents.  These were last exchanged in January of this year and are due to be changed on June 12.  She also has a history of a neurogenic bladder with a chronic Urena.    Past Medical History:   Diagnosis Date    Altered bowel elimination due to intestinal ostomy (HCC)     Arthritis     osteoporsis    Cancer (HCC)     colon and uterine    Cerebral artery occlusion with cerebral infarction (HCC)     uses walker    Closed fracture of radius 12/27/2016    left    Complicated UTI (urinary tract infection) 01/25/2023    Elevated lipids 01/15/2014    Headache     History of anal cancer 02/20/2017    Dr. Cutler    Hyperlipidemia     Hypertension     Major depressive disorder, recurrent, mild 04/18/2023    patient denies    Obesity     Osteoporosis     Poor historian     Proteinuria 06/25/2014    Type II or unspecified type diabetes mellitus without mention of complication, not stated as uncontrolled     Vaginal cancer (HCC) 05/22/2017    Vitamin D deficiency 11/06/2014         Past Surgical History:   Procedure Laterality Date    BLADDER SURGERY N/A 09/24/2021    CYSTOSCOPY BILATERAL RETROGRADE PYELOGRAM, BILATERAL STENT INSERTION performed by Pineda Bethea MD at [facility] [place]    BLADDER SURGERY Bilateral 12/22/2022    CYSTOSCOPY, RETROGRADE PYELOGRAM, BILATERAL URETERAL STENT EXCHANGE, URENA CATHETER EXCHANGE performed by Winston Wren MD at [facility] OR    BLADDER SURGERY Bilateral 6/8/2023    CYSTOSCOPY RETROGRADE PYELOGRAM BILATERAL STENT EXCHANGE   
[facility]  Hematology/Oncology  Consult  Dr. Javier Law      Patient Name: Isamar Lopez  YOB: 1950  PCP: Juliano Page MD   Referring Provider:      Reason for Consultation:   Chief Complaint   Patient presents with    Fall     Pt states sciatic like back pain and became dizzy and fell- recently diagnosed with UTI        History of Present Illness:  This pt is a 73 yo female colorectal and uterine cancer and pmhx as below. She presented to the ED due to a fall as well as malaise and RLE pain. Patient also complained of decreased appetite, abdominal distention, and black-red/dark stools in her colostomy bag. She has ureteral stents and chronic knutson. Workup in the ED: Sodium 129, potassium 6.1, CO2 18, BUN/creatinine 61/5.1, glucose 104, troponin 62, alk phos 1, AST 45, Hgb 8.8. CT head negative. CT abdomen shows bilateral hydroureteronephrosis, bulky posterior right lower quadrant mass (9.5 cm), left lower quadrant small bowel containing parastomal hernia, pelvic ascites. 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 CT chest 6/5/25 with no acute thoracic findings, but did show multiple LUQ nodules up to 1.8 cm concerning for and consistent with peritoneal carcinomatosis. Oncology consulted for further recommendations     Diagnostic Data:     Past Medical History:   Diagnosis Date    Altered bowel elimination due to intestinal ostomy (HCC)     Arthritis     osteoporsis    Cancer (HCC)     colon and uterine    Cerebral artery occlusion with cerebral infarction (HCC)     uses walker    Closed fracture of radius 12/27/2016    left    Complicated UTI (urinary tract infection) 01/25/2023    Elevated lipids 01/15/2014    Headache     History of anal cancer 02/20/2017    Dr. Cutler    Hyperlipidemia     Hypertension     Major depressive disorder, recurrent, mild 04/18/2023    patient denies    Obesity  
[facility]  CONSULT NOTE  ATTENDING NOTE    Chief Complaint   Patient presents with    Fall     Pt states sciatic like back pain and became dizzy and fell- recently diagnosed with UTI       HPI:  73y/o M presents with multiple complaints mostly dizziness and then a fall.  She then states she has been having some bleeding from her stoma.  Her stoma has been prolapsed for about a week.  She has been having back pain.  She also has some blood in her underwear.  She denies weight loss.  It is uncertain when she had her last colonoscopy, but she states she sees Dr. Cutler soon.  She has had an indwelling urena for about 2 years and ureteral stents.    Past Medical History:   Diagnosis Date    Altered bowel elimination due to intestinal ostomy (HCC)     Arthritis     osteoporsis    Cancer (HCC)     colon and uterine    Cerebral artery occlusion with cerebral infarction (HCC)     uses walker    Closed fracture of radius 12/27/2016    left    Complicated UTI (urinary tract infection) 01/25/2023    Elevated lipids 01/15/2014    Headache     History of anal cancer 02/20/2017    Dr. Cutler    Hyperlipidemia     Hypertension     Major depressive disorder, recurrent, mild 04/18/2023    patient denies    Obesity     Osteoporosis     Poor historian     Proteinuria 06/25/2014    Type II or unspecified type diabetes mellitus without mention of complication, not stated as uncontrolled     Vaginal cancer (HCC) 05/22/2017    Vitamin D deficiency 11/06/2014     Past Surgical History:   Procedure Laterality Date    BLADDER SURGERY N/A 09/24/2021    CYSTOSCOPY BILATERAL RETROGRADE PYELOGRAM, BILATERAL STENT INSERTION [place] [facility] [place]    BLADDER SURGERY Bilateral 12/22/2022    CYSTOSCOPY, RETROGRADE PYELOGRAM, BILATERAL URETERAL STENT EXCHANGE, URENA CATHETER EXCHANGE performed by Winston Wren MD at [facility]    BLADDER SURGERY Bilateral 6/8/2023    CYSTOSCOPY RETROGRADE PYELOGRAM 
[facility]  Neuro Inpatient Consult        Isamar Lopez is a 74 y.o. right handed woman    Neurology was consulted for dysarthria; concern for stroke    Patient's sister is present at bedside during my visit.  Vital signs have been stable on room air    Past Medical History:     Past Medical History:   Diagnosis Date    Altered bowel elimination due to intestinal ostomy (HCC)     Arthritis     osteoporsis    Cancer (HCC)     colon and uterine    Cerebral artery occlusion with cerebral infarction (HCC)     uses walker    Closed fracture of radius 12/27/2016    left    Complicated UTI (urinary tract infection) 01/25/2023    Elevated lipids 01/15/2014    Headache     History of anal cancer 02/20/2017    Dr. Cutler    Hyperlipidemia     Hypertension     Major depressive disorder, recurrent, mild 04/18/2023    patient denies    Obesity     Osteoporosis     Poor historian     Proteinuria 06/25/2014    Type II or unspecified type diabetes mellitus without mention of complication, not stated as uncontrolled     Vaginal cancer (HCC) 05/22/2017    Vitamin D deficiency 11/06/2014       Past Surgical History:     Past Surgical History:   Procedure Laterality Date    BLADDER SURGERY N/A 09/24/2021    CYSTOSCOPY BILATERAL RETROGRADE PYELOGRAM, BILATERAL STENT INSERTION performed by Pineda Bethea MD at [facility] [place]    BLADDER SURGERY Bilateral 12/22/2022    CYSTOSCOPY, RETROGRADE PYELOGRAM, BILATERAL URETERAL STENT EXCHANGE, URENA CATHETER EXCHANGE performed by Winston Wren MD at [facility] OR    BLADDER SURGERY Bilateral 6/8/2023    CYSTOSCOPY RETROGRADE PYELOGRAM BILATERAL STENT EXCHANGE      ++IODINE ALLERGY++ performed by Pineda Bethea MD at [facility] OR    BREAST BIOPSY      COLOSTOMY      CT BIOPSY ABDOMEN RETROPERITONEUM  5/19/2025    CT BIOPSY ABDOMEN RETROPERITONEUM 5/19/2025 WilliamEmil MD [facility] CT    CYSTOSCOPY Bilateral 07/21/2022    CYSTOSCOPY RETROGRADE PYELOGRAM BILATERIAL 
by Winston Wren MD at [facility] OR    BLADDER SURGERY Bilateral 6/8/2023    CYSTOSCOPY RETROGRADE PYELOGRAM BILATERAL STENT EXCHANGE      ++IODINE ALLERGY++ performed by Pineda Bethea MD at [facility] OR    BREAST BIOPSY      COLOSTOMY      CYSTOSCOPY Bilateral 07/21/2022    CYSTOSCOPY RETROGRADE PYELOGRAM BILATERIAL STENT EXCHANGE performed by Pineda Bethea MD at [facility] [place]    DEBRIDEMENT Bilateral 5/2/2024    CYSTOSCOPY RETROGRADE PYELOGRAM BILATERAL STENT CHANGE performed by Pineda Bethea MD at [facility] OR    DEBRIDEMENT Bilateral 9/5/2024    CYSTOSCOPY BILATERAL RETROGRADE PYELOGRAM BILATERAL STENT CHANGE URENA CATHETER CHANGE performed by Pineda Bethea MD at [facility] OR    DEBRIDEMENT Bilateral 1/10/2025    CYSTOSCOPY RETROGRADE PYELOGRAM STENT BILATERAL STENT EXCHANGE, INSERTION OF URENA performed by Pineda Bethea MD at [facility] [place]    RECTAL SURGERY      WRIST FRACTURE SURGERY Left 11/07/2016     Current Medications:   Scheduled Meds:   sodium zirconium cyclosilicate  10 g Oral BID    sodium chloride flush  5-40 mL IntraVENous 2 times per day    amLODIPine  10 mg Oral QAM    aspirin  81 mg Oral QAM    atorvastatin  40 mg Oral Nightly    calcitRIOL  0.25 mcg Oral Daily    carvedilol  6.25 mg Oral BID    escitalopram  5 mg Oral Daily    LORazepam  0.5 mg Oral Daily    Vitamin D  1,000 Units Oral Daily    insulin lispro  0-8 Units SubCUTAneous 4x Daily AC & HS    sodium bicarbonate  650 mg Oral BID    melatonin  10 mg Oral Nightly    pantoprazole (PROTONIX) 40 mg in sodium chloride (PF) 0.9 % 10 mL injection  40 mg IntraVENous Q12H     Continuous Infusions:   sodium bicarbonate 150 mEq in dextrose 5 % 1,000 mL infusion 100 mL/hr at 05/17/25 1348    dextrose      sodium chloride      dextrose       PRN Meds:glucose, dextrose bolus **OR** dextrose bolus, glucagon (rDNA), dextrose, sodium chloride flush, sodium chloride, ondansetron **OR** ondansetron, polyethylene glycol, glucose, dextrose bolus **OR** dextrose bolus, 
Floor Nurse, Patient, and Family    Time/Communication  Greater than 50% of time spent, total 55 minutes in counseling and coordination of care at the bedside regarding goals of care, diagnosis and prognosis, and see above.    Thank you for allowing Palliative Medicine to participate in the care of Isamar Lopez.        
stones, new bulky posterior RLQ mass  Check urine studies  Bladder scan  Avoid nephrotoxins/NSAIDs  Strict I&O, daily weights  Urology consult  IV fluids-NS at 100 mL/h  Monitor labs    2.  Hyperkalemia  With JEANNINE/CKD  K+ 6.1 today  For medical management in ED  Low potassium diet when able to eat  Repeat BMP this evening  Monitor labs    3.  Nonanion gap metabolic acidosis  With JEANNINE/CKD  Anion gap 12/CO2 18 on 5/16  Check lactic acid and beta hydroxybutyrate  Sodium bicarbonate 650 mg oral twice daily  Monitor labs    4.  Hyponatremia  Presumed secondary to JEANNINE/CKD  Sodium 129 today  Will check serum and urine osmolality  Strict I&O  IV fluids-NS  Monitor labs-repeat BMP this evening    5.  Bilateral hydroureteronephrosis  CT ABD 5/16-bilateral hydroureteronephrosis, severe R and more mild degree on L w/ bilateral ureteral stents, no obstructing kidney stones, new bulky posterior RLQ mass  Urology consulted    6.  Anemia/history of cancer  Hemoglobin 8.8 g/dL  Defer management to hematology/oncology    7.  Hypertension with CKD  BP goal<130/80  BP near goal  Monitor BPs    MARTIR Higgins - CNP  Seen in er rm 13   Patient examined chart reviewed , labs noted   JEANNINE on CKD 4 in the setting of large pelvic Mass   ( Malignant) and obstructive uropathy , hyperK+   Evelated cr , anemia noted , urology consulted   Will dose lokelma , await urology input   Patient known to Dr Bethea , already has stents was due for replacement in June 2025   Plan as outlined above     Dr GRADY

## 2025-06-05 NOTE — PROCEDURES
PROCEDURE NOTE  Date: 6/5/2025   Name: Isamar Lopez  YOB: 1950    CENTRAL LINE    Date/Time: 6/5/2025 3:26 PM    Performed by: Thuy Gorman DO  Authorized by: Santi Wolf MD  Consent: Verbal consent obtained. Written consent obtained.  Risks and benefits: risks, benefits and alternatives were discussed  Consent given by: patient  Required items: required blood products, implants, devices, and special equipment available  Patient identity confirmed: verbally with patient  Time out: Immediately prior to procedure a \"time out\" was called to verify the correct patient, procedure, equipment, support staff and site/side marked as required.  Indications: vascular access  Anesthesia: local infiltration    Anesthesia:  Local Anesthetic: lidocaine 1% with epinephrine    Sedation:  Patient sedated: no    Preparation: skin prepped with 2% chlorhexidine  Skin prep agent dried: skin prep agent completely dried prior to procedure  Sterile barriers: all five maximum sterile barriers used - cap, mask, sterile gown, sterile gloves, and large sterile sheet  Hand hygiene: hand hygiene performed prior to central venous catheter insertion  Location details: right femoral  Patient position: flat  Catheter type: double lumen  Catheter size: 14 Fr  Pre-procedure: landmarks identified  Ultrasound guidance: yes  Sterile ultrasound techniques: sterile gel and sterile probe covers were used  Number of attempts: 1  Successful placement: yes  Post-procedure: line sutured  Assessment: blood return through all ports and free fluid flow  Patient tolerance: patient tolerated the procedure well with no immediate complications  Comments: Dr. Richardson was available for the procedure

## 2025-06-06 LAB
ANION GAP SERPL CALCULATED.3IONS-SCNC: 17 MMOL/L (ref 7–16)
ANION GAP SERPL CALCULATED.3IONS-SCNC: 19 MMOL/L (ref 7–16)
BASOPHILS # BLD: 0.12 K/UL (ref 0–0.2)
BASOPHILS NFR BLD: 1 % (ref 0–2)
BUN SERPL-MCNC: 72 MG/DL (ref 8–23)
BUN SERPL-MCNC: 85 MG/DL (ref 8–23)
CA-I BLD-SCNC: 1.13 MMOL/L (ref 1.15–1.33)
CALCIUM SERPL-MCNC: 7.9 MG/DL (ref 8.8–10.2)
CALCIUM SERPL-MCNC: 8.1 MG/DL (ref 8.8–10.2)
CHLORIDE SERPL-SCNC: 96 MMOL/L (ref 98–107)
CHLORIDE SERPL-SCNC: 98 MMOL/L (ref 98–107)
CO2 SERPL-SCNC: 19 MMOL/L (ref 22–29)
CO2 SERPL-SCNC: 21 MMOL/L (ref 22–29)
CREAT SERPL-MCNC: 5.2 MG/DL (ref 0.5–1)
CREAT SERPL-MCNC: 5.9 MG/DL (ref 0.5–1)
EOSINOPHIL # BLD: 0.23 K/UL (ref 0.05–0.5)
EOSINOPHILS RELATIVE PERCENT: 2 % (ref 0–6)
ERYTHROCYTE [DISTWIDTH] IN BLOOD BY AUTOMATED COUNT: 17.2 % (ref 11.5–15)
GFR, ESTIMATED: 7 ML/MIN/1.73M2
GFR, ESTIMATED: 8 ML/MIN/1.73M2
GLUCOSE BLD-MCNC: 117 MG/DL (ref 74–99)
GLUCOSE BLD-MCNC: 120 MG/DL (ref 74–99)
GLUCOSE BLD-MCNC: 93 MG/DL (ref 74–99)
GLUCOSE BLD-MCNC: 93 MG/DL (ref 74–99)
GLUCOSE SERPL-MCNC: 91 MG/DL (ref 74–99)
GLUCOSE SERPL-MCNC: 93 MG/DL (ref 74–99)
HCT VFR BLD AUTO: 22.4 % (ref 34–48)
HGB BLD-MCNC: 7 G/DL (ref 11.5–15.5)
LYMPHOCYTES NFR BLD: 0.47 K/UL (ref 1.5–4)
LYMPHOCYTES RELATIVE PERCENT: 4 % (ref 20–42)
MAGNESIUM SERPL-MCNC: 2 MG/DL (ref 1.6–2.4)
MCH RBC QN AUTO: 27.1 PG (ref 26–35)
MCHC RBC AUTO-ENTMCNC: 31.3 G/DL (ref 32–34.5)
MCV RBC AUTO: 86.8 FL (ref 80–99.9)
MONOCYTES NFR BLD: 0.12 K/UL (ref 0.1–0.95)
MONOCYTES NFR BLD: 1 % (ref 2–12)
NEUTROPHILS NFR BLD: 93 % (ref 43–80)
NEUTS SEG NFR BLD: 12.47 K/UL (ref 1.8–7.3)
PHOSPHATE SERPL-MCNC: 5.6 MG/DL (ref 2.5–4.5)
PLATELET # BLD AUTO: 308 K/UL (ref 130–450)
PMV BLD AUTO: 12.1 FL (ref 7–12)
POTASSIUM SERPL-SCNC: 3.6 MMOL/L (ref 3.5–5.1)
POTASSIUM SERPL-SCNC: 3.8 MMOL/L (ref 3.5–5.1)
RBC # BLD AUTO: 2.58 M/UL (ref 3.5–5.5)
RBC # BLD: ABNORMAL 10*6/UL
SODIUM SERPL-SCNC: 134 MMOL/L (ref 136–145)
SODIUM SERPL-SCNC: 135 MMOL/L (ref 136–145)
WBC OTHER # BLD: 13.4 K/UL (ref 4.5–11.5)

## 2025-06-06 PROCEDURE — 51702 INSERT TEMP BLADDER CATH: CPT

## 2025-06-06 PROCEDURE — 6370000000 HC RX 637 (ALT 250 FOR IP): Performed by: SURGERY

## 2025-06-06 PROCEDURE — 2500000003 HC RX 250 WO HCPCS: Performed by: NURSE PRACTITIONER

## 2025-06-06 PROCEDURE — 83735 ASSAY OF MAGNESIUM: CPT

## 2025-06-06 PROCEDURE — 2500000003 HC RX 250 WO HCPCS

## 2025-06-06 PROCEDURE — 6370000000 HC RX 637 (ALT 250 FOR IP)

## 2025-06-06 PROCEDURE — 6360000002 HC RX W HCPCS

## 2025-06-06 PROCEDURE — 85025 COMPLETE CBC W/AUTO DIFF WBC: CPT

## 2025-06-06 PROCEDURE — 80048 BASIC METABOLIC PNL TOTAL CA: CPT

## 2025-06-06 PROCEDURE — 6370000000 HC RX 637 (ALT 250 FOR IP): Performed by: INTERNAL MEDICINE

## 2025-06-06 PROCEDURE — 97535 SELF CARE MNGMENT TRAINING: CPT

## 2025-06-06 PROCEDURE — 6370000000 HC RX 637 (ALT 250 FOR IP): Performed by: NURSE PRACTITIONER

## 2025-06-06 PROCEDURE — 82330 ASSAY OF CALCIUM: CPT

## 2025-06-06 PROCEDURE — 84100 ASSAY OF PHOSPHORUS: CPT

## 2025-06-06 PROCEDURE — 82962 GLUCOSE BLOOD TEST: CPT

## 2025-06-06 PROCEDURE — 97530 THERAPEUTIC ACTIVITIES: CPT

## 2025-06-06 PROCEDURE — 1200000000 HC SEMI PRIVATE

## 2025-06-06 PROCEDURE — 90935 HEMODIALYSIS ONE EVALUATION: CPT

## 2025-06-06 PROCEDURE — 6370000000 HC RX 637 (ALT 250 FOR IP): Performed by: STUDENT IN AN ORGANIZED HEALTH CARE EDUCATION/TRAINING PROGRAM

## 2025-06-06 PROCEDURE — 2700000000 HC OXYGEN THERAPY PER DAY

## 2025-06-06 PROCEDURE — 36415 COLL VENOUS BLD VENIPUNCTURE: CPT

## 2025-06-06 RX ORDER — CALCIUM GLUCONATE 20 MG/ML
1000 INJECTION, SOLUTION INTRAVENOUS ONCE
Status: COMPLETED | OUTPATIENT
Start: 2025-06-06 | End: 2025-06-06

## 2025-06-06 RX ADMIN — CALCIUM ACETATE 667 MG: 667 CAPSULE ORAL at 17:52

## 2025-06-06 RX ADMIN — ATORVASTATIN CALCIUM 40 MG: 40 TABLET, FILM COATED ORAL at 21:34

## 2025-06-06 RX ADMIN — SODIUM BICARBONATE 1300 MG: 650 TABLET ORAL at 21:35

## 2025-06-06 RX ADMIN — POLYETHYLENE GLYCOL 3350 17 G: 17 POWDER, FOR SOLUTION ORAL at 08:52

## 2025-06-06 RX ADMIN — LORAZEPAM 0.5 MG: 0.5 TABLET ORAL at 08:53

## 2025-06-06 RX ADMIN — SODIUM CHLORIDE 1 G: 1 TABLET ORAL at 17:53

## 2025-06-06 RX ADMIN — SENNOSIDES 8.6 MG: 8.6 TABLET, COATED ORAL at 21:34

## 2025-06-06 RX ADMIN — PANTOPRAZOLE SODIUM 40 MG: 40 TABLET, DELAYED RELEASE ORAL at 17:53

## 2025-06-06 RX ADMIN — Medication 10 MG: at 21:34

## 2025-06-06 RX ADMIN — CARVEDILOL 6.25 MG: 6.25 TABLET, FILM COATED ORAL at 08:53

## 2025-06-06 RX ADMIN — CARVEDILOL 6.25 MG: 6.25 TABLET, FILM COATED ORAL at 21:34

## 2025-06-06 RX ADMIN — FOLIC ACID 1 MG: 1 TABLET ORAL at 08:53

## 2025-06-06 RX ADMIN — SODIUM CHLORIDE, PRESERVATIVE FREE 10 ML: 5 INJECTION INTRAVENOUS at 08:59

## 2025-06-06 RX ADMIN — LACTULOSE 30 G: 10 SOLUTION ORAL at 21:35

## 2025-06-06 RX ADMIN — ESCITALOPRAM OXALATE 5 MG: 10 TABLET ORAL at 08:53

## 2025-06-06 RX ADMIN — PANTOPRAZOLE SODIUM 40 MG: 40 TABLET, DELAYED RELEASE ORAL at 05:31

## 2025-06-06 RX ADMIN — LACTULOSE 30 G: 10 SOLUTION ORAL at 08:51

## 2025-06-06 RX ADMIN — SODIUM BICARBONATE 1300 MG: 650 TABLET ORAL at 08:53

## 2025-06-06 RX ADMIN — CALCIUM GLUCONATE 1000 MG: 20 INJECTION, SOLUTION INTRAVENOUS at 17:58

## 2025-06-06 RX ADMIN — CALCIUM ACETATE 667 MG: 667 CAPSULE ORAL at 08:53

## 2025-06-06 RX ADMIN — AMLODIPINE BESYLATE 10 MG: 10 TABLET ORAL at 08:54

## 2025-06-06 RX ADMIN — SODIUM CHLORIDE 1 G: 1 TABLET ORAL at 08:54

## 2025-06-06 RX ADMIN — Medication 1000 UNITS: at 08:53

## 2025-06-06 RX ADMIN — SODIUM CHLORIDE, PRESERVATIVE FREE 10 ML: 5 INJECTION INTRAVENOUS at 21:41

## 2025-06-06 RX ADMIN — CALCITRIOL CAPSULES 0.25 MCG 0.25 MCG: 0.25 CAPSULE ORAL at 08:54

## 2025-06-06 RX ADMIN — ASPIRIN 81 MG: 81 TABLET, COATED ORAL at 08:53

## 2025-06-06 ASSESSMENT — PAIN SCALES - GENERAL
PAINLEVEL_OUTOF10: 0
PAINLEVEL_OUTOF10: 0

## 2025-06-07 LAB
ANION GAP SERPL CALCULATED.3IONS-SCNC: 17 MMOL/L (ref 7–16)
ANION GAP SERPL CALCULATED.3IONS-SCNC: 17 MMOL/L (ref 7–16)
BASOPHILS # BLD: 0.02 K/UL (ref 0–0.2)
BASOPHILS NFR BLD: 0 % (ref 0–2)
BUN SERPL-MCNC: 62 MG/DL (ref 8–23)
BUN SERPL-MCNC: 74 MG/DL (ref 8–23)
CA-I BLD-SCNC: 1.17 MMOL/L (ref 1.15–1.33)
CALCIUM SERPL-MCNC: 8.2 MG/DL (ref 8.8–10.2)
CALCIUM SERPL-MCNC: 8.2 MG/DL (ref 8.8–10.2)
CHLORIDE SERPL-SCNC: 96 MMOL/L (ref 98–107)
CHLORIDE SERPL-SCNC: 98 MMOL/L (ref 98–107)
CO2 SERPL-SCNC: 20 MMOL/L (ref 22–29)
CO2 SERPL-SCNC: 20 MMOL/L (ref 22–29)
CREAT SERPL-MCNC: 5 MG/DL (ref 0.5–1)
CREAT SERPL-MCNC: 6 MG/DL (ref 0.5–1)
EOSINOPHIL # BLD: 0.14 K/UL (ref 0.05–0.5)
EOSINOPHILS RELATIVE PERCENT: 1 % (ref 0–6)
ERYTHROCYTE [DISTWIDTH] IN BLOOD BY AUTOMATED COUNT: 17.1 % (ref 11.5–15)
GFR, ESTIMATED: 7 ML/MIN/1.73M2
GFR, ESTIMATED: 9 ML/MIN/1.73M2
GLUCOSE BLD-MCNC: 109 MG/DL (ref 74–99)
GLUCOSE BLD-MCNC: 110 MG/DL (ref 74–99)
GLUCOSE BLD-MCNC: 96 MG/DL (ref 74–99)
GLUCOSE BLD-MCNC: 97 MG/DL (ref 74–99)
GLUCOSE SERPL-MCNC: 112 MG/DL (ref 74–99)
GLUCOSE SERPL-MCNC: 83 MG/DL (ref 74–99)
HCT VFR BLD AUTO: 21 % (ref 34–48)
HCT VFR BLD AUTO: 23.8 % (ref 34–48)
HGB BLD-MCNC: 6.6 G/DL (ref 11.5–15.5)
HGB BLD-MCNC: 7.5 G/DL (ref 11.5–15.5)
IMM GRANULOCYTES # BLD AUTO: 0.13 K/UL (ref 0–0.58)
IMM GRANULOCYTES NFR BLD: 1 % (ref 0–5)
LYMPHOCYTES NFR BLD: 0.25 K/UL (ref 1.5–4)
LYMPHOCYTES RELATIVE PERCENT: 2 % (ref 20–42)
MAGNESIUM SERPL-MCNC: 1.9 MG/DL (ref 1.6–2.4)
MAGNESIUM SERPL-MCNC: 2.1 MG/DL (ref 1.6–2.4)
MCH RBC QN AUTO: 27 PG (ref 26–35)
MCHC RBC AUTO-ENTMCNC: 31.4 G/DL (ref 32–34.5)
MCV RBC AUTO: 86.1 FL (ref 80–99.9)
MONOCYTES NFR BLD: 0.61 K/UL (ref 0.1–0.95)
MONOCYTES NFR BLD: 6 % (ref 2–12)
NEUTROPHILS NFR BLD: 90 % (ref 43–80)
NEUTS SEG NFR BLD: 10.01 K/UL (ref 1.8–7.3)
PHOSPHATE SERPL-MCNC: 5.4 MG/DL (ref 2.5–4.5)
PLATELET # BLD AUTO: 279 K/UL (ref 130–450)
PMV BLD AUTO: 11.1 FL (ref 7–12)
POTASSIUM SERPL-SCNC: 3.5 MMOL/L (ref 3.5–5.1)
POTASSIUM SERPL-SCNC: 3.6 MMOL/L (ref 3.5–5.1)
RBC # BLD AUTO: 2.44 M/UL (ref 3.5–5.5)
RBC # BLD: ABNORMAL 10*6/UL
SODIUM SERPL-SCNC: 134 MMOL/L (ref 136–145)
SODIUM SERPL-SCNC: 134 MMOL/L (ref 136–145)
WBC OTHER # BLD: 11.2 K/UL (ref 4.5–11.5)

## 2025-06-07 PROCEDURE — 86900 BLOOD TYPING SEROLOGIC ABO: CPT

## 2025-06-07 PROCEDURE — 6360000002 HC RX W HCPCS: Performed by: NURSE PRACTITIONER

## 2025-06-07 PROCEDURE — 86923 COMPATIBILITY TEST ELECTRIC: CPT

## 2025-06-07 PROCEDURE — 86901 BLOOD TYPING SEROLOGIC RH(D): CPT

## 2025-06-07 PROCEDURE — 82962 GLUCOSE BLOOD TEST: CPT

## 2025-06-07 PROCEDURE — 6370000000 HC RX 637 (ALT 250 FOR IP): Performed by: NURSE PRACTITIONER

## 2025-06-07 PROCEDURE — 6370000000 HC RX 637 (ALT 250 FOR IP): Performed by: STUDENT IN AN ORGANIZED HEALTH CARE EDUCATION/TRAINING PROGRAM

## 2025-06-07 PROCEDURE — P9016 RBC LEUKOCYTES REDUCED: HCPCS

## 2025-06-07 PROCEDURE — 2700000000 HC OXYGEN THERAPY PER DAY

## 2025-06-07 PROCEDURE — 36415 COLL VENOUS BLD VENIPUNCTURE: CPT

## 2025-06-07 PROCEDURE — 36430 TRANSFUSION BLD/BLD COMPNT: CPT

## 2025-06-07 PROCEDURE — 2500000003 HC RX 250 WO HCPCS: Performed by: NURSE PRACTITIONER

## 2025-06-07 PROCEDURE — 1200000000 HC SEMI PRIVATE

## 2025-06-07 PROCEDURE — 90935 HEMODIALYSIS ONE EVALUATION: CPT

## 2025-06-07 PROCEDURE — 80048 BASIC METABOLIC PNL TOTAL CA: CPT

## 2025-06-07 PROCEDURE — 6370000000 HC RX 637 (ALT 250 FOR IP): Performed by: INTERNAL MEDICINE

## 2025-06-07 PROCEDURE — 82330 ASSAY OF CALCIUM: CPT

## 2025-06-07 PROCEDURE — 85025 COMPLETE CBC W/AUTO DIFF WBC: CPT

## 2025-06-07 PROCEDURE — 94640 AIRWAY INHALATION TREATMENT: CPT

## 2025-06-07 PROCEDURE — 84100 ASSAY OF PHOSPHORUS: CPT

## 2025-06-07 PROCEDURE — 85014 HEMATOCRIT: CPT

## 2025-06-07 PROCEDURE — 85018 HEMOGLOBIN: CPT

## 2025-06-07 PROCEDURE — 86850 RBC ANTIBODY SCREEN: CPT

## 2025-06-07 PROCEDURE — 2500000003 HC RX 250 WO HCPCS

## 2025-06-07 PROCEDURE — 83735 ASSAY OF MAGNESIUM: CPT

## 2025-06-07 PROCEDURE — 6370000000 HC RX 637 (ALT 250 FOR IP)

## 2025-06-07 PROCEDURE — 51702 INSERT TEMP BLADDER CATH: CPT

## 2025-06-07 RX ORDER — SODIUM CHLORIDE 9 MG/ML
INJECTION, SOLUTION INTRAVENOUS PRN
Status: DISCONTINUED | OUTPATIENT
Start: 2025-06-07 | End: 2025-06-10 | Stop reason: HOSPADM

## 2025-06-07 RX ADMIN — ESCITALOPRAM OXALATE 5 MG: 10 TABLET ORAL at 11:09

## 2025-06-07 RX ADMIN — LACTULOSE 30 G: 10 SOLUTION ORAL at 14:32

## 2025-06-07 RX ADMIN — IPRATROPIUM BROMIDE AND ALBUTEROL SULFATE 1 DOSE: 2.5; .5 SOLUTION RESPIRATORY (INHALATION) at 11:04

## 2025-06-07 RX ADMIN — FOLIC ACID 1 MG: 1 TABLET ORAL at 11:12

## 2025-06-07 RX ADMIN — PANTOPRAZOLE SODIUM 40 MG: 40 TABLET, DELAYED RELEASE ORAL at 05:32

## 2025-06-07 RX ADMIN — SODIUM CHLORIDE 1 G: 1 TABLET ORAL at 17:39

## 2025-06-07 RX ADMIN — CALCIUM ACETATE 667 MG: 667 CAPSULE ORAL at 11:10

## 2025-06-07 RX ADMIN — SODIUM BICARBONATE 1300 MG: 650 TABLET ORAL at 14:33

## 2025-06-07 RX ADMIN — PANTOPRAZOLE SODIUM 40 MG: 40 TABLET, DELAYED RELEASE ORAL at 14:35

## 2025-06-07 RX ADMIN — SODIUM CHLORIDE, PRESERVATIVE FREE 10 ML: 5 INJECTION INTRAVENOUS at 22:57

## 2025-06-07 RX ADMIN — ONDANSETRON HYDROCHLORIDE 4 MG: 2 SOLUTION INTRAMUSCULAR; INTRAVENOUS at 08:16

## 2025-06-07 RX ADMIN — SODIUM CHLORIDE 1 G: 1 TABLET ORAL at 11:13

## 2025-06-07 RX ADMIN — CALCITRIOL CAPSULES 0.25 MCG 0.25 MCG: 0.25 CAPSULE ORAL at 11:14

## 2025-06-07 RX ADMIN — LORAZEPAM 0.5 MG: 0.5 TABLET ORAL at 11:19

## 2025-06-07 RX ADMIN — SODIUM CHLORIDE, PRESERVATIVE FREE 10 ML: 5 INJECTION INTRAVENOUS at 11:20

## 2025-06-07 RX ADMIN — CALCIUM ACETATE 667 MG: 667 CAPSULE ORAL at 17:39

## 2025-06-07 RX ADMIN — Medication 1000 UNITS: at 11:11

## 2025-06-07 RX ADMIN — ASPIRIN 81 MG: 81 TABLET, COATED ORAL at 11:11

## 2025-06-07 RX ADMIN — ATORVASTATIN CALCIUM 40 MG: 40 TABLET, FILM COATED ORAL at 22:56

## 2025-06-07 RX ADMIN — SODIUM BICARBONATE 1300 MG: 650 TABLET ORAL at 22:56

## 2025-06-07 RX ADMIN — Medication 10 MG: at 22:56

## 2025-06-07 ASSESSMENT — PAIN SCALES - GENERAL: PAINLEVEL_OUTOF10: 0

## 2025-06-08 LAB
ABO/RH: NORMAL
ANION GAP SERPL CALCULATED.3IONS-SCNC: 16 MMOL/L (ref 7–16)
ANTIBODY SCREEN: NEGATIVE
ARM BAND NUMBER: NORMAL
BASOPHILS # BLD: 0 K/UL (ref 0–0.2)
BASOPHILS NFR BLD: 0 % (ref 0–2)
BLOOD BANK BLOOD PRODUCT EXPIRATION DATE: NORMAL
BLOOD BANK DISPENSE STATUS: NORMAL
BLOOD BANK ISBT PRODUCT BLOOD TYPE: 8400
BLOOD BANK PRODUCT CODE: NORMAL
BLOOD BANK SAMPLE EXPIRATION: NORMAL
BLOOD BANK UNIT TYPE AND RH: NORMAL
BPU ID: NORMAL
BUN SERPL-MCNC: 65 MG/DL (ref 8–23)
CA-I BLD-SCNC: 1.17 MMOL/L (ref 1.15–1.33)
CALCIUM SERPL-MCNC: 8.1 MG/DL (ref 8.8–10.2)
CHLORIDE SERPL-SCNC: 98 MMOL/L (ref 98–107)
CO2 SERPL-SCNC: 22 MMOL/L (ref 22–29)
COMPONENT: NORMAL
CREAT SERPL-MCNC: 5.4 MG/DL (ref 0.5–1)
CROSSMATCH RESULT: NORMAL
EOSINOPHIL # BLD: 0.09 K/UL (ref 0.05–0.5)
EOSINOPHILS RELATIVE PERCENT: 1 % (ref 0–6)
ERYTHROCYTE [DISTWIDTH] IN BLOOD BY AUTOMATED COUNT: 16.7 % (ref 11.5–15)
GFR, ESTIMATED: 8 ML/MIN/1.73M2
GLUCOSE BLD-MCNC: 107 MG/DL (ref 74–99)
GLUCOSE BLD-MCNC: 130 MG/DL (ref 74–99)
GLUCOSE BLD-MCNC: 139 MG/DL (ref 74–99)
GLUCOSE BLD-MCNC: 98 MG/DL (ref 74–99)
GLUCOSE SERPL-MCNC: 95 MG/DL (ref 74–99)
HCT VFR BLD AUTO: 23.3 % (ref 34–48)
HGB BLD-MCNC: 7.5 G/DL (ref 11.5–15.5)
LYMPHOCYTES NFR BLD: 0.28 K/UL (ref 1.5–4)
LYMPHOCYTES RELATIVE PERCENT: 3 % (ref 20–42)
MAGNESIUM SERPL-MCNC: 2 MG/DL (ref 1.6–2.4)
MCH RBC QN AUTO: 27.4 PG (ref 26–35)
MCHC RBC AUTO-ENTMCNC: 32.2 G/DL (ref 32–34.5)
MCV RBC AUTO: 85 FL (ref 80–99.9)
MONOCYTES NFR BLD: 0.38 K/UL (ref 0.1–0.95)
MONOCYTES NFR BLD: 4 % (ref 2–12)
NEUTROPHILS NFR BLD: 93 % (ref 43–80)
NEUTS SEG NFR BLD: 10.14 K/UL (ref 1.8–7.3)
PHOSPHATE SERPL-MCNC: 4.4 MG/DL (ref 2.5–4.5)
PLATELET # BLD AUTO: 244 K/UL (ref 130–450)
PMV BLD AUTO: 11 FL (ref 7–12)
POTASSIUM SERPL-SCNC: 3.6 MMOL/L (ref 3.5–5.1)
RBC # BLD AUTO: 2.74 M/UL (ref 3.5–5.5)
RBC # BLD: ABNORMAL 10*6/UL
SODIUM SERPL-SCNC: 135 MMOL/L (ref 136–145)
TRANSFUSION STATUS: NORMAL
UNIT DIVISION: 0
UNIT ISSUE DATE/TIME: NORMAL
WBC OTHER # BLD: 10.9 K/UL (ref 4.5–11.5)

## 2025-06-08 PROCEDURE — 84100 ASSAY OF PHOSPHORUS: CPT

## 2025-06-08 PROCEDURE — 6370000000 HC RX 637 (ALT 250 FOR IP)

## 2025-06-08 PROCEDURE — 2500000003 HC RX 250 WO HCPCS: Performed by: NURSE PRACTITIONER

## 2025-06-08 PROCEDURE — 6370000000 HC RX 637 (ALT 250 FOR IP): Performed by: SURGERY

## 2025-06-08 PROCEDURE — 36415 COLL VENOUS BLD VENIPUNCTURE: CPT

## 2025-06-08 PROCEDURE — 82962 GLUCOSE BLOOD TEST: CPT

## 2025-06-08 PROCEDURE — 6370000000 HC RX 637 (ALT 250 FOR IP): Performed by: INTERNAL MEDICINE

## 2025-06-08 PROCEDURE — 85025 COMPLETE CBC W/AUTO DIFF WBC: CPT

## 2025-06-08 PROCEDURE — 82330 ASSAY OF CALCIUM: CPT

## 2025-06-08 PROCEDURE — 1200000000 HC SEMI PRIVATE

## 2025-06-08 PROCEDURE — 83735 ASSAY OF MAGNESIUM: CPT

## 2025-06-08 PROCEDURE — 2500000003 HC RX 250 WO HCPCS

## 2025-06-08 PROCEDURE — 80048 BASIC METABOLIC PNL TOTAL CA: CPT

## 2025-06-08 PROCEDURE — 6370000000 HC RX 637 (ALT 250 FOR IP): Performed by: STUDENT IN AN ORGANIZED HEALTH CARE EDUCATION/TRAINING PROGRAM

## 2025-06-08 PROCEDURE — 6370000000 HC RX 637 (ALT 250 FOR IP): Performed by: NURSE PRACTITIONER

## 2025-06-08 PROCEDURE — 2700000000 HC OXYGEN THERAPY PER DAY

## 2025-06-08 RX ADMIN — CARVEDILOL 6.25 MG: 6.25 TABLET, FILM COATED ORAL at 21:17

## 2025-06-08 RX ADMIN — SENNOSIDES 8.6 MG: 8.6 TABLET, COATED ORAL at 21:17

## 2025-06-08 RX ADMIN — ASPIRIN 81 MG: 81 TABLET, COATED ORAL at 08:39

## 2025-06-08 RX ADMIN — AMLODIPINE BESYLATE 10 MG: 10 TABLET ORAL at 08:39

## 2025-06-08 RX ADMIN — PANTOPRAZOLE SODIUM 40 MG: 40 TABLET, DELAYED RELEASE ORAL at 16:37

## 2025-06-08 RX ADMIN — SODIUM BICARBONATE 1300 MG: 650 TABLET ORAL at 08:39

## 2025-06-08 RX ADMIN — SODIUM CHLORIDE 1 G: 1 TABLET ORAL at 08:40

## 2025-06-08 RX ADMIN — ATORVASTATIN CALCIUM 40 MG: 40 TABLET, FILM COATED ORAL at 21:18

## 2025-06-08 RX ADMIN — SODIUM BICARBONATE 1300 MG: 650 TABLET ORAL at 21:18

## 2025-06-08 RX ADMIN — SODIUM CHLORIDE, PRESERVATIVE FREE 10 ML: 5 INJECTION INTRAVENOUS at 08:41

## 2025-06-08 RX ADMIN — Medication 10 MG: at 21:17

## 2025-06-08 RX ADMIN — SODIUM CHLORIDE 1 G: 1 TABLET ORAL at 11:42

## 2025-06-08 RX ADMIN — FOLIC ACID 1 MG: 1 TABLET ORAL at 08:40

## 2025-06-08 RX ADMIN — SODIUM CHLORIDE 1 G: 1 TABLET ORAL at 17:17

## 2025-06-08 RX ADMIN — CALCIUM ACETATE 667 MG: 667 CAPSULE ORAL at 16:36

## 2025-06-08 RX ADMIN — SODIUM BICARBONATE 1300 MG: 650 TABLET ORAL at 16:06

## 2025-06-08 RX ADMIN — CALCIUM ACETATE 667 MG: 667 CAPSULE ORAL at 11:42

## 2025-06-08 RX ADMIN — Medication 1000 UNITS: at 08:39

## 2025-06-08 RX ADMIN — CALCIUM ACETATE 667 MG: 667 CAPSULE ORAL at 08:39

## 2025-06-08 RX ADMIN — SODIUM CHLORIDE, PRESERVATIVE FREE 10 ML: 5 INJECTION INTRAVENOUS at 21:18

## 2025-06-08 RX ADMIN — ESCITALOPRAM OXALATE 5 MG: 10 TABLET ORAL at 08:39

## 2025-06-08 RX ADMIN — CALCITRIOL CAPSULES 0.25 MCG 0.25 MCG: 0.25 CAPSULE ORAL at 08:40

## 2025-06-08 RX ADMIN — LACTULOSE 30 G: 10 SOLUTION ORAL at 20:01

## 2025-06-08 RX ADMIN — LORAZEPAM 0.5 MG: 0.5 TABLET ORAL at 08:39

## 2025-06-08 RX ADMIN — CARVEDILOL 6.25 MG: 6.25 TABLET, FILM COATED ORAL at 08:40

## 2025-06-09 LAB
ANION GAP SERPL CALCULATED.3IONS-SCNC: 16 MMOL/L (ref 7–16)
BASOPHILS # BLD: 0.01 K/UL (ref 0–0.2)
BASOPHILS NFR BLD: 0 % (ref 0–2)
BUN SERPL-MCNC: 66 MG/DL (ref 8–23)
CA-I BLD-SCNC: 1.16 MMOL/L (ref 1.15–1.33)
CALCIUM SERPL-MCNC: 8 MG/DL (ref 8.8–10.2)
CHLORIDE SERPL-SCNC: 99 MMOL/L (ref 98–107)
CO2 SERPL-SCNC: 22 MMOL/L (ref 22–29)
CREAT SERPL-MCNC: 6.2 MG/DL (ref 0.5–1)
EOSINOPHIL # BLD: 0.12 K/UL (ref 0.05–0.5)
EOSINOPHILS RELATIVE PERCENT: 1 % (ref 0–6)
ERYTHROCYTE [DISTWIDTH] IN BLOOD BY AUTOMATED COUNT: 16.8 % (ref 11.5–15)
GFR, ESTIMATED: 7 ML/MIN/1.73M2
GLUCOSE BLD-MCNC: 109 MG/DL (ref 74–99)
GLUCOSE BLD-MCNC: 113 MG/DL (ref 74–99)
GLUCOSE BLD-MCNC: 126 MG/DL (ref 74–99)
GLUCOSE BLD-MCNC: 130 MG/DL (ref 74–99)
GLUCOSE BLD-MCNC: 98 MG/DL (ref 74–99)
GLUCOSE SERPL-MCNC: 90 MG/DL (ref 74–99)
HCT VFR BLD AUTO: 23.5 % (ref 34–48)
HGB BLD-MCNC: 7.3 G/DL (ref 11.5–15.5)
IMM GRANULOCYTES # BLD AUTO: 0.08 K/UL (ref 0–0.58)
IMM GRANULOCYTES NFR BLD: 1 % (ref 0–5)
INR PPP: 1.1
LYMPHOCYTES NFR BLD: 0.29 K/UL (ref 1.5–4)
LYMPHOCYTES RELATIVE PERCENT: 3 % (ref 20–42)
MAGNESIUM SERPL-MCNC: 1.9 MG/DL (ref 1.6–2.4)
MCH RBC QN AUTO: 26.5 PG (ref 26–35)
MCHC RBC AUTO-ENTMCNC: 31.1 G/DL (ref 32–34.5)
MCV RBC AUTO: 85.5 FL (ref 80–99.9)
MONOCYTES NFR BLD: 0.6 K/UL (ref 0.1–0.95)
MONOCYTES NFR BLD: 6 % (ref 2–12)
NEUTROPHILS NFR BLD: 89 % (ref 43–80)
NEUTS SEG NFR BLD: 8.48 K/UL (ref 1.8–7.3)
PHOSPHATE SERPL-MCNC: 4.4 MG/DL (ref 2.5–4.5)
PLATELET # BLD AUTO: 258 K/UL (ref 130–450)
PMV BLD AUTO: 11.3 FL (ref 7–12)
POTASSIUM SERPL-SCNC: 3.5 MMOL/L (ref 3.5–5.1)
PROTHROMBIN TIME: 12.6 SEC (ref 9.3–12.4)
RBC # BLD AUTO: 2.75 M/UL (ref 3.5–5.5)
RBC # BLD: ABNORMAL 10*6/UL
SODIUM SERPL-SCNC: 136 MMOL/L (ref 136–145)
WBC OTHER # BLD: 9.6 K/UL (ref 4.5–11.5)

## 2025-06-09 PROCEDURE — 82330 ASSAY OF CALCIUM: CPT

## 2025-06-09 PROCEDURE — 85025 COMPLETE CBC W/AUTO DIFF WBC: CPT

## 2025-06-09 PROCEDURE — 6370000000 HC RX 637 (ALT 250 FOR IP): Performed by: SURGERY

## 2025-06-09 PROCEDURE — 6370000000 HC RX 637 (ALT 250 FOR IP): Performed by: STUDENT IN AN ORGANIZED HEALTH CARE EDUCATION/TRAINING PROGRAM

## 2025-06-09 PROCEDURE — 6370000000 HC RX 637 (ALT 250 FOR IP): Performed by: NURSE PRACTITIONER

## 2025-06-09 PROCEDURE — 94640 AIRWAY INHALATION TREATMENT: CPT

## 2025-06-09 PROCEDURE — 85610 PROTHROMBIN TIME: CPT

## 2025-06-09 PROCEDURE — 6370000000 HC RX 637 (ALT 250 FOR IP)

## 2025-06-09 PROCEDURE — 90935 HEMODIALYSIS ONE EVALUATION: CPT

## 2025-06-09 PROCEDURE — 2700000000 HC OXYGEN THERAPY PER DAY

## 2025-06-09 PROCEDURE — 80048 BASIC METABOLIC PNL TOTAL CA: CPT

## 2025-06-09 PROCEDURE — 92526 ORAL FUNCTION THERAPY: CPT

## 2025-06-09 PROCEDURE — 82962 GLUCOSE BLOOD TEST: CPT

## 2025-06-09 PROCEDURE — 2500000003 HC RX 250 WO HCPCS: Performed by: NURSE PRACTITIONER

## 2025-06-09 PROCEDURE — 99232 SBSQ HOSP IP/OBS MODERATE 35: CPT | Performed by: INTERNAL MEDICINE

## 2025-06-09 PROCEDURE — 1200000000 HC SEMI PRIVATE

## 2025-06-09 PROCEDURE — 83735 ASSAY OF MAGNESIUM: CPT

## 2025-06-09 PROCEDURE — 2500000003 HC RX 250 WO HCPCS

## 2025-06-09 PROCEDURE — 6370000000 HC RX 637 (ALT 250 FOR IP): Performed by: INTERNAL MEDICINE

## 2025-06-09 PROCEDURE — 36415 COLL VENOUS BLD VENIPUNCTURE: CPT

## 2025-06-09 PROCEDURE — 84100 ASSAY OF PHOSPHORUS: CPT

## 2025-06-09 RX ADMIN — ESCITALOPRAM OXALATE 5 MG: 10 TABLET ORAL at 07:56

## 2025-06-09 RX ADMIN — LORAZEPAM 0.5 MG: 0.5 TABLET ORAL at 07:57

## 2025-06-09 RX ADMIN — SODIUM CHLORIDE 1 G: 1 TABLET ORAL at 11:35

## 2025-06-09 RX ADMIN — LACTULOSE 30 G: 10 SOLUTION ORAL at 14:27

## 2025-06-09 RX ADMIN — SODIUM BICARBONATE 1300 MG: 650 TABLET ORAL at 21:43

## 2025-06-09 RX ADMIN — ASPIRIN 81 MG: 81 TABLET, COATED ORAL at 07:56

## 2025-06-09 RX ADMIN — Medication 10 MG: at 21:43

## 2025-06-09 RX ADMIN — CARVEDILOL 6.25 MG: 6.25 TABLET, FILM COATED ORAL at 21:43

## 2025-06-09 RX ADMIN — CARVEDILOL 6.25 MG: 6.25 TABLET, FILM COATED ORAL at 07:56

## 2025-06-09 RX ADMIN — SODIUM BICARBONATE 1300 MG: 650 TABLET ORAL at 07:56

## 2025-06-09 RX ADMIN — SODIUM CHLORIDE 1 G: 1 TABLET ORAL at 07:57

## 2025-06-09 RX ADMIN — AMLODIPINE BESYLATE 10 MG: 10 TABLET ORAL at 07:57

## 2025-06-09 RX ADMIN — FOLIC ACID 1 MG: 1 TABLET ORAL at 07:57

## 2025-06-09 RX ADMIN — SENNOSIDES 8.6 MG: 8.6 TABLET, COATED ORAL at 21:43

## 2025-06-09 RX ADMIN — SODIUM CHLORIDE 1 G: 1 TABLET ORAL at 21:44

## 2025-06-09 RX ADMIN — SODIUM CHLORIDE, PRESERVATIVE FREE 10 ML: 5 INJECTION INTRAVENOUS at 21:44

## 2025-06-09 RX ADMIN — SODIUM CHLORIDE, PRESERVATIVE FREE 10 ML: 5 INJECTION INTRAVENOUS at 07:56

## 2025-06-09 RX ADMIN — CALCIUM ACETATE 667 MG: 667 CAPSULE ORAL at 07:56

## 2025-06-09 RX ADMIN — ATORVASTATIN CALCIUM 40 MG: 40 TABLET, FILM COATED ORAL at 21:43

## 2025-06-09 RX ADMIN — SODIUM BICARBONATE 1300 MG: 650 TABLET ORAL at 14:27

## 2025-06-09 RX ADMIN — CALCIUM ACETATE 667 MG: 667 CAPSULE ORAL at 11:35

## 2025-06-09 RX ADMIN — CALCITRIOL CAPSULES 0.25 MCG 0.25 MCG: 0.25 CAPSULE ORAL at 07:57

## 2025-06-09 RX ADMIN — LACTULOSE 30 G: 10 SOLUTION ORAL at 07:56

## 2025-06-09 RX ADMIN — Medication 1000 UNITS: at 07:56

## 2025-06-09 RX ADMIN — IPRATROPIUM BROMIDE AND ALBUTEROL SULFATE 1 DOSE: 2.5; .5 SOLUTION RESPIRATORY (INHALATION) at 20:50

## 2025-06-09 RX ADMIN — LACTULOSE 30 G: 10 SOLUTION ORAL at 21:42

## 2025-06-09 RX ADMIN — POLYETHYLENE GLYCOL 3350 17 G: 17 POWDER, FOR SOLUTION ORAL at 07:57

## 2025-06-09 ASSESSMENT — PAIN SCALES - GENERAL
PAINLEVEL_OUTOF10: 0

## 2025-06-09 NOTE — PROGRESS NOTES
HOSPITALIST PROGRESS NOTES     ADMITTING DATE AND TIME: 5/16/2025 10:04 AM  had concerns including Fall (Pt states sciatic like back pain and became dizzy and fell- recently diagnosed with UTI).    ADMIT DX: Hydronephrosis [N13.30]  Hyperkalemia [E87.5]  Pelvic mass [R19.00]  JEANNINE (acute kidney injury) [N17.9]      SUBJECTIVE:  Patient is being followed for Hydronephrosis [N13.30]  Hyperkalemia [E87.5]  Pelvic mass [R19.00]  JEANNINE (acute kidney injury) [N17.9]     Patient was seen examined and evaluated  Recent lab results, charts and pertinent diagnostic imaging reviewed   Ancillary service notes reviewed     Care reviewed with nursing staff, medical and surgical specialty care, primary care and the patient's family as available.      calcium gluconate  1,000 mg IntraVENous Once    vitamin D  50,000 Units Oral Weekly    folic acid  1 mg Oral Daily    lactulose  30 g Oral TID    meropenem  1,000 mg IntraVENous Q12H    polyethylene glycol  17 g Oral Daily    senna  1 tablet Oral Nightly    sodium chloride flush  5-40 mL IntraVENous 2 times per day    amLODIPine  10 mg Oral QAM    aspirin  81 mg Oral QAM    atorvastatin  40 mg Oral Nightly    calcitRIOL  0.25 mcg Oral Daily    carvedilol  6.25 mg Oral BID    escitalopram  5 mg Oral Daily    LORazepam  0.5 mg Oral Daily    Vitamin D  1,000 Units Oral Daily    insulin lispro  0-8 Units SubCUTAneous 4x Daily AC & HS    melatonin  10 mg Oral Nightly    pantoprazole (PROTONIX) 40 mg in sodium chloride (PF) 0.9 % 10 mL injection  40 mg IntraVENous Q12H     glucose, 4 tablet, PRN  dextrose bolus, 125 mL, PRN   Or  dextrose bolus, 250 mL, PRN  glucagon (rDNA), 1 mg, PRN  dextrose, , Continuous PRN  sodium chloride flush, 5-40 mL, PRN  sodium chloride, , PRN  ondansetron, 4 mg, Q8H PRN   Or  ondansetron, 4 mg, Q6H PRN  polyethylene glycol, 17 g, Daily 
                                                                                                                                HOSPITALIST PROGRESS NOTES     ADMITTING DATE AND TIME: 5/16/2025 10:04 AM  had concerns including Fall (Pt states sciatic like back pain and became dizzy and fell- recently diagnosed with UTI).    ADMIT DX: Hydronephrosis [N13.30]  Hyperkalemia [E87.5]  Pelvic mass [R19.00]  JEANNINE (acute kidney injury) [N17.9]      SUBJECTIVE:  Patient is being followed for Hydronephrosis [N13.30]  Hyperkalemia [E87.5]  Pelvic mass [R19.00]  JEANNINE (acute kidney injury) [N17.9]     Patient was seen examined and evaluated  Recent lab results, charts and pertinent diagnostic imaging reviewed   Ancillary service notes reviewed   Discussed with care giver     Care reviewed with nursing staff, medical and surgical specialty care, primary care and the patient's family as available.      lactulose  30 g Oral TID    meropenem  1,000 mg IntraVENous Q12H    polyethylene glycol  17 g Oral Daily    senna  1 tablet Oral Nightly    sodium chloride flush  5-40 mL IntraVENous 2 times per day    amLODIPine  10 mg Oral QAM    aspirin  81 mg Oral QAM    atorvastatin  40 mg Oral Nightly    calcitRIOL  0.25 mcg Oral Daily    carvedilol  6.25 mg Oral BID    escitalopram  5 mg Oral Daily    LORazepam  0.5 mg Oral Daily    Vitamin D  1,000 Units Oral Daily    insulin lispro  0-8 Units SubCUTAneous 4x Daily AC & HS    sodium bicarbonate  650 mg Oral BID    melatonin  10 mg Oral Nightly    pantoprazole (PROTONIX) 40 mg in sodium chloride (PF) 0.9 % 10 mL injection  40 mg IntraVENous Q12H     glucose, 4 tablet, PRN  dextrose bolus, 125 mL, PRN   Or  dextrose bolus, 250 mL, PRN  glucagon (rDNA), 1 mg, PRN  dextrose, , Continuous PRN  sodium chloride flush, 5-40 mL, PRN  sodium chloride, , PRN  ondansetron, 4 mg, Q8H PRN   Or  ondansetron, 4 mg, Q6H PRN  polyethylene glycol, 17 g, Daily PRN  glucose, 4 tablet, PRN  dextrose bolus, 125 mL, 
                                                                                                                                HOSPITALIST PROGRESS NOTES     ADMITTING DATE AND TIME: 5/16/2025 10:04 AM  had concerns including Fall (Pt states sciatic like back pain and became dizzy and fell- recently diagnosed with UTI).    ADMIT DX: Hydronephrosis [N13.30]  Hyperkalemia [E87.5]  Pelvic mass [R19.00]  JEANNINE (acute kidney injury) [N17.9]      SUBJECTIVE:  Patient is being followed for Hydronephrosis [N13.30]  Hyperkalemia [E87.5]  Pelvic mass [R19.00]  JEANNINE (acute kidney injury) [N17.9]     Patient was seen examined and evaluated  Recent lab results, charts and pertinent diagnostic imaging reviewed   Ancillary service notes reviewed   NAD    Care reviewed with nursing staff, medical and surgical specialty care, primary care and the patient's family as available.   ROS: denies fever, chills, cp, sob, n/v, HA unless stated above.     sodium chloride flush  5-40 mL IntraVENous 2 times per day    amLODIPine  10 mg Oral QAM    aspirin  81 mg Oral QAM    atorvastatin  40 mg Oral Nightly    calcitRIOL  0.25 mcg Oral Daily    carvedilol  6.25 mg Oral BID    escitalopram  5 mg Oral Daily    LORazepam  0.5 mg Oral Daily    Vitamin D  1,000 Units Oral Daily    insulin lispro  0-8 Units SubCUTAneous 4x Daily AC & HS    sodium bicarbonate  650 mg Oral BID    melatonin  10 mg Oral Nightly    pantoprazole (PROTONIX) 40 mg in sodium chloride (PF) 0.9 % 10 mL injection  40 mg IntraVENous Q12H     glucose, 4 tablet, PRN  dextrose bolus, 125 mL, PRN   Or  dextrose bolus, 250 mL, PRN  glucagon (rDNA), 1 mg, PRN  dextrose, , Continuous PRN  sodium chloride flush, 5-40 mL, PRN  sodium chloride, , PRN  ondansetron, 4 mg, Q8H PRN   Or  ondansetron, 4 mg, Q6H PRN  polyethylene glycol, 17 g, Daily PRN  glucose, 4 tablet, PRN  dextrose bolus, 125 mL, PRN   Or  dextrose bolus, 250 mL, PRN  glucagon (rDNA), 1 mg, PRN  dextrose, , Continuous PRN     
                                                                                                                                HOSPITALIST PROGRESS NOTES     ADMITTING DATE AND TIME: 5/16/2025 10:04 AM  had concerns including Fall (Pt states sciatic like back pain and became dizzy and fell- recently diagnosed with UTI).    ADMIT DX: Hydronephrosis [N13.30]  Hyperkalemia [E87.5]  Pelvic mass [R19.00]  JEANNINE (acute kidney injury) [N17.9]      SYNOPSIS: 74-year-old female with a past medical history of colon and uterine cancer, CVA, HLD, type II DM, HTN, frequent complicated UTIs, and vitamin D deficiency who presented to the ED due to a fall.  Patient stated she recently had sciatica-like back pain suddenly became dizzy, nauseous, and fell. She also reported malaise as well as pain in her right lower extremity with ambulation. Visiting nurses saw her approximately 2 days ago and obtained a urine specimen to test for possible UTI. Patient also complained of decreased appetite, abdominal distention, and black-red/dark stools in her colostomy bag. She has ureteral stents and chronic knutson.  Diagnostic workup in the ED: vital signs-T97.4, HR 60, /79, SpO2 100% on room air.  Sodium 129, potassium 6.1, CO2 18, BUN/creatinine 61/5.1, glucose 104, troponin 62, alk phos 1, AST 45, Hgb 8.8. CT head negative. CT abdomen shows bilateral hydroureteronephrosis, bulky posterior right lower quadrant mass, left lower quadrant ,small bowel containing parastomal hernia, pelvic ascites. General surgery and urology was consulted and she was admitted to the hospital.    5/21 patient remained stable, creatinine stable at 5, on IV fluid, IV albumin, last dose of antibiotic after stent exchange on 5/19.  ID, nephrology, urology following.    5/22 patient sodium is dropping down today to 121, patient feeling nauseated and vomited couple times.  Started on 3% saline for 4 hours, NaCl tablet.  Nephrology following    5/23 sodium level still 
                                                                                                                                HOSPITALIST PROGRESS NOTES     ADMITTING DATE AND TIME: 5/16/2025 10:04 AM  had concerns including Fall (Pt states sciatic like back pain and became dizzy and fell- recently diagnosed with UTI).    ADMIT DX: Hydronephrosis [N13.30]  Hyperkalemia [E87.5]  Pelvic mass [R19.00]  JEANNINE (acute kidney injury) [N17.9]      SYNOPSIS: 74-year-old female with a past medical history of colon and uterine cancer, CVA, HLD, type II DM, HTN, frequent complicated UTIs, and vitamin D deficiency who presented to the ED due to a fall.  Patient stated she recently had sciatica-like back pain suddenly became dizzy, nauseous, and fell. She also reported malaise as well as pain in her right lower extremity with ambulation. Visiting nurses saw her approximately 2 days ago and obtained a urine specimen to test for possible UTI. Patient also complained of decreased appetite, abdominal distention, and black-red/dark stools in her colostomy bag. She has ureteral stents and chronic knutson.  Diagnostic workup in the ED: vital signs-T97.4, HR 60, /79, SpO2 100% on room air.  Sodium 129, potassium 6.1, CO2 18, BUN/creatinine 61/5.1, glucose 104, troponin 62, alk phos 1, AST 45, Hgb 8.8. CT head negative. CT abdomen shows bilateral hydroureteronephrosis, bulky posterior right lower quadrant mass, left lower quadrant ,small bowel containing parastomal hernia, pelvic ascites. General surgery and urology was consulted and she was admitted to the hospital.    5/21 patient remained stable, creatinine stable at 5, on IV fluid, IV albumin, last dose of antibiotic after stent exchange on 5/19.  ID, nephrology, urology following.    5/22 patient sodium is dropping down today to 121, patient feeling nauseated and vomited couple times.  Started on 3% saline for 4 hours, NaCl tablet.  Nephrology following    5/24 still having 
       [facility] Hospitalist Progress Note    Admitting Date and Time: 5/16/2025 10:04 AM  Admit Dx: Hydronephrosis [N13.30]  Hyperkalemia [E87.5]  Pelvic mass [R19.00]  JEANNINE (acute kidney injury) [N17.9]    74-year-old female with a past medical history of colon and uterine cancer, CVA, HLD, type II DM, HTN, frequent complicated UTIs, and vitamin D deficiency who presented to the ED due to a fall.  Patient stated she recently had sciatica-like back pain suddenly became dizzy, nauseous, and fell. She also reported malaise as well as pain in her right lower extremity with ambulation. Visiting nurses saw her approximately 2 days ago and obtained a urine specimen to test for possible UTI. Patient also complained of decreased appetite, abdominal distention, and black-red/dark stools in her colostomy bag. She has ureteral stents and chronic knutson.  Diagnostic workup in the ED: vital signs-T97.4, HR 60, /79, SpO2 100% on room air.  Sodium 129, potassium 6.1, CO2 18, BUN/creatinine 61/5.1, glucose 104, troponin 62, alk phos 1, AST 45, Hgb 8.8. CT head negative. CT abdomen shows bilateral hydroureteronephrosis, bulky posterior right lower quadrant mass, left lower quadrant ,small bowel containing parastomal hernia, pelvic ascites. General surgery and urology was consulted and she was admitted to the hospital.     5/21 patient remained stable, creatinine stable at 5, on IV fluid, IV albumin, last dose of antibiotic after stent exchange on 5/19.  ID, nephrology, urology following.     5/22 patient sodium is dropping down today to 121, patient feeling nauseated and vomited couple times.  Started on 3% saline for 4 hours, NaCl tablet.  Nephrology following     5/24 still having nausea and vomiting.  Sodium 124     5/25 patient feels better today no more nausea or vomiting.  Sodium dropped down to 121.  Nephrology planning to reconsult urology for concerns of ureteral stent failure to reassess.  She likely may require 
       [facility] Hospitalist Progress Note    Admitting Date and Time: 5/16/2025 10:04 AM  Admit Dx: Hydronephrosis [N13.30]  Hyperkalemia [E87.5]  Pelvic mass [R19.00]  JEANNINE (acute kidney injury) [N17.9]    74-year-old female with a past medical history of colon and uterine cancer, CVA, HLD, type II DM, HTN, frequent complicated UTIs, and vitamin D deficiency who presented to the ED due to a fall.  Patient stated she recently had sciatica-like back pain suddenly became dizzy, nauseous, and fell. She also reported malaise as well as pain in her right lower extremity with ambulation. Visiting nurses saw her approximately 2 days ago and obtained a urine specimen to test for possible UTI. Patient also complained of decreased appetite, abdominal distention, and black-red/dark stools in her colostomy bag. She has ureteral stents and chronic knutson.  Diagnostic workup in the ED: vital signs-T97.4, HR 60, /79, SpO2 100% on room air.  Sodium 129, potassium 6.1, CO2 18, BUN/creatinine 61/5.1, glucose 104, troponin 62, alk phos 1, AST 45, Hgb 8.8. CT head negative. CT abdomen shows bilateral hydroureteronephrosis, bulky posterior right lower quadrant mass, left lower quadrant ,small bowel containing parastomal hernia, pelvic ascites. General surgery and urology was consulted and she was admitted to the hospital.     5/21 patient remained stable, creatinine stable at 5, on IV fluid, IV albumin, last dose of antibiotic after stent exchange on 5/19.  ID, nephrology, urology following.     5/22 patient sodium is dropping down today to 121, patient feeling nauseated and vomited couple times.  Started on 3% saline for 4 hours, NaCl tablet.  Nephrology following     5/24 still having nausea and vomiting.  Sodium 124     5/25 patient feels better today no more nausea or vomiting.  Sodium dropped down to 121.  Nephrology planning to reconsult urology for concerns of ureteral stent failure to reassess.  She likely may require 
       [facility] Hospitalist Progress Note    Admitting Date and Time: 5/16/2025 10:04 AM  Admit Dx: Hydronephrosis [N13.30]  Hyperkalemia [E87.5]  Pelvic mass [R19.00]  JEANNINE (acute kidney injury) [N17.9]    74-year-old female with a past medical history of colon and uterine cancer, CVA, HLD, type II DM, HTN, frequent complicated UTIs, and vitamin D deficiency who presented to the ED due to a fall.  Patient stated she recently had sciatica-like back pain suddenly became dizzy, nauseous, and fell. She also reported malaise as well as pain in her right lower extremity with ambulation. Visiting nurses saw her approximately 2 days ago and obtained a urine specimen to test for possible UTI. Patient also complained of decreased appetite, abdominal distention, and black-red/dark stools in her colostomy bag. She has ureteral stents and chronic knutson.  Diagnostic workup in the ED: vital signs-T97.4, HR 60, /79, SpO2 100% on room air.  Sodium 129, potassium 6.1, CO2 18, BUN/creatinine 61/5.1, glucose 104, troponin 62, alk phos 1, AST 45, Hgb 8.8. CT head negative. CT abdomen shows bilateral hydroureteronephrosis, bulky posterior right lower quadrant mass, left lower quadrant ,small bowel containing parastomal hernia, pelvic ascites. General surgery and urology was consulted and she was admitted to the hospital.     5/21 patient remained stable, creatinine stable at 5, on IV fluid, IV albumin, last dose of antibiotic after stent exchange on 5/19.  ID, nephrology, urology following.     5/22 patient sodium is dropping down today to 121, patient feeling nauseated and vomited couple times.  Started on 3% saline for 4 hours, NaCl tablet.  Nephrology following     5/24 still having nausea and vomiting.  Sodium 124     5/25 patient feels better today no more nausea or vomiting.  Sodium dropped down to 121.  Nephrology planning to reconsult urology for concerns of ureteral stent failure to reassess.  She likely may require 
       [facility] Hospitalist Progress Note    Admitting Date and Time: 5/16/2025 10:04 AM  Admit Dx: Hydronephrosis [N13.30]  Hyperkalemia [E87.5]  Pelvic mass [R19.00]  JEANNINE (acute kidney injury) [N17.9]    74-year-old female with a past medical history of colon and uterine cancer, CVA, HLD, type II DM, HTN, frequent complicated UTIs, and vitamin D deficiency who presented to the ED due to a fall.  Patient stated she recently had sciatica-like back pain suddenly became dizzy, nauseous, and fell. She also reported malaise as well as pain in her right lower extremity with ambulation. Visiting nurses saw her approximately 2 days ago and obtained a urine specimen to test for possible UTI. Patient also complained of decreased appetite, abdominal distention, and black-red/dark stools in her colostomy bag. She has ureteral stents and chronic knutson.  Diagnostic workup in the ED: vital signs-T97.4, HR 60, /79, SpO2 100% on room air.  Sodium 129, potassium 6.1, CO2 18, BUN/creatinine 61/5.1, glucose 104, troponin 62, alk phos 1, AST 45, Hgb 8.8. CT head negative. CT abdomen shows bilateral hydroureteronephrosis, bulky posterior right lower quadrant mass, left lower quadrant ,small bowel containing parastomal hernia, pelvic ascites. General surgery and urology was consulted and she was admitted to the hospital.     5/21 patient remained stable, creatinine stable at 5, on IV fluid, IV albumin, last dose of antibiotic after stent exchange on 5/19.  ID, nephrology, urology following.     5/22 patient sodium is dropping down today to 121, patient feeling nauseated and vomited couple times.  Started on 3% saline for 4 hours, NaCl tablet.  Nephrology following     5/24 still having nausea and vomiting.  Sodium 124     5/25 patient feels better today no more nausea or vomiting.  Sodium dropped down to 121.  Nephrology planning to reconsult urology for concerns of ureteral stent failure to reassess.  She likely may require 
    5/28/2025 2:16 PM  Isamar Lopez  [id number]    Subjective:    Awake and alert  Her sister is present  Patient denies pain   Right PNT with thi urine   Knutson with clear urine     Review of Systems  Constitutional: No fever or chills   Respiratory: negative for cough and hemoptysis  Cardiovascular: negative for chest pain and dyspnea  Gastrointestinal: negative for abdominal pain, diarrhea, nausea and vomiting   : See above  Derm: negative for rash and skin lesion(s)  Neurological: negative for seizures and tremors  Musculoskeletal: Negative    Psychiatric: Negative   All other reviews are negative      Scheduled Meds:   sodium bicarbonate  1,300 mg Oral TID    lidocaine  1 patch TransDERmal Daily    lidocaine  1 patch TransDERmal Daily    sodium chloride  1 g Oral TID WC    pantoprazole  40 mg Oral BID AC    vitamin D  50,000 Units Oral Weekly    folic acid  1 mg Oral Daily    lactulose  30 g Oral TID    polyethylene glycol  17 g Oral Daily    senna  1 tablet Oral Nightly    sodium chloride flush  5-40 mL IntraVENous 2 times per day    amLODIPine  10 mg Oral QAM    aspirin  81 mg Oral QAM    atorvastatin  40 mg Oral Nightly    calcitRIOL  0.25 mcg Oral Daily    carvedilol  6.25 mg Oral BID    escitalopram  5 mg Oral Daily    LORazepam  0.5 mg Oral Daily    Vitamin D  1,000 Units Oral Daily    insulin lispro  0-8 Units SubCUTAneous 4x Daily AC & HS    melatonin  10 mg Oral Nightly       Objective:  Vitals:    05/28/25 0753   BP: 119/65   Pulse: 98   Resp: 18   Temp: 97.4 °F (36.3 °C)   SpO2: 93%         Allergies: Betadine [povidone iodine], Lisinopril, Penicillins, and Tylenol [acetaminophen]    General Appearance: awake and alert, no distress   Skin: no rash or erythema  Head: normocephalic and atraumatic  Pulmonary/Chest: normal air movement, no respiratory distress  Abdomen: soft, non-tender, non-distended  Genitourinary: knutson with clear urine, right PNT with thi urine   Extremities: no cyanosis, 
  [facility]  [facility]  Progress Note    SUBJECTIVE:  Chief Complaint   Patient presents with    Fall     Pt states sciatic like back pain and became dizzy and fell- recently diagnosed with UTI       Patient resting in bed. Denies fever or chills. Had nausea and vomiting today, No rash or diarrhea. Has been moving bowels. + lower abd pain.     Review of systems:  As stated above in the chief complaint, otherwise negative.    Medications:  Scheduled Meds:   calcium gluconate  2,000 mg IntraVENous Once    pantoprazole  40 mg Oral BID AC    vitamin D  50,000 Units Oral Weekly    folic acid  1 mg Oral Daily    lactulose  30 g Oral TID    polyethylene glycol  17 g Oral Daily    senna  1 tablet Oral Nightly    sodium chloride flush  5-40 mL IntraVENous 2 times per day    amLODIPine  10 mg Oral QAM    aspirin  81 mg Oral QAM    atorvastatin  40 mg Oral Nightly    calcitRIOL  0.25 mcg Oral Daily    carvedilol  6.25 mg Oral BID    escitalopram  5 mg Oral Daily    LORazepam  0.5 mg Oral Daily    Vitamin D  1,000 Units Oral Daily    insulin lispro  0-8 Units SubCUTAneous 4x Daily AC & HS    melatonin  10 mg Oral Nightly     Continuous Infusions:   dextrose      sodium chloride      dextrose       PRN Meds:glucose, dextrose bolus **OR** dextrose bolus, glucagon (rDNA), dextrose, sodium chloride flush, sodium chloride, ondansetron **OR** ondansetron, polyethylene glycol, glucose, dextrose bolus **OR** dextrose bolus, glucagon (rDNA), dextrose    OBJECTIVE:  /75   Pulse 90   Temp 97.4 °F (36.3 °C) (Temporal)   Resp 18   Ht 1.702 m (5' 7\")   Wt 101.9 kg (224 lb 10.4 oz)   SpO2 96%   BMI 35.18 kg/m²   Temp  Av.6 °F (36.4 °C)  Min: 97.2 °F (36.2 °C)  Max: 98.1 °F (36.7 °C)  Constitutional: No acute distress  Skin: Warm and dry. No rashes were noted.   Neuro: Alert and oriented  HEENT: Round and reactive pupils.  Moist mucous membranes.  No ulcerations or thrush.  Chest: .Respirations 
  [facility]  [facility]  Progress Note    SUBJECTIVE:  Chief Complaint   Patient presents with    Fall     Pt states sciatic like back pain and became dizzy and fell- recently diagnosed with UTI       Patient resting in bed. Denies fever or chills. No nausea, vomiting, rash or diarrhea. Waiting to go for procedure. No pain      Review of systems:  As stated above in the chief complaint, otherwise negative.    Medications:  Scheduled Meds:   lactulose  30 g Oral TID    meropenem  1,000 mg IntraVENous Q12H    polyethylene glycol  17 g Oral Daily    senna  1 tablet Oral Nightly    sodium chloride flush  5-40 mL IntraVENous 2 times per day    amLODIPine  10 mg Oral QAM    aspirin  81 mg Oral QAM    atorvastatin  40 mg Oral Nightly    calcitRIOL  0.25 mcg Oral Daily    carvedilol  6.25 mg Oral BID    escitalopram  5 mg Oral Daily    LORazepam  0.5 mg Oral Daily    Vitamin D  1,000 Units Oral Daily    insulin lispro  0-8 Units SubCUTAneous 4x Daily AC & HS    sodium bicarbonate  650 mg Oral BID    melatonin  10 mg Oral Nightly    pantoprazole (PROTONIX) 40 mg in sodium chloride (PF) 0.9 % 10 mL injection  40 mg IntraVENous Q12H     Continuous Infusions:   sodium bicarbonate 150 mEq in dextrose 5 % 1,000 mL infusion 100 mL/hr at 25 0451    dextrose      sodium chloride      dextrose       PRN Meds:fentanNYL, midazolam, diphenhydrAMINE, lidocaine, glucose, dextrose bolus **OR** dextrose bolus, glucagon (rDNA), dextrose, sodium chloride flush, sodium chloride, ondansetron **OR** ondansetron, polyethylene glycol, glucose, dextrose bolus **OR** dextrose bolus, glucagon (rDNA), dextrose    OBJECTIVE:  /81   Pulse 85   Temp 97.7 °F (36.5 °C) (Oral)   Resp 14   Ht 1.702 m (5' 7\")   Wt 96.2 kg (212 lb)   SpO2 100%   BMI 33.20 kg/m²   Temp  Av.2 °F (36.8 °C)  Min: 97.7 °F (36.5 °C)  Max: 98.6 °F (37 °C)  Constitutional: No acute distress  Skin: Warm and dry. No rashes were 
  Physician Progress Note      PATIENT:               BRIANA SMITH  CSN #:                  [id number]  :                       1950  ADMIT DATE:       2025 10:04 AM  DISCH DATE:  RESPONDING  PROVIDER #:        January Duggan MD          QUERY TEXT:    UTI is documented in the medical record in [facility]  ,  .  Please clarify   the relationship, if any, with the UTI and cause.    The clinical indicators include:  Urology - UTI with neurogenic bladder and chronic knutson. Bilateral   hydronephrosis with bilateral ureteral stents. AKIC, CKD  cystoscopy with   bilateral ureteral stent exchange  GS - She has had indwelling knutson four about wo years and ureteral stents.  H & P Recent specimen sent on 2025 positive for Klebsiella pneumonia   ESBL.  Chronic Knutson catheter   urinary stent exchange. Meropenem  per ID.  Options provided:  -- UTI related to chronic indwelling urinary catheter  -- UTI not related to indwelling urinary catheter  -- UTI related to ureteral stent  -- UTI not related to ureteral stent  -- UTI due to, Please specify  -- Other - I will add my own diagnosis  -- Disagree - Not applicable / Not valid  -- Disagree - Clinically unable to determine / Unknown  -- Refer to Clinical Documentation Reviewer    PROVIDER RESPONSE TEXT:    UTI is related to the chronic indwelling urinary catheter.    Query created by: Kristie Ríos on 2025 2:13 PM      Electronically signed by:  January Duggan MD 2025 6:52 PM          
  SPEECH/LANGUAGE PATHOLOGY  CLINICAL ASSESSMENT OF SWALLOWING FUNCTION   and PLAN OF CARE      PATIENT NAME:  Isamar Lopez  (female)     MRN:  [id number]    :  1950  (74 y.o.)  STATUS:  Inpatient: Room 8410/8410-A    TODAY'S DATE:  2025  ORDER DATE, DESCRIPTION AND REFERRING PROVIDER:    SLP eval and treat  Start:  25 1330,   End:  25 1330,   ONE TIME,   Standing Count:  1 Occurrences,   R       Rodrigo Winston MD  REASON FOR REFERRAL: Speech Difficulty   EVALUATING THERAPIST: JOSE Morales                 RESULTS:    DYSPHAGIA DIAGNOSIS:   Clinical indicators of mild-moderate oropharyngeal phase dysphagia     Per chart review, patient has a previous medical hx of dysphagia. She also reports globus sensation at bedside. Patient is recommended to complete a MBSS to fully assess her oral and pharyngeal swallow function.       DIET RECOMMENDATIONS:  NPO until MBSS can be completed        FEEDING RECOMMENDATIONS:     Assistance level:  Not applicable      Compensatory strategies recommended: Fully alert for all PO, Thorough oral care to prevent colonization of oral bacteria, Upright in bed/ chair as tolerated, Effortful swallow      Discussed recommendations with:  patient nurse in person    SPEECH THERAPY  PLAN OF CARE   The dysphagia POC is established based on physician order, dysphagia diagnosis and results of clinical assessment     Will make further recommendations/changes to POC once MBSS is completed.    Conditions Requiring Skilled Therapeutic Intervention for dysphagia:    Patient is performing below functional baseline d/t  current acute condition, respiratory compromise, multiple medications, and/or increased dependency upon caregivers.  impaired mastication   Sensation of food sticking in throat   History of dysphagia/altered consistency      Specific dysphagia interventions to include:     MBSS to fully assess oropharyngeal swallow function and to assist in determining the 
  [facility]  [facility]  Progress Note    SUBJECTIVE:  Chief Complaint   Patient presents with    Fall     Pt states sciatic like back pain and became dizzy and fell- recently diagnosed with UTI       Patient resting in bed. Denies fever or chills. No nausea, vomiting, rash or diarrhea      Review of systems:  As stated above in the chief complaint, otherwise negative.    Medications:  Scheduled Meds:   meropenem  1,000 mg IntraVENous Q12H    polyethylene glycol  17 g Oral Daily    senna  1 tablet Oral Nightly    sodium chloride flush  5-40 mL IntraVENous 2 times per day    amLODIPine  10 mg Oral QAM    aspirin  81 mg Oral QAM    atorvastatin  40 mg Oral Nightly    calcitRIOL  0.25 mcg Oral Daily    carvedilol  6.25 mg Oral BID    escitalopram  5 mg Oral Daily    LORazepam  0.5 mg Oral Daily    Vitamin D  1,000 Units Oral Daily    insulin lispro  0-8 Units SubCUTAneous 4x Daily AC & HS    sodium bicarbonate  650 mg Oral BID    melatonin  10 mg Oral Nightly    pantoprazole (PROTONIX) 40 mg in sodium chloride (PF) 0.9 % 10 mL injection  40 mg IntraVENous Q12H     Continuous Infusions:   sodium bicarbonate 150 mEq in dextrose 5 % 1,000 mL infusion 100 mL/hr at 25 0603    dextrose      sodium chloride      dextrose       PRN Meds:glucose, dextrose bolus **OR** dextrose bolus, glucagon (rDNA), dextrose, sodium chloride flush, sodium chloride, ondansetron **OR** ondansetron, polyethylene glycol, glucose, dextrose bolus **OR** dextrose bolus, glucagon (rDNA), dextrose    OBJECTIVE:  /68   Pulse 90   Temp 97 °F (36.1 °C) (Temporal)   Resp 18   Ht 1.702 m (5' 7\")   Wt 96.4 kg (212 lb 8.4 oz)   SpO2 97%   BMI 33.29 kg/m²   Temp  Av.7 °F (35.9 °C)  Min: 96.4 °F (35.8 °C)  Max: 97 °F (36.1 °C)  Constitutional: No acute distress  Skin: Warm and dry. No rashes were noted.   Neuro: Alert and oriented  HEENT: Round and reactive pupils.  Moist mucous membranes.  No ulcerations 
- Notified by nursing staff that patient was having significant bleeding from stoma  - Patient was reportedly found with blood soaking her gown  - Nursing staff has held pressure for over 30 minutes with cessation of bleeding  - Bleeding was found to have stopped upon my examination  - Patient currently has no acute complaints, denies abdominal pain    BP (!) 98/56   Pulse 96   Temp 97.4 °F (36.3 °C) (Temporal)   Resp 18   Ht 1.702 m (5' 7.01\")   Wt 99.3 kg (218 lb 14.7 oz)   SpO2 98%   BMI 34.28 kg/m²       GENERAL: No acute distress.   ABDOMEN: Soft, no tenderness , non distended.  Prolapsed stoma without signs of bleeding.  Minimal tenderness at skin edge circumferentially.  Parastomal hernia present.      PLAN:  - Bleeding was successfully controlled by nursing staff with tamponade  - Unclear etiology  - Continue to monitor for further bleeding, please page if bleeding occurs again      Electronically signed by Marvin Boles DO on 3/30/2025 at 6:59 PM   
4 Eyes Skin Assessment     NAME:  Isamar Lopez  YOB: 1950  MEDICAL RECORD NUMBER:  [id number]    The patient is being assessed for  Admission    I agree that at least one RN has performed a thorough Head to Toe Skin Assessment on the patient. ALL assessment sites listed below have been assessed.      Areas assessed by both nurses:    Head, Face, Ears, Shoulders, Back, Chest, Arms, Elbows, Hands, Sacrum. Buttock, Coccyx, Ischium, and Legs. Feet and Heels    Multiple dry lesions on both upper arm and legs.  Hyperpigmentation on buttocks  Colostomy LUQ          Does the Patient have a Wound? No noted wound(s)       David Prevention initiated by RN: Yes  Wound Care Orders initiated by RN: No    Pressure Injury (Stage 3,4, Unstageable, DTI, NWPT, and Complex wounds) if present, place Wound referral order by RN under : No    New Ostomies, if present place, Ostomy referral order under : No     Nurse 1 eSignature: Electronically signed by Tg Jones RN on 5/17/25 at 3:52 AM EDT    **SHARE this note so that the co-signing nurse can place an eSignature**    Nurse 2 eSignature: Electronically signed by Cherri Romo RN on 5/17/25 at 4:00 AM EDT  
Accessed chart for SN clinical assignment  Electronically signed by Ondina Otto RN on 6/2/2025 at 6:40 AM    
Bladder scanned as instructed, noted with 201 ml of urine.  
Chart accessed for SN clinical assignment    Electronically signed by Ondina Otto RN on 6/9/2025 at 6:30 AM    
Chart reviewed.  Creatinine improving down to 6.2, sodium 134, elevated nephrologist recommendation prior to discharge.  Urology and neurology has signed off.  CODE STATUS has been established DNR CCA.  Case discussed with case management, patient to return to [facility] at discharge, medically stable.  Goals of care and CODE STATUS has been established.  No further PM needs were identified, going to signed off for now.  Please reconsult if new PM needs arises.  
Chg wipes  
Colostomy changed at this time due to leaking. Tolerated well. Stoma large and beefy red.   
Comprehensive Nutrition Assessment    Type and Reason for Visit:  LOS, Initial    Nutrition Assessment:    Pt assessed per LOS protocol. Chart reviewed. Pt. tolerating diet eating ~ % at most meals this admit and appears stable from a nutritional standpoint. No nutrition intervention indicated at this time. Will follow per policy. Consult RD if needed.    Maco Burnham RD  Contact: ext [phone]     
DVT Prophylaxis Adjustment Policy (DVT Prophylaxis)     This patient is on DVT Prophylaxis medication that requires a dose adjustment      Date Body Weight IBW  Adjusted BW SCr  CrCl Dialysis status   5/16/2025 85.3 kg Ideal body weight: 61.6 kg (135 lb 12.9 oz)  Adjusted ideal body weight: 71.1 kg (156 lb 10.9 oz) Serum creatinine: 5.1 mg/dL (H) 05/16/25 1105  Estimated creatinine clearance: 11 mL/min (A) N/a       Pharmacy has dose-adjusted the DVT Prophylaxis regimen to match   the recommendations from the following table        Ordered Medication:Lovenox 40mg daily    Order Changed/converted to: Heparin 5000 Units TID      These changes were made per protocol according to the [facility] Pharmacist   Review for Appropriate Use and Automatic Dose Adjustments of   Subcutaneous Anticoagulants Policy     *Please note this dose may need readjusted if patient's condition changes.    Please contact pharmacy with any questions regarding these changes.    Genia Sol, PharmD, BCIDP, BCPS 5/16/2025 1:08 PM  [phone]  
Discussed patient and IR procedure with bedside RN, all questions answered. Need PT/INR. Will call when able to send for patient.  
Discussed patient and IR procedure with charge RN, all questions answered. Will call if able to send for patient.  
Dr. Richardson notified of general surgery consult. Pt added to census.  
Dr. Thomas at bedside to explain procedure and obtain consent.  
ET note: Following patient for colostomy management. Patient out of room for dialysis. Nurse reports pouch changed yesterday. Supplies left in room. Mirela Guidry RN    
Fall lightning round complete  
Fall/Skin Lightning round complete  
Fall/Skin lightning round complete  
GENERAL SURGERY  DAILY PROGRESS NOTE    Patient's Name/Date of Birth: Isamar Lopez / 1950    Date: May 17, 2025     Chief Complaint   Patient presents with    Fall     Pt states sciatic like back pain and became dizzy and fell- recently diagnosed with UTI        Subjective:  Reports having abdominal pain overnight which is currently resolved.  Denies any nausea or emesis.  She had some food later last night.      Objective:  Last 24Hrs  Temp  Av.8 °F (36 °C)  Min: 94.5 °F (34.7 °C)  Max: 97.5 °F (36.4 °C)  Resp  Av  Min: 11  Max: 20  Pulse  Av.8  Min: 66  Max: 80  Systolic (24hrs), Av , Min:113 , Max:143     Diastolic (24hrs), Av, Min:64, Max:81    SpO2  Av.2 %  Min: 98 %  Max: 100 %    I/O last 3 completed shifts:  In: -   Out: 550 [Urine:550]      General: In no acute distress, alert and oriented x4  Cardiovascular: Warm throughout, no edema  Respiratory: no respiratory distress, equal chest rise  Abdomen: soft, nondistended, mild diffuse TTP, firmness around the ostomy, stoma prolapsed with some stool at the orifice, ostomy remains pink and healthy looking, no rebound or guarding  Skin: no obvious rashes or lesions appreciated  Extremities: atraumatic, no focal motor deficits      CBC  Recent Labs     25  1105 25  0521   WBC 8.8 8.5   RBC 3.41* 3.33*   HGB 8.8* 8.7*   HCT 28.3* 27.6*   MCV 83.0 82.9   MCH 25.8* 26.1   MCHC 31.1* 31.5*   RDW 15.0 15.1*    396   MPV 10.1 9.9       CMP  Recent Labs     25  1105 25  1801 25  0521   * 131* 129*   K 6.1* 5.2* 5.8*   CL 98 100 100   CO2 18* 16* 17*   BUN 61* 60* 61*   CREATININE 5.1* 5.1* 5.2*   GLUCOSE 104* 44* 157*   CALCIUM 8.0* 8.7* 8.1*   BILITOT 0.5  --  0.5   ALKPHOS 110*  --  80   AST 45*  --  43*   ALT 17  --  18         Assessment/Plan:      74 y.o. female with a new pelvic mass, GI bleeding and prolapsed ostomy.    Plan:  - CEA 1.9 WNL  -  149 elevated  - IR consulted for 
General Surgery consult sent through [employer].  
ID consult sent through answering service.  
Irrigated knutson for 2 small blood clots.   Patient tolerated procedure well.    
[facility]  PROGRESS NOTE  ATTENDING NOTE    Chief Complaint   Patient presents with    Fall     Pt states sciatic like back pain and became dizzy and fell- recently diagnosed with UTI     S:  75y/o M with back pain and a pelvic mass.  Tolerating diet  Poor historian    /73   Pulse 81   Temp 98.4 °F (36.9 °C) (Oral)   Resp 18   Ht 1.702 m (5' 7\")   Wt 85.3 kg (188 lb)   SpO2 100%   BMI 29.44 kg/m²   Gen: NAD  Abd;  soft, distended, mild diffuse TTP  Ostomy:  air in bag, small amount of blood in bag, less edematous than yesterday, but still prolapsed    ASSESSMENT/PLAN:  Pelvic mass in the setting of prior anal cancer or rectal cancer s/p APR in 1997  --IR biopsy Monday  --no plans for revision of stoma as this time, will continue bowel reg    Valencia Mcfarland MD, MSc, FACS  5/17/2025  9:03 PM    
[facility]  PROGRESS NOTE  ATTENDING NOTE    Chief Complaint   Patient presents with    Fall     Pt states sciatic like back pain and became dizzy and fell- recently diagnosed with UTI     S:  75y/o M with back pain and a pelvic mass.  Tolerating diet  Poor historian    /68   Pulse 90   Temp 97 °F (36.1 °C) (Temporal)   Resp 18   Ht 1.702 m (5' 7\")   Wt 96.4 kg (212 lb 8.4 oz)   SpO2 97%   BMI 33.29 kg/m²   Gen: NAD  Abd;  soft, distended, mild diffuse TTP  Ostomy:  blood mixed with mucus in bag; ostomy pink    ASSESSMENT/PLAN:  Pelvic mass in the setting of prior anal cancer or rectal cancer s/p APR in 1997  --IR biopsy Monday  --no plans for revision of stoma as this time, will continue bowel reg    Valencia Mcfarland MD, MSc, FACS  5/18/2025  9:06 AM    
Nephro notified of critical lab results via [employer].  
New consult to Dr Nader Bethea     worsening renal function recently seen. possible nephrostomy tube placement     
Nurse handoff called to 84.    Dressing is clean, dry, and intact.  
OCCUPATIONAL THERAPY TREATMENT NOTE    [facility]  OT BEDSIDE TREATMENT NOTE      Date:2025  Patient Name: Isamar Lopez  MRN: [id number]  : 1950  Room: [address] [place] [address]-A     Per OT Eval:     Evaluating OT: Jackie Pierson, RED, OTR/L; [id number]     Occupational therapy physician order:                         Start     Ordering Provider     25 1345   OT eval and treat  Start:  25 1345,   End:  25 1345,   ONE TIME,   Standing Count:  1 Occurrences,   R         Digna Hernandez, APRN - CNP                                           Pt presents to ED with fall and back pain     Diagnosis:   1. JEANNINE (acute kidney injury)    2. Pelvic mass    3. Hyperkalemia       Problem List         Patient Active Problem List   Diagnosis    Hypertension    Elevated lipids    Proteinuria    Vitamin D deficiency    Closed fracture of radius    History of anal cancer    Colostomy present ([facility])    Type 2 diabetes mellitus with obesity ([facility])    Stuttering    CKD (chronic kidney disease) stage 4, GFR 15-29 ml/min ([facility])    Age-related osteoporosis with current pathological fracture with routine healing    Herpes zoster vaccination declined    Hydroureteronephrosis    Acute kidney injury superimposed on CKD    Chronic indwelling López catheter    Bilateral hydronephrosis    Cerebellar infarct ([facility])    Cerebrovascular accident (CVA) ([facility])    Uncontrolled type 2 diabetes mellitus with hyperglycemia ([facility])    Catatonia    Type 2 diabetes mellitus with chronic kidney disease    Major depressive disorder, recurrent, mild    Major depressive disorder, recurrent, moderate    Major depressive disorder, recurrent, unspecified    Obstructive uropathy    Hydronephrosis    Pelvic mass    Electrolyte abnormality    Infection due to ESBL-producing Klebsiella pneumoniae    Gastrointestinal hemorrhage    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 
OT SESSION ATTEMPT     Date:2025  Patient Name: Isamar Lopez  MRN: [id number]  : 1950  Room: [address] [place] [address]-A     Occupational therapy orders received/chart review completed and OT session attempted this date:   Pt off the floor at time of attempt       Will reattempt OT at a later time/date.  Thank you,   Otoniel Lubin OTR/L [id number]    
Occupational Therapy  OCCUPATIONAL THERAPY TREATMENT SESSION    [facility]  [address] [place], [place]      OT BEDSIDE TREATMENT NOTE      Date:2025  Patient Name: Isamar Lopez  MRN: [id number]  : 1950  Room: [address] [place] [address]     Per OT Eval:      Evaluating OT: Jackie Pierson, RED, OTR/L; [id number]     Occupational therapy physician order:          Start     Ordering Provider     25 1345   OT eval and treat  Start:  25 1345,   End:  25 1345,   ONE TIME,   Standing Count:  1 Occurrences,   R         Digna Hernandez APRN - CUAUHTEMOC                                           Pt presents to ED with fall and back pain     Diagnosis:   1. JEANNINE (acute kidney injury)    2. Pelvic mass    3. Hyperkalemia       Problem List         Patient Active Problem List   Diagnosis    Hypertension    Elevated lipids    Proteinuria    Vitamin D deficiency    Closed fracture of radius    History of anal cancer    Colostomy present ([facility])    Type 2 diabetes mellitus with obesity ([facility])    Stuttering    CKD (chronic kidney disease) stage 4, GFR 15-29 ml/min ([facility])    Age-related osteoporosis with current pathological fracture with routine healing    Herpes zoster vaccination declined    Hydroureteronephrosis    Acute kidney injury superimposed on CKD    Chronic indwelling López catheter    Bilateral hydronephrosis    Cerebellar infarct ([facility])    Cerebrovascular accident (CVA) ([facility])    Uncontrolled type 2 diabetes mellitus with hyperglycemia ([facility])    Catatonia    Type 2 diabetes mellitus with chronic kidney disease    Major depressive disorder, recurrent, mild    Major depressive disorder, recurrent, moderate    Major depressive disorder, recurrent, unspecified    Obstructive uropathy    Hydronephrosis    Pelvic mass    Electrolyte abnormality    Infection due to ESBL-producing Klebsiella pneumoniae    Gastrointestinal hemorrhage    Intestinal stoma prolapse 
Occupational Therapy  OT BEDSIDE TREATMENT NOTE   [facility]  [address] [place], [place]        Date:2025  Patient Name: Isamar Lopez  MRN: [id number]  : 1950  Room: [address] [place] [address]-A     Per OT Eval:    Evaluating OT: Jackie Pierson, RED, OTR/L; [id number]     Occupational therapy physician order:  Start     Ordering Provider     25 1345   OT eval and treat  Start:  25 1345,   End:  25 1345,   ONE TIME,   Standing Count:  1 Occurrences,   R         Digna Hernandez APRN - CUAUHTEMOC                                           Pt presents to ED with fall and back pain     Diagnosis:   1. JEANNINE (acute kidney injury)    2. Pelvic mass    3. Hyperkalemia       Problem List       Patient Active Problem List   Diagnosis    Hypertension    Elevated lipids    Proteinuria    Vitamin D deficiency    Closed fracture of radius    History of anal cancer    Colostomy present ([facility])    Type 2 diabetes mellitus with obesity ([facility])    Stuttering    CKD (chronic kidney disease) stage 4, GFR 15-29 ml/min ([facility])    Age-related osteoporosis with current pathological fracture with routine healing    Herpes zoster vaccination declined    Hydroureteronephrosis    Acute kidney injury superimposed on CKD    Chronic indwelling López catheter    Bilateral hydronephrosis    Cerebellar infarct ([facility])    Cerebrovascular accident (CVA) ([facility])    Uncontrolled type 2 diabetes mellitus with hyperglycemia (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)            Pertinent Medical History:   Past 
Oncology consult sent through [employer].  
Oral and axillary temp reading 94.6 F, applied Bear hugger blanket and warm blanket. Unable to get rectal temp d/t history of colon CA and rectum sutured shut.   
Palliative consult called  
Patient complaining of nausea at this time.   Dr. Winston notified of patient complaint.  New order received.    
Patient received a popsicle and the sister came to the desk stating that she feel like a piece of popsicle is stuck. I advised that if there is a piece stuck that it would melt. I assessed the patient. Lungs sound clear and no signs of respiratory distress. Advised the sister not to give anything by mouth.   
Patients family member addressed to me that patient seems more lethargic and that it seems to be harder for the patient to get her words out. I messaged Dr. Winston to see if he wanted me to stroke alert her or him order a CT scan. Dr. Winston stated that patient has been the same from his assessment and put in a neuro consult. Neuro consult called.   
Physical Therapy    Chart review completed for PT evaluation. Patient is currently unavailable for PT services, patient was off unit at time of attempt this AM.  Will follow up as appropriate.     Leslye Kent, PT, DPT  [id number]      
Physical Therapy  Physical Therapy Initial Assessment     Name: Isamar Lopez  : 1950  MRN: [id number]      Date of Service: 2025    Evaluating PT:  Marilou Higgins PT, DPT [id number]    Room #:  8410/8410-A  Diagnosis:  Hydronephrosis [N13.30]  Hyperkalemia [E87.5]  Pelvic mass [R19.00]  JEANNINE (acute kidney injury) [N17.9]  PMHx/PSHx:    Past Medical History:   Diagnosis Date    Altered bowel elimination due to intestinal ostomy (HCC)     Arthritis     osteoporsis    Cancer (HCC)     colon and uterine    Cerebral artery occlusion with cerebral infarction ([facility]CC)     uses walker    Closed fracture of radius 2016    left    Complicated UTI (urinary tract infection) 2023    Elevated lipids 01/15/2014    Headache     History of anal cancer 2017    Dr. Cutler    Hyperlipidemia     Hypertension     Major depressive disorder, recurrent, mild 2023    patient denies    Obesity     Osteoporosis     Poor historian     Proteinuria 2014    Type II or unspecified type diabetes mellitus without mention of complication, not stated as uncontrolled     Vaginal cancer (HCC) 2017    Vitamin D deficiency 2014      Procedure/Surgery:  none this admission  Precautions:  Falls, contact iso, cognition  Equipment Needs:  TBD, pt has WW and cane    SUBJECTIVE:    Pt lives alone in a basement level apartment with level entry and elevator access.  Bed is on 1st floor and bath is on 1st floor.  Pt ambulated with WW in apartment and cane in community PTA.    OBJECTIVE:   Initial Evaluation  Date: 25 Treatment Short Term/ Long Term   Goals   AM-PAC 6 Clicks 15/24     Was pt agreeable to Eval/treatment? yes     Does pt have pain? R hip pain     Bed Mobility  Rolling: Bobo  Supine to sit: Bobo  Sit to supine: Bobo  Scooting: Bobo  Rolling: Independent   Supine to sit: Independent   Sit to supine: Independent   Scooting: Independent    Transfers Sit to stand: Bobo  Stand to sit: Bobo  Stand 
Pt gagging on pills when they are in applesauce. Patient made NPO and speech eval ordered.  
Report called to Tena MALLORY at this time.  Patient to go back to [id number].  
Right femoral temporary hemo dialysis line removed per P&P w/o issues catheter intact pt tolerated procedure w/o issues pressure held x 10 minutes 4x4 placed with tape no bleeding noted both nephrology and floor RN Renetta notified.   
SPEECH LANGUAGE PATHOLOGY  DAILY PROGRESS NOTE      PATIENT NAME:  Isamar Lopez      :  1950          TODAY'S DATE:  2025 ROOM:  8410/8410-A    Current Diet: ADULT DIET; Dysphagia - Minced and Moist; Low Potassium (Less than 3000 mg/day); Low Phosphorus (Less than 1000 mg); No Drinking Straws      Patient was seen for an ongoing dysphagia treatment. She was sitting upright in bed and pleasant. Patient reported tolerating her current and stated no difficulties with last night's dinner or breakfast earlier this morning. During the session, she was administered minced and moist textured food and thin liquid via cup. Therapist was required to assistance patient with eating due to slow processing time and reported fatigue. Moderate oral phase dysphagia symptoms were observed with the minced and moist textured food. She displayed prolonged mastication secondary to reduced dentition as well as an increase in oral transit time. No overt s/s were noted with all consistencies.     Recommendation: Patient is recommended to continue a minced and moist diet with thin liquids via cup. Assistance is required at meals and medication should be crushed in puree. RN was notified of the recommendations.         CPT code(s) 94121  dysphagia tx  Total minutes :  15 minutes    Gene Graves M.S., CCC-SLP/L   Speech-Language Pathologist  [id number]        
SPEECH LANGUAGE PATHOLOGY  DAILY PROGRESS NOTE      PATIENT NAME:  Isamar Lopez      :  1950          TODAY'S DATE:  2025 ROOM:  8410/8410-A    Current Diet: ADULT DIET; Dysphagia - Minced and Moist; Low Potassium (Less than 3000 mg/day); Low Phosphorus (Less than 1000 mg); No Drinking Straws  ADULT ORAL NUTRITION SUPPLEMENT; Breakfast; Renal Oral Supplement    Patient was seen for a follow-up dysphagia treatment session to ensure that she is safely consuming the least restrictive diet. Her sister was present and reported that the patient's diet has been \"fair.\" During the session, she was sitting upright in bed. Patient was observed with minced and moist meat and thin liquids via cup. She demonstrated mild-moderate oral phase dysphagia symptoms as noted by prolonged mastication and an increase in oral transit time with minced and moist food. Patient also displayed slight bolus holding with thin liquid via cup. No overt s/s of aspiration were noted throughout the meal. However, patient appeared to fatigue quickly while eating/drinking and benefited from short breaks to \"catch her breath.\"     Recommendation: Patient is recommended to continue a minced and moist diet with thin liquids via cup. Assistance is required at meals and medication should be crushed in puree. Patient is also recommended to be discharged from speech therapy as she is safely consuming the least restrictive diet. Please re-consult if issues should arise.       CPT code(s) 77064  dysphagia tx  Total minutes :  15 minutes    Gene Graves M.S., CCC-SLP/L   Speech-Language Pathologist  [id number]        
SPEECH LANGUAGE PATHOLOGY  DAILY PROGRESS NOTE      PATIENT NAME:  Isamar Lopez      :  1950          TODAY'S DATE:  6/3/2025 ROOM:  8410/8410-A    Current Diet: ADULT DIET; Dysphagia - Minced and Moist; Low Potassium (Less than 3000 mg/day); Low Phosphorus (Less than 1000 mg); No Drinking Straws  ADULT ORAL NUTRITION SUPPLEMENT; Breakfast; Renal Oral Supplement    Patient was seen for a follow-up dysphagia therapy session on this date. During session, she was sitting upright in bed. Patient reported fair intake at meals. She was administered minced and moist peaches as well as thin liquid via cup. Limited food intake was observed as patient reported that she just finished breakfast. However, mild-moderate oral phase dysphagia symptoms were noted as documented by prolonged mastication, an increase in oral transit time, and oral residue in her oral cavity with a few bites of minced and moist food. Patient benefited from verbal cues to alternate sips of liquid between bites of food to clear her oral cavity. No overt s/s of aspiration were observed with all consistencies administered.     Recommendation: Patient is recommended to continue a minced and moist diet with thin liquids via cup. Assistance is required at meals and medication should be crushed in puree. RN was notified of the recommendations.       CPT code(s) 83137  dysphagia tx  Total minutes :  15 minutes    Gene Graves M.S., CCC-SLP/L   Speech-Language Pathologist  [id number]        
SPEECH/LANGUAGE PATHOLOGY  VIDEOFLUOROSCOPIC STUDY OF SWALLOWING (MBS)   and PLAN OF CARE    PATIENT NAME:  Isamar Lopez  (female)     MRN:  [id number]    :  1950  (74 y.o.)  STATUS:  Inpatient: Room 8410/8410-A    TODAY'S DATE:  2025  ORDER DATE, DESCRIPTION AND REFERRING PROVIDER:  Rodrigo Winston MD  : FL MODIFIED BARIUM SWALLOW W VIDEO :  25  REASON FOR REFERRAL: Dysphagia   EVALUATING THERAPIST: JOSE Morales      RESULTS:      DYSPHAGIA DIAGNOSIS:  Clinical indicators of mild-moderate oral phase dysphagia  and Clinical indicators of mild  pharyngeal phase dysphagia     DIET RECOMMENDATIONS:  Minced and moist consistency solids (IDDSI level 5) with  thin liquids (IDDSI level 0) via cup; NO straws; medication crushed in puree     FEEDING RECOMMENDATIONS:    Assistance level:  Set-up is required for all oral intake  Supervision is needed during all oral intake  Verbal cueing for implementation of safe swallow strategies      Compensatory strategies recommended: Fully alert for all PO, Thorough oral care to prevent colonization of oral bacteria, Upright in bed/ chair as tolerated, Effortful swallow, Encourage oral clearing of bolus before next bite/sip is taken, Slow rate of intake, SINGLE cup sips, SMALL bites, NO STRAW, Liquid wash after thicker items to assist with clearing pharyngeal residue, Liquid wash to help clear oral cavity of thicker consistency items     Discussed recommendations with:  patient nurse in person    Laryngeal Penetration and Aspiration:  Penetration WITHOUT aspiration was observed in 's study with  thin liquid    SPEECH THERAPY  PLAN OF CARE   The dysphagia POC is established based on physician order and dysphagia diagnosis    Meal time assessment for 1-2 sessions to provide diet modification and compensatory strategy implementation to safely advance diet as functional ability improves      Conditions Requiring Skilled Therapeutic Intervention for 
Sent secure message to Dr. Saini in regards to 3% sodium chloride ordered for clarification.    
Skin lightning rounds completed   
Skin lightning rounds completed   
Spoke with NP antelmo- morning dose of aspirin on 5/29 to be held until cleared by nephrology. Patient to possibly start HD and if so will need IR for placement. Nursing communication order in place.  
Sugar applied to prolapsed ostomy this morning. Small amount of reduction after 1 hour. Remains bright pink without signs of ischemia. Continue to monitor ostomy appearance and output.  
Supplies Given, family member did not want a male to bath her. Family member stated she would wash her.  
[facility]  Nephrology Progress Note    Patient's Name: Isamar Lopez    History of Present Illness from 5/16 consult note:    \"Isamar Lopez is a 74 y.o. female with a past medical history of arthritis, cerebral artery occlusion with cerebral infarction, hypertension, and hyperlipidemia.  She presented to the ED on 5/16 reportedly for concerns of fall.  Vital signs on 5/16 include temperature 97.4, respirations 20, pulse 68, /79, and she was 100% SpO2.  Lab data on 5/16 includes sodium 129, potassium 6.1, CO2 18, BUN 61, creatinine 5.1, troponin 62, albumin 3.1, and hemoglobin 8.8.  Chest x-ray from 5/16 showed no pneumonia or pleural effusion.  CT abdomen/pelvis on 5/16 showed bilateral hydroureteronephrosis, severe on the right and more mild degree on the left with bilateral ureteral stents, no obstructing kidney stones, new bulky posterior right lower quadrant mass.  CT head from 5/16 showed stable chronic changes of the brain, no evidence of an acute or subacute intracranial abnormality.  Nephrology has been consulted to see the patient for concerns of hyperkalemia.  She appears have a baseline serum creatinine of 3.8 mg/dL from 4/2025.  At present, patient was seen and examined.  She came in due to concerns of fall.  She notes intermittent dizziness and nausea.\"    Subjective:    5/19/2025: Patient was seen and examined.  She is awake and reports that she feels thirsty.  She underwent biopsy in IR.  She also underwent cystoscopy, bilateral stent exchange today.    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    
[facility]  Nephrology Progress Note    Patient's Name: Isamar Lopez    History of Present Illness from 5/16 consult note:    \"Isamar Lopez is a 74 y.o. female with a past medical history of arthritis, cerebral artery occlusion with cerebral infarction, hypertension, and hyperlipidemia.  She presented to the ED on 5/16 reportedly for concerns of fall.  Vital signs on 5/16 include temperature 97.4, respirations 20, pulse 68, /79, and she was 100% SpO2.  Lab data on 5/16 includes sodium 129, potassium 6.1, CO2 18, BUN 61, creatinine 5.1, troponin 62, albumin 3.1, and hemoglobin 8.8.  Chest x-ray from 5/16 showed no pneumonia or pleural effusion.  CT abdomen/pelvis on 5/16 showed bilateral hydroureteronephrosis, severe on the right and more mild degree on the left with bilateral ureteral stents, no obstructing kidney stones, new bulky posterior right lower quadrant mass.  CT head from 5/16 showed stable chronic changes of the brain, no evidence of an acute or subacute intracranial abnormality.  Nephrology has been consulted to see the patient for concerns of hyperkalemia.  She appears have a baseline serum creatinine of 3.8 mg/dL from 4/2025.  At present, patient was seen and examined.  She came in due to concerns of fall.  She notes intermittent dizziness and nausea.\"    Subjective:    5/21/2025: Patient was seen and examined.  She is awake, no acute distress.    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 Cutler    Hyperlipidemia     Hypertension     Major depressive disorder, recurrent, mild 04/18/2023    
[facility]  Nephrology Progress Note    Patient's Name: Isamar Lopez    History of Present Illness from 5/16 consult note:    \"Isamar Lopez is a 74 y.o. female with a past medical history of arthritis, cerebral artery occlusion with cerebral infarction, hypertension, and hyperlipidemia.  She presented to the ED on 5/16 reportedly for concerns of fall.  Vital signs on 5/16 include temperature 97.4, respirations 20, pulse 68, /79, and she was 100% SpO2.  Lab data on 5/16 includes sodium 129, potassium 6.1, CO2 18, BUN 61, creatinine 5.1, troponin 62, albumin 3.1, and hemoglobin 8.8.  Chest x-ray from 5/16 showed no pneumonia or pleural effusion.  CT abdomen/pelvis on 5/16 showed bilateral hydroureteronephrosis, severe on the right and more mild degree on the left with bilateral ureteral stents, no obstructing kidney stones, new bulky posterior right lower quadrant mass.  CT head from 5/16 showed stable chronic changes of the brain, no evidence of an acute or subacute intracranial abnormality.  Nephrology has been consulted to see the patient for concerns of hyperkalemia.  She appears have a baseline serum creatinine of 3.8 mg/dL from 4/2025.  At present, patient was seen and examined.  She came in due to concerns of fall.  She notes intermittent dizziness and nausea.\"    Subjective:    5/29/2025: Patient was seen and examined.  She is awake and notes that she feels better.  Visitor at bedside.      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 Cutler    Hyperlipidemia     Hypertension     Major depressive 
[facility]  Nephrology Progress Note    Patient's Name: Isamar Lopez    History of Present Illness from 5/16 consult note:    \"Isamar Lopez is a 74 y.o. female with a past medical history of arthritis, cerebral artery occlusion with cerebral infarction, hypertension, and hyperlipidemia.  She presented to the ED on 5/16 reportedly for concerns of fall.  Vital signs on 5/16 include temperature 97.4, respirations 20, pulse 68, /79, and she was 100% SpO2.  Lab data on 5/16 includes sodium 129, potassium 6.1, CO2 18, BUN 61, creatinine 5.1, troponin 62, albumin 3.1, and hemoglobin 8.8.  Chest x-ray from 5/16 showed no pneumonia or pleural effusion.  CT abdomen/pelvis on 5/16 showed bilateral hydroureteronephrosis, severe on the right and more mild degree on the left with bilateral ureteral stents, no obstructing kidney stones, new bulky posterior right lower quadrant mass.  CT head from 5/16 showed stable chronic changes of the brain, no evidence of an acute or subacute intracranial abnormality.  Nephrology has been consulted to see the patient for concerns of hyperkalemia.  She appears have a baseline serum creatinine of 3.8 mg/dL from 4/2025.  At present, patient was seen and examined.  She came in due to concerns of fall.  She notes intermittent dizziness and nausea.\"    Subjective:    6/1/2025: Patient was seen and examined. She reports not feeling well today. She reports worsened shortness of breath this AM. She denies any pain.     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 Cutler    Hyperlipidemia     
[facility]  Nephrology Progress Note    Patient's Name: Isamar Lopez    History of Present Illness from 5/16 consult note:    \"Isamar Lopez is a 74 y.o. female with a past medical history of arthritis, cerebral artery occlusion with cerebral infarction, hypertension, and hyperlipidemia.  She presented to the ED on 5/16 reportedly for concerns of fall.  Vital signs on 5/16 include temperature 97.4, respirations 20, pulse 68, /79, and she was 100% SpO2.  Lab data on 5/16 includes sodium 129, potassium 6.1, CO2 18, BUN 61, creatinine 5.1, troponin 62, albumin 3.1, and hemoglobin 8.8.  Chest x-ray from 5/16 showed no pneumonia or pleural effusion.  CT abdomen/pelvis on 5/16 showed bilateral hydroureteronephrosis, severe on the right and more mild degree on the left with bilateral ureteral stents, no obstructing kidney stones, new bulky posterior right lower quadrant mass.  CT head from 5/16 showed stable chronic changes of the brain, no evidence of an acute or subacute intracranial abnormality.  Nephrology has been consulted to see the patient for concerns of hyperkalemia.  She appears have a baseline serum creatinine of 3.8 mg/dL from 4/2025.  At present, patient was seen and examined.  She came in due to concerns of fall.  She notes intermittent dizziness and nausea.\"    Subjective:    6/2/2025: Patient was seen and examined.  She reports that she feels pretty good.  She notes that her appetite is pretty good.    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 Cutler    Hyperlipidemia     Hypertension     Major 
[facility]  Nephrology Progress Note    Patient's Name: Isamar Lopez    History of Present Illness from 5/16 consult note:    \"Isamar Lopez is a 74 y.o. female with a past medical history of arthritis, cerebral artery occlusion with cerebral infarction, hypertension, and hyperlipidemia.  She presented to the ED on 5/16 reportedly for concerns of fall.  Vital signs on 5/16 include temperature 97.4, respirations 20, pulse 68, /79, and she was 100% SpO2.  Lab data on 5/16 includes sodium 129, potassium 6.1, CO2 18, BUN 61, creatinine 5.1, troponin 62, albumin 3.1, and hemoglobin 8.8.  Chest x-ray from 5/16 showed no pneumonia or pleural effusion.  CT abdomen/pelvis on 5/16 showed bilateral hydroureteronephrosis, severe on the right and more mild degree on the left with bilateral ureteral stents, no obstructing kidney stones, new bulky posterior right lower quadrant mass.  CT head from 5/16 showed stable chronic changes of the brain, no evidence of an acute or subacute intracranial abnormality.  Nephrology has been consulted to see the patient for concerns of hyperkalemia.  She appears have a baseline serum creatinine of 3.8 mg/dL from 4/2025.  At present, patient was seen and examined.  She came in due to concerns of fall.  She notes intermittent dizziness and nausea.\"    Subjective:    6/4/2025: Patient was seen and examined.  She notes that every time she puts food in her mouth she feels nauseated.     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 Cutler    Hyperlipidemia     Hypertension     Major depressive 
[facility]  Nephrology Progress Note    Patient's Name: Isamar Lopez    History of Present Illness from 5/16 consult note:    \"Isamar Lopez is a 74 y.o. female with a past medical history of arthritis, cerebral artery occlusion with cerebral infarction, hypertension, and hyperlipidemia.  She presented to the ED on 5/16 reportedly for concerns of fall.  Vital signs on 5/16 include temperature 97.4, respirations 20, pulse 68, /79, and she was 100% SpO2.  Lab data on 5/16 includes sodium 129, potassium 6.1, CO2 18, BUN 61, creatinine 5.1, troponin 62, albumin 3.1, and hemoglobin 8.8.  Chest x-ray from 5/16 showed no pneumonia or pleural effusion.  CT abdomen/pelvis on 5/16 showed bilateral hydroureteronephrosis, severe on the right and more mild degree on the left with bilateral ureteral stents, no obstructing kidney stones, new bulky posterior right lower quadrant mass.  CT head from 5/16 showed stable chronic changes of the brain, no evidence of an acute or subacute intracranial abnormality.  Nephrology has been consulted to see the patient for concerns of hyperkalemia.  She appears have a baseline serum creatinine of 3.8 mg/dL from 4/2025.  At present, patient was seen and examined.  She came in due to concerns of fall.  She notes intermittent dizziness and nausea.\"    Subjective:    6/9/2025: Patient was seen and examined. She is resting comfortably in the bed, sister Stephanie at the bedside. She reports feeling okay today. She reports abdominal pain. She reports a poor appetite. Patient prognosis discussed with Stephanie at the bedside, plan is for home with hospice. Stephanie would like one more dialysis treatment today to help keep the patient comfortable before likely transition to hospice care tomorrow.     PMH:    Past Medical History:   Diagnosis Date    Altered bowel elimination due to intestinal ostomy (HCC)     Arthritis     osteoporsis    Cancer (HCC)     colon and uterine    Cerebral artery 
Went into patient room to give afternoon meds, and checked ostomy and found ostomy to be full of bloody clots. General surgery and attending notified, stat H&H ordered, per surgery, keep monitoring and notify if continues.  
  *   CREATININE 7.0*   GLUCOSE 104*   CALCIUM 7.9*       Lab Results   Component Value Date/Time    HGB 8.2 05/30/2025 04:23 AM    HCT 25.1 05/30/2025 04:23 AM         Assessment:  Isamar Lopez 74 y.o. female     Principal Problem:    Hydronephrosis  Active Problems:    Hydroureteronephrosis    Acute kidney injury superimposed on CKD    Hyponatremia    JEANNINE (acute kidney injury)    Pelvic mass    Electrolyte abnormality    Infection due to ESBL-producing Klebsiella pneumoniae    Gastrointestinal hemorrhage    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HCC)    Palliative care encounter    Goals of care, counseling/discussion    Dysarthria  Resolved Problems:    * No resolved hospital problems. *    Her left nephrostomy tube could not be placed she had minimal hydronephrosis  Her creatinine has remained around 7.  She most likely has intrinsic renal disease at this time as well as obstructive uropathy    Plan:    Once for sure she does not need dialysis or reattempt at a left PERC she then could be discharged back to the care of her sister    Pineda Bethea MD   [facility]  Urology  
(5/18 BS first measured)  Current Body Weight: 96.4 kg (212 lb 8.4 oz) (5/18 BS admit wt as CBW remains elevated), 157.4 % IBW. Weight Source: Bed scale  Current BMI (kg/m2): 33.3  Usual Body Weight:  (UTO d/t lack of measured/actual wt hx on file x 1 year lookback)        Weight Adjustment For: No Adjustment                 BMI Categories: Obese Class 1 (BMI 30.0-34.9)    Estimated Daily Nutrient Needs:  Energy Requirements Based On: Formula  Weight Used for Energy Requirements: Admission  Energy (kcal/day): 1700-1900kcal (MSJx1.2SF)  Weight Used for Protein Requirements: Ideal  Protein (g/day): 80-90gm (1.3-1.5g/kgIBW) (as tolerated w/ JEANNINE on CKD)  Method Used for Fluid Requirements: Standard renal  Fluid (ml/day): per renal management    Nutrition Diagnosis:   Inadequate oral intake related to inadequate protein-energy intake as evidenced by intake 51-75%    Nutrition Interventions:   Food and/or Nutrient Delivery: Continue Current Diet, Start Oral Nutrition Supplement (start ONS: nepro once daily)  Nutrition Education/Counseling: No recommendation at this time  Coordination of Nutrition Care: Continue to monitor while inpatient, Speech Therapy, Swallow Evaluation       Goals:  Goals: PO intake 75% or greater, by next RD assessment  Type of Goal: New goal  Previous Goal Met: New Goal    Nutrition Monitoring and Evaluation:   Behavioral-Environmental Outcomes: None Identified  Food/Nutrient Intake Outcomes: Diet Advancement/Tolerance, Food and Nutrient Intake, Supplement Intake  Physical Signs/Symptoms Outcomes: Biochemical Data, Chewing or Swallowing, GI Status, Fluid Status or Edema, Diarrhea, Nutrition Focused Physical Findings, Skin, Weight    Discharge Planning:    Too soon to determine     Maco Burnham RD  Contact: ext [phone]     
0.5 mg 5/14/25 6/13/25 Yes Phyllis Wing MD   amLODIPine (NORVASC) 10 MG tablet TAKE 1 TABLET BY MOUTH EVERY MORNING 4/15/25  Yes Juliano Page MD   calcitRIOL (ROCALTROL) 0.25 MCG capsule Take 1 capsule by mouth daily   Yes Berta Coats MD   glipiZIDE (GLUCOTROL XL) 2.5 MG extended release tablet Take 1 tablet by mouth 2 times daily 4/15/25  Yes Juliano Page MD   pantoprazole (PROTONIX) 40 MG tablet TAKE 1 TABLET BY MOUTH EVERY MORNING 4/10/25  Yes Juliano Page MD   carvedilol (COREG) 6.25 MG tablet TAKE 1 TABLET BY MOUTH TWICE DAILY WITH A MEAL  Patient taking differently: Take 8.25 mg by mouth 2 times daily 1/9/25  Yes Juliano Page MD   atorvastatin (LIPITOR) 40 MG tablet Take 1 tablet by mouth nightly 10/15/24  Yes Juliano Page MD   escitalopram (LEXAPRO) 5 MG tablet Take 1 tablet by mouth daily 10/15/24  Yes Juliano Page MD   ondansetron (ZOFRAN) 4 MG tablet Take 1 tablet by mouth every 8 hours as needed for Nausea or Vomiting 10/15/24  Yes Juliano Page MD   aspirin 81 MG EC tablet Take 1 tablet by mouth every morning 4/18/23  Yes Juliano Page MD   melatonin 5 MG TABS tablet Take 2 tablets by mouth nightly 4/18/23  Yes Juliano Page MD   denosumab (PROLIA) 60 MG/ML SOSY SC injection Inject 1 mL into the skin every 6 months States next injection due 2/19/25   Yes Berta Coats MD   magnesium oxide (MAG-OX) 400 MG tablet Take 1 tablet by mouth 2 times daily   Yes Berta Coats MD   vitamin D (CHOLECALCIFEROL) 25 MCG (1000 UT) TABS tablet Take 1 tablet by mouth daily    Berta Coats MD   ONETOUCH ULTRA strip use 1 TEST STRIP to TEST BLOOD SUGAR once daily 1/9/24   Juliano Page MD   Lancets (ONETOUCH DELICA PLUS [id number]) MISC use 1 LANCET to TEST BLOOD SUGAR once daily and if needed 5/31/23   Juliano Page MD       Objective:     /70   
133*   CALCIUM 7.5*       Lab Results   Component Value Date/Time    HGB 8.3 05/19/2025 04:36 AM    HCT 26.1 05/19/2025 04:36 AM           Assessment/Plan:  POD#1--cystoscopy, retrograde pyelogram, bilateral ureteral stent exchange  Bilateral hydronephrosis managed with bilateral ureteral stents  Neurogenic bladder with a chronic López  JEANNINE on CKD    Continue to monitor creatinine  Nephrology following  Continue the bilateral ureteral stents  Continue López catheter  This is chronic  Change every 4 weeks  General Surgery following for large pelvic mass.     Reza Wren MD   5/20/2025   2:03 PM   [facility]  Urology      
5.0*   GLUCOSE 120*   CALCIUM 7.6*       Lab Results   Component Value Date/Time    HGB 7.4 05/21/2025 04:28 AM    HCT 23.3 05/21/2025 04:28 AM       No results found for: \"PSA\"      Assessment/Plan:  Bilateral hydronephrosis managed with bilateral ureteral stents  Neurogenic bladder with a chronic López  JEANNINE On CKD     Continue to watch the creatinine  Nephrology following.  Continue the López  Change every 4 weeks  Continue the bilateral ureteral stents currently, these were just changed two days ago   If rising creatinine in the future despite new stent placement may need PNTs eventually. Will hold on any plans for that currently  Biopsy from pelvic mass pending  Hold on additional  interventions at this time       Shayna Nye, APRN - CNP   [facility]  Urology      
5/19/2025 William, Emil Doe MD [facility] CT    CYSTOSCOPY Bilateral 07/21/2022    CYSTOSCOPY RETROGRADE PYELOGRAM BILATERIAL STENT EXCHANGE performed by Pineda Bethea MD at [facility] OR    DEBRIDEMENT Bilateral 5/2/2024    CYSTOSCOPY RETROGRADE PYELOGRAM BILATERAL STENT CHANGE performed by Pineda Bethea MD at [facility] OR    DEBRIDEMENT Bilateral 9/5/2024    CYSTOSCOPY BILATERAL RETROGRADE PYELOGRAM BILATERAL STENT CHANGE URENA CATHETER CHANGE performed by Pineda Bethea MD at [facility] OR    DEBRIDEMENT Bilateral 1/10/2025    CYSTOSCOPY RETROGRADE PYELOGRAM STENT BILATERAL STENT EXCHANGE, INSERTION OF URENA performed by Pineda Bethea MD at [facility] OR    DEBRIDEMENT Bilateral 5/19/2025    CYSTOSCOPY RETROGRADE PYELOGRAM BILATERAL STENT EXCHANGE performed by Winston Wren MD at [facility] OR    RECTAL SURGERY      WRIST FRACTURE SURGERY Left 11/07/2016        PAST FAMILY HISTORY:    Family History   Problem Relation Age of Onset    Kidney Disease Mother     High Blood Pressure Mother     Cancer Father     Diabetes Sister     Other Brother        PAST SOCIAL HISTORY:    Social History     Socioeconomic History    Marital status: Single     Spouse name: None    Number of children: None    Years of education: None    Highest education level: None   Tobacco Use    Smoking status: Never    Smokeless tobacco: Never   Vaping Use    Vaping status: Never Used   Substance and Sexual Activity    Alcohol use: No    Drug use: No    Sexual activity: Defer     Social Drivers of Health     Financial Resource Strain: Low Risk  (4/30/2024)    Overall Financial Resource Strain (CARDIA)     Difficulty of Paying Living Expenses: Not hard at all   Food Insecurity: No Food Insecurity (5/16/2025)    Hunger Vital Sign     Worried About Running Out of Food in the Last Year: Never true     Ran Out of Food in the Last Year: Never true   Transportation Needs: No Transportation Needs (5/16/2025)    PRAPARE - Transportation     Lack of Transportation 
AM    PHUR 8.5 01/24/2023 08:31 PM    WBCUA 6 TO 9 05/17/2025 06:50 AM    RBCUA 10 TO 20 05/17/2025 06:50 AM    BACTERIA 3+ 05/17/2025 06:50 AM    SPECGRAV 1.010 10/07/2013 11:12 AM    BLOODU LARGE 01/24/2023 08:31 PM    GLUCOSEU NEGATIVE 05/17/2025 06:50 AM       Urine Culture:       Component Value Date/Time    LABURIN  06/08/2023 0901     >100,000 CFU/ml  Refer to culture order #[id number] for IDand  susceptibility results      LABURIN  06/08/2023 0901     >100,000 CFU/ml  Refer to culture order #[id number] for  susceptibility results      LABURIN 100 mg/dL (H) 12/23/2019 1136        Blood Culture:     Imaging Studies:          Assessment and Plan       ASSESMENT:  75 y/o F s/p bilateral stent change. Large pelvic mass, CKD with JEANNINE    PLAN:    I discussed with the family that she likely will need bilateral nephrostomy tube placement.  Her stents are failing and her creatinine remains elevated.  I discussed that I would try to talk with her family today.  I will wait for them to call.did discuss this with the nurse as well.  Make n.p.o. at midnight and potentially bilateral percutaneous nephrostomy tubes tomorrow      Travis Figueredo MD  [facility]  5/26/2025  9:32 AM    
LABURIN  06/08/2023 0901     >100,000 CFU/ml  Refer to culture order #[id number] for IDand  susceptibility results      LABURIN  06/08/2023 0901     >100,000 CFU/ml  Refer to culture order #[id number] for  susceptibility results      LABURIN 100 mg/dL (H) 12/23/2019 1136        Blood Culture:     Imaging Studies:          Assessment and Plan     Assessment/plan:  Bilateral hydronephrosis managed with bilateral ureteral stents  Neurogenic bladder with a chronic López  JEANNINE On CKD     Continue to watch the creatinine  Nephrology following  CT abdomen pelvis reviewed showing severe bilateral hydronephrosis with bilateral ureteral stents in place.  There is now a new right lower quadrant mass measuring almost 10 cm for which general surgery has been consulted and has ordered biopsy.  Continue the López  Change every 4 weeks  Continue the bilateral ureteral stents currently  Will plan on cystoscopy with bilateral ureteral stent exchange on Monday.  However after reviewing CT she very well could fail stents and may end up needing nephrostomy tubes in the future with new findings of this pelvic mass.  We will continue to follow  I have added her onto her OR schedule for Monday  NPO at midnight prem Figueredo MD  [facility]  5/18/2025  9:21 AM    
Weight (IBW): 135 lbs (61 kg)    Admission Body Weight: 96.4 kg (212 lb 8.4 oz) (5/18 BS first measured)  Current Body Weight: 96.4 kg (212 lb 8.4 oz) (5/18 BS admit wt as CBW remains elevated), 157.4 % IBW. Weight Source: Bed scale  Current BMI (kg/m2): 33.3  Usual Body Weight:  (UTO d/t lack of measured/actual wt hx on file x 1 year lookback)        Weight Adjustment For: No Adjustment                 BMI Categories: Obese Class 1 (BMI 30.0-34.9)    Estimated Daily Nutrient Needs:  Energy Requirements Based On: Formula  Weight Used for Energy Requirements: Admission  Energy (kcal/day): 1700-1900kcal (MSJx1.2SF)  Weight Used for Protein Requirements: Ideal  Protein (g/day): 80-90gm (1.3-1.5g/kgIBW) (as tolerated w/ JEANNINE on CKD)  Method Used for Fluid Requirements: Standard renal  Fluid (ml/day): per renal management    Nutrition Diagnosis:   Inadequate oral intake related to inadequate protein-energy intake as evidenced by nausea, intake 51-75%, patient reported barriers    Nutrition Interventions:   Food and/or Nutrient Delivery: Continue Current Diet, Continue Oral Nutrition Supplement  Nutrition Education/Counseling: No recommendation at this time  Coordination of Nutrition Care: Continue to monitor while inpatient, Speech Therapy, Swallow Evaluation       Goals:  Goals: PO intake 75% or greater, by next RD assessment  Type of Goal: Continue current goal  Previous Goal Met: Progressing toward Goal(s)    Nutrition Monitoring and Evaluation:   Behavioral-Environmental Outcomes: None Identified  Food/Nutrient Intake Outcomes: Diet Advancement/Tolerance, Food and Nutrient Intake, Supplement Intake  Physical Signs/Symptoms Outcomes: Biochemical Data, Chewing or Swallowing, GI Status, Fluid Status or Edema, Diarrhea, Nutrition Focused Physical Findings, Skin, Weight    Discharge Planning:    Too soon to determine     Maco Burnham RD  Contact: ext [phone]     
\"AMYLASE\" in the last 72 hours.    General appearance:  NAD  Head: NCAT, PERRLA, EOMI, red conjunctiva  Neck: supple, no masses  Lungs: symmetric chest rise, no audible wheezes  Heart: normal rate per peripheral pulse  Abdomen: soft, nondistended, non tender. Ostomy with stool, stoma edematous still, improving and healthy appearing  Skin: no visible lesions  Gu: no cva tenderness  Extremities: extremities normal, atraumatic, no cyanosis or edema      Assessment/Plan:  74 y.o. female with a new pelvic mass, GI bleeding and prolapsed ostomy s/p IR bx of omental mass and pelvic fluid sampling for cytology 5/19, BL uro stent exchance 5/19     Plan:  - CEA 1.9 WNL  -  149 elevated  - Pathology pending following IR bx/cytology  - Continue to monitor prolapsed ostomy, remains viable s/p multiple applications of sugar and reduction, no rush to revise at this point, starting to look less edematous this morning and have output. Bowel regimen with lactulose, glycolax, senokot  - Trend H/H  - Transfuse for Hgb <7 per primary  - Strict I's/O's  - Path pending s/p IR omental mass bx and cytology of pelvic fluid   - No plans for surgical intervention at this time    Akiko Nam MD  PGY-4 General Surgery Resident    
bilateral nephrostomy tubes.  Await urology recommendations     5/26 serum creatinine increasing despite stent placement.  Nephro consulted urology for recommendations.  Urology recommending bilateral percutaneous nephrostomy tube.  Tentatively plan for tomorrow     5/27 tentative plan for bilateral PCN, receiving 1 unit PRBC  Pateint does complain of some chest pain and SOB  CXR, Stat labs, EKG ordered      5/28 s/p percutaneous nephrostomy tube placement on 5/27, hemoglobin stable, family member stating patient has been having worsening speech difficulties.  It appears that patient's daughter is at baseline which has been worsening.  Neurology has been consulted for increasing speech difficulty.  Speech therapy has also been consulted.     5/29 sitting up in chair, slightly worked up after PT, scores declined, neurology ordered Head CT- NAF.  Nephrology planning for tentative HD      5/30-31 clinically doing ok, await final plan from nephrology regarding HD     6/1: Creatinine improving down to 6.4, sodium 131    6/3: Cr 6.2, monitor renal function   6/4: Cr up to 6.5 today   6/5: Creatinine up to 7.1, general surgery consulted, starting dialysis.  Oncology spoke with Stephanie and the patient, terminal and poor prognosis, hospice consulted.  6/6: Family agreeable to hospice- pending   6/7: HD. Hg 6.6 and transfusing one unit.       Subjective:  Patient is being followed for Hydronephrosis [N13.30]  Hyperkalemia [E87.5]  Pelvic mass [R19.00]  JEANNINE (acute kidney injury) [N17.9]   Patient seen and examined.  Events reviewed.  No family bedside  Hg 6.6 and transfusing one unit.    Had HD this morning, hypotensive afterwards, feels tired.       ROS: denies fever, chills, cp, sob, n/v, HA unless stated above.      calcium acetate  1 capsule Oral TID WC    sodium bicarbonate  1,300 mg Oral TID    lidocaine  1 patch TransDERmal Daily    lidocaine  1 patch TransDERmal Daily    sodium chloride  1 g Oral TID WC    pantoprazole  
conjunctiva  Neck: supple, no masses  Lungs: symmetric chest rise, no audible wheezes  Heart: normal rate per peripheral pulse  Abdomen: soft, nondistended, non tender. Ostomy without stool, stoma edematous but pink and healthy appearing  Skin: no visible lesions  Gu: no cva tenderness  Extremities: extremities normal, atraumatic, no cyanosis or edema      Assessment/Plan:  74 y.o. female with a new pelvic mass, GI bleeding and prolapsed ostomy.     Plan:  - CEA 1.9 WNL  -  149 elevated  - IR consulted for pelvic mass biopsy today  - NPO for IR  - Continue to monitor prolapsed ostomy, remains viable now s/p multiple applications of sugar and reduction, no rush to revise at this point add lactulose today  - Trend H/H  - Transfuse for Hgb <7 per primary  - Strict I's/O's      Akiko Nam MD  PGY-4 General Surgery Resident    
consulted.  Palliative medicine consulted for further assistance.    ASSESSMENT/PLAN:     Pertinent Hospital Diagnoses     Right sided pelvic mass  Lower GI bleed  Prolapsed stoma  History of colon and uterine cancer  Fall  Recurrent UTI  Chronic bilateral hydronephrosis with stents  Chronic López  JEANNINE on CKD  History of CVA      Palliative Care Encounter / Counseling Regarding Goals of Care  Please see detailed goals of care discussion as below  At this time, Isamar Lopez, Does have capacity for medical decision-making.  Capacity is time limited and situation/question specific  During encounter Stephanie was surrogate medical decision-maker  Outcome of goals of care meeting:   Continue current medical management  Discussed CODE STATUS options  Code status DNR-CCA  Advanced Directives: no POA or living will in epic  Surrogate/Legal NOK:  Stephanie Camp (sibling) [phone]  Natalyrenée Pemberton (niece) [phone]    Spiritual assessment: no spiritual distress identified  Bereavement and grief: to be determined  Referrals to: none today  SUBJECTIVE:     Current medical issues leading to Palliative Medicine involvement include   Active Hospital Problems    Diagnosis Date Noted    Acute kidney injury superimposed on CKD [N17.9, N18.9] 12/21/2022     Priority: Medium    Hydroureteronephrosis [N13.30] 07/21/2022     Priority: Medium    Palliative care encounter [Z51.5] 05/21/2025    Hydronephrosis [N13.30] 05/16/2025    Pelvic mass [R19.00] 05/16/2025    Electrolyte abnormality [E87.8] 05/16/2025    Infection due to ESBL-producing Klebsiella pneumoniae [A49.8, Z16.12] 05/16/2025    Gastrointestinal hemorrhage [K92.2] 05/16/2025    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  Patient seen at the bedside, alert and oriented x 3.  No family present at the bedside.  Call placed to patient's sister, Stephanie.  Follow-up conversation 
mass  She has bilateral ureteral stents and a chronic López catheter  She had a right-sided nephrostomy tube placed this admission.  A left-sided tube was not able to be placed given minimal hydronephrosis on that side  A follow-up renal ultrasound again demonstrates minimal left-sided hydronephrosis which could be physiologic in the setting of a ureteral stent.  Spoke with patient's sister over the phone.  At this point we will hold off any further urologic procedures this admission.  She is overall cleared from our perspective for discharge, but her sister expresses concerns with regards to her receiving appropriate physical therapy and mobilization out of bed.  Once family is comfortable with discharge she is cleared from urology perspective.  Okay to follow-up with urology outpatient on routine basis.    Javier Moran MD   [facility]  Urology     
mass, GI bleeding and prolapsed ostomy.    Plan:  - CEA 1.9 WNL  -  149 elevated  - IR consulted for pelvic mass biopsy, appreciate recs  - Continue to monitor prolapsed ostomy, remains viable now s/p multiple applications of sugar and reduction, no rush to revise at this point  - Trend H/H  - Transfuse for Hgb <7 per primary  - Diet as tolerated  - Strict I's/O's    Daphney Rodriguez MD  General Surgery Resident, PGY-1    Electronically signed on 5/18/2025 at 8:45 AM   
nausea and vomiting.  Sodium 124    5/25 patient feels better today no more nausea or vomiting.  Sodium dropped down to 121.  Nephrology planning to reconsult urology for concerns of ureteral stent failure to reassess.  She likely may require bilateral nephrostomy tubes.  Await urology recommendations    SUBJECTIVE:  Patient is being followed for Hydronephrosis [N13.30]  Hyperkalemia [E87.5]  Pelvic mass [R19.00]  JEANNINE (acute kidney injury) [N17.9]     Patient was seen examined and evaluated  Recent lab results, charts and pertinent diagnostic imaging reviewed   Ancillary service notes reviewed     Care reviewed with nursing staff, medical and surgical specialty care, primary care and the patient's family as available.      ipratropium 0.5 mg-albuterol 2.5 mg  1 Dose Inhalation Q4H WA RT    sodium bicarbonate  650 mg Oral BID    sodium chloride  1 g Oral TID WC    pantoprazole  40 mg Oral BID AC    vitamin D  50,000 Units Oral Weekly    folic acid  1 mg Oral Daily    lactulose  30 g Oral TID    polyethylene glycol  17 g Oral Daily    senna  1 tablet Oral Nightly    sodium chloride flush  5-40 mL IntraVENous 2 times per day    amLODIPine  10 mg Oral QAM    aspirin  81 mg Oral QAM    atorvastatin  40 mg Oral Nightly    calcitRIOL  0.25 mcg Oral Daily    carvedilol  6.25 mg Oral BID    escitalopram  5 mg Oral Daily    LORazepam  0.5 mg Oral Daily    Vitamin D  1,000 Units Oral Daily    insulin lispro  0-8 Units SubCUTAneous 4x Daily AC & HS    melatonin  10 mg Oral Nightly     glucose, 4 tablet, PRN  dextrose bolus, 125 mL, PRN   Or  dextrose bolus, 250 mL, PRN  glucagon (rDNA), 1 mg, PRN  dextrose, , Continuous PRN  sodium chloride flush, 5-40 mL, PRN  sodium chloride, , PRN  ondansetron, 4 mg, Q8H PRN   Or  ondansetron, 4 mg, Q6H PRN  polyethylene glycol, 17 g, Daily PRN  glucose, 4 tablet, PRN  dextrose bolus, 125 mL, PRN   Or  dextrose bolus, 250 mL, PRN  glucagon (rDNA), 1 mg, PRN  dextrose, , Continuous PRN     
rashes were noted.   Neuro: Alert and oriented  HEENT: Round and reactive pupils.  Moist mucous membranes.  No ulcerations or thrush.  Chest: .Respirations unlabored. Breath sounds clear.   Cardiovascular:  RRR  Abdomen: Soft. Bowel sounds present., ostomy  Extremities: No lower extremity edema  knutson  Lines: PIV      Laboratory and Tests:  Lab Results   Component Value Date    WBC 7.9 05/21/2025    WBC 9.3 05/19/2025    WBC 7.9 05/18/2025    HGB 7.4 (L) 05/21/2025    HCT 23.3 (L) 05/21/2025    MCV 82.3 05/21/2025     05/21/2025   Microbiology    Urine culture 5/14/2025 Klebsiella pneumonia ESBL    Radiology: Reviewed    Assessment:  Bilateral chronic hydroureteronephrosis, bilateral ureteral stents in place  Urine culture from 05/14 grew ESBL Klebsiella which is likely colonizer  Renal mass  UA showed pyuria and bacteriuria  Neurogenic bladder with chronic Knutson  Afebrile, no leukocytosis  S/p stent exchange and rental biopsy 5/19       Plan:    Meropenem last dose today   Antibiotics can be discontinued after 2 days after stent exchange  Likely ESBL Klebsiella is colonizer but there is a risk for ascending infection due to stent exchange  ID will continue to follow    Ariel Parker MD  2:23 PM  5/21/2025  
reactive pupils.  Moist mucous membranes.  No ulcerations or thrush.  Chest: .Respirations unlabored. Breath sounds clear.   Cardiovascular:  RRR  Abdomen: Soft. Bowel sounds present., ostomy  Extremities: No lower extremity edema  knutson  Lines: PIV      Laboratory and Tests:  Lab Results   Component Value Date    WBC 9.3 05/19/2025    WBC 7.9 05/18/2025    WBC 8.5 05/17/2025    HGB 8.3 (L) 05/19/2025    HCT 26.1 (L) 05/19/2025    MCV 81.3 05/19/2025     05/19/2025   Microbiology    Urine culture 5/14/2025 Klebsiella pneumonia ESBL    Radiology: Reviewed    Assessment:  Bilateral chronic hydroureteronephrosis, bilateral ureteral stents in place  Urine culture from 05/14 grew ESBL Klebsiella which is likely colonizer  Renal mass  UA showed pyuria and bacteriuria  Neurogenic bladder with chronic Knutson  Afebrile, no leukocytosis  S/p stent exchange and rental biopsy 5/19       Plan:    Considering plan for stent exchange will continue meropenem until tomorrow am  Antibiotics can be discontinued after 2 days after stent exchange  Likely ESBL Klebsiella is colonizer but there is a risk for ascending infection due to stent exchange  ID will continue to follow          MARTIR Cain CNP  9:49 AM  5/20/2025  
URENA performed by Pineda Bethea MD at [facility] OR    RECTAL SURGERY      WRIST FRACTURE SURGERY Left 11/07/2016       Patient Active Problem List   Diagnosis    Hypertension    Elevated lipids    Proteinuria    Vitamin D deficiency    Closed fracture of radius    History of anal cancer    Colostomy present ([facility])    Type 2 diabetes mellitus with obesity ([facility])    Stuttering    CKD (chronic kidney disease) stage 4, GFR 15-29 ml/min ([facility])    Age-related osteoporosis with current pathological fracture with routine healing    Herpes zoster vaccination declined    Hydroureteronephrosis    Acute kidney injury superimposed on CKD    Chronic indwelling Urena catheter    Bilateral hydronephrosis    Cerebellar infarct ([facility])    Cerebrovascular accident (CVA) ([facility])    Uncontrolled type 2 diabetes mellitus with hyperglycemia ([facility])    Catatonia    Type 2 diabetes mellitus with chronic kidney disease    Major depressive disorder, recurrent, mild    Major depressive disorder, recurrent, moderate    Major depressive disorder, recurrent, unspecified    Obstructive uropathy    Hydronephrosis    Pelvic mass    Electrolyte abnormality    Infection due to ESBL-producing Klebsiella pneumoniae    Gastrointestinal hemorrhage    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[facility])       Diet:    Diet NPO  ADULT DIET; Regular; 5 carb choices (75 gm/meal)    Meds:     sodium chloride flush  5-40 mL IntraVENous 2 times per day    amLODIPine  10 mg Oral QAM    aspirin  81 mg Oral QAM    atorvastatin  40 mg Oral Nightly    calcitRIOL  0.25 mcg Oral Daily    carvedilol  6.25 mg Oral BID    escitalopram  5 mg Oral Daily    LORazepam  0.5 mg Oral Daily    Vitamin D  1,000 Units Oral Daily    insulin lispro  0-8 Units SubCUTAneous 4x Daily AC & HS    sodium bicarbonate  650 mg Oral BID    melatonin  10 mg Oral Nightly    pantoprazole (PROTONIX) 40 mg in sodium chloride (PF) 0.9 % 10 mL injection  40 mg IntraVENous Q12H        sodium bicarbonate 150 mEq in dextrose 5 % 
activity attributed to fatigue. Pt skillfully positioned supine in bed at end of session with all needs in place and lines managed. Bed alarm activated. Patient would benefit from continued skilled PT to maximize functional mobility independence.       Treatment:  Patient practiced and was instructed in the following treatment:    Bed Mobility: Verbal cues for proper positioning and sequencing to perform bed mobility to facilitate maximum independence.   Transfers: Verbal cues for proper positioning and sequencing to perform transfers safely with maximum independence.   Gait Training: Verbal cues for pacing and safety to maximize functional mobility independence.   Skillful positioning in bed to protect skin and joint integrity.   Pt education for safe mobility, activity pacing, and proper body mechanics.   Vitals and symptoms monitored during session.    PLAN:    Patient is making fair progress towards established goals.  Will continue with current POC.      Time in  1021  Time out  1059    Total Treatment Time  38 minutes     CPT codes:  [] Gait training 85059 0 minutes  [] Manual therapy 46492 0 minutes  [x] Therapeutic activities 87093 38 minutes  [] Therapeutic exercises 40598 0 minutes  [] Neuromuscular reeducation 28994 0 minutes    Keyanna Layton SPT     Leslye Kent PT, DPT  [id number]    
attached.     Treatment:  Patient practiced and was instructed in the following treatment:    Bed mobility training - pt given verbal and tactile cues to facilitate proper sequencing and safety during rolling and supine>sit as well as provided with physical assistance to complete task    STS training and side stepping at EOB to progress gait tolerance - pt educated on proper hand and foot placement, safety and sequencing, and use of FWW to safely complete sit<>stand and side stepping along EOB with hands on assistance to complete task safely    Gait training- pt was given verbal and tactile cues to facilitate safety/balance during ambulation as well as provided with physical assistance.     PLAN:    Patient is making Good progress towards established goals.  Will continue with current POC.      Time in  1004  Time out  1042    Total Treatment Time  38 minutes     CPT codes:  [] Gait training 11597 0 minutes  [] Manual therapy 59517 0 minutes  [x] Therapeutic activities 70879 38 minutes  [] Therapeutic exercises 70349 0 minutes  [] Neuromuscular reeducation 53874 0 minutes    Jorge Araiza, PT, DPT  [id number]     
lipids    Proteinuria    Vitamin D deficiency    Closed fracture of radius    History of anal cancer    Colostomy present ([facility])    Type 2 diabetes mellitus with obesity ([facility])    Stuttering    CKD (chronic kidney disease) stage 4, GFR 15-29 ml/min ([facility])    Age-related osteoporosis with current pathological fracture with routine healing    Herpes zoster vaccination declined    Hydroureteronephrosis    Acute kidney injury superimposed on CKD    Chronic indwelling López catheter    Bilateral hydronephrosis    Cerebellar infarct ([facility])    Cerebrovascular accident (CVA) ([facility])    Uncontrolled type 2 diabetes mellitus with hyperglycemia ([facility])    Catatonia    Type 2 diabetes mellitus with chronic kidney disease    Major depressive disorder, recurrent, mild    Major depressive disorder, recurrent, moderate    Major depressive disorder, recurrent, unspecified    Obstructive uropathy    Hydronephrosis    Pelvic mass    Electrolyte abnormality    Infection due to ESBL-producing Klebsiella pneumoniae    Gastrointestinal hemorrhage    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[facility])       Diet:    ADULT DIET; Regular; Low Potassium (Less than 3000 mg/day)    Meds:     calcium gluconate 1,000 mg in sodium chloride 0.9 % 100 mL IVPB  1,000 mg IntraVENous Once    lactulose  30 g Oral TID    meropenem  1,000 mg IntraVENous Q12H    polyethylene glycol  17 g Oral Daily    senna  1 tablet Oral Nightly    sodium chloride flush  5-40 mL IntraVENous 2 times per day    amLODIPine  10 mg Oral QAM    aspirin  81 mg Oral QAM    atorvastatin  40 mg Oral Nightly    calcitRIOL  0.25 mcg Oral Daily    carvedilol  6.25 mg Oral BID    escitalopram  5 mg Oral Daily    LORazepam  0.5 mg Oral Daily    Vitamin D  1,000 Units Oral Daily    insulin lispro  0-8 Units SubCUTAneous 4x Daily AC & HS    melatonin  10 mg Oral Nightly    pantoprazole (PROTONIX) 40 mg in sodium chloride (PF) 0.9 % 10 mL injection  40 mg IntraVENous Q12H        lactated ringers 125 mL/hr at 05/20/25 
participated in ~15 minutes of balance activities at EOB with dynamic use of BUEs and BLEs. STS from elevated EOB revealed BLE weakness, which was further noted when completing STS from chair. Mod verbal cueing yielded improved management of walker during the transfer. Distance of ambulation was limited by fatigue, and upon sitting to chair after ambulating, pt became severely retropulsive, requiring manual pressure to hip flexors to initiate hip flexion for safe descent to chair. Fatigue limited activity.  After session, pt was left in chair to improve upright tolerance, with all needs met OT present, and call light in reach.  All lines remained intact.  Educated pt on safety and need for assistance prior to mobilizing; pt understood and agreed to use call light.    Treatment:  Patient practiced and was instructed in the following treatment:    Bed mobility training - pt given verbal and tactile cues to facilitate proper sequencing and safety during rolling and supine>sit as well as provided with physical assistance.  Sitting EOB for >15 minutes for upright tolerance, postural awareness and BLE ROM  Transfer training - pt was given verbal and tactile cues to facilitate proper hand placement, technique and safety during sit to stand, stand to sit and stand pivot transfers as well as provided with physical assistance.  Gait training- pt was given verbal and tactile cues to facilitate postural stability, management of walker, and limb advancement during ambulation as well as provided with physical assistance.  Skilled positioning to prevent skin breakdown and contractures.  Education - On interpretation of vitals, the benefits of increasing tolerance to upright activity, body mechanics, role of PT in acute setting, safety.    PLAN:    Patient is making Good progress towards established goals.  Will continue with current POC.      Time in  0815  Time out  0853    Total Treatment Time  38 minutes     CPT codes:  [] Gait 
reviewed.  Update received from nursing.  Patient seen at the bedside, alert and oriented x 2-3.  Met with sister Stephanie in [facility]. Discussed updates to medical condition, to include worsening renal function and potential need for HD, neuro changes today, difficulty swallowing today and poor p.o. intake prior, pending biopsy results.  Stephanie tells me she feels her sister is getting very tired.  I introduced comfort measures as an appropriate option given her multitude of medical problems and changes in condition.  At this time, need to tells me she would like to continue all care, have neurology evaluation and get results from biopsy before making further decision on goals of care.  She confirms CODE STATUS is to remain DNR CCA.  All questions and concerns addressed, emotional support given.  Palliative medicine will continue to follow.    OBJECTIVE:   Prognosis: Guarded    Physical Exam:  /65   Pulse 98   Temp 97.4 °F (36.3 °C) (Temporal)   Resp 18   Ht 1.702 m (5' 7\")   Wt 101.1 kg (222 lb 14.2 oz)   SpO2 93%   BMI 34.91 kg/m²   Constitutional:   awake, delayed speech with stutter  Eyes: no scleral icterus, normal lids, no discharge  ENMT:  Normocephalic, atraumatic, mucosa moist, EOMI  Neck:  trachea midline, no JVD  Lungs: Diminished  Heart::  RRR  Abd:  Soft, non tender, non distended, bowel sounds present, ostomy  Ext:  + edema, pulses present  Skin:  Warm and dry, no rashes on visible skin  Psych: non-anxious affect  Neuro: Alert and oriented x 2-3, delayed responses, expressive aphasia, following commands    Objective data reviewed: labs, images, records, medication use, vitals, and chart    Discussed patient and the plan of care with the other IDT members: Floor Nurse, Patient, and Family    Time/Communication  Greater than 50% of time spent, total 35 minutes in counseling and coordination of care at the bedside regarding goals of care, diagnosis and prognosis, and see above.    Thank you for 
                                      This note was generated by the epic EMR system/Dragon speech recognition and may contain inherent errors or omissions not intended by the user. Grammatical errors, random word insertions, deletions, pronoun errors and incomplete sentences are occasional consequences of this technology due to software limitations. Not all errors are caught and corrected. If there are questions or concerns about the content of this note or information contained within the body of this dictation they should be addressed directly with the author for clarification.    Electronically signed by Shan Hinson MD on 5/18/2025 at 8:44 AM      
           Sequencing: Fair +              Problem solving: Fair +              Judgement/safety: Fair              Attention:  Good                 Functional Assessment:  AM-PAC Daily Activity Raw Score: 14/24    Initial Re-Eval Status  Date: 5/20/2025  Treatment Status  Date: 6/6/25 STGs = LTGs  Time frame: 10-14 days   Feeding Set up    SBA  Dysphagia diet, no straws Mod I       Grooming Stand by assist   seated Min A  Washed face and hands  Seated at eob Mod I       UB Dressing Min A   For gown, seated at eob  Min A  [facility] gown while seated EOB Mod I       LB Dressing Max A  Seated eob Max A  [facility] socks Mod I       Bathing Mod A   With sim tasks  Mod A  UB bathing     Max A  LB bathing Mod I       Toileting Total/Dependent  López and ostomy present  Dependent  López  Ostomy Mod I       Bed Mobility  Rolling L/R: Min A    Supine to sit: Min A    Sit to supine: Min A   Supine to sit: Mod A x2  Sit to supine: Mod A x2 Supine to sit: Mod I    Sit to supine: Mod I     Functional Transfers Sit to stand: Min A    Stand to sit: Min A    Stand pivot: Min A  with fww   Sit to stand: Mod A    Stand to sit: Mod A     Sit to stand: Mod I    Stand to sit: Mod I    Stand pivot: Mod I    Functional Mobility Min A  With front wheeled walker  in the room  in order to increase activity tolerance and strength for increased independence in ADLs and IADLs  Mod A w/ w/w  Lateral sidesteps to HOB  Limited by noted fatigue Mod I     Balance Sitting:    Static: Stand by assist     Dynamic: Stand by assist    Standing: Min A   Sitting:    Static: SBA   Dynamic: Min A     Standing:  Mod A w/ w/w Sitting:    Static: Mod I     Dynamic: Mod I    Standing: Mod I     Activity Tolerance Fair  pain  and fatigue   Fair-  Fatigued quickly w/ minimal activity Good      Visual/  Perceptual Glasses: does not wear glasses           Safety Fair  Fair Good during ADL completion    Vitals HR and SPO2 stable throughout session   
  WBC 14.4*  --  10.4 10.7   RBC 2.85*  --  3.21* 3.05*   HGB 7.5* 8.5* 8.5* 8.2*   HCT 22.8* 25.4* 25.8* 25.0*   MCV 80.0  --  80.4 82.0   MCH 26.3  --  26.5 26.9   MCHC 32.9  --  32.9 32.8   RDW 14.7  --  15.1* 15.2*     --  390 372   MPV 9.7  --  10.0 10.1       I/O last 3 completed shifts:  In: 578.3 [P.O.:180; Blood:398.3]  Out: 1100 [Urine:750; Stool:350]  I/O this shift:  In: 0   Out: 600 [Urine:400; Stool:200]    No results found for this or any previous visit.      Principal Problem:    Hydronephrosis  Active Problems:    Hydroureteronephrosis    Acute kidney injury superimposed on CKD    Hyponatremia    JEANNINE (acute kidney injury)    Pelvic mass    Electrolyte abnormality    Infection due to ESBL-producing Klebsiella pneumoniae    Gastrointestinal hemorrhage    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HCC)    Palliative care encounter    Goals of care, counseling/discussion    Dysarthria  Resolved Problems:    * No resolved hospital problems. *        ASSESSMENT AND PLAN:    Right sided pelvic mass  Lower GI bleed  Prolapsed stoma  History of anal carcinoma  P/W malaise, back pain, fall  S/p reduction of stoma of stoma by GS, no revision planned   S/p IR biopsy 5/19  Monitor hemoglobin, stable   Gen surgery following     Recurrent UTI  Urine culture growing ESBL K Pneumonia  Chronic bilateral hydronephrosis, ureteral stunts in situ  Chronic Knutson  Urology on board, s/p Cystopanendoscopy, retrograde pyelogram, bilateral urinary stent exchange. 5/19  Last dose meropenem 5/21 per ID  Exchange knutson [id number]   Urology reconsulted per nephrology for evaluation of ureteral stent failure, may require bilateral nephrostomy tubes.    Acute kidney injury on CKD 4  Hyperkalemia, resolved  Hyponatremia  NAGMA  Obstructive/postrenal  Cr trending down   Urology and nephrology following  Avoid nephrotoxic agents  Sodium chloride tablet  Without significant improvement after 3% NaCl   Monitor kidney function and 
11.0 10.3   RBC 3.02*  --  2.78* 3.00*   HGB 8.1* 8.4* 7.5* 7.9*   HCT 24.9* 26.6* 22.9* 24.8*   MCV 82.5  --  82.4 82.7   MCH 26.8  --  27.0 26.3   MCHC 32.5  --  32.8 31.9*   RDW 15.9*  --  16.1* 16.4*     --  387 420   MPV 10.6  --  10.3 10.7       Labs and imaging reviewed    Assessment:    Principal Problem:    Hydronephrosis  Active Problems:    Hydroureteronephrosis    Acute kidney injury superimposed on CKD    Hyponatremia    JEANNINE (acute kidney injury)    Pelvic mass    Electrolyte abnormality    Infection due to ESBL-producing Klebsiella pneumoniae    Gastrointestinal hemorrhage    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HCC)    Palliative care encounter    Goals of care, counseling/discussion    Dysarthria  Resolved Problems:    * No resolved hospital problems. *      Plan:    Right sided pelvic mass  Lower GI bleed  Prolapsed stoma  History of anal carcinoma  P/W malaise, back pain, fall  S/p reduction of stoma of stoma by GS, no revision planned   S/p IR biopsy 5/19  Monitor hemoglobin, stable   Gen surgery signed off       Recurrent UTI  Urine culture growing ESBL K Pneumonia  Chronic bilateral hydronephrosis, ureteral stunts in situ  Chronic Knutson  Urology on board, s/p Cystopanendoscopy, retrograde pyelogram, bilateral urinary stent exchange. 5/19  Last dose meropenem 5/21 per ID  Exchange knutson [id number]   Urology reconsulted s/p bilateral nephrostomy tubes.  Urology okay for discharge and follow-up outpatient     Acute kidney injury on CKD 4  Hyperkalemia, resolved  Hyponatremia  NAGMA  Obstructive/postrenal  Cr trending down, 6.2 today   Urology and nephrology following  Avoid nephrotoxic agents  Sodium chloride tablet  Monitor kidney function and electrolytes  Optimize electrolytes   bilateral percutaneous nephrostomy tube 5/27     Acute on Chronic Anemia   -xfuse as indicated, keep hgb >7  -Monitor labs      Vitamin D deficiency- supplement vitamin d      Chronic co morbidities   Type 2 diabetes mellitus, 
Knutson  Urology on board, s/p Cystopanendoscopy, retrograde pyelogram, bilateral urinary stent exchange. 5/19  Last dose meropenem 5/21 per ID  Exchange knutson [id number]     Acute kidney injury on CKD 4  Hyperkalemia, resolved  Hyponatremia  NAGMA  Obstructive/postrenal  Cr trending down   Urology and nephrology following  Avoid nephrotoxic agents  On 3% sodium chloride  Sodium chloride tablet  Monitor sodium every 6 hours  Concern for SIADH  Urine electrolytes  Monitor kidney function and electrolytes  Optimize electrolytes     Vitamin D deficiency- supplement vitamin d     Chronic co morbidities   Type 2 diabetes mellitus, CVA, HTN, HLD  Continue sliding scale insulin  Hypoglycemia protocol  Monitor blood sugar    Palliative care was consulted for goals of care discussion, CODE STATUS changed to DNR CCA  Discussed with sister at bedside      DISPOSITION: Pending clinical improvement, on 3% sodium chloride    This note was generated by the epic EMR system/Dragon speech recognition and may contain inherent errors or omissions not intended by the user. Grammatical errors, random word insertions, deletions, pronoun errors and incomplete sentences are occasional consequences of this technology due to software limitations. Not all errors are caught and corrected. If there are questions or concerns about the content of this note or information contained within the body of this dictation they should be addressed directly with the author for clarification.    Electronically signed by Rodrigo Winston MD on 5/24/2025 at 12:50 PM      
counseling/discussion       Gene Graves M.S., CCC-SLP/L   Speech-Language Pathologist  [id number]        
deficit and speech normal        Recent Labs     06/05/25  0455 06/05/25  1923 06/06/25  0459    135 135*   K 3.7 3.7 3.8    96* 98   CO2 18* 18* 21*   * 81* 85*   CREATININE 7.1* 5.0* 5.9*   GLUCOSE 108* 103* 93   CALCIUM 7.9* 8.4* 7.9*       Recent Labs     06/04/25  0506 06/06/25  0459   WBC 10.1 13.4*   RBC 2.71* 2.58*   HGB 7.4* 7.0*   HCT 22.6* 22.4*   MCV 83.4 86.8   MCH 27.3 27.1   MCHC 32.7 31.3*   RDW 16.4* 17.2*    308   MPV 10.5 12.1*       Labs and imaging reviewed    Assessment:    Principal Problem:    Hydronephrosis  Active Problems:    Hydroureteronephrosis    Acute kidney injury superimposed on CKD    Hyponatremia    JEANNINE (acute kidney injury)    Pelvic mass    Electrolyte abnormality    Infection due to ESBL-producing Klebsiella pneumoniae    Gastrointestinal hemorrhage    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HCC)    Palliative care encounter    Goals of care, counseling/discussion    Dysarthria  Resolved Problems:    * No resolved hospital problems. *      Plan:    New Right sided pelvic mass  Newly diagnosed small cell neuroendocrine carcinoma  Elevated   Lower GI bleed  Prolapsed stoma  History of uterine colon carcinoma, 1997  P/W malaise, back pain, fall  S/p reduction of stoma of stoma by GS, no revision planned  No plan for mass resection by GS   CA-125 149  S/p IR biopsy 5/19- small cell neuroendocrine carcinoma   Monitor hemoglobin, stable   Gen surgery signed off   Obtain CT chest to r/o mass  Patient has not seen oncology since the 1990's  Oncology consulted, poor prognosis, terminal, likely within 6 months, caregiver agrees that therapy is not in the best interest of the patient, hospice consulted but caregiver not ready for hospice and wanting rehab.     Recurrent UTI  Urine culture growing ESBL K Pneumonia  Chronic bilateral hydronephrosis, ureteral stunts in situ  Chronic López  Urology on board, s/p Cystopanendoscopy, retrograde pyelogram, bilateral urinary 
following  Avoid nephrotoxic agents  IV fluid per nephrology  Monitor kidney function and electrolytes  Optimize electrolytes     Vitamin D deficiency- supplement vitamin d     Chronic co morbidities   Type 2 diabetes mellitus, CVA, HTN, HLD  Continue sliding scale insulin  Hypoglycemia protocol  Monitor blood sugar    Palliative care was consulted for goals of care discussion, continue full code.      DISPOSITION: Pending clinical improvement, clearance from nephrology                                                   This note was generated by the epic EMR system/Dragon speech recognition and may contain inherent errors or omissions not intended by the user. Grammatical errors, random word insertions, deletions, pronoun errors and incomplete sentences are occasional consequences of this technology due to software limitations. Not all errors are caught and corrected. If there are questions or concerns about the content of this note or information contained within the body of this dictation they should be addressed directly with the author for clarification.    Electronically signed by Rodrigo Winston MD on 5/21/2025 at 2:50 PM      
resolved  Hyponatremia  NAGMA  Obstructive/postrenal  Cr trending down   Urology and nephrology following  Avoid nephrotoxic agents  On 3% sodium chloride  Sodium chloride tablet  Monitor sodium every 6 hours  Concern for SIADH  Urine electrolytes  Monitor kidney function and electrolytes  Optimize electrolytes     Vitamin D deficiency- supplement vitamin d     Chronic co morbidities   Type 2 diabetes mellitus, CVA, HTN, HLD  Continue sliding scale insulin  Hypoglycemia protocol  Monitor blood sugar    Palliative care was consulted for goals of care discussion, CODE STATUS changed to DNR CCA  Discussed with sister at bedside      DISPOSITION: Pending clinical improvement, on 3% sodium chloride    This note was generated by the epic EMR system/Dragon speech recognition and may contain inherent errors or omissions not intended by the user. Grammatical errors, random word insertions, deletions, pronoun errors and incomplete sentences are occasional consequences of this technology due to software limitations. Not all errors are caught and corrected. If there are questions or concerns about the content of this note or information contained within the body of this dictation they should be addressed directly with the author for clarification.    Electronically signed by Rodrigo Winston MD on 5/22/2025 at 2:29 PM      
    Chronic co morbidities   Type 2 diabetes mellitus, CVA, HTN, HLD  Continue sliding scale insulin  Hypoglycemia protocol  Monitor blood sugar    Dysarthria/speech difficulties  Neurology, SLP consulted, appreciate recommendations    Palliative care was consulted for goals of care discussion, CODE STATUS changed to DNR CCA  Discussed with sister, niece at bedside      DISPOSITION: Pending percutaneous nephrostomy tube 5/27    This note was generated by the epic EMR system/Dragon speech recognition and may contain inherent errors or omissions not intended by the user. Grammatical errors, random word insertions, deletions, pronoun errors and incomplete sentences are occasional consequences of this technology due to software limitations. Not all errors are caught and corrected. If there are questions or concerns about the content of this note or information contained within the body of this dictation they should be addressed directly with the author for clarification.    Electronically signed by Rodrigo Winston MD on 5/28/2025 at 2:14 PM      
100 strip 3    Lancets (ONETOUCH DELICA PLUS [id number]) MISC use 1 LANCET to TEST BLOOD SUGAR once daily and if needed 100 each 3       Allergies:    Betadine [povidone iodine], Lisinopril, Penicillins, and Tylenol [acetaminophen]    Social History:     reports that she has never smoked. She has never used smokeless tobacco. She reports that she does not drink alcohol and does not use drugs.    Family History:         Problem Relation Age of Onset    Kidney Disease Mother     High Blood Pressure Mother     Cancer Father     Diabetes Sister     Other Brother      Physical Exam:      Patient Vitals for the past 24 hrs:   BP Temp Temp src Pulse Resp SpO2   05/27/25 0816 117/66 98.4 °F (36.9 °C) Temporal 95 18 93 %   05/27/25 0000 104/73 98.6 °F (37 °C) Oral 91 18 93 %   05/26/25 2115 115/69 98.6 °F (37 °C) Oral 96 19 94 %         Intake/Output Summary (Last 24 hours) at 5/27/2025 1115  Last data filed at 5/27/2025 0549  Gross per 24 hour   Intake --   Output 960 ml   Net -960 ml       General: Awake, alert, no acute distress  Lungs: Clear bilaterally upper, diminished to the bases bilaterally.  Unlabored  CV: Regular rate.    Abd/: Soft, non-tender.  Ostomy  Skin: Warm and dry.    Ext: 1-2+ BLE edema   Neuro: Awake, answers questions appropriately    Data:    Recent Labs     05/25/25  0435 05/26/25  0435 05/27/25  0905   WBC 11.8* 12.7* 15.2*   HGB 7.7* 7.4* 6.8*   HCT 24.4* 23.2* 21.1*   MCV 81.6 80.8 80.2    404 393       Recent Labs     05/25/25  0435 05/25/25  0836 05/25/25  1340 05/26/25  0435 05/27/25  0905   NA  --    < > 121* 122* 121*   K  --    < > 4.6 4.6 4.4   CL  --    < > 86* 86* 86*   CO2  --    < > 20* 19* 17*   CREATININE  --    < > 6.9* 7.4* 7.8*   BUN  --    < > 78* 80* 88*   LABGLOM  --    < > 6* 5* 5*   GLUCOSE  --    < > 155* 113* 109*   CALCIUM  --    < > 8.2* 8.2* 7.8*   PHOS 6.9*  --   --  7.0* 6.9*   MG 2.5*  --   --  2.4 2.4    < > = values in this interval not displayed.       Vit 
Take 1 tablet by mouth every morning 30 tablet 5    melatonin 5 MG TABS tablet Take 2 tablets by mouth nightly 60 tablet 5    denosumab (PROLIA) 60 MG/ML SOSY SC injection Inject 1 mL into the skin every 6 months States next injection due 2/19/25      magnesium oxide (MAG-OX) 400 MG tablet Take 1 tablet by mouth 2 times daily      vitamin D (CHOLECALCIFEROL) 25 MCG (1000 UT) TABS tablet Take 1 tablet by mouth daily      ONETOUCH ULTRA strip use 1 TEST STRIP to TEST BLOOD SUGAR once daily 100 strip 3    Lancets (ONETOUCH DELICA PLUS [id number]) MISC use 1 LANCET to TEST BLOOD SUGAR once daily and if needed 100 each 3       Allergies:    Betadine [povidone iodine], Lisinopril, Penicillins, and Tylenol [acetaminophen]    Social History:     reports that she has never smoked. She has never used smokeless tobacco. She reports that she does not drink alcohol and does not use drugs.    Family History:         Problem Relation Age of Onset    Kidney Disease Mother     High Blood Pressure Mother     Cancer Father     Diabetes Sister     Other Brother      Physical Exam:      Patient Vitals for the past 24 hrs:   BP Temp Temp src Pulse Resp SpO2 Weight   05/31/25 0812 115/70 97.5 °F (36.4 °C) Temporal 94 18 97 % --   05/31/25 0201 -- -- -- -- -- -- 101 kg (222 lb 10.6 oz)   05/30/25 2030 110/68 97.4 °F (36.3 °C) Temporal 96 18 95 % --         Intake/Output Summary (Last 24 hours) at 5/31/2025 1327  Last data filed at 5/31/2025 1305  Gross per 24 hour   Intake 120 ml   Output 950 ml   Net -830 ml       General: Awake, alert, no acute distress  Lungs: Clear bilaterally upper, diminished to the bases bilaterally.  Unlabored  CV: Regular rate.    Abd/: Soft, non-tender.  Ostomy  Skin: Warm and dry.    Ext: 2+ BLE edema   Neuro: Awake, answers questions appropriately    Data:    Recent Labs     05/29/25  0505 05/30/25  0423 05/31/25  0758   WBC 10.7 11.7* 12.5*   HGB 8.2* 8.2* 8.0*   HCT 25.0* 25.1* 24.5*   MCV 82.0 81.5 81.1 
keep hgb >7  -Monitor labs     Vitamin D deficiency- supplement vitamin d     Chronic co morbidities   Type 2 diabetes mellitus, CVA, HTN, HLD  Continue sliding scale insulin  Hypoglycemia protocol  Monitor blood sugar    Dysarthria/speech difficulties  Neurology, SLP consulted, appreciate recommendations    Palliative care was consulted for goals of care discussion, CODE STATUS changed to DNR CCA  Discussed with sister, niece at bedside      DISPOSITION: Pending final plan from nephrology regarding HD     This note was generated by the epic EMR system/Dragon speech recognition and may contain inherent errors or omissions not intended by the user. Grammatical errors, random word insertions, deletions, pronoun errors and incomplete sentences are occasional consequences of this technology due to software limitations. Not all errors are caught and corrected. If there are questions or concerns about the content of this note or information contained within the body of this dictation they should be addressed directly with the author for clarification.    Electronically signed by Rodrigo Winston MD on 5/30/2025 at 12:04 PM      
once daily and if needed 100 each 3       Allergies:    Betadine [povidone iodine], Lisinopril, Penicillins, and Tylenol [acetaminophen]    Social History:     reports that she has never smoked. She has never used smokeless tobacco. She reports that she does not drink alcohol and does not use drugs.    Family History:         Problem Relation Age of Onset    Kidney Disease Mother     High Blood Pressure Mother     Cancer Father     Diabetes Sister     Other Brother      Physical Exam:      Patient Vitals for the past 24 hrs:   BP Temp Temp src Pulse Resp SpO2 Weight   05/22/25 0748 133/75 97.4 °F (36.3 °C) Temporal 90 18 96 % --   05/22/25 0600 -- -- -- -- -- -- 101.9 kg (224 lb 10.4 oz)   05/22/25 0400 133/74 98.1 °F (36.7 °C) Oral 87 18 94 % --   05/22/25 0000 131/75 97.6 °F (36.4 °C) Temporal 85 18 93 % --   05/21/25 1953 123/83 97.5 °F (36.4 °C) Temporal 84 18 94 % --   05/21/25 1241 122/78 97.2 °F (36.2 °C) Temporal 94 18 92 % --         Intake/Output Summary (Last 24 hours) at 5/22/2025 0849  Last data filed at 5/22/2025 0603  Gross per 24 hour   Intake 2138.01 ml   Output 1090 ml   Net 1048.01 ml       General: Awake, alert, no acute distress  Lungs: Diminished breath sounds  CV: S1-S2  Abd/: Soft, non-tender.  Ostomy  Skin: Warm and dry.    Ext: Trace-1+ BLE edema   Neuro: Awake, answers questions appropriately    Data:    Recent Labs     05/21/25 0428 05/22/25  0509   WBC 7.9 9.6   HGB 7.4* 7.7*   HCT 23.3* 24.2*   MCV 82.3 80.7    381       Recent Labs     05/20/25  0512 05/21/25  0428 05/22/25  0509   * 130* 123*   K 4.7 4.5 4.4   CL 93* 94* 86*   CO2 25 25 21*   CREATININE 5.1* 5.0* 4.6*   BUN 65* 66* 62*   LABGLOM 8* 9* 10*   GLUCOSE 133* 120* 136*   CALCIUM 7.5* 7.6* 8.0*   PHOS 3.7 4.0 3.9   MG 2.6* 2.6* 2.4       Vit D, 25-Hydroxy   Date Value Ref Range Status   05/20/2025 24.8 (L) 30.0 - 100.0 ng/mL Final       No results found for: \"PTH\"    Recent Labs     05/20/25  0512 
protocol  Monitor blood sugar     Dysarthria/speech difficulties  Neurology, SLP consulted, appreciate recommendations     Palliative care was consulted for goals of care discussion, CODE STATUS changed to DNR CCA  Discussed with sister, niece at bedside            DISPOSITION:   Pending nephrology clearance.   PT/OT  Sister is the caregiver and plans to discharge home  Urology okay for discharge and follow-up outpatient  Neurology signed off        Code Status: DNR-CCA  DVT/GI Prophylaxis:     Total time 35 minutes spent reviewing chart notes, reviewing treatment plans and prognosis with the patient/caregiver, and documenting in the chart.      NOTE: This report was transcribed using voice recognition software. Every effort was made to ensure accuracy; however, inadvertent computerized transcription errors may be present.  Electronically signed by January Duggan MD on 6/2/2025 at 12:25 PM    
system/Dragon speech recognition and may contain inherent errors or omissions not intended by the user. Grammatical errors, random word insertions, deletions, pronoun errors and incomplete sentences are occasional consequences of this technology due to software limitations. Not all errors are caught and corrected. If there are questions or concerns about the content of this note or information contained within the body of this dictation they should be addressed directly with the author for clarification.    Electronically signed by Rodrigo Winston MD on 5/27/2025 at 3:44 PM      
within reach; all lines and tubes intact.     Patient presents with decreased safety awareness, activity tolerance , balance , coordination, and cognition. Pt demonstrated decreased independence during ADLs, bed mobility , functional transfers, and functional mobility. Pt would benefit from continued skilled OT to increase safety and independence with completion of ADL tasks and functional mobility for improved quality of life and return to PLOF.       Treatment: OT treatment provided this date includes:  ADL- Instruction/training on safety and adapted techniques for completion of ADLs: Therapist facilitated & pt educated on activity modifications/adaptations to promote implementation of fall prevention strategies, EC/WS strategies, & safety awareness throughout ADLs.  Mobility- Instruction/training on safety and improved independence with bed mobility, functional transfers, and functional mobility.  Sitting EOB x 10 minutes to improve dynamic sitting balance and activity tolerance during ADLs.  Activity tolerance- Instruction/training on energy conservation/work simplification for completion of ADLs.  Cognitive retraining - Cues for safety, sequencing, and problem solving.  Skilled positioning/alignment- Therapist facilitated proper positioning/alignment throughout session to maintain skin/joint integrity & proper body mechanics.  Skilled monitoring of vitals- To maximize safe participation throughout functional activities.    Pt appeared to have tolerated session well and appears motivated/cooperative/pleasant. Pt instructed on use of call light for assistance and fall prevention. Pt demo'ing understanding of education provided. Continue to educate.     Rehab Potential: Good  for established goals     LTG: maximize independence and safety with ADLs to return to PLOF    Patient and/or family were instructed on functional diagnosis, prognosis/goals and OT plan of care. Demonstrated fair + understanding.      Asif 
  MG 2.4  --  2.6*  --   --   --  2.5*    < > = values in this interval not displayed.       Vit D, 25-Hydroxy   Date Value Ref Range Status   05/20/2025 24.8 (L) 30.0 - 100.0 ng/mL Final       No results found for: \"PTH\"    Recent Labs     05/23/25  0509   ALT 11   AST 28   ALKPHOS 68   BILITOT 0.6       No results for input(s): \"LABALBU\" in the last 72 hours.    Ferritin   Date Value Ref Range Status   12/22/2022 342 ng/mL Final     Comment:     FERRITIN Reference Ranges:  Adult Males   20 - 60 years:    30 - 400 ng/mL  Adult females 17 - 60 years:    13 - 150 ng/mL  Adults greater than 60 years:   no established reference range  Pediatrics:                     no established reference range       Iron   Date Value Ref Range Status   05/16/2025 34 (L) 37 - 145 ug/dL Final     Comment:     Specimen hemolyzed, results may be adversely affected.     TIBC   Date Value Ref Range Status   05/16/2025 274 250 - 450 ug/dL Final       Vitamin B-12   Date Value Ref Range Status   05/20/2025 365 232 - 1245 pg/mL Final       Folate   Date Value Ref Range Status   05/20/2025 3.9 (L) 4.6 - 34.8 ng/mL Final       Lab Results   Component Value Date/Time    COLORU Yellow 05/17/2025 06:50 AM    NITRU NEGATIVE 05/17/2025 06:50 AM    GLUCOSEU NEGATIVE 05/17/2025 06:50 AM    KETUA NEGATIVE 05/17/2025 06:50 AM    UROBILINOGEN 0.2 05/17/2025 06:50 AM    BILIRUBINUR NEGATIVE 05/17/2025 06:50 AM       Lab Results   Component Value Date/Time    PROTEIN 6.7 05/23/2025 05:09 AM    PROTEIN 6.9 01/03/2023 12:00 AM    OSMOU 261 (L) 05/22/2025 02:15 PM       No components found for: \"URIC\"    No results found for: \"LIPIDPAN\"    Assessment and Plans:    JEANNINE on a likely underlying CKD 4  JEANNINE presumed likely in setting of obstructive uropathy with hydroureteronephrosis  Baseline serum creatinine 3.8 mg/dL from 4/2025  Creatinine peaked at 7.1/BUN 74 on 5/25  CT ABD 5/16-bilateral hydroureteronephrosis, severe R and more mild degree on L w/ bilateral 
8.5*   PHOS 3.9  --  5.3*  --   --   --  6.1*   MG 2.4  --  2.4  --   --   --  2.6*    < > = values in this interval not displayed.       Vit D, 25-Hydroxy   Date Value Ref Range Status   05/20/2025 24.8 (L) 30.0 - 100.0 ng/mL Final       No results found for: \"PTH\"    Recent Labs     05/22/25  0509 05/23/25  0509   ALT 11 11   AST 26 28   ALKPHOS 76 68   BILITOT 0.7 0.6       No results for input(s): \"LABALBU\" in the last 72 hours.    Ferritin   Date Value Ref Range Status   12/22/2022 342 ng/mL Final     Comment:     FERRITIN Reference Ranges:  Adult Males   20 - 60 years:    30 - 400 ng/mL  Adult females 17 - 60 years:    13 - 150 ng/mL  Adults greater than 60 years:   no established reference range  Pediatrics:                     no established reference range       Iron   Date Value Ref Range Status   05/16/2025 34 (L) 37 - 145 ug/dL Final     Comment:     Specimen hemolyzed, results may be adversely affected.     TIBC   Date Value Ref Range Status   05/16/2025 274 250 - 450 ug/dL Final       Vitamin B-12   Date Value Ref Range Status   05/20/2025 365 232 - 1245 pg/mL Final       Folate   Date Value Ref Range Status   05/20/2025 3.9 (L) 4.6 - 34.8 ng/mL Final       Lab Results   Component Value Date/Time    COLORU Yellow 05/17/2025 06:50 AM    NITRU NEGATIVE 05/17/2025 06:50 AM    GLUCOSEU NEGATIVE 05/17/2025 06:50 AM    KETUA NEGATIVE 05/17/2025 06:50 AM    UROBILINOGEN 0.2 05/17/2025 06:50 AM    BILIRUBINUR NEGATIVE 05/17/2025 06:50 AM       Lab Results   Component Value Date/Time    PROTEIN 6.7 05/23/2025 05:09 AM    PROTEIN 6.9 01/03/2023 12:00 AM    OSMOU 261 (L) 05/22/2025 02:15 PM       No components found for: \"URIC\"    No results found for: \"LIPIDPAN\"    Assessment and Plans:    JEANNINE on a likely underlying CKD 4  JEANNINE presumed likely in setting of obstructive uropathy with hydroureteronephrosis  Baseline serum creatinine 3.8 mg/dL from 4/2025  Creatinine peaked at 5.7/BUN 69 on 5/24  CT ABD 
bilaterally upper, diminished to the bases bilaterally.    CV: Regular rate.    Abd/: Soft, non-tender.  Ostomy  Skin: Warm and dry.    Ext: 3+ BLE edema   Neuro: Awake, answers questions appropriately    Data:    Recent Labs     06/04/25  0506   WBC 10.1   HGB 7.4*   HCT 22.6*   MCV 83.4          Recent Labs     06/04/25  0506 06/04/25  1832 06/05/25  0455 06/05/25  1923 06/06/25  0459      < > 137 135 135*   K 3.5   < > 3.7 3.7 3.8   CL 99   < > 100 96* 98   CO2 18*   < > 18* 18* 21*   CREATININE 6.5*   < > 7.1* 5.0* 5.9*   *   < > 119* 81* 85*   LABGLOM 6*   < > 6* 9* 7*   GLUCOSE 111*   < > 108* 103* 93   CALCIUM 7.7*   < > 7.9* 8.4* 7.9*   PHOS 6.5*  --  6.9*  --  5.6*   MG 2.2  --  2.2  --  2.0    < > = values in this interval not displayed.       Vit D, 25-Hydroxy   Date Value Ref Range Status   05/20/2025 24.8 (L) 30.0 - 100.0 ng/mL Final       No results found for: \"PTH\"    No results for input(s): \"ALT\", \"AST\", \"ALKPHOS\", \"BILITOT\", \"BILIDIR\" in the last 72 hours.      No results for input(s): \"LABALBU\" in the last 72 hours.    Ferritin   Date Value Ref Range Status   12/22/2022 342 ng/mL Final     Comment:     FERRITIN Reference Ranges:  Adult Males   20 - 60 years:    30 - 400 ng/mL  Adult females 17 - 60 years:    13 - 150 ng/mL  Adults greater than 60 years:   no established reference range  Pediatrics:                     no established reference range       Iron   Date Value Ref Range Status   05/16/2025 34 (L) 37 - 145 ug/dL Final     Comment:     Specimen hemolyzed, results may be adversely affected.     TIBC   Date Value Ref Range Status   05/16/2025 274 250 - 450 ug/dL Final       Vitamin B-12   Date Value Ref Range Status   05/20/2025 365 232 - 1245 pg/mL Final       Folate   Date Value Ref Range Status   05/20/2025 3.9 (L) 4.6 - 34.8 ng/mL Final       Lab Results   Component Value Date/Time    COLORU Yellow 05/17/2025 06:50 AM    NITRU NEGATIVE 05/17/2025 06:50 AM    
inherent errors or omissions not intended by the user. Grammatical errors, random word insertions, deletions, pronoun errors and incomplete sentences are occasional consequences of this technology due to software limitations. Not all errors are caught and corrected. If there are questions or concerns about the content of this note or information contained within the body of this dictation they should be addressed directly with the author for clarification.    Electronically signed by Shan Hinson MD on 6/9/2025 at 9:11 AM      
on 5/18 was 10.0  FENa 1.56% with FEUrea of 17.04% on repeated urine studies  Was treated with 3% saline infusion yesterday without significant improvement in sodium  Sodium 126 on 5/19--> 124 mmol/L  Serum osmolality 302 and urine osmolality 355 initially  Urine osmolality 261 on repeat  Urine output over the past 24 hours 400 mL  Started on salt tablets 1 g p.o. 3 times daily with meals  Strict I&O  Serum osm high, in part due to high BUN; will repeat labs in am  Given a dose of bumetanide 1 mg IV 5/23  Monitor BMP every 6 hours    5.  Bilateral hydroureteronephrosis/pelvic mass  CT ABD 5/16-bilateral hydroureteronephrosis, severe R and more mild degree on L w/ bilateral ureteral stents, no obstructing kidney stones, new bulky posterior RLQ mass  S/p biopsy 5/19  S/p cystoscopy, bilateral urinary stent exchange 5/19  Will reach out to urology to assess need for nephrostomy tubes    6.  Anemia/history of cancer  Hemoglobin 7.7 g/dL  Defer management to hematology/oncology    7.  Hypertension with CKD  BP goal<130/80  BP at goal  Monitor BPs    8.  Hypophosphatemia now with hyperphosphatemia in the setting of worsening kidney function  and hypocalcemia--> likely in part with hypoalbuminemia  Vitamin D deficiency  Vitamin D 24.8 on 5/20  Phosphorus 6.9 mg/dL  Calcium 7.8 mg/dL   Ionized calcium 1.07 mmol/L on 5/22  On vitamin D supplementation  Monitor labs    Toi Alexandre MD                        
transcribed using voice recognition software. Every effort was made to ensure accuracy; however, inadvertent computerized transcription errors may be present.  Electronically signed by January Duggan MD on 6/8/2025 at 12:07 PM    
  Repeat serum osmo 289 on 5/27, urine osmo 292, urine Cr 144, urine Na 51  TSH 1.74 on 5/18  S/p 3% sodium chloride  On NaCl 1 g oral 3 times daily  Strict I&O  Monitor labs    4.  Bilateral hydroureteronephrosis/pelvic mass  CT ABD 5/16-bilateral hydroureteronephrosis, severe R and more mild degree on L w/ bilateral ureteral stents, no obstructing kidney stones, new bulky posterior RLQ mass  S/p biopsy 5/19  S/p cystoscopy, bilateral urinary stent exchange 5/19  S/p right nephrostomy tube placement 5/27  Defer to urology    5.  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  Hypertension with CKD  BP goal<130/80  BP at goal  Monitor BPs    7. hyperphosphatemia  likely in the setting of JEANNINE/CKD  hypocalcemia--> likely in part with hypoalbuminemia  Vitamin D deficiency  Vitamin D 24.8 on 5/20  Phosphorus 4.4 today  Calcium 8.1 with ionized calcium 1.17 today   Supplement calcium as needed  Low phosphorus diet   on calcium acetate   On vitamin D supplementation  Monitor labs    Reema Lopez, APRN - CNP    Patient seen and examined all key components of the physical performed independently , case discussed with NP, all pertinent labs and radiologic tests personally reviewed agree with above.   Will need further discussion with family regarding goals of care; if plan is long-term dialysis patient will need a tunneled dialysis catheter, IR consult will be required for placement.    Toi Alexandre MD      
goal<130/80  BP at goal  Monitor BPs    8.  Hypophosphatemia   and hypocalcemia--> likely in part with hypoalbuminemia  Vitamin D deficiency  Vitamin D 24.8 on 5/20  Phosphorus 5.3 mg/dL  Calcium 8.3 mg/dL with ionized calcium 1.07 mmol/L  Supplement phosphorus as needed  On vitamin D supplementation  Monitor labs    Phyllis Tomlinson APRN - CNP    Patient seen and examined all key components of the physical performed independently , case discussed with NP, all pertinent labs and radiologic tests personally reviewed agree with above.      Toi Alexandre MD                
labs-BMP every 12 hours    5.  Bilateral hydroureteronephrosis/pelvic mass  CT ABD 5/16-bilateral hydroureteronephrosis, severe R and more mild degree on L w/ bilateral ureteral stents, no obstructing kidney stones, new bulky posterior RLQ mass  S/p biopsy 5/19  S/p cystoscopy, bilateral urinary stent exchange 5/19  S/p right nephrostomy tube placement 5/27  Defer to urology    6.  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  Hypertension with CKD  BP goal<130/80  BP at goal  Monitor BPs    8.  Hypophosphatemia -resolved-now with hyperphosphatemia  Hyperphosphatemia likely in the setting of JEANNINE/CKD  hypocalcemia--> likely in part with hypoalbuminemia  Vitamin D deficiency  Vitamin D 24.8 on 5/20  Phosphorus 5.4 today  Calcium 8.2 with ionized calcium 1.17 today   Supplement calcium as needed  Low phosphorus diet   on calcium acetate   On vitamin D supplementation  Monitor labs    MARTIR Higgins - CNP    Patient seen and examined all key components of the physical performed independently , case discussed with NP, all pertinent labs and radiologic tests personally reviewed agree with above.      Procedure: Hemodialysis    Toi Alexandre MD        
more mild degree on L w/ bilateral ureteral stents, no obstructing kidney stones, new bulky posterior RLQ mass  Urology consulted await stent exchange versus nephrostomy tube placement    6.  Anemia/history of cancer  Hemoglobin 8.1 g/dL  Defer management to hematology/oncology    7.  Hypertension with CKD  BP goal<130/80  BP well-controlled 110s to 130s over 60s to 70s  Monitor BPs        William Maldonado MD  05/18/25  1:47 PM    
new bulky posterior RLQ mass  S/p biopsy 5/19  S/p cystoscopy, bilateral urinary stent exchange 5/19  S/p right nephrostomy tube placement 5/27  Defer to urology    6.  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  Hypertension with CKD  BP goal<130/80  BP at goal  Monitor BPs    8.  Hypophosphatemia -resolved-now with hyperphosphatemia  Hyperphosphatemia likely in the setting of JEANNINE/CKD  hypocalcemia--> likely in part with hypoalbuminemia  Vitamin D deficiency  Vitamin D 24.8 on 5/20  Phosphorus 6.9 today  Calcium 7.9 with ionized calcium 1.1 today   Supplement calcium today  Low phosphorus diet   on calcium acetate   On vitamin D supplementation  Monitor labs    Reema Lopez, APRN - CNP    Patient seen and examined all key components of the physical performed independently , case discussed with NP, all pertinent labs and radiologic tests personally reviewed. Any changes I have to the plan will be documented above. Agree with above.      Winston Molina MD    
posterior RLQ mass  S/p biopsy 5/19  S/p cystoscopy, bilateral urinary stent exchange 5/19  S/p right nephrostomy tube placement 5/27  Defer to urology    6.  Anemia/history of cancer  Hemoglobin 6.8 g/dL on 5/27--> await hemoglobin today  Defer management to hematology/oncology    7.  Hypertension with CKD  BP goal<130/80  BP at goal  Monitor BPs    8.  Hypophosphatemia -resolved-now with hyperphosphatemia  Hyperphosphatemia likely in the setting of JEANNINE/CKD  hypocalcemia--> likely in part with hypoalbuminemia  Vitamin D deficiency  Vitamin D 24.8 on 5/20  Phosphorus 7.5 today  Calcium 7.6 with ionized calcium 1.02 today  Low phosphorus diet   Supplement calcium today  May need to consider starting phosphate binder  On vitamin D supplementation  Monitor labs    MARTIR Higgins - CNP    Patient seen and examined all key components of the physical performed independently , case discussed with NP, all pertinent labs and radiologic tests personally reviewed agree with above.      Toi Alexandre MD        
posterior RLQ mass  S/p biopsy 5/19  S/p cystoscopy, bilateral urinary stent exchange 5/19  S/p right nephrostomy tube placement 5/27  Defer to urology    6.  Anemia/history of cancer  Hemoglobin 6.8 g/dL on 5/27--> hemoglobin 7.5 g/dL today  Defer management to hematology/oncology    7.  Hypertension with CKD  BP goal<130/80  BP at goal  Monitor BPs    8.  Hypophosphatemia -resolved-now with hyperphosphatemia  Hyperphosphatemia likely in the setting of JEANNINE/CKD  hypocalcemia--> likely in part with hypoalbuminemia  Vitamin D deficiency  Vitamin D 24.8 on 5/20  Phosphorus 6.7 today  Calcium 8 with ionized calcium 1.12 today  Low phosphorus diet   Supplement calcium today  May need to consider starting phosphate binder  On vitamin D supplementation  Monitor labs    MARTIR Higgins - CNP    Patient seen and examined all key components of the physical performed independently , case discussed with NP, all pertinent labs and radiologic tests personally reviewed. Any changes I have to the plan will be documented above. Agree with above.      Winston Molina MD    
urinary stent exchange 5/19  S/p right nephrostomy tube placement 5/27  Defer to urology    6.  Anemia/history of cancer  Hemoglobin 6.8 g/dL on 5/27--> hemoglobin 8.2 today  Defer management to hematology/oncology    7.  Hypertension with CKD  BP goal<130/80  BP at goal  Monitor BPs    8.  Hypophosphatemia -resolved-now with hyperphosphatemia  Hyperphosphatemia likely in the setting of JEANNINE/CKD  hypocalcemia--> likely in part with hypoalbuminemia  Vitamin D deficiency  Vitamin D 24.8 on 5/20  Phosphorus 7.5 today  Calcium 7.9 with ionized calcium 1.09 today  Low phosphorus diet   Supplement calcium today  May need to consider starting phosphate binder  On vitamin D supplementation  Monitor labs    Reema Lopez APRN - CNP    Patient seen and examined all key components of the physical performed independently , case discussed with NP, all pertinent labs and radiologic tests personally reviewed agree with above.      Toi Alexandre MD

## 2025-06-10 VITALS
RESPIRATION RATE: 18 BRPM | BODY MASS INDEX: 33.74 KG/M2 | HEART RATE: 100 BPM | DIASTOLIC BLOOD PRESSURE: 76 MMHG | SYSTOLIC BLOOD PRESSURE: 118 MMHG | WEIGHT: 214.95 LBS | HEIGHT: 67 IN | TEMPERATURE: 97.9 F | OXYGEN SATURATION: 100 %

## 2025-06-10 LAB
ANION GAP SERPL CALCULATED.3IONS-SCNC: 15 MMOL/L (ref 7–16)
BASOPHILS # BLD: 0 K/UL (ref 0–0.2)
BASOPHILS NFR BLD: 0 % (ref 0–2)
BUN SERPL-MCNC: 51 MG/DL (ref 8–23)
CA-I BLD-SCNC: 1.18 MMOL/L (ref 1.15–1.33)
CALCIUM SERPL-MCNC: 8.2 MG/DL (ref 8.8–10.2)
CHLORIDE SERPL-SCNC: 100 MMOL/L (ref 98–107)
CO2 SERPL-SCNC: 23 MMOL/L (ref 22–29)
CREAT SERPL-MCNC: 5.1 MG/DL (ref 0.5–1)
EOSINOPHIL # BLD: 0 K/UL (ref 0.05–0.5)
EOSINOPHILS RELATIVE PERCENT: 0 % (ref 0–6)
ERYTHROCYTE [DISTWIDTH] IN BLOOD BY AUTOMATED COUNT: 17 % (ref 11.5–15)
GFR, ESTIMATED: 8 ML/MIN/1.73M2
GLUCOSE BLD-MCNC: 127 MG/DL (ref 74–99)
GLUCOSE BLD-MCNC: 128 MG/DL (ref 74–99)
GLUCOSE BLD-MCNC: 131 MG/DL (ref 74–99)
GLUCOSE SERPL-MCNC: 119 MG/DL (ref 74–99)
HCT VFR BLD AUTO: 22.8 % (ref 34–48)
HGB BLD-MCNC: 7.3 G/DL (ref 11.5–15.5)
LYMPHOCYTES NFR BLD: 0.25 K/UL (ref 1.5–4)
LYMPHOCYTES RELATIVE PERCENT: 3 % (ref 20–42)
MAGNESIUM SERPL-MCNC: 1.9 MG/DL (ref 1.6–2.4)
MCH RBC QN AUTO: 27.3 PG (ref 26–35)
MCHC RBC AUTO-ENTMCNC: 32 G/DL (ref 32–34.5)
MCV RBC AUTO: 85.4 FL (ref 80–99.9)
MONOCYTES NFR BLD: 0.41 K/UL (ref 0.1–0.95)
MONOCYTES NFR BLD: 4 % (ref 2–12)
NEUTROPHILS NFR BLD: 93 % (ref 43–80)
NEUTS SEG NFR BLD: 8.75 K/UL (ref 1.8–7.3)
PHOSPHATE SERPL-MCNC: 3.2 MG/DL (ref 2.5–4.5)
PLATELET # BLD AUTO: 242 K/UL (ref 130–450)
PMV BLD AUTO: 11.3 FL (ref 7–12)
POTASSIUM SERPL-SCNC: 3.4 MMOL/L (ref 3.5–5.1)
RBC # BLD AUTO: 2.67 M/UL (ref 3.5–5.5)
RBC # BLD: ABNORMAL 10*6/UL
SODIUM SERPL-SCNC: 138 MMOL/L (ref 136–145)
WBC OTHER # BLD: 9.4 K/UL (ref 4.5–11.5)

## 2025-06-10 PROCEDURE — 2500000003 HC RX 250 WO HCPCS

## 2025-06-10 PROCEDURE — 6370000000 HC RX 637 (ALT 250 FOR IP): Performed by: STUDENT IN AN ORGANIZED HEALTH CARE EDUCATION/TRAINING PROGRAM

## 2025-06-10 PROCEDURE — 2700000000 HC OXYGEN THERAPY PER DAY

## 2025-06-10 PROCEDURE — 6360000002 HC RX W HCPCS: Performed by: NURSE PRACTITIONER

## 2025-06-10 PROCEDURE — 2500000003 HC RX 250 WO HCPCS: Performed by: NURSE PRACTITIONER

## 2025-06-10 PROCEDURE — 85025 COMPLETE CBC W/AUTO DIFF WBC: CPT

## 2025-06-10 PROCEDURE — 99239 HOSP IP/OBS DSCHRG MGMT >30: CPT | Performed by: INTERNAL MEDICINE

## 2025-06-10 PROCEDURE — 6370000000 HC RX 637 (ALT 250 FOR IP)

## 2025-06-10 PROCEDURE — 83735 ASSAY OF MAGNESIUM: CPT

## 2025-06-10 PROCEDURE — 6370000000 HC RX 637 (ALT 250 FOR IP): Performed by: SURGERY

## 2025-06-10 PROCEDURE — 82962 GLUCOSE BLOOD TEST: CPT

## 2025-06-10 PROCEDURE — 6370000000 HC RX 637 (ALT 250 FOR IP): Performed by: INTERNAL MEDICINE

## 2025-06-10 PROCEDURE — 84100 ASSAY OF PHOSPHORUS: CPT

## 2025-06-10 PROCEDURE — 36415 COLL VENOUS BLD VENIPUNCTURE: CPT

## 2025-06-10 PROCEDURE — 82330 ASSAY OF CALCIUM: CPT

## 2025-06-10 PROCEDURE — 80048 BASIC METABOLIC PNL TOTAL CA: CPT

## 2025-06-10 PROCEDURE — 6370000000 HC RX 637 (ALT 250 FOR IP): Performed by: NURSE PRACTITIONER

## 2025-06-10 RX ORDER — SODIUM BICARBONATE 650 MG/1
1300 TABLET ORAL 3 TIMES DAILY
Qty: 90 TABLET | Refills: 5 | Status: SHIPPED | OUTPATIENT
Start: 2025-06-10

## 2025-06-10 RX ORDER — SODIUM CHLORIDE 1 G/1
1 TABLET ORAL
Qty: 90 TABLET | Refills: 3 | Status: SHIPPED | OUTPATIENT
Start: 2025-06-10

## 2025-06-10 RX ADMIN — CARVEDILOL 6.25 MG: 6.25 TABLET, FILM COATED ORAL at 08:42

## 2025-06-10 RX ADMIN — CALCIUM ACETATE 667 MG: 667 CAPSULE ORAL at 12:30

## 2025-06-10 RX ADMIN — LACTULOSE 30 G: 10 SOLUTION ORAL at 08:42

## 2025-06-10 RX ADMIN — POLYETHYLENE GLYCOL 3350 17 G: 17 POWDER, FOR SOLUTION ORAL at 08:42

## 2025-06-10 RX ADMIN — SODIUM CHLORIDE 1 G: 1 TABLET ORAL at 12:30

## 2025-06-10 RX ADMIN — AMLODIPINE BESYLATE 10 MG: 10 TABLET ORAL at 08:42

## 2025-06-10 RX ADMIN — ONDANSETRON HYDROCHLORIDE 4 MG: 2 SOLUTION INTRAMUSCULAR; INTRAVENOUS at 12:40

## 2025-06-10 RX ADMIN — LORAZEPAM 0.5 MG: 0.5 TABLET ORAL at 08:42

## 2025-06-10 RX ADMIN — CALCITRIOL CAPSULES 0.25 MCG 0.25 MCG: 0.25 CAPSULE ORAL at 08:42

## 2025-06-10 RX ADMIN — ESCITALOPRAM OXALATE 5 MG: 10 TABLET ORAL at 08:43

## 2025-06-10 RX ADMIN — SODIUM CHLORIDE, PRESERVATIVE FREE 10 ML: 5 INJECTION INTRAVENOUS at 08:43

## 2025-06-10 RX ADMIN — FOLIC ACID 1 MG: 1 TABLET ORAL at 08:42

## 2025-06-10 RX ADMIN — Medication 1000 UNITS: at 08:42

## 2025-06-10 RX ADMIN — ERGOCALCIFEROL 50000 UNITS: 1.25 CAPSULE ORAL at 08:43

## 2025-06-10 RX ADMIN — SODIUM CHLORIDE 1 G: 1 TABLET ORAL at 08:42

## 2025-06-10 RX ADMIN — SODIUM BICARBONATE 1300 MG: 650 TABLET ORAL at 08:42

## 2025-06-10 RX ADMIN — CALCIUM ACETATE 667 MG: 667 CAPSULE ORAL at 08:42

## 2025-06-10 NOTE — PLAN OF CARE
Problem: Safety - Adult  Goal: Free from fall injury  5/18/2025 1323 by Tena Baldwin RN  Outcome: Progressing  5/18/2025 0110 by Aleida England RN  Outcome: Progressing     Problem: Chronic Conditions and Co-morbidities  Goal: Patient's chronic conditions and co-morbidity symptoms are monitored and maintained or improved  Outcome: Progressing     Problem: Skin/Tissue Integrity  Goal: Skin integrity remains intact  Description: 1.  Monitor for areas of redness and/or skin breakdown2.  Assess vascular access sites hourly3.  Every 4-6 hours minimum:  Change oxygen saturation probe site4.  Every 4-6 hours:  If on nasal continuous positive airway pressure, respiratory therapy assess nares and determine need for appliance change or resting period  5/18/2025 1323 by Tena Baldwin RN  Outcome: Progressing  5/18/2025 0110 by Aleida England RN  Outcome: Progressing     Problem: ABCDS Injury Assessment  Goal: Absence of physical injury  5/18/2025 1323 by Tena Baldwin RN  Outcome: Progressing  5/18/2025 0110 by Aleida England RN  Outcome: Progressing     
  Problem: Safety - Adult  Goal: Free from fall injury  5/18/2025 2319 by Gloria Head RN  Outcome: Progressing  5/18/2025 1323 by Tena Baldwin RN  Outcome: Progressing     Problem: Chronic Conditions and Co-morbidities  Goal: Patient's chronic conditions and co-morbidity symptoms are monitored and maintained or improved  5/18/2025 2319 by Gloria Head RN  Outcome: Progressing  5/18/2025 1323 by Tena Baldwin RN  Outcome: Progressing     Problem: Skin/Tissue Integrity  Goal: Skin integrity remains intact  Description: 1.  Monitor for areas of redness and/or skin breakdown2.  Assess vascular access sites hourly3.  Every 4-6 hours minimum:  Change oxygen saturation probe site4.  Every 4-6 hours:  If on nasal continuous positive airway pressure, respiratory therapy assess nares and determine need for appliance change or resting period  5/18/2025 2319 by Gloria Head RN  Outcome: Progressing  5/18/2025 1323 by Tena Baldwin RN  Outcome: Progressing     Problem: ABCDS Injury Assessment  Goal: Absence of physical injury  5/18/2025 2319 by Gloria Head RN  Outcome: Progressing  5/18/2025 1323 by Tena Baldwin RN  Outcome: Progressing     
  Problem: Safety - Adult  Goal: Free from fall injury  5/19/2025 1252 by Tena Baldwin RN  Outcome: Progressing  5/18/2025 2319 by Gloria Head RN  Outcome: Progressing     Problem: Chronic Conditions and Co-morbidities  Goal: Patient's chronic conditions and co-morbidity symptoms are monitored and maintained or improved  5/19/2025 1252 by Tena Baldwin RN  Outcome: Progressing  5/18/2025 2319 by Gloria Head RN  Outcome: Progressing     Problem: Skin/Tissue Integrity  Goal: Skin integrity remains intact  Description: 1.  Monitor for areas of redness and/or skin breakdown2.  Assess vascular access sites hourly3.  Every 4-6 hours minimum:  Change oxygen saturation probe site4.  Every 4-6 hours:  If on nasal continuous positive airway pressure, respiratory therapy assess nares and determine need for appliance change or resting period  5/19/2025 1252 by Tena Baldwin RN  Outcome: Progressing  5/18/2025 2319 by Gloria Head RN  Outcome: Progressing     Problem: ABCDS Injury Assessment  Goal: Absence of physical injury  5/19/2025 1252 by Tena Baldwin RN  Outcome: Progressing  5/18/2025 2319 by Gloria Head RN  Outcome: Progressing     
  Problem: Safety - Adult  Goal: Free from fall injury  5/20/2025 0021 by Isra Iqbal RN  Outcome: Progressing  5/19/2025 1252 by Tena Baldwin RN  Outcome: Progressing     Problem: Chronic Conditions and Co-morbidities  Goal: Patient's chronic conditions and co-morbidity symptoms are monitored and maintained or improved  5/20/2025 0021 by Isra Iqbal RN  Outcome: Progressing  5/19/2025 1252 by Tena Baldwin RN  Outcome: Progressing     Problem: Skin/Tissue Integrity  Goal: Skin integrity remains intact  Description: 1.  Monitor for areas of redness and/or skin breakdown2.  Assess vascular access sites hourly3.  Every 4-6 hours minimum:  Change oxygen saturation probe site4.  Every 4-6 hours:  If on nasal continuous positive airway pressure, respiratory therapy assess nares and determine need for appliance change or resting period  5/20/2025 0021 by Isra Iqbal RN  Outcome: Progressing  5/19/2025 1252 by Tena Baldwin RN  Outcome: Progressing     Problem: ABCDS Injury Assessment  Goal: Absence of physical injury  5/19/2025 1252 by Tena Baldwin RN  Outcome: Progressing     
  Problem: Safety - Adult  Goal: Free from fall injury  5/20/2025 1128 by Mattie Naqvi RN  Outcome: Progressing     Problem: Chronic Conditions and Co-morbidities  Goal: Patient's chronic conditions and co-morbidity symptoms are monitored and maintained or improved  5/20/2025 1128 by Mattie Naqvi RN  Outcome: Progressing     Problem: Skin/Tissue Integrity  Goal: Skin integrity remains intact  Description: 1.  Monitor for areas of redness and/or skin breakdown2.  Assess vascular access sites hourly3.  Every 4-6 hours minimum:  Change oxygen saturation probe site4.  Every 4-6 hours:  If on nasal continuous positive airway pressure, respiratory therapy assess nares and determine need for appliance change or resting period  5/20/2025 1128 by Mattie Naqvi RN  Outcome: Progressing     Problem: ABCDS Injury Assessment  Goal: Absence of physical injury  Outcome: Progressing     Problem: Discharge Planning  Goal: Discharge to home or other facility with appropriate resources  Outcome: Progressing     
  Problem: Safety - Adult  Goal: Free from fall injury  5/20/2025 2304 by Isra Iqbal RN  Outcome: Progressing  5/20/2025 1128 by Mattie Naqvi RN  Outcome: Progressing     Problem: Chronic Conditions and Co-morbidities  Goal: Patient's chronic conditions and co-morbidity symptoms are monitored and maintained or improved  5/20/2025 2304 by Isra Iqbal RN  Outcome: Progressing  5/20/2025 1128 by Mattie Naqvi RN  Outcome: Progressing     Problem: Skin/Tissue Integrity  Goal: Skin integrity remains intact  Description: 1.  Monitor for areas of redness and/or skin breakdown2.  Assess vascular access sites hourly3.  Every 4-6 hours minimum:  Change oxygen saturation probe site4.  Every 4-6 hours:  If on nasal continuous positive airway pressure, respiratory therapy assess nares and determine need for appliance change or resting period  5/20/2025 2304 by Isra Iqbal RN  Outcome: Progressing  Flowsheets (Taken 5/20/2025 1129 by Mattie Naqvi RN)  Skin Integrity Remains Intact: Monitor for areas of redness and/or skin breakdown  5/20/2025 1128 by Mattie Naqvi RN  Outcome: Progressing     Problem: Discharge Planning  Goal: Discharge to home or other facility with appropriate resources  5/20/2025 2304 by Isra Iqbal RN  Outcome: Progressing  5/20/2025 1128 by Mattie Naqvi RN  Outcome: Progressing     
  Problem: Safety - Adult  Goal: Free from fall injury  5/21/2025 0940 by Mattie Naqvi RN  Outcome: Progressing     Problem: Chronic Conditions and Co-morbidities  Goal: Patient's chronic conditions and co-morbidity symptoms are monitored and maintained or improved  5/21/2025 0940 by Mattie Naqvi RN  Outcome: Progressing     Problem: Skin/Tissue Integrity  Goal: Skin integrity remains intact  Description: 1.  Monitor for areas of redness and/or skin breakdown2.  Assess vascular access sites hourly3.  Every 4-6 hours minimum:  Change oxygen saturation probe site4.  Every 4-6 hours:  If on nasal continuous positive airway pressure, respiratory therapy assess nares and determine need for appliance change or resting period  5/21/2025 0940 by Mattie Naqvi RN  Outcome: Progressing     Problem: ABCDS Injury Assessment  Goal: Absence of physical injury  Outcome: Progressing     Problem: Discharge Planning  Goal: Discharge to home or other facility with appropriate resources  5/21/2025 0940 by Mattie Naqvi RN  Outcome: Progressing     
  Problem: Safety - Adult  Goal: Free from fall injury  5/23/2025 0144 by Susy Parson RN  Outcome: Progressing  5/22/2025 1814 by Rosangela Monreal RN  Outcome: Progressing  Flowsheets (Taken 5/22/2025 0915)  Free From Fall Injury: Instruct family/caregiver on patient safety     Problem: Chronic Conditions and Co-morbidities  Goal: Patient's chronic conditions and co-morbidity symptoms are monitored and maintained or improved  5/23/2025 0144 by Susy Parson RN  Outcome: Progressing  5/22/2025 1814 by Rosangela Monreal RN  Outcome: Progressing     Problem: Skin/Tissue Integrity  Goal: Skin integrity remains intact  Description: 1.  Monitor for areas of redness and/or skin breakdown2.  Assess vascular access sites hourly3.  Every 4-6 hours minimum:  Change oxygen saturation probe site4.  Every 4-6 hours:  If on nasal continuous positive airway pressure, respiratory therapy assess nares and determine need for appliance change or resting period  5/23/2025 0144 by Susy Parson RN  Outcome: Progressing  5/22/2025 1814 by Rosangela Monreal RN  Outcome: Progressing  Flowsheets (Taken 5/22/2025 0915)  Skin Integrity Remains Intact: Monitor for areas of redness and/or skin breakdown     Problem: ABCDS Injury Assessment  Goal: Absence of physical injury  Outcome: Progressing     
  Problem: Safety - Adult  Goal: Free from fall injury  5/24/2025 1026 by Tessie Correa RN  Outcome: Progressing  5/24/2025 0311 by Nicole Barraza RN  Outcome: Progressing     Problem: Chronic Conditions and Co-morbidities  Goal: Patient's chronic conditions and co-morbidity symptoms are monitored and maintained or improved  5/24/2025 1026 by Tessie Correa RN  Outcome: Progressing  5/24/2025 0311 by Nicole Barraza RN  Outcome: Progressing     Problem: Skin/Tissue Integrity  Goal: Skin integrity remains intact  Description: 1.  Monitor for areas of redness and/or skin breakdown2.  Assess vascular access sites hourly3.  Every 4-6 hours minimum:  Change oxygen saturation probe site4.  Every 4-6 hours:  If on nasal continuous positive airway pressure, respiratory therapy assess nares and determine need for appliance change or resting period  5/24/2025 1026 by Tessie Correa RN  Outcome: Progressing  5/24/2025 0311 by Nicole Barraza RN  Outcome: Progressing     Problem: ABCDS Injury Assessment  Goal: Absence of physical injury  5/24/2025 1026 by Tessie Correa RN  Outcome: Progressing  5/24/2025 0311 by Nicole Barraza RN  Outcome: Progressing     Problem: Discharge Planning  Goal: Discharge to home or other facility with appropriate resources  5/24/2025 1026 by Tessie Correa RN  Outcome: Progressing  5/24/2025 0311 by Nicole Barraza RN  Outcome: Progressing     
  Problem: Safety - Adult  Goal: Free from fall injury  5/25/2025 1216 by Tena Baldwin RN  Outcome: Progressing  5/25/2025 0320 by Nicole Barraza RN  Outcome: Progressing     Problem: Chronic Conditions and Co-morbidities  Goal: Patient's chronic conditions and co-morbidity symptoms are monitored and maintained or improved  5/25/2025 1216 by Tena Baldwin RN  Outcome: Progressing  5/25/2025 0320 by Nicole Barraza RN  Outcome: Progressing     Problem: Skin/Tissue Integrity  Goal: Skin integrity remains intact  Description: 1.  Monitor for areas of redness and/or skin breakdown2.  Assess vascular access sites hourly3.  Every 4-6 hours minimum:  Change oxygen saturation probe site4.  Every 4-6 hours:  If on nasal continuous positive airway pressure, respiratory therapy assess nares and determine need for appliance change or resting period  5/25/2025 1216 by Tena Baldwin RN  Outcome: Progressing  5/25/2025 0320 by Nicole Barraza RN  Outcome: Progressing     Problem: ABCDS Injury Assessment  Goal: Absence of physical injury  5/25/2025 0320 by Nicole Barraza RN  Outcome: Progressing     Problem: Discharge Planning  Goal: Discharge to home or other facility with appropriate resources  5/25/2025 0320 by Nicole Barraza RN  Outcome: Progressing     
  Problem: Safety - Adult  Goal: Free from fall injury  5/25/2025 2246 by Fer Saavedra RN  Outcome: Progressing  5/25/2025 1216 by Tena Baldwin RN  Outcome: Progressing     Problem: Chronic Conditions and Co-morbidities  Goal: Patient's chronic conditions and co-morbidity symptoms are monitored and maintained or improved  5/25/2025 2246 by Fer Saavedra RN  Outcome: Progressing  5/25/2025 1216 by Tena Baldwin RN  Outcome: Progressing     Problem: Skin/Tissue Integrity  Goal: Skin integrity remains intact  Description: 1.  Monitor for areas of redness and/or skin breakdown2.  Assess vascular access sites hourly3.  Every 4-6 hours minimum:  Change oxygen saturation probe site4.  Every 4-6 hours:  If on nasal continuous positive airway pressure, respiratory therapy assess nares and determine need for appliance change or resting period  5/25/2025 2246 by Fer Saavedra RN  Outcome: Progressing  5/25/2025 1216 by Tena Baldwin RN  Outcome: Progressing     Problem: ABCDS Injury Assessment  Goal: Absence of physical injury  Outcome: Progressing     Problem: Discharge Planning  Goal: Discharge to home or other facility with appropriate resources  Outcome: Progressing     
  Problem: Safety - Adult  Goal: Free from fall injury  5/26/2025 1034 by Tena Baldwin RN  Outcome: Progressing  5/25/2025 2246 by Fer Saavedra RN  Outcome: Progressing     Problem: Chronic Conditions and Co-morbidities  Goal: Patient's chronic conditions and co-morbidity symptoms are monitored and maintained or improved  5/25/2025 2246 by Fer Saavedra RN  Outcome: Progressing     Problem: Skin/Tissue Integrity  Goal: Skin integrity remains intact  Description: 1.  Monitor for areas of redness and/or skin breakdown2.  Assess vascular access sites hourly3.  Every 4-6 hours minimum:  Change oxygen saturation probe site4.  Every 4-6 hours:  If on nasal continuous positive airway pressure, respiratory therapy assess nares and determine need for appliance change or resting period  5/26/2025 1034 by Tena Baldwin RN  Outcome: Progressing  5/25/2025 2246 by Fer Saavedra RN  Outcome: Progressing     Problem: ABCDS Injury Assessment  Goal: Absence of physical injury  5/26/2025 1034 by Tena Baldwin RN  Outcome: Progressing  5/25/2025 2246 by Fer Saavedra RN  Outcome: Progressing     Problem: Discharge Planning  Goal: Discharge to home or other facility with appropriate resources  5/25/2025 2246 by Fer Saavedra RN  Outcome: Progressing     
  Problem: Safety - Adult  Goal: Free from fall injury  5/26/2025 2150 by Zara Chisholm RN  Outcome: Progressing  5/26/2025 1034 by Tena Baldwin RN  Outcome: Progressing     Problem: Chronic Conditions and Co-morbidities  Goal: Patient's chronic conditions and co-morbidity symptoms are monitored and maintained or improved  Outcome: Progressing     Problem: Skin/Tissue Integrity  Goal: Skin integrity remains intact  Description: 1.  Monitor for areas of redness and/or skin breakdown2.  Assess vascular access sites hourly3.  Every 4-6 hours minimum:  Change oxygen saturation probe site4.  Every 4-6 hours:  If on nasal continuous positive airway pressure, respiratory therapy assess nares and determine need for appliance change or resting period  5/26/2025 2150 by Zara Chisholm RN  Outcome: Progressing  5/26/2025 1034 by Tena Baldwin, RN  Outcome: Progressing     Problem: ABCDS Injury Assessment  Goal: Absence of physical injury  5/26/2025 2150 by Zara Chisholm RN  Outcome: Progressing  5/26/2025 1034 by Tena Baldwin RN  Outcome: Progressing     Problem: Discharge Planning  Goal: Discharge to home or other facility with appropriate resources  Outcome: Progressing     
  Problem: Safety - Adult  Goal: Free from fall injury  5/28/2025 0924 by Vandana Rivera RN  Outcome: Progressing  5/27/2025 2329 by Dale Tyson RN  Outcome: Progressing  5/27/2025 1949 by Joe Corrales RN  Outcome: Progressing     Problem: Chronic Conditions and Co-morbidities  Goal: Patient's chronic conditions and co-morbidity symptoms are monitored and maintained or improved  5/28/2025 0924 by Vandana Rivera RN  Outcome: Progressing  5/27/2025 2329 by Dale Tyson RN  Outcome: Progressing  5/27/2025 1949 by Joe Corrales RN  Outcome: Progressing     Problem: Skin/Tissue Integrity  Goal: Skin integrity remains intact  Description: 1.  Monitor for areas of redness and/or skin breakdown2.  Assess vascular access sites hourly3.  Every 4-6 hours minimum:  Change oxygen saturation probe site4.  Every 4-6 hours:  If on nasal continuous positive airway pressure, respiratory therapy assess nares and determine need for appliance change or resting period  5/28/2025 0924 by Vandana Rivera RN  Outcome: Progressing  5/27/2025 2329 by Dale Tyson RN  Outcome: Progressing  5/27/2025 1949 by Joe Corrales RN  Outcome: Progressing     Problem: ABCDS Injury Assessment  Goal: Absence of physical injury  5/28/2025 0924 by Vandana Rivera RN  Outcome: Progressing  5/27/2025 2329 by Dale Tyson RN  Outcome: Progressing  5/27/2025 1949 by Joe Corrales RN  Outcome: Progressing     Problem: Discharge Planning  Goal: Discharge to home or other facility with appropriate resources  5/28/2025 0924 by Vandana Rivera RN  Outcome: Progressing  5/27/2025 1949 by Joe Corrales RN  Outcome: Progressing     
  Problem: Safety - Adult  Goal: Free from fall injury  5/28/2025 2238 by Ilia Borrego RN  Outcome: Progressing  5/28/2025 0924 by Vandana Rivera RN  Outcome: Progressing     Problem: Chronic Conditions and Co-morbidities  Goal: Patient's chronic conditions and co-morbidity symptoms are monitored and maintained or improved  5/28/2025 2238 by Ilia Borrego RN  Outcome: Progressing  5/28/2025 0924 by Vandana Rivera RN  Outcome: Progressing     Problem: Skin/Tissue Integrity  Goal: Skin integrity remains intact  Description: 1.  Monitor for areas of redness and/or skin breakdown2.  Assess vascular access sites hourly3.  Every 4-6 hours minimum:  Change oxygen saturation probe site4.  Every 4-6 hours:  If on nasal continuous positive airway pressure, respiratory therapy assess nares and determine need for appliance change or resting period  5/28/2025 2238 by Ilia Borrego RN  Outcome: Progressing  5/28/2025 0924 by Vandana Rivera RN  Outcome: Progressing     Problem: ABCDS Injury Assessment  Goal: Absence of physical injury  5/28/2025 2238 by Ilia Borrego RN  Outcome: Progressing  5/28/2025 0924 by Vandana Rivera RN  Outcome: Progressing     Problem: Discharge Planning  Goal: Discharge to home or other facility with appropriate resources  5/28/2025 2238 by Ilia Borrego RN  Outcome: Progressing  5/28/2025 0924 by Vandana Rivera RN  Outcome: Progressing     
  Problem: Safety - Adult  Goal: Free from fall injury  5/29/2025 0913 by Mattie Naqvi RN  Outcome: Progressing     Problem: Chronic Conditions and Co-morbidities  Goal: Patient's chronic conditions and co-morbidity symptoms are monitored and maintained or improved  5/29/2025 0913 by Mattie Naqvi RN  Outcome: Progressing     Problem: Skin/Tissue Integrity  Goal: Skin integrity remains intact  Description: 1.  Monitor for areas of redness and/or skin breakdown2.  Assess vascular access sites hourly3.  Every 4-6 hours minimum:  Change oxygen saturation probe site4.  Every 4-6 hours:  If on nasal continuous positive airway pressure, respiratory therapy assess nares and determine need for appliance change or resting period  5/29/2025 0913 by Mattie Naqvi RN  Outcome: Progressing     Problem: ABCDS Injury Assessment  Goal: Absence of physical injury  5/29/2025 0913 by Mattie Naqvi RN  Outcome: Progressing     Problem: Discharge Planning  Goal: Discharge to home or other facility with appropriate resources  5/29/2025 0913 by Mattie Naqvi RN  Outcome: Progressing     
  Problem: Safety - Adult  Goal: Free from fall injury  5/29/2025 2221 by Ilai Borrego RN  Outcome: Progressing  5/29/2025 0913 by Mattie Naqvi RN  Outcome: Progressing     Problem: Chronic Conditions and Co-morbidities  Goal: Patient's chronic conditions and co-morbidity symptoms are monitored and maintained or improved  5/29/2025 2221 by Ilia Borrego RN  Outcome: Progressing  5/29/2025 0913 by Mattie Naqvi RN  Outcome: Progressing     Problem: Skin/Tissue Integrity  Goal: Skin integrity remains intact  Description: 1.  Monitor for areas of redness and/or skin breakdown2.  Assess vascular access sites hourly3.  Every 4-6 hours minimum:  Change oxygen saturation probe site4.  Every 4-6 hours:  If on nasal continuous positive airway pressure, respiratory therapy assess nares and determine need for appliance change or resting period  5/29/2025 2221 by Ilia Borrego RN  Outcome: Progressing  5/29/2025 0913 by Mattie Naqvi RN  Outcome: Progressing  Flowsheets (Taken 5/29/2025 0913)  Skin Integrity Remains Intact: Monitor for areas of redness and/or skin breakdown     Problem: ABCDS Injury Assessment  Goal: Absence of physical injury  5/29/2025 2221 by Ilia Borrego RN  Outcome: Progressing  5/29/2025 0913 by Mattie Naqvi RN  Outcome: Progressing     Problem: Discharge Planning  Goal: Discharge to home or other facility with appropriate resources  5/29/2025 2221 by Ilia Borrego RN  Outcome: Progressing  5/29/2025 0913 by Mattie Naqvi RN  Outcome: Progressing     
  Problem: Safety - Adult  Goal: Free from fall injury  5/31/2025 0117 by Nicole Barraza RN  Outcome: Progressing  5/30/2025 1901 by Rosangela Monreal RN  Outcome: Progressing  Flowsheets (Taken 5/30/2025 0831)  Free From Fall Injury: Instruct family/caregiver on patient safety     Problem: Chronic Conditions and Co-morbidities  Goal: Patient's chronic conditions and co-morbidity symptoms are monitored and maintained or improved  5/31/2025 0117 by Nicole Barraza RN  Outcome: Progressing  5/30/2025 1901 by Rosangela Monreal RN  Outcome: Progressing     Problem: Skin/Tissue Integrity  Goal: Skin integrity remains intact  Description: 1.  Monitor for areas of redness and/or skin breakdown2.  Assess vascular access sites hourly3.  Every 4-6 hours minimum:  Change oxygen saturation probe site4.  Every 4-6 hours:  If on nasal continuous positive airway pressure, respiratory therapy assess nares and determine need for appliance change or resting period  Outcome: Progressing     Problem: ABCDS Injury Assessment  Goal: Absence of physical injury  Outcome: Progressing     Problem: Discharge Planning  Goal: Discharge to home or other facility with appropriate resources  Outcome: Progressing     
  Problem: Safety - Adult  Goal: Free from fall injury  6/1/2025 0257 by Nicole Barraza RN  Outcome: Progressing  5/31/2025 1651 by Tanya Fernandez RN  Outcome: Progressing     Problem: Chronic Conditions and Co-morbidities  Goal: Patient's chronic conditions and co-morbidity symptoms are monitored and maintained or improved  6/1/2025 0257 by Nicole Barraza RN  Outcome: Progressing  5/31/2025 1651 by Tanya Fernandez RN  Outcome: Progressing     Problem: Skin/Tissue Integrity  Goal: Skin integrity remains intact  Description: 1.  Monitor for areas of redness and/or skin breakdown2.  Assess vascular access sites hourly3.  Every 4-6 hours minimum:  Change oxygen saturation probe site4.  Every 4-6 hours:  If on nasal continuous positive airway pressure, respiratory therapy assess nares and determine need for appliance change or resting period  6/1/2025 0257 by Nicole Barraza RN  Outcome: Progressing  5/31/2025 1651 by Tanya Fernandez RN  Outcome: Progressing     Problem: ABCDS Injury Assessment  Goal: Absence of physical injury  6/1/2025 0257 by Nicole Barraza RN  Outcome: Progressing  5/31/2025 1651 by Tanya Fernandez RN  Outcome: Progressing     Problem: Discharge Planning  Goal: Discharge to home or other facility with appropriate resources  6/1/2025 0257 by Nicole Barraza RN  Outcome: Progressing  5/31/2025 1651 by Tanya Fernandez RN  Outcome: Progressing     
  Problem: Safety - Adult  Goal: Free from fall injury  6/10/2025 0107 by Katiana Serrano, RN  Outcome: Progressing  6/9/2025 1112 by Renetta Kohler, RN  Outcome: Progressing     
  Problem: Safety - Adult  Goal: Free from fall injury  6/10/2025 1348 by Masha Roy RN  Outcome: Progressing  6/10/2025 0107 by Katiana Serrano RN  Outcome: Progressing     Problem: Chronic Conditions and Co-morbidities  Goal: Patient's chronic conditions and co-morbidity symptoms are monitored and maintained or improved  6/10/2025 1348 by Masha Roy RN  Outcome: Progressing  6/10/2025 0107 by Katiana Serrano RN  Outcome: Progressing     Problem: Skin/Tissue Integrity  Goal: Skin integrity remains intact  Description: 1.  Monitor for areas of redness and/or skin breakdown2.  Assess vascular access sites hourly3.  Every 4-6 hours minimum:  Change oxygen saturation probe site4.  Every 4-6 hours:  If on nasal continuous positive airway pressure, respiratory therapy assess nares and determine need for appliance change or resting period  6/10/2025 1348 by Masha Roy RN  Outcome: Progressing  6/10/2025 0107 by Katiana Serrano RN  Outcome: Progressing     Problem: ABCDS Injury Assessment  Goal: Absence of physical injury  6/10/2025 1348 by Masha Roy RN  Outcome: Progressing  6/10/2025 0107 by Katinaa Serrano RN  Outcome: Progressing     Problem: Discharge Planning  Goal: Discharge to home or other facility with appropriate resources  6/10/2025 1348 by Masha Roy RN  Outcome: Progressing  6/10/2025 0107 by Katiana Serrano RN  Outcome: Progressing     Problem: Genitourinary - Adult  Goal: Absence of urinary retention  6/10/2025 1348 by Masha Roy RN  Outcome: Progressing  6/10/2025 0107 by Katiana Serrano RN  Outcome: Progressing  Goal: Urinary catheter remains patent  6/10/2025 1348 by Masha Roy RN  Outcome: Progressing  6/10/2025 0107 by Katiana Serrano RN  Outcome: Progressing     
  Problem: Safety - Adult  Goal: Free from fall injury  6/3/2025 0930 by Cherri Romo RN  Outcome: Progressing  6/2/2025 2037 by Tessie Correa RN  Outcome: Progressing     Problem: Chronic Conditions and Co-morbidities  Goal: Patient's chronic conditions and co-morbidity symptoms are monitored and maintained or improved  6/3/2025 0930 by Cherri Romo RN  Outcome: Progressing  6/2/2025 2037 by Tessie Correa RN  Outcome: Progressing     Problem: Skin/Tissue Integrity  Goal: Skin integrity remains intact  Description: 1.  Monitor for areas of redness and/or skin breakdown2.  Assess vascular access sites hourly3.  Every 4-6 hours minimum:  Change oxygen saturation probe site4.  Every 4-6 hours:  If on nasal continuous positive airway pressure, respiratory therapy assess nares and determine need for appliance change or resting period  6/3/2025 0930 by Cherri Romo RN  Outcome: Progressing  6/2/2025 2037 by Tessie Correa RN  Outcome: Progressing     Problem: ABCDS Injury Assessment  Goal: Absence of physical injury  6/3/2025 0930 by Cherri Romo RN  Outcome: Progressing  6/2/2025 2037 by Tessie Correa RN  Outcome: Progressing     Problem: Discharge Planning  Goal: Discharge to home or other facility with appropriate resources  6/3/2025 0930 by Cherri Romo RN  Outcome: Progressing  6/2/2025 2037 by Tessie Correa RN  Outcome: Progressing     Problem: Pain  Goal: Verbalizes/displays adequate comfort level or baseline comfort level  6/3/2025 0930 by Cherri Romo RN  Outcome: Progressing  6/2/2025 2037 by Tessie Correa RN  Outcome: Progressing  Flowsheets (Taken 6/2/2025 2000)  Verbalizes/displays adequate comfort level or baseline comfort level: Encourage patient to monitor pain and request assistance     
  Problem: Safety - Adult  Goal: Free from fall injury  6/4/2025 1110 by Tena Baldwin RN  Outcome: Progressing  6/4/2025 0254 by Mary Perez RN  Outcome: Progressing     Problem: Chronic Conditions and Co-morbidities  Goal: Patient's chronic conditions and co-morbidity symptoms are monitored and maintained or improved  6/4/2025 0254 by Mary Perez RN  Outcome: Progressing     Problem: Skin/Tissue Integrity  Goal: Skin integrity remains intact  Description: 1.  Monitor for areas of redness and/or skin breakdown2.  Assess vascular access sites hourly3.  Every 4-6 hours minimum:  Change oxygen saturation probe site4.  Every 4-6 hours:  If on nasal continuous positive airway pressure, respiratory therapy assess nares and determine need for appliance change or resting period  6/4/2025 1110 by Tena Baldwin RN  Outcome: Progressing  6/4/2025 0254 by Mary Perez RN  Outcome: Progressing     Problem: ABCDS Injury Assessment  Goal: Absence of physical injury  6/4/2025 1110 by Tena Baldwin RN  Outcome: Progressing  6/4/2025 0254 by Mary Perez RN  Outcome: Progressing     Problem: Discharge Planning  Goal: Discharge to home or other facility with appropriate resources  6/4/2025 0254 by Mary Perez RN  Outcome: Progressing     Problem: Pain  Goal: Verbalizes/displays adequate comfort level or baseline comfort level  6/4/2025 0254 by Mary Perez RN  Outcome: Progressing     
  Problem: Safety - Adult  Goal: Free from fall injury  6/4/2025 2015 by Cherri Romo RN  Outcome: Progressing  6/4/2025 1110 by Tena Baldwin RN  Outcome: Progressing     Problem: Chronic Conditions and Co-morbidities  Goal: Patient's chronic conditions and co-morbidity symptoms are monitored and maintained or improved  Outcome: Progressing     Problem: Skin/Tissue Integrity  Goal: Skin integrity remains intact  Description: 1.  Monitor for areas of redness and/or skin breakdown2.  Assess vascular access sites hourly3.  Every 4-6 hours minimum:  Change oxygen saturation probe site4.  Every 4-6 hours:  If on nasal continuous positive airway pressure, respiratory therapy assess nares and determine need for appliance change or resting period  6/4/2025 2015 by Cherri Romo RN  Outcome: Progressing  6/4/2025 1110 by Tena Baldwin RN  Outcome: Progressing     Problem: ABCDS Injury Assessment  Goal: Absence of physical injury  6/4/2025 2015 by Cherri Romo RN  Outcome: Progressing  6/4/2025 1110 by Tena Baldwin RN  Outcome: Progressing     Problem: Discharge Planning  Goal: Discharge to home or other facility with appropriate resources  Outcome: Progressing     Problem: Pain  Goal: Verbalizes/displays adequate comfort level or baseline comfort level  Outcome: Progressing     
  Problem: Safety - Adult  Goal: Free from fall injury  6/6/2025 0919 by Tena Bladwin RN  Outcome: Progressing  6/6/2025 0407 by Karoline Edwards RN  Outcome: Progressing     Problem: Chronic Conditions and Co-morbidities  Goal: Patient's chronic conditions and co-morbidity symptoms are monitored and maintained or improved  6/6/2025 0919 by Tena Baldwin RN  Outcome: Progressing  6/6/2025 0407 by Karoline Edwards RN  Outcome: Progressing     Problem: Skin/Tissue Integrity  Goal: Skin integrity remains intact  Description: 1.  Monitor for areas of redness and/or skin breakdown2.  Assess vascular access sites hourly3.  Every 4-6 hours minimum:  Change oxygen saturation probe site4.  Every 4-6 hours:  If on nasal continuous positive airway pressure, respiratory therapy assess nares and determine need for appliance change or resting period  6/6/2025 0919 by Tena Baldwin, RN  Outcome: Progressing  6/6/2025 0407 by Karoline Edwards RN  Outcome: Progressing     
  Problem: Safety - Adult  Goal: Free from fall injury  6/8/2025 2018 by Katiana Serrano, RN  Outcome: Progressing  6/8/2025 1053 by Thuy Conti, RN  Outcome: Progressing     
  Problem: Safety - Adult  Goal: Free from fall injury  Outcome: Progressing     
  Problem: Safety - Adult  Goal: Free from fall injury  Outcome: Progressing     Problem: Chronic Conditions and Co-morbidities  Goal: Patient's chronic conditions and co-morbidity symptoms are monitored and maintained or improved  Outcome: Progressing     
  Problem: Safety - Adult  Goal: Free from fall injury  Outcome: Progressing     Problem: Chronic Conditions and Co-morbidities  Goal: Patient's chronic conditions and co-morbidity symptoms are monitored and maintained or improved  Outcome: Progressing     Problem: Skin/Tissue Integrity  Goal: Skin integrity remains intact  Description: 1.  Monitor for areas of redness and/or skin breakdown2.  Assess vascular access sites hourly3.  Every 4-6 hours minimum:  Change oxygen saturation probe site4.  Every 4-6 hours:  If on nasal continuous positive airway pressure, respiratory therapy assess nares and determine need for appliance change or resting period  Outcome: Progressing     
  Problem: Safety - Adult  Goal: Free from fall injury  Outcome: Progressing     Problem: Chronic Conditions and Co-morbidities  Goal: Patient's chronic conditions and co-morbidity symptoms are monitored and maintained or improved  Outcome: Progressing     Problem: Skin/Tissue Integrity  Goal: Skin integrity remains intact  Description: 1.  Monitor for areas of redness and/or skin breakdown2.  Assess vascular access sites hourly3.  Every 4-6 hours minimum:  Change oxygen saturation probe site4.  Every 4-6 hours:  If on nasal continuous positive airway pressure, respiratory therapy assess nares and determine need for appliance change or resting period  Outcome: Progressing     Problem: ABCDS Injury Assessment  Goal: Absence of physical injury  Outcome: Progressing     
  Problem: Safety - Adult  Goal: Free from fall injury  Outcome: Progressing     Problem: Chronic Conditions and Co-morbidities  Goal: Patient's chronic conditions and co-morbidity symptoms are monitored and maintained or improved  Outcome: Progressing     Problem: Skin/Tissue Integrity  Goal: Skin integrity remains intact  Description: 1.  Monitor for areas of redness and/or skin breakdown2.  Assess vascular access sites hourly3.  Every 4-6 hours minimum:  Change oxygen saturation probe site4.  Every 4-6 hours:  If on nasal continuous positive airway pressure, respiratory therapy assess nares and determine need for appliance change or resting period  Outcome: Progressing     Problem: ABCDS Injury Assessment  Goal: Absence of physical injury  Outcome: Progressing     
  Problem: Safety - Adult  Goal: Free from fall injury  Outcome: Progressing     Problem: Chronic Conditions and Co-morbidities  Goal: Patient's chronic conditions and co-morbidity symptoms are monitored and maintained or improved  Outcome: Progressing     Problem: Skin/Tissue Integrity  Goal: Skin integrity remains intact  Description: 1.  Monitor for areas of redness and/or skin breakdown2.  Assess vascular access sites hourly3.  Every 4-6 hours minimum:  Change oxygen saturation probe site4.  Every 4-6 hours:  If on nasal continuous positive airway pressure, respiratory therapy assess nares and determine need for appliance change or resting period  Outcome: Progressing     Problem: ABCDS Injury Assessment  Goal: Absence of physical injury  Outcome: Progressing     Problem: Discharge Planning  Goal: Discharge to home or other facility with appropriate resources  Outcome: Progressing     
  Problem: Safety - Adult  Goal: Free from fall injury  Outcome: Progressing     Problem: Chronic Conditions and Co-morbidities  Goal: Patient's chronic conditions and co-morbidity symptoms are monitored and maintained or improved  Outcome: Progressing     Problem: Skin/Tissue Integrity  Goal: Skin integrity remains intact  Description: 1.  Monitor for areas of redness and/or skin breakdown2.  Assess vascular access sites hourly3.  Every 4-6 hours minimum:  Change oxygen saturation probe site4.  Every 4-6 hours:  If on nasal continuous positive airway pressure, respiratory therapy assess nares and determine need for appliance change or resting period  Outcome: Progressing     Problem: ABCDS Injury Assessment  Goal: Absence of physical injury  Outcome: Progressing     Problem: Discharge Planning  Goal: Discharge to home or other facility with appropriate resources  Outcome: Progressing     Problem: Pain  Goal: Verbalizes/displays adequate comfort level or baseline comfort level  Outcome: Progressing     
  Problem: Safety - Adult  Goal: Free from fall injury  Outcome: Progressing     Problem: Chronic Conditions and Co-morbidities  Goal: Patient's chronic conditions and co-morbidity symptoms are monitored and maintained or improved  Outcome: Progressing     Problem: Skin/Tissue Integrity  Goal: Skin integrity remains intact  Description: 1.  Monitor for areas of redness and/or skin breakdown2.  Assess vascular access sites hourly3.  Every 4-6 hours minimum:  Change oxygen saturation probe site4.  Every 4-6 hours:  If on nasal continuous positive airway pressure, respiratory therapy assess nares and determine need for appliance change or resting period  Outcome: Progressing     Problem: ABCDS Injury Assessment  Goal: Absence of physical injury  Outcome: Progressing     Problem: Discharge Planning  Goal: Discharge to home or other facility with appropriate resources  Outcome: Progressing     Problem: Pain  Goal: Verbalizes/displays adequate comfort level or baseline comfort level  Outcome: Progressing     Problem: Nutrition Deficit:  Goal: Optimize nutritional status  Outcome: Progressing     
  Problem: Safety - Adult  Goal: Free from fall injury  Outcome: Progressing     Problem: Chronic Conditions and Co-morbidities  Goal: Patient's chronic conditions and co-morbidity symptoms are monitored and maintained or improved  Outcome: Progressing     Problem: Skin/Tissue Integrity  Goal: Skin integrity remains intact  Description: 1.  Monitor for areas of redness and/or skin breakdown2.  Assess vascular access sites hourly3.  Every 4-6 hours minimum:  Change oxygen saturation probe site4.  Every 4-6 hours:  If on nasal continuous positive airway pressure, respiratory therapy assess nares and determine need for appliance change or resting period  Outcome: Progressing     Problem: ABCDS Injury Assessment  Goal: Absence of physical injury  Outcome: Progressing     Problem: Discharge Planning  Goal: Discharge to home or other facility with appropriate resources  Outcome: Progressing     Problem: Pain  Goal: Verbalizes/displays adequate comfort level or baseline comfort level  Outcome: Progressing  Flowsheets (Taken 6/2/2025 2000)  Verbalizes/displays adequate comfort level or baseline comfort level: Encourage patient to monitor pain and request assistance     
  Problem: Safety - Adult  Goal: Free from fall injury  Outcome: Progressing     Problem: Chronic Conditions and Co-morbidities  Goal: Patient's chronic conditions and co-morbidity symptoms are monitored and maintained or improved  Outcome: Progressing     Problem: Skin/Tissue Integrity  Goal: Skin integrity remains intact  Description: 1.  Monitor for areas of redness and/or skin breakdown2.  Assess vascular access sites hourly3.  Every 4-6 hours minimum:  Change oxygen saturation probe site4.  Every 4-6 hours:  If on nasal continuous positive airway pressure, respiratory therapy assess nares and determine need for appliance change or resting period  Outcome: Progressing  Flowsheets (Taken 6/8/2025 1051)  Skin Integrity Remains Intact: Monitor for areas of redness and/or skin breakdown     Problem: ABCDS Injury Assessment  Goal: Absence of physical injury  Outcome: Progressing     Problem: Discharge Planning  Goal: Discharge to home or other facility with appropriate resources  Outcome: Progressing     Problem: Pain  Goal: Verbalizes/displays adequate comfort level or baseline comfort level  Outcome: Progressing     Problem: Nutrition Deficit:  Goal: Optimize nutritional status  Outcome: Progressing     
  Problem: Safety - Adult  Goal: Free from fall injury  Outcome: Progressing     Problem: Skin/Tissue Integrity  Goal: Skin integrity remains intact  Description: 1.  Monitor for areas of redness and/or skin breakdown2.  Assess vascular access sites hourly3.  Every 4-6 hours minimum:  Change oxygen saturation probe site4.  Every 4-6 hours:  If on nasal continuous positive airway pressure, respiratory therapy assess nares and determine need for appliance change or resting period  Outcome: Progressing     Problem: ABCDS Injury Assessment  Goal: Absence of physical injury  Outcome: Progressing     
Called and spoke with the patient's Sister Stephanie and provided an update.  All questions answered.    Electronically signed by January Duggan MD on 6/2/2025 at 2:57 PM    
I called and spoke with the caregiver Stephanie.  We discussed oncology, Dr. Law, and the discussion they had yesterday.  Stephanie is aware of the patient's poor prognosis and how aggressive this cancer is, terminal, within a couple months.  We spoke about hospice.  Stephanie ultimately wishes for the patient to go home with hospice like they did with a similar family member recently.  She does not want Isamar to go to a facility with hospice.  Stephanie is open to talking with hospice to learn more information.  Updated case management.    Electronically signed by January Duggan MD on 6/6/2025 at 3:07 PM    
I called the sister Stephanie and provided an update. All questions answered.     Electronically signed by January Duggan MD on 6/4/2025 at 6:47 PM    
RN  Outcome: Adequate for Discharge  6/10/2025 1348 by Masha Roy RN  Outcome: Progressing  Goal: Urinary catheter remains patent  6/10/2025 1510 by Masha Roy RN  Outcome: Adequate for Discharge  6/10/2025 1348 by Masha Roy, RN  Outcome: Progressing

## 2025-06-10 NOTE — FLOWSHEET NOTE
06/05/25 1830   Vital Signs   /61   Temp 97.6 °F (36.4 °C)   Pulse 86   Respirations 18   Post-Hemodialysis Assessment   Post-Treatment Procedures Blood returned;Catheter capped, clamped and heparinized x 2 ports   Machine Disinfection Process Acid/Vinegar Clean;Heat Disinfect;Exterior Machine Disinfection   Dialyzer Clearance Moderately streaked   Duration of Treatment (minutes) 120 minutes   Hemodialysis Intake (ml) 300 ml   Hemodialysis Output (ml) 600 ml   NET Removed (ml) 300   Tolerated Treatment Good   Interventions Taken Ultrafiltration stopped   Patient Response to Treatment tolerated tx well however became hypotensive with increased uf. removed 300 mls on this first tx. vss pt has no complaints   Time Off 1830   Patient Disposition Return to room   Observations & Evaluations   Level of Consciousness 0   O2 Device Nasal cannula       
   06/07/25 1037   Vital Signs   BP 97/60   Temp 98.4 °F (36.9 °C)   Pulse 82   Respirations 18   Post-Hemodialysis Assessment   Post-Treatment Procedures Blood returned;Catheter capped, clamped and heparinized x 2 ports   Machine Disinfection Process Acid/Vinegar Clean;Heat Disinfect   Rinseback Volume (ml) 300 ml   Blood Volume Processed (Liters) 21.5 L   Dialyzer Clearance Lightly streaked   Duration of Treatment (minutes) 160 minutes   Heparin Amount Administered During Treatment (mL) 0 mL   Hemodialysis Intake (ml) 300 ml   Hemodialysis Output (ml) 871 ml   NET Removed (ml) 571   Tolerated Treatment Poor   Patient Response to Treatment HD treatment hard to complete due to suboptimal catheter flow.  Dr. Alexandre aware.  Ran patient as best we could.  Patient clotted with 20 minutes left.  treatmement terminated.   Bilateral Breath Sounds Diminished   Patient Disposition Return to room   Observations & Evaluations   Level of Consciousness 0   Oriented X 4   Heart Rhythm Regular   Respiratory Quality/Effort Unlabored       
   06/09/25 1830   Vital Signs   /64   Temp 97.7 °F (36.5 °C)   Pulse 96   Respirations 18   Weight - Scale 97.5 kg (214 lb 15.2 oz)   Weight Method Stated   Percent Weight Change 0.52   Pain Assessment   Pain Assessment None - Denies Pain   Pain Level 0   Hemodialysis Central Access - Temporary Right Femoral vein   Placement Date/Time: 06/05/25 1802   Orientation: Right  Access Location: Femoral vein  Catheter Size: 14 fr   Continued need for line? No   Post-Hemodialysis Assessment   Post-Treatment Procedures Blood returned   Machine Disinfection Process Exterior Machine Disinfection;Acid/Vinegar Clean;Heat Disinfect   Rinseback Volume (ml) 300 ml   Blood Volume Processed (Liters) 24.8 L   Dialyzer Clearance Heavily streaked   Duration of Treatment (minutes) 123 minutes   Heparin Amount Administered During Treatment (mL) 0 mL   Hemodialysis Intake (ml) 800 ml   Hemodialysis Output (ml) 300 ml   NET Removed (ml) -500   Tolerated Treatment Poor   Interventions Taken Fluid bolus;Head of bed lowered;Ultrafiltration stopped   Patient Response to Treatment hd terminated 57 mins early due to pt nauseated throughout the the tx pt did have 2 episodes with pt was lightheaded and dizzy during that time vitals remained stable UF remained off last 1 hour of HD pt +500mL termination order was given by Dr. Alexandre at bedside HD Right femoral temporary hemo dialysis line removed per P&P w/o issues catheter intact pt tolerated procedure w/o issues pressure held x 10 minutes 4x4 placed with tape no bleeding noted both nephrology and floor RN Renetta notified.   Physician Notified Yes   Patient Disposition Return to room       
ET Nurse (Follow up) 8410A  Admit Date: 5/16/2025 10:04 AM    Reason for consult:  Colostomy    Stoma assessment:     06/10/25 1350   Colostomy LUQ   Placement Date/Time: 09/19/97 1556   Pre-existing: Yes  Location: LUQ   Stomal Appliance 1 piece;Changed   Stoma  Assessment Protrudes;Red;Moist   Peristomal Assessment Intact   Treatment Barrier ring;Site care;Pouch change  (1 piece flat, barrier strips)   Stool Appearance Loose   Stool Color Yellow;Brown   Stool Amount Smear         Plan:  Pouch change  Following patient.    Parul Roy RN 6/10/2025 2:36 PM   
ET Nurse(follow up) 8410  Admit Date: 5/16/2025 10:04 AM    Reason for consult:  colostomy management    Stoma assessment:     06/03/25 1035   Colostomy LUQ   Placement Date/Time: 09/19/97 1556   Pre-existing: Yes  Location: LUQ   Stomal Appliance 1 piece;Flat;Changed   Stoma  Assessment Protrudes;Moist;Red;Swelling   Peristomal Assessment Intact   Treatment Barrier ring;Pouch change  (skin care, strips)   Output (mL) 0 ml     Plan:  pouch changed  Will follow    Mirela Guidry RN 6/3/2025 12:33 PM   
ET Nurse(follow up) 8410  Admit Date: 5/16/2025 10:04 AM    Reason for consult:  colostomy management    Stoma assessment:     06/04/25 0930   Colostomy LUQ   Placement Date/Time: 09/19/97 1556   Pre-existing: Yes  Location: LUQ   Stomal Appliance Intact   Stoma  Assessment Protrudes;Moist;Red   Stool Color Brown   Stool Amount Small     Patient had previously been leaking  Pouch remains on from when ostomy nurse changed yesterday    Plan:    Will follow    Mirela Guidry RN 6/4/2025 12:15 PM   
ET Nurse(initial evaluation)  [id number]  Admit Date: 5/16/2025 10:04 AM    Reason for consult:  colostomy leaking    Stoma assessment:     05/20/25 0919   Colostomy LUQ   Placement Date/Time: 09/19/97 1556   Pre-existing: Yes  Location: LUQ   Stomal Appliance 1 piece;Flat   Stoma  Assessment Moist;Protrudes;Red   Peristomal Assessment Intact   Treatment Barrier ring;Pouch change  (skin care, strips)   Stool Appearance Loose   Stool Color Brown   Output (mL) 100 ml     Plan:  pouch changed  Will follow    Mirela Guidry RN 5/20/2025 9:20 AM   
Condition/Temp Dry;Warm;Swollen/edematous   Abdomen Inspection Distended;Soft;Obese   Bowel Sounds (All Quadrants) Active;Present   Handoff   Communication Given Transfer Handoff   Handoff Given To Tena Baldwin RN   Handoff Received From Lauren Reyna RN   Handoff Communication Telephone   Time Handoff Given 1412   End of Shift Check Performed N/A

## 2025-06-10 NOTE — DISCHARGE SUMMARY
dictation they should be addressed directly with the author for clarification.     Signed:  Electronically signed by Shan Hinson MD on 6/10/2025 at 1:32 PM

## 2025-06-10 NOTE — CARE COORDINATION
CM update note.  Discharge plan is home with [facility].  Bang orders are on the chart.  Spoke with patient sister who confirmed the discharge plan.  She is her paid caregiver.  She plans to stay with her after discharge to assist.  She will provide transport home on the day of discharge.  Patient is S/p CT guided core biopsy of omental mass, S/p cysto and bilateral stent exchange on 5/19. S/p reduction of stoma by general surgery.  ID following IV Merrem to complete today.  Nephrology following.  IV albumin q 8, Calcium gluconate x 1, IVF @ 75 ml/hr.  Creatinine 5.0 today, baseline is 3.8.  Discharge order placed if cleared by all.  Palliative consult today.  CM/SW to follow.  Miguel Angel Westbrook RN [phone].    
CM update note.  Discharge plan remains home with [facility].  Bang orders are on the chart.  Will need to update home care orders to include nephrostomy tube care.  Patient sister is her paid caregiver and plans to stay with her upon discharge.  Her sister will provide transport home on day of discharge.  Nephrology following.  Creatinine improving 6.2 today, baseline is 3.8.  Calcium gluconate x 1.  CM/SW to follow.  Miguel Angel Westbrook RN [phone].    
CM update note.  Hospice consult noted.  Spoke with patient sister Stephanie over the phone.  She wants to proceed with Skilled Nursing home placement for rehab.  She is not ready to start hospice at this time.  CM will provide her with list of Hospice agencies for future use.  [facility]. [facility]. [facility].  Referrals sent via [facility].  Temporary dialysis line placed and first dialysis treatment on 6/5.  Creatinine 5.9  today.  [facility] is out of network with patient insurance.  Await [facility] to review.  Will need tunneled catheter if moving forward with dialysis.  Will need insurance precert to accepting SNF.  CM/JAXON to follow.  Miguel Angel Westbrook RN  [phone].      1555:  spoke with attending, patient sister is interested in Hospice.  I left Vm message with Stephanie to discuss further.  CM/SW to follow.  Miguel Angel Westbrook RN, [phone].    
CM update note.  Met with patient at the bedside to discuss discharge planning.  Patient lives alone in an apartment with elevator access.  Her sister is her paid caregiver 6 days per weeks 4-4.5 hours per day.  Patient uses a walker in her home and a cane when she leaves home.  She owns a shower chair.  She has a life alert and moms meals delivered.  Patient plans to return home with [facility].  Her sister will provide transport on day of discharge.  Patient is S/p CT guided core biopsy of omental mass, S/p cysto and bilateral stent exchange on 5/19.  S/p reduction of stoma by general surgery.  ID following and remains on IV Merrem q 12 and will continue until tomorrow.  Nephrology following for hyperkalemia.  Calcium gluconate x 1 today.  Creatinine 5.1, baseline is 3.8.  IVF @ 125 ml/hr.  Left VM message with patient sister to confirm discharge planning.  Bang orders placed for [facility].  CM/SW to follow.  Miguel Angel Westbrook RN [phone].    
CM update note.  Met with patient sister Stephanie.  She reports that the patient does not want to go through any aggressive treatments.  She wants to go home.  Discussed Hospice.  She has chosen [facility].  Referral sent via [facility].  Goal is home with Hospice but has questions regarding dialysis.  Await Hospice discussion.  CM/JAXON to follow.  Miguel Angel Westbrook RN, [phone].    
CM update note.  Neurology consulted for concerns of stroke like symptoms.  CT Head ordered.  Renal Uls and MBS completed.  Minced and moist solid with thin liquids recommended.  Nephrology following.  Creatinine down to 7.3 from peaking at 8.0 yesterday.  Patient is s/p right IR guided nephrostomy tube on 5/27.  IV Bumex x 1, calcium gluconate x 1.  PT/OT am-pac is 10.  Met with patient and her sister.  Went over therapy evals.  Discharge plan remains home with [facility].  Will need updated orders to include the nephrostomy tube.   NEL orders are on the chart.  Patient sister is her paid caregiver. She is planning to stay with her at discharge. Patient sister will provide transport home on day of discharge.   CM/SW to follow.  Miguel Angel Westbrook RN [phone].    
CM update note.  Patient had last hemodialysis yesterday.  Temporary dialysis line pulled.  [facility] has accepted.  Spoke with Martha from [facility].  She spoke with patient sister and the plan is home with [facility] today.  She will work on getting DME equipment delivered to the home.  Once delivered will set up transport.  Spoke with patient sister Stephanie.  Equipment to be delivered today around noon.  She will be ready for her to come home after that.  Perfect Serve message sent to attending to check for discharge today.      1420:  discharge order noted.  DME equipment has been delivered.  Transport set up via stretcher with PAS.   time is between [id number].  [facility], patient, patient sister, RN notified of  time.  Ambulance form with envelope placed on the soft chart.  Miguel Angel Westbrook RN [phone].    
CM update note.  Patient is s/p right IR guided nephrostomy tube on 5/27.  Creatinine worsening.  Patient may require dialysis.  Patient is lethargic and unable to swallow pills today.  SLP and Neurology consulted.  Renal Uls ordered.  Palliative consulted for goals of care.  Met with patient and her sister at the bedside to discuss discharge planning.  Goal is home with [facility].  Will need updated orders to include the nephrostomy tube.  Patient sister is her paid caregiver.  She is planning to stay with her at discharge.  They report not being out of the bed in several days.  Will ask to updated therapy evals to assist with discharge planning.  Called [phone] to request therapy see her either today or tomorrow.  CM/SW to follow.  Miguel Angel Westbrook RN [phone].    
CM update note. Discharge plan is home with [facility]. Bang orders are on the chart.  Patient sister is her paid caregiver and plans to stay with after she discharges.  Her sister will provide transport home on day of discharge.  Patient is S/p CT guided core biopsy of omental mass, S/p cysto and bilateral stent exchange on 5/19. S/p reduction of stoma by general surgery.  Chronic knutson.  Urology has signed off.  Completed IV Merrem.  ID has signed off.  Nephrology following.  Sodium down to 123, recheck is 121, creatinine improving to 4.6.  urine studies ordered.  IVF stopped.  Calcium low at 8.0, Calcium gluconate ordered.  CM/SW to follow.  Miguel Angel Westbrook RN [phone].    
CM update note. Discharge plan remains home with [facility]. Bang orders are on the chart. Will need to update home care orders to include nephrostomy tube care. Patient sister is her paid caregiver and plans to stay with her upon discharge. Her sister will provide transport home on day of discharge. Nephrology following.  Creatinine up to 6.5, baseline is 3.8.   IV Bumex x 1.  CM/SW to follow.  Miguel Angel Westbrook RN [phone].    
CM update note. Discharge plan remains home with [facility]. Bang orders are on the chart. Will need to update home care orders to include nephrostomy tube care. Patient sister is her paid caregiver and plans to stay with her upon discharge.  Patient will need ambulette transport home on day of discharge.  Ambulette form with envelope placed on the soft chart.  Creatinine trending up to 7.3 awaiting renal plan of care.  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  Patient placed on supplemental oxygen at 2 L, baseline is room air.  CM/SW to follow.  Miguel Angel Westbrook RN [phone].     1400:  Spoke with patient sister.  They are planning to start hemodialysis today.  Would like to explore [facility] for short term rehab.  Discussed going to a facility that can do dialysis.  List provided with SARs that have dialysis den.  General surgery consulted for temporary dialysis catheter.  Await [facility] choices.  CM/SW to follow.  Miguel Angel Westbrook RN [phone].    
Patient is S/p reduction of stoma of stoma by GS, no revision, S/p IR biopsy 5/19 and s/p Cystopanendoscopy, retrograde pyelogram, bilateral urinary stent exchange. 5/19. Chronic knutson. Urology has signed off. Completed IV Merrem. ID has signed off. Nephrology following. Sodium 123 today, creatinine worsening to 5.3 today. Calcium low at 8.3 today. Plan is to return home with [facility] when medically ready. Resumption of care orders are in. Patient's sister is her paid caregiver and plans to stay with after she discharges, she will also transport patient home at time of discharge.   Lizbeth Camargo RN CM  [phone]              
Patient is S/p reduction of stoma of stoma by GS, no revision, S/p IR biopsy 5/19 and s/p Cystopanendoscopy, retrograde pyelogram, bilateral urinary stent exchange. 5/19. She is also is s/p right IR guided nephrostomy tube on 5/27. Chronic knutson.  Creatinine improving down to 6.2, sodium 134 today. Urology okay for discharge and follow-up outpatient. Neurology signed off. Await input & plan from Nephrology today. Patient's O2 sat is 97% on room air today. Plan remains to return home with [facility] when medically ready. Resumption of care orders are in. Patient's sister is her paid caregiver and plans to stay with after she discharges, she will also transport patient home at time of discharge. [facility]/Destination complete.  Lizbeth Camargo RN   [phone]    
Patient is S/p reduction of stoma of stoma by GS, no revision, S/p IR biopsy 5/19 and s/p Cystopanendoscopy, retrograde pyelogram, bilateral urinary stent exchange. 5/19. She is also is s/p right IR guided nephrostomy tube on 5/27. Chronic knutson. Sodium 126 today. Cr 7.0 today. WBC's 11.7 today. Nephrology and urology both following. Plan remains to return home with [facility] when medically ready. Resumption of care orders are in. Patient's sister is her paid caregiver and plans to stay with after she discharges, she will also transport patient home at time of discharge. [facility]/Destination complete.    Lizbeth Camargo RN   [phone]      
individualized plan of care/goals and shares the quality data associated with the providers was provided to: Patient   Patient Representative Name:       The Patient and/or Patient Representative Agree with the Discharge Plan? Yes    Eloise Westbrook RN  Case Management Department  Ph: [phone] Fax:

## 2025-06-11 ENCOUNTER — CARE COORDINATION (OUTPATIENT)
Dept: CARE COORDINATION | Age: 75
End: 2025-06-11

## 2025-06-11 NOTE — CARE COORDINATION
Care Transitions Note    Initial Call - Call within 2 business days of discharge: Yes    Patient Current Location:  [place]    Patient: Isamar Lopez    Patient : 1950   MRN: <[id number]>    Reason for Admission: fall, CKD  Discharge Date: 6/10/25  RURS: Readmission Risk Score: 21.5      Last Discharge Facility       Date Complaint Diagnosis Description Type Department Provider    25 Fall CKD (chronic kidney disease) stage 4, GFR 15-29 ml/min ([facility]) ... ED to Hosp-Admission (Discharged) (ADMITTED) Shan Saunders MD; Diana Iqbal     Care Summary Note: Spoke with Diana at [facility], she confirmed that Isamar was admitted to home hospice services yesterday, 6/10/25.   Care transitions to sign off.     Follow Up Appointment:   Future Appointments         Provider Specialty Dept Phone    2025 12:00 PM [facility] Infusion Therapy [phone]    10/16/2025 2:45 PM Juliano Page MD Family Medicine [phone]    10/20/2025 11:15 AM Juliano Page MD Family Medicine [phone]          Patient closed (entered hospice services) from the Care Transitions program on 25.    Mirela Fairbanks RN

## (undated) DEVICE — SOLUTION IRRIG 3000ML STRL H2O USP UROMATIC PLAS CONT

## (undated) DEVICE — GLOVE ORANGE PI 7 1/2   MSG9075

## (undated) DEVICE — 4-PORT MANIFOLD: Brand: NEPTUNE 2

## (undated) DEVICE — SOLUTION IRRG H2O 3000 ML USP STRL TITAN XL CONTAINER

## (undated) DEVICE — GOWN,SIRUS,FABRNF,2XL,18/CS: Brand: MEDLINE

## (undated) DEVICE — SOLUTION IRRIG 1000ML STRL H2O USP PLAS POUR BTL

## (undated) DEVICE — MEDIA CONTRAST ISOVUE 300 100ML

## (undated) DEVICE — CATHETER URET 5FR L70CM OPN END SGL LUMN INJ HUB FLEXIMA

## (undated) DEVICE — CATHETER F BLLN 30CC 20FR 2 W HYDRGEL COAT LESS TRAUM LUB

## (undated) DEVICE — PLUG,CATHETER,DRAINAGE PROTECTOR,TUBE: Brand: MEDLINE

## (undated) DEVICE — GARMENT,MEDLINE,DVT,INT,CALF,MED, GEN2: Brand: MEDLINE

## (undated) DEVICE — SOLUTION IRRIG 3000ML 0.9% SOD CHL USP UROMATIC PLAS CONT

## (undated) DEVICE — DRAINBAG,ANTI-REFLUX TOWER,L/F,2000ML,LL: Brand: MEDLINE

## (undated) DEVICE — MEDIA CONTRAST ISOVUE  300 10X50ML

## (undated) DEVICE — HOSE CONN FOR WST MGMT SYS NEPTUNE 2

## (undated) DEVICE — STRAP,CATHETER,ADJBL FOAM,HOOK&LOOP: Brand: MEDLINE

## (undated) DEVICE — SYRINGE MED 20ML STD CLR PLAS LUERLOCK TIP N CTRL DISP

## (undated) DEVICE — SYRINGE MED 10ML LUERLOCK TIP W/O SFTY DISP

## (undated) DEVICE — GOWN,SIRUS,FABRNF,XL,20/CS: Brand: MEDLINE

## (undated) DEVICE — GAUZE,SPONGE,4"X4",8PLY,STRL,LF,10/TRAY: Brand: MEDLINE

## (undated) DEVICE — CYSTO: Brand: MEDLINE INDUSTRIES, INC.

## (undated) DEVICE — TUBING, SUCTION, 3/16" X 12', STRAIGHT: Brand: MEDLINE

## (undated) DEVICE — CYSTO PACK: Brand: MEDLINE INDUSTRIES, INC.

## (undated) DEVICE — SPECIMEN CUP W/LID: Brand: DEROYAL

## (undated) DEVICE — GUIDEWIRE ENDOSCP L150CM DIA0.035IN TIP 3CM PTFE NIT

## (undated) DEVICE — CATHETER F BLLN 5CC 16FR 2 W HYDRGEL COAT LESS TRAUM LUB

## (undated) DEVICE — BAG DRNGE COMB PK

## (undated) DEVICE — SOLUTION SCRB 4OZ 4% CHG H2O AIDED FOR PREOPERATIVE SKIN

## (undated) DEVICE — MEDICINE CUP, GRADUATED, STER: Brand: MEDLINE

## (undated) DEVICE — GUIDEWIRE UROLOGICAL STR STD 0.035 IN X150 CM (QTY = BOX OF 5)

## (undated) DEVICE — BAG DRAINAGE CONTAINER 15 LT FLUID COLLCTN

## (undated) DEVICE — GUIDEWIRE ENDO L150CM DIA0.035IN STR TIP (QTY = EACH)

## (undated) DEVICE — GUIDEWIRE VASC STR 3 CM 0.035 INX150 CM STD NIT ZIPWIRE

## (undated) DEVICE — SOLUTION SURG PREP ANTIMICROBIAL 4 OZ SKIN WND EXIDINE

## (undated) DEVICE — GLOVE SURG SIGNATURE LTX ESS LTX PF 7.5

## (undated) DEVICE — MEDIA CONTRAST RX ISOVUE-300 61% 30ML VIALS

## (undated) DEVICE — CATHETER F BLLN 5CC 18FR 2 W HYDRGEL COAT LESS TRAUM LUB

## (undated) DEVICE — CATHETER URETH 22FR BLLN 5CC STD LTX 3 W F TWO OPP DRNGE

## (undated) DEVICE — READY WET SKIN SCRUB TRAY-LF: Brand: MEDLINE INDUSTRIES, INC.

## (undated) DEVICE — SOLUTION IV IRRIG WATER 1000ML POUR BRL 2F7114

## (undated) DEVICE — TRAY,SKIN SCRUB,DRY,W/GAUZE: Brand: MEDLINE

## (undated) DEVICE — SYRINGE MED 30ML STD CLR PLAS LUERLOCK TIP N CTRL DISP

## (undated) DEVICE — SOLUTION IRRIG 1000ML H2O PIC PLAS SHATTERPROOF CONTAINER

## (undated) DEVICE — SYRINGE, LUER LOCK, 10ML: Brand: MEDLINE

## (undated) DEVICE — Z INACTIVATING USE 2488003 GUIDEWIRE ENDO L150CM DIA0.035IN STR TIP (QTY = EACH)

## (undated) DEVICE — DEVICE TORQ KENDALL 0025IN 0038IN